# Patient Record
Sex: FEMALE | Race: WHITE | NOT HISPANIC OR LATINO | Employment: OTHER | URBAN - METROPOLITAN AREA
[De-identification: names, ages, dates, MRNs, and addresses within clinical notes are randomized per-mention and may not be internally consistent; named-entity substitution may affect disease eponyms.]

---

## 2017-03-03 ENCOUNTER — HOSPITAL ENCOUNTER (OUTPATIENT)
Dept: PHYSICAL MEDICINE AND REHAB | Facility: CLINIC | Age: 67
Discharge: HOME | End: 2017-03-03
Attending: ORTHOPAEDIC SURGERY

## 2017-03-03 DIAGNOSIS — Z98.1 S/P CERVICAL SPINAL FUSION: ICD-10-CM

## 2017-03-03 DIAGNOSIS — M81.0 OSTEOPOROSIS: ICD-10-CM

## 2017-03-03 DIAGNOSIS — M54.2 CERVICAL PAIN: ICD-10-CM

## 2017-03-20 ENCOUNTER — COMMUNICATION - HEALTHEAST (OUTPATIENT)
Dept: PHYSICAL MEDICINE AND REHAB | Facility: CLINIC | Age: 67
End: 2017-03-20

## 2017-03-27 ENCOUNTER — AMBULATORY - HEALTHEAST (OUTPATIENT)
Dept: PHYSICAL MEDICINE AND REHAB | Facility: CLINIC | Age: 67
End: 2017-03-27

## 2017-04-03 ENCOUNTER — RECORDS - HEALTHEAST (OUTPATIENT)
Dept: ADMINISTRATIVE | Facility: OTHER | Age: 67
End: 2017-04-03

## 2017-04-05 ENCOUNTER — RECORDS - HEALTHEAST (OUTPATIENT)
Dept: ADMINISTRATIVE | Facility: OTHER | Age: 67
End: 2017-04-05

## 2017-04-14 ENCOUNTER — AMBULATORY - HEALTHEAST (OUTPATIENT)
Dept: PHYSICAL MEDICINE AND REHAB | Facility: CLINIC | Age: 67
End: 2017-04-14

## 2017-04-21 ENCOUNTER — AMBULATORY - HEALTHEAST (OUTPATIENT)
Dept: PHYSICAL MEDICINE AND REHAB | Facility: CLINIC | Age: 67
End: 2017-04-21

## 2017-04-21 ENCOUNTER — RECORDS - HEALTHEAST (OUTPATIENT)
Dept: ADMINISTRATIVE | Facility: OTHER | Age: 67
End: 2017-04-21

## 2017-04-21 ENCOUNTER — RECORDS - HEALTHEAST (OUTPATIENT)
Dept: LAB | Facility: CLINIC | Age: 67
End: 2017-04-21

## 2017-04-21 LAB
CHOLEST SERPL-MCNC: 270 MG/DL
FASTING STATUS PATIENT QL REPORTED: ABNORMAL
HDLC SERPL-MCNC: 40 MG/DL
LDLC SERPL CALC-MCNC: 150 MG/DL
LDLC SERPL CALC-MCNC: ABNORMAL MG/DL
TRIGL SERPL-MCNC: 528 MG/DL

## 2017-04-26 ASSESSMENT — MIFFLIN-ST. JEOR: SCORE: 1439.32

## 2017-04-27 ENCOUNTER — ANESTHESIA - HEALTHEAST (OUTPATIENT)
Dept: SURGERY | Facility: HOSPITAL | Age: 67
End: 2017-04-27

## 2017-04-27 ENCOUNTER — SURGERY - HEALTHEAST (OUTPATIENT)
Dept: SURGERY | Facility: HOSPITAL | Age: 67
End: 2017-04-27

## 2017-04-27 ASSESSMENT — MIFFLIN-ST. JEOR: SCORE: 1421.62

## 2017-04-28 ASSESSMENT — MIFFLIN-ST. JEOR: SCORE: 1424.97

## 2017-04-29 ENCOUNTER — OFFICE VISIT - HEALTHEAST (OUTPATIENT)
Dept: OCCUPATIONAL THERAPY | Facility: HOSPITAL | Age: 67
End: 2017-04-29

## 2017-05-04 ENCOUNTER — RECORDS - HEALTHEAST (OUTPATIENT)
Dept: ADMINISTRATIVE | Facility: OTHER | Age: 67
End: 2017-05-04

## 2017-05-05 ENCOUNTER — COMMUNICATION - HEALTHEAST (OUTPATIENT)
Dept: PULMONOLOGY | Facility: OTHER | Age: 67
End: 2017-05-05

## 2017-05-05 ENCOUNTER — AMBULATORY - HEALTHEAST (OUTPATIENT)
Dept: PULMONOLOGY | Facility: OTHER | Age: 67
End: 2017-05-05

## 2017-05-05 DIAGNOSIS — R06.02 SOB (SHORTNESS OF BREATH): ICD-10-CM

## 2017-05-08 ENCOUNTER — RECORDS - HEALTHEAST (OUTPATIENT)
Dept: GENERAL RADIOLOGY | Facility: CLINIC | Age: 67
End: 2017-05-08

## 2017-05-08 DIAGNOSIS — M54.12 RADICULOPATHY, CERVICAL REGION: ICD-10-CM

## 2017-05-09 ENCOUNTER — HOSPITAL ENCOUNTER (OUTPATIENT)
Dept: PHYSICAL MEDICINE AND REHAB | Facility: CLINIC | Age: 67
Discharge: HOME | End: 2017-05-09
Attending: PHYSICIAN ASSISTANT

## 2017-05-09 ENCOUNTER — COMMUNICATION - HEALTHEAST (OUTPATIENT)
Dept: PHYSICAL MEDICINE AND REHAB | Facility: CLINIC | Age: 67
End: 2017-05-09

## 2017-05-09 DIAGNOSIS — G89.18 POST-OP PAIN: ICD-10-CM

## 2017-05-09 ASSESSMENT — MIFFLIN-ST. JEOR: SCORE: 1403.03

## 2017-05-25 ENCOUNTER — AMBULATORY - HEALTHEAST (OUTPATIENT)
Dept: PHYSICAL MEDICINE AND REHAB | Facility: CLINIC | Age: 67
End: 2017-05-25

## 2017-05-30 ENCOUNTER — RECORDS - HEALTHEAST (OUTPATIENT)
Dept: GENERAL RADIOLOGY | Facility: CLINIC | Age: 67
End: 2017-05-30

## 2017-05-30 DIAGNOSIS — G89.18 OTHER ACUTE POSTPROCEDURAL PAIN: ICD-10-CM

## 2017-06-06 ENCOUNTER — HOSPITAL ENCOUNTER (OUTPATIENT)
Dept: PHYSICAL MEDICINE AND REHAB | Facility: CLINIC | Age: 67
Discharge: HOME | End: 2017-06-06
Attending: PHYSICIAN ASSISTANT

## 2017-06-06 DIAGNOSIS — M54.12 CERVICAL RADICULOPATHY: ICD-10-CM

## 2017-06-06 ASSESSMENT — MIFFLIN-ST. JEOR: SCORE: 1430.24

## 2017-06-07 ENCOUNTER — RECORDS - HEALTHEAST (OUTPATIENT)
Dept: ADMINISTRATIVE | Facility: OTHER | Age: 67
End: 2017-06-07

## 2017-06-13 ENCOUNTER — RECORDS - HEALTHEAST (OUTPATIENT)
Dept: PULMONOLOGY | Facility: OTHER | Age: 67
End: 2017-06-13

## 2017-06-13 ENCOUNTER — RECORDS - HEALTHEAST (OUTPATIENT)
Dept: ADMINISTRATIVE | Facility: OTHER | Age: 67
End: 2017-06-13

## 2017-06-13 ENCOUNTER — OFFICE VISIT - HEALTHEAST (OUTPATIENT)
Dept: PULMONOLOGY | Facility: OTHER | Age: 67
End: 2017-06-13

## 2017-06-13 DIAGNOSIS — I42.9 CARDIOMYOPATHY (H): ICD-10-CM

## 2017-06-13 DIAGNOSIS — R06.02 SHORTNESS OF BREATH: ICD-10-CM

## 2017-06-13 DIAGNOSIS — R53.81 PHYSICAL DECONDITIONING: ICD-10-CM

## 2017-06-13 DIAGNOSIS — J96.90 RESPIRATORY FAILURE (H): ICD-10-CM

## 2017-06-13 DIAGNOSIS — E66.01 MORBID OBESITY DUE TO EXCESS CALORIES (H): ICD-10-CM

## 2017-06-13 DIAGNOSIS — G47.33 OSA TREATED WITH BIPAP: ICD-10-CM

## 2017-06-13 DIAGNOSIS — R06.09 DYSPNEA ON EXERTION: ICD-10-CM

## 2017-06-14 ENCOUNTER — OFFICE VISIT - HEALTHEAST (OUTPATIENT)
Dept: PHYSICAL THERAPY | Facility: REHABILITATION | Age: 67
End: 2017-06-14

## 2017-06-14 ENCOUNTER — AMBULATORY - HEALTHEAST (OUTPATIENT)
Dept: PHYSICAL MEDICINE AND REHAB | Facility: CLINIC | Age: 67
End: 2017-06-14

## 2017-06-14 DIAGNOSIS — G89.18 POST-OPERATIVE PAIN: ICD-10-CM

## 2017-06-14 DIAGNOSIS — M54.12 CERVICAL RADICULOPATHY: ICD-10-CM

## 2017-06-20 ENCOUNTER — OFFICE VISIT - HEALTHEAST (OUTPATIENT)
Dept: PHYSICAL THERAPY | Facility: REHABILITATION | Age: 67
End: 2017-06-20

## 2017-06-20 DIAGNOSIS — M54.12 CERVICAL RADICULOPATHY: ICD-10-CM

## 2017-06-27 ENCOUNTER — OFFICE VISIT - HEALTHEAST (OUTPATIENT)
Dept: PHYSICAL THERAPY | Facility: REHABILITATION | Age: 67
End: 2017-06-27

## 2017-06-27 DIAGNOSIS — M54.12 CERVICAL RADICULOPATHY: ICD-10-CM

## 2017-06-29 ENCOUNTER — RECORDS - HEALTHEAST (OUTPATIENT)
Dept: ADMINISTRATIVE | Facility: OTHER | Age: 67
End: 2017-06-29

## 2017-07-05 ENCOUNTER — OFFICE VISIT - HEALTHEAST (OUTPATIENT)
Dept: PHYSICAL THERAPY | Facility: REHABILITATION | Age: 67
End: 2017-07-05

## 2017-07-05 DIAGNOSIS — M54.12 CERVICAL RADICULOPATHY: ICD-10-CM

## 2017-07-18 ENCOUNTER — OFFICE VISIT - HEALTHEAST (OUTPATIENT)
Dept: PHYSICAL THERAPY | Facility: REHABILITATION | Age: 67
End: 2017-07-18

## 2017-07-18 ENCOUNTER — COMMUNICATION - HEALTHEAST (OUTPATIENT)
Dept: FAMILY MEDICINE | Facility: CLINIC | Age: 67
End: 2017-07-18

## 2017-07-18 DIAGNOSIS — M54.12 CERVICAL RADICULOPATHY: ICD-10-CM

## 2017-07-20 ENCOUNTER — RECORDS - HEALTHEAST (OUTPATIENT)
Dept: GENERAL RADIOLOGY | Facility: CLINIC | Age: 67
End: 2017-07-20

## 2017-07-20 ENCOUNTER — RECORDS - HEALTHEAST (OUTPATIENT)
Dept: ADMINISTRATIVE | Facility: OTHER | Age: 67
End: 2017-07-20

## 2017-07-20 DIAGNOSIS — R06.09 OTHER FORMS OF DYSPNEA: ICD-10-CM

## 2017-07-20 DIAGNOSIS — M54.12 RADICULOPATHY, CERVICAL REGION: ICD-10-CM

## 2017-07-21 ENCOUNTER — HOSPITAL ENCOUNTER (OUTPATIENT)
Dept: CARDIOLOGY | Facility: HOSPITAL | Age: 67
Discharge: HOME | End: 2017-07-21

## 2017-07-21 ENCOUNTER — HOSPITAL ENCOUNTER (OUTPATIENT)
Dept: NUCLEAR MEDICINE | Facility: HOSPITAL | Age: 67
Discharge: HOME | End: 2017-07-21

## 2017-07-21 DIAGNOSIS — R06.02 SOB (SHORTNESS OF BREATH): ICD-10-CM

## 2017-07-21 DIAGNOSIS — R09.02 HYPOXIA: ICD-10-CM

## 2017-07-21 LAB
CV STRESS CURRENT BP HE: NORMAL
CV STRESS CURRENT HR HE: 53
CV STRESS CURRENT HR HE: 54
CV STRESS CURRENT HR HE: 54
CV STRESS CURRENT HR HE: 70
CV STRESS CURRENT HR HE: 71
CV STRESS CURRENT HR HE: 73
CV STRESS CURRENT HR HE: 73
CV STRESS CURRENT HR HE: 75
CV STRESS CURRENT HR HE: 76
CV STRESS CURRENT HR HE: 77
CV STRESS CURRENT HR HE: 78
CV STRESS DEVIATION TIME HE: NORMAL
CV STRESS ECHO PERCENT HR HE: NORMAL
CV STRESS EXERCISE STAGE HE: NORMAL
CV STRESS FINAL RESTING BP HE: NORMAL
CV STRESS FINAL RESTING HR HE: 70
CV STRESS MAX HR HE: 78
CV STRESS MAX TREADMILL GRADE HE: 0
CV STRESS MAX TREADMILL SPEED HE: 0
CV STRESS PEAK DIA BP HE: NORMAL
CV STRESS PEAK SYS BP HE: NORMAL
CV STRESS PHASE HE: NORMAL
CV STRESS PROTOCOL HE: NORMAL
CV STRESS RESTING PT POSITION HE: NORMAL
CV STRESS ST DEVIATION AMOUNT HE: NORMAL
CV STRESS ST DEVIATION ELEVATION HE: NORMAL
CV STRESS ST EVELATION AMOUNT HE: NORMAL
CV STRESS TEST TYPE HE: NORMAL
CV STRESS TOTAL STAGE TIME MIN 1 HE: NORMAL
NUC STRESS EJECTION FRACTION: 74 %
STRESS ECHO BASELINE BP: NORMAL
STRESS ECHO BASELINE HR: 53
STRESS ECHO CALCULATED PERCENT HR: 50 %
STRESS ECHO LAST STRESS BP: NORMAL
STRESS ECHO LAST STRESS HR: 76

## 2017-07-25 ENCOUNTER — HOSPITAL ENCOUNTER (OUTPATIENT)
Dept: PHYSICAL MEDICINE AND REHAB | Facility: CLINIC | Age: 67
Discharge: HOME | End: 2017-07-25
Attending: PHYSICIAN ASSISTANT

## 2017-07-25 DIAGNOSIS — M54.12 CERVICAL RADICULOPATHY: ICD-10-CM

## 2017-07-25 DIAGNOSIS — Z98.1 S/P CERVICAL SPINAL FUSION: ICD-10-CM

## 2017-07-26 ENCOUNTER — AMBULATORY - HEALTHEAST (OUTPATIENT)
Dept: PHYSICAL MEDICINE AND REHAB | Facility: CLINIC | Age: 67
End: 2017-07-26

## 2017-07-26 DIAGNOSIS — Z98.1 STATUS POST CERVICAL SPINAL FUSION: ICD-10-CM

## 2017-07-26 DIAGNOSIS — M54.12 CERVICAL RADICULITIS: ICD-10-CM

## 2017-07-28 ENCOUNTER — COMMUNICATION - HEALTHEAST (OUTPATIENT)
Dept: PHYSICAL MEDICINE AND REHAB | Facility: CLINIC | Age: 67
End: 2017-07-28

## 2017-08-01 ENCOUNTER — HOSPITAL ENCOUNTER (OUTPATIENT)
Dept: PHYSICAL MEDICINE AND REHAB | Facility: CLINIC | Age: 67
Discharge: HOME | End: 2017-08-01
Attending: PHYSICIAN ASSISTANT

## 2017-08-01 ENCOUNTER — OFFICE VISIT - HEALTHEAST (OUTPATIENT)
Dept: PULMONOLOGY | Facility: OTHER | Age: 67
End: 2017-08-01

## 2017-08-01 DIAGNOSIS — R06.09 DYSPNEA ON EXERTION: ICD-10-CM

## 2017-08-01 DIAGNOSIS — J96.11 CHRONIC RESPIRATORY FAILURE WITH HYPOXIA (H): ICD-10-CM

## 2017-08-01 DIAGNOSIS — E66.2 MORBID OBESITY WITH ALVEOLAR HYPOVENTILATION (H): ICD-10-CM

## 2017-08-01 DIAGNOSIS — R53.81 PHYSICAL DECONDITIONING: ICD-10-CM

## 2017-08-01 DIAGNOSIS — G47.33 OSA TREATED WITH BIPAP: ICD-10-CM

## 2017-08-01 DIAGNOSIS — M54.12 CERVICAL RADICULOPATHY: ICD-10-CM

## 2017-08-01 DIAGNOSIS — I50.32 CHRONIC DIASTOLIC CONGESTIVE HEART FAILURE (H): ICD-10-CM

## 2017-08-01 RX ORDER — ALENDRONATE SODIUM 70 MG/1
70 TABLET ORAL
Refills: 11 | Status: SHIPPED | COMMUNITY
Start: 2017-07-12

## 2017-08-02 ENCOUNTER — RECORDS - HEALTHEAST (OUTPATIENT)
Dept: ADMINISTRATIVE | Facility: OTHER | Age: 67
End: 2017-08-02

## 2017-08-08 ENCOUNTER — OFFICE VISIT - HEALTHEAST (OUTPATIENT)
Dept: PHYSICAL THERAPY | Facility: REHABILITATION | Age: 67
End: 2017-08-08

## 2017-08-08 DIAGNOSIS — R26.89 POOR BALANCE: ICD-10-CM

## 2017-08-08 DIAGNOSIS — Z74.09 DECREASED FUNCTIONAL MOBILITY AND ENDURANCE: ICD-10-CM

## 2017-08-08 DIAGNOSIS — M62.81 GENERALIZED MUSCLE WEAKNESS: ICD-10-CM

## 2017-08-09 ENCOUNTER — AMBULATORY - HEALTHEAST (OUTPATIENT)
Dept: LAB | Facility: CLINIC | Age: 67
End: 2017-08-09

## 2017-08-09 DIAGNOSIS — R06.09 DYSPNEA ON EXERTION: ICD-10-CM

## 2017-08-21 ENCOUNTER — ANESTHESIA - HEALTHEAST (OUTPATIENT)
Dept: SURGERY | Facility: HOSPITAL | Age: 67
End: 2017-08-21

## 2017-08-21 ASSESSMENT — MIFFLIN-ST. JEOR: SCORE: 1425.71

## 2017-08-22 ENCOUNTER — SURGERY - HEALTHEAST (OUTPATIENT)
Dept: SURGERY | Facility: HOSPITAL | Age: 67
End: 2017-08-22

## 2017-08-29 ENCOUNTER — AMBULATORY - HEALTHEAST (OUTPATIENT)
Dept: CARDIOLOGY | Facility: CLINIC | Age: 67
End: 2017-08-29

## 2017-10-13 ENCOUNTER — RECORDS - HEALTHEAST (OUTPATIENT)
Dept: ADMINISTRATIVE | Facility: OTHER | Age: 67
End: 2017-10-13

## 2017-10-13 ENCOUNTER — AMBULATORY - HEALTHEAST (OUTPATIENT)
Dept: CARDIOLOGY | Facility: CLINIC | Age: 67
End: 2017-10-13

## 2017-10-23 ENCOUNTER — OFFICE VISIT - HEALTHEAST (OUTPATIENT)
Dept: CARDIOLOGY | Facility: CLINIC | Age: 67
End: 2017-10-23

## 2017-10-23 DIAGNOSIS — I50.32 CHRONIC DIASTOLIC HEART FAILURE (H): ICD-10-CM

## 2017-10-23 DIAGNOSIS — G47.33 OBSTRUCTIVE SLEEP APNEA: ICD-10-CM

## 2017-10-23 DIAGNOSIS — N18.2 CHRONIC KIDNEY DISEASE, STAGE 2 (MILD): ICD-10-CM

## 2017-10-23 DIAGNOSIS — I10 ESSENTIAL HYPERTENSION: ICD-10-CM

## 2017-10-23 ASSESSMENT — MIFFLIN-ST. JEOR: SCORE: 1399.62

## 2017-10-25 ENCOUNTER — AMBULATORY - HEALTHEAST (OUTPATIENT)
Dept: CARDIOLOGY | Facility: CLINIC | Age: 67
End: 2017-10-25

## 2017-10-25 DIAGNOSIS — E87.70 HYPERVOLEMIA, UNSPECIFIED HYPERVOLEMIA TYPE: ICD-10-CM

## 2017-10-25 RX ORDER — FUROSEMIDE 20 MG
20 TABLET ORAL DAILY
Qty: 30 TABLET | Refills: 0 | Status: SHIPPED
Start: 2017-10-25 | End: 2022-10-31

## 2017-12-12 ENCOUNTER — RECORDS - HEALTHEAST (OUTPATIENT)
Dept: LAB | Facility: CLINIC | Age: 67
End: 2017-12-12

## 2017-12-12 LAB
CHOLEST SERPL-MCNC: 232 MG/DL
FASTING STATUS PATIENT QL REPORTED: NO
HDLC SERPL-MCNC: 67 MG/DL
LDLC SERPL CALC-MCNC: 123 MG/DL
TRIGL SERPL-MCNC: 208 MG/DL

## 2018-01-10 ENCOUNTER — RECORDS - HEALTHEAST (OUTPATIENT)
Dept: LAB | Facility: CLINIC | Age: 68
End: 2018-01-10

## 2018-01-10 ENCOUNTER — RECORDS - HEALTHEAST (OUTPATIENT)
Dept: ADMINISTRATIVE | Facility: OTHER | Age: 68
End: 2018-01-10

## 2018-01-10 LAB
ANION GAP SERPL CALCULATED.3IONS-SCNC: 13 MMOL/L (ref 5–18)
BUN SERPL-MCNC: 35 MG/DL (ref 8–22)
CALCIUM SERPL-MCNC: 9.6 MG/DL (ref 8.5–10.5)
CHLORIDE BLD-SCNC: 101 MMOL/L (ref 98–107)
CO2 SERPL-SCNC: 28 MMOL/L (ref 22–31)
CREAT SERPL-MCNC: 1.31 MG/DL (ref 0.6–1.1)
GFR SERPL CREATININE-BSD FRML MDRD: 41 ML/MIN/1.73M2
GLUCOSE BLD-MCNC: 104 MG/DL (ref 70–125)
POTASSIUM BLD-SCNC: 4.2 MMOL/L (ref 3.5–5)
SODIUM SERPL-SCNC: 142 MMOL/L (ref 136–145)

## 2018-01-11 ENCOUNTER — RECORDS - HEALTHEAST (OUTPATIENT)
Dept: ADMINISTRATIVE | Facility: OTHER | Age: 68
End: 2018-01-11

## 2018-01-12 ENCOUNTER — RECORDS - HEALTHEAST (OUTPATIENT)
Dept: ADMINISTRATIVE | Facility: OTHER | Age: 68
End: 2018-01-12

## 2018-01-16 ENCOUNTER — RECORDS - HEALTHEAST (OUTPATIENT)
Dept: ADMINISTRATIVE | Facility: OTHER | Age: 68
End: 2018-01-16

## 2018-02-16 ENCOUNTER — RECORDS - HEALTHEAST (OUTPATIENT)
Dept: LAB | Facility: CLINIC | Age: 68
End: 2018-02-16

## 2018-02-16 LAB
ANION GAP SERPL CALCULATED.3IONS-SCNC: 13 MMOL/L (ref 5–18)
BUN SERPL-MCNC: 48 MG/DL (ref 8–22)
CALCIUM SERPL-MCNC: 9.6 MG/DL (ref 8.5–10.5)
CHLORIDE BLD-SCNC: 102 MMOL/L (ref 98–107)
CO2 SERPL-SCNC: 26 MMOL/L (ref 22–31)
CREAT SERPL-MCNC: 1.32 MG/DL (ref 0.6–1.1)
GFR SERPL CREATININE-BSD FRML MDRD: 40 ML/MIN/1.73M2
GLUCOSE BLD-MCNC: 105 MG/DL (ref 70–125)
POTASSIUM BLD-SCNC: 4.5 MMOL/L (ref 3.5–5)
SODIUM SERPL-SCNC: 141 MMOL/L (ref 136–145)

## 2018-02-18 LAB — BACTERIA SPEC CULT: ABNORMAL

## 2018-02-19 LAB — BNP SERPL-MCNC: 137 PG/ML (ref 0–109)

## 2018-03-19 ENCOUNTER — OFFICE VISIT - HEALTHEAST (OUTPATIENT)
Dept: PULMONOLOGY | Facility: OTHER | Age: 68
End: 2018-03-19

## 2018-03-19 DIAGNOSIS — E66.01 MORBID OBESITY (H): ICD-10-CM

## 2018-03-19 DIAGNOSIS — R53.81 PHYSICAL DECONDITIONING: ICD-10-CM

## 2018-03-19 DIAGNOSIS — I51.89 DIASTOLIC DYSFUNCTION: ICD-10-CM

## 2018-03-19 DIAGNOSIS — G47.33 OSA TREATED WITH BIPAP: ICD-10-CM

## 2018-03-19 DIAGNOSIS — J96.11 CHRONIC RESPIRATORY FAILURE WITH HYPOXIA (H): ICD-10-CM

## 2018-04-05 ENCOUNTER — COMMUNICATION - HEALTHEAST (OUTPATIENT)
Dept: SURGERY | Facility: HOSPITAL | Age: 68
End: 2018-04-05

## 2018-05-16 ENCOUNTER — OFFICE VISIT - HEALTHEAST (OUTPATIENT)
Dept: PHYSICAL THERAPY | Facility: REHABILITATION | Age: 68
End: 2018-05-16

## 2018-05-16 DIAGNOSIS — R68.89 DECREASED FUNCTIONAL ACTIVITY TOLERANCE: ICD-10-CM

## 2018-05-16 DIAGNOSIS — R53.81 PHYSICAL DECONDITIONING: ICD-10-CM

## 2018-05-30 ENCOUNTER — OFFICE VISIT - HEALTHEAST (OUTPATIENT)
Dept: PHYSICAL THERAPY | Facility: REHABILITATION | Age: 68
End: 2018-05-30

## 2018-05-30 DIAGNOSIS — Z74.09 DECREASED FUNCTIONAL MOBILITY AND ENDURANCE: ICD-10-CM

## 2018-05-30 DIAGNOSIS — R26.89 POOR BALANCE: ICD-10-CM

## 2018-05-30 DIAGNOSIS — R53.81 PHYSICAL DECONDITIONING: ICD-10-CM

## 2018-05-30 DIAGNOSIS — R68.89 DECREASED FUNCTIONAL ACTIVITY TOLERANCE: ICD-10-CM

## 2018-05-30 DIAGNOSIS — M62.81 GENERALIZED MUSCLE WEAKNESS: ICD-10-CM

## 2018-06-06 ENCOUNTER — OFFICE VISIT - HEALTHEAST (OUTPATIENT)
Dept: PHYSICAL THERAPY | Facility: REHABILITATION | Age: 68
End: 2018-06-06

## 2018-06-06 DIAGNOSIS — Z74.09 DECREASED FUNCTIONAL MOBILITY AND ENDURANCE: ICD-10-CM

## 2018-06-06 DIAGNOSIS — M62.81 GENERALIZED MUSCLE WEAKNESS: ICD-10-CM

## 2018-06-06 DIAGNOSIS — R68.89 DECREASED FUNCTIONAL ACTIVITY TOLERANCE: ICD-10-CM

## 2018-06-06 DIAGNOSIS — R26.89 POOR BALANCE: ICD-10-CM

## 2018-06-06 DIAGNOSIS — R53.81 PHYSICAL DECONDITIONING: ICD-10-CM

## 2018-06-08 ENCOUNTER — RECORDS - HEALTHEAST (OUTPATIENT)
Dept: LAB | Facility: CLINIC | Age: 68
End: 2018-06-08

## 2018-06-08 LAB — ERYTHROCYTE [SEDIMENTATION RATE] IN BLOOD BY WESTERGREN METHOD: 41 MM/HR (ref 0–20)

## 2018-06-13 ENCOUNTER — OFFICE VISIT - HEALTHEAST (OUTPATIENT)
Dept: PHYSICAL THERAPY | Facility: REHABILITATION | Age: 68
End: 2018-06-13

## 2018-06-13 DIAGNOSIS — R53.81 PHYSICAL DECONDITIONING: ICD-10-CM

## 2018-06-13 DIAGNOSIS — R26.89 POOR BALANCE: ICD-10-CM

## 2018-06-13 DIAGNOSIS — R68.89 DECREASED FUNCTIONAL ACTIVITY TOLERANCE: ICD-10-CM

## 2018-06-13 DIAGNOSIS — Z74.09 DECREASED FUNCTIONAL MOBILITY AND ENDURANCE: ICD-10-CM

## 2018-06-13 DIAGNOSIS — M62.81 GENERALIZED MUSCLE WEAKNESS: ICD-10-CM

## 2018-06-19 ENCOUNTER — RECORDS - HEALTHEAST (OUTPATIENT)
Dept: ADMINISTRATIVE | Facility: OTHER | Age: 68
End: 2018-06-19

## 2018-06-27 ENCOUNTER — OFFICE VISIT - HEALTHEAST (OUTPATIENT)
Dept: PHYSICAL THERAPY | Facility: REHABILITATION | Age: 68
End: 2018-06-27

## 2018-06-27 DIAGNOSIS — M62.81 GENERALIZED MUSCLE WEAKNESS: ICD-10-CM

## 2018-06-27 DIAGNOSIS — R68.89 DECREASED FUNCTIONAL ACTIVITY TOLERANCE: ICD-10-CM

## 2018-06-27 DIAGNOSIS — R53.81 PHYSICAL DECONDITIONING: ICD-10-CM

## 2018-06-27 DIAGNOSIS — R26.89 POOR BALANCE: ICD-10-CM

## 2018-06-27 DIAGNOSIS — Z74.09 DECREASED FUNCTIONAL MOBILITY AND ENDURANCE: ICD-10-CM

## 2019-01-17 ENCOUNTER — RECORDS - HEALTHEAST (OUTPATIENT)
Dept: ADMINISTRATIVE | Facility: OTHER | Age: 69
End: 2019-01-17

## 2019-01-18 ENCOUNTER — RECORDS - HEALTHEAST (OUTPATIENT)
Dept: ADMINISTRATIVE | Facility: OTHER | Age: 69
End: 2019-01-18

## 2019-01-18 ENCOUNTER — COMMUNICATION - HEALTHEAST (OUTPATIENT)
Dept: PHARMACY | Facility: HOSPITAL | Age: 69
End: 2019-01-18

## 2019-01-18 DIAGNOSIS — M54.12 CERVICAL RADICULOPATHY: ICD-10-CM

## 2019-03-13 ENCOUNTER — OFFICE VISIT - HEALTHEAST (OUTPATIENT)
Dept: PULMONOLOGY | Facility: OTHER | Age: 69
End: 2019-03-13

## 2019-03-13 DIAGNOSIS — J45.40 MODERATE PERSISTENT ASTHMA WITHOUT COMPLICATION: ICD-10-CM

## 2019-03-13 RX ORDER — BENZONATATE 100 MG/1
100 CAPSULE ORAL 3 TIMES DAILY PRN
Status: SHIPPED | COMMUNITY
Start: 2019-03-13 | End: 2023-12-05

## 2019-03-13 RX ORDER — ALBUTEROL SULFATE 90 UG/1
1 AEROSOL, METERED RESPIRATORY (INHALATION) EVERY 4 HOURS PRN
Status: SHIPPED | COMMUNITY
Start: 2019-03-13

## 2019-03-13 ASSESSMENT — MIFFLIN-ST. JEOR: SCORE: 1349.73

## 2019-03-29 ENCOUNTER — COMMUNICATION - HEALTHEAST (OUTPATIENT)
Dept: PULMONOLOGY | Facility: OTHER | Age: 69
End: 2019-03-29

## 2019-04-01 ENCOUNTER — COMMUNICATION - HEALTHEAST (OUTPATIENT)
Dept: PULMONOLOGY | Facility: OTHER | Age: 69
End: 2019-04-01

## 2019-04-01 ENCOUNTER — AMBULATORY - HEALTHEAST (OUTPATIENT)
Dept: SLEEP MEDICINE | Facility: CLINIC | Age: 69
End: 2019-04-01

## 2019-04-01 DIAGNOSIS — G47.33 OSA (OBSTRUCTIVE SLEEP APNEA): ICD-10-CM

## 2019-04-03 ENCOUNTER — COMMUNICATION - HEALTHEAST (OUTPATIENT)
Dept: PULMONOLOGY | Facility: OTHER | Age: 69
End: 2019-04-03

## 2019-04-03 DIAGNOSIS — R05.3 CHRONIC COUGH: ICD-10-CM

## 2019-04-03 DIAGNOSIS — R06.00 DYSPNEA, UNSPECIFIED TYPE: ICD-10-CM

## 2019-04-03 DIAGNOSIS — J98.4 RESTRICTIVE LUNG DISEASE: ICD-10-CM

## 2019-04-08 ENCOUNTER — HOSPITAL ENCOUNTER (OUTPATIENT)
Dept: CT IMAGING | Facility: CLINIC | Age: 69
Discharge: HOME | End: 2019-04-08
Attending: INTERNAL MEDICINE

## 2019-04-08 DIAGNOSIS — R06.00 DYSPNEA, UNSPECIFIED TYPE: ICD-10-CM

## 2019-04-08 DIAGNOSIS — J98.4 RESTRICTIVE LUNG DISEASE: ICD-10-CM

## 2019-04-08 DIAGNOSIS — R05.3 CHRONIC COUGH: ICD-10-CM

## 2019-04-09 ENCOUNTER — COMMUNICATION - HEALTHEAST (OUTPATIENT)
Dept: PULMONOLOGY | Facility: OTHER | Age: 69
End: 2019-04-09

## 2019-04-11 ENCOUNTER — COMMUNICATION - HEALTHEAST (OUTPATIENT)
Dept: PULMONOLOGY | Facility: OTHER | Age: 69
End: 2019-04-11

## 2019-04-24 ENCOUNTER — RECORDS - HEALTHEAST (OUTPATIENT)
Dept: SLEEP MEDICINE | Facility: CLINIC | Age: 69
End: 2019-04-24

## 2019-04-24 ENCOUNTER — RECORDS - HEALTHEAST (OUTPATIENT)
Dept: ADMINISTRATIVE | Facility: OTHER | Age: 69
End: 2019-04-24

## 2019-04-24 DIAGNOSIS — G47.33 OBSTRUCTIVE SLEEP APNEA (ADULT) (PEDIATRIC): ICD-10-CM

## 2019-05-03 ENCOUNTER — COMMUNICATION - HEALTHEAST (OUTPATIENT)
Dept: SLEEP MEDICINE | Facility: CLINIC | Age: 69
End: 2019-05-03

## 2019-05-03 ENCOUNTER — COMMUNICATION - HEALTHEAST (OUTPATIENT)
Dept: PULMONOLOGY | Facility: OTHER | Age: 69
End: 2019-05-03

## 2019-05-03 ENCOUNTER — AMBULATORY - HEALTHEAST (OUTPATIENT)
Dept: PULMONOLOGY | Facility: OTHER | Age: 69
End: 2019-05-03

## 2019-05-03 DIAGNOSIS — G47.33 OSA (OBSTRUCTIVE SLEEP APNEA): ICD-10-CM

## 2019-05-08 ENCOUNTER — OFFICE VISIT - HEALTHEAST (OUTPATIENT)
Dept: PULMONOLOGY | Facility: OTHER | Age: 69
End: 2019-05-08

## 2019-05-08 DIAGNOSIS — G47.33 OSA (OBSTRUCTIVE SLEEP APNEA): ICD-10-CM

## 2019-05-08 DIAGNOSIS — J45.909 UNCOMPLICATED ASTHMA, UNSPECIFIED ASTHMA SEVERITY, UNSPECIFIED WHETHER PERSISTENT: ICD-10-CM

## 2019-05-08 DIAGNOSIS — E66.2 OBESITY HYPOVENTILATION SYNDROME (H): ICD-10-CM

## 2019-05-08 DIAGNOSIS — J30.9 ALLERGIC RHINITIS, UNSPECIFIED SEASONALITY, UNSPECIFIED TRIGGER: ICD-10-CM

## 2019-05-08 ASSESSMENT — MIFFLIN-ST. JEOR: SCORE: 1363.34

## 2019-05-10 ENCOUNTER — RECORDS - HEALTHEAST (OUTPATIENT)
Dept: LAB | Facility: CLINIC | Age: 69
End: 2019-05-10

## 2019-05-10 LAB
ANION GAP SERPL CALCULATED.3IONS-SCNC: 14 MMOL/L (ref 5–18)
BUN SERPL-MCNC: 70 MG/DL (ref 8–22)
CALCIUM SERPL-MCNC: 10.3 MG/DL (ref 8.5–10.5)
CHLORIDE BLD-SCNC: 99 MMOL/L (ref 98–107)
CHOLEST SERPL-MCNC: 214 MG/DL
CO2 SERPL-SCNC: 27 MMOL/L (ref 22–31)
CREAT SERPL-MCNC: 1.47 MG/DL (ref 0.6–1.1)
FASTING STATUS PATIENT QL REPORTED: NO
GFR SERPL CREATININE-BSD FRML MDRD: 35 ML/MIN/1.73M2
GLUCOSE BLD-MCNC: 106 MG/DL (ref 70–125)
HDLC SERPL-MCNC: 44 MG/DL
LDLC SERPL CALC-MCNC: 125 MG/DL
POTASSIUM BLD-SCNC: 4.7 MMOL/L (ref 3.5–5)
SODIUM SERPL-SCNC: 140 MMOL/L (ref 136–145)
TRIGL SERPL-MCNC: 227 MG/DL

## 2019-05-13 LAB — 25(OH)D3 SERPL-MCNC: 41.2 NG/ML (ref 30–80)

## 2019-05-17 ENCOUNTER — RECORDS - HEALTHEAST (OUTPATIENT)
Dept: LAB | Facility: CLINIC | Age: 69
End: 2019-05-17

## 2019-05-17 LAB
ANION GAP SERPL CALCULATED.3IONS-SCNC: 13 MMOL/L (ref 5–18)
BUN SERPL-MCNC: 60 MG/DL (ref 8–22)
CALCIUM SERPL-MCNC: 9.8 MG/DL (ref 8.5–10.5)
CHLORIDE BLD-SCNC: 105 MMOL/L (ref 98–107)
CO2 SERPL-SCNC: 24 MMOL/L (ref 22–31)
CREAT SERPL-MCNC: 1.47 MG/DL (ref 0.6–1.1)
GFR SERPL CREATININE-BSD FRML MDRD: 35 ML/MIN/1.73M2
GLUCOSE BLD-MCNC: 86 MG/DL (ref 70–125)
POTASSIUM BLD-SCNC: 5.7 MMOL/L (ref 3.5–5)
SODIUM SERPL-SCNC: 142 MMOL/L (ref 136–145)

## 2019-06-27 ENCOUNTER — RECORDS - HEALTHEAST (OUTPATIENT)
Dept: LAB | Facility: CLINIC | Age: 69
End: 2019-06-27

## 2019-06-27 LAB — ERYTHROCYTE [SEDIMENTATION RATE] IN BLOOD BY WESTERGREN METHOD: 40 MM/HR (ref 0–20)

## 2019-08-01 ENCOUNTER — OFFICE VISIT - HEALTHEAST (OUTPATIENT)
Dept: PULMONOLOGY | Facility: OTHER | Age: 69
End: 2019-08-01

## 2019-08-01 DIAGNOSIS — J45.40 MODERATE PERSISTENT ASTHMA WITHOUT COMPLICATION: ICD-10-CM

## 2019-08-01 DIAGNOSIS — G47.33 OSA (OBSTRUCTIVE SLEEP APNEA): ICD-10-CM

## 2019-08-01 DIAGNOSIS — E66.2 OBESITY HYPOVENTILATION SYNDROME (H): ICD-10-CM

## 2019-08-01 DIAGNOSIS — J32.9 CHRONIC SINUSITIS, UNSPECIFIED LOCATION: ICD-10-CM

## 2019-08-28 ENCOUNTER — RECORDS - HEALTHEAST (OUTPATIENT)
Dept: LAB | Facility: CLINIC | Age: 69
End: 2019-08-28

## 2019-08-28 LAB
ALBUMIN SERPL-MCNC: 3.8 G/DL (ref 3.5–5)
ALP SERPL-CCNC: 76 U/L (ref 45–120)
ALT SERPL W P-5'-P-CCNC: 16 U/L (ref 0–45)
ANION GAP SERPL CALCULATED.3IONS-SCNC: 13 MMOL/L (ref 5–18)
AST SERPL W P-5'-P-CCNC: 20 U/L (ref 0–40)
BILIRUB SERPL-MCNC: 0.3 MG/DL (ref 0–1)
BUN SERPL-MCNC: 61 MG/DL (ref 8–22)
CALCIUM SERPL-MCNC: 9.4 MG/DL (ref 8.5–10.5)
CHLORIDE BLD-SCNC: 105 MMOL/L (ref 98–107)
CO2 SERPL-SCNC: 24 MMOL/L (ref 22–31)
CREAT SERPL-MCNC: 1.44 MG/DL (ref 0.6–1.1)
ERYTHROCYTE [SEDIMENTATION RATE] IN BLOOD BY WESTERGREN METHOD: 47 MM/HR (ref 0–20)
GFR SERPL CREATININE-BSD FRML MDRD: 36 ML/MIN/1.73M2
GLUCOSE BLD-MCNC: 125 MG/DL (ref 70–125)
POTASSIUM BLD-SCNC: 4.1 MMOL/L (ref 3.5–5)
PROT SERPL-MCNC: 7 G/DL (ref 6–8)
SODIUM SERPL-SCNC: 142 MMOL/L (ref 136–145)

## 2019-08-30 ENCOUNTER — RECORDS - HEALTHEAST (OUTPATIENT)
Dept: LAB | Facility: CLINIC | Age: 69
End: 2019-08-30

## 2019-09-03 LAB — O+P STL MICRO: NORMAL

## 2019-09-06 ENCOUNTER — RECORDS - HEALTHEAST (OUTPATIENT)
Dept: LAB | Facility: CLINIC | Age: 69
End: 2019-09-06

## 2019-09-06 LAB
SHIGA TOXIN 1: NEGATIVE
SHIGA TOXIN 2: NEGATIVE

## 2019-09-08 LAB — BACTERIA SPEC CULT: NORMAL

## 2019-09-25 ENCOUNTER — RECORDS - HEALTHEAST (OUTPATIENT)
Dept: LAB | Facility: CLINIC | Age: 69
End: 2019-09-25

## 2019-09-25 LAB — ERYTHROCYTE [SEDIMENTATION RATE] IN BLOOD BY WESTERGREN METHOD: 17 MM/HR (ref 0–20)

## 2019-10-18 ENCOUNTER — RECORDS - HEALTHEAST (OUTPATIENT)
Dept: LAB | Facility: CLINIC | Age: 69
End: 2019-10-18

## 2019-10-18 LAB — ERYTHROCYTE [SEDIMENTATION RATE] IN BLOOD BY WESTERGREN METHOD: 28 MM/HR (ref 0–20)

## 2019-12-04 ENCOUNTER — RECORDS - HEALTHEAST (OUTPATIENT)
Dept: LAB | Facility: CLINIC | Age: 69
End: 2019-12-04

## 2019-12-04 LAB
ANION GAP SERPL CALCULATED.3IONS-SCNC: 10 MMOL/L (ref 5–18)
BUN SERPL-MCNC: 48 MG/DL (ref 8–22)
CALCIUM SERPL-MCNC: 10.1 MG/DL (ref 8.5–10.5)
CHLORIDE BLD-SCNC: 95 MMOL/L (ref 98–107)
CO2 SERPL-SCNC: 34 MMOL/L (ref 22–31)
CREAT SERPL-MCNC: 1.3 MG/DL (ref 0.6–1.1)
GFR SERPL CREATININE-BSD FRML MDRD: 41 ML/MIN/1.73M2
GLUCOSE BLD-MCNC: 117 MG/DL (ref 70–125)
POTASSIUM BLD-SCNC: 5 MMOL/L (ref 3.5–5)
SODIUM SERPL-SCNC: 139 MMOL/L (ref 136–145)

## 2019-12-19 ASSESSMENT — MIFFLIN-ST. JEOR: SCORE: 1366.74

## 2019-12-20 ENCOUNTER — SURGERY - HEALTHEAST (OUTPATIENT)
Dept: SURGERY | Facility: HOSPITAL | Age: 69
End: 2019-12-20

## 2019-12-20 ENCOUNTER — ANESTHESIA - HEALTHEAST (OUTPATIENT)
Dept: SURGERY | Facility: HOSPITAL | Age: 69
End: 2019-12-20

## 2020-03-31 ENCOUNTER — RECORDS - HEALTHEAST (OUTPATIENT)
Dept: LAB | Facility: CLINIC | Age: 70
End: 2020-03-31

## 2020-04-02 LAB — BACTERIA SPEC CULT: NORMAL

## 2020-05-13 ENCOUNTER — RECORDS - HEALTHEAST (OUTPATIENT)
Dept: ADMINISTRATIVE | Facility: OTHER | Age: 70
End: 2020-05-13

## 2020-05-13 ENCOUNTER — RECORDS - HEALTHEAST (OUTPATIENT)
Dept: LAB | Facility: CLINIC | Age: 70
End: 2020-05-13

## 2020-05-13 LAB
ANION GAP SERPL CALCULATED.3IONS-SCNC: 12 MMOL/L (ref 5–18)
BUN SERPL-MCNC: 51 MG/DL (ref 8–22)
CALCIUM SERPL-MCNC: 10.3 MG/DL (ref 8.5–10.5)
CHLORIDE BLD-SCNC: 97 MMOL/L (ref 98–107)
CHOLEST SERPL-MCNC: 201 MG/DL
CO2 SERPL-SCNC: 32 MMOL/L (ref 22–31)
CREAT SERPL-MCNC: 1.46 MG/DL (ref 0.6–1.1)
ERYTHROCYTE [SEDIMENTATION RATE] IN BLOOD BY WESTERGREN METHOD: 41 MM/HR (ref 0–20)
FASTING STATUS PATIENT QL REPORTED: NO
GFR SERPL CREATININE-BSD FRML MDRD: 36 ML/MIN/1.73M2
GLUCOSE BLD-MCNC: 131 MG/DL (ref 70–125)
HDLC SERPL-MCNC: 52 MG/DL
LDLC SERPL CALC-MCNC: 103 MG/DL
POTASSIUM BLD-SCNC: 4.9 MMOL/L (ref 3.5–5)
SODIUM SERPL-SCNC: 141 MMOL/L (ref 136–145)
TRIGL SERPL-MCNC: 229 MG/DL

## 2020-05-14 ENCOUNTER — RECORDS - HEALTHEAST (OUTPATIENT)
Dept: ADMINISTRATIVE | Facility: OTHER | Age: 70
End: 2020-05-14

## 2020-05-15 ENCOUNTER — COMMUNICATION - HEALTHEAST (OUTPATIENT)
Dept: ADMINISTRATIVE | Facility: CLINIC | Age: 70
End: 2020-05-15

## 2020-05-22 ENCOUNTER — RECORDS - HEALTHEAST (OUTPATIENT)
Dept: ADMINISTRATIVE | Facility: OTHER | Age: 70
End: 2020-05-22

## 2020-05-22 ENCOUNTER — AMBULATORY - HEALTHEAST (OUTPATIENT)
Dept: PHYSICAL MEDICINE AND REHAB | Facility: CLINIC | Age: 70
End: 2020-05-22

## 2020-05-22 DIAGNOSIS — M48.02 CERVICAL STENOSIS OF SPINAL CANAL: ICD-10-CM

## 2020-05-27 ENCOUNTER — AMBULATORY - HEALTHEAST (OUTPATIENT)
Dept: NEUROSURGERY | Facility: CLINIC | Age: 70
End: 2020-05-27

## 2020-05-27 DIAGNOSIS — Z98.1 S/P CERVICAL SPINAL FUSION: ICD-10-CM

## 2020-05-29 ENCOUNTER — RECORDS - HEALTHEAST (OUTPATIENT)
Dept: RADIOLOGY | Facility: CLINIC | Age: 70
End: 2020-05-29

## 2020-05-29 ENCOUNTER — AMBULATORY - HEALTHEAST (OUTPATIENT)
Dept: NEUROSURGERY | Facility: CLINIC | Age: 70
End: 2020-05-29

## 2020-06-03 ENCOUNTER — OFFICE VISIT - HEALTHEAST (OUTPATIENT)
Dept: NEUROSURGERY | Facility: CLINIC | Age: 70
End: 2020-06-03

## 2020-06-03 DIAGNOSIS — S12.9XXA CERVICAL PSEUDOARTHROSIS, INITIAL ENCOUNTER (H): ICD-10-CM

## 2020-06-03 DIAGNOSIS — M81.0 SENILE OSTEOPOROSIS: ICD-10-CM

## 2020-06-03 DIAGNOSIS — M47.22 OSTEOARTHRITIS OF SPINE WITH RADICULOPATHY, CERVICAL REGION: ICD-10-CM

## 2020-06-03 RX ORDER — HYDROXYZINE PAMOATE 25 MG/1
25 CAPSULE ORAL PRN
Status: SHIPPED | COMMUNITY
Start: 2020-06-03 | End: 2022-10-31

## 2020-06-03 ASSESSMENT — MIFFLIN-ST. JEOR: SCORE: 1430.7

## 2020-06-25 ENCOUNTER — RECORDS - HEALTHEAST (OUTPATIENT)
Dept: LAB | Facility: CLINIC | Age: 70
End: 2020-06-25

## 2020-06-25 LAB
AMPHETAMINES UR QL SCN: ABNORMAL
BARBITURATES UR QL: ABNORMAL
BENZODIAZ UR QL: ABNORMAL
CANNABINOIDS UR QL SCN: ABNORMAL
COCAINE UR QL: ABNORMAL
CREAT UR-MCNC: 55.8 MG/DL
METHADONE UR QL SCN: ABNORMAL
OPIATES UR QL SCN: ABNORMAL
OXYCODONE UR QL: ABNORMAL
PCP UR QL SCN: ABNORMAL

## 2020-07-01 ENCOUNTER — COMMUNICATION - HEALTHEAST (OUTPATIENT)
Dept: NEUROSURGERY | Facility: CLINIC | Age: 70
End: 2020-07-01

## 2020-07-23 ENCOUNTER — RECORDS - HEALTHEAST (OUTPATIENT)
Dept: ADMINISTRATIVE | Facility: OTHER | Age: 70
End: 2020-07-23

## 2021-01-29 ENCOUNTER — RECORDS - HEALTHEAST (OUTPATIENT)
Dept: LAB | Facility: CLINIC | Age: 71
End: 2021-01-29

## 2021-01-29 LAB
ANION GAP SERPL CALCULATED.3IONS-SCNC: 11 MMOL/L (ref 5–18)
BUN SERPL-MCNC: 70 MG/DL (ref 8–22)
CALCIUM SERPL-MCNC: 9.7 MG/DL (ref 8.5–10.5)
CHLORIDE BLD-SCNC: 99 MMOL/L (ref 98–107)
CHOLEST SERPL-MCNC: 190 MG/DL
CO2 SERPL-SCNC: 32 MMOL/L (ref 22–31)
CREAT SERPL-MCNC: 1.53 MG/DL (ref 0.6–1.1)
ERYTHROCYTE [SEDIMENTATION RATE] IN BLOOD BY WESTERGREN METHOD: 30 MM/HR (ref 0–20)
FASTING STATUS PATIENT QL REPORTED: ABNORMAL
GFR SERPL CREATININE-BSD FRML MDRD: 34 ML/MIN/1.73M2
GLUCOSE BLD-MCNC: 98 MG/DL (ref 70–125)
HDLC SERPL-MCNC: 42 MG/DL
LDLC SERPL CALC-MCNC: 89 MG/DL
POTASSIUM BLD-SCNC: 5 MMOL/L (ref 3.5–5)
SODIUM SERPL-SCNC: 142 MMOL/L (ref 136–145)
TRIGL SERPL-MCNC: 293 MG/DL

## 2021-03-08 ENCOUNTER — AMBULATORY - HEALTHEAST (OUTPATIENT)
Dept: OTHER | Facility: CLINIC | Age: 71
End: 2021-03-08

## 2021-03-19 ENCOUNTER — DOCUMENTATION ONLY (OUTPATIENT)
Dept: OTHER | Facility: CLINIC | Age: 71
End: 2021-03-19

## 2021-03-19 ENCOUNTER — AMBULATORY - HEALTHEAST (OUTPATIENT)
Dept: OTHER | Facility: CLINIC | Age: 71
End: 2021-03-19

## 2021-05-26 ENCOUNTER — RECORDS - HEALTHEAST (OUTPATIENT)
Dept: ADMINISTRATIVE | Facility: CLINIC | Age: 71
End: 2021-05-26

## 2021-05-27 NOTE — PROGRESS NOTES
Order for sleep study received, reviewed, and approved.   Comments for study:   High risk for hypoventilation (obesity and restrictive lung disease).  Split but skip CPAP and go right to Bilevel (prior CPAP intolerance).  Hesham Nguyen MD  8:26 PM, 4/1/2019

## 2021-05-27 NOTE — TELEPHONE ENCOUNTER
Pulmonary Telephone Note    Called Ms. Rey. Received her BiPAP prescription from [facility] which is from 2003; settings are IPAP 16, EPAP 12. She is recovering from an upper airway process and cough with steroid and antibiotic, starting to feel better. Will get her in for a new sleep study and referral to sleep medicine clinic. I will follow up with her in May. She is in agreement.    Nitin Giang MD  Pulmonary and Critical Care Medicine  [facility]  Cell [phone]  Office [phone]  Pager [phone]

## 2021-05-27 NOTE — TELEPHONE ENCOUNTER
Pulmonary Telephone Note    Called Ms. Rey. Discussed the pulmonary trunk enlargement on CT, even more reason to get a repeat sleep study done. That is scheduled for April 24. Minimal dependent fibroatelectasis otherwise, expected. No ILD. I will call her with sleep study results when available. Encouraged her to call any time with questions or concerning symptoms.    Nitin Giang MD  Pulmonary and Critical Care Medicine  [facility]  Cell [phone]  Office [phone]  Pager [phone]

## 2021-05-27 NOTE — TELEPHONE ENCOUNTER
Pulmonary Telephone Note    Called Ms. Rey back. She had called asking about her sleep study and about persistent cough and getting a chest CT. I assured her that I would call her after her sleep study, inform her of the results, and prescribe the necessary therapy. Also, given her persistent cough and dyspnea, and restrictive physiology on PFTs, will order high-resolution chest CT and I will call her with the results. Encouraged her to call any time with questions or concerning symptoms.    Nitin Giang MD  Pulmonary and Critical Care Medicine  [facility]  Cell [phone]  Office [phone]  Pager [phone]

## 2021-05-27 NOTE — TELEPHONE ENCOUNTER
Pulmonary Telephone Note    Chest CT unremarkable. Trivial amount of basilar fibroatelectasis, to be expected. Called Ms. Rey to discuss and reached voicemail on her home and cell phones. Left a brief voicemail message on her home phone with the clinic phone number and a message that I would try her again at another time.    Nitin Giang MD  Pulmonary and Critical Care Medicine  [facility]  Cell [phone]  Office [phone]  Pager [phone]

## 2021-05-27 NOTE — TELEPHONE ENCOUNTER
Call from patient. States she would like to speak with Dr. Giang .  Says she has been having some sleep issues and is wondering if he can order a sleep study?  Would like to get this set up asap.      Saw Dr. Giang on 3/13.    Phone: [phone]  Cell:  [phone]

## 2021-05-28 NOTE — TELEPHONE ENCOUNTER
Pulmonary Telephone Note    Received communication from Dr. Ngyuen regarding the split night sleep study result and reviewed his report. Moderate NELLIE with AHI 17.1 (severe when supine at 40.6). Titration with BiPAP was unable to completely eliminate events, so future titration study may be needed. Discussed with Ms. Rey, and will prescribe ResMed auto-bilevel with EPAP min of 12, IPAP max of 25, and PS of 7 cm H2O, oxygen 2 L/min bled in (she already has home oxygen). I will also see her in clinic next week for further discussion.    Nitin Giang MD  Pulmonary and Critical Care Medicine  [facility]  Cell [phone]  Office [phone]  Pager [phone]

## 2021-05-28 NOTE — TELEPHONE ENCOUNTER
Study completed.  Incomplete titration.  Recommending Auto-Bilevel 12 - 25 with PS 7 cmH2O. Recommending ResMed device due to integrated O2 hookups.  However, patient will need follow up of persistent hypoxia.

## 2021-05-28 NOTE — TELEPHONE ENCOUNTER
Sleep study from 4/24/19 is still in process, as of today. Routing to Dr. Nguyen for review.    Jane Beasley, [facility] 5/3/2019 12:13 PM

## 2021-05-28 NOTE — TELEPHONE ENCOUNTER
Dr Kazaglis    Dr Wilson called to obtain Sleep Study Results for Desirae Rey.    Thank you    Contact:  [phone]

## 2021-05-28 NOTE — PROGRESS NOTES
Orders for auto-bilevel device faxed to [facility].   Faxed to [phone]

## 2021-05-28 NOTE — PATIENT INSTRUCTIONS - HE
It was good to see you in clinic today. This is what we discussed:    1. I will work on getting your BiPAP machine.  2. We will continue your current medications and oxygen.  3. I will see you in about 3 months.  4. Call any time with questions or concerning symptoms.    Nitin Giang MD  Pulmonary and Critical Care Medicine  [facility]  Office [phone]

## 2021-05-28 NOTE — PROGRESS NOTES
Pulmonary Clinic Follow-up Visit    Assessment/Plan:  68 year old female never smoker with a history of asthma, chronic allergic rhinosinusitis, GERD, HTN, fibromyalgia, chronic low back pain, obesity, dyslipidemia, NELLIE, obesity-hypoventilation syndrome, presenting for follow-up.     Asthma, chronic rhinosinusitis, NELLIE, obesity-hypoventilation syndrome: We are attempting to get the patient adequate treatment for her NELLIE/OHS, and facing challenges. She had a split-night sleep study on 4/24-4/25 showing AHI 17.1 (up to 40.6 when supine) during diagnostic portion, with titration portion showing need for BiPAP, though despite uptitration of BiPAP, there were persistent events. Discussed her case with Dr. Nguyen and prescribed BiPAP via ResMed device with auto-bilevel, EPAP min 12, IPAP max 25, PS 7 cm H2O with oxygen 2 L/min bled in. Magaly VALDOVINOS states her last device was given in 2015, so she is not due for a new device until 2020, and will need to reprogram her current device; the patient states her device was given by [facility] in 2013 and it says so on the device. She states the fluticasone-salmeterol respiclick is going fine. Walking daily and doing pool exercise four times per week.     Plan:  - attempt to resolve her home BiPAP issues, either programming her current device or getting her a new one with the above settings  - advised her to have formal sleep medicine consultation but she would like to resolve her device issue first  - start fluticasone-salmeterol respiclick 113-14 mcg one inhalation two times a day; rinse/gargle/spit water after use  - continue montelukast 10 mg at bedtime  - continue cetirizine 10 mg daily  - albuterol HFA as needed  - continue oxygen 3 L/min continuously  - annual influenza vaccination  - will discuss pneumococcal vaccination at follow-up  - follow up in 3 months  - encouraged her to call any time with questions or concerning symptoms     I appreciate the opportunity to  participate in the care of Ms. Rey.  Please call any time if needed.     CCx: asthma, NELLIE, obesity-hypoventilation syndrome, chronic rhinosinusitis    HPI: 68 year old female never smoker with a history of asthma, chronic allergic rhinosinusitis, GERD, HTN, fibromyalgia, chronic low back pain, obesity, dyslipidemia, NELLIE, obesity-hypoventilation syndrome, presenting for follow-up. We are attempting to get the patient adequate treatment for her NELLIE/OHS, and facing challenges. She had a split-night sleep study on 4/24-4/25 showing AHI 17.1 (up to 40.6 when supine) during diagnostic portion, with titration portion showing need for BiPAP, though despite uptitration of BiPAP, there were persistent events. Discussed her case with Dr. Nguyen and prescribed BiPAP via ResMed device with auto-bilevel, EPAP min 12, IPAP max 25, PS 7 cm H2O with oxygen 2 L/min bled in. Magaly VALDOVINOS states her last device was given in 2015, so she is not due for a new device until 2020, and will need to reprogram her current device; the patient states her device was given by [facility] in 2013 and it says so on the device. She states the fluticasone-salmeterol respiclick is going fine. Walking daily and doing pool exercise four times per week.    ROS:  A 12-system review was obtained and was negative with the exception of the symptoms endorsed in the history of present illness.    PMH:  NELLIE on BiPAP  OHS  Asthma  Chronic rhinosinusitis  Chronic low back pain  DJD  HTN  DL  Fibromyalgia  GERD    PSH:  Past Surgical History:   Procedure Laterality Date     CARPAL TUNNEL RELEASE Bilateral      CERVICAL FUSION N/A 4/27/2017    Procedure: ANTERIOR CERVICAL DECOMPRESSION FUSION C5-7 BILATERAL;  Surgeon: Basim Barone MD;  Location: [facility] OR;  Service:      CHOLECYSTECTOMY      open     HAND SURGERY Right      HYSTEROSCOPY       JOINT REPLACEMENT      bilateral TKA     KNEE SURGERY         Allergies:  Allergies   Allergen Reactions      Latex Rash     Severe rash       Colchicine Diarrhea     Latex      Added based on information entered during case entry, please review and add reactions, type, and severity as needed     Other Environmental Allergy      Coverlet bandaid , 3M product; rash     Statins-Hmg-Coa Reductase Inhibitors Myalgia     Tried lovastatin and simvastatin     Sulfa (Sulfonamide Antibiotics) Swelling     Facial swelling     Paper Tape Rash       Family HX:  Family History   Problem Relation Age of Onset     Rheum arthritis Mother      Heart disease Sister        Social Hx:  Social History     Socioeconomic History     Marital status:      Spouse name: Not on file     Number of children: Not on file     Years of education: Not on file     Highest education level: Not on file   Occupational History     Not on file   Social Needs     Financial resource strain: Not on file     Food insecurity:     Worry: Not on file     Inability: Not on file     Transportation needs:     Medical: Not on file     Non-medical: Not on file   Tobacco Use     Smoking status: Never Smoker     Smokeless tobacco: Never Used   Substance and Sexual Activity     Alcohol use: No     Drug use: No     Sexual activity: Not on file   Lifestyle     Physical activity:     Days per week: Not on file     Minutes per session: Not on file     Stress: Not on file   Relationships     Social connections:     Talks on phone: Not on file     Gets together: Not on file     Attends Jainism service: Not on file     Active member of club or organization: Not on file     Attends meetings of clubs or organizations: Not on file     Relationship status: Not on file     Intimate partner violence:     Fear of current or ex partner: Not on file     Emotionally abused: Not on file     Physically abused: Not on file     Forced sexual activity: Not on file   Other Topics Concern     Not on file   Social History Narrative     Not on file       Current Meds:  Current Outpatient  Medications   Medication Sig Dispense Refill     acyclovir (ZOVIRAX) 5 % ointment Apply to Affected Areas 5 Times Daily for 7 Days PRN       albuterol (PROAIR HFA;PROVENTIL HFA;VENTOLIN HFA) 90 mcg/actuation inhaler Inhale 2 puffs every 6 (six) hours as needed for wheezing.       alendronate (FOSAMAX) 70 MG tablet Take 70 mg by mouth every 7 days. Takes on Mondays 11     azelastine (ASTELIN) 137 mcg nasal spray 2 sprays into each nostril 2 (two) times a day as needed for rhinitis.        azelastine (OPTIVAR) 0.05 % ophthalmic solution Administer 1 drop to both eyes every 12 (twelve) hours.       beclomethasone (QVAR) 80 mcg/actuation inhaler Inhale 2 puffs 2 (two) times a day as needed.        benzonatate (TESSALON) 100 MG capsule Take 200 mg by mouth 3 (three) times a day as needed for cough.       calcium citrate (CALCITRATE) 200 mg (950 mg) tablet Take 1 tablet (200 mg total) by mouth 2 (two) times a day. 200 tablet 4     CALCIUM CITRATE/VITAMIN D3 (CALCIUM CITRATE + D ORAL) Take 1 tablet by mouth 2 (two) times a day.       cetirizine (ZYRTEC) 10 MG tablet Take 10 mg by mouth at bedtime.        cholecalciferol, vitamin D3, 5,000 unit Tab Take 5,000 Units by mouth daily.       esomeprazole (NEXIUM) 40 MG capsule Take 40 mg by mouth every other day.       fluticasone-salmeterol (AIRDUO RESPICLICK) 113-14 mcg/actuation AePB Inhale 1 Inhalation 2 (two) times a day. 1 each 11     furosemide (LASIX) 20 MG tablet Take 1 tablet (20 mg total) by mouth daily. 30 tablet 0     gabapentin (NEURONTIN) 300 MG capsule Take 300 mg by mouth 3 (three) times a day.        HYDROcodone-acetaminophen (NORCO) 5-325 mg per tablet Take 1 tablet by mouth every 4 (four) hours as needed for pain. (Patient taking differently: Take 1 tablet by mouth every 6 (six) hours as needed for pain. ) 80 tablet 0     hydrOXYzine (ATARAX) 25 MG tablet Take 25 mg by mouth every 8 (eight) hours as needed for itching.        lysine 500 mg cap Take 1 capsule  "by mouth 2 (two) times a day.       magnesium oxide 250 mg Tab Take 500 mg by mouth 2 (two) times a day.        metoprolol succinate (TOPROL-XL) 50 MG 24 hr tablet Take 1 tablet (50 mg total) by mouth daily. 30 tablet 0     modafinil (PROVIGIL) 200 MG tablet Take 200 mg by mouth daily.        montelukast (SINGULAIR) 10 mg tablet Take 10 mg by mouth daily.       OMEGA-3/DHA/EPA/FISH OIL (FISH OIL-OMEGA-3 FATTY ACIDS) 300-1,000 mg capsule Take 1 g by mouth daily.       OXYGEN-AIR DELIVERY SYSTEMS [facility] Use As Directed. 3 lpm 24/7 continuous       rosuvastatin (CRESTOR) 5 MG tablet 1 tablet at bedtime.  1     TiZANidine (ZANAFLEX) 4 MG capsule Take 8 mg by mouth every 8 (eight) hours as needed for muscle spasms.       valACYclovir (VALTREX) 500 MG tablet Take 500 mg by mouth 2 (two) times a day as needed.        No current facility-administered medications for this visit.        Physical Exam:  /72   Pulse (!) 53   Ht 4' 7.75\" (1.416 m)   Wt 217 lb (98.4 kg)   SpO2 95% Comment: 3 lpm cont  Breastfeeding? No   BMI 49.09 kg/m    Gen: alert, oriented, no distress  HEENT: nasal turbinates are unremarkable, no oropharyngeal lesions, no cervical or supraclavicular lymphadenopathy  CV: RRR, no M/G/R  Resp: diminished bilaterally, no focal crackles or wheezes  Abd: soft, nontender, no palpable organomegaly  Skin: no apparent rashes  Ext: trace bilateral ankle edema  Neuro: alert, nonfocal    Labs:  reviewed    Imaging studies:  High-res chest CT (April 2019):  - images directly reviewed, formal interpretation follows:  FINDINGS:   LUNGS AND PLEURA: Compared to April 2017, little change of minimal bilateral lower lobe predominant bandlike opacities, reticulation and asymmetric bronchiectasis (small amount in the medial right lower lobe and some in the left lower lobe). Prior   infectious / inflammatory opacities have resolved since 2017. No consolidation. Negative pleural spaces. Expiration images demonstrate little " change in the appearance of the trachea. It is difficult to evaluate the degree of possible gas trapping secondary to this. Diffuse gas trapping cannot be excluded.     MEDIASTINUM: No adenopathy. Pulmonary trunk enlargement measuring 4 cm. Mild coronary calcification. Nonaneurysmal aorta.     LIMITED UPPER ABDOMEN: Cholecystectomy.     MUSCULOSKELETAL: Degenerative changes shoulders and spine. ACDF.     IMPRESSION:   CONCLUSION:      1.  Minimal amount of nonspecific scarring and/or fibrosis in the lung bases. Unfortunately, prone imaging was not able to be performed to evaluate for resolution of any reticular opacities in the lung bases. No honeycombing or UIP pattern present.   Findings show no significant change since 4/5/2017.     2.  Minimal bronchiectasis. No significant airway thickening.     3.  Significant pulmonary trunk enlargement; correlate for pulmonary hypertension or shunt.       TTE (August 2017 at [facility]):  - EF 60-65%  - normal RV size/function  - grade 1 diastolic dysfunction  - no valvular abnormalities  - technically difficult study     Pharmacologic MPI (July 2017):    There is no prior study available.    The left ventricular ejection fraction is 74%.    The pharmacologic nuclear stress test is negative for inducible myocardial ischemia or infarction.     PFT's (June 2017):  FEV1/FVC is 88 and is normal.  FEV1 is 53% predicted and is reduced.  FVC is 48% predicted and reduced.  There was no improvement in spirometry after a single inhaled dose of bronchodilator.  TLC is 72% predicted and is reduced.  RV is 79% predicted and is normal.  DLCO is 39% predicted and is reduced when it   is corrected for hemoglobin.    Sleep Study (4/24/19-4/25/19):  Primary Findings:  Diagnostic Portion:    Respiratory monitoring showed moderate obstructive sleep apnea (AHI=17.1) during light NREM sleep, with events more frequently occurring during supine sleep (AHI 40.6/hr vs 8.3/hr).    The patient spent a  total of 21.9 minutes at an oxygen saturation below 88%.  Profound hypoxia was noted and after 10 minutes of sleep, 1 LPM of supplemental oxygen was added via nasal cannula (which stabilized hypoxia).    Mild tachypnea was noted throughout  Titration Portion:  A trial of Bi-level PAP in Spontaneous (S) mode was initiated at 8/4 and increased up to 22/12 cmH2O (supplemental oxygen was turned off).    Respiration was stabilized during NREM sleep, albeit with persistent hypoxia.  However, during REM sleep respiratory events and profound hypoxia returned.    This was considered an unacceptable titration due to persistence of respiratory events and hypoxia.   Mask leak was also problematic.     Other Findings:  Sleep Architecture:     Sleep onset latency was normal.    REM onset latency was prolonged.    All stages of sleep were sampled during the test.    Sleep architecture was fragmented due to numerous arousals.    Stage N3 sleep and Stage REM sleep were observed with increased frequency and duration (rebound) after CPAP pressures reached an optimal pressure.    Sleep efficiency was normal.     Respiration:    Mean hemoglobin oxygen saturation (SaO2) during the diagnostic portion of the PSG in NREM and REM sleep was 84% with a SaO2 desaturation jorge observed to 61%.    Sleep-related Hypoxia was present as cumulative exposure time in sleep to SaO2 below 88% surpassed 5 total minutes (in this case, 21.9 minutes).     Movements:        Periodic leg movements were observed with low frequency.    The patient had a Periodic Limb Movement (PLM) index of 2.1 and the ESTEFANIA PLM index was 0.8.     Parasomnia:    No activity consistent with parasomnia was observed in the video recording.     Electrocardiogram:    Two-lead ECG showed normal sinus rhythm.     Electroencephalogram:    We used a limited-montage EEG with F3, F4, C3, C4, O1, O2 and contra-lateral references to M1 and M2 that was reviewed using a 30-second EPOCH. No EEG  abnormalities were observed with these settings.     Therapeutic Intervention:   Bilevel was initiated with a pressure of 8/4 cwp  and titrated up to 22/12 cwp  Interface:  Type-  Nasal          ,    Brand-  Eson 2              ,   Size-  Small     Diagnosis:    G47.33 Obstructive sleep apnea    G47.34 Sleep related hypoxia    G47.36 Sleep related hypoventilation in conditions classified elsewhere     Assessment & Recommendation:        Although optimal pressures were not determined, I would recommend auto-titrating Bilevel with an EPAP min of 12, IPAP max of 25, and PS of 7 cmH2O.  Recommend special attention to mask fit and mouth leak, especially at higher pressures.    I suspect hypoxia will persist despite maximal Bilevel support.  ? If patient already qualifies for continuous oxygen, recommend bleeding 1 LPM at machine end of tubing (recommend ResMed devices due to tubing with integrated oxygen hookup).  ? If patient does not qualify for continuous oxygen already, then she would benefit from nocturnal oximetry to confirm resolution of hypoxia.  She may require repeat titration study to qualify for supplemental oxygen therapy.    Consider adjunct positional therapy with HOB elevation or lateral sleep position.    Patients with excessive daytime sleepiness are at increased risk for motor vehicle accidents.    Suggest optimizing sleep hygiene and avoiding any further sleep deprivation.    Consider weight reduction management as this may be a factor in NELLIE.    Recommend evaluation due to periodic leg movements.    Measures aimed at increasing sleep consolidation can be beneficial.    Nitin Giang MD  Pulmonary and Critical Care Medicine  [facility]  Cell [phone]  Office [phone]  Pager [phone]

## 2021-05-30 VITALS — HEIGHT: 56 IN | WEIGHT: 228.84 LBS | BODY MASS INDEX: 51.48 KG/M2

## 2021-05-31 VITALS — WEIGHT: 229.1 LBS | BODY MASS INDEX: 50.91 KG/M2

## 2021-05-31 VITALS — HEIGHT: 56 IN | WEIGHT: 230 LBS | BODY MASS INDEX: 51.74 KG/M2

## 2021-05-31 VITALS — BODY MASS INDEX: 50.39 KG/M2 | WEIGHT: 224 LBS | HEIGHT: 56 IN

## 2021-05-31 VITALS — WEIGHT: 225 LBS | HEIGHT: 56 IN | BODY MASS INDEX: 50.61 KG/M2

## 2021-05-31 VITALS — HEIGHT: 56 IN | WEIGHT: 229 LBS | BODY MASS INDEX: 51.51 KG/M2

## 2021-05-31 NOTE — PATIENT INSTRUCTIONS - HE
It was good to see you in clinic today. This is what we discussed:    1. Continue your sleep device with oxygen at a setting of 3.  2. Continue AirDuo one inhalation twice daily. Rinse, gargle, and spit water after use.  3. Continue montelukast (Singulair) one pill at bedtime.  4. Continue cetirizine (Zyrtec) one pill each morning.  5. Use albuterol inhaler or nebulizer as needed.  6. Continue to stay active with exercise, including pool exercise.  7. Continue the oxygen on setting of 3 continuously.  8. I will see you in 6 months or sooner if needed.  9. Call any time with questions or concerning symptoms.    Nitin Giang MD  Pulmonary and Critical Care Medicine  [facility]  Office [phone]

## 2021-05-31 NOTE — PROGRESS NOTES
Pulmonary Clinic Follow-up Visit    Assessment/Plan:  68 year old female never smoker with a history of asthma, chronic allergic rhinosinusitis, GERD, HTN, fibromyalgia, chronic low back pain, obesity, dyslipidemia, NELLIE, and obesity-hypoventilation syndrome, presenting for follow-up.     Asthma, chronic rhinosinusitis, NELLIE, obesity-hypoventilation syndrome: AHI 17.1 (40.6 when supine), with titration sleep study showing need for high-pressure BiPAP (had persistent events despite uptitration throughout the study). We were finally able to get her the appropriate ResMed auto-bilevel, EPAP min 12, IPAP max 25, PS 7 cm H2O with oxygen 2 L/min bled in, with Ultra Mirage II mask. Continues fluticasone-salmeterol respiclick. Regular walking using walking sticks and frequently does pool exercise. No need for prednisone or ED/urgent care visits in the last 3 months. Has a lot of sinus drainage but not interested in nasal sprays due to aversion. Has some ongoing, but stable, dyspnea with exertion, especially when outside in hot/humid weather.     Plan:  - continue auto-BiPAP with oxygen bleed-in at above settings  - continue fluticasone-salmeterol respiclick 113-14 mcg one inhalation two times a day; rinse/gargle/spit water after use  - continue montelukast 10 mg at bedtime  - continue cetirizine 10 mg daily  - albuterol HFA as needed  - continue oxygen 3 L/min continuously  - annual high-dose influenza vaccination  - will discuss pneumococcal vaccination at follow-up  - follow up in 6 months or sooner if needed  - encouraged her to call any time with questions or concerning symptoms     I appreciate the opportunity to participate in the care of Ms. Rey.  Please call any time if needed.     CCx: asthma, NELLIE, obesity-hypoventilation syndrome, chronic rhinosinusitis    HPI: 68 year old female never smoker with a history of asthma, chronic allergic rhinosinusitis, GERD, HTN, fibromyalgia, chronic low back pain, obesity,  dyslipidemia, NELLIE, and obesity-hypoventilation syndrome, presenting for follow-up. AHI 17.1 (40.6 when supine), with titration sleep study showing need for high-pressure BiPAP (had persistent events despite uptitration throughout the study). We were finally able to get her the appropriate ResMed auto-bilevel, EPAP min 12, IPAP max 25, PS 7 cm H2O with oxygen 2 L/min bled in, with Ultra Mirage II mask. Continues fluticasone-salmeterol respiclick. Regular walking using walking sticks and frequently does pool exercise. No need for prednisone or ED/urgent care visits in the last 3 months. Has a lot of sinus drainage but not interested in nasal sprays due to aversion. Has some ongoing, but stable, dyspnea with exertion, especially when outside in hot/humid weather.    ROS:  A 12-system review was obtained and was negative with the exception of the symptoms endorsed in the history of present illness.    PMH:  NELLIE/OHS on auto-BiPAP  Asthma  Chronic rhinosinusitis  Chronic low back pain  DJD  HTN  DL  Fibromyalgia  GERD    PSH:  Past Surgical History:   Procedure Laterality Date     CARPAL TUNNEL RELEASE Bilateral      CERVICAL FUSION N/A 4/27/2017    Procedure: ANTERIOR CERVICAL DECOMPRESSION FUSION C5-7 BILATERAL;  Surgeon: Basim Barone MD;  [place] [facility] [place];  Service:      CHOLECYSTECTOMY      open     HAND SURGERY Right      HYSTEROSCOPY       JOINT REPLACEMENT      bilateral TKA     KNEE SURGERY         Allergies:  Allergies   Allergen Reactions     Latex Rash     Severe rash       Colchicine Diarrhea     Latex      Added based on information entered during case entry, please review and add reactions, type, and severity as needed     Other Environmental Allergy      Coverlet bandaid , 3M product; rash     Statins-Hmg-Coa Reductase Inhibitors Myalgia     Tried lovastatin and simvastatin     Sulfa (Sulfonamide Antibiotics) Swelling     Facial swelling     Paper Tape Rash       Family HX:  Family  History   Problem Relation Age of Onset     Rheum arthritis Mother      Heart disease Sister        Social Hx:  Social History     Socioeconomic History     Marital status:      Spouse name: Not on file     Number of children: Not on file     Years of education: Not on file     Highest education level: Not on file   Occupational History     Not on file   Social Needs     Financial resource strain: Not on file     Food insecurity:     Worry: Not on file     Inability: Not on file     Transportation needs:     Medical: Not on file     Non-medical: Not on file   Tobacco Use     Smoking status: Never Smoker     Smokeless tobacco: Never Used   Substance and Sexual Activity     Alcohol use: No     Drug use: No     Sexual activity: Not on file   Lifestyle     Physical activity:     Days per week: Not on file     Minutes per session: Not on file     Stress: Not on file   Relationships     Social connections:     Talks on phone: Not on file     Gets together: Not on file     Attends [employer] witness service: Not on file     Active member of club or organization: Not on file     Attends meetings of clubs or organizations: Not on file     Relationship status: Not on file     Intimate partner violence:     Fear of current or ex partner: Not on file     Emotionally abused: Not on file     Physically abused: Not on file     Forced sexual activity: Not on file   Other Topics Concern     Not on file   Social History Narrative     Not on file       Current Meds:  Current Outpatient Medications   Medication Sig Dispense Refill     acyclovir (ZOVIRAX) 5 % ointment Apply to Affected Areas 5 Times Daily for 7 Days PRN       albuterol (ACCUNEB) 1.25 mg/3 mL nebulizer solution Take 1 ampule by nebulization every 6 (six) hours as needed for wheezing or shortness of breath.       albuterol (PROAIR HFA;PROVENTIL HFA;VENTOLIN HFA) 90 mcg/actuation inhaler Inhale 2 puffs every 6 (six) hours as needed for wheezing.       alendronate (FOSAMAX)  70 MG tablet Take 70 mg by mouth every 7 days. Takes on Mondays  11     azelastine (OPTIVAR) 0.05 % ophthalmic solution Administer 1 drop to both eyes every 12 (twelve) hours.       benzonatate (TESSALON) 100 MG capsule Take 200 mg by mouth 3 (three) times a day as needed for cough.       calcium citrate (CALCITRATE) 200 mg (950 mg) tablet Take 1 tablet (200 mg total) by mouth 2 (two) times a day. 200 tablet 4     CALCIUM CITRATE/VITAMIN D3 (CALCIUM CITRATE + D ORAL) Take 1 tablet by mouth 2 (two) times a day.       cetirizine (ZYRTEC) 10 MG tablet Take 10 mg by mouth at bedtime.        cholecalciferol, vitamin D3, 5,000 unit Tab Take 5,000 Units by mouth daily.       esomeprazole (NEXIUM) 40 MG capsule Take 40 mg by mouth every other day.       fluticasone-salmeterol (AIRDUO RESPICLICK) 113-14 mcg/actuation AePB Inhale 1 Inhalation 2 (two) times a day. 1 each 11     furosemide (LASIX) 20 MG tablet Take 1 tablet (20 mg total) by mouth daily. 30 tablet 0     gabapentin (NEURONTIN) 300 MG capsule Take 300 mg by mouth 3 (three) times a day.        HYDROcodone-acetaminophen (NORCO) 5-325 mg per tablet Take 1 tablet by mouth every 4 (four) hours as needed for pain. (Patient taking differently: Take 1 tablet by mouth every 6 (six) hours as needed for pain. ) 80 tablet 0     lysine 500 mg cap Take 1 capsule by mouth 2 (two) times a day.       magnesium oxide 250 mg Tab Take 500 mg by mouth 2 (two) times a day.        metoprolol succinate (TOPROL-XL) 50 MG 24 hr tablet Take 1 tablet (50 mg total) by mouth daily. 30 tablet 0     modafinil (PROVIGIL) 200 MG tablet Take 200 mg by mouth daily.        montelukast (SINGULAIR) 10 mg tablet Take 10 mg by mouth daily.       OMEGA-3/DHA/EPA/FISH OIL (FISH OIL-OMEGA-3 FATTY ACIDS) 300-1,000 mg capsule Take 1 g by mouth daily.       OXYGEN-AIR DELIVERY SYSTEMS [facility] Use As Directed. 3 lpm 24/7 continuous       rosuvastatin (CRESTOR) 5 MG tablet 1 tablet at bedtime.  1     TiZANidine  (ZANAFLEX) 4 MG capsule Take 8 mg by mouth every 8 (eight) hours as needed for muscle spasms.       valACYclovir (VALTREX) 500 MG tablet Take 500 mg by mouth 2 (two) times a day as needed.        No current facility-administered medications for this visit.        Physical Exam:  /84   Pulse 60   Resp 19   Wt 213 lb (96.6 kg)   SpO2 96% Comment: 3LPM  BMI 48.18 kg/m    Gen: alert, oriented, no distress  HEENT: nasal turbinates are unremarkable, no oropharyngeal lesions, no cervical or supraclavicular lymphadenopathy  CV: RRR, no M/G/R  Resp: CTAB, no focal crackles or wheezes  Abd: soft, nontender, no palpable organomegaly  Skin: no apparent rashes  Ext: trace bilateral ankle edema  Neuro: alert, nonfocal    Labs:  reviewed    Imaging studies:  High-res chest CT (April 2019):  - images directly reviewed, formal interpretation follows:  FINDINGS:   LUNGS AND PLEURA: Compared to April 2017, little change of minimal bilateral lower lobe predominant bandlike opacities, reticulation and asymmetric bronchiectasis (small amount in the medial right lower lobe and some in the left lower lobe). Prior   infectious / inflammatory opacities have resolved since 2017. No consolidation. Negative pleural spaces. Expiration images demonstrate little change in the appearance of the trachea. It is difficult to evaluate the degree of possible gas trapping secondary to this. Diffuse gas trapping cannot be excluded.     MEDIASTINUM: No adenopathy. Pulmonary trunk enlargement measuring 4 cm. Mild coronary calcification. Nonaneurysmal aorta.     LIMITED UPPER ABDOMEN: Cholecystectomy.     MUSCULOSKELETAL: Degenerative changes shoulders and spine. ACDF.     IMPRESSION:   CONCLUSION:      1.  Minimal amount of nonspecific scarring and/or fibrosis in the lung bases. Unfortunately, prone imaging was not able to be performed to evaluate for resolution of any reticular opacities in the lung bases. No honeycombing or UIP pattern present.    Findings show no significant change since 4/5/2017.     2.  Minimal bronchiectasis. No significant airway thickening.     3.  Significant pulmonary trunk enlargement; correlate for pulmonary hypertension or shunt.        TTE (August 2017 at [facility]):  - EF 60-65%  - normal RV size/function  - grade 1 diastolic dysfunction  - no valvular abnormalities  - technically difficult study     Pharmacologic MPI (July 2017):    There is no prior study available.    The left ventricular ejection fraction is 74%.    The pharmacologic nuclear stress test is negative for inducible myocardial ischemia or infarction.     PFT's (June 2017):  FEV1/FVC is 88 and is normal.  FEV1 is 53% predicted and is reduced.  FVC is 48% predicted and reduced.  There was no improvement in spirometry after a single inhaled dose of bronchodilator.  TLC is 72% predicted and is reduced.  RV is 79% predicted and is normal.  DLCO is 39% predicted and is reduced when it   is corrected for hemoglobin.     Sleep Study (4/24/19-4/25/19):  Primary Findings:  Diagnostic Portion:    Respiratory monitoring showed moderate obstructive sleep apnea (AHI=17.1) during light NREM sleep, with events more frequently occurring during supine sleep (AHI 40.6/hr vs 8.3/hr).    The patient spent a total of 21.9 minutes at an oxygen saturation below 88%.  Profound hypoxia was noted and after 10 minutes of sleep, 1 LPM of supplemental oxygen was added via nasal cannula (which stabilized hypoxia).    Mild tachypnea was noted throughout  Titration Portion:  A trial of Bi-level PAP in Spontaneous (S) mode was initiated at 8/4 and increased up to 22/12 cmH2O (supplemental oxygen was turned off).    Respiration was stabilized during NREM sleep, albeit with persistent hypoxia.  However, during REM sleep respiratory events and profound hypoxia returned.    This was considered an unacceptable titration due to persistence of respiratory events and hypoxia.   Mask leak was also  problematic.     Other Findings:  Sleep Architecture:     Sleep onset latency was normal.    REM onset latency was prolonged.    All stages of sleep were sampled during the test.    Sleep architecture was fragmented due to numerous arousals.    Stage N3 sleep and Stage REM sleep were observed with increased frequency and duration (rebound) after CPAP pressures reached an optimal pressure.    Sleep efficiency was normal.     Respiration:    Mean hemoglobin oxygen saturation (SaO2) during the diagnostic portion of the PSG in NREM and REM sleep was 84% with a SaO2 desaturation jorge observed to 61%.    Sleep-related Hypoxia was present as cumulative exposure time in sleep to SaO2 below 88% surpassed 5 total minutes (in this case, 21.9 minutes).     Movements:        Periodic leg movements were observed with low frequency.    The patient had a Periodic Limb Movement (PLM) index of 2.1 and the ESTEFANIA PLM index was 0.8.     Parasomnia:    No activity consistent with parasomnia was observed in the video recording.     Electrocardiogram:    Two-lead ECG showed normal sinus rhythm.     Electroencephalogram:    We used a limited-montage EEG with F3, F4, C3, C4, O1, O2 and contra-lateral references to M1 and M2 that was reviewed using a 30-second EPOCH. No EEG abnormalities were observed with these settings.     Therapeutic Intervention:   Bilevel was initiated with a pressure of 8/4 cwp  and titrated up to 22/12 cwp  Interface:  Type-  Nasal          ,    Brand-  Eson 2              ,   Size-  Small     Diagnosis:    G47.33 Obstructive sleep apnea    G47.34 Sleep related hypoxia    G47.36 Sleep related hypoventilation in conditions classified elsewhere     Assessment & Recommendation:        Although optimal pressures were not determined, I would recommend auto-titrating Bilevel with an EPAP min of 12, IPAP max of 25, and PS of 7 cmH2O.  Recommend special attention to mask fit and mouth leak, especially at higher pressures.    I  suspect hypoxia will persist despite maximal Bilevel support.  ? If patient already qualifies for continuous oxygen, recommend bleeding 1 LPM at machine end of tubing (recommend ResMed devices due to tubing with integrated oxygen hookup).  ? If patient does not qualify for continuous oxygen already, then she would benefit from nocturnal oximetry to confirm resolution of hypoxia.  She may require repeat titration study to qualify for supplemental oxygen therapy.    Consider adjunct positional therapy with HOB elevation or lateral sleep position.    Patients with excessive daytime sleepiness are at increased risk for motor vehicle accidents.    Suggest optimizing sleep hygiene and avoiding any further sleep deprivation.    Consider weight reduction management as this may be a factor in NELLIE.    Recommend evaluation due to periodic leg movements.    Measures aimed at increasing sleep consolidation can be beneficial.    Nitin Giang MD  Pulmonary and Critical Care Medicine  [facility] [place]  Cell [phone]  Office [phone]  Pager [phone]

## 2021-06-01 VITALS — WEIGHT: 222 LBS | BODY MASS INDEX: 50.22 KG/M2

## 2021-06-02 ENCOUNTER — RECORDS - HEALTHEAST (OUTPATIENT)
Dept: ADMINISTRATIVE | Facility: CLINIC | Age: 71
End: 2021-06-02

## 2021-06-02 VITALS — HEIGHT: 56 IN | WEIGHT: 214 LBS | BODY MASS INDEX: 48.14 KG/M2

## 2021-06-03 VITALS — BODY MASS INDEX: 48.18 KG/M2 | WEIGHT: 213 LBS

## 2021-06-03 VITALS — BODY MASS INDEX: 48.41 KG/M2 | BODY MASS INDEX: 48.41 KG/M2 | HEIGHT: 56 IN | WEIGHT: 214 LBS

## 2021-06-03 VITALS — HEIGHT: 56 IN | WEIGHT: 217 LBS | BODY MASS INDEX: 48.81 KG/M2

## 2021-06-04 VITALS
DIASTOLIC BLOOD PRESSURE: 77 MMHG | HEART RATE: 67 BPM | SYSTOLIC BLOOD PRESSURE: 145 MMHG | BODY MASS INDEX: 51.76 KG/M2 | HEIGHT: 56 IN | WEIGHT: 230.1 LBS

## 2021-06-04 VITALS — HEIGHT: 56 IN | WEIGHT: 216 LBS | BODY MASS INDEX: 48.59 KG/M2

## 2021-06-04 NOTE — ANESTHESIA POSTPROCEDURE EVALUATION
Patient: Desirae Rey  COLONOSCOPY WITH BIOPSIES, POLYPECTOMY  Anesthesia type: MAC    Patient location: Phase II Recovery  Last vitals:   Vitals Value Taken Time   /57 12/20/2019 11:01 AM   Temp 35.7  C (96.2  F) 12/20/2019 10:29 AM   Pulse 70 12/20/2019 11:12 AM   Resp 24 12/20/2019 10:29 AM   SpO2 97 % 12/20/2019 11:12 AM   Vitals shown include unvalidated device data.  Post vital signs: stable  Level of consciousness: awake and responds to simple questions  Post-anesthesia pain: pain controlled  Post-anesthesia nausea and vomiting: no  Pulmonary: unassisted, return to baseline  Cardiovascular: stable and blood pressure at baseline  Hydration: adequate  Anesthetic events: no    QCDR Measures:  ASA# 11 - Annita-op Cardiac Arrest: ASA11B - Patient did NOT experience unanticipated cardiac arrest  ASA# 12 - Annita-op Mortality Rate: ASA12B - Patient did NOT die  ASA# 13 - PACU Re-Intubation Rate: NA - No ETT / LMA used for case  ASA# 10 - Composite Anes Safety: ASA10A - No serious adverse event    Additional Notes:    Home O2 is in their car.

## 2021-06-04 NOTE — ANESTHESIA PREPROCEDURE EVALUATION
Anesthesia Evaluation      Patient summary reviewed     Airway   Mallampati: II  Neck ROM: full   Pulmonary     breath sounds clear to auscultation  (+) asthma  sleep apnea (BiPAP) on CPAP, ,     ROS comment: O2 at home - prescribed continuous but uses at night and with exertion                          Cardiovascular   Exercise tolerance: < 4 METS (Able to do stairs)  (+) hypertension, CHF, ,     (-) angina  ECG reviewed  Rhythm: regular        Neuro/Psych    (+) neuromuscular disease (cervical fusion, fibromyalgia, ulnar neuropathy),  chronic pain (chronic opioids)    Endo/Other    (+) arthritis, obesity (BMI 48),      GI/Hepatic/Renal    (+) GERD,   chronic renal disease CRI,      Other findings:     NPO > 8 hrs     Results for SKRIBELL LYONS (MRN [id number]) as of 12/20/2019 12/4/2019 16:20  Sodium: 139  Potassium: 5.0  Chloride: 95 (L)  CO2: 34 (H)  Anion Gap, Calculation: 10  BUN: 48 (H)  Creatinine: 1.30 (H)  GFR MDRD Af Amer: 50 (L)  GFR MDRD Non Af Amer: 41 (L)  Calcium: 10.1  Glucose: 117    ECHO 2017:    wnl  EF 60 %        Dental    (+) poor dentition                       Anesthesia Plan  Planned anesthetic: MAC      LMA prn  GETA prn  Stress dose steroids prn      ASA 4   Induction: intravenous   Anesthetic plan and risks discussed with: patient and spouse  Anesthesia plan special considerations: antiemetics,   Post-op plan: routine recovery

## 2021-06-04 NOTE — ANESTHESIA CARE TRANSFER NOTE
Last vitals:   Vitals:    12/20/19 1032   BP:    Pulse: 67   Resp:    Temp:    SpO2: 97%     Patient's level of consciousness is drowsy  Spontaneous respirations: yes  Maintains airway independently: yes  Dentition unchanged: yes  Oropharynx: oropharynx clear of all foreign objects    QCDR Measures:  ASA# 20 - Surgical Safety Checklist: WHO surgical safety checklist completed prior to induction    PQRS# 430 - Adult PONV Prevention: NA - Not adult patient, not GA or 3 or more risk factors NOT present  ASA# 8 - Peds PONV Prevention: NA - Not pediatric patient, not GA or 2 or more risk factors NOT present  PQRS# 424 - Annita-op Temp Management: NA - MAC anesthesia or case < 60 minutes  PQRS# 426 - PACU Transfer Protocol: - Transfer of care checklist used  ASA# 14 - Acute Post-op Pain: ASA14B - Patient did NOT experience pain >= 7 out of 10

## 2021-06-08 NOTE — TELEPHONE ENCOUNTER
[facility] [phone]  Referring Provider: landen Brito  DX: Elevated sed rate    Ref./rec. Were received on... 05.14 @ 2:57pm & 3:17pm (inside Rheum consult fax)

## 2021-06-08 NOTE — PATIENT INSTRUCTIONS - HE
YOU WERE TAUGHT TODAY FOR A POSTERIOR CERVICAL FUSION C5-T1.  Provided complete information about approaching surgery.  Discussed discharge planning and expected outcomes after surgery as well as follow-ups and restrictions.  Emphasized on stop taking ASA, NSAID's, vitamins and /or OTC herbal supplements within 10 days and any anticoagulant meds within 7 days prior to surgery.  Reminded patient to bring all pertinent films to hospital the day of surgery.    NPO after midnight    Provided written pamphlet about surgery.  Answered all questions.  The  will call patient with all pre-op orders and instructions.  Patient to complete all required diagnostic tests prior to surgery.  If test are not completed this will cancel the surgery; contact the clinic nurses at [phone] if unable to complete test.      YOU WILL BE IN A CERVICAL COLLAR AFTER FOR 6-12 WEEKS.

## 2021-06-08 NOTE — PROGRESS NOTES
NEUROSURGERY CONSULTATION NOTE    6/3/2020       CHIEF COMPLAINT: Cervical radiculopathy; pseudoarthrosis    HPI:    Desirae Rey is a 69 y.o. female with a PMH sig for osteoporosis who is sent to us in consultation by Noemy Brito for further evaluation of cervical pseudoarthrosis, cervical radiculopathy.    Onset: 2017+; felt like the anterior fusion surgery helped for maybe three months before symptoms started returning and worsening  Character: Pain in the back of the neck at the base of the skull. Worse in the mornings. Left arm feels like someone punched her (rarely on the right side).     Tingling: in both arms; not improved with ulnar nerve decompression. Ring and pinky finger continue to be numb since her ulnar nerve decompression, but right now is having tingling in both hands, fingers on both sides (4th and 5th digits on the right are worse than on the left). Doesn't know when tingling in the hands onset    Weakness: Denies, notes generalized fatigue    Aggravating: Having her hair up makes things feel worse. Putting the head of the bed up at night.  Relieving: leaning over the edge of the bed in the morning; hot shower in the morning, ice in the evening    Pain management: Biofreeze, bengay, vicodin- takes up to 4 tablets/day- has been using for a year at least. Has prednisone available PRN for pain- has been off of it for 2 months- will take the pain away when she takes it.     Past Medical History:   Diagnosis Date     Allergic rhinitis      Arthritis of back      CHF (congestive heart failure) (H)      Chronic kidney disease     stage 3      De Quervain's disease (tenosynovitis)      Fibromyalgia, primary      GERD (gastroesophageal reflux disease)      Hematuria     chronic     High cholesterol      History of blood clots 1970's    DVT, from birth control, h/o left calf     Hyperlipidemia      Hypertension      Low back pain      Osteoporosis      Pickwickian syndrome (H)      Plantar fasciitis       Proteinuria      Proteinuria     chronic     Sleep apnea     CPAP     Past Surgical History:   Procedure Laterality Date     CARPAL TUNNEL RELEASE Bilateral      CERVICAL FUSION N/A 4/27/2017    Procedure: ANTERIOR CERVICAL DECOMPRESSION FUSION C5-7 BILATERAL;  Surgeon: Basim Barone MD;  Location: [facility];  Service:      CHOLECYSTECTOMY      open     COLONOSCOPY N/A 12/20/2019    Procedure: COLONOSCOPY WITH BIOPSIES;  Surgeon: Lucio Farr MD;  Location: [facility];  Service: Gastroenterology     EYE SURGERY       HAND SURGERY Right      HYSTEROSCOPY       JOINT REPLACEMENT      bilateral TKA     KNEE SURGERY       REVIEW OF SYSTEMS:  Some intermittent mild imbalance. Denies falls. Notes numbness, tingling in the feet- several years. Frequent stooling- had a colonoscopy in December and was only found to have 5 polyps. A full 14 point review of systems was otherwise completed and is negative aside from that mentioned above in the HPI    MEDICATIONS:    Current Outpatient Medications   Medication Sig Dispense Refill     alendronate (FOSAMAX) 70 MG tablet Take 70 mg by mouth every 7 days. Takes on Mondays 11     azelastine (OPTIVAR) 0.05 % ophthalmic solution Administer 1 drop to both eyes every 12 (twelve) hours.       benzonatate (TESSALON) 100 MG capsule Take 200 mg by mouth 3 (three) times a day as needed for cough.       calcium citrate (CALCITRATE) 200 mg (950 mg) tablet Take 1 tablet (200 mg total) by mouth 2 (two) times a day. 200 tablet 4     cetirizine (ZYRTEC) 10 MG tablet Take 10 mg by mouth daily.        cholecalciferol, vitamin D3, 5,000 unit Tab Take 5,000 Units by mouth daily.       albuterol (PROAIR HFA;PROVENTIL HFA;VENTOLIN HFA) 90 mcg/actuation inhaler Inhale 2 puffs every 6 (six) hours as needed for wheezing.       esomeprazole (NEXIUM) 40 MG capsule Take 40 mg by mouth every other day.       furosemide (LASIX) 20 MG tablet Take 1 tablet (20 mg total) by  mouth daily. 30 tablet 0     gabapentin (NEURONTIN) 300 MG capsule Take 300 mg by mouth 3 (three) times a day.        HYDROcodone-acetaminophen 5-325 mg per tablet Take 1 tablet by mouth every 6 (six) hours as needed for pain.       lysine 500 mg cap Take 1 capsule by mouth 2 (two) times a day.       magnesium oxide 250 mg Tab Take 500 mg by mouth 2 (two) times a day.        metoprolol succinate (TOPROL-XL) 50 MG 24 hr tablet Take 1 tablet (50 mg total) by mouth daily. (Patient taking differently: Take 50 mg by mouth at bedtime. ) 30 tablet 0     modafinil (PROVIGIL) 100 MG tablet Take 250 mg by mouth daily.       montelukast (SINGULAIR) 10 mg tablet Take 10 mg by mouth daily.       OMEGA-3/DHA/EPA/FISH OIL (FISH OIL-OMEGA-3 FATTY ACIDS) 300-1,000 mg capsule Take 1 g by mouth daily.       OXYGEN-AIR DELIVERY SYSTEMS [facility] Use As Directed. 3 lpm 24/7 continuous       predniSONE (DELTASONE) 20 MG tablet Take 10 mg by mouth daily.       rosuvastatin (CRESTOR) 10 MG tablet Take 10 mg by mouth at bedtime.       TiZANidine (ZANAFLEX) 4 MG capsule Take 8 mg by mouth every 8 (eight) hours as needed for muscle spasms.       valACYclovir (VALTREX) 500 MG tablet Take 500 mg by mouth 2 (two) times a day as needed.        No current facility-administered medications for this visit.          ALLERGIES/SENSITIVITIES:     Allergies   Allergen Reactions     Latex Rash     Severe rash       Valsartan      Hyperkalemia     Colchicine Diarrhea     Latex      Added based on information entered during case entry, please review and add reactions, type, and severity as needed     Other Environmental Allergy      Coverlet bandaid , 3VR product; rash     Statins-Hmg-Coa Reductase Inhibitors Myalgia     Tried lovastatin and simvastatin     Sulfa (Sulfonamide Antibiotics) Swelling     Facial swelling     Paper Tape Rash       PERTINENT SOCIAL HISTORY:   Social History     Socioeconomic History     Marital status:      Spouse name: Not on  file     Number of children: Not on file     Years of education: Not on file     Highest education level: Not on file   Occupational History     Not on file   Social Needs     Financial resource strain: Not on file     Food insecurity     Worry: Not on file     Inability: Not on file     Transportation needs     Medical: Not on file     Non-medical: Not on file   Tobacco Use     Smoking status: Never Smoker     Smokeless tobacco: Never Used   Substance and Sexual Activity     Alcohol use: No     Drug use: No     Sexual activity: Not on file   Lifestyle     Physical activity     Days per week: Not on file     Minutes per session: Not on file     Stress: Not on file   Relationships     Social connections     Talks on phone: Not on file     Gets together: Not on file     Attends Islam service: Not on file     Active member of club or organization: Not on file     Attends meetings of clubs or organizations: Not on file     Relationship status: Not on file     Intimate partner violence     Fear of current or ex partner: Not on file     Emotionally abused: Not on file     Physically abused: Not on file     Forced sexual activity: Not on file   Other Topics Concern     Not on file   Social History Narrative     Not on file         FAMILY HISTORY:  Family History   Problem Relation Age of Onset     Rheum arthritis Mother      Heart disease Sister      COPD Father         PHYSICAL EXAM:     Constitution: There were no vitals taken for this visit..   Awake, alert and in NAD  Eyes: Conjugate gaze. Conjunctiva benign without icterus or injection  Heart: RRR  Lungs: Non-labored respiration without accessory muscle use  Skin: No obvious rash or lesion  Psych: Appropriate mood and affect, alert and oriented x 3  Mental Status:  Speech is fluent.  Recent and remote memory are intact.  Attention span and concentration are normal.     Cranial Nerves:  CN2: No funduscopic exam performed. CN3,4 & 6: Pupillary light response,  lateral and vertical gaze normal.  No nystagmus. CN7: No facial weakness, smile, facial symmetry intact. CN8: Intact to spoken voice.      Motor: No pronator drift of upper extremity. Normal bulk and tone all muscle groups of upper and lower extremities.     Right Left   Deltoid 5 5   Biceps 4 4+   Triceps 5 4+   Wrist ex 5 5   Wrist flex 5 5   Finger ex 5 5   Hand intrinsic 4 4    5 Mildly decreased   4/5 left opponens digiti minimi     Sensory: Sensation intact bilaterally to light touch and temperature throughout. Sensation is intact to pinprick in the bilateral LE. Vibratory sense is absent in the left great toe.     Coordination:  Gait is WNL.  HERIBERTO in the UE is WNL     Reflexes; 2+ supinator, biceps, triceps. 2+ patellar and absent achilles. No serrano. No clonus. Toes are down-going bilaterally    Musculoskeletal: Negative straight leg raise bilaterally. Negative YORDY testing. Negative Robbie finger test    IMAGING: I personally reviewed all radiographic images  Cervical spine flexion-extension x-rays 5/28/2020: Evidence of prior C5-7 anterior cervical fusion with likely pseudoarthrosis given anterior cervical plate migration the anterior cervical plate sits approximately 1 cm ventral to the anterior spine.    Cervical spine CT 5/28/2020: Evidence of cervical pseudoarthrosis involving C5-C7 anterior cervical discectomy and fusion.  Plate sits approximately 1 cm ventral to the C5 vertebral body.  Evidence of screw lucency at C7, C6 and C5 with ventral screw/plate pullout    MRI cervical spine 5/22/2020: Patient has congenitally small central cervical canal.  Evidence of prior anterior instrumented fusion from C5-7 with artifact.  Patient appears to have moderate bilateral foraminal stenosis at C5-6 and C6-7, worse on the left than the right    CONSULTATION ASSESSMENT AND PLAN:    Desirae Rey is a 69 y.o. female who has evidence of pseudoarthrosis of a C5-7 ACDF (2017) with ventral plate and screw  migration    Recommended a C5-T1 posterior instrumented fusion with foraminotomies in areas of persistent stenosis. We discussed the risks, benefits and alternatives to surgical intervention. I am not certain as to the origin of her arm paresthesias- given the distribution of her symptoms, it does appear more consistent with ulnar neuropathy, however with her residual foraminal stenosis, I cannot exclude a cervical radiculopathy. Would strongly consider EMG prior to surgery if available to elucidate origin of paresthesias    Ms. Rey has a past history of osteoporosis documented but I cannot find any prior DEXA scan within our system, her care everywhere or at [facility] which is where she thought it was performed. Given her pseudoarthrosis, I would recommend a repeat DEXA prior to surgery, but given her symptoms and anterior plate migration, I do not believe surgery should be delayed for treatment.     I spent more than 60 minutes in this apt, examining the pt, reviewing the scans, reviewing notes from chart, discussing treatment options with risks and benefits and coordinating care. >50 % clinic time was spent in face to face counseling and coordinating care    Sherita Mckeon MD      Cc:   Noemy Brito PA-C

## 2021-06-08 NOTE — PROGRESS NOTES
Neurosurgery consultation was requested by: Noemy Brito PA-C  Pain: Cervical  Radicular Pain is present:  Bilaterally in upper extremities  Lhermitte sign: No  Motor complaints:Denies  Sensory complaints: numbness tingling in hands bilateral  Gait and balance issues: No  Bowel or bladder issues: No  Duration of SX is: 1 yr  The symptoms are worse with: constant  The symptoms are better with: ice  Injury: denies  Severity is: moderate  Patient has tried the following conservative measures: PT over 1 yr ago  NDI score is:46  Vandana Pang CMA

## 2021-06-08 NOTE — TELEPHONE ENCOUNTER
"05.15 - called x 1 - pt will think about referral and call us back to schedule when ready. States \"we only have 2 guys here\".  "

## 2021-06-09 ENCOUNTER — DOCUMENTATION ONLY (OUTPATIENT)
Dept: OTHER | Facility: CLINIC | Age: 71
End: 2021-06-09

## 2021-06-09 ENCOUNTER — AMBULATORY - HEALTHEAST (OUTPATIENT)
Dept: OTHER | Facility: CLINIC | Age: 71
End: 2021-06-09

## 2021-06-09 NOTE — TELEPHONE ENCOUNTER
"\"Someone\" called patient to schedule an EMG and pt claims Dr. Mckeon never told her she needs that test. Pt very reluctant for repeat EMG as they 'hurt like a son of a _____'. I explained Dr. Mckeon's notes stronger encouraged she complete the test. Patient asking for Dr. Mckeon to call her. I explained I would forward to Dr. Mckeon's team and someone will call her back.     Ulnar nerve surgery 2017  EMG completed done prior to ulnar nerve service.   "

## 2021-06-09 NOTE — TELEPHONE ENCOUNTER
Called and spoke with Desirae, explained that Dr. Mckeon would strongly consider EMG prior to recommended surgery if available to elucidate origin of paresthesias - Desirae verbalized understanding and agreement. She will connect with us when is ready to proceed with EMG prior to surgery.  Avani Rodriguez, RN, CNRN

## 2021-06-09 NOTE — PROGRESS NOTES
[facility] SPINE SURGERY SERVICE CONSULTATION NOTE    3/3/2017     Desirae Rey is a 65 y.o. female who is sent to us in consultation by Mauricio Contreras for evaluation of cervical spinal stenosis as a second opinion.             HPI: The pt describes the symptoms as having started more than a year ago. . Pt describes aggressive numbness and tingling in the upper extremities and pain in her cervical spine at all times. She's having difficulty with working and other activities of daily life. She had a previous opinion for the need for surgery secondary to significant spinal cord compression especially at C6-C7 by multiple providers and was here today to move forward with any surgical options.      Past Medical History:   Diagnosis Date     Arthritis of back      De Quervain's disease (tenosynovitis)      Fibromyalgia, primary      Hyperlipidemia      Hypertension      Low back pain      Plantar fasciitis      Pneumonia      Proteinuria      Past Surgical History:   Procedure Laterality Date     CARPAL TUNNEL RELEASE       HAND SURGERY       KNEE SURGERY           REVIEW OF SYSTEMS:  ROS reviewed with pt as documented on pt health form of 3/3/2017.    Negative cardiac, pulmonary, hematological.  No family hx of anesthetic reactions.  No family hx of hypercoagulability.  Endorses sleep apnea       MEDICATIONS:  Current Outpatient Prescriptions   Medication Sig Dispense Refill     acyclovir (ZOVIRAX) 5 % ointment Apply to Affected Areas 5 Times Daily for 7 Days PRN       armodafinil (NUVIGIL) 250 mg tablet        aspirin 325 MG tablet Take 325 mg by mouth daily.       atenolol (TENORMIN) 50 MG tablet Take 50 mg by mouth daily.       azelastine (ASTELIN) 137 mcg nasal spray 2 sprays into each nostril 2 (two) times a day.       azelastine (OPTIVAR) 0.05 % ophthalmic solution Administer 1 drop to both eyes every 12 (twelve) hours.       beclomethasone (QVAR) 80 mcg/actuation inhaler Inhale 2 puffs 2 (two) times a day.        calcium citrate (CALCITRATE) 200 mg (950 mg) tablet Take 1 tablet (200 mg total) by mouth 2 (two) times a day. 200 tablet 4     CALCIUM CITRATE/VITAMIN D3 (CALCIUM CITRATE + D ORAL) Take 1 tablet by mouth 2 (two) times a day.       cetirizine (ZYRTEC) 10 MG tablet Take 10 mg by mouth daily.       cholecalciferol, vitamin D3, (VITAMIN D3) 2,000 unit Tab Take 1 tablet by mouth 2 (two) times a day.       clobetasol (TEMOVATE) 0.05 % ointment        cyclobenzaprine (FLEXERIL) 10 MG tablet Take 10 mg by mouth daily as needed for muscle spasms.       DOCOSAHEXANOIC ACID/EPA (FISH OIL ORAL) Take 1,400 mg by mouth daily.       ergocalciferol (VITAMIN D2) 50,000 unit capsule Take 1 capsule (50,000 Units total) by mouth once a week. 12 capsule 4     esomeprazole (NEXIUM) 40 MG capsule Take 40 mg by mouth every other day.       furosemide (LASIX) 20 MG tablet Take 20 mg by mouth daily.       gabapentin (NEURONTIN) 300 MG capsule Take 400 mg by mouth 3 (three) times a day.        glucos-msm-C-Mn-herb#21 (GLUCOSAMINE-MSM COMPLEX) Tab Take 1 tablet by mouth daily.       HYDROcodone-acetaminophen 5-325 mg per tablet Take 1 tablet by mouth every 6 (six) hours as needed.        lysine 500 mg cap Take 1 capsule by mouth 2 (two) times a day.       magnesium oxide 250 mg Tab Take 250 mg by mouth 2 (two) times a day.       montelukast (SINGULAIR) 10 mg tablet Take 10 mg by mouth daily.       MULTIVITAMIN ORAL Take 1 tablet by mouth daily.       NUVIGIL 200 mg Tab Take 1 tablet by mouth daily. Then 1/2 tablet at noon.       traMADol (ULTRAM) 50 mg tablet Take 50 mg by mouth 3 (three) times a day as needed for pain.       valACYclovir (VALTREX) 500 MG tablet Take 500 mg by mouth as needed.        valsartan (DIOVAN) 320 MG tablet Take 320 mg by mouth daily.       VITAMIN D2 50,000 unit capsule   0     No current facility-administered medications for this encounter.          ALLERGIES/SENSITIVITIES:     Allergies   Allergen Reactions      Statins-Hmg-Coa Reductase Inhibitors Myalgia     Sulfa (Sulfonamide Antibiotics)      Edema         PERTINENT SOCIAL HISTORY:   Social History     Social History     Marital status:      Spouse name: N/A     Number of children: N/A     Years of education: N/A     Social History Main Topics     Smoking status: Never Smoker     Smokeless tobacco: Never Used     Alcohol use No     Drug use: Not on file     Sexual activity: Not on file     Other Topics Concern     Not on file     Social History Narrative         FAMILY HISTORY:  Family History   Problem Relation Age of Onset     Rheum arthritis Mother      Heart disease Sister         PHYSICAL EXAM:   Constitutional:   Visit Vitals     /80     Pulse 90     SpO2 92%        Mental Status: A & O in no acute distress.  Affect is appropriate.  Speech is fluent.  Recent and remote memory are intact.  Attention span and concentration are normal.     Cranial Nerves: Grossly intact    Range of Motion: Limited due to pain    Provacative Testing: negative     Motor: No pronator drift of upper extremity. Normal bulk and tone all muscle groups of upper and lower extremities.      Sensory: Sensation intact bilaterally to light touch.  Altered bilaterally in her upper extremities and C6, 7 distribution     Coordination:  Heel/toe/  Gait not  intact.       Reflexes; supinator, biceps, triceps, knee/ ankle jerk intact.  Positive Hoffmans on the left upper extremity hoffmans,   Negative  babinski/ clonus.    IMAGING: I personally reviewed all radiographic images    Lumbar-mri  IMPRESSION:   CONCLUSION:  1. Significantly motion degraded examination.  2. Interval progression of mild to moderate spondylitic changes of the mid to lower cervical spine since 12/18/2014, with resultant moderate to severe spinal canal stenosis at the level of C6-C7 secondary to left central/foraminal disc protrusion. No   convincing accompanying cord signal abnormality.  3. Moderate spinal canal  stenosis at C5-C6.  4. Varying degrees of neural foraminal compromise, moderate on the left at C6-C7.      MRI  cervical   IMPRESSION:   CONCLUSION:  1. Transitional lumbosacral anatomy is redemonstrated, with partial lumbarization of the S1 vertebral elements. The last fully mobile disc space is designated as L5-S1. Nomenclature is consistent with designations utilized on prior MRI examinations of   the lumbar spine, most recently performed 12/18/2014.  2. Continued progression of spondylitic changes as compared to 12/18/2014, with resultant moderate to severe spinal canal stenosis at L4-L5.  3. Moderate spinal canal compromise at L3-L4.  4. Mild to moderate thecal sac effacement at L2-L3, with relatively high-grade left lateral recess narrowing contributing to encroachment/potential impingement upon the traversing left L3 nerve root.  5. Varying degrees of neural foraminal compromise, moderate to severe bilaterally at L4-L5, with mild deformity of the exiting proximal L4 nerve roots.      CONSULTATION ASSESSMENT AND PLAN:    This is a complex spine surgery consultation on a 66-year-old female with significant cervical spinal stenosis C-5 six and C6 C7. I have seen her before in the office and we had discussed proceeding with anterior cervical decompression and fusion C-5 through C7. She's having progressive numbness and tingling with pain in the upper extremities. Risk and benefits of surgery were extensively discussed today. We discussed risks of the anterior cervical fusion including but not limited to spinal cord injury, nerve root damage, spinal fluid leakage, infection, bleeding, recurrent laryngeal nerve, soft tissue injury, difficulty swallowing, chronic pain syndromes, and other unforeseen complications.  If he has any other questions or concerns he certainly not hesitate to contact our surgical team and the [facility] at any time.  Patient was specifically instructed on pre-operative and  scheduling protocol and was given the appropriate contact information for the surgery scheduler and the general information line. Patient will need a DEXA scan prior to surgical intervention.      Surgical plan:  1. Anterior cervical decompression and fusion C5-C7 with instrumentation    Basim Barone MD  Spine Surgeon  [facility] [place]    Nola Pikemergaurang SMITH served as scribe    Cc:   Mauricio Contreras MD  [id number]  [place] [id number]

## 2021-06-10 NOTE — ANESTHESIA CARE TRANSFER NOTE
Last vitals:   Vitals:    04/27/17 1522   BP: 130/64   Pulse: 73   Resp: 12   Temp: (!) 35.7  C (96.3  F)   SpO2: 93%   In OR, spont respir, , RICHEY, sustained head lift, sx and extubated to 02 via FM, spont respir, transported to PACU, VSS, report to RN.   Patient's level of consciousness is drowsy  Spontaneous respirations: yes  Maintains airway independently: yes  Dentition unchanged: yes  Oropharynx: oropharynx clear of all foreign objects    QCDR Measures:  ASA# 20 - Surgical Safety Checklist: ASA20A - Safety Checks Done  PQRS# 430 - Adult PONV Prevention: 4558F - Pt received => 2 anti-emetic agents (different classes) preop & intraop  ASA# 8 - Peds PONV Prevention: NA - Not pediatric patient, not GA or 2 or more risk factors NOT present  PQRS# 424 - Annita-op Temp Management: 4559F - At least one body temp DOCUMENTED => 35.5C or 95.9F within required timeframe  PQRS# 426 - PACU Transfer Protocol: - Transfer of care checklist used  ASA# 14 - Acute Post-op Pain: ASA14B - Patient did NOT experience pain >= 7 out of 10    I completed my SBAR handoff to the receiving nurse per policy and procedure.

## 2021-06-10 NOTE — PROGRESS NOTES
History:    Ms Rey is a very pleasant 67 yo female who is 2 weeks post acdf C5-7.  She is having some interscapular pain her pain is well controlled she is hoping to wean down on her oxycodone no upper extremity pain.  No problems with her incision.    Exam:  Alter and oriented x 3.  vss afebrile    Incision: clean/dry/intact without evidence of infection    Neuro:     Motors  5/5 throughout w/o deficit    Sensory  Equal normal bilaterally    Imaginv cervical xrays: Hardware is intact and stable and the motion migration.  No other findings          A/P  2 weeks post acdf. Cervicalgia  -Ms Rey will continue to increase her activities as robin.  I will wean her down to [place].  She will see me back in 4 weeks.  We will have her obtain updated x-rays then as well.    Should the patient have any future problems/questions/concerns they will let me know.

## 2021-06-10 NOTE — ANESTHESIA PREPROCEDURE EVALUATION
Anesthesia Evaluation      Patient summary reviewed   No history of anesthetic complications     Airway   Mallampati: II   Pulmonary - negative ROS and normal exam   (+) sleep apnea on CPAP, ,   Pneumonia: Recent URI. Completed z-pack 10days ago.    ROS comment: Pickwickian syndrome  Some seasonal allergies (worse in Spring)  Chronic sinusitis                         Cardiovascular - negative ROS  Exercise tolerance: > or = 4 METS  (+) hypertension well controlled, , hypercholesterolemia,     (-) past MI, angina  ECG reviewed (NSR with LAFB)  Rhythm: regular  Rate: normal,         Neuro/Psych - negative ROS     Endo/Other - negative ROS   (+) obesity (BMI>50),      GI/Hepatic/Renal - negative ROS   (+) GERD well controlled,   chronic renal disease CRI,      Other findings: Results for BELL THOMAS (MRN [id number]) as of 4/27/2017 08:56    4/21/2017 15:30  Sodium: 142  Potassium: 5.1 (H)  Chloride: 107  CO2: 24  Anion Gap, Calculation: 11  BUN: 40 (H)  Creatinine: 1.68 (H)  GFR MDRD Af Amer: 37 (L)  GFR MDRD Non Af Amer: 31 (L)  Calcium: 9.1        Dental - normal exam                        Anesthesia Plan  Planned anesthetic: general endotracheal  Glidescope  Soft bite block  precedex  propofol  Pre op neb  LTA (if available)  Post op CPAP available  Possible lan  Recheck K      ASA 3   Induction: intravenous   Anesthetic plan and risks discussed with: patient  Anesthesia plan special considerations: video-assisted, arterial catheterization, IV therapy two IVs, dexmedetomidine  Post-op plan: routine recovery

## 2021-06-10 NOTE — ANESTHESIA POSTPROCEDURE EVALUATION
Patient: Desirae Rey  ANTERIOR CERVICAL DECOMPRESSION FUSION C5-7 BILATERAL  Anesthesia type: general    Patient location: PACU  Last vitals:   Vitals:    04/27/17 1650   BP: 132/60   Pulse: 63   Resp: 21   Temp:    SpO2: 91%     Post vital signs: stable  Level of consciousness: awake and responds to simple questions  Post-anesthesia pain: pain controlled  Post-anesthesia nausea and vomiting: no  Pulmonary: unassisted, return to baseline  Cardiovascular: stable and blood pressure at baseline  Hydration: adequate  Anesthetic events: no    QCDR Measures:  ASA# 11 - Annita-op Cardiac Arrest: ASA11B - Patient did NOT experience unanticipated cardiac arrest  ASA# 12 - Annita-op Mortality Rate: ASA12B - Patient did NOT die  ASA# 13 - PACU Re-Intubation Rate: ASA13B - Patient did NOT require a new airway mgmt  ASA# 10 - Composite Anes Safety: ASA10A - No serious adverse event  ASA# 38 - New Corneal Injury: ASA38A - No new exposure keratitis or corneal abrasion in PACU    Additional Notes: Doing well. O2 potentially decreases with narcotics for pain (NELLIE/Pickwickian syndrome). Thought it best for patient to go to the ICU for recovery where they can watch her oxygenation closely. Report given to intensivist.

## 2021-06-11 NOTE — PROGRESS NOTES
History:    Ms Rey is a very pleasant 65 yo female who is 6 weeks post acdf C5-7.  She occasionally will note some spasm in her R UE but overall she is feeling better    Exam:  Alter and oriented x 3.  vss afebrile    Incision: clean/dry/intact without evidence of infection    Neuro:     Motors  5/5 throughout w/o deficit    Sensory  Equal normal bilaterally    Imaginv cervical xrays reveal the appearance of a developing fusion. hdw is intact/stable        A/P  6 weeks acdf C5-7. Cervicalgia  -Desirae will slowly increase her activities as she can tolerate she will see us back in 6 weeks we will have her return to work on the eighth without restriction physical therapy will also be ordered as I believe this will improve her residual pain and make returning to work easier.    Should the patient have any future problems/questions/concerns they will let me know.

## 2021-06-11 NOTE — PROGRESS NOTES
"[facility] Daily Progress     Patient Name: Desirae Rey  Date: 2017  Visit #: 3  PTA visit #:  -  Referral Diagnosis: cervical radiculopathy  Referring provider: Escobar Veloz PA-C  Visit Diagnosis:     ICD-10-CM    1. Cervical radiculopathy M54.12          Assessment:     Posture: flexed forward, needs verbal cues to correct posture.  Limited cervical mobility .    Patient is benefitting from skilled physical therapy and is making steady progress toward functional goals.  Patient is appropriate to continue with skilled physical therapy intervention, as indicated by initial plan of care.    Goal Status:  Pt. will demonstrate/verbalize independence in self-management of condition in : 4 weeks;Progressing toward  Pt. will have improved quality of sleep: waking less times/night;in 4 weeks;Progressing toward  Patient will look up / down: for reading;with less pain;in 4 weeks;Progressing toward      Plan / Patient Education:     Continue with initial plan of care.  Progress with home program as tolerated. cervical exercises, Manual therapy, posture    Subjective:     Pain Ratin-5 after working  \"my neck was hurting\".      Objective:     Gait: independent without assistive device, slow.  Limited cervical mobility.  Rounded shoulders.    Treatment Today     TREATMENT MINUTES COMMENTS   Evaluation     Self-care/ Home management     Manual therapy 15 Seated: MFR cervical and thoracic,  Ribs release.   Neuromuscular Re-education     Therapeutic Activity     Therapeutic Exercises 14 Exercises:  Exercise #1: shoulder rolls, breathing  Comment #1: shoulder extension, cervical: lateral flexion to right and left, flexion, chin tuck.  Exercise #2: corner stretch     Gait training     Modality__________________ 5 warm pack to cervical, good skin tolerance              Total 34    Blank areas are intentional and mean the treatment did not include these items.       Adali Li PT  2017    "

## 2021-06-11 NOTE — PROGRESS NOTES
"[facility] Daily Progress     Patient Name: Desirae Rey  Date: 2017  Visit #: 4  PTA visit #:  -  Referral Diagnosis: cervical radiculopathy  Referring provider: Escobar Veloz PA-C  Visit Diagnosis:     ICD-10-CM    1. Cervical radiculopathy M54.12          Assessment:     Increased adipose tissue on the cervical and sub occipital areas. Limited cervical mobility.    Patient is benefitting from skilled physical therapy and is making steady progress toward functional goals.  Patient is appropriate to continue with skilled physical therapy intervention, as indicated by initial plan of care.    Goal Status:  Pt. will demonstrate/verbalize independence in self-management of condition in : 4 weeks;Progressing toward  Pt. will have improved quality of sleep: waking less times/night;in 4 weeks;Progressing toward  Patient will look up / down: for reading;with less pain;in 4 weeks;Progressing toward      Plan / Patient Education:     Continue with initial plan of care.  Progress with home program as tolerated. cervical exercises, Manual therapy, posture    Subjective:     Pain Ratin-3  \"I did my exercises\".      Objective:     Flexed forward posture.  Slow independent ambulation without assistive device.    Treatment Today     TREATMENT MINUTES COMMENTS   Evaluation     Self-care/ Home management     Manual therapy 15 Seated: MFR cervical and thoracic elongation, strumming,  Ribs release.   Neuromuscular Re-education     Therapeutic Activity     Therapeutic Exercises 14 Exercises:  Exercise #1: shoulder rolls, breathing  Comment #1: shoulder extension, cervical: lateral flexion to right and left, flexion, chin tuck.  Exercise #2: corner stretch     Gait training     Modality__________________ 5 warm pack to cervical, good skin tolerance              Total 34    Blank areas are intentional and mean the treatment did not include these items.       Adali Li PT  2017    "

## 2021-06-11 NOTE — PROGRESS NOTES
[facility]   Cervical Thoracic Initial Evaluation    Patient Name: Desirae Rey  Date of evaluation: 6/14/2017  Referral Diagnosis: Cervical radiculopathy  Referring provider: Escobar Veloz PA-C  Visit Diagnosis:     ICD-10-CM    1. Cervical radiculopathy M54.12        Assessment:         Desirae Rey is a 66 y.o. female who presents to therapy today with chief complaints of cervical pain. Onset date of sx was 6-8-17.  Pt reported h/o neck surgery.  Pain symptoms are 7/10.  Functional impairments include reaching, turning head.  Pt demo's signs and sx consistent with cervical radiculopathy and left upper extremity weakness.     Pt. is appropriate for skilled PT intervention as outlined in the Plan of Care (POC).  Pt. is a good candidate for skilled PT services to improve pain levels and function.    Goals:  Pt. will demonstrate/verbalize independence in self-management of condition in : 4 weeks  Pt. will have improved quality of sleep: waking less times/night;in 4 weeks  Patient will look up / down: for reading;with less pain;in 4 weeks      Patient's expectations/goals are realistic.    Barriers to Learning or Achieving Goals:  Chronicity of the problem.  Co-morbidities or other medical factors.  obesity, poor posture.       Plan / Patient Instructions:        Plan of Care:   Communication with: Referral Source  Patient Related Instruction: Nature of Condition;Treatment plan and rationale;Self Care instruction;Basis of treatment;Body mechanics;Posture;Precautions;Next steps;Expected outcome  Times per Week: 2  Number of Weeks: 4  Number of Visits: 8  Discharge Planning: patient will be discharge to self care  Therapeutic Exercise: ROM;Stretching;Strengthening  Neuromuscular Reeducation: kinesio tape;posture  Manual Therapy: soft tissue mobilization;myofascial release    Plan for next visit: manual therapy, posture, upper extremities, cervical exercises.     Subjective:         Social  information:   Living Situation:single family home and lives with others    Occupation:dietary aide   Work Status:Working full time   Equipment Available: None    History of Present Illness:    Desirae is a 66 y.o. female who presents to therapy today with complaints of neck pain and left arm weakness. Date of onset/duration of symptoms is neck fusion 2017, got worse 17 when she returned to work. Onset was gradual. Symptoms are not improving. She reports  an episodic  history of similar symptoms. She describes their previous level of function as not limited    Pain Ratin  Pain rating at best: 3  Pain rating at worst: 10  Pain description: stabbing    Functional limitations are described as occurring with:   lifting  reaching at shoulder height, overhead and behind back  chew/ yawn, carry laundry/ groceries, up/ down steps.    Patient reports benefit from:  pool therapy.         Objective:      Note: Items left blank indicates the item was not performed or not indicated at the time of the evaluation.    Patient Outcome Measures :    Neck Disability Score in %: 34   Scores range from 0-100%, where a score of 0% represents minimal pain and maximal function. The minmal clinically important difference is a score reduction of 10%.    Cervical Thoracic Examination  1. Cervical radiculopathy       Involved side: Bilateral  Posture Observation:      General sitting posture is  fair.  Shoulder/Thoracic complex: Moderate bilateral scapular winging    Cervical ROM:    Date: 17     *Indicate scale AROM AROM AROM   Cervical Flexion 25o pain     Cervical Extension       Right Left Right Left Right Left   Cervical Sidebending 33o pain 32o pain       Cervical Rotation         Cervical Protraction      Cervical Retraction      Thoracic Flexion      Thoracic Extension      Thoracic Sidebending         Thoracic Rotation           Strength     Date: 17     Cervical Myotomes/5 Right Left Right Left Right Left    Cervical Flexion (C1-2) 4- 4-       Cervical Sidebending (C3) 4- 4-       Shoulder Elevation (C4)         Shoulder Abduction (C5)         Elbow Flexion (C6)         Elbow Extension (C7)         Wrist Flexion (C7)         Wrist Extension (C6)         Thumb abduction (C8)         Finger Abduction (T1)           Sensation   Left arm tingling.   Flexibility: limited.    Palpation:cervical and upper trap tenderness    Left shoulder flexion limited to 65o active.    Passive Mobility-Joint Integrity: Hypomobile.      UE Screen: rounded shoulders.    Treatment Today     TREATMENT MINUTES COMMENTS   Evaluation 30 low   Self-care/ Home management     Manual therapy 15 MFR: cervical strumming.   Neuromuscular Re-education     Therapeutic Activity     Therapeutic Exercises 15 Exercises:  Exercise #1: shoulder rolls, breathing  Comment #1: shoulder extension, cervical: lateral flexion to right and left, flexion, provided written instructions     Gait training     Modality__________________                Total 60    Blank areas are intentional and mean the treatment did not include these items.     PT Evaluation Code: (Please list factors)  Patient History/Comorbidities: cervical surgery  Examination: cervical pain  Clinical Presentation: stable  Clinical Decision Making: low    Patient History/  Comorbidities Examination  (body structures and functions, activity limitations, and/or participation restrictions) Clinical Presentation Clinical Decision Making (Complexity)   No documented Comorbidities or personal factors 1-2 Elements Stable and/or uncomplicated Low   1-2 documented comorbidities or personal factor 3 Elements Evolving clinical presentation with changing characteristics Moderate   3-4 documented comorbidities or personal factors 4 or more Unstable and unpredictable High              Adali Li PT  6/14/2017  3:03 PM

## 2021-06-11 NOTE — PROGRESS NOTES
"[facility] Daily Progress     Patient Name: Desirae Rey  Date: 2017  Visit #: 2  PTA visit #:  -  Referral Diagnosis: cervical radiculopathy  Referring provider: Escobar Veloz PA-C  Visit Diagnosis:     ICD-10-CM    1. Cervical radiculopathy M54.12          Assessment:     Bilateral cervical muscle spasm releases after manual therapy. Patient needs to improve posture.  Patient is benefitting from skilled physical therapy and is making steady progress toward functional goals.  Patient is appropriate to continue with skilled physical therapy intervention, as indicated by initial plan of care.    Goal Status:  Pt. will demonstrate/verbalize independence in self-management of condition in : 4 weeks;Progressing toward  Pt. will have improved quality of sleep: waking less times/night;in 4 weeks;Progressing toward  Patient will look up / down: for reading;with less pain;in 4 weeks;Progressing toward      Plan / Patient Education:     Continue with initial plan of care.  Progress with home program as tolerated. cervical exercises, Manual therapy, posture    Subjective:     Pain Ratin  \"I did my exercises, the pain was better after my last visit for about 4 hours\".      Objective:     Bilateral cervical and upper trap muscle spasm.  Rounded posture.    Treatment Today     TREATMENT MINUTES COMMENTS   Evaluation     Self-care/ Home management     Manual therapy 15 Seated: MFR cervical and thoracic,  Ribs release.   Neuromuscular Re-education     Therapeutic Activity     Therapeutic Exercises 14 Exercises:  Exercise #1: shoulder rolls, breathing  Comment #1: shoulder extension, cervical: lateral flexion to right and left, flexion, provided written instructions  Exercise #2: corner stretch     Gait training     Modality__________________                Total 29    Blank areas are intentional and mean the treatment did not include these items.       Adali Li PT  2017    "

## 2021-06-11 NOTE — PROGRESS NOTES
PULMONARY OUTPATIENT CONSULT NOTE    Assessment:   1. Respiratory failure   Clinically mild volume up, underlying cardiomyopathy, sleep apnea.   Recent PFTs showed a restrictive lung disease, severely reduced DLCO, although studydid not meet ATS criteria. Unable to achieve > 85% of VC.  Patient underwent ACDF C5-7 two months ago.   Recommend to get a CXR PA and L, echocardiogram , and increase diuresis   Continue O2 supplementation 3 LPM with activities and at night   2. Sleep apnea  BiPAP titration was done on 3/2008. Recommending BIPAP 14/10.    3. HTN, cardiomyopathy  4. Deconditioning   5. Morbid Obesity      Plan:   1. Continue lasix 20 mg daily  2. Take extra lasix 20 mg every other day for one week  3. Labs BNP and BMP  4. Continue O2 supplementation 3 LPM with activities and at night  5. Continue BiPAP 14/10 plus 3 LPM  6. Continue albuterol HFA as needed  7. Echocardiogram   8. CXR  9. Increase physical activity  10. Follow up in 6 weeks    Thank you for this consultation.     Carlos Clayton  Pulmonary / Critical Care  6/13/2017    CC:      Chief Complaint   Patient presents with     Consult     oxygen sat's are low     Shortness of Breath     go over pft's, worse with activity and today she is really bad       HPI:       Desirae Rey is an 66 y.o. female who presents for evaluation post hospitalization.   Patient has history of HTN, NELLIE on BiPAP, DVT LLE 84' while on contraceptives, DJD, chronic back pain, morbid obesity  Patient underwent anterior cervical discectomy and fusion C5-7 on 4/27/2017. Uneventful surgery, postop complicated with ICU admission for hypoxia. Favorable hospital course, pt was discharged on O2 supplementation, currently on 3 LPM with activities and at night.   Activity level is very limited, able to walk less 100 feet before stopping.   Complaints of back pain, knee pain.   Denies history of asthma/COPD, denies tobacco user in the past. Denies cough. Reports occasional  wheezes. Denies chest pain, orthopnea or PND, reports chronic swelling of LEs.   Uses BiPAP every night. Feels somewhat refresh in AM.   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   Her weight on 3/2008 was 194 lbs. On 8/2010 was 217 lbs, and increased to 228 lbs on 12/2013. Since then, her weight is unchanged.     Past Medical History:   Diagnosis Date     Allergic rhinitis      Arthritis of back      Chronic kidney disease     stage 3      De Quervain's disease (tenosynovitis)      DVT (deep venous thrombosis) 1970s    from BC pills     Fibromyalgia, primary      GERD (gastroesophageal reflux disease)      Hematuria     chronic     Hyperlipidemia      Hypertension      Low back pain      Osteoporosis      Pickwickian syndrome      Plantar fasciitis      Pneumonia      Proteinuria      Proteinuria     chronic     Sleep apnea     CPAP     Medications:     Prior to Admission medications    Medication Sig Start Date End Date Taking? Authorizing Provider   acyclovir (ZOVIRAX) 5 % ointment Apply to Affected Areas 5 Times Daily for 7 Days PRN 12/18/13  Yes PROVIDER, HISTORICAL   atenolol (TENORMIN) 50 MG tablet Take 50 mg by mouth at bedtime.  12/18/13  Yes PROVIDER, HISTORICAL   azelastine (ASTELIN) 137 mcg nasal spray 2 sprays into each nostril 2 (two) times a day as needed for rhinitis.  3/5/14  Yes Jordan Moreno MD   azelastine (OPTIVAR) 0.05 % ophthalmic solution Administer 1 drop to both eyes every 12 (twelve) hours. 1/28/14  Yes Jordan Moreno MD   beclomethasone (QVAR) 80 mcg/actuation inhaler Inhale 2 puffs 2 (two) times a day as needed.  2/25/14  Yes Jordan Moreno MD   calcium citrate (CALCITRATE) 200 mg (950 mg) tablet Take 1 tablet (200 mg total) by mouth 2 (two) times a day. 8/19/16  Yes Basim Barone MD   CALCIUM CITRATE/VITAMIN D3 (CALCIUM CITRATE + D ORAL) Take 1 tablet by mouth 2 (two) times a day. 9/26/14  Yes  PROVIDER, HISTORICAL   cephalexin (KEFLEX) 250 MG capsule 2 capsules daily. For 7 days 5/4/17  Yes PROVIDER, HISTORICAL   cetirizine (ZYRTEC) 10 MG tablet Take 10 mg by mouth at bedtime.  1/8/14  Yes PROVIDER, HISTORICAL   cholecalciferol, vitamin D3, (VITAMIN D3) 2,000 unit Tab Take 5,000 Units by mouth daily.  12/18/13  Yes PROVIDER, HISTORICAL   clobetasol (TEMOVATE) 0.05 % ointment Apply 1 application topically 2 (two) times a day as needed.  2/17/15  Yes PROVIDER, HISTORICAL   cyclobenzaprine (FLEXERIL) 10 MG tablet Take 10 mg by mouth at bedtime.    Yes PROVIDER, HISTORICAL   DOCOSAHEXANOIC ACID/EPA (FISH OIL ORAL) Take 1,400 mg by mouth daily. 12/18/13  Yes PROVIDER, HISTORICAL   ergocalciferol (VITAMIN D2) 50,000 unit capsule Take 1 capsule (50,000 Units total) by mouth once a week. 8/19/16  Yes Basim Barone MD   esomeprazole (NEXIUM) 40 MG capsule Take 40 mg by mouth every other day. 12/18/13  Yes PROVIDER, HISTORICAL   furosemide (LASIX) 20 MG tablet Take 20 mg by mouth daily. 12/18/13  Yes PROVIDER, HISTORICAL   gabapentin (NEURONTIN) 300 MG capsule Take 400 mg by mouth 3 (three) times a day.  4/8/15  Yes PROVIDER, HISTORICAL   glucos-msm-C-Mn-herb#21 (GLUCOSAMINE-MSM COMPLEX) Tab Take 1 tablet by mouth daily. 12/18/13  Yes PROVIDER, HISTORICAL   lysine 500 mg cap Take 1 capsule by mouth 2 (two) times a day. 12/18/13  Yes PROVIDER, HISTORICAL   magnesium oxide 250 mg Tab Take 250 mg by mouth 2 (two) times a day. 12/18/13  Yes PROVIDER, HISTORICAL   modafinil (PROVIGIL) 200 MG tablet Take 200 mg by mouth daily.   Yes PROVIDER, HISTORICAL   montelukast (SINGULAIR) 10 mg tablet Take 10 mg by mouth daily. 1/28/14  Yes Jordan Moreno MD   MULTIVITAMIN ORAL Take 1 tablet by mouth daily. 12/18/13  Yes PROVIDER, HISTORICAL   rosuvastatin (CRESTOR) 5 MG tablet 1 tablet at bedtime. 6/11/17  Yes PROVIDER, HISTORICAL   valACYclovir (VALTREX) 500 MG tablet Take 500 mg by mouth 2 (two) times a day as  needed.  1/15/15  Yes PROVIDER, HISTORICAL   valsartan (DIOVAN) 80 MG tablet 1 tablet daily. 5/24/17  Yes PROVIDER, HISTORICAL     Social History     Social History     Marital status:      Spouse name: N/A     Number of children: N/A     Years of education: N/A     Occupational History     Not on file.     Social History Main Topics     Smoking status: Never Smoker     Smokeless tobacco: Never Used     Alcohol use No     Drug use: No     Sexual activity: Not on file     Other Topics Concern     Not on file     Social History Narrative     Family History   Problem Relation Age of Onset     Rheum arthritis Mother      Heart disease Sister      Review of Systems  A 12 point comprehensive review of systems was negative except as noted.      Objective:     Vitals:    06/13/17 1510   BP: 128/70   Pulse: 68   Resp: 24   SpO2: 90%   Weight 230 lbs  SpO2 at rest on RA 90%  SpO2 after ambulating 50 feet on RA 80%  SpO2 after ambulating 75 feet on 2 LPM is 87%  SpO2 after ambulating 75 feet on 3 LPM is 92%    Gen: obese, awake, alert  HEENT: pink conjunctiva, moist mucosa, Mallampati III/IV  Neck: no thyromegaly, masses or JVD  Lungs: decrease breath sounds at the bases otherwise clear  CV: regular, no murmurs or gallops appreciated  Abdomen: soft, distended, NT, BS wnl  Ext: lymphedema   Neuro: CN II-XII intact, non focal      Diagnostic tests:     Labs 5/4/2017  Na 143  K 5.5  Cl 104  HCO3 31  BUN 40  Cr 1.05  Glu 124    Echocardiogram 12/12/2013  Mild LV hypertrophy, EF 60%. IVSd 1.3 cm. RV is not well visualized. Trace MR, Mild TR, Aortic valve is not well visualized, no significant AS.     CXR 12/12/2013  Infiltrate left upper and left lower lobe. Right lung is clear, Infiltrates are new when compared to CXR done on 1/10/2013    PFTs (6/13/2017)  FEV1/FVC is 88 and is normal.  FEV1 is 53% predicted and is reduced.  FVC is 48% predicted and reduced.  There was no improvement in spirometry after a single inhaled  dose of bronchodilator.  TLC is 72% predicted and is reduced.  RV is 79% predicted and is normal.  DLCO is 39% predicted and is reduced when it is corrected for hemoglobin.

## 2021-06-11 NOTE — PROGRESS NOTES
"[facility] Daily Progress - discharge    Patient Name: Desirae Rey  Date: 2017  Visit #: 5  PTA visit #:  -  Referral Diagnosis: cervical radiculopathy  Referring provider: Escobar Veloz PA-C  Visit Diagnosis:     ICD-10-CM    1. Cervical radiculopathy M54.12          Assessment:     Posture has improved.  Decreased c/o cervical pain.  Patient was instructed on home program and needs to continue with exercises.  Encourage patient to go for walks.        Goal Status:  Pt. will demonstrate/verbalize independence in self-management of condition in : 4 weeks;Met  Pt. will have improved quality of sleep: waking less times/night;in 4 weeks;Met  Patient will look up / down: for reading;with less pain;in 4 weeks;Met      Plan / Patient Education:     Discharge from physical therapy all goals met. Patient will continue with independent home program.    Subjective:     Pain Ratin-3  \"I did my exercises\".      Objective:     Flexed forward posture.  Cervical adipose tissue, kyphosis.  Slow independent ambulation without assistive device.    Treatment Today     TREATMENT MINUTES COMMENTS   Evaluation     Self-care/ Home management     Manual therapy 10 Seated: MFR cervical and thoracic elongation, strumming,  Ribs release.   Neuromuscular Re-education     Therapeutic Activity     Therapeutic Exercises 14 Exercises:  Exercise #1: shoulder rolls, breathing  Comment #1: shoulder extension, cervical: lateral flexion to right and left, flexion, chin tuck.  Exercise #2: corner stretch     Gait training     Modality__________________ 6 warm pack to cervical, good skin tolerance              Total 30    Blank areas are intentional and mean the treatment did not include these items.       Adali Li PT  2017    "

## 2021-06-12 NOTE — PROGRESS NOTES
[facility] Discharge Summary  Patient Name: Desirae Rey  Date: 9/18/2017  Referral Diagnosis: Physical deconditioning   Referring provider: Carlos Bull*  Visit Diagnosis:   1. Generalized muscle weakness     2. Decreased functional mobility and endurance     3. Poor balance         Goals:NOT MET  Pt will: ambulate >90 meters on 2 minute walk test with RPD 5/10 or less in 8 weeks  Pt will: increase comfortable gait speed to >0.85 m/s to decrease falls risk and improve endurance in 8 weeks  Pt will: perform 30 second sit<>stand with O2 sats 88% or greater to improve aerobic condition in 8 weeks    Patient was seen for initial evaluation and did not return for follow up.     Therapy will be discontinued at this time.  The patient will need a new referral to resume.    Thank you for your referral.  Alesha Gutiérrez  9/18/2017  3:03 PM  [facility]   Balance Initial Evaluation    Patient Name: Desirae Rey  Date of evaluation: 8/8/2017  Referral Diagnosis: Physical deconditioning   Referring provider: Carlos Bull*  Visit Diagnosis:     ICD-10-CM    1. Generalized muscle weakness M62.81    2. Decreased functional mobility and endurance Z74.09    3. Poor balance R26.89        Assessment:   Desirae Rey is a 66 y.o. female who presents to therapy today with chief complaints of physical deconditioning. Patient's biggest concerns are SOB with walking and LE weakness. She was previously attending the gym 4-5x/week but has stopped due to elbow pain and recent cervical surgery. Pt is using 3L O2 at night, no O2 during the day except for walking long distances. Evaluation reveals: patient is at high falls risk based on 4 square step test, gait speed, and TUG. She scored well below age gender normative values for all tests today. Her O2 sats reduced to 76% following 2 minute walk test, indicating poor aerobic capacity. Patient will benefit from 1:1 skilled PT services to improve  endurance/capacity, LE strength, and return to gym with regular exercise program to promote healthy living. Patient will be having an ulnar nerve decompression surgery on 8/22/17 which may ultimately alter POC.    Goals:  Pt. will be independent with home exercise program in : 2 weeks  Pt will: ambulate >90 meters on 2 minute walk test with RPD 5/10 or less in 8 weeks  Pt will: increase comfortable gait speed to >0.85 m/s to decrease falls risk and improve endurance in 8 weeks  Pt will: perform 30 second sit<>stand with O2 sats 88% or greater to improve aerobic condition in 8 weeks    Patient's expectations/goals are realistic.    Barriers to Learning or Achieving Goals:  No Barriers.       Plan / Patient Instructions:        Plan of Care:   Communication with: Referral Source  Patient Related Instruction: Nature of Condition;Treatment plan and rationale;Self Care instruction;Basis of treatment;Body mechanics  Times per Week: 2x/week  Number of Weeks: up to 8 weeks  Number of Visits: up to 12 visits   Therapeutic Exercise: Stretching;Strengthening;ROM  Neuromuscular Reeducation: posture;balance/proprioception;kinesio tape;TNE;core  Manual Therapy: soft tissue mobilization;myofascial release;joint mobilization;muscle energy  Other Plan #1: Therapeutic activity     Plan for next visit: diaphragmatic breathing techniques, postural strengthening, aerobic exercise     Subjective:       Desirae Rey is a 67 y/o female who presents today with chief c/o physical deconditioning and elbow pain. Patient recently had cervical fusion on 4//27/17 without improvement in right elbow pain. Patient had an EMG performed today and follow up with [facility] and will be pursuing an ulnar nerve decompression on 8/22/17.  Patient's main complaint is SOB with walking. She was previously attending the gym 4-5x/week but has stopped due to elbow pain. Pt is using 3L O2 at night, no O2 during the day except for walking long distances (to and  "from work). 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       Diagnosis: physical deconditioning, chronic respiratory failure with hypoxia, Dr. Carlos Clayton  Date of diagnosis: 8/1/17  Past medical history/precautions: neuropathy, acute respiratory failure, GRACY, morbid obesity, OA, Hx of B TKA, fibromyalgia, osteoporosis, anemia    Living Situation: house, one step to enter, split level with 7 steps and 2 HR  Caregiver Support: lives with her ,  assist patient with R UE activities due to elbow pain   Equipment: owns SPC and walker   Prior Functional Status: patient was going to the gym 4-5x/week before cervical surgery  Current Activity Level: enjoys swimming/walking in the pool, recumbent bike and treadmill   Chief Complaint: unable to breathe when she is walking    Patient's Functional Goal: return to the gym     Fall history:None, feels her balance is \"off\". Feels unsteady with standing.    Pain  Pain Rating:10/10 right elbow, no neck pain        Objective:      Note: Items left blank indicates the item was not performed or not indicated at the time of the evaluation.    Balance Examination  1. Generalized muscle weakness     2. Decreased functional mobility and endurance     3. Poor balance       Involved side: Bilateral    Posture Observation: forward head/rounded shoulders  Assistive Device: none    Transitional movements:          Sit?Stand:  Independent    Sit? Supine:  Independent   Supine? Sit:   Independent    Floor? Stand:  Not Tested    Strength    Strength   L / R ROM   L / R Comments   Seated Hip Flexion 4/4      Knee Extension 4/4      Dorsi Flexion 4/4                   Supine Straight Leg Raise (degrees)       Quad activation             Side lying Hip Abduction       Hip Adduction             Prone Hip Extension       Knee Flexion             Standing Plantar Flexion*       Dorsi Flexion    "     *Standing PF (single heel raise with UE support for balance only):  5= 16-20 heel raises  4= 10-15 heel raises  3= 5-9 heel raises  2= 1-4 heel raises      Resting oxygen sats: 91%  Oxygen after 30 second sit<>stand: 87%. Time to return to 90 at 86 seconds      Timed Up and Go (TUG):   Average: 14 seconds  AD used? no    30 second sit<>stand: 13 reps  UE support? no  Age gender norm: 14    Balance:    Firm Surface (seconds) Unstable Surface (seconds)    EO EC EO EC   Romberg (feet together) 30 seconds WNL 30 seconds with significant sway      Sharpened Romberg (tandem) 9 seconds  Unable, LOB immediately      Semi-Romberg (small step)           Other Balance Test: (Aguila, Yee, FGA, Mini-BESTest)      Four Square Step Test (motor planning): Trial 1: 15.4 seconds, dowel strikes x 5 total  (>15 seconds falls risk for best of 2 trials)    Gait speed (10 meter walk test):  Comfortable gait speed: 9.1 seconds, 0.66 m/s  Age gender norm: 1.3 m/s  AD used? no    Fast gait speed: 8.5 seconds, 0.70 m/s  Age gender norm: 1.77 m/s  AD used? no    Gait observation:   Decreased step length B, full foot clearance, forward flexed posture       Two Minute Walk Test  Total meters walked: 69.7 meters  AD used? no  RPD: 5/10, RPE: 6/10  Age gender norm: 155.2 m  O2 sats: 76%  50 seconds to return to 86%  76 seconds to return to 89% baseline      Treatment Today     TREATMENT MINUTES COMMENTS   Evaluation 45 Deconditioning evaluation    Self-care/ Home management     Manual therapy     Neuromuscular Re-education     Therapeutic Activity     Therapeutic Exercises     Gait training     Modality__________________                Total 45 Pt arrived 10' late to appt today    Blank areas are intentional and mean the treatment did not include these items.     PT Evaluation Code: (Please list factors)  Patient History/Comorbidities: neuropathy, acute respiratory failure, GRACY, morbid obesity, OA, Hx of B TKA, fibromyalgia, osteoporosis,  anemia  Examination: see above  Clinical Presentation: stable  Clinical Decision Making: low    Patient History/  Comorbidities Examination  (body structures and functions, activity limitations, and/or participation restrictions) Clinical Presentation Clinical Decision Making (Complexity)   No documented Comorbidities or personal factors 1-2 Elements Stable and/or uncomplicated Low   1-2 documented comorbidities or personal factor 3 Elements Evolving clinical presentation with changing characteristics Moderate   3-4 documented comorbidities or personal factors 4 or more Unstable and unpredictable High                Alesha Gutiérrez  8/8/2017  2:50 PM

## 2021-06-12 NOTE — PROGRESS NOTES
SPINE SURGERY FOLLOWUP  NOTE    Ms Rey is approaching 3 months post ACDF C5-7.  She has a few new complaints today.  She has a new frontal HA and right UE numbness from her elbow to her 4th/5th digits.  She continues to describe UE spasm along with ongoing sob.  She has f/u with a pulmonologist and the workup thus far has been unremarkable.  No new injuries.  Her UE pain/numbness is quite problematic for her.  She requires a few norco a day to get by.  She has completed therapy.      Since our last visit she has obtained a ct of her cervical spine.  She is here to f/u on the that scan.  Her pain is still difficult to manage.  It extends from her left elbow to the 4th and 5th digits of her right hand.          The pts PMH, PSH, ROS, Meds, Allergies, SH, FH are all unchanged and summarized in the pts health history from last visit         PHYSICAL EXAM:   Constitutional: /78 (Patient Site: Left Arm, Patient Position: Sitting)  Pulse 73     Mental Status: A & O in no acute distress.  Affect is appropriate.  Speech is fluent.  Recent and remote memory are intact.  Attention span and concentration are normal.        Motor: No pronator drift of upper extremity. Normal bulk and tone all muscle groups of upper and lower extremities.    Musculoskeletal: Tender to palpation cervical spine.  ROM limited in all directions.      Sensory: Sensation intact bilaterally to light touch.      Coordination:   Heel/toe/ gait intact.  nml tandem gait      Reflexes; supinator, biceps, triceps, knee/ ankle jerk intact.  neg hoffmans/   neg babinski/ clonus.    IMAGING:   I personally reviewed all radiographic images   Ct lumbar: nearly solid fusion C5-7.  No sig stenosis on the right. Residual for narrowing C6-7 left     CONSULTATION ASSESSMENT AND PLAN:    Left elbow/UE pain. Possible ulnar nerve impingment  -I would like Desirae to start pt to eval/tx her right UE pain.  She is scheduled for an emg of her right UE in roughly 10  days.  I will see her back after the emg/therapy.  We discussed her surgery looks good and the ct cannot explain her exquisite right UE pain.    I spent more than 15 minutes in this apt, examining the pt, reviewing the scans, reviewing notes from chart, discussing treatment options with risks and benefits and coordinating care. >50 % clinic time was spent in face to face counseling and coordinating care    Escobar Veloz  /Dr. Lizabeth MD      CC:     Noemy Brito PA-C  [id number] [facility] [id number]

## 2021-06-12 NOTE — PROGRESS NOTES
History:    Ms Rey is approaching 3 months post ACDF C5-7.  She has a few new complaints today.  She has a new frontal HA and right UE numbness from her elbow to her 4th/5th digits.  She continues to describe UE spasm along with ongoing sob.  She has f/u with a pulmonologist and the workup thus far has been unremarkable.  No new injuries.  Her UE pain/numbness is quite problematic for her.  She requires a few norco a day to get by.  She has completed therapy.      Exam:  Alter and oriented x 3.  vss afebrile    Incision: clean/dry/intact without evidence of infection    Neuro:     Motors  5/5 throughout w/o deficit    Sensory  Equal normal bilaterally    Imaginv cervical xrays were reviewed:  hdw stable. No sig adjacent level pathology noted    A/P  <3months ACDF C5-7.  New onset right UE numbness C8 vs ulnar neuropathy. HA  -I would like mitzy to obtain an updated ct of her cervical spine to eval for any residual stenosis.  We also discussed obtaining an EMG of her rUE she would prefer to wait on this.  I added fioricet for her HA.  I will see her back in 1-2 weeks to review her ct  Should the patient have any future problems/questions/concerns they will let me know.

## 2021-06-12 NOTE — PROGRESS NOTES
PULMONARY OUTPATIENT FOLLOW UP NOTE    Assessment:   1. Respiratory failure   Multifactorial, obesity, sleep apnea, diastolic dysfunction, deconditioning  PFTs showed a restrictive lung disease, severely reduced DLCO, although study did not meet ATS criteria.  Chest Ct scan 4/2017 showed atelectasis and nodular opacities that account for pneumonia at that time. Also enlarged main pulmonary artery were noted  Recent stress test showed preserved EF, no wall motion abnormalities.   Echocardiogram showed grade 1 diastolic dysfunction, normal RV function, normal heart valves.  Continue O2 supplementation 3 LPM with activities and at night   Continue diuresis. Referral to physical therapy  2. Sleep apnea  BiPAP titration was done on 3/2008. Recommending BIPAP 14/10.    3. HTN, cardiomyopathy  Recent NM stress test showed preserved EF, no wall motion abnormalities. Echocardiogram showed grade 1 diastolic dysfunction, preserved RV function  Titrate BP meds, diuresis    4. Deconditioning   Referral to physical therapy  5. Morbid Obesity      Plan:   1. Continue lasix 20 mg daily and extra 20 mg every other day  2. Follow up BNP and BMP   3. Continue O2 supplementation 3 LPM with activities and at night  4. Continue BiPAP 14/10 plus 3 LPM  5. Continue albuterol HFA as needed  6. Referral to physical therapy   7. Follow up in 3 months    Carlos Clayton  Pulmonary / Critical Care  8/1/2017    CC:      Chief Complaint   Patient presents with     Follow Up     6 wk f/u-pt states that she thinks that her breathing is getting worse than the last time that she was here-pt would also like to go over the results of her chest X-ray 07/20/17, stress test and Echo that were ordered at the last visit       HPI:       Desirae Rey is an 66 y.o. female who presents for evaluation post hospitalization.   Patient has history of HTN, NELLIE on BiPAP, DVT LLE 84' while on contraceptives, DJD, chronic back pain, morbid  obesity  Patient underwent anterior cervical discectomy and fusion C5-7 on 4/27/2017. Uneventful surgery, postop complicated with ICU admission for hypoxia. Favorable hospital course, pt was discharged on O2 supplementation, currently on 3 LPM with activities and at night.   Activity level is very limited, able to walk less 100 feet before stopping.   Complaints of back pain, knee pain.   Denies history of asthma/COPD, denies tobacco user in the past. Denies cough. Reports occasional wheezes. Denies chest pain, orthopnea or PND, reports chronic swelling of LEs.   Uses BiPAP every night. Feels somewhat refresh in AM.   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   Her weight on 3/2008 was 194 lbs. On 8/2010 was 217 lbs, and increased to 228 lbs on 12/2013. Since then, her weight is unchanged.     Past Medical History:   Diagnosis Date     Allergic rhinitis      Arthritis of back      Chronic kidney disease     stage 3      De Quervain's disease (tenosynovitis)      DVT (deep venous thrombosis) 1970s    from BC pills     Fibromyalgia, primary      GERD (gastroesophageal reflux disease)      Hematuria     chronic     High cholesterol      Hyperlipidemia      Hypertension      Low back pain      Osteoporosis      Pickwickian syndrome      Plantar fasciitis      Pneumonia      Proteinuria      Proteinuria     chronic     Sleep apnea     CPAP     Sleep apnea, obstructive      Medications:     Prior to Admission medications    Medication Sig Start Date End Date Taking? Authorizing Provider   acyclovir (ZOVIRAX) 5 % ointment Apply to Affected Areas 5 Times Daily for 7 Days PRN 12/18/13  Yes PROVIDER, HISTORICAL   atenolol (TENORMIN) 50 MG tablet Take 50 mg by mouth at bedtime.  12/18/13  Yes PROVIDER, HISTORICAL   azelastine (ASTELIN) 137 mcg nasal spray 2 sprays into each nostril 2 (two) times a day as needed for rhinitis.  3/5/14  Yes Jordan Moreno MD    azelastine (OPTIVAR) 0.05 % ophthalmic solution Administer 1 drop to both eyes every 12 (twelve) hours. 1/28/14  Yes Jordan Moreno MD   beclomethasone (QVAR) 80 mcg/actuation inhaler Inhale 2 puffs 2 (two) times a day as needed.  2/25/14  Yes Jordan Moreno MD   calcium citrate (CALCITRATE) 200 mg (950 mg) tablet Take 1 tablet (200 mg total) by mouth 2 (two) times a day. 8/19/16  Yes Basim Barone MD   CALCIUM CITRATE/VITAMIN D3 (CALCIUM CITRATE + D ORAL) Take 1 tablet by mouth 2 (two) times a day. 9/26/14  Yes PROVIDER, HISTORICAL   cephalexin (KEFLEX) 250 MG capsule 2 capsules daily. For 7 days 5/4/17  Yes PROVIDER, HISTORICAL   cetirizine (ZYRTEC) 10 MG tablet Take 10 mg by mouth at bedtime.  1/8/14  Yes PROVIDER, HISTORICAL   cholecalciferol, vitamin D3, (VITAMIN D3) 2,000 unit Tab Take 5,000 Units by mouth daily.  12/18/13  Yes PROVIDER, HISTORICAL   clobetasol (TEMOVATE) 0.05 % ointment Apply 1 application topically 2 (two) times a day as needed.  2/17/15  Yes PROVIDER, HISTORICAL   cyclobenzaprine (FLEXERIL) 10 MG tablet Take 10 mg by mouth at bedtime.    Yes PROVIDER, HISTORICAL   DOCOSAHEXANOIC ACID/EPA (FISH OIL ORAL) Take 1,400 mg by mouth daily. 12/18/13  Yes PROVIDER, HISTORICAL   ergocalciferol (VITAMIN D2) 50,000 unit capsule Take 1 capsule (50,000 Units total) by mouth once a week. 8/19/16  Yes Basim Barone MD   esomeprazole (NEXIUM) 40 MG capsule Take 40 mg by mouth every other day. 12/18/13  Yes PROVIDER, HISTORICAL   furosemide (LASIX) 20 MG tablet Take 20 mg by mouth daily. 12/18/13  Yes PROVIDER, HISTORICAL   gabapentin (NEURONTIN) 300 MG capsule Take 400 mg by mouth 3 (three) times a day.  4/8/15  Yes PROVIDER, HISTORICAL   glucos-msm-C-Mn-herb#21 (GLUCOSAMINE-MSM COMPLEX) Tab Take 1 tablet by mouth daily. 12/18/13  Yes PROVIDER, HISTORICAL   lysine 500 mg cap Take 1 capsule by mouth 2 (two) times a day. 12/18/13  Yes PROVIDER, HISTORICAL   magnesium oxide 250 mg  Tab Take 250 mg by mouth 2 (two) times a day. 12/18/13  Yes PROVIDER, HISTORICAL   modafinil (PROVIGIL) 200 MG tablet Take 200 mg by mouth daily.   Yes PROVIDER, HISTORICAL   montelukast (SINGULAIR) 10 mg tablet Take 10 mg by mouth daily. 1/28/14  Yes Jordan Moreno MD   MULTIVITAMIN ORAL Take 1 tablet by mouth daily. 12/18/13  Yes PROVIDER, HISTORICAL   rosuvastatin (CRESTOR) 5 MG tablet 1 tablet at bedtime. 6/11/17  Yes PROVIDER, HISTORICAL   valACYclovir (VALTREX) 500 MG tablet Take 500 mg by mouth 2 (two) times a day as needed.  1/15/15  Yes PROVIDER, HISTORICAL   valsartan (DIOVAN) 80 MG tablet 1 tablet daily. 5/24/17  Yes PROVIDER, HISTORICAL     Social History     Social History     Marital status:      Spouse name: N/A     Number of children: N/A     Years of education: N/A     Occupational History     Not on file.     Social History Main Topics     Smoking status: Never Smoker     Smokeless tobacco: Never Used     Alcohol use No     Drug use: No     Sexual activity: Not on file     Other Topics Concern     Not on file     Social History Narrative     Family History   Problem Relation Age of Onset     Rheum arthritis Mother      Heart disease Sister      Review of Systems  A 12 point comprehensive review of systems was negative except as noted.      Objective:     Vitals:    08/01/17 1133   BP: 128/58   Pulse: 77   Resp: 16   SpO2: 91%     Gen: obese, awake, alert  HEENT: pink conjunctiva, moist mucosa, Mallampati III/IV  Neck: no thyromegaly, masses or JVD  Lungs: decrease breath sounds at the bases otherwise clear  CV: regular, no murmurs or gallops appreciated  Abdomen: soft, distended, NT, BS wnl  Ext: lymphedema   Neuro: CN II-XII intact, non focal      Diagnostic tests:     Echocardiogram 12/12/2013  Mild LV hypertrophy, EF 60%. IVSd 1.3 cm. RV is not well visualized. Trace MR, Mild TR, Aortic valve is not well visualized, no significant AS.     Echocardiogram (6/29/2017)  LV is normal  size and function, EF 60-65%  IVSd 1.2 cm, LA is mildly dilated. Diastolic dysfunction.   RV was normal size and function.  Unremarkable cardiac valves    CXR 12/12/2013  Infiltrate left upper and left lower lobe. Right lung is clear, Infiltrates are new when compared to CXR done on 1/10/2013    Chest CT scan (4/5/2017)  Airspace opacity posterior right lung apex. Likely infection.   Small opacities in LLL and RLL related to infection.   Mucous plug RLL.   Enlarge pulmonary artery.  No mediastinal adenopathy.     PFTs (6/13/2017)  FEV1/FVC is 88 and is normal.  FEV1 is 53% predicted and is reduced.  FVC is 48% predicted and reduced.  There was no improvement in spirometry after a single inhaled dose of bronchodilator.  TLC is 72% predicted and is reduced.  RV is 79% predicted and is normal.  DLCO is 39% predicted and is reduced when it is corrected for hemoglobin.

## 2021-06-12 NOTE — ANESTHESIA PREPROCEDURE EVALUATION
Anesthesia Evaluation      Patient summary reviewed   No history of anesthetic complications     Airway   Mallampati: III  Neck ROM: limited   Pulmonary - normal exam   (+) shortness of breath (Stress echo was negative), sleep apnea (Uses BiPap),                          Cardiovascular - normal exam  (+) hypertension, , hypercholesterolemia,     ECG reviewed     ROS comment: Cardiac stress test 7-21-17:    There is no prior study available.    The left ventricular ejection fraction is 74%.    The pharmacologic nuclear stress test is negative for inducible myocardial ischemia or infarction.     Neuro/Psych    (+) neuromuscular disease (Fibromyalgia),  chronic pain (Chronic LBP)    Comments: Hx cervical stenosis -s/p cervical fusion 4-27-17.    Endo/Other    (+) obesity (Morbid obesity),   (-) no diabetes     GI/Hepatic/Renal    (+) GERD,   chronic renal disease (CKD Stage 3 - Cr 1.59. K 5.7.),      Other findings: Hx DVT left calf  Hgb 14.2      Dental - normal exam                        Anesthesia Plan  Planned anesthetic: peripheral nerve block  GA PRN  Conscious sedation  0.9% NaCl IVF  No paper tape  Re-check K and Cr  ASA 4   Induction: intravenous   Anesthetic plan and risks discussed with: patient and spouse  Anesthesia plan special considerations: antiemetics,

## 2021-06-12 NOTE — ANESTHESIA PROCEDURE NOTES
Peripheral Block    Patient location during procedure: pre-op  Start time: 8/22/2017 12:01 PM  End time: 8/22/2017 12:19 PM  surgical anesthesia  Staffing:  Performing  Anesthesiologist: LINDA QUIJANO  Preanesthetic Checklist  Completed: patient identified, site marked, risks, benefits, and alternatives discussed, timeout performed, consent obtained, airway assessed, oxygen available, suction available, emergency drugs available and hand hygiene performed  Peripheral Block  Block type: brachial plexus, axillary  Prep: ChloraPrep  Patient position: supine  Patient monitoring: cardiac monitor, continuous pulse oximetry, heart rate and blood pressure  Laterality: right  Injection technique: ultrasound guided    Ultrasound used to visualize needle placement in proximity to nerve being blocked: yes   Permanent ultrasound image captured for medical record  lidocaine 1% local anesthesia used for local skin prep  Needle  Needle type: echogenic   Needle gauge: 21 G  Needle length: 6 in  no peripheral nerve catheter placed  Assessment  Injection assessment: no difficulty with injection, negative aspiration for heme, incremental injection and transient paresthesias

## 2021-06-12 NOTE — ANESTHESIA POSTPROCEDURE EVALUATION
Patient: Desirae Rey  ULNAR NERVE DECOMPRESSION RIGHT ELBOW  Anesthesia type: regional    Patient location: PACU  Last vitals:   Vitals:    08/22/17 1440   BP:    Pulse: 65   Resp: 18   Temp:    SpO2: 95%     Post vital signs: stable  Level of consciousness: awake and responds to simple questions  Post-anesthesia pain: pain controlled  Post-anesthesia nausea and vomiting: no  Pulmonary: unassisted, return to baseline  Cardiovascular: stable and blood pressure at baseline  Hydration: adequate  Anesthetic events: no    QCDR Measures:  ASA# 11 - Annita-op Cardiac Arrest: ASA11B - Patient did NOT experience unanticipated cardiac arrest  ASA# 12 - Annita-op Mortality Rate: ASA12B - Patient did NOT die  ASA# 13 - PACU Re-Intubation Rate: ASA13B - Patient did NOT require a new airway mgmt  ASA# 10 - Composite Anes Safety: ASA10A - No serious adverse event  ASA# 38 - New Corneal Injury: ASA38A - No new exposure keratitis or corneal abrasion in PACU    Additional Notes:

## 2021-06-12 NOTE — ANESTHESIA CARE TRANSFER NOTE
Last vitals:   Vitals:    08/22/17 1329   BP: 127/78   Pulse: 66   Resp: 22   Temp: 36.3  C (97.3  F)   SpO2: 100%     Patient's level of consciousness is drowsy  Spontaneous respirations: yes  Maintains airway independently: yes  Dentition unchanged: yes  Oropharynx: oropharynx clear of all foreign objects    QCDR Measures:  ASA# 20 - Surgical Safety Checklist: WHO surgical safety checklist completed prior to induction  PQRS# 430 - Adult PONV Prevention: 4558F - Pt received => 2 anti-emetic agents (different classes) preop & intraop  ASA# 8 - Peds PONV Prevention: NA - Not pediatric patient, not GA or 2 or more risk factors NOT present  PQRS# 424 - Annita-op Temp Management: 4559F - At least one body temp DOCUMENTED => 35.5C or 95.9F within required timeframe  PQRS# 426 - PACU Transfer Protocol: - Transfer of care checklist used  ASA# 14 - Acute Post-op Pain: ASA14B - Patient did NOT experience pain >= 7 out of 10

## 2021-06-15 PROBLEM — I10 ESSENTIAL HYPERTENSION: Status: ACTIVE | Noted: 2017-04-27

## 2021-06-15 PROBLEM — M54.12 CERVICAL RADICULOPATHY: Status: ACTIVE | Noted: 2017-04-27

## 2021-06-16 PROBLEM — I51.89 DIASTOLIC DYSFUNCTION: Status: ACTIVE | Noted: 2017-08-27

## 2021-06-16 NOTE — PROGRESS NOTES
PULMONARY OUTPATIENT FOLLOW UP NOTE    Assessment:   1. Respiratory failure   Multifactorial, obesity, sleep apnea, diastolic dysfunction, deconditioning  PFTs showed a restrictive lung disease, severely reduced DLCO, although study did not meet ATS criteria.  Chest Ct scan 4/2017 showed atelectasis and nodular opacities that account for pneumonia at that time. Also enlarged main pulmonary artery were noted  Recent stress test showed preserved EF, no wall motion abnormalities.   Echocardiogram showed grade 1 diastolic dysfunction, normal RV function, normal heart valves.  Continue O2 supplementation 3 LPM with activities and at night   Continue diuresis. Referral to physical therapy  2. Sleep apnea  BiPAP titration was done on 3/2008. Recommending BIPAP 14/10.    3. HTN, cardiomyopathy  Recent NM stress test showed preserved EF, no wall motion abnormalities. Echocardiogram showed grade 1 diastolic dysfunction, preserved RV function  Titrate BP meds, diuresis    4. CKD stage III  5. Deconditioning   Referral to physical therapy  6. Morbid Obesity      Plan:   1. Continue lasix as instructed by PCP  2. Minimize, eliminate  salt intake  3. Continue O2 supplementation 3 LPM with activities and at night  4. Continue BiPAP 14/10 plus 3 LPM  5. Continue QVAR 80 mcg two puffs BID, rinse your mouth with water after each use  6. Continue albuterol HFA / nebulizer as needed  7. Referral to physical therapy   8. Follow up in 4 months    Carlos Clayton  Pulmonary / Critical Care  3/19/2018    CC:      Chief Complaint   Patient presents with     Follow Up     Medication Management       HPI:       Desirae Rey is an 67 y.o. female who presents for evaluation post hospitalization.   Patient has history of HTN, NELLIE on BiPAP, CKD stage III, DVT LLE 84' while on contraceptives, DJD, C5-7 fusion, chronic back pain, morbid obesity, on home O2 3 LPM with activities.   Activity level is very limited, able to walk less 100  feet before stopping.   Complaints of back pain, knee pain.   Denies history of asthma/COPD, denies tobacco user in the past. Denies cough. Reports occasional wheezes.   Patient was started on ICS by PCP  Denies chest pain, orthopnea or PND, reports chronic swelling of LEs.   Diuresis was titrated up by PCP.    Uses BiPAP every night. Feels somewhat refresh in AM.   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   Her weight is down in 6 lbs since last visit. Current weight 222 lbs.      Past Medical History:   Diagnosis Date     Allergic rhinitis      Arthritis of back      Chronic kidney disease     stage 3      De Quervain's disease (tenosynovitis)      Fibromyalgia, primary      GERD (gastroesophageal reflux disease)      Hematuria     chronic     High cholesterol      History of blood clots 1970's    DVT, from birth control, h/o left calf     Hyperlipidemia      Hypertension      Low back pain      Osteoporosis      Pickwickian syndrome      Plantar fasciitis      Proteinuria      Proteinuria     chronic     Sleep apnea     CPAP     Medications:     Prior to Admission medications    Medication Sig Start Date End Date Taking? Authorizing Provider   acyclovir (ZOVIRAX) 5 % ointment Apply to Affected Areas 5 Times Daily for 7 Days PRN 12/18/13  Yes PROVIDER, HISTORICAL   atenolol (TENORMIN) 50 MG tablet Take 50 mg by mouth at bedtime.  12/18/13  Yes PROVIDER, HISTORICAL   azelastine (ASTELIN) 137 mcg nasal spray 2 sprays into each nostril 2 (two) times a day as needed for rhinitis.  3/5/14  Yes Jordan Moreno MD   azelastine (OPTIVAR) 0.05 % ophthalmic solution Administer 1 drop to both eyes every 12 (twelve) hours. 1/28/14  Yes Jordan Moreno MD   beclomethasone (QVAR) 80 mcg/actuation inhaler Inhale 2 puffs 2 (two) times a day as needed.  2/25/14  Yes Jordan Moreno MD   calcium citrate (CALCITRATE) 200 mg (950 mg) tablet Take 1 tablet  (200 mg total) by mouth 2 (two) times a day. 8/19/16  Yes Basim Barone MD   CALCIUM CITRATE/VITAMIN D3 (CALCIUM CITRATE + D ORAL) Take 1 tablet by mouth 2 (two) times a day. 9/26/14  Yes PROVIDER, HISTORICAL   cephalexin (KEFLEX) 250 MG capsule 2 capsules daily. For 7 days 5/4/17  Yes PROVIDER, HISTORICAL   cetirizine (ZYRTEC) 10 MG tablet Take 10 mg by mouth at bedtime.  1/8/14  Yes PROVIDER, HISTORICAL   cholecalciferol, vitamin D3, (VITAMIN D3) 2,000 unit Tab Take 5,000 Units by mouth daily.  12/18/13  Yes PROVIDER, HISTORICAL   clobetasol (TEMOVATE) 0.05 % ointment Apply 1 application topically 2 (two) times a day as needed.  2/17/15  Yes PROVIDER, HISTORICAL   cyclobenzaprine (FLEXERIL) 10 MG tablet Take 10 mg by mouth at bedtime.    Yes PROVIDER, HISTORICAL   DOCOSAHEXANOIC ACID/EPA (FISH OIL ORAL) Take 1,400 mg by mouth daily. 12/18/13  Yes PROVIDER, HISTORICAL   ergocalciferol (VITAMIN D2) 50,000 unit capsule Take 1 capsule (50,000 Units total) by mouth once a week. 8/19/16  Yes Basim Barone MD   esomeprazole (NEXIUM) 40 MG capsule Take 40 mg by mouth every other day. 12/18/13  Yes PROVIDER, HISTORICAL   furosemide (LASIX) 20 MG tablet Take 20 mg by mouth daily. 12/18/13  Yes PROVIDER, HISTORICAL   gabapentin (NEURONTIN) 300 MG capsule Take 400 mg by mouth 3 (three) times a day.  4/8/15  Yes PROVIDER, HISTORICAL   glucos-msm-C-Mn-herb#21 (GLUCOSAMINE-MSM COMPLEX) Tab Take 1 tablet by mouth daily. 12/18/13  Yes PROVIDER, HISTORICAL   lysine 500 mg cap Take 1 capsule by mouth 2 (two) times a day. 12/18/13  Yes PROVIDER, HISTORICAL   magnesium oxide 250 mg Tab Take 250 mg by mouth 2 (two) times a day. 12/18/13  Yes PROVIDER, HISTORICAL   modafinil (PROVIGIL) 200 MG tablet Take 200 mg by mouth daily.   Yes PROVIDER, HISTORICAL   montelukast (SINGULAIR) 10 mg tablet Take 10 mg by mouth daily. 1/28/14  Yes Jordan Moreno MD   MULTIVITAMIN ORAL Take 1 tablet by mouth daily. 12/18/13  Yes  PROVIDER, HISTORICAL   rosuvastatin (CRESTOR) 5 MG tablet 1 tablet at bedtime. 6/11/17  Yes PROVIDER, HISTORICAL   valACYclovir (VALTREX) 500 MG tablet Take 500 mg by mouth 2 (two) times a day as needed.  1/15/15  Yes PROVIDER, HISTORICAL   valsartan (DIOVAN) 80 MG tablet 1 tablet daily. 5/24/17  Yes PROVIDER, HISTORICAL     Social History     Social History     Marital status:      Spouse name: N/A     Number of children: N/A     Years of education: N/A     Occupational History     Not on file.     Social History Main Topics     Smoking status: Never Smoker     Smokeless tobacco: Never Used     Alcohol use No     Drug use: No     Sexual activity: Not on file     Other Topics Concern     Not on file     Social History Narrative     Family History   Problem Relation Age of Onset     Rheum arthritis Mother      Heart disease Sister      Review of Systems  A 12 point comprehensive review of systems was negative except as noted.      Objective:     Vitals:    03/19/18 1526   BP: 118/68   Pulse: 69   Resp: 17   SpO2: 97%     Gen: obese, awake, alert  HEENT: pink conjunctiva, moist mucosa, Mallampati III/IV  Neck: no thyromegaly, masses or JVD  Lungs: decrease breath sounds at the bases otherwise clear  CV: regular, no murmurs or gallops appreciated  Abdomen: soft, distended, NT, BS wnl  Ext: lymphedema   Neuro: CN II-XII intact, non focal      Diagnostic tests:     Echocardiogram 12/12/2013  Mild LV hypertrophy, EF 60%. IVSd 1.3 cm. RV is not well visualized. Trace MR, Mild TR, Aortic valve is not well visualized, no significant AS.     Echocardiogram (6/29/2017)  LV is normal size and function, EF 60-65%  IVSd 1.2 cm, LA is mildly dilated. Diastolic dysfunction.   RV was normal size and function.  Unremarkable cardiac valves    CXR 12/12/2013  Infiltrate left upper and left lower lobe. Right lung is clear, Infiltrates are new when compared to CXR done on 1/10/2013    Chest CT scan (4/5/2017)  Airspace opacity  posterior right lung apex. Likely infection.   Small opacities in LLL and RLL related to infection.   Mucous plug RLL.   Enlarge pulmonary artery.  No mediastinal adenopathy.     PFTs (6/13/2017)  FEV1/FVC is 88 and is normal.  FEV1 is 53% predicted and is reduced.  FVC is 48% predicted and reduced.  There was no improvement in spirometry after a single inhaled dose of bronchodilator.  TLC is 72% predicted and is reduced.  RV is 79% predicted and is normal.  DLCO is 39% predicted and is reduced when it is corrected for hemoglobin.

## 2021-06-18 NOTE — PROGRESS NOTES
[facility] Daily Progress/Discharge Summary      Patient Name: Desirae Rey  Date: 6/27/2018  Visit #: 5  PTA visit #:    Date of evaluation: 5/16/2018  Referral Diagnosis: Physical deconditioning  Referring provider: Noemy Brito PA-C  Visit Diagnosis:     ICD-10-CM    1. Physical deconditioning R53.81    2. Decreased functional activity tolerance R68.89    3. Decreased functional mobility and endurance Z74.09    4. Generalized muscle weakness M62.81    5. Poor balance R26.89      Initial Assessment  Desirae Rey is a 67 y.o. female who presents to therapy today with chief complaints of deconditioning and difficulty with breathing during activities. She presents to clinic today with decrease in age gender norms for gait speed, 2 minute walk test and sit to stands. Gait speed places the patient at an increase risk for falls. Patient did not have her O2 on her today, during testing today her O2 sats decreased to 83% with  2 minute walk test but rebounded back to >90% in less than a minute of rest. Patient will benefit from skilled PT intervention to increase LE strength and endurance for improved functional mobility.   Patient will benefit from 1:1 skilled PT services to address the above limitations.     Assessment:   Patient had cancelled her last 2 sessions for various reasons and then completed a trial of therapy on her own and has not returned to clinic. At this time she will now be discharged from therapy with I HEP.   See below for last known objective and subjective measures.     HEP/POC compliance is  good .  Patient demonstrates understanding/independence with home program.  Patient is benefitting from skilled physical therapy and is making steady progress toward functional goals.  Patient is appropriate to continue with skilled physical therapy intervention, as indicated by initial plan of care.    Goal Status:  Pt. will be independent with home exercise program in : 4 weeks  Pt will:  ambulate >90 meters on 2 minute walk test with RPE 5/10 or less in 8 weeks  Pt will: increase in comfortable gait speed to >0.85 m/s to decrease falls risk and improve endurance in 8 weeks  Pt will: perform 30 sec sit to stand >/= 14 reps with O2 sats 88% or greater to improve aerobic condition in 8 weeks    Plan / Patient Education:     Continue with initial plan of care.  Progress with home program as tolerated.     Plan for next visit:  NuStep, LE strengthening and endurance exercises, balance exercises.   Subjective:     Pain Ratin   She has been going to the gym a lot and most of the time she is in the pool doing exercises. Patient states overall she feels better when she is being active, but in general she does not     Objective:   Has 3L O2 on today during the session    Therapeutic Exercises:  NuStep WL 6, 10:00 average  - O2 sats were 95-6%  with  bpm    - sit to stands from standard chair with yellow foam 2 x 10 reps able to do without UE assist of LOB     Seated:   - marches with TA set 2 x 10 reps each side alternating sides   - hip adduction yellow weighted ball 5 sec hold 2 x 10 reps with TA set   - HS curls L2 band 2 x 10 reps each side   - bicep curls 4# 2 x 10 reps each side   - overhead press 2# 2  x 10 reps Bilaterally   - seated chops yellow weighted ball x 10 reps each side      Standing     - bent over row with tricep kickback 2# 2 x 10 reps each side    - standing hip extension  around knees x 10 reps each side   - step ups onto small step x 8 reps each side    - standing hip abduction x 10 reps each side       Treatment Today   2018  TREATMENT MINUTES COMMENTS   Evaluation     Self-care/ Home management     Manual therapy     Neuromuscular Re-education     Therapeutic Activity     Therapeutic Exercises 55 As above    Gait training     Modality__________________                Total 55    Blank areas are intentional and mean the treatment did not include these items.      Laney Bravo, PT, DPT   6/27/2018

## 2021-06-18 NOTE — PROGRESS NOTES
[facility] Daily Progress     Patient Name: Desirae Rey  Date: 6/6/2018  Visit #: 3  PTA visit #:    Date of evaluation: 5/16/2018  Referral Diagnosis: Physical deconditioning  Referring provider: Noemy Brito PA-C  Visit Diagnosis:     ICD-10-CM    1. Physical deconditioning R53.81    2. Decreased functional activity tolerance R68.89    3. Decreased functional mobility and endurance Z74.09    4. Generalized muscle weakness M62.81    5. Poor balance R26.89      Initial Assessment  Desirae Rey is a 67 y.o. female who presents to therapy today with chief complaints of deconditioning and difficulty with breathing during activities. She presents to clinic today with decrease in age gender norms for gait speed, 2 minute walk test and sit to stands. Gait speed places the patient at an increase risk for falls. Patient did not have her O2 on her today, during testing today her O2 sats decreased to 83% with  2 minute walk test but rebounded back to >90% in less than a minute of rest. Patient will benefit from skilled PT intervention to increase LE strength and endurance for improved functional mobility.   Patient will benefit from 1:1 skilled PT services to address the above limitations.     Assessment:     HEP/POC compliance is  good .  Patient demonstrates understanding/independence with home program.  Patient is benefitting from skilled physical therapy and is making steady progress toward functional goals.  Patient is appropriate to continue with skilled physical therapy intervention, as indicated by initial plan of care.    Goal Status:  Pt. will be independent with home exercise program in : 4 weeks  Pt will: ambulate >90 meters on 2 minute walk test with RPE 5/10 or less in 8 weeks  Pt will: increase in comfortable gait speed to >0.85 m/s to decrease falls risk and improve endurance in 8 weeks  Pt will: perform 30 sec sit to stand >/= 14 reps with O2 sats 88% or greater to improve aerobic  condition in 8 weeks    Plan / Patient Education:     Continue with initial plan of care.  Progress with home program as tolerated.     Plan for next visit: NuStep, LE strengthening and endurance exercises, balance exercises.   Subjective:     Pain Ratin  She had a tough week with work and is tired today. She did not bring in her O2 tank with her today but she feels her breathing is ok overall. Patient has increased foot pain today.     Objective:   Not on her O2 today during the session    Therapeutic Exercises:  NuStep WL 5, 10:00 average  - with 2 min rest O2 sats where 94%  with -113 bpm    - sit to stands from large mat 2 x 15 reps     Supine Bracing - 5 sec hold x 10 reps, cues for TA contraction   Supine bridges - 5 sec hold x 10 reps - had increase in hamstring tightness   Supine SLR 2 x 10 reps each side  Cues for quad contraction   Hook-lying hip abduction B L2 band x 15 reps cues for TA contraction     Standing at // bars x 10 reps x 2 sets    - standing hip abduction side stepping L2 band around knees    - standing hip extension L2 band around knees    - marches with finger touch support on bar for balance       HEP:   Sit to stands  Pool or land standing exercises as above   Tandem stance       Treatment Today   2018  TREATMENT MINUTES COMMENTS   Evaluation     Self-care/ Home management     Manual therapy     Neuromuscular Re-education     Therapeutic Activity     Therapeutic Exercises 53    Gait training     Modality__________________                Total 53    Blank areas are intentional and mean the treatment did not include these items.     Laney Bravo, PT, DPT   2018

## 2021-06-18 NOTE — PROGRESS NOTES
[facility]   Physical Deconditioning Initial Evaluation    Patient Name: Desirae Rey  Date of evaluation: 5/16/2018  Referral Diagnosis: Physical deconditioning  Referring provider: Noemy Brito PA-C  Visit Diagnosis:     ICD-10-CM    1. Physical deconditioning R53.81    2. Decreased functional activity tolerance R68.89        Assessment:     Desirae Rey is a 67 y.o. female who presents to therapy today with chief complaints of deconditioning and difficulty with breathing during activities. She presents to clinic today with decrease in age gender norms for gait speed, 2 minute walk test and sit to stands. Gait speed places the patient at an increase risk for falls. Patient did not have her O2 on her today, during testing today her O2 sats decreased to 83% with  2 minute walk test but rebounded back to >90% in less than a minute of rest. Patient will benefit from skilled PT intervention to increase LE strength and endurance for improved functional mobility.   Patient will benefit from 1:1 skilled PT services to address the above limitations.     Goals:  Pt. will be independent with home exercise program in : 4 weeks  Pt will: ambulate >90 meters on 2 minute walk test with RPE 5/10 or less in 8 weeks  Pt will: increase in comfortable gait speed to >0.85 m/s to decrease falls risk and improve endurance in 8 weeks  Pt will: perform 30 sec sit to stand >/= 14 reps with O2 sats 88% or greater to improve aerobic condition in 8 weeks    Patient's expectations/goals are realistic.    Barriers to Learning or Achieving Goals:  Chronicity of the problem.  Co-morbidities or other medical factors.  fibromyalgia, osteoporosis, OA, NELLIE, obesity, chronic kidney disease stage III, HTN, hx of TKA bilaterally, carpal tunnel, ulnar nerve release and cervical surgery       Plan / Patient Instructions:        Plan of Care:   Authorization / Certification Start Date: 05/16/18  Communication with: Referral  Source  Patient Related Instruction: Nature of Condition;Treatment plan and rationale;Self Care instruction;Basis of treatment;Body mechanics  Times per Week: 1-2  Number of Weeks: up tp 8 weeks  Number of Visits: 8-10 sessions  Therapeutic Exercise: ROM;Stretching;Strengthening  Neuromuscular Reeducation: kinesio tape;posture;core;balance/proprioception;postural restoration;TNE    POC and pathology of condition were reviewed with patient.  Pt. is in agreement with the Plan of Care  A Home Exercise Program (HEP) was initiated today.  Pt. was instructed in exercises by PT and patient was given a handout with detailed instructions.    Plan for next visit: NuStep, LE strengthening and endurance exercises, balance exercises.     Treatment techniques, plan of care, and goals were discussed with the patient.  The patient agrees to the plan as outlined.  The plan of care is dynamic and will be modified on an ongoing basis.       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    Work Status: Working full time   Equipment Available: SE cane and O2 for walking longer distances     Pain Ratin  Pain rating at best: 3  Pain rating at worst: 9  Pain description: burning and constant pain in both shoulder and feet     Functional limitations are described as occurring with:   ascending and descending stairs or curbs  balance  repetitive movements  performing routine daily activities  walking longer than 100 feet    Patient reports benefit from:  pool exercise and whirlpool       Objective:      Note: Items left blank indicates the item was not performed or not indicated at the time of the evaluation.    Balance Examination  1. Physical deconditioning     2. Decreased functional activity tolerance       Involved side: Bilateral  Gait Observation: antalgic, forward bed and lean to the right side    Assistive Device:  None and uses a cane and O2 sometimes    Lower Extremity Strength: 5/16/2018  Date: 5/16/2018     LE strength/5 Right Left Right Left Right Left   Hip Flexion  4+ 4+       Hip Extension          Hip Abduction  4 4       Hip Adduction  5 5       Hip External Rotation         Hip Internal Rotation         Knee Extension  5 5       Knee Flexion 5 5       Ankle Dorsiflexion  5 5       Great Toe Extension         Ankle Plantar flexion          Abdominals          Sensation: numbness in B feet per patient report    Cardiovascular Testing:  Resting:  HR: 100-102  SpO2: 88-93    After Walk test:  HR: 125  SpO2: 83    After 2 minutes Rest:  HR: 108  SpO2: 93    Two Minute Walk Test  Total meters walked: 70.4 meters   AD used? none  RPE: 7-8/10  Age gender norm: 155 meters   TWO- MINUTE WALK TEST (2mwt) MEANS    WOMEN MEAN DISTANCE (FEET) METERS       + cm   18-54 600.4 183 m 0.192  cm   55-59 578.7 176 m 38.776 cm   60-64 545.9 166 m 32.936 cm   65-69 509.2 155 m 20.416 cm   70-74 478.7 145 m 95.9576 cm   75-79 462.3 140 m 90.904 cm   80-85 440.6 136 m 29.488 cm     Physical Therapy  Physical Therapy Gait Speed Measurement  Comfortable Gait Speed was measured by the 10-Meter Walk Test, and is an important indicator of independence and one's ability to perform ADL/IADL activities. It can be correlated to falls risk and need for further balance intervention.    Patient score: 0.57 m/sec  Gait assistive device used: none   Time   Interpretation    >1.2 m/sec.  Independent in all activities and crossing street    0.8-1.0 m/sec  Community ambulator, household activities. May need intervention to reduce falls risk.    0.6-0.8 m/sec  Performs self-care, limited community ambulator. May need intervention to reduce falls risk.    <0.6 m/sec  Dependent in ADL's, household ambulator. Needs intervention to reduce falls risk.    Minimally Clinically Important Difference for geriatrics: .05 meters/second, with  substantial change of .13 meters/second.  Normative Data:   Healthy Adults:   (Zak, 1997; n = 230 healthy volunteers; age 20-79, measured over 7.62 m with acceleration and deceleration period)           Male   Female    Age  Comfortable  Fast  Comfortable  Fast    20's  1.39  2.53  1.41  2.47    30's  1.46  2.45  1.42  2.34    40's  1.46  2.46  1.39  2.12    50's  1.39  2.07  1.40  2.01    60's  1.36  1.93  1.30  1.77    70's  1.33  2.08  1.27  1.74         Fast Gait Speed: 0.72 m/sec     30 sec sit to stand: 13 reps without UE assistance   Age/gender norms: 14 reps     Appt time: 02:00 - 03:00 PM     Treatment Today   5/16/2018  TREATMENT MINUTES COMMENTS   Evaluation 40    Self-care/ Home management     Manual therapy     Neuromuscular Re-education     Therapeutic Activity     Therapeutic Exercises 13 Demo/performance of HEP  Patient educated on pathology  Discussed POC  10 sit to stands - 2 times per day    Gait training     Modality__________________                Total 53    Blank areas are intentional and mean the treatment did not include these items.     PT Evaluation Code: (Please list factors)  Patient History/Comorbidities: as above   Examination: as above   Clinical Presentation: stable   Clinical Decision Making: low     Patient History/  Comorbidities Examination  (body structures and functions, activity limitations, and/or participation restrictions) Clinical Presentation Clinical Decision Making (Complexity)   No documented Comorbidities or personal factors 1-2 Elements Stable and/or uncomplicated Low   1-2 documented comorbidities or personal factor 3 Elements Evolving clinical presentation with changing characteristics Moderate   3-4 documented comorbidities or personal factors 4 or more Unstable and unpredictable High     Laney Bravo, PT, DPT  5/16/2018  2:12 PM

## 2021-06-18 NOTE — PROGRESS NOTES
[facility] Daily Progress     Patient Name: Desirae Rey  Date: 6/13/2018  Visit #: 4   PTA visit #:    Date of evaluation: 5/16/2018  Referral Diagnosis: Physical deconditioning  Referring provider: Noemy Brito PA-C  Visit Diagnosis:     ICD-10-CM    1. Physical deconditioning R53.81    2. Decreased functional activity tolerance R68.89    3. Decreased functional mobility and endurance Z74.09    4. Generalized muscle weakness M62.81    5. Poor balance R26.89      Initial Assessment  Desirae Rey is a 67 y.o. female who presents to therapy today with chief complaints of deconditioning and difficulty with breathing during activities. She presents to clinic today with decrease in age gender norms for gait speed, 2 minute walk test and sit to stands. Gait speed places the patient at an increase risk for falls. Patient did not have her O2 on her today, during testing today her O2 sats decreased to 83% with  2 minute walk test but rebounded back to >90% in less than a minute of rest. Patient will benefit from skilled PT intervention to increase LE strength and endurance for improved functional mobility.   Patient will benefit from 1:1 skilled PT services to address the above limitations.     Assessment:     HEP/POC compliance is  good .  Patient demonstrates understanding/independence with home program.  Patient is benefitting from skilled physical therapy and is making steady progress toward functional goals.  Patient is appropriate to continue with skilled physical therapy intervention, as indicated by initial plan of care.    Goal Status:  Pt. will be independent with home exercise program in : 4 weeks  Pt will: ambulate >90 meters on 2 minute walk test with RPE 5/10 or less in 8 weeks  Pt will: increase in comfortable gait speed to >0.85 m/s to decrease falls risk and improve endurance in 8 weeks  Pt will: perform 30 sec sit to stand >/= 14 reps with O2 sats 88% or greater to improve aerobic  condition in 8 weeks    Plan / Patient Education:     Continue with initial plan of care.  Progress with home program as tolerated.     Plan for next visit:  NuStep, LE strengthening and endurance exercises, balance exercises.   Subjective:     Pain Ratin   She made an appointment to have the corns shaved off her feet. Hopefully this will help her feet pain, her back has been more painful today as well.  She comes with her O2 today as it is more humid outside     Objective:   Has 3L O2 on today during the session    Therapeutic Exercises:  NuStep WL 6, 10:00 average  - O2 sats were 95-6%  with  bpm    - sit to stands from standard chair with yellow foam 2 x 10 reps able to do without UE assist of LOB     Seated:   - marches with TA set 2 x 10 reps each side alternating sides   - LAQ 5 sec hold 2 x 10 reps with TA set   - HS curls L2 band 2 x 10 reps each side   - bicep curls 3# x 10 reps bilaterally  - overhead press 1# x 10 reps Bilaterally   - seated chops red weighted ball x 10 reps each side      Standing     - standing hip abduction side stepping  band around knees 20 feet x 2 each side    - standing hip extension  around knees x 10 reps each side   - standing hamstring curls x 10 reps each side    - standing hip abduction x 10 reps each side       Treatment Today   2018  TREATMENT MINUTES COMMENTS   Evaluation     Self-care/ Home management     Manual therapy     Neuromuscular Re-education     Therapeutic Activity     Therapeutic Exercises 53 As above    Gait training     Modality__________________                Total 53    Blank areas are intentional and mean the treatment did not include these items.     Laney Bravo, PT, DPT   2018

## 2021-06-18 NOTE — PROGRESS NOTES
[facility] Daily Progress     Patient Name: Desirae Rey  Date: 5/30/2018  Visit #: 2  PTA visit #:    Date of evaluation: 5/16/2018  Referral Diagnosis: Physical deconditioning  Referring provider: Noemy Brito PA-C  Visit Diagnosis:     ICD-10-CM    1. Physical deconditioning R53.81    2. Decreased functional activity tolerance R68.89    3. Decreased functional mobility and endurance Z74.09    4. Generalized muscle weakness M62.81    5. Poor balance R26.89      Initial Assessment  Desirae Rey is a 67 y.o. female who presents to therapy today with chief complaints of deconditioning and difficulty with breathing during activities. She presents to clinic today with decrease in age gender norms for gait speed, 2 minute walk test and sit to stands. Gait speed places the patient at an increase risk for falls. Patient did not have her O2 on her today, during testing today her O2 sats decreased to 83% with  2 minute walk test but rebounded back to >90% in less than a minute of rest. Patient will benefit from skilled PT intervention to increase LE strength and endurance for improved functional mobility.   Patient will benefit from 1:1 skilled PT services to address the above limitations.     Assessment:     HEP/POC compliance is  good .  Patient demonstrates understanding/independence with home program.  Patient is benefitting from skilled physical therapy and is making steady progress toward functional goals.  Patient is appropriate to continue with skilled physical therapy intervention, as indicated by initial plan of care.    Goal Status:  Pt. will be independent with home exercise program in : 4 weeks  Pt will: ambulate >90 meters on 2 minute walk test with RPE 5/10 or less in 8 weeks  Pt will: increase in comfortable gait speed to >0.85 m/s to decrease falls risk and improve endurance in 8 weeks  Pt will: perform 30 sec sit to stand >/= 14 reps with O2 sats 88% or greater to improve aerobic  condition in 8 weeks    Plan / Patient Education:     Continue with initial plan of care.  Progress with home program as tolerated.     Plan for next visit: NuStep, LE strengthening and endurance exercises, balance exercises.   Subjective:     Pain Ratin  Patient is doing well, she has been going to the pool most days, tomorrow the [facility] is re opening their pool and she will resume going there as well as at [facility] with her  when able. Patient reports has been doing her sit to stands at home,. She comes into the clinic today with her portable O2 on. She reports her O2 stats have been in the 90%'s consistently       Objective:   Patient on 3L of O2 today throughout the session    Therapeutic Exercises:  NuStep WL 5, 8:00 average  - after session O2 sats where 93% on 3L with -113 bpm    - sit to stands from large mat 2 x 15 reps     Standing at // bars x 10 reps x 2 sets    - standing hip abduction    - standing hip extension    - marches with finger touch support on bar for balance    - attempted heel raises but feet too painful   - hamstring curls     - forward step ups on to step with one riser x 10 reps each side     Neuromuscular Re-education  Standing cone tapping with one finger touch on bars for assist 5 cones in 1/2 [place] 2 x 5 reps each side     Tandem stance 20 sec x 4 reps each side     HEP:   Sit to stands  Pool or land standing exercises as above   Tandem stance       Treatment Today   2018  TREATMENT MINUTES COMMENTS   Evaluation     Self-care/ Home management     Manual therapy     Neuromuscular Re-education 8    Therapeutic Activity     Therapeutic Exercises 45    Gait training     Modality__________________                Total 53    Blank areas are intentional and mean the treatment did not include these items.     Laney Bravo, PT, DPT   2018

## 2021-06-19 NOTE — LETTER
Letter by Nitin Giang MD at      Author: Nitin Giang MD Service: -- Author Type: --    Filed:  Encounter Date: 5/3/2019 Status: (Other)       3 May 2019    Dear Noemy Brito PA-C,    See below for documentation of a telephone conversation that I had today with Desirae Rey ( 1950).  Please feel free to call me at any time if needed.    Pulmonary Telephone Note    Received communication from Dr. Nguyen regarding the split night sleep study result and reviewed his report. Moderate NELLIE with AHI 17.1 (severe when supine at 40.6). Titration with BiPAP was unable to completely eliminate events, so future titration study may be needed. Discussed with Ms. Rey, and will prescribe ResMed auto-bilevel with EPAP min of 12, IPAP max of 25, and PS of 7 cm H2O, oxygen 2 L/min bled in (she already has home oxygen). I will also see her in clinic next week for further discussion.    Sincerely,    Nitin Giang MD  Pulmonary and Critical Care Medicine  [facility]  Cell [phone]  Office [phone]  Pager [phone]

## 2021-06-19 NOTE — LETTER
Letter by Nitin Giang MD at      Author: Nitin Giang MD Service: -- Author Type: --    Filed:  Encounter Date: 5/8/2019 Status: (Other)         Noemy Brito PA-C  [address] [facility] [address]                                  May 9, 2019    Patient: Desirae Rey   MR Number: [id number]   YOB: 1950   Date of Visit: 5/8/2019     Dear Noemy Brito PA-C:    I saw Desirae Rey on the above date at the [facility]. Below are the relevant portions of my assessment and plan of care.    If you have questions, please do not hesitate to call me. I look forward to following Desirae along with you.    Sincerely,        Nitin Giang MD          CC  No Recipients  Nitin Giang MD  5/9/2019  1:39 PM  Sign at close encounter  Pulmonary Clinic Follow-up Visit    Assessment/Plan:  68 year old female never smoker with a history of asthma, chronic allergic rhinosinusitis, GERD, HTN, fibromyalgia, chronic low back pain, obesity, dyslipidemia, NELLIE, obesity-hypoventilation syndrome, presenting for follow-up.     Asthma, chronic rhinosinusitis, NELLIE, obesity-hypoventilation syndrome: We are attempting to get the patient adequate treatment for her NELLIE/OHS, and facing challenges. She had a split-night sleep study on 4/24-4/25 showing AHI 17.1 (up to 40.6 when supine) during diagnostic portion, with titration portion showing need for BiPAP, though despite uptitration of BiPAP, there were persistent events. Discussed her case with Dr. Nguyen and prescribed BiPAP via ResMed device with auto-bilevel, EPAP min 12, IPAP max 25, PS 7 cm H2O with oxygen 2 L/min bled in. Magaly VALDOVINOS states her last device was given in 2015, so she is not due for a new device until 2020, and will need to reprogram her current device; the patient states her device was given by Padmini in 2013 and it says so on the device. She states the fluticasone-salmeterol respiclick is going fine. Walking daily and doing pool  exercise four times per week.     Plan:  - attempt to resolve her home BiPAP issues, either programming her current device or getting her a new one with the above settings  - advised her to have formal sleep medicine consultation but she would like to resolve her device issue first  - start fluticasone-salmeterol respiclick 113-14 mcg one inhalation two times a day; rinse/gargle/spit water after use  - continue montelukast 10 mg at bedtime  - continue cetirizine 10 mg daily  - albuterol HFA as needed  - continue oxygen 3 L/min continuously  - annual influenza vaccination  - will discuss pneumococcal vaccination at follow-up  - follow up in 3 months  - encouraged her to call any time with questions or concerning symptoms     I appreciate the opportunity to participate in the care of Ms. Rey.  Please call any time if needed.     CCx: asthma, NELLIE, obesity-hypoventilation syndrome, chronic rhinosinusitis    HPI: 68 year old female never smoker with a history of asthma, chronic allergic rhinosinusitis, GERD, HTN, fibromyalgia, chronic low back pain, obesity, dyslipidemia, NELLIE, obesity-hypoventilation syndrome, presenting for follow-up. We are attempting to get the patient adequate treatment for her NELLIE/OHS, and facing challenges. She had a split-night sleep study on 4/24-4/25 showing AHI 17.1 (up to 40.6 when supine) during diagnostic portion, with titration portion showing need for BiPAP, though despite uptitration of BiPAP, there were persistent events. Discussed her case with Dr. Nguyen and prescribed BiPAP via ResMed device with auto-bilevel, EPAP min 12, IPAP max 25, PS 7 cm H2O with oxygen 2 L/min bled in. Magaly VALDOVINOS states her last device was given in 2015, so she is not due for a new device until 2020, and will need to reprogram her current device; the patient states her device was given by Padmini in 2013 and it says so on the device. She states the fluticasone-salmeterol respiclick is going fine. Walking  daily and doing pool exercise four times per week.    ROS:  A 12-system review was obtained and was negative with the exception of the symptoms endorsed in the history of present illness.    PMH:  NELLIE on BiPAP  OHS  Asthma  Chronic rhinosinusitis  Chronic low back pain  DJD  HTN  DL  Fibromyalgia  GERD    PSH:  Past Surgical History:   Procedure Laterality Date   ? CARPAL TUNNEL RELEASE Bilateral    ? CERVICAL FUSION N/A 4/27/2017    Procedure: ANTERIOR CERVICAL DECOMPRESSION FUSION C5-7 BILATERAL;  Surgeon: Basim Barone MD;  [place] [facility] [place]  Service:    ? CHOLECYSTECTOMY      open   ? HAND SURGERY Right    ? HYSTEROSCOPY     ? JOINT REPLACEMENT      bilateral TKA   ? KNEE SURGERY         Allergies:  Allergies   Allergen Reactions   ? Latex Rash     Severe rash     ? Colchicine Diarrhea   ? Latex      Added based on information entered during case entry, please review and add reactions, type, and severity as needed   ? Other Environmental Allergy      Coverlet bandaid , 3M product; rash   ? Statins-Hmg-Coa Reductase Inhibitors Myalgia     Tried lovastatin and simvastatin   ? Sulfa (Sulfonamide Antibiotics) Swelling     Facial swelling   ? Paper Tape Rash       Family HX:  Family History   Problem Relation Age of Onset   ? Rheum arthritis Mother    ? Heart disease Sister        Social Hx:  Social History     Socioeconomic History   ? Marital status:      Spouse name: Not on file   ? Number of children: Not on file   ? Years of education: Not on file   ? Highest education level: Not on file   Occupational History   ? Not on file   Social Needs   ? Financial resource strain: Not on file   ? Food insecurity:     Worry: Not on file     Inability: Not on file   ? Transportation needs:     Medical: Not on file     Non-medical: Not on file   Tobacco Use   ? Smoking status: Never Smoker   ? Smokeless tobacco: Never Used   Substance and Sexual Activity   ? Alcohol use: No   ? Drug use: No   ?  Sexual activity: Not on file   Lifestyle   ? Physical activity:     Days per week: Not on file     Minutes per session: Not on file   ? Stress: Not on file   Relationships   ? Social connections:     Talks on phone: Not on file     Gets together: Not on file     Attends [employer] service: Not on file     Active member of club or organization: Not on file     Attends meetings of clubs or organizations: Not on file     Relationship status: Not on file   ? Intimate partner violence:     Fear of current or ex partner: Not on file     Emotionally abused: Not on file     Physically abused: Not on file     Forced sexual activity: Not on file   Other Topics Concern   ? Not on file   Social History Narrative   ? Not on file       Current Meds:  Current Outpatient Medications   Medication Sig Dispense Refill   ? acyclovir (ZOVIRAX) 5 % ointment Apply to Affected Areas 5 Times Daily for 7 Days PRN     ? albuterol (PROAIR HFA;PROVENTIL HFA;VENTOLIN HFA) 90 mcg/actuation inhaler Inhale 2 puffs every 6 (six) hours as needed for wheezing.     ? alendronate (FOSAMAX) 70 MG tablet Take 70 mg by mouth every 7 days. Takes on Mondays 11   ? azelastine (ASTELIN) 137 mcg nasal spray 2 sprays into each nostril 2 (two) times a day as needed for rhinitis.      ? azelastine (OPTIVAR) 0.05 % ophthalmic solution Administer 1 drop to both eyes every 12 (twelve) hours.     ? beclomethasone (QVAR) 80 mcg/actuation inhaler Inhale 2 puffs 2 (two) times a day as needed.      ? benzonatate (TESSALON) 100 MG capsule Take 200 mg by mouth 3 (three) times a day as needed for cough.     ? calcium citrate (CALCITRATE) 200 mg (950 mg) tablet Take 1 tablet (200 mg total) by mouth 2 (two) times a day. 200 tablet 4   ? CALCIUM CITRATE/VITAMIN D3 (CALCIUM CITRATE + D ORAL) Take 1 tablet by mouth 2 (two) times a day.     ? cetirizine (ZYRTEC) 10 MG tablet Take 10 mg by mouth at bedtime.      ? cholecalciferol, vitamin D3, 5,000 unit Tab Take 5,000 Units by  "mouth daily.     ? esomeprazole (NEXIUM) 40 MG capsule Take 40 mg by mouth every other day.     ? fluticasone-salmeterol (AIRDUO RESPICLICK) 113-14 mcg/actuation AePB Inhale 1 Inhalation 2 (two) times a day. 1 each 11   ? furosemide (LASIX) 20 MG tablet Take 1 tablet (20 mg total) by mouth daily. 30 tablet 0   ? gabapentin (NEURONTIN) 300 MG capsule Take 300 mg by mouth 3 (three) times a day.      ? HYDROcodone-acetaminophen (NORCO) 5-325 mg per tablet Take 1 tablet by mouth every 4 (four) hours as needed for pain. (Patient taking differently: Take 1 tablet by mouth every 6 (six) hours as needed for pain. ) 80 tablet 0   ? hydrOXYzine (ATARAX) 25 MG tablet Take 25 mg by mouth every 8 (eight) hours as needed for itching.      ? lysine 500 mg cap Take 1 capsule by mouth 2 (two) times a day.     ? magnesium oxide 250 mg Tab Take 500 mg by mouth 2 (two) times a day.      ? metoprolol succinate (TOPROL-XL) 50 MG 24 hr tablet Take 1 tablet (50 mg total) by mouth daily. 30 tablet 0   ? modafinil (PROVIGIL) 200 MG tablet Take 200 mg by mouth daily.      ? montelukast (SINGULAIR) 10 mg tablet Take 10 mg by mouth daily.     ? OMEGA-3/DHA/EPA/FISH OIL (FISH OIL-OMEGA-3 FATTY ACIDS) 300-1,000 mg capsule Take 1 g by mouth daily.     ? OXYGEN-AIR DELIVERY SYSTEMS [facility] Use As Directed. 3 lpm 24/7 continuous     ? rosuvastatin (CRESTOR) 5 MG tablet 1 tablet at bedtime.  1   ? TiZANidine (ZANAFLEX) 4 MG capsule Take 8 mg by mouth every 8 (eight) hours as needed for muscle spasms.     ? valACYclovir (VALTREX) 500 MG tablet Take 500 mg by mouth 2 (two) times a day as needed.        No current facility-administered medications for this visit.        Physical Exam:  /72   Pulse (!) 53   Ht 4' 7.75\" (1.416 m)   Wt 217 lb (98.4 kg)   SpO2 95% Comment: 3 lpm cont  Breastfeeding? No   BMI 49.09 kg/m     Gen: alert, oriented, no distress  HEENT: nasal turbinates are unremarkable, no oropharyngeal lesions, no cervical or " supraclavicular lymphadenopathy  CV: RRR, no M/G/R  Resp: diminished bilaterally, no focal crackles or wheezes  Abd: soft, nontender, no palpable organomegaly  Skin: no apparent rashes  Ext: trace bilateral ankle edema  Neuro: alert, nonfocal    Labs:  reviewed    Imaging studies:  High-res chest CT (April 2019):  - images directly reviewed, formal interpretation follows:  FINDINGS:   LUNGS AND PLEURA: Compared to April 2017, little change of minimal bilateral lower lobe predominant bandlike opacities, reticulation and asymmetric bronchiectasis (small amount in the medial right lower lobe and some in the left lower lobe). Prior   infectious / inflammatory opacities have resolved since 2017. No consolidation. Negative pleural spaces. Expiration images demonstrate little change in the appearance of the trachea. It is difficult to evaluate the degree of possible gas trapping secondary to this. Diffuse gas trapping cannot be excluded.     MEDIASTINUM: No adenopathy. Pulmonary trunk enlargement measuring 4 cm. Mild coronary calcification. Nonaneurysmal aorta.     LIMITED UPPER ABDOMEN: Cholecystectomy.     MUSCULOSKELETAL: Degenerative changes shoulders and spine. ACDF.     IMPRESSION:   CONCLUSION:      1.  Minimal amount of nonspecific scarring and/or fibrosis in the lung bases. Unfortunately, prone imaging was not able to be performed to evaluate for resolution of any reticular opacities in the lung bases. No honeycombing or UIP pattern present.   Findings show no significant change since 4/5/2017.     2.  Minimal bronchiectasis. No significant airway thickening.     3.  Significant pulmonary trunk enlargement; correlate for pulmonary hypertension or shunt.       TTE (August 2017 at [facility]):  - EF 60-65%  - normal RV size/function  - grade 1 diastolic dysfunction  - no valvular abnormalities  - technically difficult study     Pharmacologic MPI (July 2017):    There is no prior study available.    The left ventricular  ejection fraction is 74%.    The pharmacologic nuclear stress test is negative for inducible myocardial ischemia or infarction.     PFT's (June 2017):  FEV1/FVC is 88 and is normal.  FEV1 is 53% predicted and is reduced.  FVC is 48% predicted and reduced.  There was no improvement in spirometry after a single inhaled dose of bronchodilator.  TLC is 72% predicted and is reduced.  RV is 79% predicted and is normal.  DLCO is 39% predicted and is reduced when it   is corrected for hemoglobin.    Sleep Study (4/24/19-4/25/19):  Primary Findings:  Diagnostic Portion:    Respiratory monitoring showed moderate obstructive sleep apnea (AHI=17.1) during light NREM sleep, with events more frequently occurring during supine sleep (AHI 40.6/hr vs 8.3/hr).    The patient spent a total of 21.9 minutes at an oxygen saturation below 88%.  Profound hypoxia was noted and after 10 minutes of sleep, 1 LPM of supplemental oxygen was added via nasal cannula (which stabilized hypoxia).    Mild tachypnea was noted throughout  Titration Portion:  A trial of Bi-level PAP in Spontaneous (S) mode was initiated at 8/4 and increased up to 22/12 cmH2O (supplemental oxygen was turned off).    Respiration was stabilized during NREM sleep, albeit with persistent hypoxia.  However, during REM sleep respiratory events and profound hypoxia returned.    This was considered an unacceptable titration due to persistence of respiratory events and hypoxia.   Mask leak was also problematic.     Other Findings:  Sleep Architecture:     Sleep onset latency was normal.    REM onset latency was prolonged.    All stages of sleep were sampled during the test.    Sleep architecture was fragmented due to numerous arousals.    Stage N3 sleep and Stage REM sleep were observed with increased frequency and duration (rebound) after CPAP pressures reached an optimal pressure.    Sleep efficiency was normal.     Respiration:    Mean hemoglobin oxygen saturation (SaO2) during  the diagnostic portion of the PSG in NREM and REM sleep was 84% with a SaO2 desaturation jorge observed to 61%.    Sleep-related Hypoxia was present as cumulative exposure time in sleep to SaO2 below 88% surpassed 5 total minutes (in this case, 21.9 minutes).     Movements:        Periodic leg movements were observed with low frequency.    The patient had a Periodic Limb Movement (PLM) index of 2.1 and the ESTEFANIA PLM index was 0.8.     Parasomnia:    No activity consistent with parasomnia was observed in the video recording.     Electrocardiogram:    Two-lead ECG showed normal sinus rhythm.     Electroencephalogram:    We used a limited-montage EEG with F3, F4, C3, C4, O1, O2 and contra-lateral references to M1 and M2 that was reviewed using a 30-second EPOCH. No EEG abnormalities were observed with these settings.     Therapeutic Intervention:   Bilevel was initiated with a pressure of 8/4 cwp  and titrated up to 22/12 cwp  Interface:  Type-  Nasal          ,    Brand-  Eson 2              ,   Size-  Small     Diagnosis:    G47.33 Obstructive sleep apnea    G47.34 Sleep related hypoxia    G47.36 Sleep related hypoventilation in conditions classified elsewhere     Assessment & Recommendation:        Although optimal pressures were not determined, I would recommend auto-titrating Bilevel with an EPAP min of 12, IPAP max of 25, and PS of 7 cmH2O.  Recommend special attention to mask fit and mouth leak, especially at higher pressures.    I suspect hypoxia will persist despite maximal Bilevel support.  ? If patient already qualifies for continuous oxygen, recommend bleeding 1 LPM at machine end of tubing (recommend ResMed devices due to tubing with integrated oxygen hookup).  ? If patient does not qualify for continuous oxygen already, then she would benefit from nocturnal oximetry to confirm resolution of hypoxia.  She may require repeat titration study to qualify for supplemental oxygen therapy.    Consider adjunct  positional therapy with HOB elevation or lateral sleep position.    Patients with excessive daytime sleepiness are at increased risk for motor vehicle accidents.    Suggest optimizing sleep hygiene and avoiding any further sleep deprivation.    Consider weight reduction management as this may be a factor in NELLIE.    Recommend evaluation due to periodic leg movements.    Measures aimed at increasing sleep consolidation can be beneficial.    Nitin Giang MD  Pulmonary and Critical Care Medicine  [facility]  Cell [phone]  Office [phone]  Pager [phone]

## 2021-06-19 NOTE — LETTER
Letter by Nitin Giang MD at      Author: Nitin Giang MD Service: -- Author Type: --    Filed:  Encounter Date: 4/3/2019 Status: (Other)       3 April 2019    Dear Noemy Brito PA-C,    See below for documentation of a telephone conversation that I had today with Desirae Rey ( 1950).  Please feel free to call me at any time if needed.    Pulmonary Telephone Note     Called Ms. Rey back. She had called asking about her sleep study and about persistent cough and getting a chest CT. I assured her that I would call her after her sleep study, inform her of the results, and prescribe the necessary therapy. Also, given her persistent cough and dyspnea, and restrictive physiology on PFTs, will order high-resolution chest CT and I will call her with the results. Encouraged her to call any time with questions or concerning symptoms.    Sincerely,    Nitin Giang MD  Pulmonary and Critical Care Medicine  [facility]  Cell [phone]  Office [phone]  Pager [phone]

## 2021-06-19 NOTE — LETTER
Letter by Nitin Giang MD at      Author: Nitin Giang MD Service: -- Author Type: --    Filed:  Encounter Date: 2019 Status: (Other)       2019    Dear Noemy Brito PA-C,    See below for documentation of a telephone conversation that I had today with Desirae Rey ( 1950).  Please feel free to call me at any time if needed.    Pulmonary Telephone Note     Called Ms. Rey. Discussed the pulmonary trunk enlargement on CT, even more reason to get a repeat sleep study done. That is scheduled for . Minimal dependent fibroatelectasis otherwise, expected. No ILD. I will call her with sleep study results when available. Encouraged her to call any time with questions or concerning symptoms.    Sincerely,    Nitin Giang MD  Pulmonary and Critical Care Medicine  [facility]  Cell [phone]  Office [phone]  Pager [phone]

## 2021-06-19 NOTE — LETTER
Letter by Nitin Giang MD at      Author: Nitin Giang MD Service: -- Author Type: --    Filed:  Encounter Date: 8/1/2019 Status: (Other)         Noemy Brito PA-C  [address] [facility] [address]  [place] [address]                                  August 4, 2019    Patient: Desirae Rey   MR Number: [id number]   YOB: 1950   Date of Visit: 8/1/2019     Dear Mayela Brito PA-C:    I saw Desirae Rey on the above date at the [facility]. Below are the relevant portions of my assessment and plan of care.    If you have questions, please do not hesitate to call me. I look forward to following Desirae along with you.    Sincerely,        Nitin Giang MD          CC  No Recipients  Nitin Giang MD  8/4/2019  3:36 PM  Sign at close encounter  Pulmonary Clinic Follow-up Visit    Assessment/Plan:  68 year old female never smoker with a history of asthma, chronic allergic rhinosinusitis, GERD, HTN, fibromyalgia, chronic low back pain, obesity, dyslipidemia, NELLIE, and obesity-hypoventilation syndrome, presenting for follow-up.     Asthma, chronic rhinosinusitis, NELLIE, obesity-hypoventilation syndrome: AHI 17.1 (40.6 when supine), with titration sleep study showing need for high-pressure BiPAP (had persistent events despite uptitration throughout the study). We were finally able to get her the appropriate ResMed auto-bilevel, EPAP min 12, IPAP max 25, PS 7 cm H2O with oxygen 2 L/min bled in, with Ultra Mirage II mask. Continues fluticasone-salmeterol respiclick. Regular walking using walking sticks and frequently does pool exercise. No need for prednisone or ED/urgent care visits in the last 3 months. Has a lot of sinus drainage but not interested in nasal sprays due to aversion. Has some ongoing, but stable, dyspnea with exertion, especially when outside in hot/humid weather.     Plan:  - continue auto-BiPAP with oxygen bleed-in at above settings  - continue fluticasone-salmeterol  respiclick 113-14 mcg one inhalation two times a day; rinse/gargle/spit water after use  - continue montelukast 10 mg at bedtime  - continue cetirizine 10 mg daily  - albuterol HFA as needed  - continue oxygen 3 L/min continuously  - annual high-dose influenza vaccination  - will discuss pneumococcal vaccination at follow-up  - follow up in 6 months or sooner if needed  - encouraged her to call any time with questions or concerning symptoms     I appreciate the opportunity to participate in the care of Ms. Rey.  Please call any time if needed.     CCx: asthma, NELLIE, obesity-hypoventilation syndrome, chronic rhinosinusitis    HPI: 68 year old female never smoker with a history of asthma, chronic allergic rhinosinusitis, GERD, HTN, fibromyalgia, chronic low back pain, obesity, dyslipidemia, NELLIE, and obesity-hypoventilation syndrome, presenting for follow-up. AHI 17.1 (40.6 when supine), with titration sleep study showing need for high-pressure BiPAP (had persistent events despite uptitration throughout the study). We were finally able to get her the appropriate ResMed auto-bilevel, EPAP min 12, IPAP max 25, PS 7 cm H2O with oxygen 2 L/min bled in, with Ultra Mirage II mask. Continues fluticasone-salmeterol respiclick. Regular walking using walking sticks and frequently does pool exercise. No need for prednisone or ED/urgent care visits in the last 3 months. Has a lot of sinus drainage but not interested in nasal sprays due to aversion. Has some ongoing, but stable, dyspnea with exertion, especially when outside in hot/humid weather.    ROS:  A 12-system review was obtained and was negative with the exception of the symptoms endorsed in the history of present illness.    PMH:  NELLIE/OHS on auto-BiPAP  Asthma  Chronic rhinosinusitis  Chronic low back pain  DJD  HTN  DL  Fibromyalgia  GERD    PSH:  Past Surgical History:   Procedure Laterality Date   ? CARPAL TUNNEL RELEASE Bilateral    ? CERVICAL FUSION N/A 4/27/2017     Procedure: ANTERIOR CERVICAL DECOMPRESSION FUSION C5-7 BILATERAL;  Surgeon: Basim Barone MD;  Location: [facility] Main OR;  Service:    ? CHOLECYSTECTOMY      open   ? HAND SURGERY Right    ? HYSTEROSCOPY     ? JOINT REPLACEMENT      bilateral TKA   ? KNEE SURGERY         Allergies:  Allergies   Allergen Reactions   ? Latex Rash     Severe rash     ? Colchicine Diarrhea   ? Latex      Added based on information entered during case entry, please review and add reactions, type, and severity as needed   ? Other Environmental Allergy      Coverlet bandaid , 3M product; rash   ? Statins-Hmg-Coa Reductase Inhibitors Myalgia     Tried lovastatin and simvastatin   ? Sulfa (Sulfonamide Antibiotics) Swelling     Facial swelling   ? Paper Tape Rash       Family HX:  Family History   Problem Relation Age of Onset   ? Rheum arthritis Mother    ? Heart disease Sister        Social Hx:  Social History     Socioeconomic History   ? Marital status:      Spouse name: Not on file   ? Number of children: Not on file   ? Years of education: Not on file   ? Highest education level: Not on file   Occupational History   ? Not on file   Social Needs   ? Financial resource strain: Not on file   ? Food insecurity:     Worry: Not on file     Inability: Not on file   ? Transportation needs:     Medical: Not on file     Non-medical: Not on file   Tobacco Use   ? Smoking status: Never Smoker   ? Smokeless tobacco: Never Used   Substance and Sexual Activity   ? Alcohol use: No   ? Drug use: No   ? Sexual activity: Not on file   Lifestyle   ? Physical activity:     Days per week: Not on file     Minutes per session: Not on file   ? Stress: Not on file   Relationships   ? Social connections:     Talks on phone: Not on file     Gets together: Not on file     Attends [employer] service: Not on file     Active member of club or organization: Not on file     Attends meetings of clubs or organizations: Not on file     Relationship status:  Not on file   ? Intimate partner violence:     Fear of current or ex partner: Not on file     Emotionally abused: Not on file     Physically abused: Not on file     Forced sexual activity: Not on file   Other Topics Concern   ? Not on file   Social History Narrative   ? Not on file       Current Meds:  Current Outpatient Medications   Medication Sig Dispense Refill   ? acyclovir (ZOVIRAX) 5 % ointment Apply to Affected Areas 5 Times Daily for 7 Days PRN     ? albuterol (ACCUNEB) 1.25 mg/3 mL nebulizer solution Take 1 ampule by nebulization every 6 (six) hours as needed for wheezing or shortness of breath.     ? albuterol (PROAIR HFA;PROVENTIL HFA;VENTOLIN HFA) 90 mcg/actuation inhaler Inhale 2 puffs every 6 (six) hours as needed for wheezing.     ? alendronate (FOSAMAX) 70 MG tablet Take 70 mg by mouth every 7 days. Takes on Mondays  11   ? azelastine (OPTIVAR) 0.05 % ophthalmic solution Administer 1 drop to both eyes every 12 (twelve) hours.     ? benzonatate (TESSALON) 100 MG capsule Take 200 mg by mouth 3 (three) times a day as needed for cough.     ? calcium citrate (CALCITRATE) 200 mg (950 mg) tablet Take 1 tablet (200 mg total) by mouth 2 (two) times a day. 200 tablet 4   ? CALCIUM CITRATE/VITAMIN D3 (CALCIUM CITRATE + D ORAL) Take 1 tablet by mouth 2 (two) times a day.     ? cetirizine (ZYRTEC) 10 MG tablet Take 10 mg by mouth at bedtime.      ? cholecalciferol, vitamin D3, 5,000 unit Tab Take 5,000 Units by mouth daily.     ? esomeprazole (NEXIUM) 40 MG capsule Take 40 mg by mouth every other day.     ? fluticasone-salmeterol (AIRDUO RESPICLICK) 113-14 mcg/actuation AePB Inhale 1 Inhalation 2 (two) times a day. 1 each 11   ? furosemide (LASIX) 20 MG tablet Take 1 tablet (20 mg total) by mouth daily. 30 tablet 0   ? gabapentin (NEURONTIN) 300 MG capsule Take 300 mg by mouth 3 (three) times a day.      ? HYDROcodone-acetaminophen (NORCO) 5-325 mg per tablet Take 1 tablet by mouth every 4 (four) hours as needed  for pain. (Patient taking differently: Take 1 tablet by mouth every 6 (six) hours as needed for pain. ) 80 tablet 0   ? lysine 500 mg cap Take 1 capsule by mouth 2 (two) times a day.     ? magnesium oxide 250 mg Tab Take 500 mg by mouth 2 (two) times a day.      ? metoprolol succinate (TOPROL-XL) 50 MG 24 hr tablet Take 1 tablet (50 mg total) by mouth daily. 30 tablet 0   ? modafinil (PROVIGIL) 200 MG tablet Take 200 mg by mouth daily.      ? montelukast (SINGULAIR) 10 mg tablet Take 10 mg by mouth daily.     ? OMEGA-3/DHA/EPA/FISH OIL (FISH OIL-OMEGA-3 FATTY ACIDS) 300-1,000 mg capsule Take 1 g by mouth daily.     ? OXYGEN-AIR DELIVERY SYSTEMS [facility]. 3 lpm 24/7 continuous     ? rosuvastatin (CRESTOR) 5 MG tablet 1 tablet at bedtime.  1   ? TiZANidine (ZANAFLEX) 4 MG capsule Take 8 mg by mouth every 8 (eight) hours as needed for muscle spasms.     ? valACYclovir (VALTREX) 500 MG tablet Take 500 mg by mouth 2 (two) times a day as needed.        No current facility-administered medications for this visit.        Physical Exam:  /84   Pulse 60   Resp 19   Wt 213 lb (96.6 kg)   SpO2 96% Comment: 3LPM  BMI 48.18 kg/m     Gen: alert, oriented, no distress  HEENT: nasal turbinates are unremarkable, no oropharyngeal lesions, no cervical or supraclavicular lymphadenopathy  CV: RRR, no M/G/R  Resp: CTAB, no focal crackles or wheezes  Abd: soft, nontender, no palpable organomegaly  Skin: no apparent rashes  Ext: trace bilateral ankle edema  Neuro: alert, nonfocal    Labs:  reviewed    Imaging studies:  High-res chest CT (April 2019):  - images directly reviewed, formal interpretation follows:  FINDINGS:   LUNGS AND PLEURA: Compared to April 2017, little change of minimal bilateral lower lobe predominant bandlike opacities, reticulation and asymmetric bronchiectasis (small amount in the medial right lower lobe and some in the left lower lobe). Prior   infectious / inflammatory opacities have resolved  since 2017. No consolidation. Negative pleural spaces. Expiration images demonstrate little change in the appearance of the trachea. It is difficult to evaluate the degree of possible gas trapping secondary to this. Diffuse gas trapping cannot be excluded.     MEDIASTINUM: No adenopathy. Pulmonary trunk enlargement measuring 4 cm. Mild coronary calcification. Nonaneurysmal aorta.     LIMITED UPPER ABDOMEN: Cholecystectomy.     MUSCULOSKELETAL: Degenerative changes shoulders and spine. ACDF.     IMPRESSION:   CONCLUSION:      1.  Minimal amount of nonspecific scarring and/or fibrosis in the lung bases. Unfortunately, prone imaging was not able to be performed to evaluate for resolution of any reticular opacities in the lung bases. No honeycombing or UIP pattern present.   Findings show no significant change since 4/5/2017.     2.  Minimal bronchiectasis. No significant airway thickening.     3.  Significant pulmonary trunk enlargement; correlate for pulmonary hypertension or shunt.        TTE (August 2017 at [facility]):  - EF 60-65%  - normal RV size/function  - grade 1 diastolic dysfunction  - no valvular abnormalities  - technically difficult study     Pharmacologic MPI (July 2017):    There is no prior study available.    The left ventricular ejection fraction is 74%.    The pharmacologic nuclear stress test is negative for inducible myocardial ischemia or infarction.     PFT's (June 2017):  FEV1/FVC is 88 and is normal.  FEV1 is 53% predicted and is reduced.  FVC is 48% predicted and reduced.  There was no improvement in spirometry after a single inhaled dose of bronchodilator.  TLC is 72% predicted and is reduced.  RV is 79% predicted and is normal.  DLCO is 39% predicted and is reduced when it   is corrected for hemoglobin.     Sleep Study (4/24/19-4/25/19):  Primary Findings:  Diagnostic Portion:    Respiratory monitoring showed moderate obstructive sleep apnea (AHI=17.1) during light NREM sleep, with events more  frequently occurring during supine sleep (AHI 40.6/hr vs 8.3/hr).    The patient spent a total of 21.9 minutes at an oxygen saturation below 88%.  Profound hypoxia was noted and after 10 minutes of sleep, 1 LPM of supplemental oxygen was added via nasal cannula (which stabilized hypoxia).    Mild tachypnea was noted throughout  Titration Portion:  A trial of Bi-level PAP in Spontaneous (S) mode was initiated at 8/4 and increased up to 22/12 cmH2O (supplemental oxygen was turned off).    Respiration was stabilized during NREM sleep, albeit with persistent hypoxia.  However, during REM sleep respiratory events and profound hypoxia returned.    This was considered an unacceptable titration due to persistence of respiratory events and hypoxia.   Mask leak was also problematic.     Other Findings:  Sleep Architecture:     Sleep onset latency was normal.    REM onset latency was prolonged.    All stages of sleep were sampled during the test.    Sleep architecture was fragmented due to numerous arousals.    Stage N3 sleep and Stage REM sleep were observed with increased frequency and duration (rebound) after CPAP pressures reached an optimal pressure.    Sleep efficiency was normal.     Respiration:    Mean hemoglobin oxygen saturation (SaO2) during the diagnostic portion of the PSG in NREM and REM sleep was 84% with a SaO2 desaturation jorge observed to 61%.    Sleep-related Hypoxia was present as cumulative exposure time in sleep to SaO2 below 88% surpassed 5 total minutes (in this case, 21.9 minutes).     Movements:        Periodic leg movements were observed with low frequency.    The patient had a Periodic Limb Movement (PLM) index of 2.1 and the ESTEFANIA PLM index was 0.8.     Parasomnia:    No activity consistent with parasomnia was observed in the video recording.     Electrocardiogram:    Two-lead ECG showed normal sinus rhythm.     Electroencephalogram:    We used a limited-montage EEG with F3, F4, C3, C4, O1, O2 and  contra-lateral references to M1 and M2 that was reviewed using a 30-second EPOCH. No EEG abnormalities were observed with these settings.     Therapeutic Intervention:   Bilevel was initiated with a pressure of 8/4 cwp  and titrated up to 22/12 cwp  Interface:  Type-  Nasal          ,    Brand-  Eson 2              ,   Size-  Small     Diagnosis:    G47.33 Obstructive sleep apnea    G47.34 Sleep related hypoxia    G47.36 Sleep related hypoventilation in conditions classified elsewhere     Assessment & Recommendation:        Although optimal pressures were not determined, I would recommend auto-titrating Bilevel with an EPAP min of 12, IPAP max of 25, and PS of 7 cmH2O.  Recommend special attention to mask fit and mouth leak, especially at higher pressures.    I suspect hypoxia will persist despite maximal Bilevel support.  ? If patient already qualifies for continuous oxygen, recommend bleeding 1 LPM at machine end of tubing (recommend ResMed devices due to tubing with integrated oxygen hookup).  ? If patient does not qualify for continuous oxygen already, then she would benefit from nocturnal oximetry to confirm resolution of hypoxia.  She may require repeat titration study to qualify for supplemental oxygen therapy.    Consider adjunct positional therapy with HOB elevation or lateral sleep position.    Patients with excessive daytime sleepiness are at increased risk for motor vehicle accidents.    Suggest optimizing sleep hygiene and avoiding any further sleep deprivation.    Consider weight reduction management as this may be a factor in NELLIE.    Recommend evaluation due to periodic leg movements.    Measures aimed at increasing sleep consolidation can be beneficial.    Nitin Giang MD  Pulmonary and Critical Care Medicine  [facility]  Cell [phone]  Office [phone]  Pager [phone]

## 2021-06-19 NOTE — LETTER
"Letter by Nitin Giang MD at      Author: Nitin Giang MD Service: -- Author Type: --    Filed:  Encounter Date: 3/13/2019 Status: (Other)         Noemy Brito PA-C  [address] [facility] [address]                                  March 15, 2019    Patient: Desirae Rey   MR Number: [id number]   YOB: 1950   Date of Visit: 3/13/2019     Dear Noemy Brito PA-C:    I saw Desirae Rey on the above date at the [facility] [place]. Below are the relevant portions of my assessment and plan of care.    If you have questions, please do not hesitate to call me. I look forward to following Desirae along with you.    Sincerely,        Nitin Giang MD          CC  No Recipients  Nitin Giang MD  3/15/2019  3:57 PM  Sign at close encounter  Pulmonary Clinic Follow-up Visit    Assessment/Plan:  68 year old female never smoker with a history of asthma, chronic allergic rhinosinusitis, GERD, HTN, fibromyalgia, chronic low back pain, obesity, dyslipidemia, NELLIE, obesity-hypoventilation syndrome, presenting for follow-up.    Asthma, chronic rhinosinusitis, NELLIE, obesity-hypoventilation syndrome: Patient returns for follow-up to the clinic, though it is my first time meeting her. She never smoked. Had \"bronchial pneumonia\" as a child. Has struggled with persistent dyspnea on exertion and nasal/sinus congestion. Her BiPAP has not been working properly for about two months, but she continues to use it; received it about 10 years ago from [facility]. I do not have the original prescription, though I have record of settings of IPAP 14, EPAP 10 in some notes. Uses an Ultra Mirage II nasal mask. Uses oxygen 3 L/min continuously. Continues to have exertional dyspnea and occasional wheezing, \"plugged nose\" and intermittent itchy and watery eyes. Currently uses beclomethasone 40 mcg two inhalations two times a day, montelukast, and cetirizine, but takes the cetirizine at night and the montelukast " "in the morning. Weight appears to be down 11 pounds from October 2017 to current. Has some leg edema and uses compressions stockings regularly.    Plan:  - stop inhaled beclomethasone  - start fluticasone-salmeterol respiclick 113-14 mcg one inhalation two times a day; rinse/gargle/spit water after use  - continue montelukast 10 mg but take at bedtime  - continue cetirizine 10 mg but take in the morning  - albuterol HFA as needed  - patient decided to wait on my pursuing a new BiPAP machine; can discuss at follow-up; I called [facility] and she last had her equipment serviced there in November 2017; asked them to fax us her most recent BiPAP prescription  - continue oxygen 3 L/min continuously, including bled into nocturnal BiPAP  - annual influenza vaccination  - will discuss pneumococcal vaccination at follow-up  - follow up in 3 months  - encouraged her to call any time with questions or concerning symptoms    I appreciate the opportunity to participate in the care of Ms. Rey.  Please call any time if needed.    CCx: asthma, NELLIE, obesity-hypoventilation syndrome, chronic rhinosinusitis    HPI: 68 year old female never smoker with a history of asthma, chronic allergic rhinosinusitis, GERD, HTN, fibromyalgia, chronic low back pain, obesity, dyslipidemia, NELLIE, obesity-hypoventilation syndrome, presenting for follow-up. Patient returns for follow-up to the clinic, though it is my first time meeting her. She never smoked. Had \"bronchial pneumonia\" as a child. Has struggled with persistent dyspnea on exertion and nasal/sinus congestion. Her BiPAP has not been working properly for about two months, but she continues to use it; received it about 10 years ago from [facility]. I do not have the original prescription, though I have record of settings of IPAP 14, EPAP 10 in some notes. Uses an Ultra Mirage II nasal mask. Uses oxygen 3 L/min continuously. Continues to have exertional dyspnea and occasional " "wheezing, \"plugged nose\" and intermittent itchy and watery eyes. Currently uses beclomethasone 40 mcg two inhalations two times a day, montelukast, and cetirizine, but takes the cetirizine at night and the montelukast in the morning. Weight appears to be down 11 pounds from October 2017 to current. Has some leg edema and uses compressions stockings regularly.    ROS:  A 12-system review was obtained and was negative with the exception of the symptoms endorsed in the history of present illness.    PMH:  NELLIE on BiPAP  OHS  Asthma  Chronic rhinosinusitis  Chronic low back pain  DJD  HTN  DL  Fibromyalgia  GERD    PSH:  Past Surgical History:   Procedure Laterality Date   ? CARPAL TUNNEL RELEASE Bilateral    ? CERVICAL FUSION N/A 4/27/2017    Procedure: ANTERIOR CERVICAL DECOMPRESSION FUSION C5-7 BILATERAL;  Surgeon: Basim Barone MD;  Location: [facility];  Service:    ? CHOLECYSTECTOMY      open   ? HAND SURGERY Right    ? HYSTEROSCOPY     ? JOINT REPLACEMENT      bilateral TKA   ? KNEE SURGERY         Allergies:  Allergies   Allergen Reactions   ? Latex Rash     Severe rash     ? Colchicine Diarrhea   ? Latex      Added based on information entered during case entry, please review and add reactions, type, and severity as needed   ? Other Environmental Allergy      Coverlet bandaid , [employer] product; rash   ? Statins-Hmg-Coa Reductase Inhibitors Myalgia     Tried lovastatin and simvastatin   ? Sulfa (Sulfonamide Antibiotics) Swelling     Facial swelling   ? Paper Tape Rash       Family HX:  Family History   Problem Relation Age of Onset   ? Rheum arthritis Mother    ? Heart disease Sister        Social Hx:  Social History     Socioeconomic History   ? Marital status:      Spouse name: Not on file   ? Number of children: Not on file   ? Years of education: Not on file   ? Highest education level: Not on file   Occupational History   ? Not on file   Social Needs   ? Financial resource strain: Not on " file   ? Food insecurity:     Worry: Not on file     Inability: Not on file   ? Transportation needs:     Medical: Not on file     Non-medical: Not on file   Tobacco Use   ? Smoking status: Never Smoker   ? Smokeless tobacco: Never Used   Substance and Sexual Activity   ? Alcohol use: No   ? Drug use: No   ? Sexual activity: Not on file   Lifestyle   ? Physical activity:     Days per week: Not on file     Minutes per session: Not on file   ? Stress: Not on file   Relationships   ? Social connections:     Talks on phone: Not on file     Gets together: Not on file     Attends Bahai service: Not on file     Active member of club or organization: Not on file     Attends meetings of clubs or organizations: Not on file     Relationship status: Not on file   ? Intimate partner violence:     Fear of current or ex partner: Not on file     Emotionally abused: Not on file     Physically abused: Not on file     Forced sexual activity: Not on file   Other Topics Concern   ? Not on file   Social History Narrative   ? Not on file       Current Meds:  Current Outpatient Medications   Medication Sig Dispense Refill   ? acyclovir (ZOVIRAX) 5 % ointment Apply to Affected Areas 5 Times Daily for 7 Days PRN     ? albuterol (PROAIR HFA;PROVENTIL HFA;VENTOLIN HFA) 90 mcg/actuation inhaler Inhale 2 puffs every 6 (six) hours as needed for wheezing.     ? alendronate (FOSAMAX) 70 MG tablet Take 70 mg by mouth every 7 days. Takes on Mondays  11   ? azelastine (ASTELIN) 137 mcg nasal spray 2 sprays into each nostril 2 (two) times a day as needed for rhinitis.      ? azelastine (OPTIVAR) 0.05 % ophthalmic solution Administer 1 drop to both eyes every 12 (twelve) hours.     ? beclomethasone (QVAR) 80 mcg/actuation inhaler Inhale 2 puffs 2 (two) times a day as needed.      ? benzonatate (TESSALON) 100 MG capsule Take 200 mg by mouth 3 (three) times a day as needed for cough.     ? calcium citrate (CALCITRATE) 200 mg (950 mg) tablet Take 1  tablet (200 mg total) by mouth 2 (two) times a day. 200 tablet 4   ? CALCIUM CITRATE/VITAMIN D3 (CALCIUM CITRATE + D ORAL) Take 1 tablet by mouth 2 (two) times a day.     ? cetirizine (ZYRTEC) 10 MG tablet Take 10 mg by mouth at bedtime.      ? cholecalciferol, vitamin D3, 5,000 unit Tab Take 5,000 Units by mouth daily.     ? esomeprazole (NEXIUM) 40 MG capsule Take 40 mg by mouth every other day.     ? furosemide (LASIX) 20 MG tablet Take 1 tablet (20 mg total) by mouth daily. 30 tablet 0   ? gabapentin (NEURONTIN) 300 MG capsule Take 300 mg by mouth 3 (three) times a day.      ? HYDROcodone-acetaminophen (NORCO) 5-325 mg per tablet Take 1 tablet by mouth every 4 (four) hours as needed for pain. (Patient taking differently: Take 1 tablet by mouth every 6 (six) hours as needed for pain. ) 80 tablet 0   ? hydrOXYzine (ATARAX) 25 MG tablet Take 25 mg by mouth every 8 (eight) hours as needed for itching.      ? lysine 500 mg cap Take 1 capsule by mouth 2 (two) times a day.     ? magnesium oxide 250 mg Tab Take 500 mg by mouth 2 (two) times a day.      ? metoprolol succinate (TOPROL-XL) 50 MG 24 hr tablet Take 1 tablet (50 mg total) by mouth daily. 30 tablet 0   ? modafinil (PROVIGIL) 200 MG tablet Take 200 mg by mouth daily.      ? montelukast (SINGULAIR) 10 mg tablet Take 10 mg by mouth daily.     ? OMEGA-3/DHA/EPA/FISH OIL (FISH OIL-OMEGA-3 FATTY ACIDS) 300-1,000 mg capsule Take 1 g by mouth daily.     ? rosuvastatin (CRESTOR) 5 MG tablet 1 tablet at bedtime.  1   ? TiZANidine (ZANAFLEX) 4 MG capsule Take 8 mg by mouth every 8 (eight) hours as needed for muscle spasms.     ? valACYclovir (VALTREX) 500 MG tablet Take 500 mg by mouth 2 (two) times a day as needed.      ? fluticasone-salmeterol (AIRDUO RESPICLICK) 113-14 mcg/actuation AePB Inhale 1 Inhalation 2 (two) times a day. 1 each 11     No current facility-administered medications for this visit.        Physical Exam:  /64   Pulse 75   Resp 12   Ht 4'  "7.75\" (1.416 m)   Wt 214 lb (97.1 kg)   SpO2 90%   Breastfeeding? No   BMI 48.41 kg/m     Gen: alert, oriented, no distress  HEENT: nasal turbinates are mildly edematous, no oropharyngeal lesions, no cervical or supraclavicular lymphadenopathy  CV: RRR, no M/G/R  Resp: diminished bilaterally  Abd: soft, nontender, no palpable organomegaly  Skin: no apparent rashes  Ext: trace bilateral edema with compression stockings in place  Neuro: alert, nonfocal    Labs:  reviewed    Imaging studies:  CXR (July 2017):  - images directly reviewed, formal interpretation follows:  FINDINGS: Left subcoracoid calcification could represent an intra-articular loose body, or calcified lymph node. Cervical fusion hardware. Multilevel thoracic disc degeneration.     Heart size appears normal. Lungs appear clear.    TTE (August 2017 at [facility]):  - EF 60-65%  - normal RV size/function  - grade 1 diastolic dysfunction  - no valvular abnormalities  - technically difficult study    Pharmacologic MPI (July 2017):    There is no prior study available.    The left ventricular ejection fraction is 74%.    The pharmacologic nuclear stress test is negative for inducible myocardial ischemia or infarction.    PFT's (June 2017):  FEV1/FVC is 88 and is normal.  FEV1 is 53% predicted and is reduced.  FVC is 48% predicted and reduced.  There was no improvement in spirometry after a single inhaled dose of bronchodilator.  TLC is 72% predicted and is reduced.  RV is 79% predicted and is normal.  DLCO is 39% predicted and is reduced when it   is corrected for hemoglobin.    Nitin Giang MD  Pulmonary and Critical Care Medicine  [facility]  Cell [phone]  Office [phone]  Pager [phone]         "

## 2021-06-19 NOTE — LETTER
Letter by Nitin Giang MD at      Author: Nitin Giang MD Service: -- Author Type: --    Filed:  Encounter Date: 2019 Status: (Other)       2019    Dear Noemy Brito PA-C,    See below for documentation of a telephone conversation that I had today with Desirae Rey ( 1950).  Please feel free to call me at any time if needed.    Pulmonary Telephone Note     Called Ms. Rey. Received her BiPAP prescription from [place] which is from ; settings are IPAP 16, EPAP 12. She is recovering from an upper airway process and cough with steroid and antibiotic, starting to feel better. Will get her in for a new sleep study and referral to sleep medicine clinic. I will follow up with her in May. She is in agreement.    Sincerely,    Nitin Giang MD  Pulmonary and Critical Care Medicine  [facility]  Cell [phone]  Office [phone]  Pager [phone]

## 2021-06-24 NOTE — PATIENT INSTRUCTIONS - HE
It was good to meet you in clinic today. This is what we discussed:    1. Stop Qvar.  2. Start AirDuo one inhalation twice daily. Rinse, gargle, and spit water after use.  3. Use albuterol inhaler as needed.  4. Continue Singulair, but take one pill at bedtime.  5. Continue Zyrtec, but take one pill in the morning.  6. We can wait on getting a new BiPAP machine.  7. Continue your oxygen on a setting of 3.  8. I will see you in about 3 months.  9. Call any time with questions or concerning symptoms.    Nitin Giang MD  Pulmonary and Critical Care Medicine  [facility]  Office [phone]

## 2021-06-25 NOTE — PROGRESS NOTES
Progress Notes by Greer Forrest MD at 10/23/2017  3:30 PM     Author: Greer Forrest MD Service: -- Author Type: Physician    Filed: 10/23/2017  6:32 PM Encounter Date: 10/23/2017 Status: Signed    : Greer Forrest MD (Physician)           Click to link to [facility]     [facility] NOTE    Thank you, Dr. Brito, for asking the [facility] team to see Ms. Desirae Rey to follow-up on recent diastolic heart failure.    Assessment/Recommendations   Assessment:    1.  Mild diastolic heart failure exacerbation, resolved with diuretic therapy.  She appears euvolemic today with no report of any significant weight gain, orthopnea or PND.  I suggested we check a basic metabolic profile and BNP today to compare to her previous studies.  2.  Hypoxia, likely multifactorial.  I suspect this is due to hypoventilation syndrome in the setting of her obesity.  She does have a history of obstructive sleep apnea but has not had any recent reassessment.  I suggested we set up a referral to the sleep medicine clinic to see if any adjustment in her CPAP mask as needed.  3.  Morbid obesity  4.  Chronic kidney disease, stage III.  Again we will repeat basic metabolic profile today to verify that no worsening of her renal function noted.    Plan:  1.  Continue current medications for now  2.  Repeat basic metabolic profile and BNP  3.  Referral to sleep medicine clinic       History of Present Illness    Ms. Desirae Rey is a 66 y.o. female with history of morbid obesity, essential hypertension, hypercholesterolemia, NELLIE treated with CPAP therapy with negative stress test this summer who underwent recent surgery on her left elbow.  Postoperatively, she was noted to have hypoxia with subsequent laboratory studies suggesting an elevated BNP.  Her echocardiogram showed normal LV systolic performance but mild diastolic dysfunction and that she was given diuretic therapy for treatment of diastolic heart  failure.  Her hypoxia did appear to improve back to her baseline need for supplemental oxygen at nighttime and she was discharged home.  Since her hospitalization, she has gradually been increasing her activity and is back to work now.  She does continue to experience symptoms of exertional dyspnea.  She tries to exercise 4-5 days per week at the gym, up to an hour at a time.  She denies any clear orthopnea, PND or worsening of her baseline lower extremity edema    ECG (personally reviewed): No ECG today    Cardiac Imaging Studies (personally reviewed): No new cardiac imaging.  Recent nuclear stress study negative for ischemia or infarction.     Physical Examination Review of Systems   Vitals:    10/23/17 1538   BP: 120/56   Pulse: 80   Resp: 28     Body mass index is 50.9 kg/(m^2).  Wt Readings from Last 3 Encounters:   10/23/17 (!) 225 lb (102.1 kg)   08/21/17 (!) 229 lb (103.9 kg)   08/01/17 (!) 229 lb 1.6 oz (103.9 kg)     General Appearance:   Awake, Alert, No acute distress.   HEENT:  No scleral icterus; the mucous membranes were pink and moist.   Neck: No cervical bruits or jugular venous distention    Chest: The spine was straight. The chest was symmetric.   Lungs:   Respirations unlabored; the lungs are clear to auscultation. No wheezing   Cardiovascular:    Regular rate and rhythm.  S1, S2 normal.  No murmur, gallop or rub   Abdomen:  No organomegaly, masses, bruits, or tenderness. Bowels sounds are present   Extremities:  1+ ankle edema bilaterally   Skin: No xanthelasma. Warm, Dry.   Musculoskeletal: No tenderness.   Neurologic: Mood and affect are appropriate.    General: WNL  Eyes: WNL  Ears/Nose/Throat: WNL  Lungs: Shortness of Breath, Snoring  Heart: Arm Pain, Shortness of Breath with activity  Stomach: WNL  Bladder: WNL  Muscle/Joints: Joint Pain  Skin: WNL  Nervous System: WNL  Mental Health: WNL     Blood: WNL     Medical History  Surgical History Family History Social History   Past Medical  History:   Diagnosis Date   ? Allergic rhinitis    ? Arthritis of back    ? Chronic kidney disease     stage 3    ? De Quervain's disease (tenosynovitis)    ? Fibromyalgia, primary    ? GERD (gastroesophageal reflux disease)    ? Hematuria     chronic   ? High cholesterol    ? History of blood clots 1970's    DVT, from birth control, h/o left calf   ? Hyperlipidemia    ? Hypertension    ? Low back pain    ? Osteoporosis    ? Pickwickian syndrome    ? Plantar fasciitis    ? Proteinuria    ? Proteinuria     chronic   ? Sleep apnea     CPAP    Past Surgical History:   Procedure Laterality Date   ? CARPAL TUNNEL RELEASE Bilateral    ? CERVICAL FUSION N/A 4/27/2017    Procedure: ANTERIOR CERVICAL DECOMPRESSION FUSION C5-7 BILATERAL;  Surgeon: Basim Barone MD;  Location: [facility];  Service:    ? CHOLECYSTECTOMY      open   ? HAND SURGERY Right    ? HYSTEROSCOPY     ? JOINT REPLACEMENT      bilateral TKA   ? KNEE SURGERY      Family History   Problem Relation Age of Onset   ? Rheum arthritis Mother    ? Heart disease Sister     Social History     Social History   ? Marital status:      Spouse name: N/A   ? Number of children: N/A   ? Years of education: N/A     Occupational History   ? Not on file.     Social History Main Topics   ? Smoking status: Never Smoker   ? Smokeless tobacco: Never Used   ? Alcohol use No   ? Drug use: No   ? Sexual activity: Not on file     Other Topics Concern   ? Not on file     Social History Narrative          Medications  Allergies   Current Outpatient Prescriptions   Medication Sig Dispense Refill   ? acyclovir (ZOVIRAX) 5 % ointment Apply to Affected Areas 5 Times Daily for 7 Days PRN     ? alendronate (FOSAMAX) 70 MG tablet Take 70 mg by mouth every 7 days. Takes on Mondays  11   ? azelastine (ASTELIN) 137 mcg nasal spray 2 sprays into each nostril 2 (two) times a day as needed for rhinitis.      ? azelastine (OPTIVAR) 0.05 % ophthalmic solution Administer 1 drop  to both eyes every 12 (twelve) hours.     ? beclomethasone (QVAR) 80 mcg/actuation inhaler Inhale 2 puffs 2 (two) times a day as needed.      ? calcium citrate (CALCITRATE) 200 mg (950 mg) tablet Take 1 tablet (200 mg total) by mouth 2 (two) times a day. 200 tablet 4   ? cetirizine (ZYRTEC) 10 MG tablet Take 10 mg by mouth at bedtime.      ? cholecalciferol, vitamin D3, 5,000 unit Tab Take 5,000 Units by mouth daily.     ? cyclobenzaprine (FLEXERIL) 10 MG tablet Take 10 mg by mouth at bedtime as needed for muscle spasms.      ? esomeprazole (NEXIUM) 40 MG capsule Take 40 mg by mouth every other day.     ? furosemide (LASIX) 20 MG tablet Take 2 tablets (40 mg total) by mouth daily. 30 tablet 0   ? gabapentin (NEURONTIN) 300 MG capsule Take 300 mg by mouth 3 (three) times a day.      ? HYDROcodone-acetaminophen (NORCO) 5-325 mg per tablet Take 1 tablet by mouth every 4 (four) hours as needed for pain. (Patient taking differently: Take 1 tablet by mouth every 6 (six) hours as needed for pain. ) 80 tablet 0   ? hydrOXYzine (ATARAX) 25 MG tablet Take 25 mg by mouth every 8 (eight) hours as needed for itching.      ? lysine 500 mg cap Take 1 capsule by mouth 2 (two) times a day.     ? magnesium oxide 250 mg Tab Take 500 mg by mouth 2 (two) times a day.      ? metoprolol succinate (TOPROL-XL) 50 MG 24 hr tablet Take 1 tablet (50 mg total) by mouth daily. 30 tablet 0   ? modafinil (PROVIGIL) 200 MG tablet Take 200 mg by mouth daily.      ? montelukast (SINGULAIR) 10 mg tablet Take 10 mg by mouth daily.     ? OMEGA-3/DHA/EPA/FISH OIL (FISH OIL-OMEGA-3 FATTY ACIDS) 300-1,000 mg capsule Take 1 g by mouth daily.     ? rosuvastatin (CRESTOR) 5 MG tablet 1 tablet at bedtime.  1   ? valACYclovir (VALTREX) 500 MG tablet Take 500 mg by mouth 2 (two) times a day as needed.      ? CALCIUM CITRATE/VITAMIN D3 (CALCIUM CITRATE + D ORAL) Take 1 tablet by mouth 2 (two) times a day.     ? clobetasol (TEMOVATE) 0.05 % ointment Apply 1  application topically 2 (two) times a day as needed.      ? ergocalciferol (VITAMIN D2) 50,000 unit capsule Take 1 capsule (50,000 Units total) by mouth once a week. 12 capsule 4   ? oxyCODONE-acetaminophen (PERCOCET) 5-325 mg per tablet Take 1 tablet by mouth every 4 (four) hours as needed for pain. 30 tablet 0     No current facility-administered medications for this visit.       Allergies   Allergen Reactions   ? Latex Rash     Severe rash     ? Colchicine Diarrhea   ? Latex      Added based on information entered during case entry, please review and add reactions, type, and severity as needed   ? Other Environmental Allergy      Coverlet bandaid , [employer] product; rash   ? Statins-Hmg-Coa Reductase Inhibitors Myalgia     Tried lovastatin and simvastatin   ? Sulfa (Sulfonamide Antibiotics) Swelling     Facial swelling   ? Paper Tape Rash         Lab Results    Chemistry/lipid CBC Cardiac Enzymes/BNP/TSH/INR   Lab Results   Component Value Date    CHOL 270 (H) 04/21/2017    HDL 40 (L) 04/21/2017    LDLCALC  04/21/2017      Comment:      Invalid, Triglycerides >400    TRIG 528 (H) 04/21/2017    CREATININE 1.04 08/30/2017    BUN 28 (H) 08/30/2017    K 4.4 08/30/2017     08/30/2017     08/30/2017    CO2 30 08/30/2017    Lab Results   Component Value Date    WBC 7.0 08/25/2017    HGB 11.6 (L) 08/25/2017    HCT 36.9 08/25/2017     (H) 08/25/2017     08/25/2017    Lab Results   Component Value Date    CKTOTAL 284 (H) 05/06/2016     (H) 08/26/2017    TSH 2.86 06/07/2017    INR 1.08 02/14/2013

## 2021-06-25 NOTE — PROGRESS NOTES
"Pulmonary Clinic Follow-up Visit    Assessment/Plan:  68 year old female never smoker with a history of asthma, chronic allergic rhinosinusitis, GERD, HTN, fibromyalgia, chronic low back pain, obesity, dyslipidemia, NELLIE, obesity-hypoventilation syndrome, presenting for follow-up.    Asthma, chronic rhinosinusitis, NELLIE, obesity-hypoventilation syndrome: Patient returns for follow-up to the clinic, though it is my first time meeting her. She never smoked. Had \"bronchial pneumonia\" as a child. Has struggled with persistent dyspnea on exertion and nasal/sinus congestion. Her BiPAP has not been working properly for about two months, but she continues to use it; received it about 10 years ago from [facility]. I do not have the original prescription, though I have record of settings of IPAP 14, EPAP 10 in some notes. Uses an Ultra Mirage II nasal mask. Uses oxygen 3 L/min continuously. Continues to have exertional dyspnea and occasional wheezing, \"plugged nose\" and intermittent itchy and watery eyes. Currently uses beclomethasone 40 mcg two inhalations two times a day, montelukast, and cetirizine, but takes the cetirizine at night and the montelukast in the morning. Weight appears to be down 11 pounds from October 2017 to current. Has some leg edema and uses compressions stockings regularly.    Plan:  - stop inhaled beclomethasone  - start fluticasone-salmeterol respiclick 113-14 mcg one inhalation two times a day; rinse/gargle/spit water after use  - continue montelukast 10 mg but take at bedtime  - continue cetirizine 10 mg but take in the morning  - albuterol HFA as needed  - patient decided to wait on my pursuing a new BiPAP machine; can discuss at follow-up; I called [facility] and she last had her equipment serviced there in November 2017; asked them to fax us her most recent BiPAP prescription  - continue oxygen 3 L/min continuously, including bled into nocturnal BiPAP  - annual influenza vaccination  - " "will discuss pneumococcal vaccination at follow-up  - follow up in 3 months  - encouraged her to call any time with questions or concerning symptoms    I appreciate the opportunity to participate in the care of Ms. Rey.  Please call any time if needed.    CCx: asthma, NELLIE, obesity-hypoventilation syndrome, chronic rhinosinusitis    HPI: 68 year old female never smoker with a history of asthma, chronic allergic rhinosinusitis, GERD, HTN, fibromyalgia, chronic low back pain, obesity, dyslipidemia, NELLIE, obesity-hypoventilation syndrome, presenting for follow-up. Patient returns for follow-up to the clinic, though it is my first time meeting her. She never smoked. Had \"bronchial pneumonia\" as a child. Has struggled with persistent dyspnea on exertion and nasal/sinus congestion. Her BiPAP has not been working properly for about two months, but she continues to use it; received it about 10 years ago from [facility]. I do not have the original prescription, though I have record of settings of IPAP 14, EPAP 10 in some notes. Uses an Ultra Mirage II nasal mask. Uses oxygen 3 L/min continuously. Continues to have exertional dyspnea and occasional wheezing, \"plugged nose\" and intermittent itchy and watery eyes. Currently uses beclomethasone 40 mcg two inhalations two times a day, montelukast, and cetirizine, but takes the cetirizine at night and the montelukast in the morning. Weight appears to be down 11 pounds from October 2017 to current. Has some leg edema and uses compressions stockings regularly.    ROS:  A 12-system review was obtained and was negative with the exception of the symptoms endorsed in the history of present illness.    PMH:  NELLIE on BiPAP  OHS  Asthma  Chronic rhinosinusitis  Chronic low back pain  DJD  HTN  DL  Fibromyalgia  GERD    PSH:  Past Surgical History:   Procedure Laterality Date     CARPAL TUNNEL RELEASE Bilateral      CERVICAL FUSION N/A 4/27/2017    Procedure: ANTERIOR CERVICAL " DECOMPRESSION FUSION C5-7 BILATERAL;  Surgeon: Basim Barone MD;  Location: [facility] OR;  Service:      CHOLECYSTECTOMY      open     HAND SURGERY Right      HYSTEROSCOPY       JOINT REPLACEMENT      bilateral TKA     KNEE SURGERY         Allergies:  Allergies   Allergen Reactions     Latex Rash     Severe rash       Colchicine Diarrhea     Latex      Added based on information entered during case entry, please review and add reactions, type, and severity as needed     Other Environmental Allergy      Coverlet bandaid , 3M product; rash     Statins-Hmg-Coa Reductase Inhibitors Myalgia     Tried lovastatin and simvastatin     Sulfa (Sulfonamide Antibiotics) Swelling     Facial swelling     Paper Tape Rash       Family HX:  Family History   Problem Relation Age of Onset     Rheum arthritis Mother      Heart disease Sister        Social Hx:  Social History     Socioeconomic History     Marital status:      Spouse name: Not on file     Number of children: Not on file     Years of education: Not on file     Highest education level: Not on file   Occupational History     Not on file   Social Needs     Financial resource strain: Not on file     Food insecurity:     Worry: Not on file     Inability: Not on file     Transportation needs:     Medical: Not on file     Non-medical: Not on file   Tobacco Use     Smoking status: Never Smoker     Smokeless tobacco: Never Used   Substance and Sexual Activity     Alcohol use: No     Drug use: No     Sexual activity: Not on file   Lifestyle     Physical activity:     Days per week: Not on file     Minutes per session: Not on file     Stress: Not on file   Relationships     Social connections:     Talks on phone: Not on file     Gets together: Not on file     Attends Yazidi service: Not on file     Active member of club or organization: Not on file     Attends meetings of clubs or organizations: Not on file     Relationship status: Not on file     Intimate  partner violence:     Fear of current or ex partner: Not on file     Emotionally abused: Not on file     Physically abused: Not on file     Forced sexual activity: Not on file   Other Topics Concern     Not on file   Social History Narrative     Not on file       Current Meds:  Current Outpatient Medications   Medication Sig Dispense Refill     acyclovir (ZOVIRAX) 5 % ointment Apply to Affected Areas 5 Times Daily for 7 Days PRN       albuterol (PROAIR HFA;PROVENTIL HFA;VENTOLIN HFA) 90 mcg/actuation inhaler Inhale 2 puffs every 6 (six) hours as needed for wheezing.       alendronate (FOSAMAX) 70 MG tablet Take 70 mg by mouth every 7 days. Takes on Mondays 11     azelastine (ASTELIN) 137 mcg nasal spray 2 sprays into each nostril 2 (two) times a day as needed for rhinitis.        azelastine (OPTIVAR) 0.05 % ophthalmic solution Administer 1 drop to both eyes every 12 (twelve) hours.       beclomethasone (QVAR) 80 mcg/actuation inhaler Inhale 2 puffs 2 (two) times a day as needed.        benzonatate (TESSALON) 100 MG capsule Take 200 mg by mouth 3 (three) times a day as needed for cough.       calcium citrate (CALCITRATE) 200 mg (950 mg) tablet Take 1 tablet (200 mg total) by mouth 2 (two) times a day. 200 tablet 4     CALCIUM CITRATE/VITAMIN D3 (CALCIUM CITRATE + D ORAL) Take 1 tablet by mouth 2 (two) times a day.       cetirizine (ZYRTEC) 10 MG tablet Take 10 mg by mouth at bedtime.        cholecalciferol, vitamin D3, 5,000 unit Tab Take 5,000 Units by mouth daily.       esomeprazole (NEXIUM) 40 MG capsule Take 40 mg by mouth every other day.       furosemide (LASIX) 20 MG tablet Take 1 tablet (20 mg total) by mouth daily. 30 tablet 0     gabapentin (NEURONTIN) 300 MG capsule Take 300 mg by mouth 3 (three) times a day.        HYDROcodone-acetaminophen (NORCO) 5-325 mg per tablet Take 1 tablet by mouth every 4 (four) hours as needed for pain. (Patient taking differently: Take 1 tablet by mouth every 6 (six) hours as  "needed for pain. ) 80 tablet 0     hydrOXYzine (ATARAX) 25 MG tablet Take 25 mg by mouth every 8 (eight) hours as needed for itching.        lysine 500 mg cap Take 1 capsule by mouth 2 (two) times a day.       magnesium oxide 250 mg Tab Take 500 mg by mouth 2 (two) times a day.        metoprolol succinate (TOPROL-XL) 50 MG 24 hr tablet Take 1 tablet (50 mg total) by mouth daily. 30 tablet 0     modafinil (PROVIGIL) 200 MG tablet Take 200 mg by mouth daily.        montelukast (SINGULAIR) 10 mg tablet Take 10 mg by mouth daily.       OMEGA-3/DHA/EPA/FISH OIL (FISH OIL-OMEGA-3 FATTY ACIDS) 300-1,000 mg capsule Take 1 g by mouth daily.       rosuvastatin (CRESTOR) 5 MG tablet 1 tablet at bedtime.  1     TiZANidine (ZANAFLEX) 4 MG capsule Take 8 mg by mouth every 8 (eight) hours as needed for muscle spasms.       valACYclovir (VALTREX) 500 MG tablet Take 500 mg by mouth 2 (two) times a day as needed.        fluticasone-salmeterol (AIRDUO RESPICLICK) 113-14 mcg/actuation AePB Inhale 1 Inhalation 2 (two) times a day. 1 each 11     No current facility-administered medications for this visit.        Physical Exam:  /64   Pulse 75   Resp 12   Ht 4' 7.75\" (1.416 m)   Wt 214 lb (97.1 kg)   SpO2 90%   Breastfeeding? No   BMI 48.41 kg/m    Gen: alert, oriented, no distress  HEENT: nasal turbinates are mildly edematous, no oropharyngeal lesions, no cervical or supraclavicular lymphadenopathy  CV: RRR, no M/G/R  Resp: diminished bilaterally  Abd: soft, nontender, no palpable organomegaly  Skin: no apparent rashes  Ext: trace bilateral edema with compression stockings in place  Neuro: alert, nonfocal    Labs:  reviewed    Imaging studies:  CXR (July 2017):  - images directly reviewed, formal interpretation follows:  FINDINGS: Left subcoracoid calcification could represent an intra-articular loose body, or calcified lymph node. Cervical fusion hardware. Multilevel thoracic disc degeneration.     Heart size appears " normal. Lungs appear clear.    TTE (August 2017 at [facility]):  - EF 60-65%  - normal RV size/function  - grade 1 diastolic dysfunction  - no valvular abnormalities  - technically difficult study    Pharmacologic MPI (July 2017):    There is no prior study available.    The left ventricular ejection fraction is 74%.    The pharmacologic nuclear stress test is negative for inducible myocardial ischemia or infarction.    PFT's (June 2017):  FEV1/FVC is 88 and is normal.  FEV1 is 53% predicted and is reduced.  FVC is 48% predicted and reduced.  There was no improvement in spirometry after a single inhaled dose of bronchodilator.  TLC is 72% predicted and is reduced.  RV is 79% predicted and is normal.  DLCO is 39% predicted and is reduced when it   is corrected for hemoglobin.    Nitin Giang MD  Pulmonary and Critical Care Medicine  [facility]  Cell [phone]  Office [phone]  Pager [phone]

## 2021-07-03 NOTE — ADDENDUM NOTE
Addendum Note by Tirso Pollock MD at 8/22/2017  3:02 PM     Author: Tirso Pollock MD Service: -- Author Type: Physician    Filed: 8/22/2017  3:02 PM Date of Service: 8/22/2017  3:02 PM Status: Signed    : Tirso Pollock MD (Physician)       Addendum  created 08/22/17 1502 by Tirso Pollock MD    Anesthesia Intra Meds edited

## 2021-07-21 ENCOUNTER — RECORDS - HEALTHEAST (OUTPATIENT)
Dept: ADMINISTRATIVE | Facility: CLINIC | Age: 71
End: 2021-07-21

## 2021-09-20 ENCOUNTER — LAB REQUISITION (OUTPATIENT)
Dept: LAB | Facility: CLINIC | Age: 71
End: 2021-09-20
Payer: MEDICARE

## 2021-09-20 DIAGNOSIS — E78.5 HYPERLIPIDEMIA, UNSPECIFIED: ICD-10-CM

## 2021-09-20 DIAGNOSIS — G89.4 CHRONIC PAIN SYNDROME: ICD-10-CM

## 2021-09-20 LAB
ALBUMIN SERPL-MCNC: 4 G/DL (ref 3.5–5)
ALP SERPL-CCNC: 65 U/L (ref 45–120)
ALT SERPL W P-5'-P-CCNC: 21 U/L (ref 0–45)
ANION GAP SERPL CALCULATED.3IONS-SCNC: 13 MMOL/L (ref 5–18)
AST SERPL W P-5'-P-CCNC: 20 U/L (ref 0–40)
BILIRUB SERPL-MCNC: 0.3 MG/DL (ref 0–1)
BUN SERPL-MCNC: 64 MG/DL (ref 8–22)
CALCIUM SERPL-MCNC: 10.5 MG/DL (ref 8.5–10.5)
CHLORIDE BLD-SCNC: 98 MMOL/L (ref 98–107)
CO2 SERPL-SCNC: 31 MMOL/L (ref 22–31)
CREAT SERPL-MCNC: 1.93 MG/DL (ref 0.6–1.1)
GFR SERPL CREATININE-BSD FRML MDRD: 26 ML/MIN/1.73M2
GLUCOSE BLD-MCNC: 168 MG/DL (ref 70–125)
POTASSIUM BLD-SCNC: 5 MMOL/L (ref 3.5–5)
PROT SERPL-MCNC: 7 G/DL (ref 6–8)
SODIUM SERPL-SCNC: 142 MMOL/L (ref 136–145)

## 2021-09-20 PROCEDURE — 80053 COMPREHEN METABOLIC PANEL: CPT | Mod: ORL | Performed by: PHYSICIAN ASSISTANT

## 2021-09-20 PROCEDURE — 80171 DRUG SCREEN QUANT GABAPENTIN: CPT | Mod: ORL | Performed by: PHYSICIAN ASSISTANT

## 2021-09-22 LAB — GABAPENTIN SERPLBLD-MCNC: 12.5 UG/ML

## 2021-10-20 ENCOUNTER — LAB REQUISITION (OUTPATIENT)
Dept: LAB | Facility: CLINIC | Age: 71
End: 2021-10-20
Payer: MEDICARE

## 2021-10-20 DIAGNOSIS — N18.30 CHRONIC KIDNEY DISEASE, STAGE 3 UNSPECIFIED (H): ICD-10-CM

## 2021-10-20 LAB
ANION GAP SERPL CALCULATED.3IONS-SCNC: 11 MMOL/L (ref 5–18)
BUN SERPL-MCNC: 55 MG/DL (ref 8–22)
CALCIUM SERPL-MCNC: 10.5 MG/DL (ref 8.5–10.5)
CHLORIDE BLD-SCNC: 102 MMOL/L (ref 98–107)
CO2 SERPL-SCNC: 30 MMOL/L (ref 22–31)
CREAT SERPL-MCNC: 1.44 MG/DL (ref 0.6–1.1)
GFR SERPL CREATININE-BSD FRML MDRD: 37 ML/MIN/1.73M2
GLUCOSE BLD-MCNC: 87 MG/DL (ref 70–125)
POTASSIUM BLD-SCNC: 4.7 MMOL/L (ref 3.5–5)
SODIUM SERPL-SCNC: 143 MMOL/L (ref 136–145)

## 2021-10-20 PROCEDURE — 80048 BASIC METABOLIC PNL TOTAL CA: CPT | Mod: ORL | Performed by: PHYSICIAN ASSISTANT

## 2021-11-30 ENCOUNTER — LAB REQUISITION (OUTPATIENT)
Dept: LAB | Facility: CLINIC | Age: 71
End: 2021-11-30
Payer: MEDICARE

## 2021-11-30 DIAGNOSIS — N18.30 CHRONIC KIDNEY DISEASE, STAGE 3 UNSPECIFIED (H): ICD-10-CM

## 2021-11-30 LAB
ANION GAP SERPL CALCULATED.3IONS-SCNC: 11 MMOL/L (ref 5–18)
BUN SERPL-MCNC: 65 MG/DL (ref 8–22)
CALCIUM SERPL-MCNC: 10.3 MG/DL (ref 8.5–10.5)
CHLORIDE BLD-SCNC: 102 MMOL/L (ref 98–107)
CO2 SERPL-SCNC: 30 MMOL/L (ref 22–31)
CREAT SERPL-MCNC: 1.57 MG/DL (ref 0.6–1.1)
GFR SERPL CREATININE-BSD FRML MDRD: 33 ML/MIN/1.73M2
GLUCOSE BLD-MCNC: 127 MG/DL (ref 70–125)
POTASSIUM BLD-SCNC: 4.9 MMOL/L (ref 3.5–5)
SODIUM SERPL-SCNC: 143 MMOL/L (ref 136–145)

## 2021-11-30 PROCEDURE — 80048 BASIC METABOLIC PNL TOTAL CA: CPT | Mod: ORL | Performed by: PHYSICIAN ASSISTANT

## 2021-12-15 ENCOUNTER — LAB REQUISITION (OUTPATIENT)
Dept: LAB | Facility: CLINIC | Age: 71
End: 2021-12-15
Payer: MEDICARE

## 2021-12-15 DIAGNOSIS — I13.0 HYPERTENSIVE HEART AND CHRONIC KIDNEY DISEASE WITH HEART FAILURE AND STAGE 1 THROUGH STAGE 4 CHRONIC KIDNEY DISEASE, OR UNSPECIFIED CHRONIC KIDNEY DISEASE (H): ICD-10-CM

## 2021-12-15 PROCEDURE — 80048 BASIC METABOLIC PNL TOTAL CA: CPT | Mod: ORL | Performed by: PHYSICIAN ASSISTANT

## 2021-12-16 LAB
ANION GAP SERPL CALCULATED.3IONS-SCNC: 9 MMOL/L (ref 5–18)
BUN SERPL-MCNC: 62 MG/DL (ref 8–22)
CALCIUM SERPL-MCNC: 10.1 MG/DL (ref 8.5–10.5)
CHLORIDE BLD-SCNC: 100 MMOL/L (ref 98–107)
CO2 SERPL-SCNC: 33 MMOL/L (ref 22–31)
CREAT SERPL-MCNC: 1.49 MG/DL (ref 0.6–1.1)
GFR SERPL CREATININE-BSD FRML MDRD: 36 ML/MIN/1.73M2
GLUCOSE BLD-MCNC: 114 MG/DL (ref 70–125)
POTASSIUM BLD-SCNC: 4.9 MMOL/L (ref 3.5–5)
SODIUM SERPL-SCNC: 142 MMOL/L (ref 136–145)

## 2022-04-22 ENCOUNTER — LAB REQUISITION (OUTPATIENT)
Dept: LAB | Facility: CLINIC | Age: 72
End: 2022-04-22
Payer: MEDICARE

## 2022-04-22 DIAGNOSIS — N39.46 MIXED INCONTINENCE: ICD-10-CM

## 2022-04-22 PROCEDURE — 87186 SC STD MICRODIL/AGAR DIL: CPT | Mod: ORL | Performed by: PHYSICIAN ASSISTANT

## 2022-04-25 LAB
BACTERIA UR CULT: ABNORMAL
BACTERIA UR CULT: ABNORMAL

## 2022-07-21 ENCOUNTER — TRANSFERRED RECORDS (OUTPATIENT)
Dept: PULMONOLOGY | Facility: OTHER | Age: 72
End: 2022-07-21

## 2022-07-22 ENCOUNTER — MEDICAL CORRESPONDENCE (OUTPATIENT)
Dept: PULMONOLOGY | Facility: OTHER | Age: 72
End: 2022-07-22

## 2022-08-09 ENCOUNTER — TRANSFERRED RECORDS (OUTPATIENT)
Dept: HEALTH INFORMATION MANAGEMENT | Facility: CLINIC | Age: 72
End: 2022-08-09

## 2022-08-09 ENCOUNTER — LAB REQUISITION (OUTPATIENT)
Dept: LAB | Facility: CLINIC | Age: 72
End: 2022-08-09
Payer: MEDICARE

## 2022-08-09 DIAGNOSIS — Z03.818 ENCOUNTER FOR OBSERVATION FOR SUSPECTED EXPOSURE TO OTHER BIOLOGICAL AGENTS RULED OUT: ICD-10-CM

## 2022-08-09 PROCEDURE — 87081 CULTURE SCREEN ONLY: CPT | Mod: ORL | Performed by: FAMILY MEDICINE

## 2022-08-12 LAB — BACTERIA SPEC CULT: NORMAL

## 2022-10-11 DIAGNOSIS — J44.9 COPD (CHRONIC OBSTRUCTIVE PULMONARY DISEASE) (H): Primary | ICD-10-CM

## 2022-10-30 ENCOUNTER — HOSPITAL ENCOUNTER (OUTPATIENT)
Facility: HOSPITAL | Age: 72
Setting detail: OBSERVATION
Discharge: HOME | End: 2022-11-03
Attending: EMERGENCY MEDICINE
Payer: MEDICARE

## 2022-10-30 DIAGNOSIS — R41.82 ALTERED MENTAL STATUS, UNSPECIFIED ALTERED MENTAL STATUS TYPE: ICD-10-CM

## 2022-10-30 PROCEDURE — 83880 ASSAY OF NATRIURETIC PEPTIDE: CPT | Performed by: EMERGENCY MEDICINE

## 2022-10-30 PROCEDURE — 83605 ASSAY OF LACTIC ACID: CPT | Performed by: EMERGENCY MEDICINE

## 2022-10-30 PROCEDURE — 82077 ASSAY SPEC XCP UR&BREATH IA: CPT | Performed by: EMERGENCY MEDICINE

## 2022-10-30 PROCEDURE — 83690 ASSAY OF LIPASE: CPT | Performed by: EMERGENCY MEDICINE

## 2022-10-30 PROCEDURE — 96361 HYDRATE IV INFUSION ADD-ON: CPT

## 2022-10-30 PROCEDURE — C9803 HOPD COVID-19 SPEC COLLECT: HCPCS

## 2022-10-30 PROCEDURE — 84484 ASSAY OF TROPONIN QUANT: CPT | Performed by: EMERGENCY MEDICINE

## 2022-10-30 PROCEDURE — 96360 HYDRATION IV INFUSION INIT: CPT

## 2022-10-30 PROCEDURE — 80053 COMPREHEN METABOLIC PANEL: CPT | Performed by: EMERGENCY MEDICINE

## 2022-10-30 PROCEDURE — 99285 EMERGENCY DEPT VISIT HI MDM: CPT | Mod: 25

## 2022-10-30 PROCEDURE — 36415 COLL VENOUS BLD VENIPUNCTURE: CPT | Performed by: EMERGENCY MEDICINE

## 2022-10-30 PROCEDURE — 82550 ASSAY OF CK (CPK): CPT | Performed by: FAMILY MEDICINE

## 2022-10-30 PROCEDURE — 85025 COMPLETE CBC W/AUTO DIFF WBC: CPT | Performed by: EMERGENCY MEDICINE

## 2022-10-30 ASSESSMENT — ACTIVITIES OF DAILY LIVING (ADL): ADLS_ACUITY_SCORE: 35

## 2022-10-31 ENCOUNTER — APPOINTMENT (OUTPATIENT)
Dept: CT IMAGING | Facility: HOSPITAL | Age: 72
End: 2022-10-31
Attending: EMERGENCY MEDICINE
Payer: MEDICARE

## 2022-10-31 ENCOUNTER — APPOINTMENT (OUTPATIENT)
Dept: OCCUPATIONAL THERAPY | Facility: HOSPITAL | Age: 72
End: 2022-10-31
Attending: FAMILY MEDICINE
Payer: MEDICARE

## 2022-10-31 ENCOUNTER — APPOINTMENT (OUTPATIENT)
Dept: CARDIOLOGY | Facility: HOSPITAL | Age: 72
End: 2022-10-31
Attending: HOSPITALIST
Payer: MEDICARE

## 2022-10-31 ENCOUNTER — APPOINTMENT (OUTPATIENT)
Dept: SPEECH THERAPY | Facility: HOSPITAL | Age: 72
End: 2022-10-31
Attending: FAMILY MEDICINE
Payer: MEDICARE

## 2022-10-31 ENCOUNTER — APPOINTMENT (OUTPATIENT)
Dept: PHYSICAL THERAPY | Facility: HOSPITAL | Age: 72
End: 2022-10-31
Attending: FAMILY MEDICINE
Payer: MEDICARE

## 2022-10-31 PROBLEM — R53.1 ACUTE WEAKNESS: Status: ACTIVE | Noted: 2022-10-31

## 2022-10-31 PROBLEM — R41.82 ALTERED MENTAL STATUS, UNSPECIFIED ALTERED MENTAL STATUS TYPE: Status: ACTIVE | Noted: 2022-10-31

## 2022-10-31 PROBLEM — R62.7 ADULT FAILURE TO THRIVE: Status: ACTIVE | Noted: 2022-10-31

## 2022-10-31 PROBLEM — R79.89 ELEVATED TROPONIN: Status: ACTIVE | Noted: 2022-10-31

## 2022-10-31 PROBLEM — I50.32 CHRONIC HEART FAILURE WITH PRESERVED EJECTION FRACTION (HFPEF) (H): Status: ACTIVE | Noted: 2022-10-31

## 2022-10-31 LAB
ALBUMIN SERPL BCG-MCNC: 3.8 G/DL (ref 3.5–5.2)
ALBUMIN UR-MCNC: 300 MG/DL
ALP SERPL-CCNC: 82 U/L (ref 35–104)
ALT SERPL W P-5'-P-CCNC: 30 U/L (ref 10–35)
AMPHETAMINES UR QL SCN: ABNORMAL
ANION GAP SERPL CALCULATED.3IONS-SCNC: 12 MMOL/L (ref 7–15)
ANION GAP SERPL CALCULATED.3IONS-SCNC: 15 MMOL/L (ref 7–15)
APPEARANCE UR: CLEAR
AST SERPL W P-5'-P-CCNC: 44 U/L (ref 10–35)
BARBITURATES UR QL SCN: ABNORMAL
BASE EXCESS BLDV CALC-SCNC: 3 MMOL/L
BASOPHILS # BLD AUTO: 0 10E3/UL (ref 0–0.2)
BASOPHILS NFR BLD AUTO: 0 %
BENZODIAZ UR QL SCN: ABNORMAL
BILIRUB SERPL-MCNC: 0.4 MG/DL
BILIRUB UR QL STRIP: NEGATIVE
BUN SERPL-MCNC: 27.9 MG/DL (ref 8–23)
BUN SERPL-MCNC: 30.6 MG/DL (ref 8–23)
BZE UR QL SCN: ABNORMAL
CALCIUM SERPL-MCNC: 9.3 MG/DL (ref 8.8–10.2)
CALCIUM SERPL-MCNC: 9.7 MG/DL (ref 8.8–10.2)
CANNABINOIDS UR QL SCN: ABNORMAL
CHLORIDE SERPL-SCNC: 96 MMOL/L (ref 98–107)
CHLORIDE SERPL-SCNC: 99 MMOL/L (ref 98–107)
CK SERPL-CCNC: 289 U/L (ref 26–192)
CK SERPL-CCNC: 392 U/L (ref 26–192)
COLOR UR AUTO: ABNORMAL
CREAT SERPL-MCNC: 1.24 MG/DL (ref 0.51–0.95)
CREAT SERPL-MCNC: 1.26 MG/DL (ref 0.51–0.95)
CRP SERPL-MCNC: 26.4 MG/L
DEPRECATED HCO3 PLAS-SCNC: 27 MMOL/L (ref 22–29)
DEPRECATED HCO3 PLAS-SCNC: 27 MMOL/L (ref 22–29)
EOSINOPHIL # BLD AUTO: 0.2 10E3/UL (ref 0–0.7)
EOSINOPHIL NFR BLD AUTO: 2 %
ERYTHROCYTE [DISTWIDTH] IN BLOOD BY AUTOMATED COUNT: 11.8 % (ref 10–15)
ERYTHROCYTE [SEDIMENTATION RATE] IN BLOOD BY WESTERGREN METHOD: 50 MM/HR (ref 0–20)
ETHANOL SERPL-MCNC: <0.01 G/DL
FLUAV RNA SPEC QL NAA+PROBE: NEGATIVE
FLUBV RNA RESP QL NAA+PROBE: NEGATIVE
GFR SERPL CREATININE-BSD FRML MDRD: 46 ML/MIN/1.73M2
GFR SERPL CREATININE-BSD FRML MDRD: 47 ML/MIN/1.73M2
GLUCOSE SERPL-MCNC: 110 MG/DL (ref 70–99)
GLUCOSE SERPL-MCNC: 126 MG/DL (ref 70–99)
GLUCOSE UR STRIP-MCNC: NEGATIVE MG/DL
HCO3 BLDV-SCNC: 29 MMOL/L (ref 24–30)
HCT VFR BLD AUTO: 39.3 % (ref 35–47)
HGB BLD-MCNC: 12.9 G/DL (ref 11.7–15.7)
HGB UR QL STRIP: ABNORMAL
HOLD SPECIMEN: NORMAL
HYALINE CASTS: 4 /LPF
IMM GRANULOCYTES # BLD: 0.1 10E3/UL
IMM GRANULOCYTES NFR BLD: 1 %
KETONES UR STRIP-MCNC: NEGATIVE MG/DL
LACTATE SERPL-SCNC: 0.9 MMOL/L (ref 0.7–2)
LEUKOCYTE ESTERASE UR QL STRIP: NEGATIVE
LIPASE SERPL-CCNC: 16 U/L (ref 13–60)
LVEF ECHO: NORMAL
LYMPHOCYTES # BLD AUTO: 1.1 10E3/UL (ref 0.8–5.3)
LYMPHOCYTES NFR BLD AUTO: 12 %
MAGNESIUM SERPL-MCNC: 2 MG/DL (ref 1.7–2.3)
MCH RBC QN AUTO: 32 PG (ref 26.5–33)
MCHC RBC AUTO-ENTMCNC: 32.8 G/DL (ref 31.5–36.5)
MCV RBC AUTO: 98 FL (ref 78–100)
MONOCYTES # BLD AUTO: 0.9 10E3/UL (ref 0–1.3)
MONOCYTES NFR BLD AUTO: 9 %
MUCOUS THREADS #/AREA URNS LPF: PRESENT /LPF
NEUTROPHILS # BLD AUTO: 7 10E3/UL (ref 1.6–8.3)
NEUTROPHILS NFR BLD AUTO: 76 %
NITRATE UR QL: NEGATIVE
NRBC # BLD AUTO: 0 10E3/UL
NRBC BLD AUTO-RTO: 0 /100
NT-PROBNP SERPL-MCNC: 2039 PG/ML (ref 0–900)
OPIATES UR QL SCN: ABNORMAL
OXYHGB MFR BLDV: 92.3 % (ref 70–75)
PCO2 BLDV: 54 MM HG (ref 35–50)
PCP QUAL URINE (ROCHE): ABNORMAL
PH BLDV: 7.34 [PH] (ref 7.35–7.45)
PH UR STRIP: 6 [PH] (ref 5–7)
PLATELET # BLD AUTO: 366 10E3/UL (ref 150–450)
PO2 BLDV: 74 MM HG (ref 25–47)
POTASSIUM SERPL-SCNC: 4.4 MMOL/L (ref 3.4–5.3)
POTASSIUM SERPL-SCNC: 4.4 MMOL/L (ref 3.4–5.3)
PROT SERPL-MCNC: 7.1 G/DL (ref 6.4–8.3)
RBC # BLD AUTO: 4.03 10E6/UL (ref 3.8–5.2)
RBC URINE: 1 /HPF
RSV RNA SPEC NAA+PROBE: NEGATIVE
SAO2 % BLDV: 93.6 % (ref 70–75)
SARS-COV-2 RNA RESP QL NAA+PROBE: NEGATIVE
SODIUM SERPL-SCNC: 138 MMOL/L (ref 136–145)
SODIUM SERPL-SCNC: 138 MMOL/L (ref 136–145)
SP GR UR STRIP: 1.01 (ref 1–1.03)
SQUAMOUS EPITHELIAL: <1 /HPF
TROPONIN T SERPL HS-MCNC: 38 NG/L
TROPONIN T SERPL HS-MCNC: 39 NG/L
TROPONIN T SERPL HS-MCNC: 41 NG/L
TSH SERPL DL<=0.005 MIU/L-ACNC: 4.57 UIU/ML (ref 0.3–4.2)
UROBILINOGEN UR STRIP-MCNC: <2 MG/DL
WBC # BLD AUTO: 9.1 10E3/UL (ref 4–11)
WBC URINE: 1 /HPF

## 2022-10-31 PROCEDURE — 93005 ELECTROCARDIOGRAM TRACING: CPT | Performed by: EMERGENCY MEDICINE

## 2022-10-31 PROCEDURE — 92610 EVALUATE SWALLOWING FUNCTION: CPT | Mod: GN

## 2022-10-31 PROCEDURE — G0378 HOSPITAL OBSERVATION PER HR: HCPCS

## 2022-10-31 PROCEDURE — 71250 CT THORAX DX C-: CPT | Mod: MG

## 2022-10-31 PROCEDURE — 97162 PT EVAL MOD COMPLEX 30 MIN: CPT | Mod: GP | Performed by: PHYSICAL THERAPIST

## 2022-10-31 PROCEDURE — 82550 ASSAY OF CK (CPK): CPT | Performed by: HOSPITALIST

## 2022-10-31 PROCEDURE — 36415 COLL VENOUS BLD VENIPUNCTURE: CPT | Performed by: FAMILY MEDICINE

## 2022-10-31 PROCEDURE — 85652 RBC SED RATE AUTOMATED: CPT | Performed by: FAMILY MEDICINE

## 2022-10-31 PROCEDURE — 82310 ASSAY OF CALCIUM: CPT | Performed by: HOSPITALIST

## 2022-10-31 PROCEDURE — 81001 URINALYSIS AUTO W/SCOPE: CPT | Performed by: EMERGENCY MEDICINE

## 2022-10-31 PROCEDURE — 80307 DRUG TEST PRSMV CHEM ANLYZR: CPT | Performed by: FAMILY MEDICINE

## 2022-10-31 PROCEDURE — 36415 COLL VENOUS BLD VENIPUNCTURE: CPT | Mod: 59 | Performed by: HOSPITALIST

## 2022-10-31 PROCEDURE — 84484 ASSAY OF TROPONIN QUANT: CPT | Mod: 91 | Performed by: HOSPITALIST

## 2022-10-31 PROCEDURE — 999N000285 HC STATISTIC VASC ACCESS LAB DRAW WITH PIV START

## 2022-10-31 PROCEDURE — 82805 BLOOD GASES W/O2 SATURATION: CPT | Performed by: EMERGENCY MEDICINE

## 2022-10-31 PROCEDURE — 84484 ASSAY OF TROPONIN QUANT: CPT | Performed by: FAMILY MEDICINE

## 2022-10-31 PROCEDURE — 97535 SELF CARE MNGMENT TRAINING: CPT | Mod: GO

## 2022-10-31 PROCEDURE — 255N000002 HC RX 255 OP 636: Performed by: INTERNAL MEDICINE

## 2022-10-31 PROCEDURE — G1010 CDSM STANSON: HCPCS

## 2022-10-31 PROCEDURE — 250N000009 HC RX 250: Performed by: HOSPITALIST

## 2022-10-31 PROCEDURE — 83735 ASSAY OF MAGNESIUM: CPT | Performed by: HOSPITALIST

## 2022-10-31 PROCEDURE — 99204 OFFICE O/P NEW MOD 45 MIN: CPT | Performed by: INTERNAL MEDICINE

## 2022-10-31 PROCEDURE — 250N000013 HC RX MED GY IP 250 OP 250 PS 637: Performed by: FAMILY MEDICINE

## 2022-10-31 PROCEDURE — 99205 OFFICE O/P NEW HI 60 MIN: CPT | Performed by: PSYCHIATRY & NEUROLOGY

## 2022-10-31 PROCEDURE — 258N000003 HC RX IP 258 OP 636: Performed by: EMERGENCY MEDICINE

## 2022-10-31 PROCEDURE — 84443 ASSAY THYROID STIM HORMONE: CPT | Performed by: FAMILY MEDICINE

## 2022-10-31 PROCEDURE — 99223 1ST HOSP IP/OBS HIGH 75: CPT | Mod: AI | Performed by: FAMILY MEDICINE

## 2022-10-31 PROCEDURE — 87637 SARSCOV2&INF A&B&RSV AMP PRB: CPT | Performed by: EMERGENCY MEDICINE

## 2022-10-31 PROCEDURE — 97167 OT EVAL HIGH COMPLEX 60 MIN: CPT | Mod: GO

## 2022-10-31 PROCEDURE — 99226 PR SUBSEQUENT OBSERVATION CARE,LEVEL III: CPT | Performed by: HOSPITALIST

## 2022-10-31 PROCEDURE — 999N000157 HC STATISTIC RCP TIME EA 10 MIN

## 2022-10-31 PROCEDURE — 93306 TTE W/DOPPLER COMPLETE: CPT | Mod: 26 | Performed by: INTERNAL MEDICINE

## 2022-10-31 PROCEDURE — 250N000013 HC RX MED GY IP 250 OP 250 PS 637: Performed by: HOSPITALIST

## 2022-10-31 PROCEDURE — 94150 VITAL CAPACITY TEST: CPT

## 2022-10-31 PROCEDURE — 999N000127 HC STATISTIC PERIPHERAL IV START W US GUIDANCE

## 2022-10-31 PROCEDURE — 83519 RIA NONANTIBODY: CPT | Performed by: FAMILY MEDICINE

## 2022-10-31 PROCEDURE — 86140 C-REACTIVE PROTEIN: CPT | Performed by: FAMILY MEDICINE

## 2022-10-31 RX ORDER — AMOXICILLIN 250 MG
2 CAPSULE ORAL 2 TIMES DAILY PRN
Status: DISCONTINUED | OUTPATIENT
Start: 2022-10-31 | End: 2022-11-03 | Stop reason: HOSPADM

## 2022-10-31 RX ORDER — ALBUTEROL SULFATE 90 UG/1
1 AEROSOL, METERED RESPIRATORY (INHALATION) EVERY 4 HOURS PRN
Status: DISCONTINUED | OUTPATIENT
Start: 2022-10-31 | End: 2022-11-03 | Stop reason: HOSPADM

## 2022-10-31 RX ORDER — ACETAMINOPHEN 650 MG/1
650 SUPPOSITORY RECTAL EVERY 6 HOURS PRN
Status: DISCONTINUED | OUTPATIENT
Start: 2022-10-31 | End: 2022-11-03 | Stop reason: HOSPADM

## 2022-10-31 RX ORDER — METOPROLOL SUCCINATE 50 MG/1
50 TABLET, EXTENDED RELEASE ORAL DAILY
COMMUNITY

## 2022-10-31 RX ORDER — PANTOPRAZOLE SODIUM 40 MG/1
40 TABLET, DELAYED RELEASE ORAL
Status: DISCONTINUED | OUTPATIENT
Start: 2022-11-01 | End: 2022-11-03 | Stop reason: HOSPADM

## 2022-10-31 RX ORDER — FUROSEMIDE 20 MG
40 TABLET ORAL EVERY MORNING
Status: DISCONTINUED | OUTPATIENT
Start: 2022-11-01 | End: 2022-11-03 | Stop reason: HOSPADM

## 2022-10-31 RX ORDER — POLYETHYLENE GLYCOL 3350 17 G/17G
17 POWDER, FOR SOLUTION ORAL DAILY
Status: DISCONTINUED | OUTPATIENT
Start: 2022-10-31 | End: 2022-11-03 | Stop reason: HOSPADM

## 2022-10-31 RX ORDER — BISACODYL 10 MG
10 SUPPOSITORY, RECTAL RECTAL DAILY PRN
Status: DISCONTINUED | OUTPATIENT
Start: 2022-10-31 | End: 2022-11-03 | Stop reason: HOSPADM

## 2022-10-31 RX ORDER — LYSINE HCL 500 MG
2 TABLET ORAL DAILY
COMMUNITY

## 2022-10-31 RX ORDER — FUROSEMIDE 20 MG
3 TABLET ORAL EVERY MORNING
Status: ON HOLD | COMMUNITY
End: 2024-04-23

## 2022-10-31 RX ORDER — AZELASTINE HYDROCHLORIDE 0.5 MG/ML
1 SOLUTION/ DROPS OPHTHALMIC 2 TIMES DAILY
Status: DISCONTINUED | OUTPATIENT
Start: 2022-10-31 | End: 2022-11-03 | Stop reason: HOSPADM

## 2022-10-31 RX ORDER — PROCHLORPERAZINE 25 MG
12.5 SUPPOSITORY, RECTAL RECTAL EVERY 12 HOURS PRN
Status: DISCONTINUED | OUTPATIENT
Start: 2022-10-31 | End: 2022-11-03 | Stop reason: HOSPADM

## 2022-10-31 RX ORDER — CETIRIZINE HYDROCHLORIDE 10 MG/1
10 TABLET ORAL DAILY
Status: DISCONTINUED | OUTPATIENT
Start: 2022-10-31 | End: 2022-11-03 | Stop reason: HOSPADM

## 2022-10-31 RX ORDER — MONTELUKAST SODIUM 10 MG/1
10 TABLET ORAL AT BEDTIME
Status: DISCONTINUED | OUTPATIENT
Start: 2022-10-31 | End: 2022-11-03 | Stop reason: HOSPADM

## 2022-10-31 RX ORDER — METOPROLOL SUCCINATE 50 MG/1
50 TABLET, EXTENDED RELEASE ORAL AT BEDTIME
Status: DISCONTINUED | OUTPATIENT
Start: 2022-10-31 | End: 2022-11-03 | Stop reason: HOSPADM

## 2022-10-31 RX ORDER — PROCHLORPERAZINE MALEATE 5 MG
5 TABLET ORAL EVERY 6 HOURS PRN
Status: DISCONTINUED | OUTPATIENT
Start: 2022-10-31 | End: 2022-11-03 | Stop reason: HOSPADM

## 2022-10-31 RX ORDER — ACETAMINOPHEN 325 MG/1
650 TABLET ORAL EVERY 6 HOURS PRN
Status: DISCONTINUED | OUTPATIENT
Start: 2022-10-31 | End: 2022-11-03 | Stop reason: HOSPADM

## 2022-10-31 RX ORDER — AMOXICILLIN 250 MG
1 CAPSULE ORAL 2 TIMES DAILY PRN
Status: DISCONTINUED | OUTPATIENT
Start: 2022-10-31 | End: 2022-11-03 | Stop reason: HOSPADM

## 2022-10-31 RX ORDER — TOLTERODINE 2 MG/1
4 CAPSULE, EXTENDED RELEASE ORAL DAILY
Status: DISCONTINUED | OUTPATIENT
Start: 2022-10-31 | End: 2022-11-03 | Stop reason: HOSPADM

## 2022-10-31 RX ORDER — IOPAMIDOL 755 MG/ML
75 INJECTION, SOLUTION INTRAVASCULAR ONCE
Status: DISCONTINUED | OUTPATIENT
Start: 2022-10-31 | End: 2022-11-03 | Stop reason: HOSPADM

## 2022-10-31 RX ORDER — FUROSEMIDE 20 MG
20 TABLET ORAL
Status: DISCONTINUED | OUTPATIENT
Start: 2022-10-31 | End: 2022-10-31

## 2022-10-31 RX ORDER — FUROSEMIDE 20 MG
20 TABLET ORAL DAILY
Status: DISCONTINUED | OUTPATIENT
Start: 2022-10-31 | End: 2022-11-03 | Stop reason: HOSPADM

## 2022-10-31 RX ORDER — LANOLIN ALCOHOL/MO/W.PET/CERES
3 CREAM (GRAM) TOPICAL
Status: DISCONTINUED | OUTPATIENT
Start: 2022-10-31 | End: 2022-11-03 | Stop reason: HOSPADM

## 2022-10-31 RX ORDER — IBUPROFEN 200 MG
1 CAPSULE ORAL DAILY
COMMUNITY

## 2022-10-31 RX ORDER — FLUCONAZOLE 100 MG/1
100 TABLET ORAL 2 TIMES DAILY
Status: ON HOLD | COMMUNITY
End: 2022-11-03

## 2022-10-31 RX ORDER — SOLIFENACIN SUCCINATE 10 MG/1
5-10 TABLET, FILM COATED ORAL AT BEDTIME
COMMUNITY

## 2022-10-31 RX ORDER — HYDROCODONE BITARTRATE AND ACETAMINOPHEN 5; 325 MG/1; MG/1
1 TABLET ORAL EVERY 4 HOURS PRN
Status: DISCONTINUED | OUTPATIENT
Start: 2022-10-31 | End: 2022-11-03 | Stop reason: HOSPADM

## 2022-10-31 RX ORDER — IPRATROPIUM BROMIDE AND ALBUTEROL SULFATE 2.5; .5 MG/3ML; MG/3ML
3 SOLUTION RESPIRATORY (INHALATION) EVERY 4 HOURS PRN
Status: DISCONTINUED | OUTPATIENT
Start: 2022-10-31 | End: 2022-11-03 | Stop reason: HOSPADM

## 2022-10-31 RX ORDER — BENZTROPINE MESYLATE 0.5 MG/1
1 TABLET ORAL 3 TIMES DAILY PRN
Status: DISCONTINUED | OUTPATIENT
Start: 2022-10-31 | End: 2022-11-03 | Stop reason: HOSPADM

## 2022-10-31 RX ORDER — BENZONATATE 100 MG/1
100 CAPSULE ORAL 3 TIMES DAILY PRN
Status: DISCONTINUED | OUTPATIENT
Start: 2022-10-31 | End: 2022-11-03 | Stop reason: HOSPADM

## 2022-10-31 RX ORDER — DICLOXACILLIN SODIUM 250 MG
500 CAPSULE ORAL 4 TIMES DAILY
Status: ON HOLD | COMMUNITY
End: 2022-11-03

## 2022-10-31 RX ORDER — HYDRALAZINE HYDROCHLORIDE 20 MG/ML
10 INJECTION INTRAMUSCULAR; INTRAVENOUS EVERY 4 HOURS PRN
Status: DISCONTINUED | OUTPATIENT
Start: 2022-10-31 | End: 2022-11-03 | Stop reason: HOSPADM

## 2022-10-31 RX ORDER — DULOXETIN HYDROCHLORIDE 30 MG/1
30 CAPSULE, DELAYED RELEASE ORAL 2 TIMES DAILY
Status: DISCONTINUED | OUTPATIENT
Start: 2022-10-31 | End: 2022-11-03 | Stop reason: HOSPADM

## 2022-10-31 RX ORDER — TRIAMCINOLONE ACETONIDE 1 MG/G
OINTMENT TOPICAL 2 TIMES DAILY
Status: DISCONTINUED | OUTPATIENT
Start: 2022-10-31 | End: 2022-11-03 | Stop reason: HOSPADM

## 2022-10-31 RX ADMIN — SODIUM CHLORIDE 500 ML: 9 INJECTION, SOLUTION INTRAVENOUS at 01:20

## 2022-10-31 RX ADMIN — HYDROCODONE BITARTRATE AND ACETAMINOPHEN 1 TABLET: 5; 325 TABLET ORAL at 22:00

## 2022-10-31 RX ADMIN — SENNOSIDES AND DOCUSATE SODIUM 2 TABLET: 50; 8.6 TABLET ORAL at 22:00

## 2022-10-31 RX ADMIN — FUROSEMIDE 20 MG: 20 TABLET ORAL at 13:45

## 2022-10-31 RX ADMIN — HYDROCODONE BITARTRATE AND ACETAMINOPHEN 1 TABLET: 5; 325 TABLET ORAL at 13:47

## 2022-10-31 RX ADMIN — MONTELUKAST SODIUM 10 MG: 10 TABLET, COATED ORAL at 21:52

## 2022-10-31 RX ADMIN — AZELASTINE HYDROCHLORIDE 1 DROP: 0.5 SOLUTION/ DROPS INTRAOCULAR at 21:52

## 2022-10-31 RX ADMIN — CETIRIZINE HYDROCHLORIDE 10 MG: 10 TABLET ORAL at 13:47

## 2022-10-31 RX ADMIN — TOLTERODINE 4 MG: 2 CAPSULE, EXTENDED RELEASE ORAL at 13:47

## 2022-10-31 RX ADMIN — DULOXETINE HYDROCHLORIDE 30 MG: 30 CAPSULE, DELAYED RELEASE ORAL at 21:52

## 2022-10-31 RX ADMIN — METOPROLOL SUCCINATE 50 MG: 50 TABLET, EXTENDED RELEASE ORAL at 21:52

## 2022-10-31 RX ADMIN — PERFLUTREN 4 ML: 6.52 INJECTION, SUSPENSION INTRAVENOUS at 10:05

## 2022-10-31 ASSESSMENT — ACTIVITIES OF DAILY LIVING (ADL)
ADLS_ACUITY_SCORE: 35
ADLS_ACUITY_SCORE: 41
ADLS_ACUITY_SCORE: 35
ADLS_ACUITY_SCORE: 41
ADLS_ACUITY_SCORE: 35
ADLS_ACUITY_SCORE: 35
ADLS_ACUITY_SCORE: 41
ADLS_ACUITY_SCORE: 35
ADLS_ACUITY_SCORE: 41
DEPENDENT_IADLS:: INDEPENDENT;TRANSPORTATION
ADLS_ACUITY_SCORE: 35

## 2022-10-31 NOTE — CONSULTS
"This is a neurological consultation    71-year-old admitted to hospital 10/30/2022      Chief complaint  Generalized weakness lethargy  Generalized weakness  Recent right leg cellulitis  Cognitive changes and hallucinations (metabolic encephalopathy)      HPI  71-year-old brought in to hospital on 10/30/2022 by her .   gave most of the history.  Patient has been progressively weak \"disabled and delusional\" worsening over the last 3 days.  For couple of days needed to use the walker but on day of admission was unable to walk with a walker.  Patient was found on the floor by family, patient stated that she had lowered her self to the floor but she is confused.    Patient is lethargic/confused    Had a little bit of faint erythema in the lower extremities patient may have some right leg cellulitis recently diagnosed possibly prescribed antibiotics within the last week or so.    Recently patient has been weak would get on the floor not from a fall but was unable to get back up and would crawl around    Patient been having some hallucinations and seeing people in the home      Patient has history of significant cervical spine disease recent problems with past fusion has seen orthopedic spine specialist who wants to do refusion of her neck.  Has chronic arthritic shoulders with frozen shoulder bilaterally chronic greater than 4 years  Arthritic gait with diffuse weakness chronic in nature at baseline        Significant chronic cervical spine disease        Chronic frozen shoulders        Chronic diffuse \"fibromyalgia pain syndrome\"        2017 anterior cervical fusion helped for about 3 months        Evaluated by neurosurgery June 2020 with cervical pseudoarthrosis cervical radiculopathy (considering possible C5-T1 posterior cervical fusion)        Chronic ulnar numbness bilaterally.  Past history of ulnar nerve decompression.        Previously treated with Biofreeze/Bengay/Vicodin/prednisone for her " pain.          Review of past sleep study April 2019  Diagnosis:  - G47.33 Obstructive sleep apnea  - G47.34 Sleep related hypoxia  - G47.36 Sleep related hypoventilation in conditions classified elsewhere     Assessment & Recommendation:        Although optimal pressures were not determined, I would recommend auto-titrating Bilevel with an EPAP min of 12, IPAP max of 25, and PS of 7 cmH2O.  Recommend special attention to mask fit and mouth leak, especially at higher pressures.    I suspect hypoxia will persist despite maximal Bilevel support.  o If patient already qualifies for continuous oxygen, recommend bleeding 1 LPM at machine end of tubing (recommend ResMed devices due to tubing with integrated oxygen hookup).  o If patient does not qualify for continuous oxygen already, then she would benefit from nocturnal oximetry to confirm resolution of hypoxia.  She may require repeat titration study to qualify for supplemental oxygen therapy.    Consider adjunct positional therapy with HOB elevation or lateral sleep position.    Patients with excessive daytime sleepiness are at increased risk for motor vehicle accidents.    Suggest optimizing sleep hygiene and avoiding any further sleep deprivation.    Consider weight reduction management as this may be a factor in NELLIE.    Recommend evaluation due to periodic leg movements.    Measures aimed at increasing sleep consolidation can be beneficial.  Hesham Nguyen MD  Diplomate of Sleep Medicine, [facility]        Past medical history  Hypertension  Hyperlipidemia  CHF  CKD  COPD  Obstructive sleep apnea/pickwickian syndrome  Fibromyalgia  Osteoarthritis  Pseudogout  Right cubital tunnel syndrome  Adult failure to thrive  History of blood clots in the 1970s possibly DVT from birth control  Plantar fasciitis      Habits  Non-smoker  Does not drink alcohol      Family history  Father COPD  Mother rheumatoid arthritis  Sister heart disease  Daughter pseudotumor    Work-up  CT scan  head 10/31/2022  1.  Mildly motion degraded exam.        No definite acute intracranial abnormality.  2.  Mild sequelae of chronic microvascular ischemic disease.  CT chest abdomen pelvis 10/31/2022 see official report  1.  Mild wall thickening of colon in the left lower quadrant with slight adjacent inflammation.        Findings could be due to diverticulitis or focal colitis.        Follow-up to confirm resolution.  2.  No bowel obstruction or abscess.    TSH 4.57  Troponin high-sensitivity 41-39 elevated  AST 44  ESR 50  CRP 26.4  -289  BNP 2039  COVID-19 negative, influenza A and influenza B negative, RSV negative  Alcohol level negative  Urine tox screen positive opioids/cannabinoids          Labs  Sodium 138 potassium 4.4  BUN 30.6, creatinine 1.26  Glucose 126  WBC 9.1, hemoglobin 12.9, platelets 366,000        Exam          10/31/2022  NIF    -20       FVC  1.29    General exam per primary MD  Alert orient x3 now  HEENT no signs of trauma  Lungs clear  Heart rate regular  Abdomen soft  No edema the feet        Neurologic exam  Alert orient x3  Prosody speech normal  Naming normal  Repetition normal  Comprehension normal  No neglect  Memory recall okay at bedside testing    Cranials 2 through 12  No ophthalmoplegia  No nystagmus  No visual field cut  Face symmetrical  Tongue twisters good    Upper extremities  Chronic decreased range of motion of the shoulders  Reported right over left pain with testing  Deltoid 4/4  Biceps 4/4+  Triceps 5/4+  Wrist extensors 5/5  Wrist flexors 5/5  Finger extensors 5/5  Intrinsic hand strength 4/4    Lower extremities reported right over left  Iliopsoas 4/4  Quadriceps 4/4  Anterior tibial 5/5    Reflexes  Biceps 2+  Triceps 1+  Knees absent  Ankles absent  Toes downgoing  No spasticity      Sensory  Chronic numbness hands bilaterally  Decreased vibratory sense chronically in the toes    Gait  Deferred            Assessment/plan    1.   "Hallucinations/lethargy/confusion (question metabolic encephalopathy)       Patient with obstructive sleep apnea/pickwickian syndrome/COPD       History of hypoventilation syndrome/restrictive chronic hypoxic respiratory failure       May not have used her BiPAP the last couple of days per the patient, ???         Home medication last       Zyrtec 10 mg daily       Cymbalta 30 mg twice daily       Hydrocodone 1 every 4 hours as needed maximum 6 tablets/day       Zanaflex 4 mg every 8 hour as needed              Has a care 10 mg 5-10 mg at nighttime         Provigil 250 mg daily       O2 3 L/min at night        2.   Significant chronic cervical spine disease        Chronic frozen shoulders        Chronic diffuse \"fibromyalgia pain syndrome\"        2017 anterior cervical fusion helped for about 3 months        Evaluated by neurosurgery June 2020 with cervical pseudoarthrosis cervical radiculopathy (considering possible C5-T1 posterior cervical fusion)        3.  CT chest abdomen pelvis 10/31/2022 see official report       Mild wall thickening of colon in the left lower quadrant with slight adjacent inflammation.        Findings could be due to diverticulitis or focal colitis.        Follow-up to confirm resolution.    4.  Questionable component of PMR        ESR 50        CRP 26.4        -289    5.   Chronic diastolic heart failure/elevated BNP         Cardiology checking echo        Diagnosis  Metabolic toxic encephalopathy  Chronic cervical neck disease chronic spinal difficulties exacerbated by above  PMR  Hypoventilation/obstructive sleep apnea severe with hypoxia  Cardiac disease          Myasthenia gravis panel pending  Echo pending  Check EEG    Diligent treatment of hypoventilation syndrome/obstructive sleep apnea patient states that she may be on BiPAP not CPAP and per chart may need oxygen with this.    May need pain consult and review due to difficulty with breathing disorder pain " medications    Question seen by neurosurgery back in June 2020 with consideration of further intervention for her neck if not improving in strength with PT consider MRI scan cervical spine    I am not sure that this is a neuromuscular junction problem MG antibodies pending    Extensive chart review  Discussed with patient face-to-face  Discussed face-to-face with primary MD    110 minutes total care time today discussing and evaluating the above greater than 50% face-to-face patient care team.

## 2022-10-31 NOTE — H&P
Admission History & Physical  Desirae Rey, 1950, [id number]    [facility]  [unfilled]  Noemy Brito, [phone]   Code Status:  Full Code     Extended Emergency Contact Information  Primary Emergency Contact: Hugo Rey  Address: [address] [place] [address]            [place] [address] [facility]  Home Phone: [phone]  Mobile Phone: [phone]  Relation: Spouse  Secondary Emergency Contact: Ricardo Rey  Mobile Phone: [phone]  Relation: Son     Assessment and Plan:   71-year-old female with NELLIE, obesity, HFpEF, chronic hypoxic respiratory failure on 3 L oxygen, HTN, CKD 3 presents with full body weakness, ?  Hallucinations really of unclear etiology.      Principal Problem:    Altered mental status, unspecified altered mental status type  Active Problems:    NELLIE (obstructive sleep apnea)    Benign essential hypertension    CKD (chronic kidney disease) stage 3, GFR 30-59 ml/min (H)    Acute weakness    Adult failure to thrive    Chronic heart failure with preserved ejection fraction (HFpEF) (H)    Elevated troponin    Present on Admission:    Altered mental status, unspecified altered mental status type    NELLIE (obstructive sleep apnea)      Acute metabolic encephalopathy, ?hallucinations  -Patient was sent to the emergency department when she was unable to walk on her own and patient's family endorsed she had been hallucinating and confused.  CT head 10/31/2022 mildly motion degraded but no definitive acute intracranial abnormalities and mixed chronic microvascular ischemic disease.  CT chest abdomen pelvis with some mild wall thickening of the colon but otherwise no acute abnormalities.  Patient otherwise afebrile, without leukocytosis and no signs of infection based on urinalysis and imaging, influenza and COVID-negative.  AST mildly elevated but hepatobiliary system normal on CT abdomen.  In the emergency department patient is able to answer all questions I asked her, although  she speaks very slowly and does readily fall asleep multiple times during exam.  She denies any hallucinations and no family are present in order to verify what type of hallucination she might of been having.  Patient currently oriented x3 and appears decisional and without confusion, just very slow and sleepy.  I asked ED to collect VBG, PCO2 is 54 which is only mildly elevated likely her baseline.  Patient recently on 2 different types of antibiotics, initially Keflex and then the second 1 she does not remember the name.  Some antibiotics can cause hallucinations.  -Check urine drug screen  -Do not let SPO2 become greater than 94%  -Delirium protocol, Seroquel is an excellent choice for hallucinations in the elderly  -Given ? hallucinations, acute onset weakness, will ask neurology to evaluate.    Acute onset weakness, adult failure to thrive  -Patient with very dramatic decrease in physical capabilities over the past 3 to 4 days.  She initially was independent now is barely able to move on her own and can no longer walk.  She is speaking very slowly but denies any difficulty with swallowing.  Somewhat concerning for neuromuscular junction issue myasthenia gravis, guillain barre, versus other neurological issue.  Patient endorses she feels like her acute onset weakness started when she began a new antibiotic 3-4 days ago, the name of which she cannot remember.  We do not have a current medication reconciliation for patient so unclear what other medication she could be on that are causing weakness.  Really unclear cause of weakness, concern it is neurological versus psychosomatic vs myositis?.  She is protecting her airway and using less than her normal chronic oxygen  -Check CK, Myasthenia gravis panel, TSH, ESR, CRP  -Check NIF  -Fall precautions  -SLP evaluation  -PT/OT  -Neurology consulted per above    Restrictive lung disease, chronic hypoxic respiratory failure  -Patient 3 L oxygen with activity and at night  as well as CPAP at night.  CT chest with scattered subsegmental atelectasis but otherwise no pleural effusions.  -Medication reconciliation pending, continue home inhalers at that time  -Again do not over titrate oxygen, SPO2 should not be greater than 94%    Chronic HFpE  -Patient with proBNP > 2300 but no other values to compare to.  CT chest without any pulmonary edema and patient's oxygen needs have not changed.  Initial high-sensitivity troponin elevated.  EKG with left anterior fascicular block but no acute ST or T wave abnormalities.  NM stress test from 2017 showed ejection fraction of 74% otherwise unable to read other TTE results.  Patient is on oral Lasix at baseline.  Really appears quite euvolemic, I do not think she is in acute heart failure.  -Continue home diuretics once medication reconciliation is complete    ? colitis  -CT abdomen pelvis 10/21/2022 did show some mild wall thickening of the colon to the left lower quadrant which could be from diverticulitis or focal colitis.  Patient without leukocytosis, afebrile, hemodynamically stable.  Unfortunately family did not accompany her and there were no reports of abdominal pain, diarrhea.  -Need to just monitor for the time being    Elevated high-sensitivity troponin  -Patient with elevated high-sensitivity troponin x1.  EKG with left anterior fascicular block but no ST or T wave abnormalities.  Patient is very lethargic but does not appear to have any chest pain.  Likely with acute ACS  -Repeat troponin    HTN  -Home medication reconciliation pending    CKD 3  -Baseline creatinine around 1.5    NELLIE, obesity hypoventilation syndrome  -CPAP    Venous stasis dermatitis, bilateral  -Patient with mild pink/erythematous skin changes to bilateral lower extremities.  Legs are also hairless.  This is not consistent with a cellulitis and more consistent with venous stasis.  -Start triamcinolone 0.1% ointment twice daily    Clinically Significant Risk Factors  Present on Admission                      # Severe Obesity: Estimated body mass index is 48.82 kg/m  as calculated from the following:    Height as of this encounter: 1.524 m (5').    Weight as of this encounter: 113.4 kg (250 lb).             Electrolytes: Currently within normal limits  Fluids: P.o.  Diet: Cardiac  VTE prophylaxis: Lovenox      COVID19 symptom check 10/31/2022  Fevers/Chills/myalgias: NEGATIVE TO ALL  Sick contacts/COVID19 exposure: NEGATIVE TO ALL  Cough/trouble breathing/SOB/sore throat: NEGATIVE TO ALL  Diarrhea: NEGATIVE    Patient was very clearly able to tell me that she is full code at this time.  She does appear to be decisional and comprehending our discussion about this.    Chief Complaint: weakness     HPI:    Desirae Rey is a 71 year old old female chronic hypoxic respiratory failure, HFpEF, HTN, CKD 3 who presents with worsening generalized weakness.  Patient is actually able to answer all questions but she speaks very slowly with muffled speech needs quite a bit of time to answer.  She currently denies any pain, including denying chest pain, headache, abdominal pain.  She states she just feels very weak and has no energy.  She says she was recently treated with 2 different antibiotics for possible skin infection on her leg.  The first antibiotic was Keflex and then she states she was placed on another antibiotic, the name she cannot remember.  When she started the second antibiotic 3 to 4 days ago she endorses when she became very weak and started falling a lot.  At baseline she is independent but family she also needed to use a walker and now is unable to walk or move on her own at all.    History is provided by patient    Medical History  Present on Admission:    Altered mental status, unspecified altered mental status type    NELLIE (obstructive sleep apnea)     Past Medical History:   Diagnosis Date     Allergic rhinitis      Arthritis of back      CHF (congestive heart failure)  (H)      Chronic kidney disease     stage 3      De Quervain's disease (tenosynovitis)      Fibromyalgia, primary      GERD (gastroesophageal reflux disease)      Hematuria     chronic     High cholesterol      History of blood clots 1970's    DVT, from birth control, h/o left calf     Hyperlipidemia      Hypertension      Low back pain      Osteoporosis      Pickwickian syndrome (H)      Plantar fasciitis      Proteinuria      Proteinuria     chronic     Sleep apnea     CPAP     Patient Active Problem List   Diagnosis     Fibromyalgia     Osteoporosis     Obesity     Osteoarthritis     NELLIE (obstructive sleep apnea)     Immunology Studies Nonspecific Abnormal Findings     Allergic Rhinitis     Anemia     Hematuria     Proteinuria     Renal Insufficiency     Synovitis     Pseudogout     Chondrocalcinosis     Pain During Urination (Dysuria)     Cervical radiculopathy     Benign essential hypertension     Hyperkalemia     Acute respiratory failure with hypoxia (H)     GRACY (acute kidney injury) (H)     Blood loss anemia     Morbid obesity due to excess calories (H)     Cubital tunnel syndrome, right     Morbid obesity, unspecified obesity type (H)     Chronic kidney disease, stage 2 (mild)     Hyperlipidemia, unspecified hyperlipidemia type     Other specified hypotension     Sleep apnea     CKD (chronic kidney disease) stage 3, GFR 30-59 ml/min (H)     Hypertension     Diastolic dysfunction     Altered mental status, unspecified altered mental status type     Acute weakness     Adult failure to thrive     Chronic heart failure with preserved ejection fraction (HFpEF) (H)     Elevated troponin     Surgical History  She  has a past surgical history that includes knee surgery; Hand surgery (Right); Release carpal tunnel (Bilateral); joint replacement; Cholecystectomy; Hysteroscopy Diagnostic; Cervical Fusion (N/A, 4/27/2017); Eye surgery; and Colonoscopy (N/A, 12/20/2019).     Past Surgical History:   Procedure Laterality  Date     CERVICAL FUSION N/A 4/27/2017    Procedure: ANTERIOR CERVICAL DECOMPRESSION FUSION C5-7 BILATERAL;  Surgeon: Basim Barone MD;  Location: [facility];  Service:      CHOLECYSTECTOMY      open     COLONOSCOPY N/A 12/20/2019    Procedure: COLONOSCOPY WITH BIOPSIES;  Surgeon: Lucio Farr MD;  Location: [facility];  Service: Gastroenterology     EYE SURGERY       HAND SURGERY Right      HYSTEROSCOPY DIAGNOSTIC       JOINT REPLACEMENT      bilateral TKA     KNEE SURGERY       RELEASE CARPAL TUNNEL Bilateral     Social History  Reviewed, and she  reports that she has never smoked. She has never used smokeless tobacco. She reports that she does not drink alcohol and does not use drugs.  Social History     Tobacco Use     Smoking status: Never     Smokeless tobacco: Never   Substance Use Topics     Alcohol use: No      Allergies  Allergies   Allergen Reactions     Latex Rash     Severe rash     Valsartan Unknown     Hyperkalemia     Colchicine Diarrhea     Latex Unknown     Added based on information entered during case entry, please review and add reactions, type, and severity as needed     Other Environmental Allergy Unknown     Coverlet bandaid , 3M product; rash     Statins-Hmg-Coa Reductase Inhibitors [Hmg-Coa-R Inhibitors] Muscle Pain (Myalgia)     Tried lovastatin and simvastatin     Sulfa (Sulfonamide Antibiotics) [Sulfa Drugs] Swelling     Facial swelling     Paper Tape [Adhesive Tape] Rash    Family History  Reviewed, and family history includes Chronic Obstructive Pulmonary Disease in her father; Heart Disease in her sister; Rheumatoid Arthritis in her mother.   Psychosocial Needs  Social History     Social History Narrative     Not on file     Additional psychosocial needs reviewed per nursing assessment.       Prior to Admission Medications   (Not in a hospital admission)            Review of Systems: History obtained from the patient  General ROS: negative  for  -  chills, or, positive for fatigue  ENT ROS: negative for - headaches, hearing change, nasal congestion, nasal discharge  Hematological and Lymphatic ROS: negative for - bleeding problems, blood clots, bruising  Respiratory ROS: negative for - cough, pleuritic pain, shortness of breath  Cardiovascular ROS: negative for - chest pain, dyspnea on exertion, edema  Gastrointestinal ROS: negative for - abdominal pain, constipation, diarrhea, and nausea/vomiting  Genito-Urinary ROS: negative for - change in urinary stream, dysuria, hematuria  Musculoskeletal ROS: Positive for diffuse weakness in all extremities  Neurological ROS: negative for - behavioral changes, confusion, dizziness, numbness/tingling   Dermatological ROS: negative for pruritus, rash    BP (!) 167/95   Pulse 84   Temp 97.9  F (36.6  C) (Oral)   Resp 22   Ht 1.524 m (5')   Wt 113.4 kg (250 lb)   SpO2 96%   BMI 48.82 kg/m      Physical Examination:   General appearance -sleepy, well appearing, and in no distress and oriented to person, place, and time  Mental status -patient is sleepy and falls asleep readily during exam but will awaken.  She answers all my questions appropriately but speaks very slowly and her speech is quite muffled as well.  HEENT - sclera anicteric, left eye normal, right eye normal, nares normal and patent, no erythema  Neck -very short and thick neck  Respiratory -loud transmitted upper airway sounds but otherwise lungs are clear to auscultation, nasal cannula in place  Cardiac - normal rate, regular rhythm, normal S1, S2, no murmurs, rubs, clicks or gallops, no JVD  Abdomen - soft and rotund, nontender, nondistended, no masses or organomegaly no rebound tenderness noted bowel sounds normal, no bladder distension noted  Neurological -sedated, oriented, speech is very slow, patient is able to move all extremities equally but very slowly, diffuse 4+ strength in bilateral upper and lower extremities  Musculoskeletal - no joint  tenderness, deformity or swelling, full range of motion without pain  Extremities - peripheral pulses normal, no pedal edema, no clubbing or cyanosis  Skin -bilateral lower extremities are hairless, mild pink/erythematous coloration to bilateral anterior legs that are not warm, nonblanching nontender.  Hypertrophic nails.    Results:  Recent Results (from the past 24 hour(s))   Extra Blue Top Tube    Collection Time: 10/30/22 11:55 PM   Result Value Ref Range    Hold Specimen JIC    Extra Red Top Tube    Collection Time: 10/30/22 11:55 PM   Result Value Ref Range    Hold Specimen JIC    Extra Green Top (Lithium Heparin) Tube    Collection Time: 10/30/22 11:55 PM   Result Value Ref Range    Hold Specimen JIC    Extra Purple Top Tube    Collection Time: 10/30/22 11:55 PM   Result Value Ref Range    Hold Specimen JIC    Extra Green Top (Lithium Heparin) ON ICE    Collection Time: 10/30/22 11:55 PM   Result Value Ref Range    Hold Specimen JIC    Comprehensive metabolic panel    Collection Time: 10/30/22 11:55 PM   Result Value Ref Range    Sodium 138 136 - 145 mmol/L    Potassium 4.4 3.4 - 5.3 mmol/L    Chloride 96 (L) 98 - 107 mmol/L    Carbon Dioxide (CO2) 27 22 - 29 mmol/L    Anion Gap 15 7 - 15 mmol/L    Urea Nitrogen 30.6 (H) 8.0 - 23.0 mg/dL    Creatinine 1.26 (H) 0.51 - 0.95 mg/dL    Calcium 9.7 8.8 - 10.2 mg/dL    Glucose 126 (H) 70 - 99 mg/dL    Alkaline Phosphatase 82 35 - 104 U/L    AST 44 (H) 10 - 35 U/L    ALT 30 10 - 35 U/L    Protein Total 7.1 6.4 - 8.3 g/dL    Albumin 3.8 3.5 - 5.2 g/dL    Bilirubin Total 0.4 <=1.2 mg/dL    GFR Estimate 46 (L) >60 mL/min/1.73m2   Lipase    Collection Time: 10/30/22 11:55 PM   Result Value Ref Range    Lipase 16 13 - 60 U/L   Lactic acid whole blood    Collection Time: 10/30/22 11:55 PM   Result Value Ref Range    Lactic Acid 0.9 0.7 - 2.0 mmol/L   Troponin T, High Sensitivity    Collection Time: 10/30/22 11:55 PM   Result Value Ref Range    Troponin T, High Sensitivity  41 (H) <=14 ng/L   Nt probnp inpatient (BNP)    Collection Time: 10/30/22 11:55 PM   Result Value Ref Range    N terminal Pro BNP Inpatient 2,039 (H) 0 - 900 pg/mL   Ethyl Alcohol Level    Collection Time: 10/30/22 11:55 PM   Result Value Ref Range    Alcohol ethyl <0.01 (H) <=0.00 g/dL   CBC with platelets and differential    Collection Time: 10/30/22 11:55 PM   Result Value Ref Range    WBC Count 9.1 4.0 - 11.0 10e3/uL    RBC Count 4.03 3.80 - 5.20 10e6/uL    Hemoglobin 12.9 11.7 - 15.7 g/dL    Hematocrit 39.3 35.0 - 47.0 %    MCV 98 78 - 100 fL    MCH 32.0 26.5 - 33.0 pg    MCHC 32.8 31.5 - 36.5 g/dL    RDW 11.8 10.0 - 15.0 %    Platelet Count 366 150 - 450 10e3/uL    % Neutrophils 76 %    % Lymphocytes 12 %    % Monocytes 9 %    % Eosinophils 2 %    % Basophils 0 %    % Immature Granulocytes 1 %    NRBCs per 100 WBC 0 <1 /100    Absolute Neutrophils 7.0 1.6 - 8.3 10e3/uL    Absolute Lymphocytes 1.1 0.8 - 5.3 10e3/uL    Absolute Monocytes 0.9 0.0 - 1.3 10e3/uL    Absolute Eosinophils 0.2 0.0 - 0.7 10e3/uL    Absolute Basophils 0.0 0.0 - 0.2 10e3/uL    Absolute Immature Granulocytes 0.1 <=0.4 10e3/uL    Absolute NRBCs 0.0 10e3/uL   CK total    Collection Time: 10/30/22 11:55 PM   Result Value Ref Range     (H) 26 - 192 U/L   Extra Blood Culture Bottle    Collection Time: 10/30/22 11:56 PM   Result Value Ref Range    Hold Specimen JIC    Symptomatic; Auto-generated order Influenza A/B & SARS-CoV2 (COVID-19) Virus PCR Multiplex Nasopharyngeal    Collection Time: 10/31/22  1:36 AM    Specimen: Nasopharyngeal; Swab   Result Value Ref Range    Influenza A PCR Negative Negative    Influenza B PCR Negative Negative    RSV PCR Negative Negative    SARS CoV2 PCR Negative Negative   UA with Microscopic reflex to Culture    Collection Time: 10/31/22  1:59 AM    Specimen: Urine, Catheter   Result Value Ref Range    Color Urine Light Yellow Colorless, Straw, Light Yellow, Yellow    Appearance Urine Clear Clear    Glucose  Urine Negative Negative mg/dL    Bilirubin Urine Negative Negative    Ketones Urine Negative Negative mg/dL    Specific Gravity Urine 1.015 1.001 - 1.030    Blood Urine 0.06 mg/dL (A) Negative    pH Urine 6.0 5.0 - 7.0    Protein Albumin Urine 300 (A) Negative mg/dL    Urobilinogen Urine <2.0 <2.0 mg/dL    Nitrite Urine Negative Negative    Leukocyte Esterase Urine Negative Negative    Mucus Urine Present (A) None Seen /LPF    RBC Urine 1 <=2 /HPF    WBC Urine 1 <=5 /HPF    Squamous Epithelials Urine <1 <=1 /HPF    Hyaline Casts Urine 4 (H) <=2 /LPF   Troponin T, High Sensitivity    Collection Time: 10/31/22  3:34 AM   Result Value Ref Range    Troponin T, High Sensitivity 39 (H) <=14 ng/L   Blood gas venous    Collection Time: 10/31/22  3:34 AM   Result Value Ref Range    pH Venous 7.34 (L) 7.35 - 7.45    pCO2 Venous 54 (H) 35 - 50 mm Hg    pO2 Venous 74 (H) 25 - 47 mm Hg    Bicarbonate Venous 29 24 - 30 mmol/L    Base Excess/Deficit (+/-) 3.0   mmol/L    Oxyhemoglobin Venous 92.3 (H) 70.0 - 75.0 %    O2 Sat, Venous 93.6 (H) 70.0 - 75.0 %     IMPRESSION:  1.  Mild wall thickening of colon in the left lower quadrant with slight adjacent inflammation. Findings could be due to diverticulitis or focal colitis. Follow-up to confirm resolution.  2.  No bowel obstruction or abscess.    IMPRESSION:  1.  Mildly motion degraded exam. No definite acute intracranial abnormality.  2.  Mild sequelae of chronic microvascular ischemic disease.    Lab Results personally reviewed 10/31  Imaging Results personally reviewed 10/31  Discussed with patient  Reviewed old records   Discharge Planning discussed with patient  Discussed care with patient for 70 minutes with greater than 50% of time spent in counseling and coordination of care.    Gail Salinas DO  Hospitalist Service  [facility]  Text page via [employer] Paging/Directory     This note was created using dragon dictation, any spelling and grammatical  errors are unintentional.

## 2022-10-31 NOTE — ED NOTES
RT happened by, and I asked him to place pt CPAP on her. He agrees that this is a good plan and is kind enough to do so. Pt is now on 1L on her CPAP. She also agreed with the plan and wanted the cpap placed, as well.

## 2022-10-31 NOTE — ED NOTES
Cardiology seeing patient. Pt moved from hallway bed to RP 5; care and report handed off to RP nurse. Desirae is awake and oriented upon the transfer.

## 2022-10-31 NOTE — PROGRESS NOTES
Hospitalist Progress Note    Assessment/Plan    Principal Problem:    Altered mental status, unspecified altered mental status type  Active Problems:    NELLIE (obstructive sleep apnea)    Benign essential hypertension    CKD (chronic kidney disease) stage 3, GFR 30-59 ml/min (H)    Acute weakness    Adult failure to thrive    Chronic heart failure with preserved ejection fraction (HFpEF) (H)    Elevated troponin    71-year-old patient with history of obstructive sleep apnea, heart failure with preserved ejection fraction, restrictive lung disease on 3 L oxygen at home, CKD stage III presented with full body weakness of 4 days duration of possible hallucination per her family, all of unclear etiology, patient was coherent when I interviewed her this morning, and they she states that since she started antibiotic for UTI which is dicloxacillin she thinks that could have contributed to her weakness, reviewing her medication.  She also takes fluconazole and statin, however the CK was slightly elevated at 392, initial work-up in the ER including head CT was negative for new stroke      Metabolic encephalopathy  Possible due to polypharmacy head CT was done on admission with some mixed chronic microvascular ischemic disease no definitive acute intracranial abnormality  CT abdomen chest pelvis some thickening of the colon but she denies any diarrhea or acute onset abdominal pain,  She could answer questions appropriately to me and tells me that she has been feeling weak since she started her new antibiotic for UTI she is on chronic oxygen at home and ABG showed PCO2 of 54 which is mildly elevated at her baseline, urine tox screen was positive for marijuana and opiates and she states she takes it only occasionally      Acute onset weakness  Patient states she has been feeling weak for 3 to 4 days since she started on antibiotic that dicloxacillin for acute UTI she also takes fluconazole and statin at home her CK slightly  elevated at 394,  Will recheck CK level,  No recent URI to suggest GuillianBarré, myasthenia gravis panel has been ordered, ESR and CRP elevated,  She tells me that she was previously diagnosed with polymyalgia rheumatica and I reviewed her medication list includes a opiates in the tizanidine for muscle spasm,  Appreciate recommendations from neurology    History of a chronic hypoxic respiratory failure  Secondary to restrictive lung disease she is on liter oxygen at home and CPAP at night,  Chest CT was done on admission showed scattered atelectasis but no pleural effusion,  Resume diet inhalers,      Chronic diastolic heart failure  She was seen by cardiologist in 2017 she was diagnosed with diastolic heart failure and she takes Lasix twice a day, her troponin was elevated but then came down, cardiologist evaluated the patient and recommended to repeat the echo which is currently pending-resume p.o. diuretics      CT finding of colonic thickening suggestive of colitis  CT showed thickening of the colon Dr. Quadrant could be diverticulitis or focal colitis  Patient has no leukocytosis no acute abdominal pain on the chronic abdominal discomfort,  Will hold off on treatment for now she states she has a regular bowel movement      Elevated high-sensitivity troponin likely demand ischemia  EKG showed left anterior fascicular block  Repeat troponin pending, cardiologist recommended an echo may be a stress test to before hospital discharge, as an outpatient      CKD stage III  Creatinine stable at baseline    Chronic venous stasis dermatitis of both lower extremities  And has redness of both patient but no warmth or any signs suggestive of cellulitis,  Will continue to monitor    NELLIE obesity hypoventilation syndrome  She uses CPAP at home but after she discussed with cardiologist she has not been using it recently    Barriers to Discharge: Not clinically stable for discharge acute Chronic Obstructive Pulmonary Disease  Exacerbation.   -Start Solu-Medrol 60 mg IV daily  -Continue oxygen support and wean as tolerated  -Continue home medications  -Start duonebs q.4 hours while awake and every 2 hour as needed    Anticipated discharge date/Disposition: TBD    Subjective  Patient's was examined and seen in the ER hallway she opens her eyes and answer questions, she thinks her weakness got worse after she was started on antibiotic for UTI, she thinks she might have polymyalgia rheumatica    Objective    Vital signs in last 24 hours  Temp:  [97.9  F (36.6  C)-98.2  F (36.8  C)] 98.2  F (36.8  C)  Pulse:  [83-90] 86  Resp:  [21-28] 21  BP: (148-175)/() 148/80  SpO2:  [93 %-100 %] 94 % [unfilled] O2 Device: Nasal cannula    Weight:   [unfilled] Weight change:     Intake/Output last 3 shifts  No intake/output data recorded.  Body mass index is 48.82 kg/m .    Physical Exam:  General Appearance: Alert, oriented x3, does not appear in distress.  HEENT: Normocephalic. No scleral icterus, . Mucous membranes moist.  Heart: :Regular rate and rhythm, normal S1 ,S2, No murmurs, no JVD, nopedal edema   Lungs: Clear to auscultation bilaterally. No wheezing or crackles  Abdomen: Soft, non tender, no rebound or rigidity, non distended, bowel sounds present.  Extremity: No deformity. No joint swelling.      Pertinent Labs   Lab Results: personally reviewed.   Recent Labs   Lab 10/30/22  2355      CO2 27   BUN 30.6*   ALBUMIN 3.8   ALKPHOS 82   ALT 30   AST 44*     Recent Labs   Lab 10/30/22  2355   WBC 9.1   HGB 12.9   HCT 39.3        No results for input(s): CKTOTAL, TROPONINI in the last 168 hours.    Invalid input(s): TROPONINT, CKMBINDEX  Invalid input(s): POCGLUFGR      Advanced Care Planning:  Discharge Planning discussed with patient's and her son over the phone  Total time with this patient is 46 min with 50% of time spent in examining the patient, reviewing records, discussing plan of care and counseling, 50% of time spent  in coordination of care.  Care discussed and coordinated with KENDRICK.      Morris Liang MD  Internal Medicine Hospitalist  10/31/2022

## 2022-10-31 NOTE — CONSULTS
HEART CARE CONSULTATON NOTE            Reason for consult: Elevated troponin x2     Assessment:     1. Elevated Troponin    Mildly elevated and down trending. Although patient is not a great historian, she does not report any chest pain or worsening shortness of breath. No EKG changes. Troponin mildly elevated and down trending. Could represent mild demand ischemia in setting of acute illness/decompensation. Agree with hospitalist that this is unlikely ACS.     High sensitivity troponin GREATER than 100 is concerning for acute MI. If troponin is greater than 14 but less than 100, repeat in 2 hours. If the change in troponin is greater than 7, high concern for acute MI. If the change is less than 7, consider how long ago symptoms started. If 12+ hours, could be late infarct. If less than 12 hours, unlikely to be infarct. I included a graph from the [employer] at the bottom of this note for reference since we recently changed to using this assay. They use a change of 10 rather than 7, which is what our institution recommends.   2. HFpEF  Noted to have EF of 74% in 2017. BNP elevated more than at that time. Repeat echocardiogram could be beneficial to evaluate for CHF exacerbation or worsening.  3. NELLIE/ obesity hypoventilation syndrome  4. HTN  5. CKD3  6. Acute encephalophathy  7. Acute onset weakness, adult failure to thrive  8. Venous stasis dermatits    Cardiology staff note.  Patient seen and examined and discussed with medical student   Goran.  Asked to see secondary to equivocal troponin.  This is most likely not related to an acute ischemic event given the level of troponins identified.  Would wonder if acute symptoms related to obstructive sleep apnea and not utilizing CPAP recently.  She reports that over the last 2 days she did not utilize her CPAP and perhaps this is contributing to her acute presentation of acute somnolence.  We will plan to review echocardiogram.  Encourage  utilization of CPAP.  We did talk about the importance of avoiding sodium indiscretion.  She does state that she has been eating more salty foods as of late.  She has a history of heart failure with preserved ejection fraction episode in 2017.  We will continue to monitor on current dosing.         Plan:     1. Echocardiogram  2. Continue PTA furosemide & metoprolol  3. Could consider stress test in the future, although this does not need to be done inpatient.   4. Discussed the importance of diligent use of her CPAP     History:      HPI:   Desirae is a 70 yo with PMHx of HFpEF, chronic hypoxic respiratory failure, obesity hypoventilation syndrome, HTN and CKD 3 presneting with confusion, somnolence, and weakness. Per family, patient had been hallcuinating at home, more confused, and unable to walk on her own for the last few days. At baseline, she gets short of breath after 5-10 feet but is ambulatory.  Initally in the ED, she was very somnolent, falling asleep during conversation, and speaking slowly. Hwoever, this morning she is alert and able to follow conservation without difficulty. She believes she did not use her CPAP for the last few day and thinks that contributed to her confusion. She also says she has been eating out and eating more frozen meals. She thinks she is slightly more swollen than typical.  No chest pain or  worsening shortness of breath.    Past Medical History:   Diagnosis Date     Allergic rhinitis      Arthritis of back      CHF (congestive heart failure) (H)      Chronic kidney disease     stage 3      De Quervain's disease (tenosynovitis)      Fibromyalgia, primary      GERD (gastroesophageal reflux disease)      Hematuria     chronic     High cholesterol      History of blood clots 1970's    DVT, from birth control, h/o left calf     Hyperlipidemia      Hypertension      Low back pain      Osteoporosis      Pickwickian syndrome (H)      Plantar fasciitis      Proteinuria      Proteinuria      chronic     Sleep apnea     CPAP      Past Surgical History:   Procedure Laterality Date     CERVICAL FUSION N/A 4/27/2017    Procedure: ANTERIOR CERVICAL DECOMPRESSION FUSION C5-7 BILATERAL;  Surgeon: Basim Barone MD;  Location: [facility];  Service:      CHOLECYSTECTOMY      open     COLONOSCOPY N/A 12/20/2019    Procedure: COLONOSCOPY WITH BIOPSIES;  Surgeon: Lucio Farr MD;  Location: [facility];  Service: Gastroenterology     EYE SURGERY       HAND SURGERY Right      HYSTEROSCOPY DIAGNOSTIC       JOINT REPLACEMENT      bilateral TKA     KNEE SURGERY       RELEASE CARPAL TUNNEL Bilateral       Social History     Socioeconomic History     Marital status:      Spouse name: Not on file     Number of children: Not on file     Years of education: Not on file     Highest education level: Not on file   Occupational History     Not on file   Tobacco Use     Smoking status: Never     Smokeless tobacco: Never   Substance and Sexual Activity     Alcohol use: No     Drug use: No     Sexual activity: Not on file   Other Topics Concern     Not on file   Social History Narrative     Not on file     Social Determinants of Health     Financial Resource Strain: Not on file   Food Insecurity: Not on file   Transportation Needs: Not on file   Physical Activity: Not on file   Stress: Not on file   Social Connections: Not on file   Intimate Partner Violence: Not on file   Housing Stability: Not on file     Family History   Problem Relation Age of Onset     Rheumatoid Arthritis Mother      Heart Disease Sister      Chronic Obstructive Pulmonary Disease Father      Allergies   Allergen Reactions     Latex Rash     Severe rash     Valsartan Unknown     Hyperkalemia     Colchicine Diarrhea     Latex Unknown     Added based on information entered during case entry, please review and add reactions, type, and severity as needed     Other Environmental Allergy Unknown     Coverlet bandaid , 3M  product; rash     Statins-Hmg-Coa Reductase Inhibitors [Hmg-Coa-R Inhibitors] Muscle Pain (Myalgia)     Tried lovastatin and simvastatin     Sulfa (Sulfonamide Antibiotics) [Sulfa Drugs] Swelling     Facial swelling     Paper Tape [Adhesive Tape] Rash     Current Outpatient Medications   Medication Sig Dispense Refill     albuterol (PROAIR HFA;PROVENTIL HFA;VENTOLIN HFA) 90 mcg/actuation inhaler [ALBUTEROL (PROAIR HFA;PROVENTIL HFA;VENTOLIN HFA) 90 MCG/ACTUATION INHALER] Inhale 2 puffs every 6 (six) hours as needed for wheezing.       alendronate (FOSAMAX) 70 MG tablet [ALENDRONATE (FOSAMAX) 70 MG TABLET] Take 70 mg by mouth every 7 days. Takes on Mondays 11     azelastine (OPTIVAR) 0.05 % ophthalmic solution [AZELASTINE (OPTIVAR) 0.05 % OPHTHALMIC SOLUTION] Administer 1 drop to both eyes every 12 (twelve) hours.       benzonatate (TESSALON) 100 MG capsule [BENZONATATE (TESSALON) 100 MG CAPSULE] Take 200 mg by mouth 3 (three) times a day as needed for cough.       calcium citrate (CALCITRATE) 200 mg (950 mg) tablet [CALCIUM CITRATE (CALCITRATE) 200 MG (950 MG) TABLET] Take 1 tablet (200 mg total) by mouth 2 (two) times a day. 200 tablet 4     cetirizine (ZYRTEC) 10 MG tablet [CETIRIZINE (ZYRTEC) 10 MG TABLET] Take 10 mg by mouth daily.        cholecalciferol, vitamin D3, 5,000 unit Tab [CHOLECALCIFEROL, VITAMIN D3, 5,000 UNIT TAB] Take 5,000 Units by mouth daily.       esomeprazole (NEXIUM) 40 MG capsule [ESOMEPRAZOLE (NEXIUM) 40 MG CAPSULE] Take 40 mg by mouth every other day.       furosemide (LASIX) 20 MG tablet [FUROSEMIDE (LASIX) 20 MG TABLET] Take 1 tablet (20 mg total) by mouth daily. 30 tablet 0     gabapentin (NEURONTIN) 300 MG capsule [GABAPENTIN (NEURONTIN) 300 MG CAPSULE] Take 300 mg by mouth 3 (three) times a day.        HYDROcodone-acetaminophen 5-325 mg per tablet [HYDROCODONE-ACETAMINOPHEN 5-325 MG PER TABLET] Take 1 tablet by mouth every 6 (six) hours as needed for pain.       hydrOXYzine pamoate  (VISTARIL) 25 MG capsule [HYDROXYZINE PAMOATE (VISTARIL) 25 MG CAPSULE] Take 25 mg by mouth as needed.       magnesium oxide 250 mg Tab [MAGNESIUM OXIDE 250 MG TAB] Take 500 mg by mouth 2 (two) times a day.        MEDICATION CANNOT BE REORDERED - PLEASE MANUALLY REORDER AND DISCONTINUE THE OLD ORDER [LYSINE 500 MG CAP] Take 1 capsule by mouth 2 (two) times a day.       metoprolol succinate (TOPROL-XL) 50 MG 24 hr tablet [METOPROLOL SUCCINATE (TOPROL-XL) 50 MG 24 HR TABLET] Take 1 tablet (50 mg total) by mouth daily. 30 tablet 0     modafinil (PROVIGIL) 100 MG tablet [MODAFINIL (PROVIGIL) 100 MG TABLET] Take 250 mg by mouth daily.       montelukast (SINGULAIR) 10 mg tablet [MONTELUKAST (SINGULAIR) 10 MG TABLET] Take 10 mg by mouth daily.       OMEGA-3/DHA/EPA/FISH OIL (FISH OIL-OMEGA-3 FATTY ACIDS) 300-1,000 mg capsule [OMEGA-3/DHA/EPA/FISH OIL (FISH OIL-OMEGA-3 FATTY ACIDS) 300-1,000 MG CAPSULE] Take 1 g by mouth daily.       OXYGEN-AIR DELIVERY SYSTEMS MISC [OXYGEN-AIR DELIVERY SYSTEMS MISC] Use 3 L As Directed. 3 lpm with activities and as needed at night       predniSONE (DELTASONE) 20 MG tablet [PREDNISONE (DELTASONE) 20 MG TABLET] Take 10 mg by mouth daily.       rosuvastatin (CRESTOR) 10 MG tablet [ROSUVASTATIN (CRESTOR) 10 MG TABLET] Take 10 mg by mouth at bedtime.       TiZANidine (ZANAFLEX) 4 MG capsule [TIZANIDINE (ZANAFLEX) 4 MG CAPSULE] Take 8 mg by mouth every 8 (eight) hours as needed for muscle spasms.       valACYclovir (VALTREX) 500 MG tablet [VALACYCLOVIR (VALTREX) 500 MG TABLET] Take 500 mg by mouth 2 (two) times a day as needed.            Review of Systems  All pertinent positives and negatives reviewed in History.  All other 10 point review of systems reviewed and negative.      Objective:    Physical Exam  VITALS: BP (!) 149/80   Pulse 86   Temp 97.9  F (36.6  C) (Oral)   Resp 26   Ht 1.524 m (5')   Wt 113.4 kg (250 lb)   SpO2 96%   BMI 48.82 kg/m    BMI: Body mass index is 48.82  kg/m .  Wt Readings from Last 3 Encounters:   10/30/22 113.4 kg (250 lb)   06/03/20 104.4 kg (230 lb 1.6 oz)   12/19/19 98 kg (216 lb)     No intake or output data in the 24 hours ending 10/31/22 0916  General Appearance:  Sleepy but arousable, cooperative, no distress, appears stated age,cpap in place   HEENT:  Normocephalic, without obvious abnormality   Neck: Supple, symmetrical, trachea midline, no adenopathy,  no carotid bruit, unable to assess for JVD due to body habitus   Lungs:   Clear but diminished bilaterally, respirations unlabored, CPAP in place   Chest Wall:  No tenderness or deformity   Heart:  Regular rate and rhythm, S1, S2 normal, soft S4 present, no rub or gallop   Abdomen:   Soft, non-tender,  no masses, no organomegaly   Extremities: Extremities normal, atraumatic, no cyanosis. Pitting edema to ankle.   Skin: Skin color, texture, turgor normal, venous statis dermatitis noted on bilateral calves   Neurologic: Alert and oriented X 3, Moves all 4 extremities     Cardiographics  ECG: No acute changes, sinus rhythm with no ischemia sinus rhythm, left anterior fascicular block  Echocardiogram: pending    Imaging  Chest CT :   IMPRESSION:  1.  Mild wall thickening of colon in the left lower quadrant with slight adjacent inflammation. Findings could be due to diverticulitis or focal colitis. Follow-up to confirm resolution.  2.  No bowel obstruction or abscess.     Lab Results    Chemistry/lipid CBC Cardiac Enzymes/BNP/TSH/INR   Recent Labs   Lab Test 01/29/21  1647   CHOL 190   HDL 42*   LDL 89   TRIG 293*     Recent Labs   Lab Test 01/29/21  1647 05/13/20  1536 05/10/19  1701   LDL 89 103 125     Recent Labs   Lab Test 10/30/22  2355      POTASSIUM 4.4   CHLORIDE 96*   CO2 27   *   BUN 30.6*   CR 1.26*   GFRESTIMATED 46*   LYNN 9.7     Recent Labs   Lab Test 10/30/22  2355 12/15/21  1625 11/30/21  1514   CR 1.26* 1.49* 1.57*     No results for input(s): A1C in the last 88653 hours.        Recent Labs   Lab Test 10/30/22  2355   WBC 9.1   HGB 12.9   HCT 39.3   MCV 98        Recent Labs   Lab Test 10/30/22  2355   HGB 12.9    No results for input(s):  Lab Test 10/31/22  0334 10/30/22  2355   Trop 39 41     Recent Labs   Lab Test 10/30/22  2355 02/16/18  1606   BNP  --  137*   NTBNPI 2,039*  --      Recent Labs   Lab Test 10/31/22  0334   TSH 4.57*     No results for input(s): INR in the last 79539 hours.

## 2022-10-31 NOTE — ED TRIAGE NOTES
Pt arrives via EMS from home for increased weakness. Pt has had increasing weakness for the last 3 days. At baseline pt is able to ambulate but pt had to use walker the last couple days and today was unable to walk at all. Pt was found on the floor by family, but family reported to have lowered herself to the floor and did not fall. Pt is confused, but EMS reports this is not her baseline.      Triage Assessment     Row Name 10/30/22 3085       Triage Assessment (Adult)    Airway WDL WDL       Respiratory WDL    Respiratory WDL WDL       Skin Circulation/Temperature WDL    Skin Circulation/Temperature WDL X;temperature    Skin Temperature cool       Cardiac WDL    Cardiac WDL WDL       Cognitive/Neuro/Behavioral WDL    Cognitive/Neuro/Behavioral WDL X;level of consciousness    Level of Consciousness confused    Orientation disoriented to;time;situation    Mood/Behavior calm       Alexandro Coma Scale    Best Eye Response 4-->(E4) spontaneous    Best Motor Response 6-->(M6) obeys commands    Best Verbal Response 4-->(V4) confused    Alexandro Coma Scale Score 14

## 2022-10-31 NOTE — PROGRESS NOTES
[facility]  OUTPATIENT OCCUPATIONAL THERAPY  EVALUATION  PLAN OF TREATMENT FOR OUTPATIENT REHABILITATION  (COMPLETE FOR INITIAL CLAIMS ONLY)  Patient's Last Name, First Name, M.I.  YOB: 1950  Desirae Rey                          Provider's Name  MARJORIE [facility] Medical Record No.  [id number]                             Onset Date:  10/30/22   Start of Care Date:      Type:     ___PT   _X_OT   ___SLP Medical Diagnosis:                       OT Diagnosis:  Impaired ability to perform ADLs, IADLs, and functional mobility Visits from SOC:  1     See note for plan of treatment, functional goals and certification details    I CERTIFY THE NEED FOR THESE SERVICES FURNISHED UNDER        THIS PLAN OF TREATMENT AND WHILE UNDER MY CARE     (Physician co-signature of this document indicates review and certification of the therapy plan).                                  10/31/22 1045   Appointment Info   Signing Clinician's Name / Credentials (OT) Mary Louise OTR/L   Quick Adds   Quick Adds Certification   Living Environment   People in Home spouse   Current Living Arrangements house  (split-level)   Home Accessibility stairs to enter home;stairs within home   Number of Stairs, Main Entrance 1   Stair Railings, Main Entrance railings safe and in good condition   Number of Stairs, Within Home, Primary seven   Stair Railings, Within Home, Primary railings safe and in good condition   Living Environment Comments Pt lives in a split-level home with her . Her bedroom, bathroom, and the kitchen are on the upstairs level (8 total stairs to reach). Pt does not typically have to go into lower level. Pt has a walk-in shower with grab bars. One of the toilets is raised with a raised height toilet.   Self-Care   Usual Activity Tolerance fair   Equipment Currently Used at Home cane, straight;walker, rolling  (recently started using FWW and SEC)   Fall  history within last six months yes   Number of times patient has fallen within last six months 8  (within the last year, pt unsure how many times were in the past 6 months)   Activity/Exercise/Self-Care Comment Pt typically requires Min A with dressing (with bra). Pt IND with shower transfer, requires Min A for bathing (her  washes her hair). Pt requires assistance with G/H as pt has limited ROM of arms d/t pain, pt's  assists.   Instrumental Activities of Daily Living (IADL)   IADL Comments Pt does assist with cooking/cleaning tasks, but recently  has been completing more of them. Pt does not drive d/t BUE pain. Pt manages own meds.   General Information   Onset of Illness/Injury or Date of Surgery 10/30/22   Referring Physician Gail Salinas DO   Additional Occupational Profile Info/Pertinent History of Current Problem Per chart review, pt is a 71 year old old female chronic hypoxic respiratory failure, HFpEF, HTN, CKD 3 who presents with worsening generalized weakness.   Existing Precautions/Restrictions fall;oxygen therapy device and L/min  (3 LPM via NC, consistent with baseline)   Cognitive Status Examination   Orientation Status orientation to person, place and time   Pain Assessment   Patient Currently in Pain Yes, see Vital Sign flowsheet  (pain with AROM of BUE's, pain in toes)   Range of Motion Comprehensive   Comment, General Range of Motion AROM of shoulder flexion <90 degrees BUE limited by pain, full PROM no increase in pain   Strength Comprehensive (MMT)   Comment, General Manual Muscle Testing (MMT) Assessment functional BUE weakness noted   Bed Mobility   Bed Mobility supine-sit;sit-supine;rolling left   Rolling Left [place] (Bed Mobility) moderate assist (50% patient effort);verbal cues   Supine-Sit [place] (Bed Mobility) moderate assist (50% patient effort)   Sit-Supine [place] (Bed Mobility) moderate assist (50% patient effort);2 person assist   Assistive  Device (Bed Mobility) bed rails;draw sheet   Transfers   Transfers sit-stand transfer;toilet transfer   Sit-Stand Transfer   Sit-Stand [place] (Transfers) not tested   Sit/Stand Transfer Comments Unsafe to attempt at this time d/t height of ED cart.   Toilet Transfer   Toilet Transfer Comments Unsafe to attempt at this time d/t height of ED cart and pt's BLE weakness.   Balance   Balance Comments Pt unsteady at EOB, required close SBA/CGA for balance.   Activities of Daily Living   BADL Assessment/Intervention lower body dressing;toileting   Lower Body Dressing Assessment/Training   [place] Level (Lower Body Dressing) dependent (less than 25% patient effort)   Toileting   Assistive Devices (Toileting) bedpan   [place] Level (Toileting) dependent (less than 25% patient effort)   Clinical Impression   Criteria for Skilled Therapeutic Interventions Met (OT) Yes, treatment indicated   OT Diagnosis Impaired ability to perform ADLs, IADLs, and functional mobility   Influenced by the following impairments generalized weakness   OT Problem List-Impairments impacting ADL problems related to;activity tolerance impaired;balance;mobility;range of motion (ROM);strength;pain   Assessment of Occupational Performance 5 or more Performance Deficits   Identified Performance Deficits bed mobility, lower body dressing, toileting, transfers, functional mobility, grooming/hygiene   Planned Therapy Interventions (OT) ADL retraining;IADL retraining;balance training;bed mobility training;neuromuscular re-education;ROM;strengthening;transfer training;home program guidelines;progressive activity/exercise   Clinical Decision Making Complexity (OT) low complexity   Anticipated Equipment Needs Upon Discharge (OT) bathing equipment   Risk & Benefits of therapy have been explained evaluation/treatment results reviewed;care plan/treatment goals reviewed;risks/benefits reviewed;current/potential barriers reviewed;participants voiced  agreement with care plan;participants included;patient   Clinical Impression Comments Pt would benefit from skilled OT services to promote ability to perform ADLs, IADLs, and functional mobility.   OT Total Evaluation Time   OT Eval, High Complexity Minutes (71294) 15   OT Goals   Therapy Frequency (OT) Daily   OT Predicted Duration/Target Date for Goal Attainment 11/07/22   OT Goals Hygiene/Grooming;Lower Body Dressing;Bed Mobility;Transfers;Toilet Transfer/Toileting   OT: Hygiene/Grooming minimal assist;using adaptive equipment;while standing   OT: Lower Body Dressing Supervision/stand-by assist;using adaptive equipment;including set-up/clothing retrieval   OT: Bed Mobility Supervision/stand-by assist;supine to/from sitting   OT: Transfer Supervision/stand-by assist;with assistive device   OT: Toilet Transfer/Toileting Supervision/stand-by assist;toilet transfer;cleaning and garment management;using adaptive equipment   Interventions   Interventions Quick Adds Self-Care/Home Management   Self-Care/Home Management   Self-Care/Home Mgmt/ADL, Compensatory, Meal Prep Minutes (87714) 8   Symptoms Noted During/After Treatment (Meal Preparation/Planning Training) increased pain;fatigue   Treatment Detail/Skilled Intervention Pt reported she was unable to don socks in supine d/t pain, requesting attempting while sitting EOB. Once sitting EOB, educated pt on attempting figure 4 tech, however pt unsteady on EOB d/t height of the ED cart and inability to stabilize feet on ground. Pt attempted to reach feet to don socks, however unable to perform with SBA. Pt tolerated sitting on EOB for ~4 min, however then became nauseous and dizzy. Pt requesting using the bathroom. Educated pt that transferring to toilet/BSC not safe at this time d/t weakness, pain, dizziness, and height of ED cart and pt verbalized understanding. Positioned pt supine with bedpan, RN notified.   OT Discharge Planning   OT Plan attempt stand/transfer Ax2,  G/H seated EOB, lower body dressing   OT Discharge Recommendation (DC Rec) Transitional Care Facility   OT Rationale for DC Rec Pt currently requires Ax2 for bed mobility and is dependent/Max A for ADLs due to weakness and pain. Pt lives with  in split level home and must be able to do 8 stairs. She has not yet been able to safely attempt standing/mobility during hospital stay.   OT Brief overview of current status Pt has not yet attempted to stand/ambulate safely at this time. Mod Ax1-2 for bed mobility, dependent for lower body dressing, dependent for toileting wtih bedpan   Total Session Time   Timed Code Treatment Minutes 8   Total Session Time (sum of timed and untimed services) 23

## 2022-10-31 NOTE — PROGRESS NOTES
10/31/22 1300   Appointment Info   Signing Clinician's Name / Credentials (SLP) Gabriel Carreno MA [facility] SLP   General Information   Onset of Illness/Injury or Date of Surgery 10/30/22   Referring Physician Dr Salinas   Patient/Family Therapy Goal Statement (SLP) to eat/drink   Pertinent History of Current Problem 71-year-old patient with history of obstructive sleep apnea, heart failure with preserved ejection fraction, restrictive lung disease on 3 L oxygen at home, CKD stage III presented with full body weakness of 4 days duration of possible hallucination per her family, all of unclear etiology, patient was coherent when I interviewed her this morning, and they she states that since she started antibiotic for UTI which is dicloxacillin she thinks that could have contributed to her weakness, reviewing her medication.   General Observations referred due to metabolic encephalopathy and significant weakness upon admit. Pt is now alert, conversant.   Type of Evaluation   Type of Evaluation Swallow Evaluation   Oral Motor   Oral Musculature generally intact   Structural Abnormalities none present   Dentition (Oral Motor)   Dentition (Oral Motor) adequate dentition;dental appliance/dentures   Dental Appliance/Denture (Oral Motor) upper and lower   Facial Symmetry (Oral Motor)   Facial Symmetry (Oral Motor) WNL   Lip Function (Oral Motor)   Lip Range of Motion (Oral Motor) WNL   Lip Strength (Oral Motor) WNL   Tongue Function (Oral Motor)   Tongue Strength (Oral Motor) WNL   Tongue Coordination/Speed (Oral Motor) WNL   Tongue ROM (Oral Motor) WNL   Jaw Function (Oral Motor)   Jaw Function (Oral Motor) WNL   Cough/Swallow/Gag Reflex (Oral Motor)   Volitional Throat Clear/Cough (Oral Motor) WNL   Volitional Swallow (Oral Motor) WNL   Comment, Cough/Swallow/Gag Reflex (Oral Motor) dry cough at times triggered while speaking   Vocal Quality/Secretion Management (Oral Motor)   Vocal Quality (Oral Motor) WNL   Secretion  Management (Oral Motor) WNL   General Swallowing Observations   Past History of Dysphagia none reported   Respiratory Support (General Swallowing Observations) nasal cannula  (home baseline 3 LPM)   Current Diet/Method of Nutritional Intake (General Swallowing Observations, NIS) thin liquids (level 0);regular diet   Swallowing Evaluation Clinical swallow evaluation   Clinical Swallow Evaluation   Feeding Assistance set up only required   Clinical Swallow Evaluation Textures Trialed thin liquids;solid foods   Clinical Swallow Eval: Thin Liquid Texture Trial   Mode of Presentation, Thin Liquids straw;self-fed   Volume of Liquid or Food Presented 6 oz   Oral Phase of Swallow WFL   Pharyngeal Phase of Swallow intact   Diagnostic Statement no overt s/s aspiration. Dry cough x1   Clinical Swallow Evaluation: Solid Food Texture Trial   Mode of Presentation spoon   Volume Presented 2 oz   Oral Phase WFL   Pharyngeal Phase intact   Diagnostic Statement No overt s/s aspiration solid texture and mixed consistency   Esophageal Phase of Swallow   Patient reports or presents with symptoms of esophageal dysphagia No   Swallowing Recommendations   Diet Consistency Recommendations regular diet;thin liquids (level 0)   Supervision Level for Intake patient independent   Mode of Delivery Recommendations bolus size, small   Monitoring/Assistance Required (Eating/Swallowing) monitor for cough or change in vocal quality with intake   Recommended Feeding/Eating Techniques (Swallow Eval) maintain upright sitting position for eating   Medication Administration Recommendations, Swallowing (SLP) patient preference   Instrumental Assessment Recommendations instrumental evaluation not recommended at this time   Clinical Impression   Criteria for Skilled Therapeutic Interventions Met (SLP Eval) No problems identified which require skilled intervention   Clinical Impression Comments Improving mentation, patient is alert, dry cough especially when  coordinating breath with speaking, baseline home O2. Patient is able to feed herself with set up. She tends to talk while chewing, min cue to swallow first. No oral dysphagia noted. No overt s/s aspiration. Dry cough x1 after large sip of water then immediately talking. Recommend regular diet w/thin liquids with improving condition.   SLP Total Evaluation Time   Eval: oral/pharyngeal swallow function, clinical swallow Minutes (84678) 25   SLP Discharge Planning   SLP Brief overview of current status  reg diet w/thin liquids   Total Session Time   Total Session Time (sum of timed and untimed services) 25

## 2022-10-31 NOTE — ED NOTES
RESPIRATORY CARE NOTE     RT attempted NIF, pt unable to perform. RT will attempt again on day shift.     BP (!) 167/95   Pulse 84   Temp 97.9  F (36.6  C) (Oral)   Resp 22   Ht 1.524 m (5')   Wt 113.4 kg (250 lb)   SpO2 96%   BMI 48.82 kg/m      Karissa Mackenzie, RT

## 2022-10-31 NOTE — ED NOTES
"Pt is sleeping. When I approach and touch her feel and legs while palpating her pulses, she arouses and opens her eyes. I ask her if she is having pain and she says, \"Not usually until I get up. Her feet and legs are very warm and her left leg is swollen, more than the right leg. The skin of the shin and calf are a little red on the RLL. HOB raised, mask loosened for comfort. Pt is answering questions appropriately, but is unsure about the date. She is able to move all of her extremities, but is very weak and drowsy. She has her CPAP at bedside and I have turned her O2 up from the current 1L to 2 liters because as she sleeps, her saturation drops to 85% periodically with some sleep apnea noted upon inspection.  "

## 2022-10-31 NOTE — ED NOTES
Bed: [facility]NED[facility]-03  Expected date: 10/30/22  Expected time:   Means of arrival:   Comments:  Padmini  71 year old  Weakness  Non ambulatory

## 2022-10-31 NOTE — PROGRESS NOTES
"   10/31/22 1046   Appointment Info   Signing Clinician's Name / Credentials (PT) Marquita Lee, PT, DPT   Quick Adds   Quick Adds Certification   Living Environment   People in Home spouse   Current Living Arrangements house   Home Accessibility stairs within home;stairs to enter home   Number of Stairs, Main Entrance 1   Stair Railings, Main Entrance railings safe and in good condition   Number of Stairs, Within Home, Primary seven   Stair Railings, Within Home, Primary railings safe and in good condition   Self-Care   Equipment Currently Used at Home walker, rolling;cane, straight   Fall history within last six months yes   Number of times patient has fallen within last six months 8  (over past year)   General Information   Onset of Illness/Injury or Date of Surgery 10/31/22   Referring Physician Gail Salinas, DO   Patient/Family Therapy Goals Statement (PT) None stated.   Pertinent History of Current Problem (include personal factors and/or comorbidities that impact the POC) Per H&P: \"71-year-old female with NELLIE, obesity, HFpEF, chronic hypoxic respiratory failure on 3 L oxygen, HTN, CKD 3 presents with full body weakness, ?  Hallucinations really of unclear etiology.\"   Existing Precautions/Restrictions fall;oxygen therapy device and L/min   Range of Motion (ROM)   Range of Motion ROM deficits secondary to weakness   Strength (Manual Muscle Testing)   Strength (Manual Muscle Testing) Deficits observed during functional mobility   Strength Comments General 3/5 to 3+/5 strength.   Bed Mobility   Bed Mobility supine-sit;sit-supine   Supine-Sit [place] (Bed Mobility) maximum assist (25% patient effort);2 person assist   Sit-Supine [place] (Bed Mobility) maximum assist (25% patient effort);2 person assist   Bed Mobility Limitations decreased ability to use arms for pushing/pulling;decreased ability to use legs for bridging/pushing   Impairments Contributing to Impaired Bed Mobility decreased " strength;decreased ROM;impaired balance;decreased flexibility   Assistive Device (Bed Mobility) draw sheet   Transfers   Comment, (Transfers) Deferred for safety due to height of bed.   Clinical Impression   Criteria for Skilled Therapeutic Intervention Yes, treatment indicated   PT Diagnosis (PT) impaired functional mobility   Influenced by the following impairments decreased strength, ROM, impaired balance   Functional limitations due to impairments transfers, ambulation   Clinical Presentation (PT Evaluation Complexity) Evolving/Changing   Clinical Presentation Rationale Pt presents as medically diagnosed.   Clinical Decision Making (Complexity) moderate complexity   Planned Therapy Interventions (PT) balance training;bed mobility training;gait training;home exercise program;neuromuscular re-education;patient/family education;strengthening;transfer training   Risk & Benefits of therapy have been explained evaluation/treatment results reviewed;participants voiced agreement with care plan;participants included;patient   PT Total Evaluation Time   PT Eval, Moderate Complexity Minutes (97820) 15   Therapy Certification   Start of care date 10/31/22   Certification date from 10/31/22   Certification date to 11/07/22   Medical Diagnosis altered mental status   Physical Therapy Goals   PT Frequency Daily   PT Predicted Duration/Target Date for Goal Attainment 11/07/22   PT Goals Bed Mobility;Transfers;Gait   PT: Bed Mobility Minimal assist;Supine to/from sit   PT: Transfers Minimal assist;Sit to/from stand;Assistive device   PT Discharge Planning   PT Plan progress mobility as able, LE strengthening   PT Discharge Recommendation (DC Rec) Transitional Care Facility   PT Rationale for DC Rec Pt currently requiring assist of 2 for transfers. Recommend TCU at discharge.   PT Brief overview of current status Supine <> sit, max assist of 2. Unable to attempt standing due to safety.   Total Session Time   Total Session Time  (sum of timed and untimed services) 15     M [facility]  OUTPATIENT PHYSICAL THERAPY EVALUATION  PLAN OF TREATMENT FOR OUTPATIENT REHABILITATION  (COMPLETE FOR INITIAL CLAIMS ONLY)  Patient's Last Name, First Name, M.I.  YOB: 1950  SkDesirae velazquez                        Provider's Name  [facility] Medical Record No.  [id number]                             Onset Date:  10/31/22   Start of Care Date:  10/31/22   Type:     _X_PT   ___OT   ___SLP Medical Diagnosis:  (P) altered mental status              PT Diagnosis:  impaired functional mobility Visits from SOC:  1     See note for plan of treatment, functional goals and certification details    I CERTIFY THE NEED FOR THESE SERVICES FURNISHED UNDER        THIS PLAN OF TREATMENT AND WHILE UNDER MY CARE     (Physician co-signature of this document indicates review and certification of the therapy plan).              Marquita Lee, PT  10/31/2022

## 2022-10-31 NOTE — PROGRESS NOTES
PT performed bedside FVC and NIF. FVC was 1.29 L with good effort. NIF was -20 cmH20 with fair effort.    Dangelo Rosales, RT

## 2022-10-31 NOTE — PHARMACY-ADMISSION MEDICATION HISTORY
Pharmacy Note - Admission Medication History    Pertinent Provider Information:     On 10/22, was started on cephalexin 500 mg four times a day for a 7 day course. Then on 10/26, antibiotic was changed to dicloxacillin for a 7 day course.      On 10/19, patient was started on a 10 day course of fluconazole. Course should have ended on 10/29, but patient believes she still has a couple more days remaining of course. Please assess if fluconazole should continue or not.       Patient reports that while she has been taking fluconazole she has been holding her rosuvastatin due to drug-drug interaction.      ______________________________________________________________________    Prior To Admission (PTA) med list completed and updated in EMR.       PTA Med List   Medication Sig Note Last Dose     albuterol (PROAIR HFA;PROVENTIL HFA;VENTOLIN HFA) 90 mcg/actuation inhaler Inhale 1 puff into the lungs every 4 hours as needed for shortness of breath / dyspnea   at prn     alendronate (FOSAMAX) 70 MG tablet [ALENDRONATE (FOSAMAX) 70 MG TABLET] Take 70 mg by mouth every 7 days. Takes on Mondays  10/24/2022     azelastine (OPTIVAR) 0.05 % ophthalmic solution [AZELASTINE (OPTIVAR) 0.05 % OPHTHALMIC SOLUTION] Administer 1 drop to both eyes every 12 (twelve) hours.  10/30/2022     benzonatate (TESSALON) 100 MG capsule Take 100 mg by mouth 3 times daily as needed   at prn     calcium citrate (CITRACAL) 950 (200 Ca) MG tablet Take 1 tablet by mouth daily  10/30/2022 at AM     cetirizine (ZYRTEC) 10 MG tablet [CETIRIZINE (ZYRTEC) 10 MG TABLET] Take 10 mg by mouth daily.   10/30/2022 at AM     cholecalciferol (VITAMIN D3) 25 mcg (1000 units) capsule Take 5000 units in the morning and 2000 units in the evening  10/30/2022 at AM     dicloxacillin (DYNAPEN) 250 MG capsule Take 500 mg by mouth 4 times daily For 7 days 10/31/2022: Started 10/26/22 10/30/2022     DULOXETINE HCL PO Take 30 mg by mouth 2 times daily  10/30/2022 at AM      esomeprazole (NEXIUM) 40 MG capsule Take 40 mg by mouth daily as needed   at prn     fluconazole (DIFLUCAN) 100 MG tablet Take 100 mg by mouth 2 times daily For 10 days 10/31/2022: Started 10/19/22 10/30/2022     furosemide (LASIX) 20 MG tablet Take 2 tablets in the morning and 1 tablet at noon.  10/30/2022     HYDROcodone-acetaminophen 5-325 mg per tablet Take 1 tablet by mouth every 4 hours as needed for pain Max 6 tablets per day.   at prn     l-lysine HCl 500 MG TABS tablet Take 500 mg by mouth 2 times daily  10/30/2022 at AM     magnesium oxide 250 mg Tab Take 500 mg by mouth daily as needed (constipation)   at prn     metoprolol succinate ER (TOPROL XL) 50 MG 24 hr tablet Take 50 mg by mouth At Bedtime  10/29/2022 at PM     modafinil (PROVIGIL) 100 MG tablet [MODAFINIL (PROVIGIL) 100 MG TABLET] Take 250 mg by mouth daily.  10/30/2022 at AM     montelukast (SINGULAIR) 10 mg tablet [MONTELUKAST (SINGULAIR) 10 MG TABLET] Take 10 mg by mouth daily.  10/29/2022 at HS     rosuvastatin (CRESTOR) 10 MG tablet [ROSUVASTATIN (CRESTOR) 10 MG TABLET] Take 10 mg by mouth at bedtime.  Past Week     solifenacin (VESICARE) 10 MG tablet Take 5-10 mg by mouth At Bedtime  10/29/2022 at HS     tiZANidine (ZANAFLEX) 4 MG tablet Take 4 mg by mouth every 8 hours as needed   at prn     valACYclovir (VALTREX) 500 MG tablet Take 500 mg by mouth 2 times daily as needed (flare)   at prn       Information source(s): Patient, Patient's pharmacy ([facility]) and medication list  Method of interview communication: in-person    Summary of Changes to PTA Med List  New: duloxetine, lysine, solifenacin, dicloxacillin, fluconazole  Discontinued: gabapentin, hydroxyzine, fish oil, prednisone  Changed: benzonatate (200 mg to 100 mg), calcium citrate (twice daily to once daily), vitamin D (5000 units daily to 5000 units AM/2000 units PM), esomeprazole (daily to daily PRN), furosemide (20 mg daily to 40 mg AM/20 mg noon), norco (Q6H PRN to Q4H  PRN), tizanidine (8 mg to 4 mg)    Patient was asked about OTC/herbal products specifically.  PTA med list reflects this.    In the past week, patient estimated taking medication this percent of the time:  greater than 90%.    Allergies were reviewed, assessed, and updated with the patient.      Patient does not use any multi-dose medications prior to admission.    The information provided in this note is only as accurate as the sources available at the time of the update(s).    Thank you for the opportunity to participate in the care of this patient.    Smiley Hansen [facility]  10/31/2022 10:53 AM

## 2022-10-31 NOTE — ED NOTES
Swallow screen complete and patient passed fine. Pt drank a half glass of water. Mouth care and lip care done.
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
## 2022-10-31 NOTE — ED PROVIDER NOTES
EMERGENCY DEPARTMENT ENCOUNTER            IMPRESSION:  Weakness  Altered mentation      MEDICAL DECISION MAKING:  Patient brought in by medics from home.  She is unable to provide significant history.  Her  reports she has been progressively weak, disabled and delusional.  No trauma or recent infectious symptoms.  Patient is unable to provide meaningful history    On exam her blood pressure slightly elevated.  There is no fever.  Her sats are normal.  She is lethargic.  Her heart is regular and her lung sounds are diminished.  Abdomen seems a little tender with exam.  She has some faint erythema of the lower extremities    An IV was established    She was placed on cardiac monitor which showed sinus rhythm    Urine did not show evidence of infection    Viral panel negative    EKG does not show arrhythmia or ischemia    BNP is slightly elevated; troponin not elevated    Chemistry shows slightly elevated BUN and creatinine but otherwise normal    CBC and lactate are normal    Head CT is normal    CT of the abdomen shows some colon wall thickening    I spoke to hospitalist for admission; ABG ordered to evaluate for CO2 retention                  =================================================================  CHIEF COMPLAINT:  Chief Complaint   Patient presents with     Generalized Weakness         HPI  HPI is severely limited due to patient confusion.     Desirae Rey is a 71 year old female with a pertinent medical history of CHF, diastolic dysfunction, anemia, blood clots, CKD, GRACY, COPD, acute respiratory failure with hypoxia, Pickwickian syndrome, NELLIE, osteoporosis, osteoarthritis, chondrocalcinosis, fibromyalgia, pseudogout, hyperkalemia, hypercholesterolemia, HLD, HTN, morbid obesity, who presents to the ED by EMS for evaluation of generalized weakness.    Per Nurse's Note, patient arrives via EMS from home due to having progressively worsening generalized weakness for approximately 3 days. At baseline  the patient is typically able to ambulate, but the past couple of days she has needed to use a walker and today she was unable to walk at all. Patient was found on the floorr by family, but family reported that the patient stated she lowered herself to the floor and did not fall. Per EMS, patient is confused but this is not her baseline.     Per , patient recently cellulitis at her right leg with associated right leg pain and myalgias. He notes that approximately one week ago she was prescribed antibiotics for the cellulitis. He mentions that recently the patient has been unable to walk or move around due to generalized weakness, leg pains, and myalgias. He also mentions that recently at times the patient gets to the floor (not falls), and because he is unable to help her back up she needs to crawl around in order to move. He also states that a couple of days ago the patient began to have hallucinations of people in her home. He denies any other patient complications at this time.    I, Jay Farr am serving as a scribe to document services personally performed by Dr. Roberto Armstrong MD, based on my observation and the provider's statements to me. I, Dr. Roberto Armstrong MD attest that Jay Farr is acting in a scribe capacity, has observed my performance of the services and has documented them in accordance with my direction.      REVIEW OF SYSTEMS   ROS is unable to be completed due to patient confusion.      PAST MEDICAL HISTORY:  Past Medical History:   Diagnosis Date     Allergic rhinitis      Arthritis of back      CHF (congestive heart failure) (H)      Chronic kidney disease     stage 3      De Quervain's disease (tenosynovitis)      Fibromyalgia, primary      GERD (gastroesophageal reflux disease)      Hematuria     chronic     High cholesterol      History of blood clots 1970's    DVT, from birth control, h/o left calf     Hyperlipidemia      Hypertension      Low back pain      Osteoporosis       Pickwickian syndrome (H)      Plantar fasciitis      Proteinuria      Proteinuria     chronic     Sleep apnea     CPAP       PAST SURGICAL HISTORY:  Past Surgical History:   Procedure Laterality Date     CERVICAL FUSION N/A 4/27/2017    Procedure: ANTERIOR CERVICAL DECOMPRESSION FUSION C5-7 BILATERAL;  Surgeon: Basim Barone MD;  Location: [facility];  Service:      CHOLECYSTECTOMY      open     COLONOSCOPY N/A 12/20/2019    Procedure: COLONOSCOPY WITH BIOPSIES;  Surgeon: Lucio Farr MD;  Location: [facility] [place];  Service: Gastroenterology     EYE SURGERY       HAND SURGERY Right      HYSTEROSCOPY DIAGNOSTIC       JOINT REPLACEMENT      bilateral TKA     KNEE SURGERY       RELEASE CARPAL TUNNEL Bilateral          CURRENT MEDICATIONS:    albuterol (PROAIR HFA;PROVENTIL HFA;VENTOLIN HFA) 90 mcg/actuation inhaler  alendronate (FOSAMAX) 70 MG tablet  azelastine (OPTIVAR) 0.05 % ophthalmic solution  benzonatate (TESSALON) 100 MG capsule  calcium citrate (CALCITRATE) 200 mg (950 mg) tablet  cetirizine (ZYRTEC) 10 MG tablet  cholecalciferol, vitamin D3, 5,000 unit Tab  esomeprazole (NEXIUM) 40 MG capsule  furosemide (LASIX) 20 MG tablet  gabapentin (NEURONTIN) 300 MG capsule  HYDROcodone-acetaminophen 5-325 mg per tablet  hydrOXYzine pamoate (VISTARIL) 25 MG capsule  magnesium oxide 250 mg Tab  MEDICATION CANNOT BE REORDERED - PLEASE MANUALLY REORDER AND DISCONTINUE THE OLD ORDER  metoprolol succinate (TOPROL-XL) 50 MG 24 hr tablet  modafinil (PROVIGIL) 100 MG tablet  montelukast (SINGULAIR) 10 mg tablet  OMEGA-3/DHA/EPA/FISH OIL (FISH OIL-OMEGA-3 FATTY ACIDS) 300-1,000 mg capsule  OXYGEN-AIR DELIVERY SYSTEMS MISC  predniSONE (DELTASONE) 20 MG tablet  rosuvastatin (CRESTOR) 10 MG tablet  TiZANidine (ZANAFLEX) 4 MG capsule  valACYclovir (VALTREX) 500 MG tablet        ALLERGIES:  Allergies   Allergen Reactions     Latex Rash     Severe rash     Valsartan Unknown     Hyperkalemia     Colchicine  Diarrhea     Latex Unknown     Added based on information entered during case entry, please review and add reactions, type, and severity as needed     Other Environmental Allergy Unknown     Coverlet bandaid , 3M product; rash     Statins-Hmg-Coa Reductase Inhibitors [Hmg-Coa-R Inhibitors] Muscle Pain (Myalgia)     Tried lovastatin and simvastatin     Sulfa (Sulfonamide Antibiotics) [Sulfa Drugs] Swelling     Facial swelling     Paper Tape [Adhesive Tape] Rash       FAMILY HISTORY:  Family History   Problem Relation Age of Onset     Rheumatoid Arthritis Mother      Heart Disease Sister      Chronic Obstructive Pulmonary Disease Father        SOCIAL HISTORY:   Social History     Socioeconomic History     Marital status:    Tobacco Use     Smoking status: Never     Smokeless tobacco: Never   Substance and Sexual Activity     Alcohol use: No     Drug use: No       PHYSICAL EXAM:    BP (!) 167/95   Pulse 84   Temp 97.9  F (36.6  C) (Oral)   Resp 22   Ht 1.524 m (5')   Wt 113.4 kg (250 lb)   SpO2 96%   BMI 48.82 kg/m      Constitutional: She is lethargic.  She is arousable to stimuli and can follow some commands  Head: Normocephalic, atraumatic.  ENT: Mucous membranes moist. Posterior oropharynx appears normal.  Eyes: Pupils midrange and reactive ,no conjunctival discharge  Neck: No lymphadenopathy, no stridor, supple, no soft tissue swelling  Chest: No tenderness   Respiratory: Diminished breath sounds bilaterally  Cardiovascular: Regular rate and rhythm.+2 radial pulses, equal bilaterally.  No murmurs.   GI: Abdomen soft, non-tender to palpation in all 4 quadrants. No guarding or rebound. Bowel sounds intact on all 4 quadrants.   Back: No CVA tenderness.    Musculoskeletal: Moves all 4 extremities equally, strength symmetrical on bilateral uppers and lowers.  No peripheral edema  Integument: Faint bilateral erythema of the lower legs  Lymphatic: No cervical lymphadenopathy  Neurologic: She appears  lethargic.  Arousable to noxious stimuli.  Moves all extremities.  Psychiatric: Normal mood and affect. Normal judgement.    ED COURSE:  12:20 AM I met with the patient to gather history and to perform my initial exam. We discussed plans for the ED course, including diagnostic testing and treatment.   3:07 AM Spoke with the Hospitalist, Dr. Salinas who accepts the patient for admission.      LAB:  All pertinent labs reviewed and interpreted.  Results for orders placed or performed during the hospital encounter of 10/30/22   CT Head w/o Contrast    Impression    IMPRESSION:  1.  Mildly motion degraded exam. No definite acute intracranial abnormality.  2.  Mild sequelae of chronic microvascular ischemic disease.   CT Chest Abdomen Pelvis w/o Contrast    Impression    IMPRESSION:  1.  Mild wall thickening of colon in the left lower quadrant with slight adjacent inflammation. Findings could be due to diverticulitis or focal colitis. Follow-up to confirm resolution.  2.  No bowel obstruction or abscess.   Extra Blood Culture Bottle   Result Value Ref Range    Hold Specimen JIC    Extra Blue Top Tube   Result Value Ref Range    Hold Specimen JIC    Extra Red Top Tube   Result Value Ref Range    Hold Specimen JIC    Extra Green Top (Lithium Heparin) Tube   Result Value Ref Range    Hold Specimen JIC    Extra Purple Top Tube   Result Value Ref Range    Hold Specimen JIC    Extra Green Top (Lithium Heparin) ON ICE   Result Value Ref Range    Hold Specimen JIC    Comprehensive metabolic panel   Result Value Ref Range    Sodium 138 136 - 145 mmol/L    Potassium 4.4 3.4 - 5.3 mmol/L    Chloride 96 (L) 98 - 107 mmol/L    Carbon Dioxide (CO2) 27 22 - 29 mmol/L    Anion Gap 15 7 - 15 mmol/L    Urea Nitrogen 30.6 (H) 8.0 - 23.0 mg/dL    Creatinine 1.26 (H) 0.51 - 0.95 mg/dL    Calcium 9.7 8.8 - 10.2 mg/dL    Glucose 126 (H) 70 - 99 mg/dL    Alkaline Phosphatase 82 35 - 104 U/L    AST 44 (H) 10 - 35 U/L    ALT 30 10 - 35 U/L     Protein Total 7.1 6.4 - 8.3 g/dL    Albumin 3.8 3.5 - 5.2 g/dL    Bilirubin Total 0.4 <=1.2 mg/dL    GFR Estimate 46 (L) >60 mL/min/1.73m2   Result Value Ref Range    Lipase 16 13 - 60 U/L   Lactic acid whole blood   Result Value Ref Range    Lactic Acid 0.9 0.7 - 2.0 mmol/L   Result Value Ref Range    Troponin T, High Sensitivity 41 (H) <=14 ng/L   Nt probnp inpatient (BNP)   Result Value Ref Range    N terminal Pro BNP Inpatient 2,039 (H) 0 - 900 pg/mL   Ethyl Alcohol Level   Result Value Ref Range    Alcohol ethyl <0.01 (H) <=0.00 g/dL   UA with Microscopic reflex to Culture    Specimen: Urine, Catheter   Result Value Ref Range    Color Urine Light Yellow Colorless, Straw, Light Yellow, Yellow    Appearance Urine Clear Clear    Glucose Urine Negative Negative mg/dL    Bilirubin Urine Negative Negative    Ketones Urine Negative Negative mg/dL    Specific Gravity Urine 1.015 1.001 - 1.030    Blood Urine 0.06 mg/dL (A) Negative    pH Urine 6.0 5.0 - 7.0    Protein Albumin Urine 300 (A) Negative mg/dL    Urobilinogen Urine <2.0 <2.0 mg/dL    Nitrite Urine Negative Negative    Leukocyte Esterase Urine Negative Negative    Mucus Urine Present (A) None Seen /LPF    RBC Urine 1 <=2 /HPF    WBC Urine 1 <=5 /HPF    Squamous Epithelials Urine <1 <=1 /HPF    Hyaline Casts Urine 4 (H) <=2 /LPF   CBC with platelets and differential   Result Value Ref Range    WBC Count 9.1 4.0 - 11.0 10e3/uL    RBC Count 4.03 3.80 - 5.20 10e6/uL    Hemoglobin 12.9 11.7 - 15.7 g/dL    Hematocrit 39.3 35.0 - 47.0 %    MCV 98 78 - 100 fL    MCH 32.0 26.5 - 33.0 pg    MCHC 32.8 31.5 - 36.5 g/dL    RDW 11.8 10.0 - 15.0 %    Platelet Count 366 150 - 450 10e3/uL    % Neutrophils 76 %    % Lymphocytes 12 %    % Monocytes 9 %    % Eosinophils 2 %    % Basophils 0 %    % Immature Granulocytes 1 %    NRBCs per 100 WBC 0 <1 /100    Absolute Neutrophils 7.0 1.6 - 8.3 10e3/uL    Absolute Lymphocytes 1.1 0.8 - 5.3 10e3/uL    Absolute Monocytes 0.9 0.0 - 1.3  10e3/uL    Absolute Eosinophils 0.2 0.0 - 0.7 10e3/uL    Absolute Basophils 0.0 0.0 - 0.2 10e3/uL    Absolute Immature Granulocytes 0.1 <=0.4 10e3/uL    Absolute NRBCs 0.0 10e3/uL   Symptomatic; Auto-generated order Influenza A/B & SARS-CoV2 (COVID-19) Virus PCR Multiplex Nasopharyngeal    Specimen: Nasopharyngeal; Swab   Result Value Ref Range    Influenza A PCR Negative Negative    Influenza B PCR Negative Negative    RSV PCR Negative Negative    SARS CoV2 PCR Negative Negative       RADIOLOGY:  Reviewed all pertinent imaging. Please see official radiology report.  CT Chest Abdomen Pelvis w/o Contrast   Final Result   IMPRESSION:   1.  Mild wall thickening of colon in the left lower quadrant with slight adjacent inflammation. Findings could be due to diverticulitis or focal colitis. Follow-up to confirm resolution.   2.  No bowel obstruction or abscess.      CT Head w/o Contrast   Final Result   IMPRESSION:   1.  Mildly motion degraded exam. No definite acute intracranial abnormality.   2.  Mild sequelae of chronic microvascular ischemic disease.           EKG:    Performed at: [facility] Emergency Department. 31-OCT-2022, 01:30     Impression: Sinus rhythm. Pulmonary disease pattern. Left anterior fascicular block. Abnormal ECG.     Rate: 85 BPM  Rhythm: Sinus rhythm  P-R-T Axis: 90 -63 57  MA Interval:  202 ms  QRS Interval: 104 ms  QT/QTc Interval: 388/461 ms  Comparison: Compared with ECG of  28-APR-2017 07:40, Vent. Rate has increased BY 32 BPM. QT has lengthened.     I have independently reviewed and interpreted the EKG(s) documented above.        MEDICATIONS GIVEN IN THE EMERGENCY:  Medications   iopamidol (ISOVUE-370) solution 75 mL (75 mLs Intravenous Not Given 10/31/22 0120)   melatonin tablet 3 mg (has no administration in time range)   acetaminophen (TYLENOL) tablet 650 mg (has no administration in time range)     Or   acetaminophen (TYLENOL) Suppository 650 mg (has no  administration in time range)   polyethylene glycol (MIRALAX) Packet 17 g (has no administration in time range)   bisacodyl (DULCOLAX) suppository 10 mg (has no administration in time range)   senna-docusate (SENOKOT-S/PERICOLACE) 8.6-50 MG per tablet 1 tablet (has no administration in time range)     Or   senna-docusate (SENOKOT-S/PERICOLACE) 8.6-50 MG per tablet 2 tablet (has no administration in time range)   prochlorperazine (COMPAZINE) injection 5 mg (has no administration in time range)     Or   prochlorperazine (COMPAZINE) tablet 5 mg (has no administration in time range)     Or   prochlorperazine (COMPAZINE) suppository 12.5 mg (has no administration in time range)   benztropine (COGENTIN) tablet 1 mg (has no administration in time range)   QUEtiapine (SEROquel) half-tab 12.5 mg (has no administration in time range)   0.9% sodium chloride BOLUS (500 mLs Intravenous New Bag 10/31/22 0120)           NEW PRESCRIPTIONS STARTED AT TODAY'S ER VISIT:  New Prescriptions    No medications on file          FINAL DIAGNOSIS:    ICD-10-CM    1. Altered mental status, unspecified altered mental status type  R41.82                At the conclusion of the encounter I discussed the results of all of the tests and the disposition. The questions were answered. The patient or family acknowledged understanding and was agreeable with the care plan.     NAME: Desirae Rey  AGE: 71 year old female  YOB: 1950  MRN: [id number]  EVALUATION DATE & TIME: 10/30/2022 10:42 PM    PCP: Noemy Brito    ED PROVIDER: ROXIE Augustin, Jay Farr, am serving as a scribe to document services personally performed by Dr. Roberto Armstrong based on my observation and the provider's statements to me. I, Roberto Armstrong MD attest that Jay Farr is acting in a scribe capacity, has observed my performance of the services and has documented them in accordance with my direction.    Roberto Armstrong M.D.  Emergency  Medicine  [facility] EMERGENCY DEPARTMENT  [facility] [facility] [address]  [phone]  Dept: [phone]  10/30/2022         Roberto Armstrong MD  10/31/22 0332

## 2022-11-01 ENCOUNTER — APPOINTMENT (OUTPATIENT)
Dept: NEUROLOGY | Facility: HOSPITAL | Age: 72
End: 2022-11-01
Attending: PSYCHIATRY & NEUROLOGY
Payer: MEDICARE

## 2022-11-01 ENCOUNTER — APPOINTMENT (OUTPATIENT)
Dept: OCCUPATIONAL THERAPY | Facility: HOSPITAL | Age: 72
End: 2022-11-01
Payer: MEDICARE

## 2022-11-01 ENCOUNTER — APPOINTMENT (OUTPATIENT)
Dept: MRI IMAGING | Facility: HOSPITAL | Age: 72
End: 2022-11-01
Attending: PSYCHIATRY & NEUROLOGY
Payer: MEDICARE

## 2022-11-01 LAB
ANION GAP SERPL CALCULATED.3IONS-SCNC: 8 MMOL/L (ref 7–15)
BUN SERPL-MCNC: 38.6 MG/DL (ref 8–23)
CALCIUM SERPL-MCNC: 9.2 MG/DL (ref 8.8–10.2)
CHLORIDE SERPL-SCNC: 101 MMOL/L (ref 98–107)
CREAT SERPL-MCNC: 1.57 MG/DL (ref 0.51–0.95)
DEPRECATED HCO3 PLAS-SCNC: 29 MMOL/L (ref 22–29)
ERYTHROCYTE [DISTWIDTH] IN BLOOD BY AUTOMATED COUNT: 12 % (ref 10–15)
GFR SERPL CREATININE-BSD FRML MDRD: 35 ML/MIN/1.73M2
GLUCOSE SERPL-MCNC: 109 MG/DL (ref 70–99)
HCT VFR BLD AUTO: 37.8 % (ref 35–47)
HGB BLD-MCNC: 11.7 G/DL (ref 11.7–15.7)
MCH RBC QN AUTO: 31.7 PG (ref 26.5–33)
MCHC RBC AUTO-ENTMCNC: 31 G/DL (ref 31.5–36.5)
MCV RBC AUTO: 102 FL (ref 78–100)
PLATELET # BLD AUTO: 353 10E3/UL (ref 150–450)
POTASSIUM SERPL-SCNC: 4.8 MMOL/L (ref 3.4–5.3)
RBC # BLD AUTO: 3.69 10E6/UL (ref 3.8–5.2)
SODIUM SERPL-SCNC: 138 MMOL/L (ref 136–145)
WBC # BLD AUTO: 7.4 10E3/UL (ref 4–11)

## 2022-11-01 PROCEDURE — G0378 HOSPITAL OBSERVATION PER HR: HCPCS

## 2022-11-01 PROCEDURE — 255N000002 HC RX 255 OP 636

## 2022-11-01 PROCEDURE — 36415 COLL VENOUS BLD VENIPUNCTURE: CPT | Performed by: FAMILY MEDICINE

## 2022-11-01 PROCEDURE — 99214 OFFICE O/P EST MOD 30 MIN: CPT | Performed by: PSYCHIATRY & NEUROLOGY

## 2022-11-01 PROCEDURE — 82310 ASSAY OF CALCIUM: CPT | Performed by: FAMILY MEDICINE

## 2022-11-01 PROCEDURE — 250N000013 HC RX MED GY IP 250 OP 250 PS 637: Performed by: HOSPITALIST

## 2022-11-01 PROCEDURE — 999N000157 HC STATISTIC RCP TIME EA 10 MIN

## 2022-11-01 PROCEDURE — 99225 PR SUBSEQUENT OBSERVATION CARE,LEVEL II: CPT | Performed by: INTERNAL MEDICINE

## 2022-11-01 PROCEDURE — 95819 EEG AWAKE AND ASLEEP: CPT | Mod: 26 | Performed by: PSYCHIATRY & NEUROLOGY

## 2022-11-01 PROCEDURE — 97110 THERAPEUTIC EXERCISES: CPT | Mod: GO

## 2022-11-01 PROCEDURE — 99214 OFFICE O/P EST MOD 30 MIN: CPT | Performed by: INTERNAL MEDICINE

## 2022-11-01 PROCEDURE — 95819 EEG AWAKE AND ASLEEP: CPT

## 2022-11-01 PROCEDURE — 999N000127 HC STATISTIC PERIPHERAL IV START W US GUIDANCE

## 2022-11-01 PROCEDURE — 85027 COMPLETE CBC AUTOMATED: CPT | Performed by: FAMILY MEDICINE

## 2022-11-01 PROCEDURE — 97535 SELF CARE MNGMENT TRAINING: CPT | Mod: GO

## 2022-11-01 PROCEDURE — 250N000013 HC RX MED GY IP 250 OP 250 PS 637: Performed by: FAMILY MEDICINE

## 2022-11-01 PROCEDURE — 72156 MRI NECK SPINE W/O & W/DYE: CPT | Mod: ME

## 2022-11-01 PROCEDURE — A9585 GADOBUTROL INJECTION: HCPCS

## 2022-11-01 RX ORDER — GADOBUTROL 604.72 MG/ML
11 INJECTION INTRAVENOUS ONCE
Status: COMPLETED | OUTPATIENT
Start: 2022-11-01 | End: 2022-11-01

## 2022-11-01 RX ADMIN — POLYETHYLENE GLYCOL 3350 17 G: 17 POWDER, FOR SOLUTION ORAL at 08:22

## 2022-11-01 RX ADMIN — CETIRIZINE HYDROCHLORIDE 10 MG: 10 TABLET ORAL at 08:02

## 2022-11-01 RX ADMIN — AZELASTINE HYDROCHLORIDE 1 DROP: 0.5 SOLUTION/ DROPS INTRAOCULAR at 20:52

## 2022-11-01 RX ADMIN — DULOXETINE HYDROCHLORIDE 30 MG: 30 CAPSULE, DELAYED RELEASE ORAL at 20:53

## 2022-11-01 RX ADMIN — DULOXETINE HYDROCHLORIDE 30 MG: 30 CAPSULE, DELAYED RELEASE ORAL at 08:02

## 2022-11-01 RX ADMIN — SENNOSIDES AND DOCUSATE SODIUM 1 TABLET: 50; 8.6 TABLET ORAL at 20:52

## 2022-11-01 RX ADMIN — METOPROLOL SUCCINATE 50 MG: 50 TABLET, EXTENDED RELEASE ORAL at 21:05

## 2022-11-01 RX ADMIN — FUROSEMIDE 40 MG: 20 TABLET ORAL at 08:22

## 2022-11-01 RX ADMIN — HYDROCODONE BITARTRATE AND ACETAMINOPHEN 1 TABLET: 5; 325 TABLET ORAL at 22:24

## 2022-11-01 RX ADMIN — FUROSEMIDE 20 MG: 20 TABLET ORAL at 11:17

## 2022-11-01 RX ADMIN — MODAFINIL 250 MG: 100 TABLET ORAL at 10:24

## 2022-11-01 RX ADMIN — AZELASTINE HYDROCHLORIDE 1 DROP: 0.5 SOLUTION/ DROPS INTRAOCULAR at 08:23

## 2022-11-01 RX ADMIN — TOLTERODINE 4 MG: 2 CAPSULE, EXTENDED RELEASE ORAL at 08:02

## 2022-11-01 RX ADMIN — TRIAMCINOLONE ACETONIDE: 1 OINTMENT TOPICAL at 20:53

## 2022-11-01 RX ADMIN — PANTOPRAZOLE SODIUM 40 MG: 40 TABLET, DELAYED RELEASE ORAL at 08:22

## 2022-11-01 RX ADMIN — GADOBUTROL 11 ML: 604.72 INJECTION INTRAVENOUS at 15:24

## 2022-11-01 RX ADMIN — MONTELUKAST SODIUM 10 MG: 10 TABLET, COATED ORAL at 21:05

## 2022-11-01 ASSESSMENT — ACTIVITIES OF DAILY LIVING (ADL)
ADLS_ACUITY_SCORE: 41
ADLS_ACUITY_SCORE: 47
ADLS_ACUITY_SCORE: 51
ADLS_ACUITY_SCORE: 41
ADLS_ACUITY_SCORE: 51
ADLS_ACUITY_SCORE: 41
ADLS_ACUITY_SCORE: 41

## 2022-11-01 ASSESSMENT — COLUMBIA-SUICIDE SEVERITY RATING SCALE - C-SSRS
1. IN THE PAST MONTH, HAVE YOU WISHED YOU WERE DEAD OR WISHED YOU COULD GO TO SLEEP AND NOT WAKE UP?: NO
3. HAVE YOU BEEN THINKING ABOUT HOW YOU MIGHT KILL YOURSELF?: NO
2. HAVE YOU ACTUALLY HAD ANY THOUGHTS OF KILLING YOURSELF IN THE PAST MONTH?: NO
6. HAVE YOU EVER DONE ANYTHING, STARTED TO DO ANYTHING, OR PREPARED TO DO ANYTHING TO END YOUR LIFE?: NO
5. HAVE YOU STARTED TO WORK OUT OR WORKED OUT THE DETAILS OF HOW TO KILL YOURSELF? DO YOU INTEND TO CARRY OUT THIS PLAN?: NO
4. HAVE YOU HAD THESE THOUGHTS AND HAD SOME INTENTION OF ACTING ON THEM?: NO

## 2022-11-01 NOTE — PROGRESS NOTES
Bedside EEG was performed that included photic stimulation. HV is not done due to patient state. The patient was both awake and asleep during the recording.  EEG #   Skins-Intact  EEG system was used.[id number]

## 2022-11-01 NOTE — PROGRESS NOTES
Cardiology Progress Note    Assessment:  1.  Elevated high-sensitivity troponin the trend is not suggestive of an acute myocardial infarction.  Details include a troponin greater than 14 but less than 100 repeat in 2 hours. When the change in troponin is greater than 7suggests a  higher concern for acute myocardial infarction ST changes less than 7 this appears less likely.  Troponin was flat at 41-39-38.  Elevated BNP noted.  Patient has moderate coronary calcification on the CT scan completed this admission.  2.  Heart failure with preserved ejection fraction.  Echocardiogram this admission demonstrates normal systolic function.  Diastolic indices were reported to be abnormal.  No regional wall motion abnormality noted.  Input and output not recorded.  Patient not reporting dyspnea. .  It appears that she received both the 20 mg dose and a 40 mg dose.  Home dose appears to be 40 mg in the morning and 20 mg in the afternoon.    3.Obstructive sleep apnea/obesity hypoventilation syndrome.  Patient did not wear her CPAP for 2 days which currently being precipitating factor to her admission.  Suspect troponin was related to demand.    4.  Renal insufficiency.  Patient has chronic renal insufficiency.  Creatinine is somewhat higher today at 1.57 from admission 1.26.      Principal Problem:    Altered mental status, unspecified altered mental status type  Active Problems:    NELLIE (obstructive sleep apnea)    Benign essential hypertension    CKD (chronic kidney disease) stage 3, GFR 30-59 ml/min (H)    Acute weakness    Adult failure to thrive    Chronic heart failure with preserved ejection fraction (HFpEF) (H)    Elevated troponin      Plan:  1.  Repeat chemistry profile in the morning.  No evidence for overt congestive heart failure on exam.  2.  We will arrange a Lexiscan nuclear stress test  3.  Discussed the importance of utilizing her CPAP regularly.  4.  Lipid management.  Patient is on rosuvastatin 10 mg daily  at home.  The last LDL cholesterol in the chart is 89 from 2021 would recommend repeating.    Subjective:   Desirae Arteaga  Is seen in follow-up.  She is more alert today.  She denies chest discomfort.  She does endorse some chronic mild shortness of breath with exertion.  She does not recall having a recent stress test but may have had a stress echo number of years ago.    Objective:   Vital signs in last 24 hours:  VITALS: /83 (BP Location: Right arm)   Pulse 65   Temp 97.8  F (36.6  C) (Oral)   Resp 20   Ht 1.524 m (5')   Wt 95.7 kg (210 lb 14.4 oz)   SpO2 99%   BMI 41.19 kg/m    BMI: Body mass index is 41.19 kg/m .  Wt Readings from Last 3 Encounters:   22 95.7 kg (210 lb 14.4 oz)   20 104.4 kg (230 lb 1.6 oz)   19 98 kg (216 lb)       Intake/Output Summary (Last 24 hours) at 2022 1718  Last data filed at 10/31/2022 2030  Gross per 24 hour   Intake 240 ml   Output --   Net 240 ml       Physical Exam:  General: More alert in no acute distress   Neck: JVP difficult to assess secondary to body habitus   Lungs: clear to auscultation   COR: RRR, No murmur, no rub no gallop   Abd: nondistended, BS present   Extrem: No edema, warm  Neuro: More alert, oriented    Cardiographics:    ECG: No acute changes, sinus rhythm with no ischemia sinus rhythm, left anterior fascicular block  Narrative & Impression  [id number]  [id number]  [id number]  [id number]^JAMES^BRIONNA^[place] [address]     Name: DESIRAE THOMAS  MRN: [id number]  : 1950  Study Date: 10/31/2022 09:30 AM  Age: 71 yrs  Gender: Female  Patient Location: [facility]  Reason For Study: Abn EKG  Ordering Physician: BRIONNA RAMIREZ  Performed By: ACE     BSA: 2.1 m2  Height: 60 in  Weight: 250 lb  HR: 88  BP: 148/80 mmHg  ______________________________________________________________________________  Procedure  Complete Echo Adult. Definity (NDC #05790-381) given intravenously.  4mL given.  ______________________________________________________________________________  Interpretation Summary     The left ventricle is normal in size.  Left ventricular function is normal.The ejection fraction is 60-65%.  The left atrium is mildly dilated.  There is moderate mitral annular calcification.  Mild valvular aortic stenosis.  ______________________________________________________________________________  Left Ventricle  The left ventricle is normal in size. Left ventricular function is normal.The  ejection fraction is 60-65%. There is normal left ventricular wall thickness.  Left ventricular diastolic function is abnormal. No regional wall motion  abnormalities noted.     Right Ventricle  The right ventricle is not well visualized.     Atria  The left atrium is mildly dilated. Right atrial size is normal. There is no  color Doppler evidence of an atrial shunt.     Mitral Valve  There is moderate mitral annular calcification. There is no mitral  regurgitation noted.     Tricuspid Valve  The tricuspid valve is not well visualized. No tricuspid regurgitation.     Aortic Valve  The aortic valve is not well visualized. No aortic regurgitation is present.  Mild valvular aortic stenosis.     Pulmonic Valve  The pulmonic valve is not well visualized. There is trace pulmonic valvular  regurgitation.     Vessels  The aorta root is normal. Normal size ascending aorta. IVC diameter <2.1 cm  collapsing >50% with sniff suggests a normal RA pressure of 3 mmHg.     Pericardium  There is no pericardial effusion        Imaging:   Chest X-Ray:   EXAM: CT CHEST ABDOMEN PELVIS W/O CONTRAST  LOCATION: [facility]  DATE/TIME: 10/31/2022 1:11 AM     INDICATION: ams tender abd  COMPARISON: None.  TECHNIQUE: CT scan of the chest, abdomen, and pelvis was performed without IV contrast. Multiplanar reformats were obtained. Dose reduction techniques were used.   CONTRAST: None.     FINDINGS:   LUNGS AND  PLEURA: Scattered subsegmental atelectasis left lung. No pleural effusion.     MEDIASTINUM/AXILLAE: Atherosclerotic aorta. No adenopathy. No significant pericardial effusion. Mitral annular calcification.     CORONARY ARTERY CALCIFICATION: Moderate.     HEPATOBILIARY: Cholecystectomy.     PANCREAS: Fatty atrophy of the pancreas.     SPLEEN: Normal.     ADRENAL GLANDS: Normal.     KIDNEYS/BLADDER: Normal.     BOWEL: Normal caliber. Diverticulosis. Slight thickening of the colon in the left lower quadrant with mild adjacent inflammation.     LYMPH NODES: Normal.     VASCULATURE: Diffuse, atherosclerotic vascular calcification.     PELVIC ORGANS: Normal.     MUSCULOSKELETAL: Fat-containing hernia umbilical region. Degenerative change osseous structures. Postsurgical change cervical spine.                                                                      IMPRESSION:  1.  Mild wall thickening of colon in the left lower quadrant with slight adjacent inflammation. Findings could be due to diverticulitis or focal colitis. Follow-up to confirm resolution.  2.  No bowel obstruction or abscess.    EXAM: CT HEAD W/O CONTRAST  LOCATION: [facility]  DATE/TIME: 10/31/2022 1:09 AM     INDICATION: Altered mental status  COMPARISON: None.  TECHNIQUE: Routine CT Head without IV contrast. Multiplanar reformats. Dose reduction techniques were used.     FINDINGS: Mildly motion degraded exam.  INTRACRANIAL CONTENTS: No intracranial hemorrhage, extraaxial collection, or mass effect.  The gray-white differentiation is grossly preserved. Mild presumed chronic small vessel ischemic changes. Normal ventricles and sulci.      VISUALIZED ORBITS/SINUSES/MASTOIDS: Prior bilateral cataract surgery. Visualized portions of the orbits are otherwise unremarkable. Mild mucosal thickening scattered about the paranasal sinuses. Nonspecific fluid in the left maxillary sinus. No middle   ear or mastoid effusion.     BONES/SOFT  TISSUES: No acute abnormality.                                                                      IMPRESSION:  1.  Mildly motion degraded exam. No definite acute intracranial abnormality.  2.  Mild sequelae of chronic microvascular ischemic disease.   Lab Results    Chemistry/lipid CBC Cardiac Enzymes/BNP/TSH/INR   Recent Labs   Lab Test 01/29/21  1647   CHOL 190   HDL 42*   LDL 89   TRIG 293*     Recent Labs   Lab Test 01/29/21  1647 05/13/20  1536 05/10/19  1701   LDL 89 103 125     Recent Labs   Lab Test 11/01/22  0738      POTASSIUM 4.8   CHLORIDE 101   CO2 29   *   BUN 38.6*   CR 1.57*   GFRESTIMATED 35*   LYNN 9.2     Recent Labs   Lab Test 11/01/22  0738 10/31/22  1442 10/30/22  2355   CR 1.57* 1.24* 1.26*     No results for input(s): A1C in the last 50584 hours.       Recent Labs   Lab Test 11/01/22  0738   WBC 7.4   HGB 11.7   HCT 37.8   *        Recent Labs   Lab Test 11/01/22  0738 10/30/22  2355   HGB 11.7 12.9    No results for input(s): TROPONINI in the last 33635 hours.  Recent Labs   Lab Test 10/30/22  2355 02/16/18  1606   BNP  --  137*   NTBNPI 2,039*  --      Recent Labs   Lab Test 10/31/22  0334   TSH 4.57*     No results for input(s): INR in the last 63256 hours.

## 2022-11-01 NOTE — PLAN OF CARE
PRIMARY DIAGNOSIS: ACUTE PAIN  OUTPATIENT/OBSERVATION GOALS TO BE MET BEFORE DISCHARGE:  1. Pain Status: Pain free.    2. Return to near baseline physical activity: No    3. Cleared for discharge by consultants (if involved): No    Discharge Planner Nurse   Safe discharge environment identified: Yes  Barriers to discharge: Yes       Entered by: Ioana Guerra RN 11/01/2022 2:07 PM   Pt is alert and oriented x4. Pt is med complaint. Pt's v/s is stable. Pt denies pain. Pt had small bm this morning, continue to monitor pt.  Please review provider order for any additional goals.   Nurse to notify provider when observation goals have been met and patient is ready for discharge.Goal Outcome Evaluation:

## 2022-11-01 NOTE — PLAN OF CARE
Problem: Plan of Care - These are the overarching goals to be used throughout the patient stay.    Goal: Optimal Comfort and Wellbeing  Outcome: Progressing     Pt comfortable overnight. Makes needs known.   Still very weak she says compared to baseline.   Purewick in place. A/o 4x.   Vss. 3L nasal canula baseline. EEG this AM.

## 2022-11-01 NOTE — PROGRESS NOTES
Hospitalist Progress Note    Assessment/Plan    Principal Problem:    Altered mental status, unspecified altered mental status type  Active Problems:    NELLIE (obstructive sleep apnea)    Benign essential hypertension    CKD (chronic kidney disease) stage 3, GFR 30-59 ml/min (H)    Acute weakness    Adult failure to thrive    Chronic heart failure with preserved ejection fraction (HFpEF) (H)    Elevated troponin    Desirae Rey is a 71 year old female with history of obstructive sleep apnea, heart failure with preserved ejection fraction, restrictive lung disease on 3 L oxygen at home, CKD stage III who presented with weakness and confusion for the past 4 days.  Patient was hallucinating.  She thinks it may have been related to an antibiotic that she started for UTI.  She also admits that she had not worn her CPAP for 2 or 3 days prior to admission at night.  Usually she is very compliant with her CPAP.  She did use oxygen at that time.    Acute metabolic encephalopathy  -Etiology is likely multifactorial  -Could be due to the fact that she was not using her CPAP, medications contributing  -CT scan of the head did not show any acute intracranial abnormality  -Urine tox screen was positive for marijuana and opiates  -Her encephalopathy seems to have resolved and her mentation seems to have returned to baseline  -She is hoping to go home    Generalized weakness  -Etiology is unclear  -Probably related to medications and what ever caused her confusion  -Appreciate input from neurology  -She says at baseline she uses an assistive device at home but otherwise is independent in her ADLs  -She was evaluated by therapy and they are recommending TCU    Chronic hypoxic respiratory failure due to restrictive lung disease  -Patient's oxygen needs are at baseline which is 3 L via nasal cannula    Chronic diastolic heart failure  -Previously seen by cardiologist in 2017 and diagnosed with diastolic congestive heart failure  -She  has been taking Lasix twice a day  -Her troponin was elevated but her delta troponin was negative and unlikely to be ACS  -2D echocardiogram done here shows an EF of 60 to 65% but abnormal diastolic function  -Continue diuretics    Diverticulitis versus focal colitis on CT scan  -Patient does not have any symptoms.  White count is normal and she is afebrile  -This was incidentally found on CT scan  -Monitor    Elevated high-sensitivity troponin with a flat trend  -Unlikely to be ACS  -2D echo also does not show any regional wall motion abnormality  -Seen by cardiology and appreciate recommendations    CKD stage III  -Baseline creatinine is anywhere 1.2-1.6  -Creatinine remains at baseline    NELLIE/OHS  -Previously she was very compliant with her CPAP.  She was encouraged to continue    Barriers to Discharge: If patient remains stable and her mentation remained stable then probably discharge on 11/2/2022    Anticipated discharge date/Disposition: 11/2/2022.  Therapies recommending TCU.    Subjective  Patient was sitting up in the bed.  Visiting with her son.  Cognitively she seemed very appropriate.  Denies being confused.  No new symptoms.  She is hoping she can go home soon.    Objective    Vital signs in last 24 hours  Temp:  [97.8  F (36.6  C)-98.2  F (36.8  C)] 97.8  F (36.6  C)  Pulse:  [65-92] 65  Resp:  [18-20] 20  BP: (127-143)/(70-83) 127/83  SpO2:  [95 %-99 %] 99 % [unfilled] O2 Device: Nasal cannula    Weight:   [unfilled] Weight change:     Intake/Output last 3 shifts  I/O last 3 completed shifts:  In: 240 [P.O.:240]  Out: -   Body mass index is 41.19 kg/m .    Physical Exam:  General Appearance: Alert, oriented x3, does not appear in distress.  HEENT: Normocephalic. No scleral icterus, . Mucous membranes moist.  Heart: :Regular rate and rhythm, normal S1 ,S2, No murmurs, no JVD, nopedal edema   Lungs: Clear to auscultation bilaterally. No wheezing or crackles  Abdomen: Soft, non tender, no rebound or  rigidity, non distended, bowel sounds present.  Extremity: No deformity. No joint swelling.      Pertinent Labs   Lab Results: personally reviewed.   Recent Labs   Lab 11/01/22  0738 10/31/22  1442 10/30/22  2355    138 138   CO2 29 27 27   BUN 38.6* 27.9* 30.6*   ALBUMIN  --   --  3.8   ALKPHOS  --   --  82   ALT  --   --  30   AST  --   --  44*     Recent Labs   Lab 11/01/22 0738 10/30/22  2355   WBC 7.4 9.1   HGB 11.7 12.9   HCT 37.8 39.3    366     No results for input(s): CKTOTAL, TROPONINI in the last 168 hours.    Invalid input(s): TROPONINT, CKMBINDEX  Invalid input(s): PERI Thomas MD  Hospitalist  Board certified in internal medicine

## 2022-11-01 NOTE — PROGRESS NOTES
"This is a neurological follow-up note    71-year-old admitted to hospital 10/30/2022      Chief complaint  Generalized weakness lethargy  Generalized weakness  Recent right leg cellulitis  Cognitive changes and hallucinations (metabolic encephalopathy)      HPI  71-year-old brought in to hospital on 10/30/2022 by her .   gave most of the history.  Patient has been progressively weak \"disabled and delusional\" worsening over the last 3 days.  For couple of days needed to use the walker but on day of admission was unable to walk with a walker.  Patient was found on the floor by family, patient stated that she had lowered her self to the floor but she is confused.    Patient is lethargic/confused    Had a little bit of faint erythema in the lower extremities patient may have some right leg cellulitis recently diagnosed possibly prescribed antibiotics within the last week or so.    Recently patient has been weak would get on the floor not from a fall but was unable to get back up and would crawl around    Patient been having some hallucinations and seeing people in the home      Patient has history of significant cervical spine disease recent problems with past fusion has seen orthopedic spine specialist who wants to do refusion of her neck.  Has chronic arthritic shoulders with frozen shoulder bilaterally chronic greater than 4 years  Arthritic gait with diffuse weakness chronic in nature at baseline        Significant chronic cervical spine disease        Chronic frozen shoulders        Chronic diffuse \"fibromyalgia pain syndrome\"        2017 anterior cervical fusion helped for about 3 months        Evaluated by neurosurgery June 2020 with cervical pseudoarthrosis cervical radiculopathy (considering possible C5-T1 posterior cervical fusion)        Chronic ulnar numbness bilaterally.  Past history of ulnar nerve decompression.        Previously treated with Biofreeze/Bengay/Vicodin/prednisone for her " pain.          Review of past sleep study April 2019  Diagnosis:  - G47.33 Obstructive sleep apnea  - G47.34 Sleep related hypoxia  - G47.36 Sleep related hypoventilation in conditions classified elsewhere     Assessment & Recommendation:        Although optimal pressures were not determined, I would recommend auto-titrating Bilevel with an EPAP min of 12, IPAP max of 25, and PS of 7 cmH2O.  Recommend special attention to mask fit and mouth leak, especially at higher pressures.    I suspect hypoxia will persist despite maximal Bilevel support.  o If patient already qualifies for continuous oxygen, recommend bleeding 1 LPM at machine end of tubing (recommend ResMed devices due to tubing with integrated oxygen hookup).  o If patient does not qualify for continuous oxygen already, then she would benefit from nocturnal oximetry to confirm resolution of hypoxia.  She may require repeat titration study to qualify for supplemental oxygen therapy.    Consider adjunct positional therapy with HOB elevation or lateral sleep position.    Patients with excessive daytime sleepiness are at increased risk for motor vehicle accidents.    Suggest optimizing sleep hygiene and avoiding any further sleep deprivation.    Consider weight reduction management as this may be a factor in NELLIE.    Recommend evaluation due to periodic leg movements.    Measures aimed at increasing sleep consolidation can be beneficial.  Hesham Nguyen MD  Diplomate of Sleep Medicine, [facility]        Past medical history  Hypertension  Hyperlipidemia  CHF  CKD  COPD  Obstructive sleep apnea/pickwickian syndrome  Fibromyalgia  Osteoarthritis  Pseudogout  Right cubital tunnel syndrome  Adult failure to thrive  History of blood clots in the 1970s possibly DVT from birth control  Plantar fasciitis      Habits  Non-smoker  Does not drink alcohol      Family history  Father COPD  Mother rheumatoid arthritis  Sister heart disease  Daughter pseudotumor    Work-up  CT scan  head 10/31/2022  1.  Mildly motion degraded exam.        No definite acute intracranial abnormality.  2.  Mild sequelae of chronic microvascular ischemic disease.  CT chest abdomen pelvis 10/31/2022 see official report  1.  Mild wall thickening of colon in the left lower quadrant with slight adjacent inflammation.        Findings could be due to diverticulitis or focal colitis.        Follow-up to confirm resolution.  2.  No bowel obstruction or abscess.    TSH 4.57  Troponin high-sensitivity 41-39 elevated  AST 44  ESR 50  CRP 26.4  -289  BNP 2039  COVID-19 negative, influenza A and influenza B negative, RSV negative  Alcohol level negative  Urine tox screen positive opioids/cannabinoids  Echo 10/31/2022 60-65% ejection fraction, left atrium mildly dilated  EEG 11/1/2022,Impression  Mildly abnormal awake drowsy and sleep stage I EEG due to:  Subtle mild theta disorganization of the background nonspecific in nature  Could be consistent with mild cerebral dysfunction           Labs  Sodium 138 potassium 4.4  BUN 30.6, creatinine 1.26  Glucose 126  WBC 9.1, hemoglobin 12.9, platelets 366,000        Exam  Blood pressure 143/70, pulse 92, temperature 90.7  Blood pressure range 143//84  Pulse range 85-98  T-max 98.7            10/31/2022  NIF    -20       FVC  1.29    General exam per primary MD  Alert orient x3 now  HEENT no signs of trauma  Lungs clear  Heart rate regular  Abdomen soft  No edema the feet        Neurologic exam  Alert orient x3  Prosody speech normal  Naming normal  Repetition normal  Comprehension normal  No neglect  Memory recall okay at bedside testing    Cranials 2 through 12  No ophthalmoplegia  No nystagmus  No visual field cut  Face symmetrical  Tongue twisters good    Upper extremities  Chronic decreased range of motion of the shoulders  Reported right over left pain with testing  Deltoid 4/4  Biceps 4/4+  Triceps 5/4+  Wrist extensors 5/5  Wrist flexors 5/5  Finger extensors  "5/5  Intrinsic hand strength 4/4    Lower extremities reported right over left  Iliopsoas 4/4  Quadriceps 4/4  Anterior tibial 5/5    Reflexes  Biceps 2+  Triceps 1+  Knees 2+  Ankles absent  Toes downgoing  No spasticity      Sensory  Chronic numbness hands bilaterally  Decreased vibratory sense chronically in the toes    Gait  Deferred            Assessment/plan    1.  Hallucinations/lethargy/confusion (question metabolic encephalopathy)       Patient with obstructive sleep apnea/pickwickian syndrome/COPD       History of hypoventilation syndrome/restrictive chronic hypoxic respiratory failure       May not have used her BiPAP the last couple of days per the patient, ???         Home medication last       Zyrtec 10 mg daily       Cymbalta 30 mg twice daily       Hydrocodone 1 every 4 hours as needed maximum 6 tablets/day       Zanaflex 4 mg every 8 hour as needed              Has a care 10 mg 5-10 mg at nighttime         Provigil 250 mg daily       O2 3 L/min at night        2.   Significant chronic cervical spine disease        Chronic frozen shoulders        Chronic diffuse \"fibromyalgia pain syndrome\"        2017 anterior cervical fusion helped for about 3 months        Evaluated by neurosurgery June 2020 with cervical pseudoarthrosis cervical radiculopathy (considering possible C5-T1 posterior cervical fusion)        3.  CT chest abdomen pelvis 10/31/2022 see official report       Mild wall thickening of colon in the left lower quadrant with slight adjacent inflammation.        Findings could be due to diverticulitis or focal colitis.        Follow-up to confirm resolution.    4.  Questionable component of PMR        ESR 50        CRP 26.4        -289    5.   Chronic diastolic heart failure/elevated BNP         Cardiology checking echo        Diagnosis  Metabolic toxic encephalopathy  Chronic cervical neck disease chronic spinal difficulties exacerbated by above  PMR  Hypoventilation/obstructive sleep " apnea severe with hypoxia  Cardiac disease          Myasthenia gravis panel pending  Echo pending  Check EEG    Diligent treatment of hypoventilation syndrome/obstructive sleep apnea patient states that she may be on BiPAP not CPAP and per chart may need oxygen with this.    May need pain consult and review due to difficulty with breathing disorder pain medications    Question seen by neurosurgery back in June 2020 with consideration of further intervention for her neck if not improving in strength with PT consider MRI scan cervical spine    I am not sure that this is a neuromuscular junction problem MG antibodies pending    Patient has fluctuating sensorium  EEG shows a little bit of theta slowing of the background subtle    Suspect encephalopathy is multifactorial    Difficulty with gait and weakness is multifactorial and complex  Does have known cervical neck disease  We will see if we can repeat an MRI scan of her cervical spine  Continue with PT/OT as tolerated    32 minutes total care time today greater than 50% face-to-face patient care team reviewing and discussing the above.

## 2022-11-01 NOTE — PROGRESS NOTES
"PRIMARY DIAGNOSIS: \"GENERIC\" NURSING  OUTPATIENT/OBSERVATION GOALS TO BE MET BEFORE DISCHARGE:  1. ADLs back to baseline: Yes    2. Activity and level of assistance: Up with standby assistance.    3. Pain status: Pain free.    4. Return to near baseline physical activity: No     Discharge Planner Nurse   Safe discharge environment identified: Yes  Barriers to discharge: Yes       Entered by: Liza Thornton RN 11/01/2022 6:38 PM     Please review provider order for any additional goals.   Nurse to notify provider when observation goals have been met and patient is ready for discharge.  "

## 2022-11-02 ENCOUNTER — APPOINTMENT (OUTPATIENT)
Dept: NUCLEAR MEDICINE | Facility: HOSPITAL | Age: 72
End: 2022-11-02
Attending: INTERNAL MEDICINE
Payer: MEDICARE

## 2022-11-02 ENCOUNTER — APPOINTMENT (OUTPATIENT)
Dept: CARDIOLOGY | Facility: HOSPITAL | Age: 72
End: 2022-11-02
Attending: INTERNAL MEDICINE
Payer: MEDICARE

## 2022-11-02 ENCOUNTER — APPOINTMENT (OUTPATIENT)
Dept: PHYSICAL THERAPY | Facility: HOSPITAL | Age: 72
End: 2022-11-02
Payer: MEDICARE

## 2022-11-02 LAB
ANION GAP SERPL CALCULATED.3IONS-SCNC: 10 MMOL/L (ref 7–15)
BUN SERPL-MCNC: 41.2 MG/DL (ref 8–23)
CALCIUM SERPL-MCNC: 9.2 MG/DL (ref 8.8–10.2)
CHLORIDE SERPL-SCNC: 101 MMOL/L (ref 98–107)
CHOLEST SERPL-MCNC: 165 MG/DL
CK SERPL-CCNC: 101 U/L (ref 26–192)
CREAT SERPL-MCNC: 1.47 MG/DL (ref 0.51–0.95)
CV BLOOD PRESSURE: 65 MMHG
CV STRESS CURRENT BP HE: NORMAL
CV STRESS CURRENT HR HE: 66
CV STRESS CURRENT HR HE: 67
CV STRESS CURRENT HR HE: 75
CV STRESS CURRENT HR HE: 76
CV STRESS CURRENT HR HE: 76
CV STRESS CURRENT HR HE: 77
CV STRESS CURRENT HR HE: 78
CV STRESS CURRENT HR HE: 79
CV STRESS CURRENT HR HE: 79
CV STRESS CURRENT HR HE: 81
CV STRESS CURRENT HR HE: 81
CV STRESS CURRENT HR HE: 82
CV STRESS CURRENT HR HE: 82
CV STRESS DEVIATION TIME HE: NORMAL
CV STRESS ECHO PERCENT HR HE: NORMAL
CV STRESS EXERCISE STAGE HE: NORMAL
CV STRESS FINAL RESTING BP HE: NORMAL
CV STRESS FINAL RESTING HR HE: 76
CV STRESS MAX HR HE: 82
CV STRESS MAX TREADMILL GRADE HE: 0
CV STRESS MAX TREADMILL SPEED HE: 0
CV STRESS PEAK DIA BP HE: NORMAL
CV STRESS PEAK SYS BP HE: NORMAL
CV STRESS PHASE HE: NORMAL
CV STRESS PROTOCOL HE: NORMAL
CV STRESS RESTING PT POSITION HE: NORMAL
CV STRESS ST DEVIATION AMOUNT HE: NORMAL
CV STRESS ST DEVIATION ELEVATION HE: NORMAL
CV STRESS ST EVELATION AMOUNT HE: NORMAL
CV STRESS TEST TYPE HE: NORMAL
CV STRESS TOTAL STAGE TIME MIN 1 HE: NORMAL
DEPRECATED HCO3 PLAS-SCNC: 28 MMOL/L (ref 22–29)
GFR SERPL CREATININE-BSD FRML MDRD: 38 ML/MIN/1.73M2
GLUCOSE SERPL-MCNC: 132 MG/DL (ref 70–99)
HDLC SERPL-MCNC: 36 MG/DL
LDLC SERPL CALC-MCNC: 93 MG/DL
NONHDLC SERPL-MCNC: 129 MG/DL
POTASSIUM SERPL-SCNC: 4.6 MMOL/L (ref 3.4–5.3)
RATE PRESSURE PRODUCT: NORMAL
SODIUM SERPL-SCNC: 139 MMOL/L (ref 136–145)
STRESS ECHO BASELINE DIASTOLIC HE: 102
STRESS ECHO BASELINE HR: 73
STRESS ECHO BASELINE SYSTOLIC BP: 211
STRESS ECHO CALCULATED PERCENT HR: 55 %
STRESS ECHO LAST STRESS DIASTOLIC BP: 117
STRESS ECHO LAST STRESS HR: 79
STRESS ECHO LAST STRESS SYSTOLIC BP: 188
STRESS ECHO TARGET HR: 150
TRIGL SERPL-MCNC: 181 MG/DL

## 2022-11-02 PROCEDURE — G0378 HOSPITAL OBSERVATION PER HR: HCPCS

## 2022-11-02 PROCEDURE — 97110 THERAPEUTIC EXERCISES: CPT | Mod: GP

## 2022-11-02 PROCEDURE — 99214 OFFICE O/P EST MOD 30 MIN: CPT | Performed by: INTERNAL MEDICINE

## 2022-11-02 PROCEDURE — 93018 CV STRESS TEST I&R ONLY: CPT | Performed by: INTERNAL MEDICINE

## 2022-11-02 PROCEDURE — 80061 LIPID PANEL: CPT | Performed by: INTERNAL MEDICINE

## 2022-11-02 PROCEDURE — 93017 CV STRESS TEST TRACING ONLY: CPT

## 2022-11-02 PROCEDURE — 78452 HT MUSCLE IMAGE SPECT MULT: CPT | Mod: 26 | Performed by: INTERNAL MEDICINE

## 2022-11-02 PROCEDURE — 97530 THERAPEUTIC ACTIVITIES: CPT | Mod: GP

## 2022-11-02 PROCEDURE — G1010 CDSM STANSON: HCPCS | Performed by: INTERNAL MEDICINE

## 2022-11-02 PROCEDURE — 93016 CV STRESS TEST SUPVJ ONLY: CPT | Performed by: INTERNAL MEDICINE

## 2022-11-02 PROCEDURE — 250N000013 HC RX MED GY IP 250 OP 250 PS 637: Performed by: FAMILY MEDICINE

## 2022-11-02 PROCEDURE — 96374 THER/PROPH/DIAG INJ IV PUSH: CPT

## 2022-11-02 PROCEDURE — 99225 PR SUBSEQUENT OBSERVATION CARE,LEVEL II: CPT | Performed by: INTERNAL MEDICINE

## 2022-11-02 PROCEDURE — 82550 ASSAY OF CK (CPK): CPT | Performed by: PSYCHIATRY & NEUROLOGY

## 2022-11-02 PROCEDURE — G1010 CDSM STANSON: HCPCS

## 2022-11-02 PROCEDURE — A9500 TC99M SESTAMIBI: HCPCS | Performed by: INTERNAL MEDICINE

## 2022-11-02 PROCEDURE — 250N000013 HC RX MED GY IP 250 OP 250 PS 637: Performed by: HOSPITALIST

## 2022-11-02 PROCEDURE — 99214 OFFICE O/P EST MOD 30 MIN: CPT | Performed by: PSYCHIATRY & NEUROLOGY

## 2022-11-02 PROCEDURE — 343N000001 HC RX 343: Performed by: INTERNAL MEDICINE

## 2022-11-02 PROCEDURE — 80048 BASIC METABOLIC PNL TOTAL CA: CPT | Performed by: INTERNAL MEDICINE

## 2022-11-02 PROCEDURE — 999N000157 HC STATISTIC RCP TIME EA 10 MIN

## 2022-11-02 PROCEDURE — 250N000011 HC RX IP 250 OP 636: Performed by: FAMILY MEDICINE

## 2022-11-02 PROCEDURE — 36415 COLL VENOUS BLD VENIPUNCTURE: CPT | Performed by: INTERNAL MEDICINE

## 2022-11-02 PROCEDURE — 250N000011 HC RX IP 250 OP 636: Performed by: INTERNAL MEDICINE

## 2022-11-02 RX ORDER — AMINOPHYLLINE 25 MG/ML
50 INJECTION, SOLUTION INTRAVENOUS
Status: ACTIVE | OUTPATIENT
Start: 2022-11-02 | End: 2022-11-02

## 2022-11-02 RX ORDER — NALOXONE HYDROCHLORIDE 0.4 MG/ML
0.2 INJECTION, SOLUTION INTRAMUSCULAR; INTRAVENOUS; SUBCUTANEOUS
Status: DISCONTINUED | OUTPATIENT
Start: 2022-11-02 | End: 2022-11-03 | Stop reason: HOSPADM

## 2022-11-02 RX ORDER — NALOXONE HYDROCHLORIDE 0.4 MG/ML
0.4 INJECTION, SOLUTION INTRAMUSCULAR; INTRAVENOUS; SUBCUTANEOUS
Status: DISCONTINUED | OUTPATIENT
Start: 2022-11-02 | End: 2022-11-03 | Stop reason: HOSPADM

## 2022-11-02 RX ORDER — REGADENOSON 0.08 MG/ML
0.4 INJECTION, SOLUTION INTRAVENOUS ONCE
Status: COMPLETED | OUTPATIENT
Start: 2022-11-02 | End: 2022-11-02

## 2022-11-02 RX ADMIN — MONTELUKAST SODIUM 10 MG: 10 TABLET, COATED ORAL at 22:24

## 2022-11-02 RX ADMIN — REGADENOSON 0.4 MG: 0.08 INJECTION, SOLUTION INTRAVENOUS at 11:14

## 2022-11-02 RX ADMIN — PANTOPRAZOLE SODIUM 40 MG: 40 TABLET, DELAYED RELEASE ORAL at 06:41

## 2022-11-02 RX ADMIN — TRIAMCINOLONE ACETONIDE: 1 OINTMENT TOPICAL at 11:55

## 2022-11-02 RX ADMIN — Medication 30.7 MILLICURIE: at 13:46

## 2022-11-02 RX ADMIN — TRIAMCINOLONE ACETONIDE: 1 OINTMENT TOPICAL at 19:10

## 2022-11-02 RX ADMIN — POLYETHYLENE GLYCOL 3350 17 G: 17 POWDER, FOR SOLUTION ORAL at 11:53

## 2022-11-02 RX ADMIN — AZELASTINE HYDROCHLORIDE 1 DROP: 0.5 SOLUTION/ DROPS INTRAOCULAR at 19:10

## 2022-11-02 RX ADMIN — ACETAMINOPHEN 650 MG: 325 TABLET, FILM COATED ORAL at 16:17

## 2022-11-02 RX ADMIN — CETIRIZINE HYDROCHLORIDE 10 MG: 10 TABLET ORAL at 11:54

## 2022-11-02 RX ADMIN — TOLTERODINE 4 MG: 2 CAPSULE, EXTENDED RELEASE ORAL at 11:56

## 2022-11-02 RX ADMIN — AZELASTINE HYDROCHLORIDE 1 DROP: 0.5 SOLUTION/ DROPS INTRAOCULAR at 11:57

## 2022-11-02 RX ADMIN — Medication 8.31 MCI.: at 10:31

## 2022-11-02 RX ADMIN — METOPROLOL SUCCINATE 50 MG: 50 TABLET, EXTENDED RELEASE ORAL at 22:24

## 2022-11-02 RX ADMIN — FUROSEMIDE 20 MG: 20 TABLET ORAL at 14:49

## 2022-11-02 RX ADMIN — DULOXETINE HYDROCHLORIDE 30 MG: 30 CAPSULE, DELAYED RELEASE ORAL at 11:56

## 2022-11-02 RX ADMIN — HYDRALAZINE HYDROCHLORIDE 10 MG: 20 INJECTION, SOLUTION INTRAMUSCULAR; INTRAVENOUS at 19:02

## 2022-11-02 RX ADMIN — DULOXETINE HYDROCHLORIDE 30 MG: 30 CAPSULE, DELAYED RELEASE ORAL at 20:11

## 2022-11-02 ASSESSMENT — ACTIVITIES OF DAILY LIVING (ADL)
ADLS_ACUITY_SCORE: 43

## 2022-11-02 NOTE — PROGRESS NOTES
"PRIMARY DIAGNOSIS: \"GENERIC\" NURSING  OUTPATIENT/OBSERVATION GOALS TO BE MET BEFORE DISCHARGE:  1. ADLs back to baseline: No    2. Activity and level of assistance: Up with standby assistance.    3. Pain status: Improved with use of alternative comfort measures i.e.: positioning    4. Return to near baseline physical activity: Yes     Discharge Planner Nurse   Safe discharge environment identified: Yes  Barriers to discharge: Yes       Entered by: Liza Thornton RN 11/01/2022 10:09 PM     Please review provider order for any additional goals.   Nurse to notify provider when observation goals have been met and patient is ready for discharge.  "

## 2022-11-02 NOTE — PROGRESS NOTES
"PRIMARY DIAGNOSIS: \"GENERIC\" NURSING  OUTPATIENT/OBSERVATION GOALS TO BE MET BEFORE DISCHARGE:  1. ADLs back to baseline: No    2. Activity and level of assistance: Up with standby assistance.    3. Pain status: Pain free.    4. Return to near baseline physical activity: No     Discharge Planner Nurse   Safe discharge environment identified: Yes  Barriers to discharge: Yes       Entered by: Verena De Los Santos RN 11/02/2022 2:10 PM     Please review provider order for any additional goals.   Nurse to notify provider when observation goals have been met and patient is ready for discharge.  "

## 2022-11-02 NOTE — PROGRESS NOTES
"This is a neurological follow-up note    71-year-old admitted to hospital 10/30/2022      Chief complaint  Generalized weakness lethargy  Generalized weakness  Recent right leg cellulitis  Cognitive changes and hallucinations (metabolic encephalopathy)      HPI  71-year-old brought in to hospital on 10/30/2022 by her .   gave most of the history.  Patient has been progressively weak \"disabled and delusional\" worsening over the last 3 days.  For couple of days needed to use the walker but on day of admission was unable to walk with a walker.  Patient was found on the floor by family, patient stated that she had lowered her self to the floor but she is confused.    Patient is lethargic/confused    Had a little bit of faint erythema in the lower extremities patient may have some right leg cellulitis recently diagnosed possibly prescribed antibiotics within the last week or so.    Recently patient has been weak would get on the floor not from a fall but was unable to get back up and would crawl around    Patient been having some hallucinations and seeing people in the home      Patient has history of significant cervical spine disease recent problems with past fusion has seen orthopedic spine specialist who wants to do refusion of her neck.  Has chronic arthritic shoulders with frozen shoulder bilaterally chronic greater than 4 years  Arthritic gait with diffuse weakness chronic in nature at baseline        Significant chronic cervical spine disease        Chronic frozen shoulders        Chronic diffuse \"fibromyalgia pain syndrome\"        2017 anterior cervical fusion helped for about 3 months        Evaluated by neurosurgery June 2020 with cervical pseudoarthrosis cervical radiculopathy (considering possible C5-T1 posterior cervical fusion)        Chronic ulnar numbness bilaterally.  Past history of ulnar nerve decompression.        Previously treated with Biofreeze/Bengay/Vicodin/prednisone for her " pain.          Review of past sleep study April 2019  Diagnosis:  - G47.33 Obstructive sleep apnea  - G47.34 Sleep related hypoxia  - G47.36 Sleep related hypoventilation in conditions classified elsewhere     Assessment & Recommendation:        Although optimal pressures were not determined, I would recommend auto-titrating Bilevel with an EPAP min of 12, IPAP max of 25, and PS of 7 cmH2O.  Recommend special attention to mask fit and mouth leak, especially at higher pressures.    I suspect hypoxia will persist despite maximal Bilevel support.  o If patient already qualifies for continuous oxygen, recommend bleeding 1 LPM at machine end of tubing (recommend ResMed devices due to tubing with integrated oxygen hookup).  o If patient does not qualify for continuous oxygen already, then she would benefit from nocturnal oximetry to confirm resolution of hypoxia.  She may require repeat titration study to qualify for supplemental oxygen therapy.    Consider adjunct positional therapy with HOB elevation or lateral sleep position.    Patients with excessive daytime sleepiness are at increased risk for motor vehicle accidents.    Suggest optimizing sleep hygiene and avoiding any further sleep deprivation.    Consider weight reduction management as this may be a factor in NELLIE.    Recommend evaluation due to periodic leg movements.    Measures aimed at increasing sleep consolidation can be beneficial.  Hesham Nguyen MD  Diplomate of Sleep Medicine, [facility]        Past medical history  Hypertension  Hyperlipidemia  CHF  CKD  COPD  Obstructive sleep apnea/pickwickian syndrome  Fibromyalgia  Osteoarthritis  Pseudogout  Right cubital tunnel syndrome  Adult failure to thrive  History of blood clots in the 1970s possibly DVT from birth control  Plantar fasciitis      Habits  Non-smoker  Does not drink alcohol      Family history  Father COPD  Mother rheumatoid arthritis  Sister heart disease  Daughter pseudotumor    Work-up  CT scan  head 10/31/2022  1.  Mildly motion degraded exam.        No definite acute intracranial abnormality.  2.  Mild sequelae of chronic microvascular ischemic disease.  CT chest abdomen pelvis 10/31/2022 see official report  1.  Mild wall thickening of colon in the left lower quadrant with slight adjacent inflammation.        Findings could be due to diverticulitis or focal colitis.        Follow-up to confirm resolution.  2.  No bowel obstruction or abscess.  MRI cervical spine 11/1/2022 with and without contrast  1.  ACDF C5-C7  2.  No pathologic enhancement to suggest spinal infection  3.  Cervical spondylosis multilevel mild to moderate foraminal narrowing  4.  No high-grade spinal canal narrowing.  TSH 4.57  Troponin high-sensitivity 41-39 elevated  AST 44  ESR 50  CRP 26.4  -289  BNP 2039  COVID-19 negative, influenza A and influenza B negative, RSV negative  Alcohol level negative  Urine tox screen positive opioids/cannabinoids  Echo 10/31/2022 60-65% ejection fraction, left atrium mildly dilated  EEG 11/1/2022,Impression  Mildly abnormal awake drowsy and sleep stage I EEG due to:  Subtle mild theta disorganization of the background nonspecific in nature  Could be consistent with mild cerebral dysfunction  Myasthenia gravis panel pending        Labs  Sodium 138 potassium 4.4  BUN 30.6, creatinine 1.26  Glucose 126  WBC 9.1, hemoglobin 12.9, platelets 366,000        Exam  Blood pressure 143/73, pulse 54, temperature 90.1  Blood pressure range 114//73  Pulse range 57-74  T-max 98.2    Review of systems pertinent positive negatives  With a little bit stronger today on 11/2/2022 and was able to ambulate a little bit with the therapist    Developed significant nausea and vomiting  Denied chest pain or shortness of breath                10/31/2022  NIF    -20       FVC  1.29    General exam per primary MD  Alert orient x3 now  HEENT no signs of trauma  Lungs clear  Heart rate regular  Abdomen soft  No edema  "the feet        Neurologic exam  Alert orient x3  Prosody speech normal  Naming normal  Repetition normal  Comprehension normal  No neglect  Memory recall okay at bedside testing    Cranials 2 through 12  No ophthalmoplegia  No nystagmus  No visual field cut  Face symmetrical  Tongue twisters good    Upper extremities  Chronic decreased range of motion of the shoulders  Reported right over left pain with testing  Deltoid 4/4  Biceps 4/4+  Triceps 5/4+  Wrist extensors 5/5  Wrist flexors 5/5  Finger extensors 5/5  Intrinsic hand strength 4/4    Lower extremities reported right over left  Iliopsoas 4/4  Quadriceps 4/4  Anterior tibial 5/5    Reflexes  Biceps 2+  Triceps 1+  Knees 2+  Ankles absent  Toes downgoing  No spasticity      Sensory  Chronic numbness hands bilaterally  Decreased vibratory sense chronically in the toes    Gait  Ambulated with physical therapist            Assessment/plan    1.  Hallucinations/lethargy/confusion (question metabolic encephalopathy)       Patient with obstructive sleep apnea/pickwickian syndrome/COPD       History of hypoventilation syndrome/restrictive chronic hypoxic respiratory failure       May not have used her BiPAP the last couple of days per the patient, ???         Home medication last       Zyrtec 10 mg daily       Cymbalta 30 mg twice daily       Hydrocodone 1 every 4 hours as needed maximum 6 tablets/day       Zanaflex 4 mg every 8 hour as needed              Has a care 10 mg 5-10 mg at nighttime         Provigil 250 mg daily       O2 3 L/min at night        2.   Significant chronic cervical spine disease        Chronic frozen shoulders        Chronic diffuse \"fibromyalgia pain syndrome\"        2017 anterior cervical fusion helped for about 3 months        Evaluated by neurosurgery June 2020 with cervical pseudoarthrosis cervical radiculopathy (considering possible C5-T1 posterior cervical fusion)        3.  CT chest abdomen pelvis 10/31/2022 see official report       " Mild wall thickening of colon in the left lower quadrant with slight adjacent inflammation.        Findings could be due to diverticulitis or focal colitis.        Follow-up to confirm resolution.    4.  Questionable component of PMR        ESR 50        CRP 26.4        -289    5.   Chronic diastolic heart failure/elevated BNP         Cardiology checking echo        Diagnosis  Metabolic toxic encephalopathy  Chronic cervical neck disease chronic spinal difficulties exacerbated by above  PMR  Hypoventilation/obstructive sleep apnea severe with hypoxia  Cardiac disease      Encephalopathy is multifactorial    Difficulty with gait and weakness is multifactorial and complex  Does have known cervical neck disease  Continue with PT/OT as tolerated    Myasthenia gravis panel pending  Recheck CPK if not coming down question outpatient consult with rheumatology for possible PMR  EEG consistent with encephalopathy  MRI cervical spine without contrast no signs of spinal infection does have hardware in place.  No high-grade stenosis    Discussed with patient at bedside    Diligent treatment of hypoventilation syndrome/obstructive sleep apnea patient states that she may be on BiPAP not CPAP and per chart may need oxygen with this.    May need pain consult and review due to difficulty with breathing disorder pain medications      Neuro exam improved less encephalopathic strength slightly better varies with her medical issues.  Biggest difficulty today is nausea and vomiting.  Patient is good to get a nuclear stress test today    Discussed face-to-face with patient and physical therapy in room  30 minutes total care time today greater than 50 percent face-to-face patient care team discussing and evaluating the above.

## 2022-11-02 NOTE — UTILIZATION REVIEW
Concurrent stay review; Secondary Review Determination     Under the authority of the Utilization Management Committee, the utilization review process indicated a secondary review on Desirae Rey.  The review outcome is based on review of the medical records, discussions with staff, and applying clinical experience noted on the date of the review.        (x) Observation Status Appropriate - Concurrent stay review    RATIONALE FOR DETERMINATION   71 yr old female with NELLIE, CKD3, restrictive lung disease with chronic respiratory failure on 3L oxygen presented with weakness and hallucination.  Mentation baseline by following AM.  Suspected due to either not wearing CPAP vs medication/antibiotic as outpatient.  Mild elevated HS-Trop and stress test performed and pending.  Respiratory status stable.  Volume status stable.  Anticipating discharge to TCU.  If determined abnormal stress test needing further evaluation prior to ability to discharge, then consider change to inpatient.    Patient is clinically improving and there is no clear indication to change patient's status to inpatient. The severity of illness, intensity of service provided, expected LOS and risk for adverse outcome make the care appropriate for observation.    The information on this document is developed by the utilization review team in order for the business office to ensure compliance.  This only denotes the appropriateness of proper admission status and does not reflect the quality of care rendered.         The definitions of Inpatient Status and Observation Status used in making the determination above are those provided in the CMS Coverage Manual, Chapter 1 and Chapter 6, section 70.4.      Sincerely,   Tiny Frederick MD  Utilization Review  Physician Advisor  [facility]

## 2022-11-02 NOTE — PROGRESS NOTES
Cardiology Progress Note    Assessment:  1.  Elevated high-sensitivity troponin.  The trend is not suggestive of an acute myocardial infarction.  Troponin greater than 14 but less than 100 without significant change between tests.  Patient with moderate coronary artery calcification on CT scan.  Plan Lexiscan nuclear stress test to exclude significant ischemia.  Echocardiogram this admission with normal systolic function with abnormal diastolic indices.  No regional wall motion abnormality.  Patient is on home dose diuretics.  Input and output have not been accurately recorded.    2.  Obstructive sleep apnea/hypoventilation obesity syndrome.  Patient did not wear her CPAP for 3 days prior to the hospitalization.  Question precipitating factor.  Question is mild elevation in high-sensitivity troponin related to lack of CPAP use.    3.  Moderate coronary artery plaque on CT scan.  Not on statins.  Allergy list indicates has previously tried lovastatin and simvastatin but did not tolerate related to muscle discomfort.  Last LDL cholesterol in the chart 89.  We will plan to check fasting lipid panel today.  Could consider pravastatin which might be better tolerated.    4.  Renal insufficiency.  Chronic renal insufficiency.  Creatinine yesterday 1.57.  Not drawn today.  We will recheck today and ask primary care to assess.  Patient chronically on furosemide 40 mg in the morning and 20 mg in the afternoon.  Principal Problem:    Altered mental status, unspecified altered mental status type  Active Problems:    NELLIE (obstructive sleep apnea)    Benign essential hypertension    CKD (chronic kidney disease) stage 3, GFR 30-59 ml/min (H)    Acute weakness    Adult failure to thrive    Chronic heart failure with preserved ejection fraction (HFpEF) (H)    Elevated troponin    Cardiac medications include furosemide, metoprolol XL 50 mg daily  Plan:  1.  Nuclear stress test today.  If abnormal would have a low threshold to place  on aspirin.  Could consider aspirin given the moderate coronary calcification.  2.  Check lipids.  Previously intolerant to statins: Question if patient was able to tolerate pravastatin.  3.  Recheck chemistries and renal insufficiency.  Defer to primary care.    Subjective:   Desirae Thomas is seen in follow-up.  Chart notes reviewed.  Discussed with patient yesterday.  She is sleeping comfortably.  She arouses and is appropriate with respect to her affect and questions.    Objective:   Vital signs in last 24 hours:  VITALS: BP (!) 143/73 (BP Location: Right arm)   Pulse 54   Temp 98.1  F (36.7  C) (Oral)   Resp 20   Ht 1.524 m (5')   Wt 95.7 kg (210 lb 14.4 oz)   SpO2 100%   BMI 41.19 kg/m    BMI: Body mass index is 41.19 kg/m .  Wt Readings from Last 3 Encounters:   22 95.7 kg (210 lb 14.4 oz)   20 104.4 kg (230 lb 1.6 oz)   19 98 kg (216 lb)       Intake/Output Summary (Last 24 hours) at 2022 0811  Last data filed at 2022 2200  Gross per 24 hour   Intake 842 ml   Output --   Net 842 ml       Physical Exam:  General: Alert, no acute distress   Neck: CPAP in place.  Due to venous pressure difficult to assess.   Lungs: clear to anterior auscultation   COR: RRR, No significant murmur, distant   Abd: nondistended, BS present, nontender   Extrem: No edema, warm  Neuro: Alert and oriented    Cardiographics:    ECG: No acute changes, sinus rhythm with no ischemia sinus rhythm, left anterior fascicular block  Narrative & Impression  [id number]  [id number]  [id number]  [id number]^JAMES^BRIONNA^[place] [address]     Name: DESIRAE THOMAS  MRN: [id number]  : 1950  Study Date: 10/31/2022 09:30 AM  Age: 71 yrs  Gender: Female  Patient [facility]  Reason For Study: Abn EKG  Ordering Physician: BRIONNA RAMIREZ  Performed By: ACE     BSA: 2.1 m2  Height: 60 in  Weight: 250 lb  HR: 88  BP: 148/80  mmHg  ______________________________________________________________________________  Procedure  Complete Echo Adult. Definity (NDC #[id number]) given intravenously. 4mL given.  ______________________________________________________________________________  Interpretation Summary     The left ventricle is normal in size.  Left ventricular function is normal.The ejection fraction is 60-65%.  The left atrium is mildly dilated.  There is moderate mitral annular calcification.  Mild valvular aortic stenosis.  ______________________________________________________________________________  Left Ventricle  The left ventricle is normal in size. Left ventricular function is normal.The  ejection fraction is 60-65%. There is normal left ventricular wall thickness.  Left ventricular diastolic function is abnormal. No regional wall motion  abnormalities noted.     Right Ventricle  The right ventricle is not well visualized.     Atria  The left atrium is mildly dilated. Right atrial size is normal. There is no  color Doppler evidence of an atrial shunt.     Mitral Valve  There is moderate mitral annular calcification. There is no mitral  regurgitation noted.     Tricuspid Valve  The tricuspid valve is not well visualized. No tricuspid regurgitation.     Aortic Valve  The aortic valve is not well visualized. No aortic regurgitation is present.  Mild valvular aortic stenosis.     Pulmonic Valve  The pulmonic valve is not well visualized. There is trace pulmonic valvular  regurgitation.     Vessels  The aorta root is normal. Normal size ascending aorta. IVC diameter <2.1 cm  collapsing >50% with sniff suggests a normal RA pressure of 3 mmHg.     Pericardium  There is no pericardial effusion           Imaging:   Chest X-Ray:   EXAM: CT CHEST ABDOMEN PELVIS W/O CONTRAST  LOCATION: [facility]  DATE/TIME: 10/31/2022 1:11 AM     INDICATION: ams tender abd  COMPARISON: None.  TECHNIQUE: CT scan of the chest,  abdomen, and pelvis was performed without IV contrast. Multiplanar reformats were obtained. Dose reduction techniques were used.   CONTRAST: None.     FINDINGS:   LUNGS AND PLEURA: Scattered subsegmental atelectasis left lung. No pleural effusion.     MEDIASTINUM/AXILLAE: Atherosclerotic aorta. No adenopathy. No significant pericardial effusion. Mitral annular calcification.     CORONARY ARTERY CALCIFICATION: Moderate.     HEPATOBILIARY: Cholecystectomy.     PANCREAS: Fatty atrophy of the pancreas.     SPLEEN: Normal.     ADRENAL GLANDS: Normal.     KIDNEYS/BLADDER: Normal.     BOWEL: Normal caliber. Diverticulosis. Slight thickening of the colon in the left lower quadrant with mild adjacent inflammation.     LYMPH NODES: Normal.     VASCULATURE: Diffuse, atherosclerotic vascular calcification.     PELVIC ORGANS: Normal.     MUSCULOSKELETAL: Fat-containing hernia umbilical region. Degenerative change osseous structures. Postsurgical change cervical spine.                                                                      IMPRESSION:  1.  Mild wall thickening of colon in the left lower quadrant with slight adjacent inflammation. Findings could be due to diverticulitis or focal colitis. Follow-up to confirm resolution.  2.  No bowel obstruction or abscess.     EXAM: CT HEAD W/O CONTRAST  LOCATION: [facility]  DATE/TIME: 10/31/2022 1:09 AM     INDICATION: Altered mental status  COMPARISON: None.  TECHNIQUE: Routine CT Head without IV contrast. Multiplanar reformats. Dose reduction techniques were used.     FINDINGS: Mildly motion degraded exam.  INTRACRANIAL CONTENTS: No intracranial hemorrhage, extraaxial collection, or mass effect.  The gray-white differentiation is grossly preserved. Mild presumed chronic small vessel ischemic changes. Normal ventricles and sulci.      VISUALIZED ORBITS/SINUSES/MASTOIDS: Prior bilateral cataract surgery. Visualized portions of the orbits are otherwise  unremarkable. Mild mucosal thickening scattered about the paranasal sinuses. Nonspecific fluid in the left maxillary sinus. No middle   ear or mastoid effusion.     BONES/SOFT TISSUES: No acute abnormality.                                                                      IMPRESSION:  1.  Mildly motion degraded exam. No definite acute intracranial abnormality.  2.  Mild sequelae of chronic microvascular ischemic disease.     Lab Results    Chemistry/lipid CBC Cardiac Enzymes/BNP/TSH/INR   Recent Labs   Lab Test 01/29/21  1647   CHOL 190   HDL 42*   LDL 89   TRIG 293*     Recent Labs   Lab Test 01/29/21  1647 05/13/20  1536 05/10/19  1701   LDL 89 103 125     Recent Labs   Lab Test 11/01/22  0738      POTASSIUM 4.8   CHLORIDE 101   CO2 29   *   BUN 38.6*   CR 1.57*   GFRESTIMATED 35*   LYNN 9.2     Recent Labs   Lab Test 11/01/22  0738 10/31/22  1442 10/30/22  2355   CR 1.57* 1.24* 1.26*     No results for input(s): A1C in the last 81820 hours.       Recent Labs   Lab Test 11/01/22  0738   WBC 7.4   HGB 11.7   HCT 37.8   *        Recent Labs   Lab Test 11/01/22  0738 10/30/22  2355   HGB 11.7 12.9    No results for input(s): TROPONINI in the last 83003 hours.  Recent Labs   Lab Test 10/30/22  2355 02/16/18  1606   BNP  --  137*   NTBNPI 2,039*  --      Recent Labs   Lab Test 10/31/22  0334   TSH 4.57*     No results for input(s): INR in the last 44820 hours.

## 2022-11-02 NOTE — PROGRESS NOTES
Nuclear Pharmacological Stress Test:  Sitting Protocol. Lexiscan 0.4mg IV administered over 15sec.  Baseline VS: HR=75 (SR)  AL=635/102, rechecked prior to stress test 185/100  Peak VS: HR= 81 BP= 181/92.  No symptoms of CP produced, however patient felt typical vasodilator side effects from Lexiscan including feeling nauseated which resolved by the end of recovery.     Results pending cardiology interpretation.   Reginaldo message sent  Jade Lam RT (R)

## 2022-11-02 NOTE — PROGRESS NOTES
Nuclear stress test read as negative.  The results are outlined below.  We will plan to update the patient.  Cholesterol numbers 90 total cholesterol 165, HDL 36, LDL 93.  Will defer to primary care to follow-up as a relates to the lipids given history of intolerance to statins.  NM Lexiscan stress test  Order: [id number]   Status: Final result      Visible to patient: No (scheduled for 11/2/2022  4:15 PM)      Next appt: Today at 03:45 PM in Occupational Therapy (Karissa Ames, OTR)      0 Result Notes  Details    Reading Physician Reading Date Result Priority   Marquise Pfeiffer,   [phone] 11/2/2022 Routine   Marquise Pfeiffer,   [phone] 11/2/2022      Result Text        The nuclear stress test is negative for inducible myocardial ischemia.     Left ventricular function is normal.     The left ventricular ejection fraction at rest is 65%.        A prior study was conducted on 7/21/2017.

## 2022-11-02 NOTE — PROGRESS NOTES
Hospitalist Progress Note    Assessment/Plan    Principal Problem:    Altered mental status, unspecified altered mental status type  Active Problems:    NELLIE (obstructive sleep apnea)    Benign essential hypertension    CKD (chronic kidney disease) stage 3, GFR 30-59 ml/min (H)    Acute weakness    Adult failure to thrive    Chronic heart failure with preserved ejection fraction (HFpEF) (H)    Elevated troponin    Desirae Rey is a 71 year old female with history of obstructive sleep apnea, heart failure with preserved ejection fraction, restrictive lung disease on 3 L oxygen at home, CKD stage III who presented with weakness and confusion for the past 4 days.  Patient was hallucinating.  She thinks it may have been related to an antibiotic that she started for UTI.  She also admits that she had not worn her CPAP for 2 or 3 days prior to admission at night.  Usually she is very compliant with her CPAP.  She did use oxygen at that time.    Acute metabolic encephalopathy  -Etiology is likely multifactorial  -Could be due to the fact that she was not using her CPAP, medications contributing  -CT scan of the head did not show any acute intracranial abnormality  -Urine tox screen was positive for marijuana and opiates  -Her encephalopathy seems to have resolved and her mentation seems to have returned to baseline  -She is hoping to go home soon    Generalized weakness  -Etiology is unclear  -Probably related to medications and what ever caused her confusion  -Appreciate input from neurology  -She says at baseline she uses an assistive device at home but otherwise is independent in her ADLs  -She was evaluated by therapy and they are recommending TCU    Chronic hypoxic respiratory failure due to restrictive lung disease  -Patient's oxygen needs are at baseline which is 3 L via nasal cannula    Chronic diastolic heart failure  -Previously seen by cardiologist in 2017 and diagnosed with diastolic congestive heart  failure  -She has been taking Lasix twice a day  -Her troponin was elevated but her delta troponin was negative and unlikely to be ACS  -2D echocardiogram done here shows an EF of 60 to 65% but abnormal diastolic function  -Continue diuretics    Diverticulitis versus focal colitis on CT scan  -Patient does not have any symptoms.  White count is normal and she is afebrile  -This was incidentally found on CT scan  -Monitor    Elevated high-sensitivity troponin with a flat trend  -Unlikely to be ACS  -2D echo also does not show any regional wall motion abnormality  -Seen by cardiology and appreciate recommendations  -Nuclear medicine stress test today was negative  -Plan for her to follow up as outpatient for hyperlipidemia    CKD stage III  -Baseline creatinine is anywhere from 1.2-1.6  -Creatinine remains at baseline    NELLIE/OHS  -Previously she was very compliant with her CPAP.  She was encouraged to continue    Barriers to Discharge: If patient remains stable and her mentation remained stable then probably discharge on 11/2/2022    Anticipated discharge date/Disposition: 11/2/2022.  Therapies recommending TCU.    Subjective  Patient was sitting up in the bed.  Visiting with her son.  Cognitively she seemed very appropriate.  Denies being confused.  No new symptoms.  She is hoping she can go home soon.    Objective    Vital signs in last 24 hours  Temp:  [98  F (36.7  C)-98.2  F (36.8  C)] 98  F (36.7  C)  Pulse:  [54-74] 70  Resp:  [18-20] 18  BP: (114-145)/(54-73) 135/71  SpO2:  [94 %-100 %] 100 % [unfilled] O2 Device: Nasal cannula    Weight:   [unfilled] Weight change:     Intake/Output last 3 shifts  I/O last 3 completed shifts:  In: 1322 [P.O.:1322]  Out: -   Body mass index is 41.19 kg/m .    Physical Exam:  General Appearance: Alert, oriented x3, does not appear in distress.  HEENT: Normocephalic. No scleral icterus, . Mucous membranes moist.  Heart: :Regular rate and rhythm, normal S1 ,S2, No murmurs, no  JVD, nopedal edema   Lungs: Clear to auscultation bilaterally. No wheezing or crackles  Abdomen: Soft, non tender, no rebound or rigidity, non distended, bowel sounds present.  Extremity: No deformity. No joint swelling.      Pertinent Labs   Lab Results: personally reviewed.   Recent Labs   Lab 11/02/22  0716 11/01/22  0738 10/31/22  1442 10/30/22  2355    138 138 138   CO2 28 29 27 27   BUN 41.2* 38.6* 27.9* 30.6*   ALBUMIN  --   --   --  3.8   ALKPHOS  --   --   --  82   ALT  --   --   --  30   AST  --   --   --  44*     Recent Labs   Lab 11/01/22  0738 10/30/22  2355   WBC 7.4 9.1   HGB 11.7 12.9   HCT 37.8 39.3    366     No results for input(s): CKTOTAL, TROPONINI in the last 168 hours.    Invalid input(s): TROPONINT, CKMBINDEX  Invalid input(s): PERI Thomas MD  Hospitalist  Board certified in internal medicine

## 2022-11-02 NOTE — PLAN OF CARE
"PRIMARY DIAGNOSIS: \"GENERIC\" NURSING  OUTPATIENT/OBSERVATION GOALS TO BE MET BEFORE DISCHARGE:  1. ADLs back to baseline: Yes    2. Activity and level of assistance: Up with standby assistance.    3. Pain status: Improved with use of alternative comfort measures i.e.: reposition    4. Return to near baseline physical activity: Yes     Discharge Planner Nurse   Safe discharge environment identified: Yes  Barriers to discharge: Yes       Entered by: Gaye Montero RN 11/02/2022 4:50 AM     Please review provider order for any additional goals.   Nurse to notify provider when observation goals have been met and patient is ready for discharge.Goal Outcome Evaluation:        Pt denies pain. Alert/oriented. Generalized weakness compared to baseline. Oxygen at 3 L via NC. Call light within reach.            "

## 2022-11-02 NOTE — PROGRESS NOTES
"PRIMARY DIAGNOSIS: \"GENERIC\" NURSING  OUTPATIENT/OBSERVATION GOALS TO BE MET BEFORE DISCHARGE:  1. ADLs back to baseline: No    2. Activity and level of assistance: Up with standby assistance.    3. Pain status: Pain free.    4. Return to near baseline physical activity: No     Discharge Planner Nurse   Safe discharge environment identified: Yes  Barriers to discharge: Yes       Entered by: Verena De Los Santos RN 11/02/2022 2:12 PM     Please review provider order for any additional goals.   Nurse to notify provider when observation goals have been met and patient is ready for discharge.  "

## 2022-11-02 NOTE — PLAN OF CARE
"Goal Outcome Evaluation:                      PRIMARY DIAGNOSIS: \"GENERIC\" NURSING  OUTPATIENT/OBSERVATION GOALS TO BE MET BEFORE DISCHARGE:  ADLs back to baseline:  No    Activity and level of assistance: Up with standby assistance.    Pain status: reposition    Return to near baseline physical activity: Yes     Discharge Planner Nurse   Safe discharge environment identified: Yes  Barriers to discharge: Yes       Entered by: Gaye Montero RN 11/02/2022 2:25 AM     Please review provider order for any additional goals.   Nurse to notify provider when observation goals have been met and patient is ready for discharge.  "

## 2022-11-03 ENCOUNTER — APPOINTMENT (OUTPATIENT)
Dept: PHYSICAL THERAPY | Facility: HOSPITAL | Age: 72
End: 2022-11-03
Payer: MEDICARE

## 2022-11-03 ENCOUNTER — APPOINTMENT (OUTPATIENT)
Dept: OCCUPATIONAL THERAPY | Facility: HOSPITAL | Age: 72
End: 2022-11-03
Payer: MEDICARE

## 2022-11-03 VITALS
SYSTOLIC BLOOD PRESSURE: 135 MMHG | TEMPERATURE: 98.2 F | HEART RATE: 75 BPM | OXYGEN SATURATION: 95 % | BODY MASS INDEX: 41.4 KG/M2 | RESPIRATION RATE: 20 BRPM | DIASTOLIC BLOOD PRESSURE: 71 MMHG | HEIGHT: 60 IN | WEIGHT: 210.9 LBS

## 2022-11-03 PROCEDURE — G0378 HOSPITAL OBSERVATION PER HR: HCPCS

## 2022-11-03 PROCEDURE — 99214 OFFICE O/P EST MOD 30 MIN: CPT | Performed by: PSYCHIATRY & NEUROLOGY

## 2022-11-03 PROCEDURE — 97535 SELF CARE MNGMENT TRAINING: CPT | Mod: GO

## 2022-11-03 PROCEDURE — 250N000013 HC RX MED GY IP 250 OP 250 PS 637: Performed by: FAMILY MEDICINE

## 2022-11-03 PROCEDURE — 97530 THERAPEUTIC ACTIVITIES: CPT | Mod: GP

## 2022-11-03 PROCEDURE — 97116 GAIT TRAINING THERAPY: CPT | Mod: GP

## 2022-11-03 PROCEDURE — 99217 PR OBSERVATION CARE DISCHARGE: CPT | Performed by: INTERNAL MEDICINE

## 2022-11-03 PROCEDURE — 250N000013 HC RX MED GY IP 250 OP 250 PS 637: Performed by: HOSPITALIST

## 2022-11-03 RX ORDER — MODAFINIL 100 MG/1
200 TABLET ORAL DAILY
Status: DISCONTINUED | OUTPATIENT
Start: 2022-11-03 | End: 2022-11-03 | Stop reason: HOSPADM

## 2022-11-03 RX ORDER — AMOXICILLIN 250 MG
1 CAPSULE ORAL 2 TIMES DAILY PRN
Qty: 30 TABLET | Refills: 0 | Status: SHIPPED | OUTPATIENT
Start: 2022-11-03

## 2022-11-03 RX ADMIN — TRIAMCINOLONE ACETONIDE: 1 OINTMENT TOPICAL at 09:53

## 2022-11-03 RX ADMIN — AZELASTINE HYDROCHLORIDE 1 DROP: 0.5 SOLUTION/ DROPS INTRAOCULAR at 09:46

## 2022-11-03 RX ADMIN — PANTOPRAZOLE SODIUM 40 MG: 40 TABLET, DELAYED RELEASE ORAL at 09:49

## 2022-11-03 RX ADMIN — FUROSEMIDE 20 MG: 20 TABLET ORAL at 12:05

## 2022-11-03 RX ADMIN — TOLTERODINE 4 MG: 2 CAPSULE, EXTENDED RELEASE ORAL at 09:49

## 2022-11-03 RX ADMIN — MODAFINIL 200 MG: 100 TABLET ORAL at 12:05

## 2022-11-03 RX ADMIN — DULOXETINE HYDROCHLORIDE 30 MG: 30 CAPSULE, DELAYED RELEASE ORAL at 09:59

## 2022-11-03 RX ADMIN — FUROSEMIDE 40 MG: 20 TABLET ORAL at 09:48

## 2022-11-03 RX ADMIN — POLYETHYLENE GLYCOL 3350 17 G: 17 POWDER, FOR SOLUTION ORAL at 09:49

## 2022-11-03 RX ADMIN — MODAFINIL 50 MG: 100 TABLET ORAL at 12:05

## 2022-11-03 RX ADMIN — CETIRIZINE HYDROCHLORIDE 10 MG: 10 TABLET ORAL at 09:48

## 2022-11-03 ASSESSMENT — ACTIVITIES OF DAILY LIVING (ADL)
ADLS_ACUITY_SCORE: 47
ADLS_ACUITY_SCORE: 43
ADLS_ACUITY_SCORE: 47
ADLS_ACUITY_SCORE: 43

## 2022-11-03 NOTE — DISCHARGE SUMMARY
Vitals stable. Denied any pain. Pt was discharged home around 1600. Discharge instruction was given. Belongs was sent home with patient.  gave pt a ride home. Nely Khan RN

## 2022-11-03 NOTE — PLAN OF CARE
Physical Therapy Discharge Summary    Reason for therapy discharge:    Discharged to home with home therapy.    Progress towards therapy goal(s). See goals on Care Plan in [facility] electronic health record for goal details.  Goals partially met.  Barriers to achieving goals:   discharge from facility.    Therapy recommendation(s):    Continued therapy is recommended.  Rationale/Recommendations:  Pt requires Ax1 for mobility, amb, pericares, stairs, transfers. Assistance w/ ADLs.    Lucila Carroll on 11/3/2022 at 3:46 PM

## 2022-11-03 NOTE — PROGRESS NOTES
"PRIMARY DIAGNOSIS: \"GENERIC\" NURSING  OUTPATIENT/OBSERVATION GOALS TO BE MET BEFORE DISCHARGE:  1. ADLs back to baseline: Yes    2. Activity and level of assistance: Up with standby assistance.    3. Pain status: Improved-controlled with oral pain medications.    4. Return to near baseline physical activity: Yes     Discharge Planner Nurse   Safe discharge environment identified: Yes  Barriers to discharge: No       Entered by: Asia Calhoun RN 11/03/2022 12:11 PM   Pt is alert, slept with her CPAP on last night. Pt switched to 2.5L NC. Pt was incontinent this morning and Pt did not know when she peeped. Pt had a shower and  was changed. Pt is now off Tele per provider.   Please review provider order for any additional goals.   Nurse to notify provider when observation goals have been met and patient is ready for discharge.  "

## 2022-11-03 NOTE — PROGRESS NOTES
"PRIMARY DIAGNOSIS: \"GENERIC\" NURSING  OUTPATIENT/OBSERVATION GOALS TO BE MET BEFORE DISCHARGE:  1. ADLs back to baseline: Assist x1    2. Activity and level of assistance: Assist X1     3. Pain status: Denies    4. Return to near baseline physical activity: Yes, improving still needs assist X 1     Discharge Planner Nurse   Safe discharge environment identified: Yes, TCU  Barriers to discharge: None       Entered by: Emi Young RN 11/03/2022 6:26 AM   Patient denied pain and was able to sleep for most of the night with CPAP in place. Patient hopes to discharge today.  Please review provider order for any additional goals.   Nurse to notify provider when observation goals have been met and patient is ready for discharge.  "

## 2022-11-03 NOTE — PROGRESS NOTES
Pt home cpap set up at bedside, water added to reservoir.  RN will assist with placement and O2 bleed.  RT will continue to monitor.    22:45 called to pt room, to evaluate leak coming from cpap.  Noticed minimal air leak from cpap tubing where tubing connects to mask.  Writer unable to fix leak.  Instructed pt to contact home care company to evaluate leak.  RT will continue to monitor.

## 2022-11-03 NOTE — PROGRESS NOTES
"PRIMARY DIAGNOSIS: \"GENERIC\" NURSING  OUTPATIENT/OBSERVATION GOALS TO BE MET BEFORE DISCHARGE:  1. ADLs back to baseline: No    2. Activity and level of assistance: Up with maximum assistance. Consider SW and/or PT evaluation.     3. Pain status: Improved-controlled with oral pain medications.    4. Return to near baseline physical activity: No     Discharge Planner Nurse   Safe discharge environment identified: No  Barriers to discharge: Yes       Entered by: Elayne Scott RN 11/02/2022 8:34 PM     Please review provider order for any additional goals.   Nurse to notify provider when observation goals have been met and patient is ready for discharge.  Alert and oriented x 4. Forgetful. C/O back pain. Tylenol given x 1 around 1600. Pt said it is helping and had no further complain . Ate 100% of supper. Pt is sitting on a recliner with legs elevated.     Elayne Scott RN      "

## 2022-11-03 NOTE — PLAN OF CARE
Occupational Therapy Discharge Summary    Reason for therapy discharge:    Discharged to home with home therapy.    Progress towards therapy goal(s). See goals on Care Plan in [facility] electronic health record for goal details.  Goals partially met.  Barriers to achieving goals:   discharge from facility.    Therapy recommendation(s):    Continued therapy is recommended.  Rationale/Recommendations:  to improve overall ADL ind. .    Goal Outcome Evaluation:

## 2022-11-03 NOTE — PROGRESS NOTES
Discharge planning assist    Length of Stay (days): 1    Expected Discharge Date: 11/03/2022     Concerns to be Addressed:  Potential home care.    Anticipated Discharge Disposition: Home  Anticipated Discharge Services: Home PT  Anticipated Discharge DME: Per therapy (if indicated).    Referrals Placed by CM/SVEN: See below   Private pay costs discussed: See previous care management notes.     Additional Information:  Per MD, patient can discharge to home today and would benefit from home care. Writer met with patient to review role of care management services, discuss goals of care and assess need for any possible services at discharge. Patient alert, answering questions appropriately and engaged in the conversation. She would agree with home care for home PT and has no agency preferences. Referral made to [facility].     Latanya Hamilton RN

## 2022-11-03 NOTE — PROGRESS NOTES
"This is a neurological follow-up note    71-year-old admitted to hospital 10/30/2022      Chief complaint  Generalized weakness lethargy  Generalized weakness  Recent right leg cellulitis  Cognitive changes and hallucinations (metabolic encephalopathy)      HPI  71-year-old brought in to hospital on 10/30/2022 by her .   gave most of the history.  Patient has been progressively weak \"disabled and delusional\" worsening over the last 3 days.  For couple of days needed to use the walker but on day of admission was unable to walk with a walker.  Patient was found on the floor by family, patient stated that she had lowered her self to the floor but she is confused.    Patient is lethargic/confused    Had a little bit of faint erythema in the lower extremities patient may have some right leg cellulitis recently diagnosed possibly prescribed antibiotics within the last week or so.    Recently patient has been weak would get on the floor not from a fall but was unable to get back up and would crawl around    Patient been having some hallucinations and seeing people in the home    Patient may not have been using her CPAP/BiPAP mask regularly for the 2 days leading up to the above.        Patient has history of significant cervical spine disease recent problems with past fusion has seen orthopedic spine specialist who wants to do refusion of her neck.  Has chronic arthritic shoulders with frozen shoulder bilaterally chronic greater than 4 years  Arthritic gait with diffuse weakness chronic in nature at baseline        Significant chronic cervical spine disease        Chronic frozen shoulders        Chronic diffuse \"fibromyalgia pain syndrome\"        2017 anterior cervical fusion helped for about 3 months        Evaluated by neurosurgery June 2020 with cervical pseudoarthrosis cervical radiculopathy (considering possible C5-T1 posterior cervical fusion)        Chronic ulnar numbness bilaterally.  Past history of " ulnar nerve decompression.        Previously treated with Biofreeze/Bengay/Vicodin/prednisone for her pain.          Review of past sleep study April 2019  Diagnosis:  - G47.33 Obstructive sleep apnea  - G47.34 Sleep related hypoxia  - G47.36 Sleep related hypoventilation in conditions classified elsewhere     Assessment & Recommendation:        Although optimal pressures were not determined, I would recommend auto-titrating Bilevel with an EPAP min of 12, IPAP max of 25, and PS of 7 cmH2O.  Recommend special attention to mask fit and mouth leak, especially at higher pressures.    I suspect hypoxia will persist despite maximal Bilevel support.  o If patient already qualifies for continuous oxygen, recommend bleeding 1 LPM at machine end of tubing (recommend ResMed devices due to tubing with integrated oxygen hookup).  o If patient does not qualify for continuous oxygen already, then she would benefit from nocturnal oximetry to confirm resolution of hypoxia.  She may require repeat titration study to qualify for supplemental oxygen therapy.    Consider adjunct positional therapy with HOB elevation or lateral sleep position.    Patients with excessive daytime sleepiness are at increased risk for motor vehicle accidents.    Suggest optimizing sleep hygiene and avoiding any further sleep deprivation.    Consider weight reduction management as this may be a factor in NELLIE.    Recommend evaluation due to periodic leg movements.    Measures aimed at increasing sleep consolidation can be beneficial.  Hesham Nguyen MD  Diplomate of Sleep Medicine, [facility]        Past medical history  Hypertension  Hyperlipidemia  CHF  CKD  COPD  Obstructive sleep apnea/pickwickian syndrome  Fibromyalgia  Osteoarthritis  Pseudogout  Right cubital tunnel syndrome  Adult failure to thrive  History of blood clots in the 1970s possibly DVT from birth control  Plantar fasciitis      Habits  Non-smoker  Does not drink alcohol      Family  history  Father COPD  Mother rheumatoid arthritis  Sister heart disease  Daughter pseudotumor    Work-up  CT scan head 10/31/2022  1.  Mildly motion degraded exam.        No definite acute intracranial abnormality.  2.  Mild sequelae of chronic microvascular ischemic disease.  CT chest abdomen pelvis 10/31/2022 see official report  1.  Mild wall thickening of colon in the left lower quadrant with slight adjacent inflammation.        Findings could be due to diverticulitis or focal colitis.        Follow-up to confirm resolution.  2.  No bowel obstruction or abscess.  MRI cervical spine 11/1/2022 with and without contrast  1.  ACDF C5-C7  2.  No pathologic enhancement to suggest spinal infection  3.  Cervical spondylosis multilevel mild to moderate foraminal narrowing  4.  No high-grade spinal canal narrowing.  TSH 4.57  Troponin high-sensitivity 41-39 elevated  AST 44  ESR 50  CRP 26.4  -289-101  BNP 2039  COVID-19 negative, influenza A and influenza B negative, RSV negative  Alcohol level negative  Urine tox screen positive opioids/cannabinoids  Echo 10/31/2022 60-65% ejection fraction, left atrium mildly dilated  EEG 11/1/2022,Impression  Mildly abnormal awake drowsy and sleep stage I EEG due to:  Subtle mild theta disorganization of the background nonspecific in nature  Could be consistent with mild cerebral dysfunction  Myasthenia gravis panel pending        Labs  Sodium 138 potassium 4.4  BUN 30.6, creatinine 1.26  Glucose 126  WBC 9.1, hemoglobin 12.9, platelets 366,000        Exam  Blood pressure 100/52, pulse 62, temperature 97.6  Blood pressure range 100//88  Pulse range 54-74  T-max 98.2     -289-101            10/31/2022  NIF    -20       FVC  1.29    Review of systems pertinent positive negatives  With a little bit stronger today on 11/2/2022 and was able to ambulate a little bit with the therapist    Developed significant nausea and vomiting  Denied chest pain or shortness of  "breath            General exam per primary MD  Alert orient x3   HEENT no signs of trauma  Lungs clear  Heart rate regular  Abdomen soft  No edema the feet        Neurologic exam  Alert orient x3  Prosody speech normal  Naming normal  Repetition normal  Comprehension normal  No neglect  Memory recall okay at bedside testing    Cranials 2 through 12  No ophthalmoplegia  No nystagmus  No visual field cut  Face symmetrical  Tongue twisters good    Upper extremities  Chronic decreased range of motion of the shoulders  Reported right over left pain with testing  Deltoid 4/4  Biceps 4/4+  Triceps 5/4+  Wrist extensors 5/5  Wrist flexors 5/5  Finger extensors 5/5  Intrinsic hand strength 4/4    Lower extremities reported right over left  Iliopsoas 4/4  Quadriceps 4/4  Anterior tibial 5/5    Reflexes  Biceps 2+  Triceps 1+  Knees 2+  Ankles absent  Toes downgoing  No spasticity      Sensory  Chronic numbness hands bilaterally  Decreased vibratory sense chronically in the toes    Gait  Ambulated with physical therapist    Patient close to her baseline        Assessment/plan    1.  Hallucinations/lethargy/confusion (question metabolic encephalopathy)       Patient with obstructive sleep apnea/pickwickian syndrome/COPD       History of hypoventilation syndrome/restrictive chronic hypoxic respiratory failure       May not have used her BiPAP the last couple of days per the patient, ???         Home medication last       Zyrtec 10 mg daily       Cymbalta 30 mg twice daily       Hydrocodone 1 every 4 hours as needed maximum 6 tablets/day       Zanaflex 4 mg every 8 hour as needed              Has a care 10 mg 5-10 mg at nighttime         Provigil 250 mg daily       O2 3 L/min at night        2.   Significant chronic cervical spine disease        Chronic frozen shoulders        Chronic diffuse \"fibromyalgia pain syndrome\"        2017 anterior cervical fusion helped for about 3 months        Evaluated by neurosurgery June 2020 with " cervical pseudoarthrosis cervical radiculopathy (considering possible C5-T1 posterior cervical fusion)        3.  CT chest abdomen pelvis 10/31/2022 see official report       Mild wall thickening of colon in the left lower quadrant with slight adjacent inflammation.        Findings could be due to diverticulitis or focal colitis.        Follow-up to confirm resolution.    4.  Questionable component of PMR        ESR 50        CRP 26.4        -289    5.   Chronic diastolic heart failure/elevated BNP         Cardiology checking echo        Diagnosis  Metabolic toxic encephalopathy  Chronic cervical neck disease chronic spinal difficulties exacerbated by above  PMR  Hypoventilation/obstructive sleep apnea severe with hypoxia  Cardiac disease      Encephalopathy is multifactorial    Difficulty with gait and weakness is multifactorial and complex  Does have known cervical neck disease      Myasthenia gravis panel pending    CPK came down more consistent with mild rhabdomyolysis  EEG consistent with encephalopathy  MRI cervical spine without contrast no signs of spinal infection does have hardware in place.  No high-grade stenosis  Patient with elevated inflammatory markers may want a consider rheumatologic work-up as a outpatient    Diligent treatment of hypoventilation syndrome/obstructive sleep apnea patient states that she may be on BiPAP not CPAP and per chart needs oxygen with this.    May need pain consult and review due to difficulty with breathing disorder pain medications      Encephalopathy improved after using her CPAP and oxygen    Recommend as an outpatient and she talk with her primary to see a different rheumatologist had seen possibly Dr. Claire in the past did not get much help  May have underlying either polymyalgia rheumatica or other arthritic condition causing her chronic elevated inflammatory markers.    Discussed also with primary MD    Neurology will sign off at this time  Total care time  today 32 minutes greater than 50% face-to-face patient and care team reviewing above.

## 2022-11-03 NOTE — PROGRESS NOTES
PRIMARY DIAGNOSIS: ACUTE PAIN  OUTPATIENT/OBSERVATION GOALS TO BE MET BEFORE DISCHARGE:  1. Pain Status: Denies    2. Return to near baseline physical activity: Getting stronger but assist X1 to bedside commode    3. Cleared for discharge by consultants (if involved): Neuro and Cardiology involved; neither mentions signing off at this time.    Discharge Planner Nurse   Safe discharge environment identified: Yes; TCU pending placement  Barriers to discharge: None       Entered by: Emi Young RN 11/03/2022 2:30 AM     Please review provider order for any additional goals.   Nurse to notify provider when observation goals have been met and patient is ready for discharge.

## 2022-11-03 NOTE — PROGRESS NOTES
"PRIMARY DIAGNOSIS: \"GENERIC\" NURSING  OUTPATIENT/OBSERVATION GOALS TO BE MET BEFORE DISCHARGE:  1. ADLs back to baseline: No    2. Activity and level of assistance: Up with standby assistance.    3. Pain status: Improved with use of alternative comfort measures i.e.: positioning    4. Return to near baseline physical activity: No     Discharge Planner Nurse   Safe discharge environment identified: No  Barriers to discharge: Yes       Entered by: Elayne Scott RN 11/02/2022 10:40 PM     Please review provider order for any additional goals.   Nurse to notify provider when observation goals have been met and patient is ready for discharge.    BP was 188/88 PRN iv hydralazine given x 1 1900.  BP  came down to 134/64. Hs BP meds given. Got up to a commode with assist of 1. Had small formed BM and voided.      Elayne Scott RN    "

## 2022-11-03 NOTE — PROGRESS NOTES
PRIMARY DIAGNOSIS: ACUTE PAIN  OUTPATIENT/OBSERVATION GOALS TO BE MET BEFORE DISCHARGE:  1. Pain Status: Improved-controlled with oral pain medications.    2. Return to near baseline physical activity: Yes    3. Cleared for discharge by consultants (if involved): No    Discharge Planner Nurse   Safe discharge environment identified: No  Barriers to discharge: Yes       Entered by: Asia Calhoun RN 11/03/2022 9:44 AM     Please review provider order for any additional goals.   Nurse to notify provider when observation goals have been met and patient is ready for discharge.

## 2022-11-03 NOTE — DISCHARGE SUMMARY
[facility]  Hospitalist Discharge Summary      Date of Admission:  10/30/2022  Date of Discharge:  11/3/2022  Discharging Provider: Jesus Alberto Thomas MD  Discharge Service: Hospitalist Service    Discharge Diagnoses   Acute metabolic encephalopathy  Generalized weakness  Chronic hypoxic respiratory failure due to restrictive lung disease  Chronic diastolic congestive heart failure  Diverticulitis versus focal colitis on CT scan  Elevated high-sensitivity troponin with a flat trend  Chronic kidney disease stage III  NELLIE/OHS    Follow-ups Needed After Discharge   Follow-up Appointments     Follow-up and recommended labs and tests       Follow up with primary care provider, Noemy Brito, within 7 days for   hospital follow- up.  No follow up labs or test are needed.             Unresulted Labs Ordered in the Past 30 Days of this Admission     Date and Time Order Name Status Description    10/31/2022  3:46 AM Myasthenia Gravis (MG) Evaulation with MuSK Reflex In process       These results will be followed up by PCP/Neurology    Discharge Disposition   Admited to home care:   Agency: [facility] homecare  Condition at discharge: Stable    Hospital Course   Desirae Rey is a 71 year old female with history of obstructive sleep apnea, heart failure with preserved ejection fraction, restrictive lung disease on 3 L oxygen at home, CKD stage III who presented with weakness and confusion for the past 4 days.  Patient was hallucinating.  She thinks it may have been related to an antibiotic that she started for UTI.  She also admits that she had not worn her CPAP for 2 or 3 days prior to admission at night.  Usually she is very compliant with her CPAP but for 2 or 3 nights prior to coming to the hospital she had only worn oxygen and not CPAP.  Her mentation slowly improved.  Her urine tox was positive for marijuana and opiates.  CT scan of the head was negative.  The etiology for her  encephalopathy was probably multifactorial.  Her mentation slowly returned to baseline.  Because she had troponin elevation she underwent a stress test that was negative. She was also seen by Neurology and work up was negative as well. She was then discharged in stable condition to home with Home Care.    Hospital Course per problem list     Acute metabolic encephalopathy  -Etiology is likely multifactorial  -Could be due to the fact that she was not using her CPAP, medications contributing  -CT scan of the head did not show any acute intracranial abnormality  -Urine tox screen was positive for marijuana and opiates  -Her encephalopathy seems to have resolved and her mentation seems to have returned to baseline     Generalized weakness  -Etiology is unclear  -Probably related to medications and what ever caused her confusion  -Appreciate input from neurology  -She says at baseline she uses an assistive device at home but otherwise is independent in her ADLs  -She was evaluated by therapy and initially they recommended TCU but later said she could go home with home care.     Chronic hypoxic respiratory failure due to restrictive lung disease  -Patient's oxygen needs are at baseline which is 3 L via nasal cannula     Chronic diastolic heart failure  -Previously seen by cardiologist in 2017 and diagnosed with diastolic congestive heart failure  -She has been taking Lasix twice a day  -Her troponin was elevated but her delta troponin was negative and unlikely to be ACS  -2D echocardiogram done here shows an EF of 60 to 65% but abnormal diastolic function  -Continue diuretics     Diverticulitis versus focal colitis on CT scan  -Patient does not have any symptoms.  White count is normal and she is afebrile  -This was incidentally found on CT scan  -Monitor     Elevated high-sensitivity troponin with a flat trend  -Unlikely to be ACS  -2D echo also does not show any regional wall motion abnormality  -Seen by cardiology and  appreciate recommendations  -Nuclear medicine stress test today was negative  -Plan for her to follow up as outpatient for hyperlipidemia     CKD stage III  -Baseline creatinine is anywhere from 1.2-1.6  -Creatinine remains at baseline     NELLIE/OHS  -Previously she was very compliant with her CPAP.  She was encouraged to continue  -She is recommended outpatient follow-up with sleep medicine for this    Consultations This Hospital Stay   OCCUPATIONAL THERAPY ADULT IP CONSULT  PHYSICAL THERAPY ADULT IP CONSULT  PHARMACY IP CONSULT  NEUROLOGY IP CONSULT  SPEECH LANGUAGE PATH ADULT IP CONSULT  CARDIOLOGY IP CONSULT  CARE MANAGEMENT / SOCIAL WORK IP CONSULT    Code Status   Full Code    Time Spent on this Encounter   I, Jesus Alberto Thomas MD, personally saw the patient today and spent greater than 30 minutes discharging this patient.       Jesus Alberto Thomas MD  [address] [address]  Phone: [phone]  Fax: [phone]  ______________________________________________________________________    Physical Exam   Vital Signs: Temp: 98.1  F (36.7  C) Temp src: Oral BP: (!) 148/67 Pulse: 65   Resp: 17 SpO2: 94 % O2 Device: Nasal cannula Oxygen Delivery: 2.5 LPM  Weight: 210 lbs 14.4 oz  Physical Exam  Constitutional:       General: She is not in acute distress.     Appearance: Normal appearance. She is obese.   HENT:      Head: Normocephalic and atraumatic.      Mouth/Throat:      Mouth: Mucous membranes are moist.   Eyes:      Extraocular Movements: Extraocular movements intact.      Pupils: Pupils are equal, round, and reactive to light.   Cardiovascular:      Rate and Rhythm: Normal rate and regular rhythm.   Pulmonary:      Effort: Pulmonary effort is normal.      Breath sounds: Normal breath sounds.      Comments: Diminished breath sounds at bilateral bases  Abdominal:      General: Bowel sounds are normal.      Palpations: Abdomen is soft.      Tenderness:  There is no abdominal tenderness.   Musculoskeletal:         General: Normal range of motion.      Cervical back: Neck supple.   Skin:     General: Skin is warm and dry.   Neurological:      General: No focal deficit present.      Mental Status: She is alert and oriented to person, place, and time. Mental status is at baseline.              Primary Care Physician   Noemy Brito    Discharge Orders      Home Care Referral      Reason for your hospital stay    Confusion, altered mental status     Follow-up and recommended labs and tests     Follow up with primary care provider, Noemy Brito, within 7 days for hospital follow- up.  No follow up labs or test are needed.     Activity    Your activity upon discharge: activity as tolerated     Diet    Follow this diet upon discharge: Orders Placed This Encounter      Combination Diet Regular Diet; 2 gm NA Diet       Significant Results and Procedures   Most Recent 3 CBC's:Recent Labs   Lab Test 11/01/22  0738 10/30/22  2355   WBC 7.4 9.1   HGB 11.7 12.9   * 98    366     Most Recent 3 BMP's:Recent Labs   Lab Test 11/02/22  0716 11/01/22  0738 10/31/22  1442    138 138   POTASSIUM 4.6 4.8 4.4   CHLORIDE 101 101 99   CO2 28 29 27   BUN 41.2* 38.6* 27.9*   CR 1.47* 1.57* 1.24*   ANIONGAP 10 8 12   LYNN 9.2 9.2 9.3   * 109* 110*     Most Recent 2 LFT's:Recent Labs   Lab Test 10/30/22  2355 09/20/21  1456   AST 44* 20   ALT 30 21   ALKPHOS 82 65   BILITOTAL 0.4 0.3   ,   Results for orders placed or performed during the hospital encounter of 10/30/22   CT Head w/o Contrast    Narrative    EXAM: CT HEAD W/O CONTRAST  LOCATION: [facility]  DATE/TIME: 10/31/2022 1:09 AM    INDICATION: Altered mental status  COMPARISON: None.  TECHNIQUE: Routine CT Head without IV contrast. Multiplanar reformats. Dose reduction techniques were used.    FINDINGS: Mildly motion degraded exam.  INTRACRANIAL CONTENTS: No intracranial hemorrhage,  extraaxial collection, or mass effect.  The gray-white differentiation is grossly preserved. Mild presumed chronic small vessel ischemic changes. Normal ventricles and sulci.     VISUALIZED ORBITS/SINUSES/MASTOIDS: Prior bilateral cataract surgery. Visualized portions of the orbits are otherwise unremarkable. Mild mucosal thickening scattered about the paranasal sinuses. Nonspecific fluid in the left maxillary sinus. No middle   ear or mastoid effusion.    BONES/SOFT TISSUES: No acute abnormality.      Impression    IMPRESSION:  1.  Mildly motion degraded exam. No definite acute intracranial abnormality.  2.  Mild sequelae of chronic microvascular ischemic disease.   CT Chest Abdomen Pelvis w/o Contrast    Narrative    EXAM: CT CHEST ABDOMEN PELVIS W/O CONTRAST  LOCATION: [facility]  DATE/TIME: 10/31/2022 1:11 AM    INDICATION: ams tender abd  COMPARISON: None.  TECHNIQUE: CT scan of the chest, abdomen, and pelvis was performed without IV contrast. Multiplanar reformats were obtained. Dose reduction techniques were used.   CONTRAST: None.    FINDINGS:   LUNGS AND PLEURA: Scattered subsegmental atelectasis left lung. No pleural effusion.    MEDIASTINUM/AXILLAE: Atherosclerotic aorta. No adenopathy. No significant pericardial effusion. Mitral annular calcification.    CORONARY ARTERY CALCIFICATION: Moderate.    HEPATOBILIARY: Cholecystectomy.    PANCREAS: Fatty atrophy of the pancreas.    SPLEEN: Normal.    ADRENAL GLANDS: Normal.    KIDNEYS/BLADDER: Normal.    BOWEL: Normal caliber. Diverticulosis. Slight thickening of the colon in the left lower quadrant with mild adjacent inflammation.    LYMPH NODES: Normal.    VASCULATURE: Diffuse, atherosclerotic vascular calcification.    PELVIC ORGANS: Normal.    MUSCULOSKELETAL: Fat-containing hernia umbilical region. Degenerative change osseous structures. Postsurgical change cervical spine.      Impression    IMPRESSION:  1.  Mild wall thickening of  colon in the left lower quadrant with slight adjacent inflammation. Findings could be due to diverticulitis or focal colitis. Follow-up to confirm resolution.  2.  No bowel obstruction or abscess.   MR Cervical Spine w/o & w Contrast    Narrative    EXAM: MR CERVICAL SPINE W/O and W CONTRAST  LOCATION: [facility]  DATE/TIME: 2022 3:34 PM    INDICATION: History of neck pain, concern for infection.  COMPARISON: MRI cervical spine 3/17/2017  CONTRAST: 11 mL Gadavist IV  TECHNIQUE: MRI Cervical Spine without and with IV contrast.    FINDINGS: The examination imaging quality is is degraded by motion artifact. ACDF C5-C7. No evidence of fluid collection or pathologic enhancement to suggest spinal infection. Minimal 1-2 mm anterior subluxation T2 on T3. Otherwise normal vertebral body   heights, alignment and marrow signal. No abnormal spinal cord signal.    Normal craniovertebral junction. Cervical spondylosis with multilevel mild to moderate neural foraminal stenosis. No high-grade narrowing of the spinal canal. Multilevel uncovertebral joint hypertrophy and facet arthropathy. No cervical disc herniation.    No extraspinal abnormality.      Impression    IMPRESSION:    No evidence of fluid collection or pathologic enhancement to suggest spinal infection.    Echocardiogram Complete     Value    LVEF  60-65%    Narrative    [id number]  [id number]  [id number]  [id number]^JAMES^BRIONNA^[place] [address]     Name: BELL THOMAS  MRN: [id number]  : 1950  Study Date: 10/31/2022 09:30 AM  Age: 71 yrs  Gender: Female  Patient Location: [facility]  Reason For Study: Abn EKG  Ordering Physician: BRIONNA RAMIREZ  Performed By: ACE     BSA: 2.1 m2  Height: 60 in  Weight: 250 lb  HR: 88  BP: 148/80 mmHg  ______________________________________________________________________________  Procedure  Complete Echo Adult. Definity (NDC #44021-288) given  intravenously. 4mL given.  ______________________________________________________________________________  Interpretation Summary     The left ventricle is normal in size.  Left ventricular function is normal.The ejection fraction is 60-65%.  The left atrium is mildly dilated.  There is moderate mitral annular calcification.  Mild valvular aortic stenosis.  ______________________________________________________________________________  Left Ventricle  The left ventricle is normal in size. Left ventricular function is normal.The  ejection fraction is 60-65%. There is normal left ventricular wall thickness.  Left ventricular diastolic function is abnormal. No regional wall motion  abnormalities noted.     Right Ventricle  The right ventricle is not well visualized.     Atria  The left atrium is mildly dilated. Right atrial size is normal. There is no  color Doppler evidence of an atrial shunt.     Mitral Valve  There is moderate mitral annular calcification. There is no mitral  regurgitation noted.     Tricuspid Valve  The tricuspid valve is not well visualized. No tricuspid regurgitation.     Aortic Valve  The aortic valve is not well visualized. No aortic regurgitation is present.  Mild valvular aortic stenosis.     Pulmonic Valve  The pulmonic valve is not well visualized. There is trace pulmonic valvular  regurgitation.     Vessels  The aorta root is normal. Normal size ascending aorta. IVC diameter <2.1 cm  collapsing >50% with sniff suggests a normal RA pressure of 3 mmHg.     Pericardium  There is no pericardial effusion.     ______________________________________________________________________________  MMode/2D Measurements & Calculations  IVSd: 1.1 cm  LVIDd: 4.4 cm  LVIDs: 2.8 cm  LVPWd: 1.0 cm  FS: 35.6 %     LV mass(C)d: 157.3 grams  LV mass(C)dI: 76.6 grams/m2  Ao root diam: 3.2 cm  LA dimension: 3.6 cm  LA/Ao: 1.1  LVOT diam: 2.1 cm  LVOT area: 3.5 cm2  LA Volume Indexed (AL/bp): 43.1 ml/m2  RWT: 0.47      Doppler Measurements & Calculations  MV E max ruddy: 90.0 cm/sec  MV A max ruddy: 134.6 cm/sec  MV E/A: 0.67  MV max P.0 mmHg  MV mean P.7 mmHg  MV V2 VTI: 34.8 cm  MVA(VTI): 2.5 cm2  MV dec slope: 405.6 cm/sec2  MV dec time: 0.22 sec     Ao V2 max: 233.7 cm/sec  Ao max P.8 mmHg  Ao V2 mean: 148.9 cm/sec  Ao mean PG: 10.6 mmHg  Ao V2 VTI: 36.7 cm  RITA(I,D): 2.4 cm2  RITA(V,D): 2.1 cm2  LV V1 max P.2 mmHg  LV V1 max: 142.7 cm/sec  LV V1 VTI: 25.1 cm  SV(LVOT): 87.4 ml  SI(LVOT): 42.6 ml/m2  PA acc time: 0.05 sec  AV Ruddy Ratio (DI): 0.61  RITA Index (cm2/m2): 1.2  E/E' avg: 15.9  Lateral E/e': 13.5  Medial E/e': 18.2     ______________________________________________________________________________  Report approved by: Arelis Villagomez 10/31/2022 12:15 PM         NM Lexiscan stress test     Value    Pharmacologic Protocol Lexiscan    Test Type Pharmacological    Baseline HR 73    Baseline Systolic     Baseline Diastolic     Last Stress HR 79    Last Stress Systolic     Last Stress Diastolic     Target     PERCENT HR 85%    ST Deviation Elevation III 0.1mm    Deviation Time  mm    ST Elevation Amount II 0.8mm    ST Deviation Amount he aVR -0.7mm    Final Resting /94    Final Resting HR 76    Max Treadmill Speed 0.0    Max Treadmill Grade 0.0    Peak Systolic /117    Peak Diastolic /117    Max HR  82    Stress Phase Resting    Stress Resting Pt Position MANUAL EVENT    Current HR 75    Current /102    Stress Phase Stress    Stage Minute EXE 00:00    Exercise Stage STAGE 2    Current HR 67    Current /100    Stress Phase Stress    Stage Minute EXE 01:00    Exercise Stage STAGE 3    Current HR 66    Current /100    Stress Phase Stress    Stage Minute EXE 02:00    Exercise Stage STAGE 4    Current HR 81    Current /100    Stress Phase Stress    Stage Minute EXE 02:02    Exercise Stage STAGE 4    Current HR 82    Current /92     Stress Phase Stress    Stage Minute EXE 03:00    Exercise Stage STAGE 5    Current HR 82    Current /92    Stress Phase Stress    Stage Minute EXE 03:34    Exercise Stage STAGE 5    Current HR 81    Current /117    Stress Phase Stress    Stage Minute EXE 04:00    Exercise Stage STAGE 6    Current HR 79    Current /117    Stress Phase Stress    Stage Minute EXE 04:00    Exercise Stage STAGE 6    Current HR 79    Current /117    Stress Phase Recovery    Stage Minute REC 00:55    Exercise Stage Recovery    Current HR 78    Current /100    Stress Phase Recovery    Stage Minute REC 00:59    Exercise Stage Recovery    Current HR 77    Current /100    Stress Phase Recovery    Stage Minute REC 01:59    Exercise Stage Recovery    Current HR 75    Current /100    Stress Phase Recovery    Stage Minute REC 02:10    Exercise Stage Recovery    Current HR 75    Current /101    Stress Phase Recovery    Stage Minute REC 02:59    Exercise Stage Recovery    Current HR 75    Current /101    Stress Phase Recovery    Stage Minute REC 03:14    Exercise Stage Recovery    Current HR 75    Current /94    Stress Phase Recovery    Stage Minute REC 03:59    Exercise Stage Recovery    Current HR 76    Current /94    Stress Phase Recovery    Stage Minute REC 04:01    Exercise Stage Recovery    Current HR 76    Current /94    Max Predicted HR  55    Rate Pressure Product 15,416.0    BP 65    Narrative       The nuclear stress test is negative for inducible myocardial ischemia.     Left ventricular function is normal.     The left ventricular ejection fraction at rest is 65%.       A prior study was conducted on 7/21/2017.       Discharge Medications   Current Discharge Medication List      START taking these medications    Details   senna-docusate (SENOKOT-S/PERICOLACE) 8.6-50 MG tablet Take 1 tablet by mouth 2 times daily as needed for constipation  Qty: 30 tablet, Refills: 0     Associated Diagnoses: Altered mental status, unspecified altered mental status type         CONTINUE these medications which have NOT CHANGED    Details   albuterol (PROAIR HFA;PROVENTIL HFA;VENTOLIN HFA) 90 mcg/actuation inhaler Inhale 1 puff into the lungs every 4 hours as needed for shortness of breath / dyspnea      alendronate (FOSAMAX) 70 MG tablet [ALENDRONATE (FOSAMAX) 70 MG TABLET] Take 70 mg by mouth every 7 days. Takes on Mondays  Refills: 11      azelastine (OPTIVAR) 0.05 % ophthalmic solution [AZELASTINE (OPTIVAR) 0.05 % OPHTHALMIC SOLUTION] Administer 1 drop to both eyes every 12 (twelve) hours.      benzonatate (TESSALON) 100 MG capsule Take 100 mg by mouth 3 times daily as needed      calcium citrate (CITRACAL) 950 (200 Ca) MG tablet Take 1 tablet by mouth daily      cetirizine (ZYRTEC) 10 MG tablet [CETIRIZINE (ZYRTEC) 10 MG TABLET] Take 10 mg by mouth daily.       cholecalciferol (VITAMIN D3) 25 mcg (1000 units) capsule Take 5000 units in the morning and 2000 units in the evening      DULOXETINE HCL PO Take 30 mg by mouth 2 times daily      esomeprazole (NEXIUM) 40 MG capsule Take 40 mg by mouth daily as needed      furosemide (LASIX) 20 MG tablet Take 2 tablets in the morning and 1 tablet at noon.      HYDROcodone-acetaminophen 5-325 mg per tablet Take 1 tablet by mouth every 4 hours as needed for pain Max 6 tablets per day.      l-lysine HCl 500 MG TABS tablet Take 500 mg by mouth 2 times daily      magnesium oxide 250 mg Tab Take 500 mg by mouth daily as needed (constipation)      metoprolol succinate ER (TOPROL XL) 50 MG 24 hr tablet Take 50 mg by mouth At Bedtime      modafinil (PROVIGIL) 100 MG tablet [MODAFINIL (PROVIGIL) 100 MG TABLET] Take 250 mg by mouth daily.      montelukast (SINGULAIR) 10 mg tablet [MONTELUKAST (SINGULAIR) 10 MG TABLET] Take 10 mg by mouth daily.      rosuvastatin (CRESTOR) 10 MG tablet [ROSUVASTATIN (CRESTOR) 10 MG TABLET] Take 10 mg by mouth at bedtime.       solifenacin (VESICARE) 10 MG tablet Take 5-10 mg by mouth At Bedtime      tiZANidine (ZANAFLEX) 4 MG tablet Take 4 mg by mouth every 8 hours as needed      valACYclovir (VALTREX) 500 MG tablet Take 500 mg by mouth 2 times daily as needed (flare)      OXYGEN-AIR DELIVERY SYSTEMS MISC [OXYGEN-AIR DELIVERY SYSTEMS MISC] Use 3 L As Directed. 3 lpm with activities and as needed at night         STOP taking these medications       dicloxacillin (DYNAPEN) 250 MG capsule Comments:   Reason for Stopping:         fluconazole (DIFLUCAN) 100 MG tablet Comments:   Reason for Stopping:             Allergies   Allergies   Allergen Reactions     Latex Rash     Severe rash     Valsartan Unknown     Hyperkalemia     Colchicine Diarrhea     Latex Unknown     Added based on information entered during case entry, please review and add reactions, type, and severity as needed     Other Environmental Allergy Unknown     Coverlet bandaid , 3M product; rash     Statins-Hmg-Coa Reductase Inhibitors [Hmg-Coa-R Inhibitors] Muscle Pain (Myalgia)     Tried lovastatin and simvastatin     Sulfa (Sulfonamide Antibiotics) [Sulfa Drugs] Swelling     Facial swelling     Paper Tape [Adhesive Tape] Rash

## 2022-11-07 LAB — MAYO MISC RESULT: NORMAL

## 2022-11-08 LAB
ACHR BIND IGG+IGM SER IA-SCNC: 0 NMOL/L
IMMUNOLOGIST REVIEW: NORMAL

## 2022-11-10 ENCOUNTER — TRANSFERRED RECORDS (OUTPATIENT)
Dept: HEALTH INFORMATION MANAGEMENT | Facility: CLINIC | Age: 72
End: 2022-11-10

## 2022-11-11 ENCOUNTER — MEDICAL CORRESPONDENCE (OUTPATIENT)
Dept: HEALTH INFORMATION MANAGEMENT | Facility: CLINIC | Age: 72
End: 2022-11-11

## 2023-01-12 LAB
ATRIAL RATE - MUSE: 85 BPM
DIASTOLIC BLOOD PRESSURE - MUSE: 95 MMHG
INTERPRETATION ECG - MUSE: NORMAL
P AXIS - MUSE: 90 DEGREES
PR INTERVAL - MUSE: 202 MS
QRS DURATION - MUSE: 104 MS
QT - MUSE: 388 MS
QTC - MUSE: 461 MS
R AXIS - MUSE: -63 DEGREES
SYSTOLIC BLOOD PRESSURE - MUSE: 167 MMHG
T AXIS - MUSE: 57 DEGREES
VENTRICULAR RATE- MUSE: 85 BPM

## 2023-01-13 ENCOUNTER — ALLIED HEALTH/NURSE VISIT (OUTPATIENT)
Dept: PULMONOLOGY | Facility: CLINIC | Age: 73
End: 2023-01-13
Attending: INTERNAL MEDICINE
Payer: MEDICARE

## 2023-01-13 DIAGNOSIS — J44.9 COPD (CHRONIC OBSTRUCTIVE PULMONARY DISEASE) (H): ICD-10-CM

## 2023-01-13 LAB — HGB BLD-MCNC: 11.3 G/DL

## 2023-01-13 PROCEDURE — 94726 PLETHYSMOGRAPHY LUNG VOLUMES: CPT | Performed by: INTERNAL MEDICINE

## 2023-01-13 PROCEDURE — 94375 RESPIRATORY FLOW VOLUME LOOP: CPT | Performed by: INTERNAL MEDICINE

## 2023-01-13 PROCEDURE — 85018 HEMOGLOBIN: CPT | Performed by: INTERNAL MEDICINE

## 2023-01-13 PROCEDURE — 94729 DIFFUSING CAPACITY: CPT | Performed by: INTERNAL MEDICINE

## 2023-01-14 LAB
DLCOCOR-%PRED-PRE: 84 %
DLCOCOR-PRE: 11.46 ML/MIN/MMHG
DLCOUNC-%PRED-PRE: 78 %
DLCOUNC-PRE: 10.64 ML/MIN/MMHG
DLCOUNC-PRED: 13.55 ML/MIN/MMHG
ERV-%PRED-PRE: -135 %
ERV-PRE: 0.4 L
ERV-PRED: -0.3 L
EXPTIME-PRE: 5.95 SEC
FEF2575-%PRED-PRE: 100 %
FEF2575-PRE: 1.39 L/SEC
FEF2575-PRED: 1.38 L/SEC
FEFMAX-%PRED-PRE: 107 %
FEFMAX-PRE: 4.49 L/SEC
FEFMAX-PRED: 4.19 L/SEC
FEV1-%PRED-PRE: 69 %
FEV1-PRE: 0.99 L
FEV1FEV6-PRE: 88 %
FEV1FEV6-PRED: 79 %
FEV1FVC-PRE: 88 %
FEV1FVC-PRED: 80 %
FEV1SVC-PRE: 59 %
FEV1SVC-PRED: 79 %
FIFMAX-PRE: 1.35 L/SEC
FRCPLETH-%PRED-PRE: 82 %
FRCPLETH-PRE: 1.78 L
FRCPLETH-PRED: 2.16 L
FVC-%PRED-PRE: 63 %
FVC-PRE: 1.13 L
FVC-PRED: 1.78 L
IC-%PRED-PRE: 61 %
IC-PRE: 1.28 L
IC-PRED: 2.1 L
RVPLETH-%PRED-PRE: 84 %
RVPLETH-PRE: 1.38 L
RVPLETH-PRED: 1.63 L
TLCPLETH-%PRED-PRE: 92 %
TLCPLETH-PRE: 3.06 L
TLCPLETH-PRED: 3.3 L
VA-%PRED-PRE: 85 %
VA-PRE: 2.59 L
VC-%PRED-PRE: 93 %
VC-PRE: 1.68 L
VC-PRED: 1.8 L

## 2023-01-18 ENCOUNTER — OFFICE VISIT (OUTPATIENT)
Dept: PULMONOLOGY | Facility: CLINIC | Age: 73
End: 2023-01-18
Payer: MEDICARE

## 2023-01-18 VITALS
HEIGHT: 60 IN | HEART RATE: 60 BPM | BODY MASS INDEX: 41.66 KG/M2 | WEIGHT: 212.2 LBS | DIASTOLIC BLOOD PRESSURE: 84 MMHG | SYSTOLIC BLOOD PRESSURE: 135 MMHG | OXYGEN SATURATION: 98 %

## 2023-01-18 DIAGNOSIS — J45.40 MODERATE PERSISTENT ASTHMA WITHOUT COMPLICATION: ICD-10-CM

## 2023-01-18 DIAGNOSIS — H10.13 ALLERGIC CONJUNCTIVITIS, BILATERAL: Primary | ICD-10-CM

## 2023-01-18 PROCEDURE — 99204 OFFICE O/P NEW MOD 45 MIN: CPT | Performed by: INTERNAL MEDICINE

## 2023-01-18 RX ORDER — FLUTICASONE PROPIONATE AND SALMETEROL 113; 14 UG/1; UG/1
1 POWDER, METERED RESPIRATORY (INHALATION) 2 TIMES DAILY
Qty: 60 EACH | Refills: 11 | Status: SHIPPED | OUTPATIENT
Start: 2023-01-18 | End: 2024-04-13

## 2023-01-18 RX ORDER — AZELASTINE HYDROCHLORIDE 0.5 MG/ML
1 SOLUTION/ DROPS OPHTHALMIC 2 TIMES DAILY
Qty: 6 ML | Refills: 11 | Status: SHIPPED | OUTPATIENT
Start: 2023-01-18 | End: 2023-05-31

## 2023-01-18 NOTE — PROGRESS NOTES
Pulmonary Clinic Follow-up Visit    Assessment/Plan:  72 year old female never smoker with a history of moderate persistent asthma, chronic allergic rhinosinusitis, GERD, HTN, fibromyalgia, chronic low back pain, obesity, dyslipidemia, NELLIE with hypoventilation on auto-BiPAP, presenting for follow-up.     Moderate persistent asthma, chronic rhinosinusitis, obstructive sleep apnea with hypoventilation: I last law Desirae in August 2019 and had planned on 6-month follow-up, but have not seen her in over 3 years. She continues to use BiPAP with upload showing good adherence an minimal residual AHI. Exertional dyspnea persists and may be worse. She uses albuterol, but the fluticasone-salmeterol I had her on has fallen off her medication list. Required prednisone and azithromycin for an exacerbation about 3 months ago. Has sinus congestion and has felt like her head is blown up like a balloon. She is on furosemide, struggles to limit sodium intake; has 1+ bilateral leg edema on exam. Has a strong aversion to nasal sprays, and previously felt that ophthalmic azelastine prescribed in allergy clinic improved her sinus congestion. Tries to stay active, walks with a cane and occasionally a walker. Recall from prior visits that she has NELLIE with hypoventilation, with AHI 17.1 (40.6 when supine), with titration sleep study showing need for high-pressure BiPAP (had persistent events despite uptitration throughout the study). Has ResMed auto-bilevel, EPAP min 12, IPAP max 25, PS 7 cm H2O with oxygen 2 L/min bled in, with Ultra Mirage II mask.     Plan:  - continue auto-BiPAP with oxygen bleed-in at above settings  - oxygen 3 L/min with activity  - restart fluticasone-salmeterol respiclick 113-14 mcg one inhalation two times a day; rinse/gargle/spit water after use  - continue montelukast 10 mg at bedtime  - continue cetirizine 10 mg daily  - trial of azelastine eye drops, one drop in each eye BID which has helped her sinus symptoms  in the past (has a strong aversion to nasal sprays)  - limit sodium to 2000 mg or less daily  - albuterol HFA as needed  - annual high-dose influenza vaccination  - up to date on  pneumococcal vaccination  - up to date on COVID-19 vaccination  - follow up in 6 months  - encouraged her to call any time with questions or concerning symptoms    Nitin Giang MD  Pulmonary and Critical Care Medicine  [facility]  Office [phone]  Pager [phone]  he/him    CCx: moderate persistent asthma, chronic rhinosinusitis, obstructive sleep apnea with hypoventilation    HPI: 72 year old female never smoker with a history of moderate persistent asthma, chronic allergic rhinosinusitis, GERD, HTN, fibromyalgia, chronic low back pain, obesity, dyslipidemia, NELLIE with hypoventilation on BiPAP, presenting for follow-up. I last law Desirae in August 2019 and had planned on 6-month follow-up, but have not seen her in over 3 years. She continues to use BiPAP with upload showing good adherence an minimal residual AHI. Exertional dyspnea persists and may be worse. She uses albuterol, but the fluticasone-salmeterol I had her on has fallen off her medication list. Required prednisone and azithromycin for an exacerbation about 3 months ago. Has sinus congestion and has felt like her head is blown up like a balloon. She is on furosemide, struggles to limit sodium intake; has 1+ bilateral leg edema on exam. Has a strong aversion to nasal sprays, and previously felt that ophthalmic azelastine prescribed in allergy clinic improved her sinus congestion. Tries to stay active, walks with a cane and occasionally a walker. Recall from prior visits that she has NELLIE with hypoventilation, with AHI 17.1 (40.6 when supine), with titration sleep study showing need for high-pressure BiPAP (had persistent events despite uptitration throughout the study). Has ResMed auto-bilevel, EPAP min 12, IPAP max 25, PS 7 cm H2O with oxygen 2 L/min  bled in, with Ultra Mirage II mask.    ROS:  A 12-system review was obtained and was negative with the exception of the symptoms endorsed in the history of present illness.    PMH:  moderate persistent asthma  chronic allergic rhinosinusitis  GERD  HTN  Fibromyalgia  chronic low back pain  Obesity  Dyslipidemia  NELLIE with hypoventilation on auto-BiPAP    PSH:  Past Surgical History:   Procedure Laterality Date     CERVICAL FUSION N/A 4/27/2017    Procedure: ANTERIOR CERVICAL DECOMPRESSION FUSION C5-7 BILATERAL;  Surgeon: Basim Barone MD;  Location: [facility];  Service:      CHOLECYSTECTOMY      open     COLONOSCOPY N/A 12/20/2019    Procedure: COLONOSCOPY WITH BIOPSIES;  Surgeon: Lucio Farr MD;  Location: [facility];  Service: Gastroenterology     EYE SURGERY       HAND SURGERY Right      HYSTEROSCOPY DIAGNOSTIC       JOINT REPLACEMENT      bilateral TKA     KNEE SURGERY       RELEASE CARPAL TUNNEL Bilateral        Allergies:  Allergies   Allergen Reactions     Latex Rash     Severe rash     Valsartan Unknown     Hyperkalemia     Colchicine Diarrhea     Latex Unknown     Added based on information entered during case entry, please review and add reactions, type, and severity as needed     Other Drug Allergy (See Comments) Muscle Pain (Myalgia)     Tried lovastatin and simvastatin     Other Environmental Allergy Unknown     Coverlet bandaid , 3M product; rash     Statins-Hmg-Coa Reductase Inhibitors [Hmg-Coa-R Inhibitors] Muscle Pain (Myalgia)     Tried lovastatin and simvastatin     Sulfa Drugs Swelling     Facial swelling  Facial swelling     Adhesive Tape Rash       Family HX:  Family History   Problem Relation Age of Onset     Rheumatoid Arthritis Mother      Heart Disease Sister      Chronic Obstructive Pulmonary Disease Father        Social Hx:  Social History     Socioeconomic History     Marital status:      Spouse name: Not on file     Number of children: Not on file      Years of education: Not on file     Highest education level: Not on file   Occupational History     Not on file   Tobacco Use     Smoking status: Never     Smokeless tobacco: Never   Substance and Sexual Activity     Alcohol use: No     Drug use: No     Sexual activity: Not on file   Other Topics Concern     Not on file   Social History Narrative     Not on file     Social Determinants of Health     Financial Resource Strain: Not on file   Food Insecurity: Not on file   Transportation Needs: Not on file   Physical Activity: Not on file   Stress: Not on file   Social Connections: Not on file   Intimate Partner Violence: Not on file   Housing Stability: Not on file       Current Meds:  Current Outpatient Medications   Medication Sig Dispense Refill     albuterol (PROAIR HFA;PROVENTIL HFA;VENTOLIN HFA) 90 mcg/actuation inhaler Inhale 1 puff into the lungs every 4 hours as needed for shortness of breath / dyspnea       alendronate (FOSAMAX) 70 MG tablet [ALENDRONATE (FOSAMAX) 70 MG TABLET] Take 70 mg by mouth every 7 days. Takes on Mondays  11     azelastine (OPTIVAR) 0.05 % ophthalmic solution Place 1 drop into both eyes 2 times daily 6 mL 11     azelastine (OPTIVAR) 0.05 % ophthalmic solution [AZELASTINE (OPTIVAR) 0.05 % OPHTHALMIC SOLUTION] Administer 1 drop to both eyes every 12 (twelve) hours.       benzonatate (TESSALON) 100 MG capsule Take 100 mg by mouth 3 times daily as needed       calcium citrate (CITRACAL) 950 (200 Ca) MG tablet Take 1 tablet by mouth daily       cetirizine (ZYRTEC) 10 MG tablet [CETIRIZINE (ZYRTEC) 10 MG TABLET] Take 10 mg by mouth daily.        cholecalciferol (VITAMIN D3) 25 mcg (1000 units) capsule Take 5000 units in the morning and 2000 units in the evening       DULOXETINE HCL PO Take 30 mg by mouth 2 times daily       esomeprazole (NEXIUM) 40 MG capsule Take 40 mg by mouth daily as needed       fluticasone-salmeterol (AIRDUO RESPICLICK) 113-14 MCG/ACT inhaler Inhale 1 puff into  the lungs 2 times daily 60 each 11     furosemide (LASIX) 20 MG tablet Take 2 tablets in the morning and 1 tablet at noon.       HYDROcodone-acetaminophen 5-325 mg per tablet Take 1 tablet by mouth every 4 hours as needed for pain Max 6 tablets per day.       l-lysine HCl 500 MG TABS tablet Take 500 mg by mouth 2 times daily       magnesium oxide 250 mg Tab Take 500 mg by mouth daily as needed (constipation)       metoprolol succinate ER (TOPROL XL) 50 MG 24 hr tablet Take 50 mg by mouth At Bedtime       modafinil (PROVIGIL) 100 MG tablet [MODAFINIL (PROVIGIL) 100 MG TABLET] Take 250 mg by mouth daily.       montelukast (SINGULAIR) 10 mg tablet [MONTELUKAST (SINGULAIR) 10 MG TABLET] Take 10 mg by mouth daily.       OXYGEN-AIR DELIVERY SYSTEMS MISC [OXYGEN-AIR DELIVERY SYSTEMS MISC] Use 3 L As Directed. 3 lpm with activities and as needed at night       rosuvastatin (CRESTOR) 10 MG tablet [ROSUVASTATIN (CRESTOR) 10 MG TABLET] Take 10 mg by mouth at bedtime.       senna-docusate (SENOKOT-S/PERICOLACE) 8.6-50 MG tablet Take 1 tablet by mouth 2 times daily as needed for constipation 30 tablet 0     solifenacin (VESICARE) 10 MG tablet Take 5-10 mg by mouth At Bedtime       tiZANidine (ZANAFLEX) 4 MG tablet Take 4 mg by mouth every 8 hours as needed       valACYclovir (VALTREX) 500 MG tablet Take 500 mg by mouth 2 times daily as needed (flare)         Physical Exam:  /84   Pulse 60   Ht 1.524 m (5')   Wt 96.3 kg (212 lb 3.2 oz)   SpO2 98%   BMI 41.44 kg/m    Gen: alert, oriented, no distress  HEENT: nasal mucosa is mildly edematous, no oropharyngeal lesions, no cervical or supraclavicular lymphadenopathy  CV: RRR, no M/G/R  Resp: CTAB, no focal crackles or wheezes  Skin: no apparent rashes  Ext: 1+ pitting edema bilateral legs  Neuro: alert, nonfocal    Labs:  reviewed    Imaging studies:  CT C/A/P (October 2022):  - images directly reviewed, formal interpretation follows:  FINDINGS:   LUNGS AND PLEURA:  Scattered subsegmental atelectasis left lung. No pleural effusion.     MEDIASTINUM/AXILLAE: Atherosclerotic aorta. No adenopathy. No significant pericardial effusion. Mitral annular calcification.     CORONARY ARTERY CALCIFICATION: Moderate.     HEPATOBILIARY: Cholecystectomy.     PANCREAS: Fatty atrophy of the pancreas.     SPLEEN: Normal.     ADRENAL GLANDS: Normal.     KIDNEYS/BLADDER: Normal.     BOWEL: Normal caliber. Diverticulosis. Slight thickening of the colon in the left lower quadrant with mild adjacent inflammation.     LYMPH NODES: Normal.     VASCULATURE: Diffuse, atherosclerotic vascular calcification.     PELVIC ORGANS: Normal.     MUSCULOSKELETAL: Fat-containing hernia umbilical region. Degenerative change osseous structures. Postsurgical change cervical spine.                                                                      IMPRESSION:  1.  Mild wall thickening of colon in the left lower quadrant with slight adjacent inflammation. Findings could be due to diverticulitis or focal colitis. Follow-up to confirm resolution.  2.  No bowel obstruction or abscess.     TTE (October 2022):  The left ventricle is normal in size.  Left ventricular function is normal.The ejection fraction is 60-65%.  The left atrium is mildly dilated.  There is moderate mitral annular calcification.  Mild valvular aortic stenosis.     Pulmonary Function Testing  June 2017:  FEV1/FVC is 88 and is normal.  FEV1 is 53% predicted and is reduced.  FVC is 48% predicted and reduced.  There was no improvement in spirometry after a single inhaled dose of bronchodilator.  TLC is 72% predicted and is reduced.  RV is 79% predicted and is normal.  DLCO is 39% predicted and is reduced when it   is corrected for hemoglobin.    January 2023  FVC 1.13 (63%)  FEV1 0.99 (69%)  FEV1/FVC 0.88  RV 1.38 (84%)  TLC 3.06 (92%)  DLCOc 84%     Sleep Study (4/24/19-4/25/19):  Primary Findings:  Diagnostic Portion:    Respiratory monitoring showed  moderate obstructive sleep apnea (AHI=17.1) during light NREM sleep, with events more frequently occurring during supine sleep (AHI 40.6/hr vs 8.3/hr).    The patient spent a total of 21.9 minutes at an oxygen saturation below 88%.  Profound hypoxia was noted and after 10 minutes of sleep, 1 LPM of supplemental oxygen was added via nasal cannula (which stabilized hypoxia).    Mild tachypnea was noted throughout  Titration Portion:  A trial of Bi-level PAP in Spontaneous (S) mode was initiated at 8/4 and increased up to 22/12 cmH2O (supplemental oxygen was turned off).    Respiration was stabilized during NREM sleep, albeit with persistent hypoxia.  However, during REM sleep respiratory events and profound hypoxia returned.    This was considered an unacceptable titration due to persistence of respiratory events and hypoxia.   Mask leak was also problematic.     Other Findings:  Sleep Architecture:     Sleep onset latency was normal.    REM onset latency was prolonged.    All stages of sleep were sampled during the test.    Sleep architecture was fragmented due to numerous arousals.    Stage N3 sleep and Stage REM sleep were observed with increased frequency and duration (rebound) after CPAP pressures reached an optimal pressure.    Sleep efficiency was normal.     Respiration:    Mean hemoglobin oxygen saturation (SaO2) during the diagnostic portion of the PSG in NREM and REM sleep was 84% with a SaO2 desaturation jorge observed to 61%.    Sleep-related Hypoxia was present as cumulative exposure time in sleep to SaO2 below 88% surpassed 5 total minutes (in this case, 21.9 minutes).     Movements:        Periodic leg movements were observed with low frequency.    The patient had a Periodic Limb Movement (PLM) index of 2.1 and the ESTEFANIA PLM index was 0.8.     Parasomnia:    No activity consistent with parasomnia was observed in the video recording.     Electrocardiogram:    Two-lead ECG showed normal sinus  rhythm.     Electroencephalogram:    We used a limited-montage EEG with F3, F4, C3, C4, O1, O2 and contra-lateral references to M1 and M2 that was reviewed using a 30-second EPOCH. No EEG abnormalities were observed with these settings.     Therapeutic Intervention:   Bilevel was initiated with a pressure of 8/4 cwp  and titrated up to 22/12 cwp  Interface:  Type-  Nasal          ,    Brand-  Eson 2              ,   Size-  Small     Diagnosis:    G47.33 Obstructive sleep apnea    G47.34 Sleep related hypoxia    G47.36 Sleep related hypoventilation in conditions classified elsewhere     Assessment & Recommendation:        Although optimal pressures were not determined, I would recommend auto-titrating Bilevel with an EPAP min of 12, IPAP max of 25, and PS of 7 cmH2O.  Recommend special attention to mask fit and mouth leak, especially at higher pressures.    I suspect hypoxia will persist despite maximal Bilevel support.  ? If patient already qualifies for continuous oxygen, recommend bleeding 1 LPM at machine end of tubing (recommend ResMed devices due to tubing with integrated oxygen hookup).  ? If patient does not qualify for continuous oxygen already, then she would benefit from nocturnal oximetry to confirm resolution of hypoxia.  She may require repeat titration study to qualify for supplemental oxygen therapy.    Consider adjunct positional therapy with HOB elevation or lateral sleep position.    Patients with excessive daytime sleepiness are at increased risk for motor vehicle accidents.    Suggest optimizing sleep hygiene and avoiding any further sleep deprivation.    Consider weight reduction management as this may be a factor in NELLIE.    Recommend evaluation due to periodic leg movements.    Measures aimed at increasing sleep consolidation can be beneficial.

## 2023-01-18 NOTE — PATIENT INSTRUCTIONS
It was good to see you in clinic today. This is what we discussed:    Continue the BiPAP at night with the oxygen.  Use the oxygen with activity or as needed.  Continue loratadine one pill daily.  Continue montelukast one pill at bedtime.  Start AirDuo respiclick one inhalation twice daily. Rinse, gargle, and spit water after use.  Use the albuterol (ProAir) as needed.  Use the azelastine eye drops one drop in each eye twice daily.  Restrict sodium to less than 2000 mg (one teaspoon) daily along with the water pills.  Stay active as much as possible.  I will see you in 6 months.  Contact me with questions or concerns.    Nitin Giang MD  Pulmonary and Critical Care Medicine  [facility]  Office [phone]

## 2023-01-18 NOTE — LETTER
1/18/2023         RE: Desirae Rey  [address] [place] [address]        Dear Colleague,    Thank you for referring your patient, Desirae Rey, to the [facility]. Please see a copy of my visit note below.    Pulmonary Clinic Follow-up Visit    Assessment/Plan:  72 year old female never smoker with a history of moderate persistent asthma, chronic allergic rhinosinusitis, GERD, HTN, fibromyalgia, chronic low back pain, obesity, dyslipidemia, NELLIE with hypoventilation on auto-BiPAP, presenting for follow-up.     Moderate persistent asthma, chronic rhinosinusitis, obstructive sleep apnea with hypoventilation: I last law Desirae in August 2019 and had planned on 6-month follow-up, but have not seen her in over 3 years. She continues to use BiPAP with upload showing good adherence an minimal residual AHI. Exertional dyspnea persists and may be worse. She uses albuterol, but the fluticasone-salmeterol I had her on has fallen off her medication list. Required prednisone and azithromycin for an exacerbation about 3 months ago. Has sinus congestion and has felt like her head is blown up like a balloon. She is on furosemide, struggles to limit sodium intake; has 1+ bilateral leg edema on exam. Has a strong aversion to nasal sprays, and previously felt that ophthalmic azelastine prescribed in allergy clinic improved her sinus congestion. Tries to stay active, walks with a cane and occasionally a walker. Recall from prior visits that she has NELLIE with hypoventilation, with AHI 17.1 (40.6 when supine), with titration sleep study showing need for high-pressure BiPAP (had persistent events despite uptitration throughout the study). Has ResMed auto-bilevel, EPAP min 12, IPAP max 25, PS 7 cm H2O with oxygen 2 L/min bled in, with Ultra Mirage II mask.     Plan:  - continue auto-BiPAP with oxygen bleed-in at above settings  - oxygen 3 L/min with activity  - restart fluticasone-salmeterol  respiclick 113-14 mcg one inhalation two times a day; rinse/gargle/spit water after use  - continue montelukast 10 mg at bedtime  - continue cetirizine 10 mg daily  - trial of azelastine eye drops, one drop in each eye BID which has helped her sinus symptoms in the past (has a strong aversion to nasal sprays)  - limit sodium to 2000 mg or less daily  - albuterol HFA as needed  - annual high-dose influenza vaccination  - up to date on  pneumococcal vaccination  - up to date on COVID-19 vaccination  - follow up in 6 months  - encouraged her to call any time with questions or concerning symptoms    Nitin Giang MD  Pulmonary and Critical Care Medicine  [facility]  Office [phone]  Pager [phone]  he/him    CCx: moderate persistent asthma, chronic rhinosinusitis, obstructive sleep apnea with hypoventilation    HPI: 72 year old female never smoker with a history of moderate persistent asthma, chronic allergic rhinosinusitis, GERD, HTN, fibromyalgia, chronic low back pain, obesity, dyslipidemia, NELLIE with hypoventilation on BiPAP, presenting for follow-up. I last law Desirae in August 2019 and had planned on 6-month follow-up, but have not seen her in over 3 years. She continues to use BiPAP with upload showing good adherence an minimal residual AHI. Exertional dyspnea persists and may be worse. She uses albuterol, but the fluticasone-salmeterol I had her on has fallen off her medication list. Required prednisone and azithromycin for an exacerbation about 3 months ago. Has sinus congestion and has felt like her head is blown up like a balloon. She is on furosemide, struggles to limit sodium intake; has 1+ bilateral leg edema on exam. Has a strong aversion to nasal sprays, and previously felt that ophthalmic azelastine prescribed in allergy clinic improved her sinus congestion. Tries to stay active, walks with a cane and occasionally a walker. Recall from prior visits that she has NELLIE with  hypoventilation, with AHI 17.1 (40.6 when supine), with titration sleep study showing need for high-pressure BiPAP (had persistent events despite uptitration throughout the study). Has ResMed auto-bilevel, EPAP min 12, IPAP max 25, PS 7 cm H2O with oxygen 2 L/min bled in, with Ultra Mirage II mask.    ROS:  A 12-system review was obtained and was negative with the exception of the symptoms endorsed in the history of present illness.    PMH:  moderate persistent asthma  chronic allergic rhinosinusitis  GERD  HTN  Fibromyalgia  chronic low back pain  Obesity  Dyslipidemia  NELLIE with hypoventilation on auto-BiPAP    PSH:  Past Surgical History:   Procedure Laterality Date     CERVICAL FUSION N/A 4/27/2017    Procedure: ANTERIOR CERVICAL DECOMPRESSION FUSION C5-7 BILATERAL;  Surgeon: Basim Barone MD;  Location: [facility];  Service:      CHOLECYSTECTOMY      open     COLONOSCOPY N/A 12/20/2019    Procedure: COLONOSCOPY WITH BIOPSIES;  Surgeon: Lucio Farr MD;  Location: [facility];  Service: Gastroenterology     EYE SURGERY       HAND SURGERY Right      HYSTEROSCOPY DIAGNOSTIC       JOINT REPLACEMENT      bilateral TKA     KNEE SURGERY       RELEASE CARPAL TUNNEL Bilateral        Allergies:  Allergies   Allergen Reactions     Latex Rash     Severe rash     Valsartan Unknown     Hyperkalemia     Colchicine Diarrhea     Latex Unknown     Added based on information entered during case entry, please review and add reactions, type, and severity as needed     Other Drug Allergy (See Comments) Muscle Pain (Myalgia)     Tried lovastatin and simvastatin     Other Environmental Allergy Unknown     Coverlet bandaid , 3M product; rash     Statins-Hmg-Coa Reductase Inhibitors [Hmg-Coa-R Inhibitors] Muscle Pain (Myalgia)     Tried lovastatin and simvastatin     Sulfa Drugs Swelling     Facial swelling  Facial swelling     Adhesive Tape Rash       Family HX:  Family History   Problem Relation Age of  Onset     Rheumatoid Arthritis Mother      Heart Disease Sister      Chronic Obstructive Pulmonary Disease Father        Social Hx:  Social History     Socioeconomic History     Marital status:      Spouse name: Not on file     Number of children: Not on file     Years of education: Not on file     Highest education level: Not on file   Occupational History     Not on file   Tobacco Use     Smoking status: Never     Smokeless tobacco: Never   Substance and Sexual Activity     Alcohol use: No     Drug use: No     Sexual activity: Not on file   Other Topics Concern     Not on file   Social History Narrative     Not on file     Social Determinants of Health     Financial Resource Strain: Not on file   Food Insecurity: Not on file   Transportation Needs: Not on file   Physical Activity: Not on file   Stress: Not on file   Social Connections: Not on file   Intimate Partner Violence: Not on file   Housing Stability: Not on file       Current Meds:  Current Outpatient Medications   Medication Sig Dispense Refill     albuterol (PROAIR HFA;PROVENTIL HFA;VENTOLIN HFA) 90 mcg/actuation inhaler Inhale 1 puff into the lungs every 4 hours as needed for shortness of breath / dyspnea       alendronate (FOSAMAX) 70 MG tablet [ALENDRONATE (FOSAMAX) 70 MG TABLET] Take 70 mg by mouth every 7 days. Takes on Mondays  11     azelastine (OPTIVAR) 0.05 % ophthalmic solution Place 1 drop into both eyes 2 times daily 6 mL 11     azelastine (OPTIVAR) 0.05 % ophthalmic solution [AZELASTINE (OPTIVAR) 0.05 % OPHTHALMIC SOLUTION] Administer 1 drop to both eyes every 12 (twelve) hours.       benzonatate (TESSALON) 100 MG capsule Take 100 mg by mouth 3 times daily as needed       calcium citrate (CITRACAL) 950 (200 Ca) MG tablet Take 1 tablet by mouth daily       cetirizine (ZYRTEC) 10 MG tablet [CETIRIZINE (ZYRTEC) 10 MG TABLET] Take 10 mg by mouth daily.        cholecalciferol (VITAMIN D3) 25 mcg (1000 units) capsule Take 5000 units in the  morning and 2000 units in the evening       DULOXETINE HCL PO Take 30 mg by mouth 2 times daily       esomeprazole (NEXIUM) 40 MG capsule Take 40 mg by mouth daily as needed       fluticasone-salmeterol (AIRDUO RESPICLICK) 113-14 MCG/ACT inhaler Inhale 1 puff into the lungs 2 times daily 60 each 11     furosemide (LASIX) 20 MG tablet Take 2 tablets in the morning and 1 tablet at noon.       HYDROcodone-acetaminophen 5-325 mg per tablet Take 1 tablet by mouth every 4 hours as needed for pain Max 6 tablets per day.       l-lysine HCl 500 MG TABS tablet Take 500 mg by mouth 2 times daily       magnesium oxide 250 mg Tab Take 500 mg by mouth daily as needed (constipation)       metoprolol succinate ER (TOPROL XL) 50 MG 24 hr tablet Take 50 mg by mouth At Bedtime       modafinil (PROVIGIL) 100 MG tablet [MODAFINIL (PROVIGIL) 100 MG TABLET] Take 250 mg by mouth daily.       montelukast (SINGULAIR) 10 mg tablet [MONTELUKAST (SINGULAIR) 10 MG TABLET] Take 10 mg by mouth daily.       OXYGEN-AIR DELIVERY SYSTEMS MISC [OXYGEN-AIR DELIVERY SYSTEMS MISC] Use 3 L As Directed. 3 lpm with activities and as needed at night       rosuvastatin (CRESTOR) 10 MG tablet [ROSUVASTATIN (CRESTOR) 10 MG TABLET] Take 10 mg by mouth at bedtime.       senna-docusate (SENOKOT-S/PERICOLACE) 8.6-50 MG tablet Take 1 tablet by mouth 2 times daily as needed for constipation 30 tablet 0     solifenacin (VESICARE) 10 MG tablet Take 5-10 mg by mouth At Bedtime       tiZANidine (ZANAFLEX) 4 MG tablet Take 4 mg by mouth every 8 hours as needed       valACYclovir (VALTREX) 500 MG tablet Take 500 mg by mouth 2 times daily as needed (flare)         Physical Exam:  /84   Pulse 60   Ht 1.524 m (5')   Wt 96.3 kg (212 lb 3.2 oz)   SpO2 98%   BMI 41.44 kg/m    Gen: alert, oriented, no distress  HEENT: nasal mucosa is mildly edematous, no oropharyngeal lesions, no cervical or supraclavicular lymphadenopathy  CV: RRR, no M/G/R  Resp: CTAB, no focal  crackles or wheezes  Skin: no apparent rashes  Ext: 1+ pitting edema bilateral legs  Neuro: alert, nonfocal    Labs:  reviewed    Imaging studies:  CT C/A/P (October 2022):  - images directly reviewed, formal interpretation follows:  FINDINGS:   LUNGS AND PLEURA: Scattered subsegmental atelectasis left lung. No pleural effusion.     MEDIASTINUM/AXILLAE: Atherosclerotic aorta. No adenopathy. No significant pericardial effusion. Mitral annular calcification.     CORONARY ARTERY CALCIFICATION: Moderate.     HEPATOBILIARY: Cholecystectomy.     PANCREAS: Fatty atrophy of the pancreas.     SPLEEN: Normal.     ADRENAL GLANDS: Normal.     KIDNEYS/BLADDER: Normal.     BOWEL: Normal caliber. Diverticulosis. Slight thickening of the colon in the left lower quadrant with mild adjacent inflammation.     LYMPH NODES: Normal.     VASCULATURE: Diffuse, atherosclerotic vascular calcification.     PELVIC ORGANS: Normal.     MUSCULOSKELETAL: Fat-containing hernia umbilical region. Degenerative change osseous structures. Postsurgical change cervical spine.                                                                      IMPRESSION:  1.  Mild wall thickening of colon in the left lower quadrant with slight adjacent inflammation. Findings could be due to diverticulitis or focal colitis. Follow-up to confirm resolution.  2.  No bowel obstruction or abscess.     TTE (October 2022):  The left ventricle is normal in size.  Left ventricular function is normal.The ejection fraction is 60-65%.  The left atrium is mildly dilated.  There is moderate mitral annular calcification.  Mild valvular aortic stenosis.     Pulmonary Function Testing  June 2017:  FEV1/FVC is 88 and is normal.  FEV1 is 53% predicted and is reduced.  FVC is 48% predicted and reduced.  There was no improvement in spirometry after a single inhaled dose of bronchodilator.  TLC is 72% predicted and is reduced.  RV is 79% predicted and is normal.  DLCO is 39% predicted and is  reduced when it   is corrected for hemoglobin.    January 2023  FVC 1.13 (63%)  FEV1 0.99 (69%)  FEV1/FVC 0.88  RV 1.38 (84%)  TLC 3.06 (92%)  DLCOc 84%     Sleep Study (4/24/19-4/25/19):  Primary Findings:  Diagnostic Portion:    Respiratory monitoring showed moderate obstructive sleep apnea (AHI=17.1) during light NREM sleep, with events more frequently occurring during supine sleep (AHI 40.6/hr vs 8.3/hr).    The patient spent a total of 21.9 minutes at an oxygen saturation below 88%.  Profound hypoxia was noted and after 10 minutes of sleep, 1 LPM of supplemental oxygen was added via nasal cannula (which stabilized hypoxia).    Mild tachypnea was noted throughout  Titration Portion:  A trial of Bi-level PAP in Spontaneous (S) mode was initiated at 8/4 and increased up to 22/12 cmH2O (supplemental oxygen was turned off).    Respiration was stabilized during NREM sleep, albeit with persistent hypoxia.  However, during REM sleep respiratory events and profound hypoxia returned.    This was considered an unacceptable titration due to persistence of respiratory events and hypoxia.   Mask leak was also problematic.     Other Findings:  Sleep Architecture:     Sleep onset latency was normal.    REM onset latency was prolonged.    All stages of sleep were sampled during the test.    Sleep architecture was fragmented due to numerous arousals.    Stage N3 sleep and Stage REM sleep were observed with increased frequency and duration (rebound) after CPAP pressures reached an optimal pressure.    Sleep efficiency was normal.     Respiration:    Mean hemoglobin oxygen saturation (SaO2) during the diagnostic portion of the PSG in NREM and REM sleep was 84% with a SaO2 desaturation jorge observed to 61%.    Sleep-related Hypoxia was present as cumulative exposure time in sleep to SaO2 below 88% surpassed 5 total minutes (in this case, 21.9 minutes).     Movements:        Periodic leg movements were observed with low  frequency.    The patient had a Periodic Limb Movement (PLM) index of 2.1 and the ESTEFANIA PLM index was 0.8.     Parasomnia:    No activity consistent with parasomnia was observed in the video recording.     Electrocardiogram:    Two-lead ECG showed normal sinus rhythm.     Electroencephalogram:    We used a limited-montage EEG with F3, F4, C3, C4, O1, O2 and contra-lateral references to M1 and M2 that was reviewed using a 30-second EPOCH. No EEG abnormalities were observed with these settings.     Therapeutic Intervention:   Bilevel was initiated with a pressure of 8/4 cwp  and titrated up to 22/12 cwp  Interface:  Type-  Nasal          ,    Brand-  Eson 2              ,   Size-  Small     Diagnosis:    G47.33 Obstructive sleep apnea    G47.34 Sleep related hypoxia    G47.36 Sleep related hypoventilation in conditions classified elsewhere     Assessment & Recommendation:        Although optimal pressures were not determined, I would recommend auto-titrating Bilevel with an EPAP min of 12, IPAP max of 25, and PS of 7 cmH2O.  Recommend special attention to mask fit and mouth leak, especially at higher pressures.    I suspect hypoxia will persist despite maximal Bilevel support.  ? If patient already qualifies for continuous oxygen, recommend bleeding 1 LPM at machine end of tubing (recommend ResMed devices due to tubing with integrated oxygen hookup).  ? If patient does not qualify for continuous oxygen already, then she would benefit from nocturnal oximetry to confirm resolution of hypoxia.  She may require repeat titration study to qualify for supplemental oxygen therapy.    Consider adjunct positional therapy with HOB elevation or lateral sleep position.    Patients with excessive daytime sleepiness are at increased risk for motor vehicle accidents.    Suggest optimizing sleep hygiene and avoiding any further sleep deprivation.    Consider weight reduction management as this may be a factor in NELLIE.    Recommend  evaluation due to periodic leg movements.    Measures aimed at increasing sleep consolidation can be beneficial.      Again, thank you for allowing me to participate in the care of your patient.        Sincerely,        Nitin Giang MD

## 2023-02-08 ENCOUNTER — HOSPITAL ENCOUNTER (EMERGENCY)
Facility: HOSPITAL | Age: 73
End: 2023-02-08
Payer: MEDICARE

## 2023-02-09 NOTE — ED NOTES
Expected Patient Referral to ED  9:03 PM    Referring Clinic/Provider:  Dr. Iglesias  [facility]    Reason for referral/Clinical facts:  Here with 4 days of abdominal pain, CT shows incarcerated SBO with high grade small bowl obstruction, ventral hernia, unable to reduce    Recommendations provided:  Send to ED for further evaluation    Caller was informed that this institution does possess the capabilities and/or resources to provide for patient and should be transferred to our facility. Discussed to let patient know we have no hospital beds and pt may need to be transferred.     Discussed that if direct admit is sought and any hurdles are encountered, this ED would be happy to see the patient and evaluate.    Informed caller that recommendations provided are recommendations based only on the facts provided and that they responsible to accept or reject the advice, or to seek a formal in person consultation as needed and that this ED will see/treat patient should they arrive.      Ameya Singh MD  [facility] EMERGENCY DEPARTMENT  [address] [place] [id number]  [phone]       Ameya Singh MD  02/08/23 2711

## 2023-03-02 ENCOUNTER — LAB REQUISITION (OUTPATIENT)
Dept: LAB | Facility: CLINIC | Age: 73
End: 2023-03-02
Payer: MEDICARE

## 2023-03-02 DIAGNOSIS — N18.32 CHRONIC KIDNEY DISEASE, STAGE 3B (H): ICD-10-CM

## 2023-03-02 DIAGNOSIS — M15.9 POLYOSTEOARTHRITIS, UNSPECIFIED: ICD-10-CM

## 2023-03-02 DIAGNOSIS — Z87.440 PERSONAL HISTORY OF URINARY (TRACT) INFECTIONS: ICD-10-CM

## 2023-03-02 LAB — ERYTHROCYTE [SEDIMENTATION RATE] IN BLOOD BY WESTERGREN METHOD: 15 MM/HR (ref 0–30)

## 2023-03-02 PROCEDURE — 80053 COMPREHEN METABOLIC PANEL: CPT | Mod: ORL | Performed by: PHYSICIAN ASSISTANT

## 2023-03-02 PROCEDURE — 83970 ASSAY OF PARATHORMONE: CPT | Mod: ORL | Performed by: PHYSICIAN ASSISTANT

## 2023-03-02 PROCEDURE — 87086 URINE CULTURE/COLONY COUNT: CPT | Mod: ORL | Performed by: PHYSICIAN ASSISTANT

## 2023-03-02 PROCEDURE — 85652 RBC SED RATE AUTOMATED: CPT | Mod: ORL | Performed by: PHYSICIAN ASSISTANT

## 2023-03-03 ENCOUNTER — LAB REQUISITION (OUTPATIENT)
Dept: LAB | Facility: CLINIC | Age: 73
End: 2023-03-03
Payer: MEDICARE

## 2023-03-03 DIAGNOSIS — E83.52 HYPERCALCEMIA: ICD-10-CM

## 2023-03-03 LAB
ALBUMIN SERPL BCG-MCNC: 4.2 G/DL (ref 3.5–5.2)
ALP SERPL-CCNC: 74 U/L (ref 35–104)
ALT SERPL W P-5'-P-CCNC: 19 U/L (ref 10–35)
ANION GAP SERPL CALCULATED.3IONS-SCNC: 13 MMOL/L (ref 7–15)
AST SERPL W P-5'-P-CCNC: 19 U/L (ref 10–35)
BILIRUB SERPL-MCNC: 0.2 MG/DL
BUN SERPL-MCNC: 17.9 MG/DL (ref 8–23)
CALCIUM SERPL-MCNC: 10.7 MG/DL (ref 8.8–10.2)
CHLORIDE SERPL-SCNC: 102 MMOL/L (ref 98–107)
CREAT SERPL-MCNC: 1.11 MG/DL (ref 0.51–0.95)
DEPRECATED HCO3 PLAS-SCNC: 27 MMOL/L (ref 22–29)
GFR SERPL CREATININE-BSD FRML MDRD: 53 ML/MIN/1.73M2
GLUCOSE SERPL-MCNC: 111 MG/DL (ref 70–99)
POTASSIUM SERPL-SCNC: 4.5 MMOL/L (ref 3.4–5.3)
PROT SERPL-MCNC: 6.7 G/DL (ref 6.4–8.3)
PTH-INTACT SERPL-MCNC: 39 PG/ML (ref 15–65)
SODIUM SERPL-SCNC: 142 MMOL/L (ref 136–145)

## 2023-03-04 LAB — BACTERIA UR CULT: NORMAL

## 2023-03-13 ENCOUNTER — TELEPHONE (OUTPATIENT)
Dept: INTERVENTIONAL RADIOLOGY/VASCULAR | Facility: CLINIC | Age: 73
End: 2023-03-13
Payer: MEDICARE

## 2023-03-15 RX ORDER — LIDOCAINE 40 MG/G
CREAM TOPICAL
Status: CANCELLED | OUTPATIENT
Start: 2023-03-15

## 2023-03-15 NOTE — H&P
Interventional Radiology - History and Physical  Outpatient - [facility]  03/17/2023     Procedure Requested: abscessogram  Requesting Provider: LINDA GARCIA NP     HPI: Desirae Rey is a 72 year old female   PMH chronic respiratory failure ion 2L at baseline, HTN, CHF, CKD III, HLD, NELLIE, asthma who admitted to [facility] 2/19/2023 2/2 ventral hernia abscess.    S/P drain placement 2/19/2023.  Discharged 2/25/2023. She has not had any follow up visits since discharge with any specialty. She was followed by Dr. Frank from General Surgery    Last CT and abscessogram 3/9/2023 at [facility], per below.     She has no concerns for this writer today except drain is leaking with flushing    Culture: NGTD  Abx: discharged on 7 day course of Augmentin- finished, took as prescribed  Flushing: 10mL Qday  Outputs: Only the flush volume.  No additional outputs    Presents for abscessogram      Imaging:     [facility]  LOCATION: [facility] MEDICAL IMAGING  DATE: 3/9/2023    PROCEDURE: ABSCESS TUBE CHECK    INTERVENTIONAL RADIOLOGIST: Trent Jones MD.    INDICATION: Abdominal wall fluid collection status post drain placement on 02/19/2023. Patient reports 20 mL-60 mL daily output, however, she does not appear to be subtracting flushes. Following up with surgery appointment later today.    CONSENT: The risks, benefits and alternatives of an abscessogram with possible exchange, manipulation or removal were discussed with the patient  in detail. All questions were answered. Informed consent was given to proceed with the procedure.    MODERATE SEDATION: None.    CONTRAST: 10 mL Omnipaque into the abscess cavity.   ANTIBIOTICS: None.  ADDITIONAL MEDICATIONS: None.    FLUOROSCOPIC TIME: 0.6 minutes.  RADIATION DOSE: Air Kerma: 29.7 mGy.    COMPLICATIONS: No immediate complications.    STERILE BARRIER TECHNIQUE: Maximum sterile barrier technique was used. Cutaneous antisepsis was  performed at the operative site with application of 2% chlorhexidine and large sterile drape. Prior to the procedure, the  and assistant performed hand hygiene and wore hat, mask, sterile gown, and sterile gloves during the entire procedure.    PROCEDURE:   A  image was obtained.    The pre-existing drainage catheter was injected with a small amount of contrast and multiple images were obtained. Based on the results of the study the drainage catheter was left in place.    FINDINGS:  There is aspirated and small amount of serous fluid removed. Injection performed descending the abscess cavity but no fistula. 10 mL caused leakage around the catheter.    IMPRESSION:  Near resolved abscess cavity. No evidence of fistula.    PLAN:  Given the persistent output, drain was not removed today. Patient is following up with surgery. Unless drain is removed at that appointment, recommend one to two-week follow-up with abscessogram. No CT. Discussed subtracting flushes from the drain output with the patient.       EXAM: CT ABDOMEN PELVIS W  LOCATION: [facility] MEDICAL IMAGING  DATE/TIME: 3/9/2023 7:58 AM    INDICATION: Abscess After Procedure. Drain f/u approx 7-10 days post drain placement 2/19 w/ abscessogram. Periumbilical pain.  COMPARISON: 2/18/2023  TECHNIQUE: CT scan of the abdomen and pelvis was performed following injection of IV contrast. Multiplanar reformats were obtained. Dose reduction techniques were used.  CONTRAST: IOHEXOL 350 MG IODINE/ML  ML BOTTLE: 80mL    FINDINGS:   LOWER CHEST: Normal.    HEPATOBILIARY: Cholecystectomy.    PANCREAS: Normal.    SPLEEN: Normal.    ADRENAL GLANDS: Normal.    KIDNEYS/BLADDER: Few small benign renal cysts bilaterally. No follow-up needed.    BOWEL: Bowel loops are normal caliber. Mildly increased stool in the colon. Mild sigmoid diverticulosis without active inflammation.    LYMPH NODES: Normal.    VASCULATURE: Moderate calcified plaque in the abdominal  aorta.    PELVIC ORGANS: Normal.    MUSCULOSKELETAL: Since 2/18/2023 a percutaneous pigtail drain has been placed within the periumbilical fluid collection involving the abdominal wall adipose tissues. The collection has significantly decreased in size measuring 2.0 x 1.7 x 3.8 cm versus 6.3 x 4.9 x 7.4 cm previously. Adjacent induration and inflammatory changes demonstrate mild interval improvement. Inflammatory changes involving the muscular abdominal wall at this location and adjacent peritoneal space have also improved. No fluid collections within the muscular wall or peritoneal space. Degenerative disc disease and facet arthritis in the visualized spine.    IMPRESSION:  1. Since 2/18/2023 a percutaneous drain has been placed within the periumbilical abdominal wall fluid collection. The collection has significantly decreased in size measuring 2.0 x 1.7 x 3.8 cm versus 6.3 x 4.9 x 7.4 cm previously. Adjacent inflammatory changes involving the abdominal wall and adjacent peritoneal space have improved. No new fluid collections.    NPO Status: [place]   Anticoagulation/Antiplatelets/Bleeding tendencies: Xarelto - last dose last evening  Antibiotics: Antibiotics not clinically indicated for IR procedure, per review of current clinical practice guidelines     Review of Systems: A comprehensive 10-point review of systems was performed. All systems were reviewed and negative with exception to those reported in the HPI.    PMH:  Past Medical History:   Diagnosis Date     Allergic rhinitis      Arthritis of back      CHF (congestive heart failure) (H)      Chronic kidney disease     stage 3      De Quervain's disease (tenosynovitis)      Fibromyalgia, primary      GERD (gastroesophageal reflux disease)      Hematuria     chronic     High cholesterol      History of blood clots 09/01/1970    DVT, from birth control, h/o left calf     Hyperlipidemia      Hypertension      Low back pain      Osteoporosis      [facility]  syndrome (H)      Plantar fasciitis      Proteinuria      Proteinuria     chronic     Sleep apnea     CPAP     Uncomplicated asthma        PSH:  Past Surgical History:   Procedure Laterality Date     CERVICAL FUSION N/A 4/27/2017    Procedure: ANTERIOR CERVICAL DECOMPRESSION FUSION C5-7 BILATERAL;  Surgeon: Basim Barone MD;  Location: [facility];  Service:      CHOLECYSTECTOMY      open     COLONOSCOPY N/A 12/20/2019    Procedure: COLONOSCOPY WITH BIOPSIES;  Surgeon: Lucio Farr MD;  Location: [facility];  Service: Gastroenterology     EYE SURGERY       HAND SURGERY Right      HYSTEROSCOPY DIAGNOSTIC       JOINT REPLACEMENT      bilateral TKA     KNEE SURGERY       RELEASE CARPAL TUNNEL Bilateral        ALLERGIES:  Allergies   Allergen Reactions     Latex Rash     Severe rash     Valsartan Unknown     Hyperkalemia     Colchicine Diarrhea     Latex Unknown     Added based on information entered during case entry, please review and add reactions, type, and severity as needed     Other Drug Allergy (See Comments) Muscle Pain (Myalgia)     Tried lovastatin and simvastatin     Other Environmental Allergy Unknown     Coverlet bandaid , 3M product; rash     Statins-Hmg-Coa Reductase Inhibitors [Hmg-Coa-R Inhibitors] Muscle Pain (Myalgia)     Tried lovastatin and simvastatin     Sulfa Drugs Swelling     Facial swelling  Facial swelling     Adhesive Tape Rash       MEDICATIONS:  Current Outpatient Medications   Medication     albuterol (PROAIR HFA;PROVENTIL HFA;VENTOLIN HFA) 90 mcg/actuation inhaler     alendronate (FOSAMAX) 70 MG tablet     azelastine (OPTIVAR) 0.05 % ophthalmic solution     azelastine (OPTIVAR) 0.05 % ophthalmic solution     benzonatate (TESSALON) 100 MG capsule     calcium citrate (CITRACAL) 950 (200 Ca) MG tablet     cetirizine (ZYRTEC) 10 MG tablet     cholecalciferol (VITAMIN D3) 25 mcg (1000 units) capsule     DULOXETINE HCL PO     esomeprazole (NEXIUM) 40 MG capsule      fluticasone-salmeterol (AIRDUO RESPICLICK) 113-14 MCG/ACT inhaler     furosemide (LASIX) 20 MG tablet     HYDROcodone-acetaminophen 5-325 mg per tablet     l-lysine HCl 500 MG TABS tablet     magnesium oxide 250 mg Tab     metoprolol succinate ER (TOPROL XL) 50 MG 24 hr tablet     modafinil (PROVIGIL) 100 MG tablet     montelukast (SINGULAIR) 10 mg tablet     OXYGEN-AIR DELIVERY SYSTEMS MISC     rosuvastatin (CRESTOR) 10 MG tablet     senna-docusate (SENOKOT-S/PERICOLACE) 8.6-50 MG tablet     solifenacin (VESICARE) 10 MG tablet     tiZANidine (ZANAFLEX) 4 MG tablet     valACYclovir (VALTREX) 500 MG tablet     No current facility-administered medications for this encounter.         LABS:  No results found for: INR   Hemoglobin   Date Value Ref Range Status   11/01/2022 11.7 11.7 - 15.7 g/dL Final     Hemoglobin POCT   Date Value Ref Range Status   01/13/2023 11.3 g/dL Final   ]  Platelet Count   Date Value Ref Range Status   11/01/2022 353 150 - 450 10e3/uL Final     Creatinine   Date Value Ref Range Status   03/02/2023 1.11 (H) 0.51 - 0.95 mg/dL Final     Potassium   Date Value Ref Range Status   03/02/2023 4.5 3.4 - 5.3 mmol/L Final   12/15/2021 4.9 3.5 - 5.0 mmol/L Final         EXAM:  BP (!) 146/69   Pulse 70   Temp 98.5  F (36.9  C) (Oral)   Resp 20   SpO2 92%   General:  Stable.  In no acute distress.    Neuro:  A&O x 3. Moves all extremities equally.  Resp:  Lungs clear to auscultation bilaterally.  NC.  Tight, shallow  Cardio:  S1S2 and reg, without murmur, clicks or rubs  Abdomen:  Soft, non-distended, non-tender, positive bowel sounds.    Drain in place.  Insertion site c/d/i.  Stay suture in place.  Dressing c/d/i.  Drainage bag nearly empty       Pre-Sedation Assessment:  Mallampati Airway Classification:  II - Faucial pillars and soft palate may be seen, but uvula is masked by the base of the tongue  Previous reaction to anesthesia/sedation:  No  Sedation plan based on assessment: Moderate  (conscious) sedation as needed  ASA Classification: Class 3 - SEVERE SYSTEMIC DISEASE, DEFINITE FUNCTIONAL LIMITATIONS.   Code Status: FULL CODE    ASSESSMENT:  71 yo F    - PMH per above including ventral hernia abscess s/p drain placement   - Culture, abx per above  - Leaking    Presents for abscessogram    PLAN:    Abscessogram with possible drain intervention, sedation as needed    - Antibiotics not clinically indicated for IR procedure, per review of current clinical practice guidelines  - Continue to follow with Dr. Frank      Procedure, risks/benefits, details, alternatives, and sedation reviewed with patient/family and she verbalized understanding. All questions answered. OK to proceed with above radiology procedure.      10 minutes spent with patient at bedside and 10 minutes spent in reviewing imaging, test results and in consultation with colleagues.   Total Time: 20 minutes       LINDA GARCIA NP  Interventional Radiology  [phone]

## 2023-03-17 ENCOUNTER — HOSPITAL ENCOUNTER (OUTPATIENT)
Dept: INTERVENTIONAL RADIOLOGY/VASCULAR | Facility: HOSPITAL | Age: 73
Discharge: HOME | End: 2023-03-17
Attending: NURSE PRACTITIONER | Admitting: RADIOLOGY
Payer: MEDICARE

## 2023-03-17 VITALS
OXYGEN SATURATION: 92 % | TEMPERATURE: 98.5 F | DIASTOLIC BLOOD PRESSURE: 69 MMHG | RESPIRATION RATE: 20 BRPM | SYSTOLIC BLOOD PRESSURE: 146 MMHG | HEART RATE: 70 BPM

## 2023-03-17 DIAGNOSIS — T81.49XA ABSCESS AFTER PROCEDURE: ICD-10-CM

## 2023-03-17 PROCEDURE — 255N000002 HC RX 255 OP 636: Performed by: NURSE PRACTITIONER

## 2023-03-17 PROCEDURE — 49424 ASSESS CYST CONTRAST INJECT: CPT

## 2023-03-17 RX ORDER — NALOXONE HYDROCHLORIDE 0.4 MG/ML
0.4 INJECTION, SOLUTION INTRAMUSCULAR; INTRAVENOUS; SUBCUTANEOUS
Status: DISCONTINUED | OUTPATIENT
Start: 2023-03-17 | End: 2023-03-18 | Stop reason: HOSPADM

## 2023-03-17 RX ORDER — FENTANYL CITRATE 50 UG/ML
25-50 INJECTION, SOLUTION INTRAMUSCULAR; INTRAVENOUS EVERY 5 MIN PRN
Status: DISCONTINUED | OUTPATIENT
Start: 2023-03-17 | End: 2023-03-18 | Stop reason: HOSPADM

## 2023-03-17 RX ORDER — FLUMAZENIL 0.1 MG/ML
0.2 INJECTION, SOLUTION INTRAVENOUS
Status: DISCONTINUED | OUTPATIENT
Start: 2023-03-17 | End: 2023-03-18 | Stop reason: HOSPADM

## 2023-03-17 RX ORDER — NALOXONE HYDROCHLORIDE 0.4 MG/ML
0.2 INJECTION, SOLUTION INTRAMUSCULAR; INTRAVENOUS; SUBCUTANEOUS
Status: DISCONTINUED | OUTPATIENT
Start: 2023-03-17 | End: 2023-03-18 | Stop reason: HOSPADM

## 2023-03-17 RX ADMIN — IOHEXOL 5 ML: 350 INJECTION, SOLUTION INTRAVENOUS at 13:30

## 2023-03-17 NOTE — PRE-PROCEDURE
GENERAL PRE-PROCEDURE:   Procedure:  Abscessogram  Date/Time:  3/17/2023 1:04 PM    Written consent obtained?: Yes    Risks and benefits: Risks, benefits and alternatives were discussed    Consent given by:  Patient  Patient states understanding of procedure being performed: Yes    Patient's understanding of procedure matches consent: Yes    Procedure consent matches procedure scheduled: Yes    Expected level of sedation:  Moderate (PRN)  Appropriately NPO:  Yes  ASA Class:  3  Mallampati  :  Grade 2- soft palate, base of uvula, tonsillar pillars, and portion of posterior pharyngeal wall visible  Lungs:  Lungs clear with good breath sounds bilaterally and other (comment)  Lung exam comment:  NC.  Shallow  Heart:  Normal heart sounds and rate  History & Physical reviewed:  History and physical reviewed and no updates needed  Statement of review:  I have reviewed the lab findings, diagnostic data, medications, and the plan for sedation

## 2023-03-17 NOTE — IP AVS SNAPSHOT
[facility] Interventional Radiology  [address] [place] [id number]  Phone: [phone]  Fax: [phone]                                    After Visit Summary   3/17/2023    Desirae Rey   MRN: [id number]           After Visit Summary Signature Page    I have received my discharge instructions, and my questions have been answered. I have discussed any challenges I see with this plan with the nurse or doctor.    ..........................................................................................................................................  Patient/Patient Representative Signature      ..........................................................................................................................................  Patient Representative Print Name and Relationship to Patient    ..................................................               ................................................  Date                                   Time    ..........................................................................................................................................  Reviewed by Signature/Title    ...................................................              ..............................................  Date                                               Time          22EPIC Rev 08/18

## 2023-05-31 ENCOUNTER — HOSPITAL ENCOUNTER (OUTPATIENT)
Dept: GENERAL RADIOLOGY | Facility: HOSPITAL | Age: 73
Discharge: HOME | End: 2023-05-31
Attending: INTERNAL MEDICINE
Payer: MEDICARE

## 2023-05-31 ENCOUNTER — OFFICE VISIT (OUTPATIENT)
Dept: RHEUMATOLOGY | Facility: CLINIC | Age: 73
End: 2023-05-31
Payer: MEDICARE

## 2023-05-31 ENCOUNTER — LAB (OUTPATIENT)
Dept: LAB | Facility: CLINIC | Age: 73
End: 2023-05-31
Payer: MEDICARE

## 2023-05-31 VITALS
BODY MASS INDEX: 41.27 KG/M2 | HEART RATE: 78 BPM | DIASTOLIC BLOOD PRESSURE: 90 MMHG | WEIGHT: 211.3 LBS | SYSTOLIC BLOOD PRESSURE: 150 MMHG

## 2023-05-31 DIAGNOSIS — M25.50 POLYARTHRALGIA: Primary | ICD-10-CM

## 2023-05-31 DIAGNOSIS — M25.50 POLYARTHRALGIA: ICD-10-CM

## 2023-05-31 DIAGNOSIS — M15.0 PRIMARY OSTEOARTHRITIS INVOLVING MULTIPLE JOINTS: ICD-10-CM

## 2023-05-31 LAB
ALBUMIN SERPL BCG-MCNC: 4.5 G/DL (ref 3.5–5.2)
ALT SERPL W P-5'-P-CCNC: 19 U/L (ref 10–35)
BASOPHILS # BLD AUTO: 0.1 10E3/UL (ref 0–0.2)
BASOPHILS NFR BLD AUTO: 1 %
CREAT SERPL-MCNC: 1.46 MG/DL (ref 0.51–0.95)
CRP SERPL-MCNC: 3.51 MG/L
EOSINOPHIL # BLD AUTO: 0.4 10E3/UL (ref 0–0.7)
EOSINOPHIL NFR BLD AUTO: 5 %
ERYTHROCYTE [DISTWIDTH] IN BLOOD BY AUTOMATED COUNT: 11.6 % (ref 10–15)
ERYTHROCYTE [SEDIMENTATION RATE] IN BLOOD BY WESTERGREN METHOD: 23 MM/HR (ref 0–30)
GFR SERPL CREATININE-BSD FRML MDRD: 38 ML/MIN/1.73M2
HCT VFR BLD AUTO: 38.4 % (ref 35–47)
HGB BLD-MCNC: 12.8 G/DL (ref 11.7–15.7)
IMM GRANULOCYTES # BLD: 0 10E3/UL
IMM GRANULOCYTES NFR BLD: 0 %
LYMPHOCYTES # BLD AUTO: 2.2 10E3/UL (ref 0.8–5.3)
LYMPHOCYTES NFR BLD AUTO: 29 %
MCH RBC QN AUTO: 34 PG (ref 26.5–33)
MCHC RBC AUTO-ENTMCNC: 33.3 G/DL (ref 31.5–36.5)
MCV RBC AUTO: 102 FL (ref 78–100)
MONOCYTES # BLD AUTO: 0.7 10E3/UL (ref 0–1.3)
MONOCYTES NFR BLD AUTO: 10 %
NEUTROPHILS # BLD AUTO: 4.4 10E3/UL (ref 1.6–8.3)
NEUTROPHILS NFR BLD AUTO: 56 %
PLATELET # BLD AUTO: 248 10E3/UL (ref 150–450)
RBC # BLD AUTO: 3.77 10E6/UL (ref 3.8–5.2)
URATE SERPL-MCNC: 8.5 MG/DL (ref 2.4–5.7)
WBC # BLD AUTO: 7.8 10E3/UL (ref 4–11)

## 2023-05-31 PROCEDURE — 36415 COLL VENOUS BLD VENIPUNCTURE: CPT

## 2023-05-31 PROCEDURE — 85025 COMPLETE CBC W/AUTO DIFF WBC: CPT

## 2023-05-31 PROCEDURE — 82565 ASSAY OF CREATININE: CPT

## 2023-05-31 PROCEDURE — 86039 ANTINUCLEAR ANTIBODIES (ANA): CPT

## 2023-05-31 PROCEDURE — 73030 X-RAY EXAM OF SHOULDER: CPT | Mod: 50

## 2023-05-31 PROCEDURE — 86431 RHEUMATOID FACTOR QUANT: CPT

## 2023-05-31 PROCEDURE — 86140 C-REACTIVE PROTEIN: CPT

## 2023-05-31 PROCEDURE — 99204 OFFICE O/P NEW MOD 45 MIN: CPT | Performed by: INTERNAL MEDICINE

## 2023-05-31 PROCEDURE — 86038 ANTINUCLEAR ANTIBODIES: CPT

## 2023-05-31 PROCEDURE — 84550 ASSAY OF BLOOD/URIC ACID: CPT

## 2023-05-31 PROCEDURE — 86200 CCP ANTIBODY: CPT

## 2023-05-31 PROCEDURE — 85652 RBC SED RATE AUTOMATED: CPT

## 2023-05-31 PROCEDURE — 84460 ALANINE AMINO (ALT) (SGPT): CPT

## 2023-05-31 PROCEDURE — 73130 X-RAY EXAM OF HAND: CPT | Mod: 50

## 2023-05-31 PROCEDURE — 82040 ASSAY OF SERUM ALBUMIN: CPT

## 2023-05-31 RX ORDER — VIT C/B6/B5/MAGNESIUM/HERB 173 50-5-6-5MG
1 CAPSULE ORAL DAILY
COMMUNITY

## 2023-05-31 RX ORDER — NYSTATIN 100000 [USP'U]/G
POWDER TOPICAL
COMMUNITY
Start: 2022-10-22 | End: 2023-12-05

## 2023-05-31 RX ORDER — PRENATAL VIT 91/IRON/FOLIC/DHA 28-975-200
COMBINATION PACKAGE (EA) ORAL
COMMUNITY
Start: 2023-02-16 | End: 2023-12-05

## 2023-05-31 NOTE — PROGRESS NOTES
This document was created using a software with less than 100% fidelity, at times resulting in unintended, even erroneous syntax and grammar.  The reader is advised to keep this under consideration while reviewing, interpreting this note.      Rheumatology Consult Note      Desirae Rey     YOB: 1950 Age: 72 year old    Date of visit: 5/31/23    PCP: Noemy Brito    Chief Complaint   Patient presents with:  Consult      Assessment and Plan     Desirae was seen today for consult.    Diagnoses and all orders for this visit:    Polyarthralgia  -     Albumin level; Future  -     ALT; Future  -     Anti Nuclear Letha IgG by IFA with Reflex; Future  -     CBC with Platelets & Differential; Future  -     Creatinine; Future  -     CRP inflammation; Future  -     Cyclic Citrullinated Peptide Antibody IgG; Future  -     Erythrocyte sedimentation rate auto; Future  -     Rheumatoid factor; Future  -     Uric acid; Future  -     XR Shoulder Bilateral 3 Views; Future  -     XR Hand Bilateral G/E 3 Views; Future    Primary osteoarthritis involving multiple joints  -     XR Shoulder Bilateral 3 Views; Future  -     XR Hand Bilateral G/E 3 Views; Future           This patient with polyarthralgias going back several years as little as 5 and as long as 15 years, has well defined osteoarthritis both axial and appendicular status post C-spine surgery for fusion in the past, bilateral TKAs.  As far as the elevated sedimentation rate this has been high as far back as I can find i.e. 12/18/2013, 67.  Most recently in March 2023 this was at its lowest at 15.  One of the observations which requires careful monitoring and evaluation is that whenever she is given prednisone she feels so much better with the pain improvement very significantly in a day or 2 and once course of prednisone and she has relapse of symptoms in about 3 to 5 days.  There is no family history of psoriasis ulcerative colitis Crohn's disease herself.   "Further work-up indicated as noted.  X-ray of the hands done today, she has extensive chondrocalcinosis x-rays in the wrist bilaterally worse on the right side, second MCP.  This may suggest calcium pyrophosphate disease, chronic inflammatory arthropathy that could explain part significant response to prednisone each time.  Once the rest of data are available we will meet here further course to be charted accordingly.     ALYSSA Rey is a 72 year old female  is seen today for evaluation of widespread polyarthralgias polymyalgias.  This is gone on for several years.  Initially the thought was maybe 5 later on 10 years and then possibly going on since 2008 when she first \"had back give way\".  She is status post bilateral knee arthroplasty.  She has had cervical spine fusion.  She reports no personal or family history of psoriasis ulcerative colitis Crohn's disease.  She has sleep apnea she is on CPAP.  At one time she was thought to have fibromyalgia.    In this background she reports that the pain is there 24/7.  She wakes up in the morning unrefreshed.  The pain has affected upper and lower extremities neck and back.  Just about the only area where the pain is not as bothersome her hips.  She has noted no swelling with the exception of swelling toward the end of the day in her shins.  She is on Vicodin, she takes Tylenol for breakthrough pain.  One of the best options that she has found is short course of prednisone.  Over these years she recalls having had at least 5 episodes where she was given prednisone for 10 days each.  As soon as she starts prednisone within a day or so her symptoms nearly completely resolved.  Once she finishes a course of prednisone in the next 3 to 5 days her symptoms are resume.  Sometimes she is woken up from sleep because of this pain.  She feels that the worst part of the pain is first thing in the morning although this is there throughout.  At 1 point her sedimentation rate " was high.  This was at its peak at 50, October 2022 and most recently in March this year 15.  Her most recent course of prednisone was during a time when she had respiratory tract infection this was several months ago.  It is possible that this may have been in March when her sed rate was low or it is also possible she thought that it would be prior to that.  She recalls having had multiple corticosteroid injection in her shoulders by her orthopedic surgeon initially helpful later on not so.  She reports no jaw claudication double vision, she seldom if ever gets headaches.  She has had at least 7 episodes of fall over the past 12 months or so for unclear reasons. At the prompting of her  she noted spasms in her left upper extremity.  She used to work at a [employer] and [employer], now she is retired.  She has several comorbidities as noted..  She is not a smoker.           Active Problem List     Patient Active Problem List   Diagnosis     Fibromyalgia     Osteoporosis     Obesity     Osteoarthritis     NELLIE (obstructive sleep apnea)     Immunology Studies Nonspecific Abnormal Findings     Allergic Rhinitis     Anemia     Hematuria     Proteinuria     Renal Insufficiency     Synovitis     Pseudogout     Chondrocalcinosis     Pain During Urination (Dysuria)     Cervical radiculopathy     Benign essential hypertension     Hyperkalemia     Acute respiratory failure with hypoxia (H)     GRACY (acute kidney injury) (H)     Blood loss anemia     Morbid obesity due to excess calories (H)     Cubital tunnel syndrome, right     Morbid obesity, unspecified obesity type (H)     Chronic kidney disease, stage 2 (mild)     Hyperlipidemia, unspecified hyperlipidemia type     Other specified hypotension     Sleep apnea     CKD (chronic kidney disease) stage 3, GFR 30-59 ml/min (H)     Hypertension     Diastolic dysfunction     Altered mental status, unspecified altered mental status type     Acute weakness      Adult failure to thrive     Chronic heart failure with preserved ejection fraction (HFpEF) (H)     Elevated troponin     Moderate persistent asthma without complication     Past Medical History     Past Medical History:   Diagnosis Date     Allergic rhinitis      Arthritis of back      CHF (congestive heart failure) (H)      Chronic kidney disease     stage 3      De Quervain's disease (tenosynovitis)      Fibromyalgia, primary      GERD (gastroesophageal reflux disease)      Hematuria     chronic     High cholesterol      History of blood clots 09/01/1970    DVT, from birth control, h/o left calf     Hyperlipidemia      Hypertension      Low back pain      Osteoporosis      Pickwickian syndrome (H)      Plantar fasciitis      Proteinuria      Proteinuria     chronic     Sleep apnea     CPAP     Uncomplicated asthma      Past Surgical History     Past Surgical History:   Procedure Laterality Date     CERVICAL FUSION N/A 4/27/2017    Procedure: ANTERIOR CERVICAL DECOMPRESSION FUSION C5-7 BILATERAL;  Surgeon: Basim Barone MD;  Location: [facility] - [place], [place];  Service:      CHOLECYSTECTOMY      open     COLONOSCOPY N/A 12/20/2019    Procedure: COLONOSCOPY WITH BIOPSIES;  Surgeon: Lucio Farr MD;  Location: [facility] - [place], [place];  Service: Gastroenterology     EYE SURGERY       HAND SURGERY Right      HYSTEROSCOPY DIAGNOSTIC       JOINT REPLACEMENT      bilateral TKA     KNEE SURGERY       RELEASE CARPAL TUNNEL Bilateral      Allergy     Allergies   Allergen Reactions     Latex Rash     Severe rash     Valsartan Unknown     Hyperkalemia     Colchicine Diarrhea     Latex Unknown     Added based on information entered during case entry, please review and add reactions, type, and severity as needed     Other Drug Allergy (See Comments) Muscle Pain (Myalgia)     Tried lovastatin and simvastatin     Other Environmental Allergy Unknown     Coverlet bandaid , 3M product; rash     Statins-Hmg-Coa Reductase  Inhibitors [Statins] Muscle Pain (Myalgia)     Tried lovastatin and simvastatin     Sulfa Antibiotics Swelling     Facial swelling  Facial swelling     Adhesive Tape Rash     Current Medication List     Current Outpatient Medications   Medication Sig     albuterol (PROAIR HFA;PROVENTIL HFA;VENTOLIN HFA) 90 mcg/actuation inhaler Inhale 1 puff into the lungs every 4 hours as needed for shortness of breath / dyspnea     alendronate (FOSAMAX) 70 MG tablet [ALENDRONATE (FOSAMAX) 70 MG TABLET] Take 70 mg by mouth every 7 days. Takes on Mondays     azelastine (OPTIVAR) 0.05 % ophthalmic solution [AZELASTINE (OPTIVAR) 0.05 % OPHTHALMIC SOLUTION] Administer 1 drop to both eyes every 12 (twelve) hours.     benzonatate (TESSALON) 100 MG capsule Take 100 mg by mouth 3 times daily as needed     calcium citrate (CITRACAL) 950 (200 Ca) MG tablet Take 1 tablet by mouth daily     cetirizine (ZYRTEC) 10 MG tablet [CETIRIZINE (ZYRTEC) 10 MG TABLET] Take 10 mg by mouth daily.      cholecalciferol (VITAMIN D3) 25 mcg (1000 units) capsule Take 5000 units in the morning and 2000 units in the evening     DULOXETINE HCL PO Take 30 mg by mouth 2 times daily     esomeprazole (NEXIUM) 40 MG capsule Take 40 mg by mouth daily as needed     fluticasone-salmeterol (AIRDUO RESPICLICK) 113-14 MCG/ACT inhaler Inhale 1 puff into the lungs 2 times daily     furosemide (LASIX) 20 MG tablet Take 2 tablets in the morning and 1 tablet at noon.     HYDROcodone-acetaminophen 5-325 mg per tablet Take 1 tablet by mouth every 4 hours as needed for pain Max 6 tablets per day.     l-lysine HCl 500 MG TABS tablet Take 500 mg by mouth 2 times daily     magnesium oxide 250 mg Tab Take 500 mg by mouth daily as needed (constipation)     metoprolol succinate ER (TOPROL XL) 50 MG 24 hr tablet Take 50 mg by mouth At Bedtime     modafinil (PROVIGIL) 100 MG tablet [MODAFINIL (PROVIGIL) 100 MG TABLET] Take 250 mg by mouth daily.     montelukast (SINGULAIR) 10 mg tablet  [MONTELUKAST (SINGULAIR) 10 MG TABLET] Take 10 mg by mouth daily.     nystatin (MYCOSTATIN) 864782 UNIT/GM external powder APPLY TO AFFECTED AREA(S) EXTERNALLY TWICE A DAY UNDER BREAST UNTIL GONE     OXYGEN-AIR DELIVERY SYSTEMS MISC [OXYGEN-AIR DELIVERY SYSTEMS MISC] Use 3 L As Directed. 3 lpm with activities and as needed at night     rosuvastatin (CRESTOR) 10 MG tablet [ROSUVASTATIN (CRESTOR) 10 MG TABLET] Take 10 mg by mouth at bedtime.     senna-docusate (SENOKOT-S/PERICOLACE) 8.6-50 MG tablet Take 1 tablet by mouth 2 times daily as needed for constipation     solifenacin (VESICARE) 10 MG tablet Take 5-10 mg by mouth At Bedtime     terbinafine (LAMISIL) 1 % external cream      tiZANidine (ZANAFLEX) 4 MG tablet Take 4 mg by mouth every 8 hours as needed     Turmeric (CURCUPLEX-95) 500 MG CAPS Take 1 capsule by mouth daily     valACYclovir (VALTREX) 500 MG tablet Take 500 mg by mouth 2 times daily as needed (flare)     No current facility-administered medications for this visit.            Family History     Family History   Problem Relation Age of Onset     Rheumatoid Arthritis Mother      Heart Disease Sister      Chronic Obstructive Pulmonary Disease Father          Physical Exam     COMPREHENSIVE EXAMINATION:  Vitals:    05/31/23 1444   BP: (!) 150/90   Pulse: 78   Weight: 95.8 kg (211 lb 4.8 oz)     A well appearing alert oriented female. Vital data as noted above. Her eyes examined for inflammation/scleromalacia. ENT examined for oral mucositis, moisture, thrush, nasal deformity, external ear redness, deformity. Her neck is examined for suppleness and lymphadenopathy.  Cardiopulmonary examination without dyspnea at rest, use of accessory muscles of breathing, wheezing, edema, peripheral or central cyanosis.  Abdomen examined for softness, tenderness, obvious organomegaly.  Skin examined for heliotrope, malar area eruption, lupus pernio, periungual erythema, sclerodactyly, papules, erythema nodosum, purpura,  nail pitting, onycholysis, and obvious psoriasis lesion. Neurological examination done for alertness, speech, facial symmetry,  tone and power in upper and lower extremities, and gait. The joint examination is performed for swelling, tenderness, warmth, erythema, and range of motion in the following joints: DIPs, PIPs, MCPs, wrists, first CMC's, elbows, shoulders, hips, knees, ankles, feet; spine for range of motion and paraspinal muscles for tenderness. The salient normal / abnormal findings are appended.  She does not have definite synovitis in any other palpable joints.  She has bilateral TKA scars.  She has impingement in both her shoulders unable to abduct beyond 90 degrees.  She has widespread tenderness such as in her upper and lower extremities proximally distally across the trapezius, along the paraspinal region.  There is no dactylitis.    Labs / Imaging (select studies)     C3 Complement   Date Value Ref Range Status   12/18/2013 170 83 - 177 mg/dL Final     C4 Complement   Date Value Ref Range Status   12/18/2013 42 19 - 59 mg/dL Final      Hemoglobin   Date Value Ref Range Status   11/01/2022 11.7 11.7 - 15.7 g/dL Final   10/30/2022 12.9 11.7 - 15.7 g/dL Final     Hemoglobin POCT   Date Value Ref Range Status   01/13/2023 11.3 g/dL Final     Urea Nitrogen   Date Value Ref Range Status   03/02/2023 17.9 8.0 - 23.0 mg/dL Final   11/02/2022 41.2 (H) 8.0 - 23.0 mg/dL Final   11/01/2022 38.6 (H) 8.0 - 23.0 mg/dL Final   12/15/2021 62 (H) 8 - 22 mg/dL Final   11/30/2021 65 (H) 8 - 22 mg/dL Final   10/20/2021 55 (H) 8 - 22 mg/dL Final     Erythrocyte Sedimentation Rate   Date Value Ref Range Status   03/02/2023 15 0 - 30 mm/hr Final   10/31/2022 50 (H) 0 - 20 mm/hr Final   01/29/2021 30 (H) 0 - 20 mm/hr Final     AST   Date Value Ref Range Status   03/02/2023 19 10 - 35 U/L Final   10/30/2022 44 (H) 10 - 35 U/L Final   09/20/2021 20 0 - 40 U/L Final     Albumin   Date Value Ref Range Status   03/02/2023 4.2  3.5 - 5.2 g/dL Final   10/30/2022 3.8 3.5 - 5.2 g/dL Final   09/20/2021 4.0 3.5 - 5.0 g/dL Final   08/28/2019 3.8 3.5 - 5.0 g/dL Final     Alkaline Phosphatase   Date Value Ref Range Status   03/02/2023 74 35 - 104 U/L Final   10/30/2022 82 35 - 104 U/L Final   09/20/2021 65 45 - 120 U/L Final     ALT   Date Value Ref Range Status   03/02/2023 19 10 - 35 U/L Final   10/30/2022 30 10 - 35 U/L Final   09/20/2021 21 0 - 45 U/L Final          Immunization History     Immunization History   Administered Date(s) Administered     COVID-19 Bivalent 12+ (Pfizer) 09/21/2022     COVID-19 Monovalent 18+ (Moderna) 02/04/2021, 03/04/2021, 10/24/2021, 04/06/2022     FLUAD(HD)65+ QUAD 09/17/2020, 10/20/2021     Flu, Unspecified 09/26/2012     Influenza (intradermal) 10/13/2014, 09/21/2015     Pneumo Conj 13-V (2010&after) 05/10/2019     Pneumococcal 23 valent 06/25/2020     TDAP Vaccine (Adacel) 03/10/2014     Td,adult,historic,unspecified 05/17/2004

## 2023-06-01 LAB
ANA PAT SER IF-IMP: ABNORMAL
ANA SER QL IF: ABNORMAL
ANA TITR SER IF: ABNORMAL {TITER}
CCP AB SER IA-ACNC: 1.5 U/ML
RHEUMATOID FACT SER NEPH-ACNC: <7 IU/ML

## 2023-06-23 ENCOUNTER — TELEPHONE (OUTPATIENT)
Dept: RHEUMATOLOGY | Facility: CLINIC | Age: 73
End: 2023-06-23
Payer: MEDICARE

## 2023-06-23 DIAGNOSIS — R76.8 POSITIVE ANA (ANTINUCLEAR ANTIBODY): Primary | ICD-10-CM

## 2023-06-23 NOTE — TELEPHONE ENCOUNTER
[facility] Call Center    Phone Message    May a detailed message be left on voicemail: yes     Reason for Call: Other: Pt is calling to see what her results where from her X-ray. Pt was informed that Dr Valderrama is not in office today.      Action Taken: Message routed to:  Other: Rheumatology Support Pool     Travel Screening: Not Applicable

## 2023-06-27 NOTE — TELEPHONE ENCOUNTER
Per Dr Valderrama- xrays show inflammatory arthritis in hands and OA in shoulders. Dr Valderrama also reviewed labs results- borderline + MARIA GUADALUPE, elevated uric acid, Dr Valderrama would like pt to have ISSAC, dsDNA C3 and C4 drawn prior to f/u appt if able.     Pt notified and verbalized understanding. Explained Dr Valderrama would go over xrays more with pt at appt and discuss treatment options. Scheduled pt for labs 6/29 at 4pm.

## 2023-06-29 ENCOUNTER — LAB (OUTPATIENT)
Dept: LAB | Facility: CLINIC | Age: 73
End: 2023-06-29
Payer: MEDICARE

## 2023-06-29 DIAGNOSIS — R76.8 POSITIVE ANA (ANTINUCLEAR ANTIBODY): ICD-10-CM

## 2023-06-29 PROCEDURE — 86225 DNA ANTIBODY NATIVE: CPT

## 2023-06-29 PROCEDURE — 36415 COLL VENOUS BLD VENIPUNCTURE: CPT

## 2023-06-29 PROCEDURE — 86235 NUCLEAR ANTIGEN ANTIBODY: CPT

## 2023-06-29 PROCEDURE — 86160 COMPLEMENT ANTIGEN: CPT

## 2023-06-30 LAB
C3 SERPL-MCNC: 185 MG/DL (ref 81–157)
C4 SERPL-MCNC: 44 MG/DL (ref 13–39)

## 2023-07-03 LAB
DSDNA AB SER-ACNC: 10 IU/ML
ENA SM IGG SER IA-ACNC: <0.7 U/ML
ENA SM IGG SER IA-ACNC: NEGATIVE
ENA SS-A AB SER IA-ACNC: <0.5 U/ML
ENA SS-A AB SER IA-ACNC: NEGATIVE
ENA SS-B IGG SER IA-ACNC: 0.9 U/ML
ENA SS-B IGG SER IA-ACNC: NEGATIVE
U1 SNRNP IGG SER IA-ACNC: 1.2 U/ML
U1 SNRNP IGG SER IA-ACNC: NEGATIVE

## 2023-07-05 ENCOUNTER — OFFICE VISIT (OUTPATIENT)
Dept: RHEUMATOLOGY | Facility: CLINIC | Age: 73
End: 2023-07-05
Payer: MEDICARE

## 2023-07-05 VITALS
HEART RATE: 80 BPM | BODY MASS INDEX: 41.23 KG/M2 | WEIGHT: 211.1 LBS | SYSTOLIC BLOOD PRESSURE: 140 MMHG | DIASTOLIC BLOOD PRESSURE: 90 MMHG

## 2023-07-05 DIAGNOSIS — M15.0 PRIMARY OSTEOARTHRITIS INVOLVING MULTIPLE JOINTS: ICD-10-CM

## 2023-07-05 DIAGNOSIS — M25.50 POLYARTHRALGIA: Primary | ICD-10-CM

## 2023-07-05 DIAGNOSIS — M11.20 CHONDROCALCINOSIS: ICD-10-CM

## 2023-07-05 DIAGNOSIS — R76.8 POSITIVE ANA (ANTINUCLEAR ANTIBODY): ICD-10-CM

## 2023-07-05 PROCEDURE — 99214 OFFICE O/P EST MOD 30 MIN: CPT | Performed by: INTERNAL MEDICINE

## 2023-07-05 RX ORDER — PREDNISONE 2.5 MG/1
TABLET ORAL
Qty: 90 TABLET | Refills: 2 | Status: SHIPPED | OUTPATIENT
Start: 2023-07-05 | End: 2023-12-05

## 2023-07-05 RX ORDER — HYDROXYCHLOROQUINE SULFATE 200 MG/1
200 TABLET, FILM COATED ORAL DAILY
Qty: 90 TABLET | Refills: 0 | Status: SHIPPED | OUTPATIENT
Start: 2023-07-05 | End: 2023-12-05

## 2023-07-05 NOTE — PROGRESS NOTES
Rheumatology follow-up visit note     Desirae is a 72 year old female presents today for follow-up.    Desirae was seen today for recheck.    Diagnoses and all orders for this visit:    Polyarthralgia  -     predniSONE (DELTASONE) 2.5 MG tablet; 7.5 mg daily for 4 weeks, reduce by 2.5 mg every 4 weeks to 5 milligrams daily, and to 2.5 mg daily and stop  -     hydroxychloroquine (PLAQUENIL) 200 MG tablet; Take 1 tablet (200 mg) by mouth daily for 90 days    Primary osteoarthritis involving multiple joints    Positive MARIA GUADALUPE (antinuclear antibody)  -     predniSONE (DELTASONE) 2.5 MG tablet; 7.5 mg daily for 4 weeks, reduce by 2.5 mg every 4 weeks to 5 milligrams daily, and to 2.5 mg daily and stop  -     hydroxychloroquine (PLAQUENIL) 200 MG tablet; Take 1 tablet (200 mg) by mouth daily for 90 days    Chondrocalcinosis  -     predniSONE (DELTASONE) 2.5 MG tablet; 7.5 mg daily for 4 weeks, reduce by 2.5 mg every 4 weeks to 5 milligrams daily, and to 2.5 mg daily and stop        Her polyarthralgias are associated likely with more than 1 etiology.  She has well defined osteoarthritis.  This is likely the major contributory factor for her joint symptoms such as the shoulders.  She is on duloxetine.  Corticosteroid injections no longer help her.  She is aware of the options to go back to orthopedics for consideration for arthroplasty.  In addition she had a positive MARIA GUADALUPE, dsDNA was borderline positive, her complements were elevated.  She has widespread chondrocalcinosis she has hyperuricemia she may have had 1 episode of gout many years ago.  Today we discussed the concept of inflammatory joint disease.  I will ask her to take prednisone as noted major side effects ocular metabolic bone related were reviewed, starting on hydroxychloroquine.  Major side effects were outlined as well including in the examination of the eyes requirement.  We will meet here in couple of months.    Follow up in 2 months.    ALYSSA Rey  "is a 72 year old female is here for follow-up of   widespread polyarthralgias polymyalgias.  This is gone on for several years.  Initially the thought was maybe 5 later on 10 years and then possibly going on since 2008 when she first \"had back give way\".  She is status post bilateral knee arthroplasty.  She has had cervical spine fusion.  She has had bilateral glenohumeral injections in orthopedics initially helpful now no longer the case, she was offered arthroplasty of the shoulder she had declined.  She has had excellent response by her description each time she has been given prednisone for nonrheumatologic reasons. She reports no personal or family history of psoriasis ulcerative colitis Crohn's disease.  She has sleep apnea she is on CPAP.  At one time she was thought to have fibromyalgia.  Her recent work-up has showed advanced glenohumeral osteoarthritis.  She has widespread chondrocalcinosis.  She has positive MARIA GUADALUPE dsDNA borderline positive complements normal.  She has hyperuricemia.   Following is the excerpt from a previous note:    In this background she reports that the pain is there 24/7.  She wakes up in the morning unrefreshed.  The pain has affected upper and lower extremities neck and back.  Just about the only area where the pain is not as bothersome her hips.  She has noted no swelling with the exception of swelling toward the end of the day in her shins.  She is on Vicodin, she takes Tylenol for breakthrough pain.  One of the best options that she has found is short course of prednisone.  Over these years she recalls having had at least 5 episodes where she was given prednisone for 10 days each.  As soon as she starts prednisone within a day or so her symptoms nearly completely resolved.  Once she finishes a course of prednisone in the next 3 to 5 days her symptoms are resume.  Sometimes she is woken up from sleep because of this pain.  She feels that the worst part of the pain is first thing in " the morning although this is there throughout.  At 1 point her sedimentation rate was high.  This was at its peak at 51, October 2022 and most recently in March this year 15.  Her most recent course of prednisone was during a time when she had respiratory tract infection this was several months ago.  It is possible that this may have been in March when her sed rate was low or it is also possible she thought that it would be prior to that.  She recalls having had multiple corticosteroid injection in her shoulders by her orthopedic surgeon initially helpful later on not so.  She reports no jaw claudication double vision, she seldom if ever gets headaches.  She has had at least 7 episodes of fall over the past 12 months or so for unclear reasons. At the prompting of her  she noted spasms in her left upper extremity.  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  She has several comorbidities as noted..  She is not a smoker    DETAILED EXAMINATION  07/05/23  :    Vitals:    07/05/23 1334   BP: (!) 140/90   Pulse: 80   Weight: 95.8 kg (211 lb 1.6 oz)     Alert oriented. Head including the face is examined for malar rash, heliotropes, scarring, lupus pernio. Eyes examined for redness such as in episcleritis/scleritis, periorbital lesions.   Neck/ Face examined for parotid gland swelling, range of motion of neck.  Left upper and lower and right upper and lower extremities examined for tenderness, swelling, warmth of the appendicular joints, range of motion, edema, rash.  Some of the important findings included: she does not have clear evidence of synovitis in the palpable joints of the upper extremities.  No significant deformities of the digits.  + Heberden nodes.  Severe reduction in the shoulder range of motion no more than 30 degrees of abduction.  Bilateral TKA.  There is no rash on the face stomatitis sclerodactyly periungual erythema.  Patient Active Problem List     Diagnosis Date Noted     Moderate persistent asthma without complication 01/18/2023     Priority: Medium     Altered mental status, unspecified altered mental status type 10/31/2022     Priority: Medium     Acute weakness 10/31/2022     Priority: Medium     Adult failure to thrive 10/31/2022     Priority: Medium     Chronic heart failure with preserved ejection fraction (HFpEF) (H) 10/31/2022     Priority: Medium     Elevated troponin 10/31/2022     Priority: Medium     Chondrocalcinosis      Priority: Medium     Created by Conversion         NELLIE (obstructive sleep apnea)      Priority: Medium     4/24/2019 Polysomnography Split Bilevel (wt 214 lbs)  Diagnostic Portion:  Respiratory monitoring showed moderate obstructive sleep apnea (AHI=17.1)   during light NREM sleep, with events more frequently occurring during   supine sleep (AHI 40.6/hr vs 8.3/hr).  The patient spent a total of 21.9 minutes at an oxygen saturation below   88%.  Profound hypoxia was noted and after 10 minutes of sleep, 1 LPM of   supplemental oxygen was added via nasal cannula (which stabilized   hypoxia).  Mild tachypnea was noted throughout  Titration Portion:  A trial of Bi-level PAP in Spontaneous (S) mode was initiated at 8/4 and   increased up to 22/12 cmH2O (supplemental oxygen was turned off).    Respiration was stabilized during NREM sleep, albeit with persistent   hypoxia.  However, during REM sleep respiratory events and profound   hypoxia returned.    This was considered an unacceptable titration due to persistence of   respiratory events and hypoxia.   Mask leak was also problematic.  Recommending Auto-Bilevel 12 - 25 with PS 7 cmH2O. Recommending ResMed   device due to integrated O2 hookups.         Diastolic dysfunction 08/27/2017     Priority: Medium     Hyperkalemia      Priority: Medium     Acute respiratory failure with hypoxia (H)      Priority: Medium     Chronic kidney disease, stage 2 (mild)      Priority: Medium     Sleep  apnea      Priority: Medium     CPAP         CKD (chronic kidney disease) stage 3, GFR 30-59 ml/min (H)      Priority: Medium     stage 3          Hypertension      Priority: Medium     Cubital tunnel syndrome, right      Priority: Medium     Other specified hypotension      Priority: Medium     Morbid obesity, unspecified obesity type (H)      Priority: Medium     Hyperlipidemia, unspecified hyperlipidemia type      Priority: Medium     Morbid obesity due to excess calories (H)      Priority: Medium     Blood loss anemia      Priority: Medium     GRACY (acute kidney injury) (H)      Priority: Medium     Renal Insufficiency      Priority: Medium     Created by Conversion  Replacement Utility updated for latest IMO load         Cervical radiculopathy 04/27/2017     Priority: Medium     Benign essential hypertension 04/27/2017     Priority: Medium     Osteoporosis      Priority: Medium     Created by Conversion  Replacement Utility updated for latest IMO load         Osteoarthritis      Priority: Medium     Created by Conversion  Replacement Utility updated for latest IMO load         Allergic Rhinitis      Priority: Medium     Created by Conversion  Replacement Utility updated for latest IMO load         Fibromyalgia      Priority: Medium     Created by Conversion         Immunology Studies Nonspecific Abnormal Findings      Priority: Medium     Created by Conversion         Proteinuria      Priority: Medium     Created by Conversion         Synovitis      Priority: Medium     Created by Conversion         Pseudogout      Priority: Medium     Created by Conversion         Pain During Urination (Dysuria)      Priority: Medium     Created by Conversion         Obesity      Priority: Medium     Created by Conversion         Anemia      Priority: Medium     Created by Conversion         Hematuria      Priority: Medium     Created by Conversion         Past Surgical History:   Procedure Laterality Date     CERVICAL FUSION  N/A 4/27/2017    Procedure: ANTERIOR CERVICAL DECOMPRESSION FUSION C5-7 BILATERAL;  Surgeon: Basim Barone MD;  Location: [facility];  Service:      CHOLECYSTECTOMY      open     COLONOSCOPY N/A 12/20/2019    Procedure: COLONOSCOPY WITH BIOPSIES;  Surgeon: Lucio Farr MD;  Location: [facility];  Service: Gastroenterology     EYE SURGERY       HAND SURGERY Right      HYSTEROSCOPY DIAGNOSTIC       JOINT REPLACEMENT      bilateral TKA     KNEE SURGERY       RELEASE CARPAL TUNNEL Bilateral       Past Medical History:   Diagnosis Date     Allergic rhinitis      Arthritis of back      CHF (congestive heart failure) (H)      Chronic kidney disease     stage 3      De Quervain's disease (tenosynovitis)      Fibromyalgia, primary      GERD (gastroesophageal reflux disease)      Hematuria     chronic     High cholesterol      History of blood clots 09/01/1970    DVT, from birth control, h/o left calf     Hyperlipidemia      Hypertension      Low back pain      Osteoporosis      Pickwickian syndrome (H)      Plantar fasciitis      Proteinuria      Proteinuria     chronic     Sleep apnea     CPAP     Uncomplicated asthma      Allergies   Allergen Reactions     Latex Rash     Severe rash     Valsartan Unknown     Hyperkalemia     Colchicine Diarrhea     Latex Unknown     Added based on information entered during case entry, please review and add reactions, type, and severity as needed     Other Drug Allergy (See Comments) Muscle Pain (Myalgia)     Tried lovastatin and simvastatin     Other Environmental Allergy Unknown     Coverlet bandaid , 3M product; rash     Statins-Hmg-Coa Reductase Inhibitors [Statins] Muscle Pain (Myalgia)     Tried lovastatin and simvastatin     Sulfa Antibiotics Swelling     Facial swelling  Facial swelling     Adhesive Tape Rash     Current Outpatient Medications   Medication Sig Dispense Refill     albuterol (PROAIR HFA;PROVENTIL HFA;VENTOLIN HFA) 90 mcg/actuation  inhaler Inhale 1 puff into the lungs every 4 hours as needed for shortness of breath / dyspnea       alendronate (FOSAMAX) 70 MG tablet [ALENDRONATE (FOSAMAX) 70 MG TABLET] Take 70 mg by mouth every 7 days. Takes on Mondays 11     azelastine (OPTIVAR) 0.05 % ophthalmic solution [AZELASTINE (OPTIVAR) 0.05 % OPHTHALMIC SOLUTION] Administer 1 drop to both eyes every 12 (twelve) hours.       benzonatate (TESSALON) 100 MG capsule Take 100 mg by mouth 3 times daily as needed       calcium citrate (CITRACAL) 950 (200 Ca) MG tablet Take 1 tablet by mouth daily       cetirizine (ZYRTEC) 10 MG tablet [CETIRIZINE (ZYRTEC) 10 MG TABLET] Take 10 mg by mouth daily.        cholecalciferol (VITAMIN D3) 25 mcg (1000 units) capsule Take 5000 units in the morning and 2000 units in the evening       DULOXETINE HCL PO Take 30 mg by mouth 2 times daily       esomeprazole (NEXIUM) 40 MG capsule Take 40 mg by mouth daily as needed       fluticasone-salmeterol (AIRDUO RESPICLICK) 113-14 MCG/ACT inhaler Inhale 1 puff into the lungs 2 times daily 60 each 11     furosemide (LASIX) 20 MG tablet Take 2 tablets in the morning and 1 tablet at noon.       HYDROcodone-acetaminophen 5-325 mg per tablet Take 1 tablet by mouth every 4 hours as needed for pain Max 6 tablets per day.       l-lysine HCl 500 MG TABS tablet Take 500 mg by mouth 2 times daily       magnesium oxide 250 mg Tab Take 500 mg by mouth daily as needed (constipation)       metoprolol succinate ER (TOPROL XL) 50 MG 24 hr tablet Take 50 mg by mouth At Bedtime       modafinil (PROVIGIL) 100 MG tablet [MODAFINIL (PROVIGIL) 100 MG TABLET] Take 250 mg by mouth daily.       montelukast (SINGULAIR) 10 mg tablet [MONTELUKAST (SINGULAIR) 10 MG TABLET] Take 10 mg by mouth daily.       nystatin (MYCOSTATIN) 730279 UNIT/GM external powder APPLY TO AFFECTED AREA(S) EXTERNALLY TWICE A DAY UNDER BREAST UNTIL GONE       OXYGEN-AIR DELIVERY SYSTEMS MISC [OXYGEN-AIR DELIVERY SYSTEMS MISC] Use 3 L As  Directed. 3 lpm with activities and as needed at night       rosuvastatin (CRESTOR) 10 MG tablet [ROSUVASTATIN (CRESTOR) 10 MG TABLET] Take 10 mg by mouth at bedtime.       senna-docusate (SENOKOT-S/PERICOLACE) 8.6-50 MG tablet Take 1 tablet by mouth 2 times daily as needed for constipation 30 tablet 0     solifenacin (VESICARE) 10 MG tablet Take 5-10 mg by mouth At Bedtime       terbinafine (LAMISIL) 1 % external cream        tiZANidine (ZANAFLEX) 4 MG tablet Take 4 mg by mouth every 8 hours as needed       Turmeric (CURCUPLEX-95) 500 MG CAPS Take 1 capsule by mouth daily       valACYclovir (VALTREX) 500 MG tablet Take 500 mg by mouth 2 times daily as needed (flare)       family history includes Chronic Obstructive Pulmonary Disease in her father; Heart Disease in her sister; Rheumatoid Arthritis in her mother.  Social Connections: Not on file          WBC Count   Date Value Ref Range Status   05/31/2023 7.8 4.0 - 11.0 10e3/uL Final     RBC Count   Date Value Ref Range Status   05/31/2023 3.77 (L) 3.80 - 5.20 10e6/uL Final     Hemoglobin   Date Value Ref Range Status   05/31/2023 12.8 11.7 - 15.7 g/dL Final     Hematocrit   Date Value Ref Range Status   05/31/2023 38.4 35.0 - 47.0 % Final     MCV   Date Value Ref Range Status   05/31/2023 102 (H) 78 - 100 fL Final     MCH   Date Value Ref Range Status   05/31/2023 34.0 (H) 26.5 - 33.0 pg Final     Platelet Count   Date Value Ref Range Status   05/31/2023 248 150 - 450 10e3/uL Final     % Lymphocytes   Date Value Ref Range Status   05/31/2023 29 % Final     AST   Date Value Ref Range Status   03/02/2023 19 10 - 35 U/L Final     ALT   Date Value Ref Range Status   05/31/2023 19 10 - 35 U/L Final     Albumin   Date Value Ref Range Status   05/31/2023 4.5 3.5 - 5.2 g/dL Final   09/20/2021 4.0 3.5 - 5.0 g/dL Final     Alkaline Phosphatase   Date Value Ref Range Status   03/02/2023 74 35 - 104 U/L Final     Creatinine   Date Value Ref Range Status   05/31/2023 1.46 (H)  0.51 - 0.95 mg/dL Final     GFR Estimate   Date Value Ref Range Status   05/31/2023 38 (L) >60 mL/min/1.73m2 Final     Comment:     eGFR calculated using 2021 CKD-EPI equation.   01/29/2021 34 (L) >60 mL/min/1.73m2 Final     GFR Estimate If Black   Date Value Ref Range Status   01/29/2021 41 (L) >60 mL/min/1.73m2 Final     Erythrocyte Sedimentation Rate   Date Value Ref Range Status   05/31/2023 23 0 - 30 mm/hr Final     N terminal Pro BNP Inpatient   Date Value Ref Range Status   10/30/2022 2,039 (H) 0 - 900 pg/mL Final     Comment:     Reference range shown and results flagged as abnormal are suggested inpatient cut points for confirming diagnosis if CHF in an acute setting. Establishing a baseline value for each individual patient is useful for follow-up. An inpatient or emergency department NT-proPBNP <300 pg/mL effectively rules out acute CHF, with 99% negative predictive value.    The outpatient non-acute reference range for ruling out CHF is:  0-125 pg/mL (age 18 to less than 75)  0-450 pg/mL (age 75 yrs and older)

## 2023-08-11 ENCOUNTER — LAB REQUISITION (OUTPATIENT)
Dept: LAB | Facility: CLINIC | Age: 73
End: 2023-08-11
Payer: MEDICARE

## 2023-08-11 DIAGNOSIS — R73.03 PREDIABETES: ICD-10-CM

## 2023-08-11 DIAGNOSIS — E55.9 VITAMIN D DEFICIENCY, UNSPECIFIED: ICD-10-CM

## 2023-08-11 DIAGNOSIS — E78.5 HYPERLIPIDEMIA, UNSPECIFIED: ICD-10-CM

## 2023-08-11 LAB
ALBUMIN SERPL BCG-MCNC: 4.5 G/DL (ref 3.5–5.2)
ALP SERPL-CCNC: 81 U/L (ref 35–104)
ALT SERPL W P-5'-P-CCNC: 28 U/L (ref 0–50)
ANION GAP SERPL CALCULATED.3IONS-SCNC: 16 MMOL/L (ref 7–15)
AST SERPL W P-5'-P-CCNC: 31 U/L (ref 0–45)
BILIRUB SERPL-MCNC: 0.2 MG/DL
BUN SERPL-MCNC: 60.7 MG/DL (ref 8–23)
CALCIUM SERPL-MCNC: 10.2 MG/DL (ref 8.8–10.2)
CHLORIDE SERPL-SCNC: 96 MMOL/L (ref 98–107)
CHOLEST SERPL-MCNC: 172 MG/DL
CREAT SERPL-MCNC: 1.71 MG/DL (ref 0.51–0.95)
DEPRECATED HCO3 PLAS-SCNC: 26 MMOL/L (ref 22–29)
GFR SERPL CREATININE-BSD FRML MDRD: 31 ML/MIN/1.73M2
GLUCOSE SERPL-MCNC: 133 MG/DL (ref 70–99)
HDLC SERPL-MCNC: 55 MG/DL
LDLC SERPL CALC-MCNC: 81 MG/DL
NONHDLC SERPL-MCNC: 117 MG/DL
POTASSIUM SERPL-SCNC: 5.6 MMOL/L (ref 3.4–5.3)
PROT SERPL-MCNC: 7.3 G/DL (ref 6.4–8.3)
SODIUM SERPL-SCNC: 138 MMOL/L (ref 136–145)
TRIGL SERPL-MCNC: 180 MG/DL

## 2023-08-11 PROCEDURE — 80053 COMPREHEN METABOLIC PANEL: CPT | Mod: ORL | Performed by: PHYSICIAN ASSISTANT

## 2023-08-11 PROCEDURE — 80061 LIPID PANEL: CPT | Mod: ORL | Performed by: PHYSICIAN ASSISTANT

## 2023-08-11 PROCEDURE — 82306 VITAMIN D 25 HYDROXY: CPT | Mod: ORL | Performed by: PHYSICIAN ASSISTANT

## 2023-08-14 ENCOUNTER — MEDICAL CORRESPONDENCE (OUTPATIENT)
Dept: HEALTH INFORMATION MANAGEMENT | Facility: CLINIC | Age: 73
End: 2023-08-14
Payer: MEDICARE

## 2023-08-14 LAB — DEPRECATED CALCIDIOL+CALCIFEROL SERPL-MC: 80 UG/L (ref 20–75)

## 2023-08-21 ENCOUNTER — OFFICE VISIT (OUTPATIENT)
Dept: PULMONOLOGY | Facility: CLINIC | Age: 73
End: 2023-08-21
Payer: MEDICARE

## 2023-08-21 VITALS
BODY MASS INDEX: 41.05 KG/M2 | WEIGHT: 210.2 LBS | HEART RATE: 70 BPM | SYSTOLIC BLOOD PRESSURE: 145 MMHG | DIASTOLIC BLOOD PRESSURE: 79 MMHG | OXYGEN SATURATION: 96 %

## 2023-08-21 DIAGNOSIS — J45.30 MILD PERSISTENT ASTHMA WITHOUT COMPLICATION: ICD-10-CM

## 2023-08-21 DIAGNOSIS — U07.1 INFECTION DUE TO 2019 NOVEL CORONAVIRUS: Primary | ICD-10-CM

## 2023-08-21 DIAGNOSIS — G47.33 OSA (OBSTRUCTIVE SLEEP APNEA): ICD-10-CM

## 2023-08-21 PROCEDURE — 99214 OFFICE O/P EST MOD 30 MIN: CPT | Performed by: INTERNAL MEDICINE

## 2023-08-21 RX ORDER — ALBUTEROL SULFATE 90 UG/1
AEROSOL, METERED RESPIRATORY (INHALATION)
COMMUNITY
End: 2023-12-05

## 2023-08-21 RX ORDER — ASPIRIN 81 MG/1
TABLET ORAL
COMMUNITY

## 2023-08-21 RX ORDER — DOCUSATE SODIUM 100 MG/1
100-300 CAPSULE, LIQUID FILLED ORAL DAILY PRN
COMMUNITY
End: 2024-04-13

## 2023-08-21 NOTE — PATIENT INSTRUCTIONS
Continue the BiPAP at night with the oxygen.  Use the oxygen with activity or as needed.  Continue loratadine one pill daily.  Continue montelukast one pill at bedtime.  AirDuo respiclick one inhalation twice daily. Rinse, gargle, and spit water after use.  Use the albuterol (ProAir) as needed.  Use the azelastine eye drops one drop in each eye twice daily.  Restrict sodium to less than 2000 mg (one teaspoon) daily along with the water pills.  Stay active as much as possible.  Follow up in 3 months

## 2023-08-26 NOTE — PROGRESS NOTES
PULMONARY OUTPATIENT FOLLOW UP NOTE        Assessment:      Chronic respiratory failure   Secondary to NELLIE/OHS, body habitus. Underlying asthma  Continue O2 supplementation  S/p treatment COVID19 viral infection two weeks ago  Received molnupiravir for 5 days   Asthma mild persistent   Non tobacco use in the past. Worked in the kitchen area.   PFTs 1/2023 showed normal spirometry, lung volumes and diffusion capacity.   Continue ICS/LABA, albuterol HFA as needed  NELLIE on auto BiPAP  Sleep study 2019 showed a moderate obstructive sleep apnea (AHI=17.1), events more frequently occurring during supine sleep (AHI 40.6/hr vs 8.3/hr).  Nocturnal hypoxia.   Auto-titrating Bilevel with an EPAP min of 12, IPAP max of 25, and PS of 7 cmH2O pluse O2 2 LPM was effective.   Good compliance 30/30 days (100%) average use 7 Hr 42 Min per day.   Median leak 66.6 L/min  Patient feels somewhat refresh in AM.   High leaks related to high level pressure, discussion about wearing a new mask, proper mask fit.   HTN  Low sodium diet. Titrate BP meds and diuretics.   Obesity Body mass index is 41.05 kg/m .     Plan:      Continue the BiPAP at night with the oxygen.  Use the oxygen with activity or as needed.  Continue loratadine one pill daily.  Continue montelukast one pill at bedtime.  AirDuo respiclick one inhalation twice daily. Rinse, gargle, and spit water after use.  Use the albuterol (ProAir) as needed.  Use the azelastine eye drops one drop in each eye twice daily.  Restrict sodium to less than 2000 mg (one teaspoon) daily along with the water pills.  Stay active as much as possible.  Follow up in 3 months        Carlos Clayton  Pulmonary / Critical Care  August 21, 2023        CC:     Chief Complaint   Patient presents with    Follow Up     Asthma  NELLIE  Chronic rhinosinusitis          HPI:      Desirae Rey is a 72 year old female who presents for follow up appointment.  Patient has history of HTN, asthma, NELLIE/OHS  on BiPAP, CKD stage III, DVT LLE 84' while on contraceptives, DJD, C5-7 fusion, chronic back pain, morbid obesity, on home O2 3 LPM with activities.   Last visit with Dr. Giang was on 8/1/2023.   Patient was diagnosed with COVID19 viral infection two weeks ago and she was started on molnupiravir. Reports doing ok, mild dry cough, no wheezes   Uses inhalers as instructed, AIRDUO and albuterol  Activity level is limited, able to walk 1/2 block before stopping.   Complaints of back pain, knee pain.   Denies chest pain, orthopnea or PND, reports chronic swelling of LEs.   Uses BiPAP every night. Feels somewhat refresh in AM.   No tobacco use in the past  Her weight is down in 6 lbs since last visit.   Lost 10 lbs over the last year.     Past Medical History :     Past Medical History:   Diagnosis Date    Allergic rhinitis     Arthritis of back     CHF (congestive heart failure) (H)     Chronic kidney disease     stage 3     De Quervain's disease (tenosynovitis)     Fibromyalgia, primary     GERD (gastroesophageal reflux disease)     Hematuria     chronic    High cholesterol     History of blood clots 09/01/1970    DVT, from birth control, h/o left calf    Hyperlipidemia     Hypertension     Low back pain     Osteoporosis     Pickwickian syndrome (H)     Plantar fasciitis     Proteinuria     Proteinuria     chronic    Sleep apnea     CPAP    Uncomplicated asthma         Medications:     Current Outpatient Medications   Medication    albuterol (PROAIR HFA) 108 (90 Base) MCG/ACT inhaler    alendronate (FOSAMAX) 70 MG tablet    aspirin 81 MG EC tablet    azelastine (OPTIVAR) 0.05 % ophthalmic solution    calcium citrate (CITRACAL) 950 (200 Ca) MG tablet    cetirizine (ZYRTEC) 10 MG tablet    cholecalciferol (VITAMIN D3) 25 mcg (1000 units) capsule    DULOXETINE HCL PO    esomeprazole (NEXIUM) 40 MG capsule    fluticasone-salmeterol (AIRDUO RESPICLICK) 113-14 MCG/ACT inhaler    furosemide (LASIX) 20 MG tablet     HYDROcodone-acetaminophen 5-325 mg per tablet    l-lysine HCl 500 MG TABS tablet    magnesium oxide 250 mg Tab    metoprolol succinate ER (TOPROL XL) 50 MG 24 hr tablet    modafinil (PROVIGIL) 100 MG tablet    montelukast (SINGULAIR) 10 mg tablet    Naloxone HCl 5 MG/0.5ML SOSY    nystatin (MYCOSTATIN) 433435 UNIT/GM external powder    OXYGEN-AIR DELIVERY SYSTEMS MISC    predniSONE (DELTASONE) 2.5 MG tablet    rosuvastatin (CRESTOR) 10 MG tablet    senna-docusate (SENOKOT-S/PERICOLACE) 8.6-50 MG tablet    solifenacin (VESICARE) 10 MG tablet    terbinafine (LAMISIL) 1 % external cream    tiZANidine (ZANAFLEX) 4 MG tablet    Turmeric (CURCUPLEX-95) 500 MG CAPS    valACYclovir (VALTREX) 500 MG tablet    albuterol (PROAIR HFA;PROVENTIL HFA;VENTOLIN HFA) 90 mcg/actuation inhaler    benzonatate (TESSALON) 100 MG capsule    docusate sodium (COLACE) 100 MG capsule    hydroxychloroquine (PLAQUENIL) 200 MG tablet     No current facility-administered medications for this visit.            Social History :     Social History     Socioeconomic History    Marital status:      Spouse name: Not on file    Number of children: Not on file    Years of education: Not on file    Highest education level: Not on file   Occupational History    Not on file   Tobacco Use    Smoking status: Never    Smokeless tobacco: Never   Vaping Use    Vaping Use: Never used   Substance and Sexual Activity    Alcohol use: No    Drug use: No    Sexual activity: Not on file   Other Topics Concern    Not on file   Social History Narrative    Not on file     Social Determinants of Health     Financial Resource Strain: Not on file   Food Insecurity: Not on file   Transportation Needs: Not on file   Physical Activity: Not on file   Stress: Not on file   Social Connections: Not on file   Intimate Partner Violence: Not on file   Housing Stability: Not on file          Family History :     Family History   Problem Relation Age of Onset    Rheumatoid  Arthritis Mother     Heart Disease Sister     Chronic Obstructive Pulmonary Disease Father        Review of Systems  A 12 point comprehensive review of systems was negative except as noted.        Objective:     BP (!) 145/79 (BP Location: Left arm, Patient Position: Sitting, Cuff Size: Adult Regular)   Pulse 70   Wt 95.3 kg (210 lb 3.2 oz)   SpO2 96%   BMI 41.05 kg/m      Gen: obese, awake, alert, no distress  HEENT: pink conjunctiva, moist mucosa, Mallampati III/IV  Neck: no thyromegaly, masses or JVD  Lungs: clear  CV: regular, no murmurs or gallops appreciated  Abdomen: soft, NT, BS wnl  Ext: 1+ edema  Neuro: CN II-XII intact, non focal      Diagnostic tests:       Sleep study: (4/24/2019)     Diagnostic Portion:  - Respiratory monitoring showed moderate obstructive sleep apnea (AHI=17.1) during light NREM sleep, with events more frequently occurring during supine sleep (AHI 40.6/hr vs 8.3/hr).  - The patient spent a total of 21.9 minutes at an oxygen saturation below 88%.  Profound hypoxia was noted and after 10 minutes of sleep, 1 LPM of supplemental oxygen was added via nasal cannula (which stabilized hypoxia).  - Mild tachypnea was noted throughout  Titration Portion:  A trial of Bi-level PAP in Spontaneous (S) mode was initiated at 8/4 and increased up to 22/12 cmH2O (supplemental oxygen was turned off).    Respiration was stabilized during NREM sleep, albeit with persistent hypoxia.  However, during REM sleep respiratory events and profound hypoxia returned.    This was considered an unacceptable titration due to persistence of respiratory events and hypoxia.   Mask leak was also problematic.    - Periodic leg movements were observed with low frequency.  - The patient had a Periodic Limb Movement (PLM) index of 2.1 and the ESTEFANIA PLM index was 0.8  Mean hemoglobin oxygen saturation (SaO2) during the diagnostic portion of the PSG in NREM and REM sleep was 84% with a SaO2 desaturation jorge observed to  61%.  - Sleep-related Hypoxia was present as cumulative exposure time in sleep to SaO2 below 88% surpassed 5 total minutes (in this case, 21.9 minutes).    PFTs: (1/13/2023)          IMAGES:     CT CHEST ABDOMEN PELVIS W/O CONTRAST  LOCATION: [facility]  DATE/TIME: 10/31/2022 1:11 AM  INDICATION: ams tender abd  COMPARISON: None.  FINDINGS:   LUNGS AND PLEURA: Scattered subsegmental atelectasis left lung. No pleural effusion.  MEDIASTINUM/AXILLAE: Atherosclerotic aorta. No adenopathy. No significant pericardial effusion. Mitral annular calcification.  CORONARY ARTERY CALCIFICATION: Moderate.  HEPATOBILIARY: Cholecystectomy.  PANCREAS: Fatty atrophy of the pancreas.  SPLEEN: Normal.  ADRENAL GLANDS: Normal.  KIDNEYS/BLADDER: Normal.  BOWEL: Normal caliber. Diverticulosis. Slight thickening of the colon in the left lower quadrant with mild adjacent inflammation.  LYMPH NODES: Normal.  VASCULATURE: Diffuse, atherosclerotic vascular calcification.  PELVIC ORGANS: Normal.   MUSCULOSKELETAL: Fat-containing hernia umbilical region. Degenerative change osseous structures. Postsurgical change cervical spine.  IMPRESSION:  1.  Mild wall thickening of colon in the left lower quadrant with slight adjacent inflammation. Findings could be due to diverticulitis or focal colitis. Follow-up to confirm resolution.  2.  No bowel obstruction or abscess.      CARDIAC:     Echocardiogram 10/30/2022  The left ventricle is normal in size.  Left ventricular function is normal.The ejection fraction is 60-65%.  Left ventricular diastolic function is abnormal.   The left atrium is mildly dilated.  There is moderate mitral annular calcification.  Mild valvular aortic stenosis.    NM stress test 10/30/2022    The nuclear stress test is negative for inducible myocardial ischemia.    Left ventricular function is normal.    The left ventricular ejection fraction at rest is 65%.

## 2023-09-20 ENCOUNTER — OFFICE VISIT (OUTPATIENT)
Dept: RHEUMATOLOGY | Facility: CLINIC | Age: 73
End: 2023-09-20
Payer: MEDICARE

## 2023-09-20 VITALS
BODY MASS INDEX: 40.9 KG/M2 | WEIGHT: 209.4 LBS | HEART RATE: 80 BPM | SYSTOLIC BLOOD PRESSURE: 140 MMHG | DIASTOLIC BLOOD PRESSURE: 90 MMHG

## 2023-09-20 DIAGNOSIS — M25.50 POLYARTHRALGIA: ICD-10-CM

## 2023-09-20 DIAGNOSIS — R76.8 POSITIVE ANA (ANTINUCLEAR ANTIBODY): ICD-10-CM

## 2023-09-20 DIAGNOSIS — M11.20 CHONDROCALCINOSIS: ICD-10-CM

## 2023-09-20 DIAGNOSIS — M15.0 PRIMARY OSTEOARTHRITIS INVOLVING MULTIPLE JOINTS: Primary | ICD-10-CM

## 2023-09-20 PROCEDURE — 99214 OFFICE O/P EST MOD 30 MIN: CPT | Performed by: INTERNAL MEDICINE

## 2023-09-20 NOTE — PROGRESS NOTES
Rheumatology follow-up visit note     Desirae is a 72 year old female presents today for follow-up.    Desirae was seen today for recheck.    Diagnoses and all orders for this visit:    Primary osteoarthritis involving multiple joints    Polyarthralgia    Positive MARIA GUADALUPE (antinuclear antibody)    Chondrocalcinosis        This patient with widespread polyarthralgias osteoarthritis including the glenohumeral joints chondrocalcinosis positive MARIA GUADALUPE and a borderline positive DNA felt that with the prednisone at 7.5 mg there was some improvement in her joint symptoms.  As we discussed previously it is conceivable that her polyarthralgias are due to more than 1 reason such as primary osteoarthritis, calcium pyrophosphate disease.  Positive MARIA GUADALUPE as yet another dimension.  She is prepared to try hydroxychloroquine again.  She is not at all inclined to consider colchicine because of the experience in the past where she has severe diarrhea when this was given to her by other physicians for gout which was to be taken on multiple occasions in a day.  She is aware that she could break out in a very significant rash if indeed her original eruption was secondary to hydroxychloroquine.  We will meet here in 3 months or sooner.  She is going to finish the course of prednisone as previously prescribed.  She has the option of going to the orthopedic surgeons and have shoulder arthroplasty.    Follow up in 3 months.    HPI    Desirae Rey is a 72 year old female is here for follow-up of widespread polyarthralgias polymyalgias.    This patient with widespread polyarthralgias osteoarthritis chondrocalcinosis positive MARIA GUADALUPE and a borderline positive DNA felt that with the prednisone at 7.5 mg there was some improvement in her joint symptoms.  She developed a rash 4 to 5 weeks into starting hydroxychloroquine that affected her shoulders and back.  This rash continued as she continues take the hydroxychloroquine once she stopped a few days later  "this subsided.  She does not recall any other precipitating factor for the rash.as noted previously her joint pains have gone on for several years.  Initially the thought was maybe 5 later on 10 years and then possibly going on since 2008 when she first \"had back give way\".  She is status post bilateral knee arthroplasty.  She has had cervical spine fusion.  She has had bilateral glenohumeral injections in orthopedics initially helpful now no longer the case, she was offered arthroplasty of the shoulder she had declined.  She has had excellent response by her description each time she has been given prednisone for nonrheumatologic reasons. She reports no personal or family history of psoriasis ulcerative colitis Crohn's disease.  She has sleep apnea she is on CPAP.  At one time she was thought to have fibromyalgia.  Her recent work-up has showed advanced glenohumeral osteoarthritis.  She has widespread chondrocalcinosis.  She has positive MARIA GUADALUPE dsDNA borderline positive complements normal.  She has hyperuricemia.       DETAILED EXAMINATION  09/20/23  :    Vitals:    09/20/23 1433   BP: (!) 140/90   Pulse: 80   Weight: 95 kg (209 lb 6.4 oz)     Alert oriented. Head including the face is examined for malar rash, heliotropes, scarring, lupus pernio. Eyes examined for redness such as in episcleritis/scleritis, periorbital lesions.   Neck/ Face examined for parotid gland swelling, range of motion of neck.  Left upper and lower and right upper and lower extremities examined for tenderness, swelling, warmth of the appendicular joints, range of motion, edema, rash.  Some of the important findings included: she does not have evidence of synovitis in the palpable joints of the upper extremities.  No significant deformities of the digits.  Minimal  [place] nodes.  Range of motion of the shoulders  show f abduction of no more than 80 degrees on both sides and that too is quite painful..  She has had bilateral TKAs.  Patient " Active Problem List    Diagnosis Date Noted    Moderate persistent asthma without complication 01/18/2023     Priority: Medium    Altered mental status, unspecified altered mental status type 10/31/2022     Priority: Medium    Acute weakness 10/31/2022     Priority: Medium    Adult failure to thrive 10/31/2022     Priority: Medium    Chronic heart failure with preserved ejection fraction (HFpEF) (H) 10/31/2022     Priority: Medium    Elevated troponin 10/31/2022     Priority: Medium    Chondrocalcinosis      Priority: Medium     Created by Conversion        NELLIE (obstructive sleep apnea)      Priority: Medium     4/24/2019 Polysomnography Split Bilevel (wt 214 lbs)  Diagnostic Portion:  Respiratory monitoring showed moderate obstructive sleep apnea (AHI=17.1)   during light NREM sleep, with events more frequently occurring during   supine sleep (AHI 40.6/hr vs 8.3/hr).  The patient spent a total of 21.9 minutes at an oxygen saturation below   88%.  Profound hypoxia was noted and after 10 minutes of sleep, 1 LPM of   supplemental oxygen was added via nasal cannula (which stabilized   hypoxia).  Mild tachypnea was noted throughout  Titration Portion:  A trial of Bi-level PAP in Spontaneous (S) mode was initiated at 8/4 and   increased up to 22/12 cmH2O (supplemental oxygen was turned off).    Respiration was stabilized during NREM sleep, albeit with persistent   hypoxia.  However, during REM sleep respiratory events and profound   hypoxia returned.    This was considered an unacceptable titration due to persistence of   respiratory events and hypoxia.   Mask leak was also problematic.  Recommending Auto-Bilevel 12 - 25 with PS 7 cmH2O. Recommending ResMed   device due to integrated O2 hookups.        Diastolic dysfunction 08/27/2017     Priority: Medium    Hyperkalemia      Priority: Medium    Acute respiratory failure with hypoxia (H)      Priority: Medium    Chronic kidney disease, stage 2 (mild)      Priority:  Medium    Sleep apnea      Priority: Medium     CPAP        CKD (chronic kidney disease) stage 3, GFR 30-59 ml/min (H)      Priority: Medium     stage 3         Hypertension      Priority: Medium    Cubital tunnel syndrome, right      Priority: Medium    Other specified hypotension      Priority: Medium    Morbid obesity, unspecified obesity type (H)      Priority: Medium    Hyperlipidemia, unspecified hyperlipidemia type      Priority: Medium    Morbid obesity due to excess calories (H)      Priority: Medium    Blood loss anemia      Priority: Medium    GRACY (acute kidney injury) (H)      Priority: Medium    Renal Insufficiency      Priority: Medium     Created by Conversion  Replacement Utility updated for latest IMO load        Cervical radiculopathy 04/27/2017     Priority: Medium    Benign essential hypertension 04/27/2017     Priority: Medium    Osteoporosis      Priority: Medium     Created by Conversion  Replacement Utility updated for latest IMO load        Osteoarthritis      Priority: Medium     Created by Conversion  Replacement Utility updated for latest IMO load        Allergic Rhinitis      Priority: Medium     Created by Conversion  Replacement Utility updated for latest IMO load        Fibromyalgia      Priority: Medium     Created by Conversion        Immunology Studies Nonspecific Abnormal Findings      Priority: Medium     Created by Conversion        Proteinuria      Priority: Medium     Created by Conversion        Synovitis      Priority: Medium     Created by Conversion        Pseudogout      Priority: Medium     Created by Conversion        Pain During Urination (Dysuria)      Priority: Medium     Created by Conversion        Obesity      Priority: Medium     Created by Conversion        Anemia      Priority: Medium     Created by Conversion        Hematuria      Priority: Medium     Created by Conversion         Past Surgical History:   Procedure Laterality Date    CERVICAL FUSION N/A  4/27/2017    Procedure: ANTERIOR CERVICAL DECOMPRESSION FUSION C5-7 BILATERAL;  Surgeon: Basim Barone MD;  Location: [facility] [place];  Service:     CHOLECYSTECTOMY      open    COLONOSCOPY N/A 12/20/2019    Procedure: COLONOSCOPY WITH BIOPSIES;  Surgeon: Lucio Farr MD;  Location: [facility] [place];  Service: Gastroenterology    EYE SURGERY      HAND SURGERY Right     HYSTEROSCOPY DIAGNOSTIC      JOINT REPLACEMENT      bilateral TKA    KNEE SURGERY      RELEASE CARPAL TUNNEL Bilateral       Past Medical History:   Diagnosis Date    Allergic rhinitis     Arthritis of back     CHF (congestive heart failure) (H)     Chronic kidney disease     stage 3     De Quervain's disease (tenosynovitis)     Fibromyalgia, primary     GERD (gastroesophageal reflux disease)     Hematuria     chronic    High cholesterol     History of blood clots 09/01/1970    DVT, from birth control, h/o left calf    Hyperlipidemia     Hypertension     Low back pain     Osteoporosis     Pickwickian syndrome (H)     Plantar fasciitis     Proteinuria     Proteinuria     chronic    Sleep apnea     CPAP    Uncomplicated asthma      Allergies   Allergen Reactions    Latex Rash     Severe rash    Pregabalin Shortness Of Breath     Other Reaction(s): swelling and shortness of breath    Valsartan Unknown     Hyperkalemia    Colchicine Diarrhea    Dicloxacillin      Other Reaction(s): confusion    Gabapentin      Other Reaction(s): Sedation    Latex Unknown     Added based on information entered during case entry, please review and add reactions, type, and severity as needed    Other Drug Allergy (See Comments) Muscle Pain (Myalgia)     Tried lovastatin and simvastatin    Other Environmental Allergy Unknown     Coverlet bandaid , 3M product; rash    Statins-Hmg-Coa Reductase Inhibitors [Statins] Muscle Pain (Myalgia)     Tried lovastatin and simvastatin    Sulfa Antibiotics Swelling     Facial swelling      Adhesive Tape Rash     Plaquenil [Hydroxychloroquine] Rash     Current Outpatient Medications   Medication Sig Dispense Refill    albuterol (PROAIR HFA) 108 (90 Base) MCG/ACT inhaler 1 puff as needed Inhalation every 4 hrs      albuterol (PROAIR HFA;PROVENTIL HFA;VENTOLIN HFA) 90 mcg/actuation inhaler Inhale 1 puff into the lungs every 4 hours as needed for shortness of breath / dyspnea      alendronate (FOSAMAX) 70 MG tablet [ALENDRONATE (FOSAMAX) 70 MG TABLET] Take 70 mg by mouth every 7 days. Takes on Mondays 11    aspirin 81 MG EC tablet 1 tablet Orally Once a day      azelastine (OPTIVAR) 0.05 % ophthalmic solution [AZELASTINE (OPTIVAR) 0.05 % OPHTHALMIC SOLUTION] Administer 1 drop to both eyes every 12 (twelve) hours.      benzonatate (TESSALON) 100 MG capsule Take 100 mg by mouth 3 times daily as needed      calcium citrate (CITRACAL) 950 (200 Ca) MG tablet Take 1 tablet by mouth daily      cetirizine (ZYRTEC) 10 MG tablet [CETIRIZINE (ZYRTEC) 10 MG TABLET] Take 10 mg by mouth daily.       cholecalciferol (VITAMIN D3) 25 mcg (1000 units) capsule Take 5000 units in the morning and 2000 units in the evening      docusate sodium (COLACE) 100 MG capsule 1-3 capsules as needed Orally Once a day      DULOXETINE HCL PO Take 30 mg by mouth 2 times daily      esomeprazole (NEXIUM) 40 MG capsule Take 40 mg by mouth daily as needed      fluticasone-salmeterol (AIRDUO RESPICLICK) 113-14 MCG/ACT inhaler Inhale 1 puff into the lungs 2 times daily 60 each 11    furosemide (LASIX) 20 MG tablet Take 2 tablets in the morning and 1 tablet at noon.      HYDROcodone-acetaminophen 5-325 mg per tablet Take 1 tablet by mouth every 4 hours as needed for pain Max 6 tablets per day.      l-lysine HCl 500 MG TABS tablet Take 500 mg by mouth 2 times daily      magnesium oxide 250 mg Tab Take 500 mg by mouth daily as needed (constipation)      metoprolol succinate ER (TOPROL XL) 50 MG 24 hr tablet Take 50 mg by mouth At Bedtime      modafinil (PROVIGIL) 100 MG  tablet [MODAFINIL (PROVIGIL) 100 MG TABLET] Take 250 mg by mouth daily.      montelukast (SINGULAIR) 10 mg tablet [MONTELUKAST (SINGULAIR) 10 MG TABLET] Take 10 mg by mouth daily.      Naloxone HCl 5 MG/0.5ML SOSY as directed Injection      nystatin (MYCOSTATIN) 824621 UNIT/GM external powder APPLY TO AFFECTED AREA(S) EXTERNALLY TWICE A DAY UNDER BREAST UNTIL GONE      OXYGEN-AIR DELIVERY SYSTEMS MISC [OXYGEN-AIR DELIVERY SYSTEMS MISC] Use 3 L As Directed. 3 lpm with activities and as needed at night      predniSONE (DELTASONE) 2.5 MG tablet 7.5 mg daily for 4 weeks, reduce by 2.5 mg every 4 weeks to 5 milligrams daily, and to 2.5 mg daily and stop 90 tablet 2    rosuvastatin (CRESTOR) 10 MG tablet [ROSUVASTATIN (CRESTOR) 10 MG TABLET] Take 10 mg by mouth at bedtime.      senna-docusate (SENOKOT-S/PERICOLACE) 8.6-50 MG tablet Take 1 tablet by mouth 2 times daily as needed for constipation 30 tablet 0    solifenacin (VESICARE) 10 MG tablet Take 5-10 mg by mouth At Bedtime      terbinafine (LAMISIL) 1 % external cream       tiZANidine (ZANAFLEX) 4 MG tablet Take 4 mg by mouth every 8 hours as needed      Turmeric (CURCUPLEX-95) 500 MG CAPS Take 1 capsule by mouth daily      valACYclovir (VALTREX) 500 MG tablet Take 500 mg by mouth 2 times daily as needed (flare)      hydroxychloroquine (PLAQUENIL) 200 MG tablet Take 1 tablet (200 mg) by mouth daily for 90 days (Patient not taking: Reported on 9/20/2023) 90 tablet 0     family history includes Chronic Obstructive Pulmonary Disease in her father; Heart Disease in her sister; Rheumatoid Arthritis in her mother.  Social Connections: Not on file          WBC Count   Date Value Ref Range Status   05/31/2023 7.8 4.0 - 11.0 10e3/uL Final     RBC Count   Date Value Ref Range Status   05/31/2023 3.77 (L) 3.80 - 5.20 10e6/uL Final     Hemoglobin   Date Value Ref Range Status   05/31/2023 12.8 11.7 - 15.7 g/dL Final     Hematocrit   Date Value Ref Range Status   05/31/2023 38.4  35.0 - 47.0 % Final     MCV   Date Value Ref Range Status   05/31/2023 102 (H) 78 - 100 fL Final     MCH   Date Value Ref Range Status   05/31/2023 34.0 (H) 26.5 - 33.0 pg Final     Platelet Count   Date Value Ref Range Status   05/31/2023 248 150 - 450 10e3/uL Final     % Lymphocytes   Date Value Ref Range Status   05/31/2023 29 % Final     AST   Date Value Ref Range Status   08/11/2023 31 0 - 45 U/L Final     Comment:     Reference intervals for this test were updated on 6/12/2023 to more accurately reflect our healthy population. There may be differences in the flagging of prior results with similar values performed with this method. Interpretation of those prior results can be made in the context of the updated reference intervals.     ALT   Date Value Ref Range Status   08/11/2023 28 0 - 50 U/L Final     Comment:     Reference intervals for this test were updated on 6/12/2023 to more accurately reflect our healthy population. There may be differences in the flagging of prior results with similar values performed with this method. Interpretation of those prior results can be made in the context of the updated reference intervals.       Albumin   Date Value Ref Range Status   08/11/2023 4.5 3.5 - 5.2 g/dL Final   09/20/2021 4.0 3.5 - 5.0 g/dL Final     Alkaline Phosphatase   Date Value Ref Range Status   08/11/2023 81 35 - 104 U/L Final     Creatinine   Date Value Ref Range Status   08/11/2023 1.71 (H) 0.51 - 0.95 mg/dL Final     GFR Estimate   Date Value Ref Range Status   08/11/2023 31 (L) >60 mL/min/1.73m2 Final   01/29/2021 34 (L) >60 mL/min/1.73m2 Final     GFR Estimate If Black   Date Value Ref Range Status   01/29/2021 41 (L) >60 mL/min/1.73m2 Final     Erythrocyte Sedimentation Rate   Date Value Ref Range Status   05/31/2023 23 0 - 30 mm/hr Final     N terminal Pro BNP Inpatient   Date Value Ref Range Status   10/30/2022 2,039 (H) 0 - 900 pg/mL Final     Comment:     Reference range shown and results  flagged as abnormal are suggested inpatient cut points for confirming diagnosis if CHF in an acute setting. Establishing a baseline value for each individual patient is useful for follow-up. An inpatient or emergency department NT-proPBNP <300 pg/mL effectively rules out acute CHF, with 99% negative predictive value.    The outpatient non-acute reference range for ruling out CHF is:  0-125 pg/mL (age 18 to less than 75)  0-450 pg/mL (age 75 yrs and older)

## 2023-11-11 ENCOUNTER — TRANSFERRED RECORDS (OUTPATIENT)
Dept: HEALTH INFORMATION MANAGEMENT | Facility: CLINIC | Age: 73
End: 2023-11-11

## 2023-12-05 ENCOUNTER — APPOINTMENT (OUTPATIENT)
Dept: RADIOLOGY | Facility: HOSPITAL | Age: 73
DRG: 603 | End: 2023-12-05
Attending: EMERGENCY MEDICINE
Payer: MEDICARE

## 2023-12-05 ENCOUNTER — APPOINTMENT (OUTPATIENT)
Dept: CT IMAGING | Facility: HOSPITAL | Age: 73
DRG: 603 | End: 2023-12-05
Attending: EMERGENCY MEDICINE
Payer: MEDICARE

## 2023-12-05 ENCOUNTER — HOSPITAL ENCOUNTER (INPATIENT)
Facility: HOSPITAL | Age: 73
LOS: 3 days | Discharge: HOME HEALTH | DRG: 603 | End: 2023-12-09
Attending: EMERGENCY MEDICINE | Admitting: STUDENT IN AN ORGANIZED HEALTH CARE EDUCATION/TRAINING PROGRAM
Payer: MEDICARE
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
## 2023-12-05 DIAGNOSIS — N39.0 URINARY TRACT INFECTION WITHOUT HEMATURIA, SITE UNSPECIFIED: ICD-10-CM
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
## 2023-12-05 DIAGNOSIS — L03.116 CELLULITIS OF LEFT LOWER EXTREMITY: ICD-10-CM

## 2023-12-05 LAB
ALBUMIN SERPL BCG-MCNC: 3.7 G/DL (ref 3.5–5.2)
ALBUMIN UR-MCNC: 100 MG/DL
ALP SERPL-CCNC: 73 U/L (ref 40–150)
ALT SERPL W P-5'-P-CCNC: 50 U/L (ref 0–50)
ANION GAP SERPL CALCULATED.3IONS-SCNC: 16 MMOL/L (ref 7–15)
APPEARANCE UR: ABNORMAL
AST SERPL W P-5'-P-CCNC: 69 U/L (ref 0–45)
BACTERIA #/AREA URNS HPF: ABNORMAL /HPF
BILIRUB DIRECT SERPL-MCNC: <0.2 MG/DL (ref 0–0.3)
BILIRUB SERPL-MCNC: 0.4 MG/DL
BILIRUB UR QL STRIP: NEGATIVE
BUN SERPL-MCNC: 46.7 MG/DL (ref 8–23)
CALCIUM SERPL-MCNC: 9.8 MG/DL (ref 8.8–10.2)
CHLORIDE SERPL-SCNC: 98 MMOL/L (ref 98–107)
CK SERPL-CCNC: 900 U/L (ref 26–192)
COLOR UR AUTO: YELLOW
CREAT SERPL-MCNC: 1.4 MG/DL (ref 0.51–0.95)
CRP SERPL-MCNC: 122.8 MG/L
DEPRECATED HCO3 PLAS-SCNC: 20 MMOL/L (ref 22–29)
EGFRCR SERPLBLD CKD-EPI 2021: 40 ML/MIN/1.73M2
ERYTHROCYTE [DISTWIDTH] IN BLOOD BY AUTOMATED COUNT: 11.9 % (ref 10–15)
GLUCOSE SERPL-MCNC: 150 MG/DL (ref 70–99)
GLUCOSE UR STRIP-MCNC: NEGATIVE MG/DL
HCT VFR BLD AUTO: 42.2 % (ref 35–47)
HGB BLD-MCNC: 14.1 G/DL (ref 11.7–15.7)
HGB UR QL STRIP: ABNORMAL
HYALINE CASTS: 3 /LPF
KETONES UR STRIP-MCNC: NEGATIVE MG/DL
LACTATE SERPL-SCNC: 0.6 MMOL/L (ref 0.7–2)
LEUKOCYTE ESTERASE UR QL STRIP: ABNORMAL
MCH RBC QN AUTO: 32.9 PG (ref 26.5–33)
MCHC RBC AUTO-ENTMCNC: 33.4 G/DL (ref 31.5–36.5)
MCV RBC AUTO: 98 FL (ref 78–100)
MUCOUS THREADS #/AREA URNS LPF: PRESENT /LPF
NITRATE UR QL: NEGATIVE
PH UR STRIP: 5.5 [PH] (ref 5–7)
PLATELET # BLD AUTO: 158 10E3/UL (ref 150–450)
POTASSIUM SERPL-SCNC: 4.5 MMOL/L (ref 3.4–5.3)
PROT SERPL-MCNC: 7.2 G/DL (ref 6.4–8.3)
RBC # BLD AUTO: 4.29 10E6/UL (ref 3.8–5.2)
RBC URINE: 0 /HPF
SODIUM SERPL-SCNC: 134 MMOL/L (ref 135–145)
SP GR UR STRIP: 1.02 (ref 1–1.03)
SQUAMOUS EPITHELIAL: 1 /HPF
TSH SERPL DL<=0.005 MIU/L-ACNC: 0.63 UIU/ML (ref 0.3–4.2)
UROBILINOGEN UR STRIP-MCNC: <2 MG/DL
WBC # BLD AUTO: 13.9 10E3/UL (ref 4–11)
WBC URINE: 18 /HPF

## 2023-12-05 PROCEDURE — 73590 X-RAY EXAM OF LOWER LEG: CPT | Mod: LT

## 2023-12-05 PROCEDURE — 250N000011 HC RX IP 250 OP 636: Mod: JZ | Performed by: EMERGENCY MEDICINE

## 2023-12-05 PROCEDURE — 99285 EMERGENCY DEPT VISIT HI MDM: CPT | Mod: 25

## 2023-12-05 PROCEDURE — G1010 CDSM STANSON: HCPCS

## 2023-12-05 PROCEDURE — 73610 X-RAY EXAM OF ANKLE: CPT | Mod: LT

## 2023-12-05 PROCEDURE — 36415 COLL VENOUS BLD VENIPUNCTURE: CPT | Performed by: EMERGENCY MEDICINE

## 2023-12-05 PROCEDURE — 36415 COLL VENOUS BLD VENIPUNCTURE: CPT | Performed by: INTERNAL MEDICINE

## 2023-12-05 PROCEDURE — 85027 COMPLETE CBC AUTOMATED: CPT | Performed by: EMERGENCY MEDICINE

## 2023-12-05 PROCEDURE — 93005 ELECTROCARDIOGRAM TRACING: CPT | Performed by: EMERGENCY MEDICINE

## 2023-12-05 PROCEDURE — 99222 1ST HOSP IP/OBS MODERATE 55: CPT | Mod: AI | Performed by: STUDENT IN AN ORGANIZED HEALTH CARE EDUCATION/TRAINING PROGRAM

## 2023-12-05 PROCEDURE — 83605 ASSAY OF LACTIC ACID: CPT | Performed by: INTERNAL MEDICINE

## 2023-12-05 PROCEDURE — 96366 THER/PROPH/DIAG IV INF ADDON: CPT

## 2023-12-05 PROCEDURE — 82550 ASSAY OF CK (CPK): CPT | Performed by: EMERGENCY MEDICINE

## 2023-12-05 PROCEDURE — 84443 ASSAY THYROID STIM HORMONE: CPT | Performed by: STUDENT IN AN ORGANIZED HEALTH CARE EDUCATION/TRAINING PROGRAM

## 2023-12-05 PROCEDURE — 86140 C-REACTIVE PROTEIN: CPT | Performed by: EMERGENCY MEDICINE

## 2023-12-05 PROCEDURE — 87086 URINE CULTURE/COLONY COUNT: CPT | Performed by: EMERGENCY MEDICINE

## 2023-12-05 PROCEDURE — G0378 HOSPITAL OBSERVATION PER HR: HCPCS

## 2023-12-05 PROCEDURE — 82248 BILIRUBIN DIRECT: CPT | Performed by: EMERGENCY MEDICINE

## 2023-12-05 PROCEDURE — 258N000003 HC RX IP 258 OP 636: Performed by: EMERGENCY MEDICINE

## 2023-12-05 PROCEDURE — 87186 SC STD MICRODIL/AGAR DIL: CPT | Performed by: EMERGENCY MEDICINE

## 2023-12-05 PROCEDURE — 73502 X-RAY EXAM HIP UNI 2-3 VIEWS: CPT

## 2023-12-05 PROCEDURE — 250N000011 HC RX IP 250 OP 636: Mod: JZ | Performed by: STUDENT IN AN ORGANIZED HEALTH CARE EDUCATION/TRAINING PROGRAM

## 2023-12-05 PROCEDURE — 73630 X-RAY EXAM OF FOOT: CPT | Mod: LT

## 2023-12-05 PROCEDURE — 80048 BASIC METABOLIC PNL TOTAL CA: CPT | Performed by: EMERGENCY MEDICINE

## 2023-12-05 PROCEDURE — 81001 URINALYSIS AUTO W/SCOPE: CPT | Performed by: EMERGENCY MEDICINE

## 2023-12-05 PROCEDURE — 96365 THER/PROPH/DIAG IV INF INIT: CPT

## 2023-12-05 PROCEDURE — 96372 THER/PROPH/DIAG INJ SC/IM: CPT | Performed by: STUDENT IN AN ORGANIZED HEALTH CARE EDUCATION/TRAINING PROGRAM

## 2023-12-05 RX ORDER — CEFTRIAXONE 2 G/1
2 INJECTION, POWDER, FOR SOLUTION INTRAMUSCULAR; INTRAVENOUS ONCE
Status: COMPLETED | OUTPATIENT
Start: 2023-12-05 | End: 2023-12-05

## 2023-12-05 RX ORDER — ONDANSETRON 2 MG/ML
4 INJECTION INTRAMUSCULAR; INTRAVENOUS EVERY 6 HOURS PRN
Status: DISCONTINUED | OUTPATIENT
Start: 2023-12-05 | End: 2023-12-09 | Stop reason: HOSPADM

## 2023-12-05 RX ORDER — HYDROXYCHLOROQUINE SULFATE 200 MG/1
100 TABLET, FILM COATED ORAL 2 TIMES DAILY
COMMUNITY
End: 2023-12-05

## 2023-12-05 RX ORDER — ACETAMINOPHEN 325 MG/1
650 TABLET ORAL EVERY 4 HOURS PRN
Status: DISCONTINUED | OUTPATIENT
Start: 2023-12-05 | End: 2023-12-09 | Stop reason: HOSPADM

## 2023-12-05 RX ORDER — PROCHLORPERAZINE 25 MG
12.5 SUPPOSITORY, RECTAL RECTAL EVERY 12 HOURS PRN
Status: DISCONTINUED | OUTPATIENT
Start: 2023-12-05 | End: 2023-12-09 | Stop reason: HOSPADM

## 2023-12-05 RX ORDER — PROCHLORPERAZINE MALEATE 5 MG
5 TABLET ORAL EVERY 6 HOURS PRN
Status: DISCONTINUED | OUTPATIENT
Start: 2023-12-05 | End: 2023-12-09 | Stop reason: HOSPADM

## 2023-12-05 RX ORDER — POLYETHYLENE GLYCOL 3350 17 G/17G
17 POWDER, FOR SOLUTION ORAL 2 TIMES DAILY PRN
Status: DISCONTINUED | OUTPATIENT
Start: 2023-12-05 | End: 2023-12-09 | Stop reason: HOSPADM

## 2023-12-05 RX ORDER — ENOXAPARIN SODIUM 100 MG/ML
40 INJECTION SUBCUTANEOUS EVERY 24 HOURS
Status: DISCONTINUED | OUTPATIENT
Start: 2023-12-05 | End: 2023-12-06

## 2023-12-05 RX ORDER — FUROSEMIDE 20 MG
20 TABLET ORAL EVERY 24 HOURS
COMMUNITY
End: 2023-12-19

## 2023-12-05 RX ORDER — ACETAMINOPHEN 650 MG/1
650 SUPPOSITORY RECTAL EVERY 4 HOURS PRN
Status: DISCONTINUED | OUTPATIENT
Start: 2023-12-05 | End: 2023-12-09 | Stop reason: HOSPADM

## 2023-12-05 RX ORDER — HYDROXYCHLOROQUINE SULFATE 100 MG/1
100 TABLET ORAL 2 TIMES DAILY
COMMUNITY

## 2023-12-05 RX ORDER — DULOXETIN HYDROCHLORIDE 30 MG/1
60 CAPSULE, DELAYED RELEASE ORAL 2 TIMES DAILY
Status: ON HOLD | COMMUNITY
End: 2024-04-23

## 2023-12-05 RX ORDER — VITAMIN B COMPLEX
50 TABLET ORAL AT BEDTIME
COMMUNITY
End: 2024-04-13

## 2023-12-05 RX ORDER — ONDANSETRON 4 MG/1
4 TABLET, ORALLY DISINTEGRATING ORAL EVERY 6 HOURS PRN
Status: DISCONTINUED | OUTPATIENT
Start: 2023-12-05 | End: 2023-12-09 | Stop reason: HOSPADM

## 2023-12-05 RX ADMIN — CEFTRIAXONE SODIUM 2 G: 2 INJECTION, POWDER, FOR SOLUTION INTRAMUSCULAR; INTRAVENOUS at 19:10

## 2023-12-05 RX ADMIN — SODIUM CHLORIDE 500 ML: 9 INJECTION, SOLUTION INTRAVENOUS at 19:06

## 2023-12-05 RX ADMIN — ENOXAPARIN SODIUM 40 MG: 40 INJECTION SUBCUTANEOUS at 22:23

## 2023-12-05 ASSESSMENT — ACTIVITIES OF DAILY LIVING (ADL)
ADLS_ACUITY_SCORE: 35
DEPENDENT_IADLS:: CLEANING;LAUNDRY;TRANSPORTATION
ADLS_ACUITY_SCORE: 35
ADLS_ACUITY_SCORE: 35
ADLS_ACUITY_SCORE: 37

## 2023-12-05 NOTE — ED PROVIDER NOTES
Emergency Department Encounter     Evaluation Date & Time:   2023  3:55 PM    CHIEF COMPLAINT:  Fall      Triage Note:Pt not on blood thinning medication     Triage Assessment (Adult)       Row Name 23 1521          Triage Assessment    Airway WDL WDL        Skin Circulation/Temperature WDL    Skin Circulation/Temperature WDL X  LL leg redness                     Fall from bed to floor. Pt on floor from 0930 until just PTA. Pt states she is not sure what caused her to fall. C/o redness and pain to left lower leg. No other injuries from fall.      Triage Assessment (Adult)       Row Name 23 1521          Triage Assessment    Airway WDL WDL        Skin Circulation/Temperature WDL    Skin Circulation/Temperature WDL X  LL leg redness                     Impression and Plan       FINAL IMPRESSION:    ICD-10-CM    1. Cellulitis of left lower extremity  L03.116       2. Fall at home, initial encounter  W19.XXXA     Y92.009       3. Urinary tract infection without hematuria, site unspecified  N39.0     possible          ED COURSE & MEDICAL DECISION MAKIN:18 PM I met the patient and performed my initial interview and exam.  6:52 PM Spoke with Dr. Real, Hospitalist, regarding plan for admission.    72 year old female, history of CHF, HTN, HLD, CKD3, morbid obesity with pickwickian syndrome (on home O2), asthma, NELLIE, fibromyalgia, pseudogout, osteoporosis and osteoarthritis, who presents for evaluation after a fall. Patient reports that she awoke on the floor at 9:30 this morning between her bed and nightstand and thinks that she fell out of bed. She was unable to get up off the floor and eventually called EMS.    She reports new LLE pain, predominantly around the ankle and shin. On exam, there is mild to moderate edema to BL lower extremities with erythema from the left mid-tibia extending to the dorsal aspect of the foot with associated tenderness to palpation, predominantly over the ankle region.  No palpable crepitance. No signs of traumatic injury. CMS intact.     X-rays of the left foot, ankle, tibia / fibula and hip / pelvis negative for fracture.     Ultrasound LLE negative for DVT.     With erythema, suspect cellulitis for which she was given IV ceftriaxone.    Given amnesia regarding the fall, head CT performed and negative for acute intracranial process.     She does have midline tenderness to palpation of the mid to lower cervical spine and CT cervical spine demonstrated no fracture or post-traumatic subluxation with hardware complication with loosening of ventral fusion plate and screws C5-C7 and ventral migration by 9 mm.    Nothing in history or on exam to suggest significant chest, abdominal or back injury and I do not think emergent imaging studies to evaluate for such are indicated.    Syncope considered for which EKG was performed and demonstrated sinus tachycardia with PACs, LAFB and no ischemic changes.    Labs otherwise remarkable for no leukocytosis (WBC 9.8) with elevated CRP (122.8) and normal lactate (0.6). UA with questionable UTI with 250 LE, few bacteria and 18 WBCs; urine culture pending and patient given ceftriaxone for both possible UTI and cellulitis.    She has chronic and stable renal insufficiency (creatinine 1.40, GFR 40) with no significant associated electrolyte derangements. Total CK mildly elevated (900), not consistent with clinically significant rhabdomyolysis, for which gentle IV hydration was initiated.    Patient admitted to Hospitalist Service for further management. Patient hemodynamically stable throughout ED course.      At the conclusion of the encounter I discussed the results of all the tests and the disposition. The questions were answered. The patient acknowledged understanding and was agreeable with the care plan.      Medical Decision Making    History:  Supplemental history from: Documented in chart, if applicable  External Record(s) reviewed: Inpatient  Record: [facility] on 05-Jul-2023    Work Up:  Chart documentation includes differential considered and any EKGs or imaging independently interpreted by provider, where specified.  In additional to work up documented, I considered the following work up: Documented in chart, if applicable.    SEE ABOVE    External consultation:  Discussion of management with another provider: Hospitalist    Complicating factors:  Care impacted by chronic illness: Chronic Kidney Disease, Chronic Lung Disease, Heart Disease, Hyperlipidemia, and Hypertension  Care affected by social determinants of health: N/A    Disposition considerations: Admit.    MEDICATIONS GIVEN IN THE EMERGENCY DEPARTMENT:  Medications   ondansetron (ZOFRAN ODT) ODT tab 4 mg (has no administration in time range)     Or   ondansetron (ZOFRAN) injection 4 mg (has no administration in time range)   acetaminophen (TYLENOL) tablet 650 mg (has no administration in time range)     Or   acetaminophen (TYLENOL) Suppository 650 mg (has no administration in time range)   polyethylene glycol (MIRALAX) Packet 17 g (has no administration in time range)   prochlorperazine (COMPAZINE) injection 5 mg (has no administration in time range)     Or   prochlorperazine (COMPAZINE) tablet 5 mg (has no administration in time range)     Or   prochlorperazine (COMPAZINE) suppository 12.5 mg (has no administration in time range)   aspirin EC tablet 81 mg (81 mg Oral Not Given 12/6/23 0916)   calcium citrate (CITRACAL) tablet 950 mg (950 mg Oral Not Given 12/6/23 0917)   cetirizine (zyrTEC) tablet 10 mg (10 mg Oral Not Given 12/6/23 0918)   Vitamin D3 (CHOLECALCIFEROL) tablet 125 mcg (125 mcg Oral Not Given 12/6/23 0918)   DULoxetine (CYMBALTA) DR capsule 60 mg (60 mg Oral Not Given 12/6/23 0918)   DULoxetine (CYMBALTA) DR capsule 30 mg (30 mg Oral $Given 12/6/23 0235)   fluticasone-vilanterol (BREO ELLIPTA) 100-25 MCG/ACT inhaler 1 puff (1 puff Inhalation Not Given 12/6/23  0922)   furosemide (LASIX) tablet 40 mg (40 mg Oral Not Given 12/6/23 0921)   furosemide (LASIX) tablet 20 mg (20 mg Oral $Given 12/6/23 1305)   HYDROcodone-acetaminophen (NORCO) 5-325 MG per tablet 1 tablet (1 tablet Oral $Given 12/6/23 0615)   hydroxychloroquine (PLAQUENIL) half-tab 100 mg (100 mg Oral Not Given 12/6/23 0921)   metoprolol succinate ER (TOPROL XL) 24 hr tablet 50 mg (50 mg Oral $Given 12/6/23 0235)   montelukast (SINGULAIR) tablet 10 mg (10 mg Oral Not Given 12/6/23 0918)   tolterodine ER (DETROL LA) 24 hr capsule 2 mg (2 mg Oral Not Given 12/6/23 0920)   enoxaparin ANTICOAGULANT (LOVENOX) injection 40 mg (40 mg Subcutaneous Not Given 12/6/23 1001)   naloxone (NARCAN) injection 0.2 mg (has no administration in time range)     Or   naloxone (NARCAN) injection 0.4 mg (has no administration in time range)     Or   naloxone (NARCAN) injection 0.2 mg (has no administration in time range)     Or   naloxone (NARCAN) injection 0.4 mg (has no administration in time range)   vancomycin (VANCOCIN) 1,500 mg in sodium chloride 0.9 % 250 mL intermittent infusion (has no administration in time range)     Followed by   vancomycin (VANCOCIN) 750 mg in sodium chloride 0.9 % 250 mL intermittent infusion (has no administration in time range)   cefTRIAXone (ROCEPHIN) 2 g vial to attach to  ml bag for ADULTS or NS 50 ml bag for PEDS (0 g Intravenous Stopped 12/5/23 2222)   sodium chloride 0.9% BOLUS 500 mL (0 mLs Intravenous Stopped 12/5/23 2130)   perflutren lipid microsphere (DEFINITY) injection SUSP (2 mLs Intravenous $Given 12/6/23 1114)       NEW PRESCRIPTIONS STARTED AT TODAY'S ED VISIT:  New Prescriptions    No medications on file       HPI     HPI     Desirae Rey is a 72 year old female, history of CHF, HTN, HLD, CKD stage 3, morbid obesity with pickwickian syndrome (on home O2), asthma, NELLIE, fibromyalgia, pseudogout, osteoporosis and osteoarthritis, who presents to this ED by EMS for evaluation after a  "fall.    The patient reports that she awoke this morning at 9:30am (7 hours ago) on the floor between her bed and her nightstand; she thinks that she fell out of bed, but does not recall doing so. She was unable to get herself off of the floor and eventually called EMS.     She reports associated LLE pain, especially around the ankle and shin.     She otherwise denies headache, nausea / vomiting. She is no longer anticoagulated on Xarelto (discontinued for \"quite awhile\"). She reports chronic neck pain due to a spinal fusion that needs to be redone; no acute or worsening neck pain.     She reports chronic shortness of breath that is at baseline. No associated chest pain.     She denies abdominal pain, acute back pain. No recent illness; denies N/V/D, cough, fever or other concerns.    Per chart review, the patient presented to [facility] on 05[facility]Jul-2023 for a rheumatology follow-up. Over the past 12 months prior to this visit, she had at least 7 episodes of falls.    REVIEW OF SYSTEMS:  All other systems reviewed and are negative.      Medical History     Past Medical History:   Diagnosis Date    Allergic rhinitis     Arthritis of back     CHF (congestive heart failure) (H)     Chronic kidney disease     De Quervain's disease (tenosynovitis)     Fibromyalgia, primary     GERD (gastroesophageal reflux disease)     Hematuria     High cholesterol     History of blood clots 09/01/1970    Hyperlipidemia     Hypertension     Low back pain     Osteoporosis     Pickwickian syndrome (H)     Plantar fasciitis     Proteinuria     Proteinuria     Sleep apnea     Uncomplicated asthma        Past Surgical History:   Procedure Laterality Date    CERVICAL FUSION N/A 4/27/2017    Procedure: ANTERIOR CERVICAL DECOMPRESSION FUSION C5-7 BILATERAL;  Surgeon: Basim Barone MD;  Location: [facility];  Service:     CHOLECYSTECTOMY      open    COLONOSCOPY N/A 12/20/2019    Procedure: COLONOSCOPY WITH BIOPSIES; "  Surgeon: Lucio Farr MD;  Location: [facility] OR;  Service: Gastroenterology    EYE SURGERY      HAND SURGERY Right     HYSTEROSCOPY DIAGNOSTIC      JOINT REPLACEMENT      bilateral TKA    KNEE SURGERY      RELEASE CARPAL TUNNEL Bilateral        Family History   Problem Relation Age of Onset    Rheumatoid Arthritis Mother     Heart Disease Sister     Chronic Obstructive Pulmonary Disease Father        Social History     Tobacco Use    Smoking status: Never    Smokeless tobacco: Never   Vaping Use    Vaping Use: Never used   Substance Use Topics    Alcohol use: No    Drug use: No       albuterol (PROAIR HFA;PROVENTIL HFA;VENTOLIN HFA) 90 mcg/actuation inhaler  alendronate (FOSAMAX) 70 MG tablet  aspirin 81 MG EC tablet  calcium citrate (CITRACAL) 950 (200 Ca) MG tablet  cetirizine (ZYRTEC) 10 MG tablet  cholecalciferol (VITAMIN D3) 25 mcg (1000 units) capsule  docusate sodium (COLACE) 100 MG capsule  DULoxetine (CYMBALTA) 30 MG capsule  DULOXETINE HCL PO  esomeprazole (NEXIUM) 40 MG capsule  fluticasone-salmeterol (AIRDUO RESPICLICK) 113-14 MCG/ACT inhaler  furosemide (LASIX) 20 MG tablet  furosemide (LASIX) 20 MG tablet  HYDROcodone-acetaminophen 5-325 mg per tablet  Hydroxychloroquine Sulfate 100 MG TABS  l-lysine HCl 500 MG TABS tablet  magnesium oxide 250 mg Tab  metoprolol succinate ER (TOPROL XL) 50 MG 24 hr tablet  modafinil (PROVIGIL) 100 MG tablet  montelukast (SINGULAIR) 10 mg tablet  senna-docusate (SENOKOT-S/PERICOLACE) 8.6-50 MG tablet  solifenacin (VESICARE) 10 MG tablet  Turmeric (CURCUPLEX-95) 500 MG CAPS  valACYclovir (VALTREX) 500 MG tablet  Vitamin D3 (CHOLECALCIFEROL) 25 mcg (1000 units) tablet  OXYGEN-AIR DELIVERY SYSTEMS MISC        Physical Exam     First Vitals:  Patient Vitals for the past 24 hrs:   BP Temp Temp src Pulse Resp SpO2   12/06/23 1138 99/58 97.8  F (36.6  C) Oral 99 22 97 %   12/06/23 0809 112/61 98  F (36.7  C) Oral -- -- 97 %   12/06/23 0630 -- -- -- -- -- 97 %    12/06/23 0330 -- -- -- -- -- 94 %   12/06/23 0235 123/71 -- -- 95 29 98 %   12/06/23 0234 -- 98.4  F (36.9  C) Oral 90 22 95 %   12/05/23 2240 123/68 -- -- 84 -- 98 %   12/05/23 2100 (!) 144/77 98.3  F (36.8  C) Oral 92 20 100 %   12/05/23 2047 (!) 145/72 98.5  F (36.9  C) Oral 88 20 96 %   12/05/23 2027 -- 98.5  F (36.9  C) Oral 114 -- 98 %   12/05/23 2025 -- -- -- 115 -- 98 %   12/05/23 2024 -- -- -- 90 -- 97 %   12/05/23 2013 (!) 142/75 98.8  F (37.1  C) Oral 87 -- 96 %   12/05/23 1934 (!) 164/82 -- -- 91 -- 97 %   12/05/23 1919 (!) 170/71 -- -- 90 -- 97 %   12/05/23 1904 (!) 170/80 -- -- 91 -- 97 %   12/05/23 1849 (!) 164/81 -- -- 93 -- 98 %   12/05/23 1834 (!) 163/79 -- -- 89 -- 97 %   12/05/23 1819 (!) 179/83 -- -- 89 -- 98 %   12/05/23 1804 (!) 180/81 -- -- 90 -- 99 %   12/05/23 1749 (!) 142/66 -- -- 94 -- 100 %   12/05/23 1658 (!) 160/87 -- -- 89 -- 98 %   12/05/23 1653 135/88 -- -- 85 -- 100 %   12/05/23 1650 -- 98.8  F (37.1  C) Oral -- -- --   12/05/23 1603 (!) 175/87 -- -- 86 -- 99 %   12/05/23 1521 (!) 165/80 -- -- 92 20 95 %       PHYSICAL EXAM:   Physical Exam    GENERAL: Awake, alert.  In mild acute distress.   HEENT: Normocephalic, atraumatic. Pupils equal, round and reactive. Conjunctiva normal.   NECK: There is mild to moderate midline tenderness to palpation of the mid to lower cervical spine. No stridor.  PULMONARY: Chest is atraumatic and non-tender to palpation. No palpable subcutaneous emphysema. Symmetrical breath sounds without distress.  Lungs clear to auscultation bilaterally without wheezes, rhonchi or rales.  CARDIO: Regular rate and rhythm.  No significant murmur, rub or gallop.  Radial and pedal pulses strong and symmetrical.  ABDOMINAL: Abdomen atraumatic, soft, non-distended and non-tender to palpation.  No CVAT, BL.  BACK:  Back is atraumatic. No midline tenderness to palpation of thoracic and lumbar spines. No palpable bony step-offs.   EXTREMITIES: There is mild to moderate  edema to BL lower extremities. The LLE is erythematous from the mid-tibia extending to the dorsal aspect of the foot with associated tenderness to palpation, predominantly over the ankle region. No palpable crepitance. No signs of traumatic injury. Both lower extremities are warm and well perfused with strong and symmetrical pedal pulses. Patient able to move all toes. Sensation to light touch grossly intact.   NEURO: GCS 14 for some amnesia.  Alert and oriented to person, place and time.  Cranial nerves grossly intact. Strength 5/5 BL upper and lower extremities with sensation to light touch grossly intact.   PSYCH: Normal mood and affect.  SKIN: Erythema LLE, as noted above.     Results     LAB:  All pertinent labs reviewed and interpreted  Labs Ordered and Resulted from Time of ED Arrival to Time of ED Departure   CBC WITH PLATELETS - Abnormal       Result Value    WBC Count 13.9 (*)     RBC Count 4.29      Hemoglobin 14.1      Hematocrit 42.2      MCV 98      MCH 32.9      MCHC 33.4      RDW 11.9      Platelet Count 158     BASIC METABOLIC PANEL - Abnormal    Sodium 134 (*)     Potassium 4.5      Chloride 98      Carbon Dioxide (CO2) 20 (*)     Anion Gap 16 (*)     Urea Nitrogen 46.7 (*)     Creatinine 1.40 (*)     GFR Estimate 40 (*)     Calcium 9.8      Glucose 150 (*)    HEPATIC FUNCTION PANEL - Abnormal    Protein Total 7.2      Albumin 3.7      Bilirubin Total 0.4      Alkaline Phosphatase 73      AST 69 (*)     ALT 50      Bilirubin Direct <0.20     ROUTINE UA WITH MICROSCOPIC REFLEX TO CULTURE - Abnormal    Color Urine Yellow      Appearance Urine Turbid (*)     Glucose Urine Negative      Bilirubin Urine Negative      Ketones Urine Negative      Specific Gravity Urine 1.016      Blood Urine 0.5 mg/dL (*)     pH Urine 5.5      Protein Albumin Urine 100 (*)     Urobilinogen Urine <2.0      Nitrite Urine Negative      Leukocyte Esterase Urine 250 Marciano/uL (*)     Bacteria Urine Few (*)     Mucus Urine Present  (*)     RBC Urine 0      WBC Urine 18 (*)     Squamous Epithelials Urine 1      Hyaline Casts Urine 3 (*)    CRP INFLAMMATION - Abnormal    CRP Inflammation 122.80 (*)    CK TOTAL - Abnormal     (*)    LACTIC ACID WHOLE BLOOD - Abnormal    Lactic Acid 0.6 (*)    BASIC METABOLIC PANEL - Abnormal    Sodium 140      Potassium 4.1      Chloride 103      Carbon Dioxide (CO2) 23      Anion Gap 14      Urea Nitrogen 45.7 (*)     Creatinine 1.40 (*)     GFR Estimate 40 (*)     Calcium 9.3      Glucose 121 (*)    CBC WITH PLATELETS - Abnormal    WBC Count 9.8      RBC Count 3.85      Hemoglobin 12.4      Hematocrit 39.0       (*)     MCH 32.2      MCHC 31.8      RDW 12.0      Platelet Count 145 (*)    CK TOTAL - Abnormal     (*)    URINE CULTURE - Abnormal    Culture >100,000 CFU/mL Escherichia coli (*)    TSH WITH FREE T4 REFLEX - Normal    TSH 0.63     BLOOD CULTURE       RADIOLOGY:  Echocardiogram Complete   Final Result      US Lower Extremity Venous Duplex Left   Final Result   IMPRESSION:   1.  No deep venous thrombosis in the left lower extremity.      XR Ankle Left G/E 3 Views   Final Result   IMPRESSION: Soft tissue swelling about the ankle. No evidence for fracture. Mild degenerative change at the tibiotalar joint. Plantar and Achilles calcaneal spurring. Degenerative change at the calcaneocuboid articulation and subtalar joints.      XR Pelvis and Hip Left 2 Views   Final Result   IMPRESSION: Both hips negative for fracture or dislocation. Mild degenerative change both hip joints. Degenerative change at the SI joints and symphysis pubis. Pelvis negative for fracture. Chronic enthesitis at the hamstring tendon attachments to the    ischial tuberosities bilaterally.      Cervical spine CT w/o contrast   Final Result   IMPRESSION:   1.  No fracture or posttraumatic subluxation.   2.  Hardware complication with loosening of ventral fusion plate and screws C5-C7 and ventral migration by 9 mm.       Foot XR, G/E 3 views, left   Final Result   IMPRESSION: Plantar and Achilles calcaneal spurring. No evidence for fracture. Degenerative change at the talonavicular joint and first MTP joint.      XR Tibia and Fibula Left 2 Views   Final Result   IMPRESSION: The left tibia and fibula are negative for fracture. Achilles calcaneal spurring. Postoperative changes total knee arthroplasty. Soft tissue swelling about the ankle.      Head CT w/o contrast   Final Result   IMPRESSION:    1.  No acute intracranial process.   2.  Right maxillary sinus secretions, correlate for sinusitis.          EC2023, 16:28; sinus tachycardia with rate of 105 bpm; PACs; normal intervals; LAFB; no ST-T wave changes consistent with ACS or pericarditis; compared to previous EKG dated 10/31/2022, the rate has increased by 20 bpm, otherwise there are no significant changes    EKG independently reviewed and interpreted by Rosalind Sibley MD      I, Ramin Muse, am serving as a scribe to document services personally performed by Dr. Rosalind Sibley, based on my observations and the provider's statements to me.  I, Rosalind Silbey MD, attest that Ramin Muse is acting in a scribe capacity, has observed my performance of the services and has documented them in accordance with my direction.      Rosalind Sibley MD  Emergency Medicine  [facility] EMERGENCY DEPARTMENT         Rosalind Sibley MD  [phone]

## 2023-12-05 NOTE — ED TRIAGE NOTES
Fall from bed to floor. Pt on floor from 0930 until just PTA. Pt states she is not sure what caused her to fall. C/o redness and pain to left lower leg. No other injuries from fall.      Triage Assessment (Adult)       Row Name 12/05/23 1521          Triage Assessment    Airway WDL WDL        Skin Circulation/Temperature WDL    Skin Circulation/Temperature WDL X  LL leg redness

## 2023-12-05 NOTE — ED TRIAGE NOTES
Pt not on blood thinning medication     Triage Assessment (Adult)       Row Name 12/05/23 1521          Triage Assessment    Airway WDL WDL        Skin Circulation/Temperature WDL    Skin Circulation/Temperature WDL X  LL leg redness

## 2023-12-06 ENCOUNTER — APPOINTMENT (OUTPATIENT)
Dept: PHYSICAL THERAPY | Facility: HOSPITAL | Age: 73
DRG: 603 | End: 2023-12-06
Attending: STUDENT IN AN ORGANIZED HEALTH CARE EDUCATION/TRAINING PROGRAM
Payer: MEDICARE

## 2023-12-06 ENCOUNTER — APPOINTMENT (OUTPATIENT)
Dept: OCCUPATIONAL THERAPY | Facility: HOSPITAL | Age: 73
DRG: 603 | End: 2023-12-06
Attending: STUDENT IN AN ORGANIZED HEALTH CARE EDUCATION/TRAINING PROGRAM
Payer: MEDICARE

## 2023-12-06 ENCOUNTER — APPOINTMENT (OUTPATIENT)
Dept: ULTRASOUND IMAGING | Facility: HOSPITAL | Age: 73
DRG: 603 | End: 2023-12-06
Attending: EMERGENCY MEDICINE
Payer: MEDICARE

## 2023-12-06 ENCOUNTER — APPOINTMENT (OUTPATIENT)
Dept: CARDIOLOGY | Facility: HOSPITAL | Age: 73
DRG: 603 | End: 2023-12-06
Attending: STUDENT IN AN ORGANIZED HEALTH CARE EDUCATION/TRAINING PROGRAM
Payer: MEDICARE

## 2023-12-06 PROBLEM — N39.0 URINARY TRACT INFECTION WITHOUT HEMATURIA, SITE UNSPECIFIED: Status: ACTIVE | Noted: 2023-12-06

## 2023-12-06 LAB
ANION GAP SERPL CALCULATED.3IONS-SCNC: 14 MMOL/L (ref 7–15)
BUN SERPL-MCNC: 45.7 MG/DL (ref 8–23)
CALCIUM SERPL-MCNC: 9.3 MG/DL (ref 8.8–10.2)
CHLORIDE SERPL-SCNC: 103 MMOL/L (ref 98–107)
CK SERPL-CCNC: 884 U/L (ref 26–192)
CREAT SERPL-MCNC: 1.4 MG/DL (ref 0.51–0.95)
DEPRECATED HCO3 PLAS-SCNC: 23 MMOL/L (ref 22–29)
EGFRCR SERPLBLD CKD-EPI 2021: 40 ML/MIN/1.73M2
ERYTHROCYTE [DISTWIDTH] IN BLOOD BY AUTOMATED COUNT: 12 % (ref 10–15)
GLUCOSE SERPL-MCNC: 121 MG/DL (ref 70–99)
HCT VFR BLD AUTO: 39 % (ref 35–47)
HGB BLD-MCNC: 12.4 G/DL (ref 11.7–15.7)
LVEF ECHO: NORMAL
MCH RBC QN AUTO: 32.2 PG (ref 26.5–33)
MCHC RBC AUTO-ENTMCNC: 31.8 G/DL (ref 31.5–36.5)
MCV RBC AUTO: 101 FL (ref 78–100)
PLATELET # BLD AUTO: 145 10E3/UL (ref 150–450)
POTASSIUM SERPL-SCNC: 4.1 MMOL/L (ref 3.4–5.3)
RBC # BLD AUTO: 3.85 10E6/UL (ref 3.8–5.2)
SODIUM SERPL-SCNC: 140 MMOL/L (ref 135–145)
WBC # BLD AUTO: 9.8 10E3/UL (ref 4–11)

## 2023-12-06 PROCEDURE — G0378 HOSPITAL OBSERVATION PER HR: HCPCS

## 2023-12-06 PROCEDURE — 36415 COLL VENOUS BLD VENIPUNCTURE: CPT | Performed by: STUDENT IN AN ORGANIZED HEALTH CARE EDUCATION/TRAINING PROGRAM

## 2023-12-06 PROCEDURE — 87040 BLOOD CULTURE FOR BACTERIA: CPT

## 2023-12-06 PROCEDURE — 93971 EXTREMITY STUDY: CPT | Mod: LT

## 2023-12-06 PROCEDURE — 99207 PR APP CREDIT; MD BILLING SHARED VISIT: CPT

## 2023-12-06 PROCEDURE — 36415 COLL VENOUS BLD VENIPUNCTURE: CPT

## 2023-12-06 PROCEDURE — 99233 SBSQ HOSP IP/OBS HIGH 50: CPT | Mod: FS | Performed by: FAMILY MEDICINE

## 2023-12-06 PROCEDURE — 80048 BASIC METABOLIC PNL TOTAL CA: CPT | Performed by: STUDENT IN AN ORGANIZED HEALTH CARE EDUCATION/TRAINING PROGRAM

## 2023-12-06 PROCEDURE — 96375 TX/PRO/DX INJ NEW DRUG ADDON: CPT

## 2023-12-06 PROCEDURE — 999N000157 HC STATISTIC RCP TIME EA 10 MIN

## 2023-12-06 PROCEDURE — 250N000011 HC RX IP 250 OP 636

## 2023-12-06 PROCEDURE — 94660 CPAP INITIATION&MGMT: CPT

## 2023-12-06 PROCEDURE — 120N000001 HC R&B MED SURG/OB

## 2023-12-06 PROCEDURE — 250N000013 HC RX MED GY IP 250 OP 250 PS 637: Performed by: STUDENT IN AN ORGANIZED HEALTH CARE EDUCATION/TRAINING PROGRAM

## 2023-12-06 PROCEDURE — 250N000011 HC RX IP 250 OP 636: Mod: JZ | Performed by: FAMILY MEDICINE

## 2023-12-06 PROCEDURE — 250N000011 HC RX IP 250 OP 636: Mod: JZ | Performed by: STUDENT IN AN ORGANIZED HEALTH CARE EDUCATION/TRAINING PROGRAM

## 2023-12-06 PROCEDURE — 999N000208 ECHOCARDIOGRAM COMPLETE

## 2023-12-06 PROCEDURE — 97162 PT EVAL MOD COMPLEX 30 MIN: CPT | Mod: GP

## 2023-12-06 PROCEDURE — 258N000003 HC RX IP 258 OP 636

## 2023-12-06 PROCEDURE — 85027 COMPLETE CBC AUTOMATED: CPT | Performed by: STUDENT IN AN ORGANIZED HEALTH CARE EDUCATION/TRAINING PROGRAM

## 2023-12-06 PROCEDURE — 99222 1ST HOSP IP/OBS MODERATE 55: CPT | Performed by: SPECIALIST

## 2023-12-06 PROCEDURE — 93306 TTE W/DOPPLER COMPLETE: CPT | Mod: 26 | Performed by: INTERNAL MEDICINE

## 2023-12-06 PROCEDURE — 255N000002 HC RX 255 OP 636: Performed by: FAMILY MEDICINE

## 2023-12-06 PROCEDURE — 82550 ASSAY OF CK (CPK): CPT | Performed by: STUDENT IN AN ORGANIZED HEALTH CARE EDUCATION/TRAINING PROGRAM

## 2023-12-06 PROCEDURE — 97165 OT EVAL LOW COMPLEX 30 MIN: CPT | Mod: GO

## 2023-12-06 PROCEDURE — 97116 GAIT TRAINING THERAPY: CPT | Mod: GP

## 2023-12-06 RX ORDER — HYDROCODONE BITARTRATE AND ACETAMINOPHEN 5; 325 MG/1; MG/1
1 TABLET ORAL EVERY 4 HOURS PRN
Status: DISCONTINUED | OUTPATIENT
Start: 2023-12-06 | End: 2023-12-09 | Stop reason: HOSPADM

## 2023-12-06 RX ORDER — CETIRIZINE HYDROCHLORIDE 10 MG/1
10 TABLET ORAL DAILY
Status: DISCONTINUED | OUTPATIENT
Start: 2023-12-06 | End: 2023-12-08

## 2023-12-06 RX ORDER — CEFTRIAXONE 1 G/1
1 INJECTION, POWDER, FOR SOLUTION INTRAMUSCULAR; INTRAVENOUS EVERY 24 HOURS
Status: DISCONTINUED | OUTPATIENT
Start: 2023-12-06 | End: 2023-12-06

## 2023-12-06 RX ORDER — DULOXETIN HYDROCHLORIDE 30 MG/1
30 CAPSULE, DELAYED RELEASE ORAL AT BEDTIME
Status: DISCONTINUED | OUTPATIENT
Start: 2023-12-06 | End: 2023-12-09 | Stop reason: HOSPADM

## 2023-12-06 RX ORDER — NALOXONE HYDROCHLORIDE 0.4 MG/ML
0.2 INJECTION, SOLUTION INTRAMUSCULAR; INTRAVENOUS; SUBCUTANEOUS
Status: DISCONTINUED | OUTPATIENT
Start: 2023-12-06 | End: 2023-12-09 | Stop reason: HOSPADM

## 2023-12-06 RX ORDER — MONTELUKAST SODIUM 10 MG/1
10 TABLET ORAL DAILY
Status: DISCONTINUED | OUTPATIENT
Start: 2023-12-06 | End: 2023-12-09 | Stop reason: HOSPADM

## 2023-12-06 RX ORDER — FLUTICASONE FUROATE AND VILANTEROL 100; 25 UG/1; UG/1
1 POWDER RESPIRATORY (INHALATION) DAILY
Status: DISCONTINUED | OUTPATIENT
Start: 2023-12-06 | End: 2023-12-09 | Stop reason: HOSPADM

## 2023-12-06 RX ORDER — DULOXETIN HYDROCHLORIDE 60 MG/1
60 CAPSULE, DELAYED RELEASE ORAL EVERY MORNING
Status: DISCONTINUED | OUTPATIENT
Start: 2023-12-06 | End: 2023-12-09 | Stop reason: HOSPADM

## 2023-12-06 RX ORDER — METOPROLOL SUCCINATE 50 MG/1
50 TABLET, EXTENDED RELEASE ORAL AT BEDTIME
Status: DISCONTINUED | OUTPATIENT
Start: 2023-12-06 | End: 2023-12-09 | Stop reason: HOSPADM

## 2023-12-06 RX ORDER — CEFTRIAXONE 1 G/1
1 INJECTION, POWDER, FOR SOLUTION INTRAMUSCULAR; INTRAVENOUS EVERY 24 HOURS
Status: DISCONTINUED | OUTPATIENT
Start: 2023-12-06 | End: 2023-12-08

## 2023-12-06 RX ORDER — CEFTRIAXONE 1 G/1
1 INJECTION, POWDER, FOR SOLUTION INTRAMUSCULAR; INTRAVENOUS EVERY 24 HOURS
Status: DISCONTINUED | OUTPATIENT
Start: 2023-12-07 | End: 2023-12-06

## 2023-12-06 RX ORDER — ASPIRIN 81 MG/1
81 TABLET ORAL DAILY
Status: DISCONTINUED | OUTPATIENT
Start: 2023-12-06 | End: 2023-12-09 | Stop reason: HOSPADM

## 2023-12-06 RX ORDER — TOLTERODINE 2 MG/1
2 CAPSULE, EXTENDED RELEASE ORAL DAILY
Status: DISCONTINUED | OUTPATIENT
Start: 2023-12-06 | End: 2023-12-09 | Stop reason: HOSPADM

## 2023-12-06 RX ORDER — CEFAZOLIN SODIUM 1 G/50ML
750 SOLUTION INTRAVENOUS EVERY 24 HOURS
Status: DISCONTINUED | OUTPATIENT
Start: 2023-12-07 | End: 2023-12-07

## 2023-12-06 RX ORDER — VITAMIN B COMPLEX
125 TABLET ORAL EVERY MORNING
Status: DISCONTINUED | OUTPATIENT
Start: 2023-12-06 | End: 2023-12-09 | Stop reason: HOSPADM

## 2023-12-06 RX ORDER — FUROSEMIDE 20 MG
40 TABLET ORAL EVERY MORNING
Status: DISCONTINUED | OUTPATIENT
Start: 2023-12-06 | End: 2023-12-09 | Stop reason: HOSPADM

## 2023-12-06 RX ORDER — FUROSEMIDE 20 MG
20 TABLET ORAL EVERY 24 HOURS
Status: DISCONTINUED | OUTPATIENT
Start: 2023-12-06 | End: 2023-12-09 | Stop reason: HOSPADM

## 2023-12-06 RX ORDER — HYDROXYCHLOROQUINE SULFATE 200 MG
100 TABLET ORAL 2 TIMES DAILY
Status: DISCONTINUED | OUTPATIENT
Start: 2023-12-06 | End: 2023-12-09 | Stop reason: HOSPADM

## 2023-12-06 RX ORDER — NALOXONE HYDROCHLORIDE 0.4 MG/ML
0.4 INJECTION, SOLUTION INTRAMUSCULAR; INTRAVENOUS; SUBCUTANEOUS
Status: DISCONTINUED | OUTPATIENT
Start: 2023-12-06 | End: 2023-12-09 | Stop reason: HOSPADM

## 2023-12-06 RX ORDER — IBUPROFEN 200 MG
950 CAPSULE ORAL DAILY
Status: DISCONTINUED | OUTPATIENT
Start: 2023-12-06 | End: 2023-12-09 | Stop reason: HOSPADM

## 2023-12-06 RX ORDER — CEFTRIAXONE 2 G/1
2 INJECTION, POWDER, FOR SOLUTION INTRAMUSCULAR; INTRAVENOUS ONCE
Status: DISCONTINUED | OUTPATIENT
Start: 2023-12-06 | End: 2023-12-06

## 2023-12-06 RX ORDER — ENOXAPARIN SODIUM 100 MG/ML
40 INJECTION SUBCUTANEOUS EVERY 12 HOURS
Status: DISCONTINUED | OUTPATIENT
Start: 2023-12-06 | End: 2023-12-09 | Stop reason: HOSPADM

## 2023-12-06 RX ADMIN — DULOXETINE HYDROCHLORIDE 30 MG: 30 CAPSULE, DELAYED RELEASE ORAL at 02:35

## 2023-12-06 RX ADMIN — HYDROCODONE BITARTRATE AND ACETAMINOPHEN 1 TABLET: 5; 325 TABLET ORAL at 06:15

## 2023-12-06 RX ADMIN — METOPROLOL SUCCINATE 50 MG: 50 TABLET, EXTENDED RELEASE ORAL at 22:04

## 2023-12-06 RX ADMIN — ONDANSETRON 4 MG: 2 INJECTION INTRAMUSCULAR; INTRAVENOUS at 16:56

## 2023-12-06 RX ADMIN — ENOXAPARIN SODIUM 40 MG: 40 INJECTION SUBCUTANEOUS at 21:58

## 2023-12-06 RX ADMIN — DULOXETINE HYDROCHLORIDE 30 MG: 30 CAPSULE, DELAYED RELEASE ORAL at 21:58

## 2023-12-06 RX ADMIN — PERFLUTREN 2 ML: 6.52 INJECTION, SUSPENSION INTRAVENOUS at 11:14

## 2023-12-06 RX ADMIN — FUROSEMIDE 20 MG: 20 TABLET ORAL at 13:05

## 2023-12-06 RX ADMIN — VANCOMYCIN HYDROCHLORIDE 1500 MG: 5 INJECTION, POWDER, LYOPHILIZED, FOR SOLUTION INTRAVENOUS at 16:26

## 2023-12-06 RX ADMIN — HYDROXYCHLOROQUINE SULFATE 100 MG: 200 TABLET, FILM COATED ORAL at 20:31

## 2023-12-06 RX ADMIN — METOPROLOL SUCCINATE 50 MG: 50 TABLET, EXTENDED RELEASE ORAL at 02:35

## 2023-12-06 ASSESSMENT — ACTIVITIES OF DAILY LIVING (ADL)
ADLS_ACUITY_SCORE: 45
ADLS_ACUITY_SCORE: 37
ADLS_ACUITY_SCORE: 45
ADLS_ACUITY_SCORE: 37
ADLS_ACUITY_SCORE: 45
ADLS_ACUITY_SCORE: 45
ADLS_ACUITY_SCORE: 39
ADLS_ACUITY_SCORE: 37
ADLS_ACUITY_SCORE: 45
ADLS_ACUITY_SCORE: 45

## 2023-12-06 NOTE — PROGRESS NOTES
PRIMARY DIAGNOSIS: VERTIGO    OUTPATIENT/OBSERVATION GOALS TO BE MET BEFORE DISCHARGE  1. Orthostatic performed: N/A    2. Tolerating PO medications: Yes    3. Return to near baseline physical activity: Yes    4. Cleared for discharge by consultants (if involved): Yes    Discharge Planner Nurse   Safe discharge environment identified: Yes  Barriers to discharge: No       Entered by: Selene Delgadillo RN 12/06/2023 2:52 PM  Patient took furosemide at PA's insistence. Patient verbalized that she would like to go home.  Please review provider order for any additional goals.   Nurse to notify provider when observation goals have been met and patient is ready for discharge.

## 2023-12-06 NOTE — PROGRESS NOTES
"Care Management Follow Up    Length of Stay (days): 0    Expected Discharge Date: 12/07/2023     Concerns to be Addressed:  infection, O2 need, weakness     Patient plan of care discussed at interdisciplinary rounds: Yes    Anticipated Discharge Disposition:  home     Anticipated Discharge Services:  home care RN/PT/Ot    Anticipated Discharge DME:  no new DME    Patient/family educated on Medicare website which has current facility and service quality ratings:  declined TCU list  Education Provided on the Discharge Plan:  per care team  Patient/Family in Agreement with the Plan:  patient goal is to return home    Referrals Placed by CM/SW:  home care  Private pay costs discussed: Not applicable    Additional Information:  Patient presenting after a fall; pmhx includes NELLIE (BiPAP), myositis, HTN/HLD, HFpEF (65%, 10/22), CKD3; admitted to observation for generalised weakness, possible syncope.   Cellulitis left LE, on IV Ceftriaxone. On O21L.     Therapy: mod-mid assist x1 with FWW, 6ft. Recommending TCU.      Social History:  Patient and spouse Hugo live in their private residence. Hugo reports that patient is independent \"with some things\", he assists her with showering and dressing. He reports that it is difficult for her to do housework and laundry \"so I am the main mark here\". Per Hugo, patient does not like using her walker so he purchased \"walking sticks\" and she uses one of these with ambulation. She wears a CPAP with O2 at night.       12/6/23:  Met with patient to discuss discharge recommendation for TCU and issue with coverage due to being in Observation. Patient declines TCU for both cost and preference for home. She is willing to have home care. She states her leg cellulitis and pain limits her ability to ambulate.  She would have support from spouse.    At this time, plan is for home with spouse and home care.  Home care referral made to [facility] for RN/PT/OT.     2:27 PM  Accepted by [facility] for " RN/PT/OT.       Merline Laboy RN

## 2023-12-06 NOTE — PROGRESS NOTES
PRIMARY DIAGNOSIS: Infection    OUTPATIENT/OBSERVATION GOALS TO BE MET BEFORE DISCHARGE  1. Orthostatic performed: No    2. Tolerating PO medications: Yes    3. Return to near baseline physical activity: No    4. Cleared for discharge by consultants (if involved): No    Discharge Planner Nurse   Safe discharge environment identified: Yes  Barriers to discharge: Yes       Entered by: Hemalatha Sauceda RN 12/06/2023 12:36 AM     Please review provider order for any additional goals.   Nurse to notify provider when observation goals have been met and patient is ready for discharge.

## 2023-12-06 NOTE — PROGRESS NOTES
PRIMARY DIAGNOSIS: Infection    OUTPATIENT/OBSERVATION GOALS TO BE MET BEFORE DISCHARGE  1. Orthostatic performed: No    2. Tolerating PO medications: Yes    3. Return to near baseline physical activity: No    4. Cleared for discharge by consultants (if involved): No    Discharge Planner Nurse   Safe discharge environment identified: Yes  Barriers to discharge: Yes       Entered by: Hemalatha Sauceda RN 12/06/2023 4:33 AM     Please review provider order for any additional goals.   Nurse to notify provider when observation goals have been met and patient is ready for discharge.

## 2023-12-06 NOTE — CONSULTS
Care Management Initial Consult    General Information  Assessment completed with: Spouse or significant other, spouse Hugo  Type of CM/SW Visit: Initial Assessment    Primary Care Provider verified and updated as needed: Yes   Readmission within the last 30 days: no previous admission in last 30 days      Reason for Consult: discharge planning  Advance Care Planning: Advance Care Planning Reviewed: present on chart, no concerns identified          Communication Assessment  Patient's communication style: spoken language (English or Bilingual)             Cognitive  Cognitive/Neuro/Behavioral:                        Living Environment:   People in home: spouse  patient, spouse Hugo  Current living Arrangements: house      Able to return to prior arrangements: yes       Family/Social Support:  Care provided by: self, spouse/significant other  Provides care for: no one  Marital Status:   , Children  Hugo       Description of Support System: Supportive, Involved    Support Assessment: Adequate family and caregiver support    Current Resources:   Patient receiving home care services: No     Community Resources: DME  Equipment currently used at home:    Supplies currently used at home: Incontinence Supplies, Oxygen Tubing/Supplies, Other (CPAP (uses O2 with her CPAP), walking sticks)    Employment/Financial:  Employment Status:          Financial Concerns:             Does the patient's insurance plan have a 3 day qualifying hospital stay waiver?      Lifestyle & Psychosocial Needs:  Social Determinants of Health     Food Insecurity: Not on file   Depression: Not on file   Housing Stability: Not on file   Tobacco Use: Low Risk  (12/5/2023)    Patient History     Smoking Tobacco Use: Never     Smokeless Tobacco Use: Never     Passive Exposure: Not on file   Financial Resource Strain: Not on file   Alcohol Use: Not on file   Transportation Needs: Not on file   Physical Activity: Not on file   Interpersonal  "Safety: Not on file   Stress: Not on file   Social Connections: Not on file       Functional Status:  Prior to admission patient needed assistance:   Dependent ADLs:: Ambulation-cane, Bathing, Dressing (Uses \"walking sticks\", spouse assists with bathing and dressing)  Dependent IADLs:: Cleaning, Laundry, Transportation  Assesssment of Functional Status: Not at  functional baseline    Mental Health Status:          Chemical Dependency Status:                Values/Beliefs:  Spiritual, Cultural Beliefs, Advent Practices, Values that affect care:                 Additional Information:  Spoke with spouse Hugo on the phone for initial assessment. Introduced self and care management role. Patient and Hugo live in their private residence. Hugo reports that patient is independent \"with some things\", he assists her with showering and dressing. He reports that it is difficult for her to do housework and laundry \"so I am the main mark here\". Per Hugo, patient does not like using her walker so he purchased \"walking sticks\" and she uses one of these with ambulation. She wears a CPAP with O2 at night. Observation status (MOON) explained to Hugo and he was upset with this and asked that the hospital not give her any \"of your medications\" while she is here, that he would bring in her home medication. He is upset that her last obs status \"cost me $700. Explained that he cannot bring in home medication and she has been getting the medication that has been ordered for her. He would like to talk to \"someone\" about this observation status.     PT/OT eval pending. CM to follow hospital progression, treatment team recommendations and assist with discharge planning as needed. Spouse to transport at discharge.     Linda Armendarzi RN     "

## 2023-12-06 NOTE — PROGRESS NOTES
[facility]    Medicine Progress Note - Hospitalist Service    Date of Admission:  12/5/2023    Assessment & Plan   Desirae Rey is a 72F presents after GLF fall; pmhx includes NELLIE (BiPAP), myositis, HTN/HLD, HFpEF, CKD3; admitted to observation for generalized weakness, possible syncope and left lower leg pain.    Generalized Weakness  Ground Level Fall   Woke up at 7 AM in bed, returned to sleep and later woke up on the floor next to her bed at about 9:30 AM   - CT Cspine/Head negative  - Echo with EF 60-65%, no changes compared to the prior study dated 10/31/2022  - Per ED Note with chart review: Over the past 12 months prior to this visit, she had at least 7 episodes of falls    - PT rec TCU placement   - OT rec TCU placement   - Appreciate CM. Patient unwilling to go to TCU at this time. Continue to discuss home care referrals/resources and follow for disposition planning as well as patient financial concerns about medications   - Orthostatics to be done    L lower leg pain  Cellulitis, suspected  Acute onset left lower leg pain 1 day ago with associated erythema and swelling  - XR hip/pelvis: Both hips and pelvis negative for fracture or dislocation  - XR L tib/fib and ankle: No fractures, soft tissue swelling about the ankle   - US LE negative for DVT  - Area of erythema marked, will need to continue to monitor for progression/advancing erythema  - Patient started empirically on ceftraixone 2g IV. Will switch to Vanco IV,  pharmacy to dose   - Gen surg consulted given advancing erythema  - BC pending     Elevated CK/CRP  Leukocytosis (mild)  - CK total 900 --> 884  - .8   - LA 0.6  - WBC 13.9 on admission --> 9.8, trend CBC    UTI   - UA in  leuk esterase, 18 WBC  - UC showing >100,000 units E. Coli   - Patient given IV ceftriaxone 2g for cellulitis in ED, will continue with 1 g IV ceftriaxone q24 hrs while IP   - Continue I&Os  - Patient report minimal one-time urination  throughout day 12/6, bladder scan order set    Fibromyalgia   Polyarthralgia  Osteoarthritis   Chronic pain  MARIA GUADALUPE positive  Continue home regimen below   - Duloxetine 60mg PO qAM, 30mg PO qPM  - Norco 5-325mg PO q4h/PRN   - Tylenol PO PRN  - Hydroxychloroquine 100 mg BID    CHF   Home oxygen use   Uses 1L NC O2 PRN at baseline  - On 1-3 L here, continue as needed   - No clinical signs of decompensation   - continue Lasix  - I&Os, daily weights     Prerenal azotemia  CKD3b  - NS 500mL bolus given in ED  - will hold IVF for now given CHF history  - BMP trend    Essential Hypertension   - continue home metoprolol       Hyponatremia (corrected)  134 --> 140  - BMP trend    NELLIE  - Continued home BiPAP      Patient requiring 2 IV antibiotics and continued hospitalization for worsening cellulitis. Inpatient status met at this time       Observation Goals: -diagnostic tests and consults completed and resulted, -vital signs normal or at patient baseline, Nurse to notify provider when observation goals have been met and patient is ready for discharge.  Diet: Regular Diet Adult    DVT Prophylaxis: Enoxaparin (Lovenox) SQ  Rebollar Catheter: Not present  Lines: None     Cardiac Monitoring: ACTIVE order. Indication: Syncope- low cardiac risk (24 hours)  Code Status: Full Code      Clinically Significant Risk Factors Present on Admission                # Drug Induced Platelet Defect: home medication list includes an antiplatelet medication   # Hypertension: Noted on problem list          # Asthma: noted on problem list        Disposition Plan      Expected Discharge Date: 12/06/2023      Destination: home with family            The patient's care was discussed with the Attending Physician, Dr. Gina Tim who independently met with and assessed the patient and is agreement with the assessment and plan     Yun Carlson PA-C  Hospitalist Service  [facility] [place]  Securely message with Vocera (more info)  Text page via  [facility]   ______________________________________________________________________    Interval History   Patient states she woke up from bed morning of 12/5/23 around 7:30 AM and fell back asleep without leaving the bed. States she woke up at 9:30 AM and was sitting on the ground next to her bed. Thinks she may have fallen out of bed. Does not think she hit her head, denies any vision changes. Does report pain to her left lower extremity that she states started abruptly 1 day ago with some associated erythema and edema. Does not know when the erythema and swelling may started. States it is exquisitely tender and painful. Denies calf pain, pain behind knees or hip pain. States she uses 1 L of oxygen daily as needed. Denies CP, SOB, difficulty breathing.     Does not have any complaints of dysuria but does report that she only urinated a small amount once throughout the day today.     Is declining taking some of her home medications. States she does not want to pay for her medications here. Emphasized the need of continuing ordered home regimen while inpatient including metoprolol and diuretics. Discussed PT/OT recommendations for TCU, patient declining at this time but is open to home care(s). Agreeable to CM/SW consult.    Physical Exam   Vital Signs: Temp: 98.4  F (36.9  C) Temp src: Oral BP: 123/71 Pulse: 95   Resp: 29 SpO2: 97 % O2 Device: Nasal cannula Oxygen Delivery: 1 LPM  Weight: 0 lbs 0 oz    Constitutional: awake, alert, no apparent distress, and appears stated age  Respiratory: No increased work of breathing, no accessory muscle use, clear to auscultation bilaterally. NC in place  Cardiovascular: Regular rate and rhythm, normal S1 and S2  Skin: There is significant erythema with sharp demarcation circumferentially along the left lower extremity, from the mid-tibia extending to the dorsal aspect and laterally of the foot. 5 inches in greatest width. Exquisitely tender, moderate swelling of the  area. There is some faint, diffuse erythema extending just inferior to popliteal area.   Musculoskeletal: 2+ DP pulses  Neurologic: Awake, alert.   Neuropsychiatric: Appropriate mood, affect and eye contact. Cooperative.      Medical Decision Making             Data     I have personally reviewed the following data over the past 24 hrs:    9.8  \   12.4   / 145 (L)     140 103 45.7 (H) /  121 (H)   4.1 23 1.40 (H) \     ALT: N/A AST: N/A AP: N/A TBILI: N/A   ALB: N/A TOT PROTEIN: N/A LIPASE: N/A     TSH: N/A T4: N/A A1C: N/A     Procal: N/A CRP: N/A Lactic Acid: 0.6 (L)         Imaging results reviewed over the past 24 hrs:   Recent Results (from the past 24 hour(s))   Head CT w/o contrast    Narrative    EXAM: CT HEAD W/O CONTRAST  LOCATION: [facility]  DATE: 12/5/2023    INDICATION: New onset confusion. Hallucinations.  COMPARISON: CT head 10/31/2022.  TECHNIQUE: Routine CT Head without IV contrast. Multiplanar reformats. Dose reduction techniques were used.    FINDINGS:  INTRACRANIAL CONTENTS: No intracranial hemorrhage, extraaxial collection, or mass effect. No CT evidence of acute infarct. Mild presumed chronic small vessel ischemic changes. Normal ventricles and sulci.     VISUALIZED ORBITS/SINUSES/MASTOIDS: No intraorbital abnormality. Dependent secretions in the right maxillary sinus. The paranasal sinuses are otherwise clear. No middle ear or mastoid effusion.    BONES/SOFT TISSUES: No acute abnormality.      Impression    IMPRESSION:   1.  No acute intracranial process.  2.  Right maxillary sinus secretions, correlate for sinusitis.   XR Tibia and Fibula Left 2 Views    Narrative    EXAM: XR TIBIA AND FIBULA LEFT 2 VIEWS  LOCATION: [facility]  DATE: 12/5/2023    INDICATION: leg pain after fall  COMPARISON: None.      Impression    IMPRESSION: The left tibia and fibula are negative for fracture. Achilles calcaneal spurring. Postoperative changes total knee  arthroplasty. Soft tissue swelling about the ankle.   Foot XR, G/E 3 views, left    Narrative    EXAM: XR FOOT LEFT G/E 3 VIEWS  LOCATION: [facility]  DATE: 12/5/2023    INDICATION: pain after fall  COMPARISON: None.      Impression    IMPRESSION: Plantar and Achilles calcaneal spurring. No evidence for fracture. Degenerative change at the talonavicular joint and first MTP joint.   Cervical spine CT w/o contrast    Narrative    EXAM: CT CERVICAL SPINE W/O CONTRAST  LOCATION: [facility]  DATE: 12/5/2023    INDICATION: Fall, neck pain.  COMPARISON: MRI cervical spine 11/01/2022. CT cervical spine 01/28/2017.  TECHNIQUE: Routine CT Cervical Spine without IV contrast. Multiplanar reformats. Dose reduction techniques were used.    FINDINGS:  VERTEBRA: Normal alignment. Normal vertebral body heights. No fracture or posttraumatic subluxation. Interbody fusion devices at C5-C6 and C6-C7 with ventral fusion hardware. Hardware loosening with lucency surrounding the fixation screws and ventral   migration of the fusion hardware, residing 9 mm anterior to the vertebral bodies.    CANAL/FORAMINA: Mild right foraminal stenosis at C3-C4. Mild spinal canal stenosis C5-C6 with moderate left foraminal stenosis. Mild spinal canal stenosis C6-C7 with severe left and mild right foraminal stenoses. Severe left and mild right foraminal   stenoses at C7-T1.    PARASPINAL: No extraspinal abnormality.      Impression    IMPRESSION:  1.  No fracture or posttraumatic subluxation.  2.  Hardware complication with loosening of ventral fusion plate and screws C5-C7 and ventral migration by 9 mm.   XR Pelvis and Hip Left 2 Views    Narrative    EXAM: XR PELVIS AND HIP LEFT 2 VIEWS  LOCATION: [facility]  DATE: 12/5/2023    INDICATION: LLE pain after fall  COMPARISON: None.      Impression    IMPRESSION: Both hips negative for fracture or dislocation. Mild degenerative change  both hip joints. Degenerative change at the SI joints and symphysis pubis. Pelvis negative for fracture. Chronic enthesitis at the hamstring tendon attachments to the   ischial tuberosities bilaterally.   XR Ankle Left G/E 3 Views    Narrative    EXAM: XR ANKLE LEFT G/E 3 VIEWS  LOCATION: [facility]  DATE: 2023    INDICATION: Traumatic ankle pain.  COMPARISON: None.      Impression    IMPRESSION: Soft tissue swelling about the ankle. No evidence for fracture. Mild degenerative change at the tibiotalar joint. Plantar and Achilles calcaneal spurring. Degenerative change at the calcaneocuboid articulation and subtalar joints.   US Lower Extremity Venous Duplex Left    Narrative    EXAM: US LOWER EXTREMITY VENOUS DUPLEX LEFT  LOCATION: [facility]  DATE: 2023    INDICATION: lt leg pain, r o dvt  COMPARISON: None.  TECHNIQUE: Venous Duplex ultrasound of the left lower extremity with and without compression, augmentation and duplex. Color flow and spectral Doppler with waveform analysis performed.    FINDINGS: Exam includes the common femoral, femoral, popliteal, and contralateral common femoral veins as well as segmentally visualized deep calf veins and greater saphenous vein.     LEFT: No deep vein thrombosis. No superficial thrombophlebitis. No popliteal cyst.      Impression    IMPRESSION:  1.  No deep venous thrombosis in the left lower extremity.   Echocardiogram Complete   Result Value    LVEF  60-65%    Narrative    [id number]  [id number]  [id number]  [id number]^SHERRY^ZAK^[place] [address]     Name: BELL THOMAS  MRN: [id number]  : 1950  Study Date: 2023 10:57 AM  Age: 72 yrs  Gender: Female  Patient Location: [facility], [place]  Reason For Study: Syncope  Ordering Physician: ZAK POWELL  Performed By: MARISSA     BSA: 1.9 m2  Height: 60 in  Weight: 209 lb  HR:  71  ______________________________________________________________________________  Procedure  Complete Portable Echo Adult. Definity (NDC #15129-420) given intravenously.  Compared to the prior study dated 10/31/2022, there have been no changes.  ______________________________________________________________________________  Interpretation Summary     1.Left ventricular size, wall motion and function are normal. The ejection  fraction is 60-65%.  2.There is mild concentric left ventricular hypertrophy.  3.Normal right ventricle size and systolic function.  4.There is discrete nodular thickening of the right coronary cusp. Mild  valvular aortic stenosis. At SVI of 38 mL/M2 peak velocity is 1.9 m/s with a  mean gradient of 8 mmHg and dimensionless index is 0.6.  5.Right ventricular systolic pressure is elevated, consistent with mild to  moderate pulmonary hypertension.  6.Ascending Aorta dilatation is present, borderline  Compared to the prior study dated 10/31/2022, there have been no changes.  ______________________________________________________________________________  I      WMSI = 1.00     % Normal = 100     X - Cannot   0 -                      (2) - Mildly 2 -          Segments  Size  Interpret    Hyperkinetic 1 - Normal  Hypokinetic  Hypokinetic  1-2     small                                                     7 -          3-5      moderate  3 - Akinetic 4 -          5 -         6 - Akinetic Dyskinetic   6-14    large               Dyskinetic   Aneurysmal  w/scar       w/scar       15-16   diffuse     Left Ventricle  Left ventricular size, wall motion and function are normal. The ejection  fraction is 60-65%. There is mild concentric left ventricular hypertrophy.  Left ventricular diastolic function is normal. No regional wall motion  abnormalities noted.     Right Ventricle  Normal right ventricle size and systolic function. TAPSE is normal, which is  consistent with normal right ventricular systolic  function.     Atria  The left atrium is moderately dilated. Right atrial size is normal. There is  no color Doppler evidence of an atrial shunt.     Mitral Valve  There is moderate mitral annular calcification. There is trace mitral  regurgitation. There is no mitral valve stenosis.     Tricuspid Valve  The tricuspid valve is not well visualized, but is grossly normal. There is  mild (1+) tricuspid regurgitation. Right ventricular systolic pressure is  elevated, consistent with mild to moderate pulmonary hypertension.     Aortic Valve  The aortic valve is trileaflet. There is discrete nodular thickening of the  right coronary cusp. No aortic regurgitation is present. Mild valvular aortic  stenosis. At SVI of 38 mL/M2 peak velocity is 1.9 m/s with a mean gradient of  8 mmHg and dimensionless index is 0.6.     Pulmonic Valve  The pulmonic valve is not well seen, but is grossly normal. This degree of  valvular regurgitation is within normal limits. There is no pulmonic valvular  stenosis.     Vessels  The aorta root is normal. Ascending Aorta dilatation is present. borderline.  IVC diameter <2.1 cm collapsing >50% with sniff suggests a normal RA pressure  of 3 mmHg.     Pericardium  There is no pericardial effusion.     Rhythm  Sinus rhythm was noted.     ______________________________________________________________________________  MMode/2D Measurements & Calculations  IVSd: 1.7 cm  LVIDd: 3.7 cm  LVIDs: 2.7 cm  LVPWd: 1.6 cm     FS: 27.3 %  LV mass(C)d: 251.4 grams  LV mass(C)dI: 132.2 grams/m2  Ao root diam: 3.1 cm  LA dimension: 3.8 cm  asc Aorta Diam: 3.9 cm  LA/Ao: 1.2  LVOT diam: 2.0 cm  LVOT area: 3.1 cm2  Ao root diam index Ht(cm/m): 2.0  Ao root diam index BSA (cm/m2): 1.6  Asc Ao diam index BSA (cm/m2): 2.1  Asc Ao diam index Ht(cm/m): 2.6  LA Volume (BP): 95.5 ml     LA Volume Index (BP): 50.3 ml/m2  LA Volume Indexed (AL/bp): 53.1 ml/m2  RV Base: 3.9 cm  RWT: 0.87  TAPSE: 3.1 cm     Doppler Measurements &  Calculations  MV E max ruddy: 90.1 cm/sec  MV A max ruddy: 102.0 cm/sec  MV E/A: 0.88  MV max P.4 mmHg  MV mean P.0 mmHg  MV V2 VTI: 35.8 cm  MVA(VTI): 2.0 cm2     MV dec slope: 274.0 cm/sec2  MV dec time: 0.33 sec  Ao V2 max: 182.0 cm/sec  Ao max P.0 mmHg  Ao V2 mean: 127.0 cm/sec  Ao mean P.0 mmHg  Ao V2 VTI: 36.2 cm  RITA(I,D): 2.0 cm2  RITA(V,D): 1.9 cm2  LV V1 max P.8 mmHg  LV V1 max: 109.0 cm/sec  LV V1 VTI: 22.9 cm  SV(LVOT): 71.9 ml  SI(LVOT): 37.8 ml/m2  PA acc time: 0.09 sec  PI end-d ruddy: 109.0 cm/sec  TR max ruddy: 318.0 cm/sec  TR max P.4 mmHg  AV Ruddy Ratio (DI): 0.60  RITA Index (cm2/m2): 1.0  E/E' av.0  Lateral E/e': 14.8  Medial E/e': 17.3  RV S Ruddy: 13.9 cm/sec     ______________________________________________________________________________  Report approved by: Arelis Sofia 2023 01:35 PM

## 2023-12-06 NOTE — PROGRESS NOTES
12/06/23 0830   Appointment Info   Signing Clinician's Name / Credentials (PT) Hawa Pool PT   Rehab Comments (PT) pt seen in ED   Quick Adds   Quick Adds Certification   Living Environment   People in Home spouse   Current Living Arrangements house   Home Accessibility stairs to enter home;stairs within home   Number of Stairs, Main Entrance 2   Stair Railings, Main Entrance none   Number of Stairs, Within Home, Primary seven   Stair Railings, Within Home, Primary railings safe and in good condition   Transportation Anticipated family or friend will provide   Living Environment Comments 7 steps up to the main level, then living on one floor   Self-Care   Usual Activity Tolerance good   Current Activity Tolerance fair   Regular Exercise No   Equipment Currently Used at Home walker, rolling;other (see comments)  (walking sticks)   Fall history within last six months yes   Number of times patient has fallen within last six months 1   General Information   Onset of Illness/Injury or Date of Surgery 12/05/23   Referring Physician YAQUELIN Coelho   Patient/Family Therapy Goals Statement (PT) none stated   Pertinent History of Current Problem (include personal factors and/or comorbidities that impact the POC) 72F presents after GLF fall; pmhx includes NELLIE (BiPAP), myositis, HTN/HLD, HFpEF (65%, 10/22), CKD3; admitted to observation for generalised weakness, possible syncope.   Existing Precautions/Restrictions fall   Cognition   Affect/Mental Status (Cognition) WFL   Orientation Status (Cognition) oriented x 4   Follows Commands (Cognition) WFL   Pain Assessment   Patient Currently in Pain Yes, see Vital Sign flowsheet   Range of Motion (ROM)   ROM Comment BLE ROM WFL, painful L ankle   Strength (Manual Muscle Testing)   Strength Comments generalized weakness throughout, 3+/5 in LE's   Bed Mobility   Bed Mobility rolling right;supine-sit   Rolling Right [place] (Bed Mobility) minimum assist (75% patient effort)    Supine-Sit [place] (Bed Mobility) moderate assist (50% patient effort);verbal cues;1 person assist   Bed Mobility Limitations decreased ability to use arms for pushing/pulling;decreased ability to use legs for bridging/pushing   Impairments Contributing to Impaired Bed Mobility pain;decreased strength   Assistive Device (Bed Mobility) bed rails   Comment, (Bed Mobility) pt pulls on PT hand to initiate movement then needs assist at trunk to come toa full sit   Transfers   Transfers sit-stand transfer   Sit-Stand Transfer   Sit-Stand [place] (Transfers) minimum assist (75% patient effort);1 person assist;verbal cues   Assistive Device (Sit-Stand Transfers) walker, front-wheeled   Comment, (Sit-Stand Transfer) limited significantly by pain L lower leg   Gait/Stairs (Locomotion)   [place] Level (Gait) minimum assist (75% patient effort);verbal cues   Assistive Device (Gait) walker, front-wheeled   Distance in Feet (Gait) 4 small side steps   Pattern (Gait) step-to   Deviations/Abnormal Patterns (Gait) antalgic   Comment, (Gait/Stairs) moves slowly, hesitant to step on the L foot   Balance   Balance Comments uses FWW   Clinical Impression   Criteria for Skilled Therapeutic Intervention Yes, treatment indicated   PT Diagnosis (PT) impaired functional mobility   Influenced by the following impairments weakness, fatigue, pain   Functional limitations due to impairments bed mobility, transfers, gait   Clinical Presentation (PT Evaluation Complexity) evolving   Clinical Presentation Rationale presents as diagnosed   Clinical Decision Making (Complexity) moderate complexity   Planned Therapy Interventions (PT) bed mobility training;gait training;transfer training;strengthening;ROM (range of motion);stair training   Risk & Benefits of therapy have been explained care plan/treatment goals reviewed;patient   PT Total Evaluation Time   PT Dharmesh, Moderate Complexity Minutes (81123) 12   Therapy Certification   Start  of care date 12/06/23   Certification date from 12/06/23   Certification date to 12/13/23   Medical Diagnosis fall, cellulitis LLE   Physical Therapy Goals   PT Frequency Daily   PT Predicted Duration/Target Date for Goal Attainment 12/13/23   PT Goals Bed Mobility;Transfers;Gait;Stairs   PT: Bed Mobility Supine to/from sit;Rolling  (CGA to guide LE's)   PT: Transfers Sit to/from stand;Bed to/from chair;Assistive device   PT: Gait Rolling walker;25 feet  (CGA)   PT: Stairs 7 stairs;Minimal assist  (with rail(s))   Interventions   Interventions Quick Adds Gait Training   Gait Training   Gait Training Minutes (48566) 12   Symptoms Noted During/After Treatment (Gait Training) fatigue   Treatment Detail/Skilled Intervention very slow pace, pt able to walk 6' forward and back   Distance in Feet 6   [place] Level (Gait Training) minimum assist (75% patient effort)   Physical Assistance Level (Gait Training) verbal cues;1 person assist   Weight Bearing (Gait Training) partial weight-bearing   Assistive Device (Gait Training) rolling walker   Pattern Analysis (Gait Training) swing-to gait   Gait Analysis Deviations decreased leni;increased time in double stance;decreased step length;decreased weight-shifting ability   Impairments (Gait Analysis/Training) pain;strength decreased   PT Discharge Planning   PT Plan bed mobility, transfers and progress gt with FWW, LE ROM/strengthening, add stairs as able   PT Discharge Recommendation (DC Rec) Transitional Care Facility   PT Rationale for DC Rec needs hands on assist for all OOB mobility, only able to walk 6' forward and back 2/2 pain, unable to do stairs at this time   PT Brief overview of current status mod assist for bed mobility, min for gt with FWW, 6'   PT Equipment Needed at Discharge walker, rolling   Total Session Time   Timed Code Treatment Minutes 12   Total Session Time (sum of timed and untimed services) 24   [facility]  Services  OUTPATIENT PHYSICAL THERAPY EVALUATION  PLAN OF TREATMENT FOR OUTPATIENT REHABILITATION  (COMPLETE FOR INITIAL CLAIMS ONLY)  Patient's Last Name, First Name, M.I.  YOB: 1950  Desirae Rey                        Provider's Name  [facility] Medical Record No.  [id number]                             Onset Date:  12/05/23   Start of Care Date:  12/06/23   Type:     _X_PT   ___OT   ___SLP Medical Diagnosis:  fall, cellulitis LLE              PT Diagnosis:  impaired functional mobility Visits from SOC:  1     See note for plan of treatment, functional goals and certification details    I CERTIFY THE NEED FOR THESE SERVICES FURNISHED UNDER        THIS PLAN OF TREATMENT AND WHILE UNDER MY CARE     (Physician co-signature of this document indicates review and certification of the therapy plan).

## 2023-12-06 NOTE — H&P
[facility]    History and Physical - Hospitalist Service       Date of Admission:  12/5/2023    Assessment & Plan      Desirae Rey is a 72F presents after GLF fall; pmhx includes NELLIE (BiPAP), myositis, HTN/HLD, HFpEF (65%, 10/22), CKD3; admitted to observation for generalised weakness, possible syncope.    #generalised weakness  #syncope, suspected  -telemetry  -TSH  -TTE (prior 10/22)  -PT/OT evaluations    #chronic pain  -Duloxetine 60mg PO qAM, 30mg PO qPM  -norco 5-325mg PO q4h/PRN   -tylenol PO PRN    #hyponatremia  Borderline.  -BMP trend    #Prerenal azotemia  #CKD3b  -NS 500mL bolus given in ED  -BMP trend    #CK elevation  -NS bolus given  -CK trend    #NELLIE  -continued home BiPAP    #cellulitis, suspected  -ceftraixone 1g IV qD       Observation Goals: -diagnostic tests and consults completed and resulted, -vital signs normal or at patient baseline, Nurse to notify provider when observation goals have been met and patient is ready for discharge.  Diet: Regular Diet Adult  DVT Prophylaxis: Enoxaparin (Lovenox) SQ  Rebollar Catheter: Not present  Lines: None     Cardiac Monitoring: ACTIVE order. Indication: Syncope- low cardiac risk (24 hours)  Code Status: Full Code    Clinically Significant Risk Factors Present on Admission                # Drug Induced Platelet Defect: home medication list includes an antiplatelet medication   # Hypertension: Noted on problem list          # Asthma: noted on problem list        Disposition Plan      Expected Discharge Date: 12/06/2023      Destination: home with family            Orestes Coelho MD  Hospitalist Service  [facility]  Securely message with [employer] (more info)  Text page via [employer] Paging/Directory     ______________________________________________________________________    Chief Complaint   fall    History is obtained from the patient    History of Present Illness   Desirae Rey is a 72F presents after GLF  fall; pmhx includes NELLIE (BiPAP), myositis, HTN/HLD, HFpEF (65%, 10/22), CKD3; admitted to observation for generalised weakness, possible syncope.    Presents after a fall, described as sliding onto floor. Has significant lower left leg pain, with associated redness and swelling.      Past Medical History    Past Medical History:   Diagnosis Date    Allergic rhinitis     Arthritis of back     CHF (congestive heart failure) (H)     Chronic kidney disease     stage 3     De Quervain's disease (tenosynovitis)     Fibromyalgia, primary     GERD (gastroesophageal reflux disease)     Hematuria     chronic    High cholesterol     History of blood clots 09/01/1970    DVT, from birth control, h/o left calf    Hyperlipidemia     Hypertension     Low back pain     Osteoporosis     Pickwickian syndrome (H)     Plantar fasciitis     Proteinuria     Proteinuria     chronic    Sleep apnea     CPAP    Uncomplicated asthma        Past Surgical History   Past Surgical History:   Procedure Laterality Date    CERVICAL FUSION N/A 4/27/2017    Procedure: ANTERIOR CERVICAL DECOMPRESSION FUSION C5-7 BILATERAL;  Surgeon: Basim Barone MD;  Location: [facility] [place];  Service:     CHOLECYSTECTOMY      open    COLONOSCOPY N/A 12/20/2019    Procedure: COLONOSCOPY WITH BIOPSIES;  Surgeon: Lucio Farr MD;  Location: [facility] [place];  Service: Gastroenterology    EYE SURGERY      HAND SURGERY Right     HYSTEROSCOPY DIAGNOSTIC      JOINT REPLACEMENT      bilateral TKA    KNEE SURGERY      RELEASE CARPAL TUNNEL Bilateral        Prior to Admission Medications   Prior to Admission Medications   Prescriptions Last Dose Informant Patient Reported? Taking?   DULOXETINE HCL PO 12/4/2023  Yes Yes   Sig: Take 30 mg by mouth at bedtime   DULoxetine (CYMBALTA) 30 MG capsule 12/4/2023  Yes Yes   Sig: Take 60 mg by mouth every morning   HYDROcodone-acetaminophen 5-325 mg per tablet Unknown  Yes Yes   Sig: Take 1 tablet by mouth every 4  hours as needed for pain Max 6 tablets per day.   Hydroxychloroquine Sulfate 100 MG TABS 2023  Yes Yes   Sig: Take 100 mg by mouth 2 times daily   OXYGEN-AIR DELIVERY SYSTEMS MISC   Yes No   Sig: [OXYGEN-AIR DELIVERY SYSTEMS MISC] Use 3 L As Directed. 3 lpm with activities and as needed at night   Turmeric (CURCUPLEX-95) 500 MG CAPS 2023  Yes Yes   Sig: Take 1 capsule by mouth daily   Vitamin D3 (CHOLECALCIFEROL) 25 mcg (1000 units) tablet 2023  Yes Yes   Sig: Take 50 mcg by mouth at bedtime   albuterol (PROAIR HFA;PROVENTIL HFA;VENTOLIN HFA) 90 mcg/actuation inhaler Unknown  Yes Yes   Sig: Inhale 1 puff into the lungs every 4 hours as needed for shortness of breath / dyspnea   alendronate (FOSAMAX) 70 MG tablet 2023  Yes Yes   Sig: [ALENDRONATE (FOSAMAX) 70 MG TABLET] Take 70 mg by mouth every 7 days. Takes on    aspirin 81 MG EC tablet 2023  Yes Yes   Si tablet Orally Once a day   calcium citrate (CITRACAL) 950 (200 Ca) MG tablet 2023  Yes Yes   Sig: Take 1 tablet by mouth daily   cetirizine (ZYRTEC) 10 MG tablet 2023  Yes Yes   Sig: [CETIRIZINE (ZYRTEC) 10 MG TABLET] Take 10 mg by mouth daily.    cholecalciferol (VITAMIN D3) 25 mcg (1000 units) capsule 2023  Yes Yes   Sig: Take 125 mcg by mouth every morning Take 5000 units in the morning and 2000 units in the evening   docusate sodium (COLACE) 100 MG capsule Unknown  Yes Yes   Sig: Take 100-300 mg by mouth daily as needed   esomeprazole (NEXIUM) 40 MG capsule Unknown  Yes Yes   Sig: Take 40 mg by mouth daily as needed   fluticasone-salmeterol (AIRDUO RESPICLICK) 113-14 MCG/ACT inhaler 2023  No Yes   Sig: Inhale 1 puff into the lungs 2 times daily   furosemide (LASIX) 20 MG tablet 2023  Yes Yes   Sig: Take 40 mg by mouth every morning Take 2 tablets in the morning and 1 tablet at noon.   furosemide (LASIX) 20 MG tablet 2023 at 1200  Yes Yes   Sig: Take 20 mg by mouth every 24 hours At NOON    l-lysine HCl 500 MG TABS tablet 12/4/2023  Yes Yes   Sig: Take 500 mg by mouth 2 times daily   magnesium oxide 250 mg Tab Unknown  Yes Yes   Sig: Take 500 mg by mouth daily as needed (constipation)   metoprolol succinate ER (TOPROL XL) 50 MG 24 hr tablet 12/4/2023  Yes Yes   Sig: Take 50 mg by mouth At Bedtime   modafinil (PROVIGIL) 100 MG tablet 12/4/2023  Yes Yes   Sig: [MODAFINIL (PROVIGIL) 100 MG TABLET] Take 250 mg by mouth daily.   montelukast (SINGULAIR) 10 mg tablet 12/4/2023  Yes Yes   Sig: [MONTELUKAST (SINGULAIR) 10 MG TABLET] Take 10 mg by mouth daily.   senna-docusate (SENOKOT-S/PERICOLACE) 8.6-50 MG tablet Unknown  No Yes   Sig: Take 1 tablet by mouth 2 times daily as needed for constipation   solifenacin (VESICARE) 10 MG tablet 12/4/2023  Yes Yes   Sig: Take 5-10 mg by mouth At Bedtime   valACYclovir (VALTREX) 500 MG tablet Unknown  Yes Yes   Sig: Take 500 mg by mouth 2 times daily as needed (flare)      Facility-Administered Medications: None        Review of Systems    The 10 point Review of Systems is negative other than noted in the HPI or here.      Physical Exam   Vital Signs: Temp: 98.3  F (36.8  C) Temp src: Oral BP: 123/68 Pulse: 84   Resp: 20 SpO2: 98 % O2 Device: Nasal cannula Oxygen Delivery: 3 LPM  Weight: 0 lbs 0 oz    Constitutional: awake, alert, cooperative, no apparent distress, and appears stated age  Respiratory: CTAB  Cardiovascular: RRR no m/g/r  Skin: Banding non-blanchable erythema of the LLE approximately 5 inches in width  Musculoskeletal: shoulder/elbow flexion/extension 5/5 bilaterally and symmetric  Neurologic: Dozes off during examination; AOx3, no focal deficits    Medical Decision Making       45 MINUTES SPENT BY ME on the date of service doing chart review, history, exam, documentation & further activities per the note.  MANAGEMENT DISCUSSED with the following over the past 24 hours: patient   NOTE(S)/MEDICAL RECORDS REVIEWED over the past 24 hours: outpatient  rheumatology, outpatient pulmonology,   Tests ORDERED & REVIEWED in the past 24 hours:  - See lab/imaging results included in the data section of the note      Data     I have personally reviewed the following data over the past 24 hrs:    13.9 (H)  \   14.1   / 158     134 (L) 98 46.7 (H) /  150 (H)   4.5 20 (L) 1.40 (H) \     ALT: 50 AST: 69 (H) AP: 73 TBILI: 0.4   ALB: 3.7 TOT PROTEIN: 7.2 LIPASE: N/A     TSH: 0.63 T4: N/A A1C: N/A     Procal: N/A CRP: 122.80 (H) Lactic Acid: 0.6 (L)         Imaging results reviewed over the past 24 hrs:   Recent Results (from the past 24 hour(s))   Head CT w/o contrast    Narrative    EXAM: CT HEAD W/O CONTRAST  LOCATION: [facility]  DATE: 12/5/2023    INDICATION: New onset confusion. Hallucinations.  COMPARISON: CT head 10/31/2022.  TECHNIQUE: Routine CT Head without IV contrast. Multiplanar reformats. Dose reduction techniques were used.    FINDINGS:  INTRACRANIAL CONTENTS: No intracranial hemorrhage, extraaxial collection, or mass effect. No CT evidence of acute infarct. Mild presumed chronic small vessel ischemic changes. Normal ventricles and sulci.     VISUALIZED ORBITS/SINUSES/MASTOIDS: No intraorbital abnormality. Dependent secretions in the right maxillary sinus. The paranasal sinuses are otherwise clear. No middle ear or mastoid effusion.    BONES/SOFT TISSUES: No acute abnormality.      Impression    IMPRESSION:   1.  No acute intracranial process.  2.  Right maxillary sinus secretions, correlate for sinusitis.   XR Tibia and Fibula Left 2 Views    Narrative    EXAM: XR TIBIA AND FIBULA LEFT 2 VIEWS  LOCATION: [facility]  DATE: 12/5/2023    INDICATION: leg pain after fall  COMPARISON: None.      Impression    IMPRESSION: The left tibia and fibula are negative for fracture. Achilles calcaneal spurring. Postoperative changes total knee arthroplasty. Soft tissue swelling about the ankle.   Foot XR, G/E 3 views, left     Narrative    EXAM: XR FOOT LEFT G/E 3 VIEWS  LOCATION: [facility]  DATE: 12/5/2023    INDICATION: pain after fall  COMPARISON: None.      Impression    IMPRESSION: Plantar and Achilles calcaneal spurring. No evidence for fracture. Degenerative change at the talonavicular joint and first MTP joint.   Cervical spine CT w/o contrast    Narrative    EXAM: CT CERVICAL SPINE W/O CONTRAST  LOCATION: [facility]  DATE: 12/5/2023    INDICATION: Fall, neck pain.  COMPARISON: MRI cervical spine 11/01/2022. CT cervical spine 01/28/2017.  TECHNIQUE: Routine CT Cervical Spine without IV contrast. Multiplanar reformats. Dose reduction techniques were used.    FINDINGS:  VERTEBRA: Normal alignment. Normal vertebral body heights. No fracture or posttraumatic subluxation. Interbody fusion devices at C5-C6 and C6-C7 with ventral fusion hardware. Hardware loosening with lucency surrounding the fixation screws and ventral   migration of the fusion hardware, residing 9 mm anterior to the vertebral bodies.    CANAL/FORAMINA: Mild right foraminal stenosis at C3-C4. Mild spinal canal stenosis C5-C6 with moderate left foraminal stenosis. Mild spinal canal stenosis C6-C7 with severe left and mild right foraminal stenoses. Severe left and mild right foraminal   stenoses at C7-T1.    PARASPINAL: No extraspinal abnormality.      Impression    IMPRESSION:  1.  No fracture or posttraumatic subluxation.  2.  Hardware complication with loosening of ventral fusion plate and screws C5-C7 and ventral migration by 9 mm.   XR Pelvis and Hip Left 2 Views    Narrative    EXAM: XR PELVIS AND HIP LEFT 2 VIEWS  LOCATION: [facility]  DATE: 12/5/2023    INDICATION: LLE pain after fall  COMPARISON: None.      Impression    IMPRESSION: Both hips negative for fracture or dislocation. Mild degenerative change both hip joints. Degenerative change at the SI joints and symphysis pubis. Pelvis  negative for fracture. Chronic enthesitis at the hamstring tendon attachments to the   ischial tuberosities bilaterally.   XR Ankle Left G/E 3 Views    Narrative    EXAM: XR ANKLE LEFT G/E 3 VIEWS  LOCATION: [facility]  DATE: 12/5/2023    INDICATION: Traumatic ankle pain.  COMPARISON: None.      Impression    IMPRESSION: Soft tissue swelling about the ankle. No evidence for fracture. Mild degenerative change at the tibiotalar joint. Plantar and Achilles calcaneal spurring. Degenerative change at the calcaneocuboid articulation and subtalar joints.   US Lower Extremity Venous Duplex Left    Narrative    EXAM: US LOWER EXTREMITY VENOUS DUPLEX LEFT  LOCATION: [facility]  DATE: 12/6/2023    INDICATION: lt leg pain, r o dvt  COMPARISON: None.  TECHNIQUE: Venous Duplex ultrasound of the left lower extremity with and without compression, augmentation and duplex. Color flow and spectral Doppler with waveform analysis performed.    FINDINGS: Exam includes the common femoral, femoral, popliteal, and contralateral common femoral veins as well as segmentally visualized deep calf veins and greater saphenous vein.     LEFT: No deep vein thrombosis. No superficial thrombophlebitis. No popliteal cyst.      Impression    IMPRESSION:  1.  No deep venous thrombosis in the left lower extremity.

## 2023-12-06 NOTE — ED NOTES
[facility] ED Handoff Report    ED Chief Complaint: Fall    ED Diagnosis:  (L03.116) Cellulitis of left lower extremity  Comment: Ultrasound  Plan:     (W19.XXXA,  Y92.009) Fall at home, initial encounter  Comment: Weakness, unknown mechanism for fall  Plan: Monitor, fluids, increase strangth to baseline    (N39.0) Urinary tract infection without hematuria, site unspecified  Comment: possible  Plan: Abx prophylactic for possible UTI        PMH:    Past Medical History:   Diagnosis Date    Allergic rhinitis     Arthritis of back     CHF (congestive heart failure) (H)     Chronic kidney disease     stage 3     De Quervain's disease (tenosynovitis)     Fibromyalgia, primary     GERD (gastroesophageal reflux disease)     Hematuria     chronic    High cholesterol     History of blood clots 09/01/1970    DVT, from birth control, h/o left calf    Hyperlipidemia     Hypertension     Low back pain     Osteoporosis     Pickwickian syndrome (H)     Plantar fasciitis     Proteinuria     Proteinuria     chronic    Sleep apnea     CPAP    Uncomplicated asthma         Code Status:  Prior     Falls Risk: Yes Band: Applied    Current Living Situation/Residence: lives with a significant other     Elimination Status: Continent: No     Activity Level: Total assist/lift    Patients Preferred Language:  English     Needed: Yes    Vital Signs:  BP (!) 164/82   Pulse 91   Temp 98.8  F (37.1  C) (Oral)   Resp 20   SpO2 97%      Cardiac Rhythm: NSR    Pain Score: 0/10    Is the Patient Confused:  No    Last Food or Drink: 12/05/23 at 1200    Focused Assessment:  Lungs clear to auscultation bilaterally, regular rate and NSR. Radial pulses +2. Normocephalic, atraumatic. PERLLA intact.  Abdomen soft, non-distended.  No lower swelling or edema. AO x3, normal mood and affect.    Tests Performed: Done: Labs and Imaging    Treatments Provided:  Rocephin,  bolus    Family Dynamics/Concerns: No    Family Updated On  Visitor Policy: No    Plan of Care Communicated to Family: No    Who Was Updated about Plan of Care:      Belongings Checklist Done and Signed by Patient: Yes    Medications sent with patient:     Covid: asymptomatic , NOT tested    Additional Information:     RN: Tristin Bryan RN   12/5/2023 7:38 PM

## 2023-12-06 NOTE — PROGRESS NOTES
Pt set up on V60 25/12, rate 12, 30%.  Fitted pt with small face mask with no visible redness or breakdown noted.  Pt will have family bring in her home Bipap unit for further use while in the hospital.  RT will continue to monitor.

## 2023-12-06 NOTE — PROGRESS NOTES
PRIMARY DIAGNOSIS: Infection    OUTPATIENT/OBSERVATION GOALS TO BE MET BEFORE DISCHARGE  1. Orthostatic performed: No    2. Tolerating PO medications: Yes    3. Return to near baseline physical activity: No    4. Cleared for discharge by consultants (if involved): No    Discharge Planner Nurse   Safe discharge environment identified: Yes  Barriers to discharge: Yes       Entered by: Hemalatha Sauceda RN 12/06/2023 6:46 AM     Please review provider order for any additional goals.   Nurse to notify provider when observation goals have been met and patient is ready for discharge.    2030-0700: A&Ox4. Tele: sinus dysrthymia HR in 70-80s. Pt very lethargic upon arrival to floor. More awake this AM. BIPAP on overnight, was put on at 0330 and switched to 1L NC at 0630 per pt request. Pt is normally 3L baseline at home but satting >98%. Was able to wean to 1L and O2 remains 98% while awake. Pt has home BIPAP that  will bring in today. Per pt, she is compliant with BIPAP at home. Bladder scanned 453cc and was able to straight cath 800cc out. Purewick and incontinent brief in place. RLE red and hot/warm to touch, 2+ edema, 2+ pedal pulses. PRN norco given for 8/10 RLE pain. U/S negative for DVT.

## 2023-12-06 NOTE — PROGRESS NOTES
Pt arrived to station 4 ED. Pt very lethargic but arousable. Sepsis protocol alarmed, paged Dr. Real. To follow sepsis protocol. Lactic acid ordered and vitals running. Has 500cc bolus running upon arrival. IV rocephin was given in ED.     BP (!) 145/72   Pulse 88   Temp 98.5  F (36.9  C) (Oral)   Resp 20   SpO2 96%

## 2023-12-06 NOTE — PROGRESS NOTES
PRIMARY DIAGNOSIS: GENERALIZED WEAKNESS    OUTPATIENT/OBSERVATION GOALS TO BE MET BEFORE DISCHARGE  1. Orthostatic performed: No    2. Tolerating PO medications: Yes    3. Return to near baseline physical activity: No    4. Cleared for discharge by consultants (if involved): No    Discharge Planner Nurse   Safe discharge environment identified: Yes  Barriers to discharge: Yes       Entered by: Selene Delgadillo RN 12/06/2023 10:07 AM   Patient is refusing and medication from the hospital. Stating that she will use what she has at home. JAMMIE Carlson notified and to the bedside.   Please review provider order for any additional goals.   Nurse to notify provider when observation goals have been met and patient is ready for discharge.

## 2023-12-06 NOTE — PHARMACY-VANCOMYCIN DOSING SERVICE
Pharmacy Vancomycin Initial Note  Date of Service 2023  Patient's  1950  72 year old, female    Indication: Skin and Soft Tissue Infection    Current estimated CrCl = Estimated Creatinine Clearance: 38 mL/min (A) (based on SCr of 1.4 mg/dL (H)).    Creatinine for last 3 days  2023:  4:43 PM Creatinine 1.40 mg/dL  2023:  6:23 AM Creatinine 1.40 mg/dL    Recent Vancomycin Level(s) for last 3 days  No results found for requested labs within last 3 days.      Vancomycin IV Administrations (past 72 hours)        No vancomycin orders with administrations in past 72 hours.                    Nephrotoxins and other renal medications (From now, onward)      Start     Dose/Rate Route Frequency Ordered Stop    23 1500  vancomycin (VANCOCIN) 750 mg in sodium chloride 0.9 % 250 mL intermittent infusion        See Hyperspace for full Linked Orders Report.    750 mg  over 60 Minutes Intravenous EVERY 24 HOURS 23 1429      23 1500  vancomycin (VANCOCIN) 1,500 mg in sodium chloride 0.9 % 250 mL intermittent infusion        See Hyperspace for full Linked Orders Report.    1,500 mg  over 90 Minutes Intravenous ONCE 23 1429      23 1200  furosemide (LASIX) tablet 20 mg        Note to Pharmacy: PTA Sig:Take 20 mg by mouth every 24 hours At NOON      20 mg Oral EVERY 24 HOURS 23 0201      23 0800  furosemide (LASIX) tablet 40 mg        Note to Pharmacy: PTA Sig:Take 40 mg by mouth every morning Take 2 tablets in the morning and 1 tablet at noon.      40 mg Oral EVERY MORNING 23 0201              Contrast Orders - past 72 hours (72h ago, onward)      Start     Dose/Rate Route Frequency Stop    23 1200  perflutren lipid microsphere (DEFINITY) injection SUSP          Intravenous ONCE 23 1114            Ins[employer] Prediction of Planned Initial Vancomycin Regimen    Loading dose: 1500 mg at 15:00 2023.  Regimen: 750 mg IV every 24 hours.  Start  time: 15:00 on 12/07/2023  Exposure target: AUC24 (range)400-600 mg/L.hr   AUC24,ss: 486 mg/L.hr  Probability of AUC24 > 400: 67 %  Ctrough,ss: 15.3 mg/L  Probability of Ctrough,ss > 20: 32 %  Probability of nephrotoxicity (Lodise AMPARO 2009): 11 %          Plan:  Start vancomycin  1500 mg IV once then 750 mg q24 hrs.   Vancomycin monitoring method: AUC  Vancomycin therapeutic monitoring goal: 400-600 mg*h/L  Pharmacy will check vancomycin levels as appropriate in 1-3 Days.    Serum creatinine levels will be ordered daily for the first week of therapy and at least twice weekly for subsequent weeks.      Monica Bhatt, KENAH

## 2023-12-06 NOTE — PROGRESS NOTES
Occupational Therapy      12/06/23 0905   Appointment Info   Signing Clinician's Name / Credentials (OT) Marlene Garcia OTR/L   Quick Adds   Quick Adds Certification   Living Environment   People in Home spouse   Current Living Arrangements house   Home Accessibility stairs to enter home   Number of Stairs, Main Entrance 7   Stair Railings, Main Entrance railings safe and in good condition   Transportation Anticipated family or friend will provide   Living Environment Comments W/I shower w/ GB and shower chair- able to live on main level once in home   Self-Care   Usual Activity Tolerance good   Current Activity Tolerance moderate   Regular Exercise No   Equipment Currently Used at Home other (see comments)  (walking sticks)   Fall history within last six months yes   Number of times patient has fallen within last six months 1   Activity/Exercise/Self-Care Comment Ind. w/ ADL routine at baseline   Instrumental Activities of Daily Living (IADL)   IADL Comments Pt reports spouse drives, pt and spouse share household tasks typically   General Information   Onset of Illness/Injury or Date of Surgery 12/05/23   Referring Physician Orestes Coelho MD   Additional Occupational Profile Info/Pertinent History of Current Problem 72F presents after GLF fall; pmhx includes NELLIE (BiPAP), myositis, HTN/HLD, HFpEF (65%, 10/22), CKD3; admitted to observation for generalised weakness, possible syncope.   Existing Precautions/Restrictions fall   General Observations and Info Pt limited by increased LLE pain   Cognitive Status Examination   Orientation Status orientation to person, place and time   Range of Motion Comprehensive   Comment, General Range of Motion Pt reports decreased BUE shoulder ROM from overuse over time   Bed Mobility   Bed Mobility supine-sit   Supine-Sit [place] (Bed Mobility) moderate assist (50% patient effort)   Assistive Device (Bed Mobility) draw sheet   Transfers   Transfers sit-stand transfer    Sit-Stand Transfer   Sit-Stand Stark (Transfers) contact guard;minimum assist (75% patient effort);2 person assist   Assistive Device (Sit-Stand Transfers) walker, front-wheeled   Sit/Stand Transfer Comments Pt able to complete STS Min/CGAx2 w/ fww no LOB but very painful w/ trnf d/t LLE pain- pt able to take a few small steps no LOB w/ CGA/Min.Ax2   Balance   Balance Assessment standing dynamic balance   Standing Balance: Dynamic contact guard   Position/Device Used, Standing Balance walker, front-wheeled   Activities of Daily Living   BADL Assessment/Intervention lower body dressing   Lower Body Dressing Assessment/Training   Stark Level (Lower Body Dressing) minimum assist (75% patient effort)   Clinical Impression   Criteria for Skilled Therapeutic Interventions Met (OT) Yes, treatment indicated   OT Diagnosis decreased ADL ind.   OT Problem List-Impairments impacting ADL problems related to;activity tolerance impaired;balance;strength;mobility   Assessment of Occupational Performance 1-3 Performance Deficits   Identified Performance Deficits balance, trnfs, dressing, toileting   Planned Therapy Interventions (OT) ADL retraining;balance training;strengthening;transfer training;progressive activity/exercise;home program guidelines   Clinical Decision Making Complexity (OT) problem focused assessment/low complexity   Risk & Benefits of therapy have been explained evaluation/treatment results reviewed;patient   OT Total Evaluation Time   OT Eval, Low Complexity Minutes (92675) 12   Therapy Certification   Medical Diagnosis fall   Start of Care Date 12/06/23   Certification date from 12/06/23   Certification date to 12/13/23   OT Goals   Therapy Frequency (OT) Daily   OT Predicted Duration/Target Date for Goal Attainment 12/13/23   OT Goals Toilet Transfer/Toileting;Transfers;Lower Body Dressing;Hygiene/Grooming   OT: Hygiene/Grooming modified independent;while standing   OT: Lower Body Dressing  Modified independent   OT: Transfer Modified independent;with assistive device   OT: Toilet Transfer/Toileting Modified independent;using adaptive equipment   OT Discharge Planning   OT Plan trnfs, toileting, LB dressing   OT Discharge Recommendation (DC Rec) Transitional Care Facility   OT Rationale for DC Rec Currently pt very limited by increased LLE pain- impacting ability to complete Ind. functional mobility. OT rec. TCU as safest d/c option to enhance ADL ind/trnf safety. pt lives in home w/ multiple steps to enter w/ spouse.   OT Brief overview of current status CGA/Min.Ax1-2 fww no LOB very painful LLE   Total Session Time   Total Session Time (sum of timed and untimed services) 12    [facility]  OUTPATIENT OCCUPATIONAL THERAPY  EVALUATION  PLAN OF TREATMENT FOR OUTPATIENT REHABILITATION  (COMPLETE FOR INITIAL CLAIMS ONLY)  Patient's Last Name, First Name, M.I.  YOB: 1950  CandidoDesirae  M                          Provider's Name  [facility] Medical Record No.  [id number]                             Onset Date:  12/05/23   Start of Care Date:  12/06/23   Type:     ___PT   _X_OT   ___SLP Medical Diagnosis:  fall                    OT Diagnosis:  decreased ADL ind. Visits from SOC:  1     See note for plan of treatment, functional goals and certification details    I CERTIFY THE NEED FOR THESE SERVICES FURNISHED UNDER        THIS PLAN OF TREATMENT AND WHILE UNDER MY CARE     (Physician co-signature of this document indicates review and certification of the therapy plan).

## 2023-12-06 NOTE — PHARMACY-ADMISSION MEDICATION HISTORY
Pharmacist Admission Medication History    Admission medication history is complete. The information provided in this note is only as accurate as the sources available at the time of the update.    Information Source(s): Patient, Clinic records, and CareEverywhere/SureScripts via in-person    Pertinent Information: none    Changes made to PTA medication list:  Added: None  Deleted: azelastine, benzonatate, naloxone, nystatin, prednisone, rosuvastatin, terbinafine, and tizanidine  Changed: albuterol, duloxetine, hydroxychloroquine, furosemide         Allergies reviewed with patient and updates made in EHR: yes    Medication History Completed By: Robert Armendariz [facility] 12/5/2023 7:02 PM    PTA Med List   Medication Sig Last Dose    albuterol (PROAIR HFA;PROVENTIL HFA;VENTOLIN HFA) 90 mcg/actuation inhaler Inhale 1 puff into the lungs every 4 hours as needed for shortness of breath / dyspnea Unknown    alendronate (FOSAMAX) 70 MG tablet [ALENDRONATE (FOSAMAX) 70 MG TABLET] Take 70 mg by mouth every 7 days. Takes on Mondays 12/4/2023    aspirin 81 MG EC tablet 1 tablet Orally Once a day 12/4/2023    calcium citrate (CITRACAL) 950 (200 Ca) MG tablet Take 1 tablet by mouth daily 12/4/2023    cetirizine (ZYRTEC) 10 MG tablet [CETIRIZINE (ZYRTEC) 10 MG TABLET] Take 10 mg by mouth daily.  12/4/2023    cholecalciferol (VITAMIN D3) 25 mcg (1000 units) capsule Take 125 mcg by mouth every morning Take 5000 units in the morning and 2000 units in the evening 12/4/2023    docusate sodium (COLACE) 100 MG capsule Take 100-300 mg by mouth daily as needed Unknown    DULoxetine (CYMBALTA) 30 MG capsule Take 60 mg by mouth every morning 12/4/2023    DULOXETINE HCL PO Take 30 mg by mouth at bedtime 12/4/2023    esomeprazole (NEXIUM) 40 MG capsule Take 40 mg by mouth daily as needed Unknown    fluticasone-salmeterol (AIRDUO RESPICLICK) 113-14 MCG/ACT inhaler Inhale 1 puff into the lungs 2 times daily 12/4/2023    furosemide (LASIX) 20 MG tablet  Take 20 mg by mouth every 24 hours At NOON 12/4/2023 at 1200    furosemide (LASIX) 20 MG tablet Take 40 mg by mouth every morning Take 2 tablets in the morning and 1 tablet at noon. 12/4/2023    HYDROcodone-acetaminophen 5-325 mg per tablet Take 1 tablet by mouth every 4 hours as needed for pain Max 6 tablets per day. Unknown    Hydroxychloroquine Sulfate 100 MG TABS Take 100 mg by mouth 2 times daily 12/4/2023    l-lysine HCl 500 MG TABS tablet Take 500 mg by mouth 2 times daily 12/4/2023    magnesium oxide 250 mg Tab Take 500 mg by mouth daily as needed (constipation) Unknown    metoprolol succinate ER (TOPROL XL) 50 MG 24 hr tablet Take 50 mg by mouth At Bedtime 12/4/2023    modafinil (PROVIGIL) 100 MG tablet [MODAFINIL (PROVIGIL) 100 MG TABLET] Take 250 mg by mouth daily. 12/4/2023    montelukast (SINGULAIR) 10 mg tablet [MONTELUKAST (SINGULAIR) 10 MG TABLET] Take 10 mg by mouth daily. 12/4/2023    senna-docusate (SENOKOT-S/PERICOLACE) 8.6-50 MG tablet Take 1 tablet by mouth 2 times daily as needed for constipation Unknown    solifenacin (VESICARE) 10 MG tablet Take 5-10 mg by mouth At Bedtime 12/4/2023    Turmeric (CURCUPLEX-95) 500 MG CAPS Take 1 capsule by mouth daily 12/4/2023    valACYclovir (VALTREX) 500 MG tablet Take 500 mg by mouth 2 times daily as needed (flare) Unknown    Vitamin D3 (CHOLECALCIFEROL) 25 mcg (1000 units) tablet Take 50 mcg by mouth at bedtime 12/4/2023

## 2023-12-07 ENCOUNTER — APPOINTMENT (OUTPATIENT)
Dept: OCCUPATIONAL THERAPY | Facility: HOSPITAL | Age: 73
DRG: 603 | End: 2023-12-07
Payer: MEDICARE

## 2023-12-07 ENCOUNTER — APPOINTMENT (OUTPATIENT)
Dept: PHYSICAL THERAPY | Facility: HOSPITAL | Age: 73
DRG: 603 | End: 2023-12-07
Payer: MEDICARE

## 2023-12-07 LAB
ANION GAP SERPL CALCULATED.3IONS-SCNC: 11 MMOL/L (ref 7–15)
BACTERIA UR CULT: ABNORMAL
BUN SERPL-MCNC: 61.7 MG/DL (ref 8–23)
CALCIUM SERPL-MCNC: 8.8 MG/DL (ref 8.8–10.2)
CHLORIDE SERPL-SCNC: 101 MMOL/L (ref 98–107)
CK SERPL-CCNC: 604 U/L (ref 26–192)
CREAT SERPL-MCNC: 2 MG/DL (ref 0.51–0.95)
CRP SERPL-MCNC: 174.3 MG/L
DEPRECATED HCO3 PLAS-SCNC: 24 MMOL/L (ref 22–29)
EGFRCR SERPLBLD CKD-EPI 2021: 26 ML/MIN/1.73M2
ERYTHROCYTE [DISTWIDTH] IN BLOOD BY AUTOMATED COUNT: 12 % (ref 10–15)
GLUCOSE SERPL-MCNC: 150 MG/DL (ref 70–99)
HCT VFR BLD AUTO: 35.5 % (ref 35–47)
HGB BLD-MCNC: 11.4 G/DL (ref 11.7–15.7)
LACTATE SERPL-SCNC: 1.1 MMOL/L (ref 0.7–2)
MCH RBC QN AUTO: 32.3 PG (ref 26.5–33)
MCHC RBC AUTO-ENTMCNC: 32.1 G/DL (ref 31.5–36.5)
MCV RBC AUTO: 101 FL (ref 78–100)
PLATELET # BLD AUTO: 172 10E3/UL (ref 150–450)
POTASSIUM SERPL-SCNC: 4.4 MMOL/L (ref 3.4–5.3)
RBC # BLD AUTO: 3.53 10E6/UL (ref 3.8–5.2)
SODIUM SERPL-SCNC: 136 MMOL/L (ref 135–145)
WBC # BLD AUTO: 10.6 10E3/UL (ref 4–11)

## 2023-12-07 PROCEDURE — 97535 SELF CARE MNGMENT TRAINING: CPT | Mod: GO

## 2023-12-07 PROCEDURE — 258N000003 HC RX IP 258 OP 636

## 2023-12-07 PROCEDURE — 86140 C-REACTIVE PROTEIN: CPT | Performed by: FAMILY MEDICINE

## 2023-12-07 PROCEDURE — 250N000011 HC RX IP 250 OP 636: Mod: JZ | Performed by: FAMILY MEDICINE

## 2023-12-07 PROCEDURE — 82550 ASSAY OF CK (CPK): CPT | Performed by: FAMILY MEDICINE

## 2023-12-07 PROCEDURE — 250N000013 HC RX MED GY IP 250 OP 250 PS 637: Performed by: STUDENT IN AN ORGANIZED HEALTH CARE EDUCATION/TRAINING PROGRAM

## 2023-12-07 PROCEDURE — 83605 ASSAY OF LACTIC ACID: CPT

## 2023-12-07 PROCEDURE — 250N000011 HC RX IP 250 OP 636

## 2023-12-07 PROCEDURE — 80048 BASIC METABOLIC PNL TOTAL CA: CPT

## 2023-12-07 PROCEDURE — 97116 GAIT TRAINING THERAPY: CPT | Mod: GP

## 2023-12-07 PROCEDURE — 99231 SBSQ HOSP IP/OBS SF/LOW 25: CPT | Performed by: SURGERY

## 2023-12-07 PROCEDURE — 97530 THERAPEUTIC ACTIVITIES: CPT | Mod: GP

## 2023-12-07 PROCEDURE — 94660 CPAP INITIATION&MGMT: CPT

## 2023-12-07 PROCEDURE — 99232 SBSQ HOSP IP/OBS MODERATE 35: CPT | Mod: FS | Performed by: FAMILY MEDICINE

## 2023-12-07 PROCEDURE — 85027 COMPLETE CBC AUTOMATED: CPT

## 2023-12-07 PROCEDURE — 120N000001 HC R&B MED SURG/OB

## 2023-12-07 PROCEDURE — 99207 PR APP CREDIT; MD BILLING SHARED VISIT: CPT

## 2023-12-07 PROCEDURE — 999N000157 HC STATISTIC RCP TIME EA 10 MIN

## 2023-12-07 RX ORDER — CLINDAMYCIN PHOSPHATE 900 MG/50ML
900 INJECTION, SOLUTION INTRAVENOUS EVERY 8 HOURS
Status: DISCONTINUED | OUTPATIENT
Start: 2023-12-07 | End: 2023-12-08

## 2023-12-07 RX ORDER — SODIUM CHLORIDE 9 MG/ML
INJECTION, SOLUTION INTRAVENOUS CONTINUOUS
Status: DISCONTINUED | OUTPATIENT
Start: 2023-12-07 | End: 2023-12-09

## 2023-12-07 RX ORDER — VANCOMYCIN HYDROCHLORIDE 500 MG/10ML
500 INJECTION, POWDER, LYOPHILIZED, FOR SOLUTION INTRAVENOUS EVERY 24 HOURS
Status: DISCONTINUED | OUTPATIENT
Start: 2023-12-07 | End: 2023-12-07

## 2023-12-07 RX ADMIN — TOLTERODINE 2 MG: 2 CAPSULE, EXTENDED RELEASE ORAL at 09:52

## 2023-12-07 RX ADMIN — HYDROXYCHLOROQUINE SULFATE 100 MG: 200 TABLET, FILM COATED ORAL at 19:15

## 2023-12-07 RX ADMIN — CLINDAMYCIN PHOSPHATE 900 MG: 900 INJECTION, SOLUTION INTRAVENOUS at 17:25

## 2023-12-07 RX ADMIN — Medication 81 MG: at 09:51

## 2023-12-07 RX ADMIN — HYDROCODONE BITARTRATE AND ACETAMINOPHEN 1 TABLET: 5; 325 TABLET ORAL at 01:46

## 2023-12-07 RX ADMIN — Medication 125 MCG: at 09:50

## 2023-12-07 RX ADMIN — HYDROXYCHLOROQUINE SULFATE 100 MG: 200 TABLET, FILM COATED ORAL at 09:51

## 2023-12-07 RX ADMIN — SODIUM CHLORIDE: 9 INJECTION, SOLUTION INTRAVENOUS at 09:47

## 2023-12-07 RX ADMIN — METOPROLOL SUCCINATE 50 MG: 50 TABLET, EXTENDED RELEASE ORAL at 21:25

## 2023-12-07 RX ADMIN — DULOXETINE HYDROCHLORIDE 60 MG: 60 CAPSULE, DELAYED RELEASE PELLETS ORAL at 09:51

## 2023-12-07 RX ADMIN — CEFTRIAXONE SODIUM 1 G: 1 INJECTION, POWDER, FOR SOLUTION INTRAMUSCULAR; INTRAVENOUS at 03:29

## 2023-12-07 RX ADMIN — HYDROCODONE BITARTRATE AND ACETAMINOPHEN 1 TABLET: 5; 325 TABLET ORAL at 09:50

## 2023-12-07 RX ADMIN — MONTELUKAST 10 MG: 10 TABLET, FILM COATED ORAL at 09:51

## 2023-12-07 RX ADMIN — ENOXAPARIN SODIUM 40 MG: 40 INJECTION SUBCUTANEOUS at 21:23

## 2023-12-07 RX ADMIN — CETIRIZINE HYDROCHLORIDE 10 MG: 10 TABLET, FILM COATED ORAL at 09:52

## 2023-12-07 RX ADMIN — ENOXAPARIN SODIUM 40 MG: 40 INJECTION SUBCUTANEOUS at 09:53

## 2023-12-07 RX ADMIN — CLINDAMYCIN PHOSPHATE 900 MG: 900 INJECTION, SOLUTION INTRAVENOUS at 21:22

## 2023-12-07 RX ADMIN — CEFTRIAXONE SODIUM 1 G: 1 INJECTION, POWDER, FOR SOLUTION INTRAMUSCULAR; INTRAVENOUS at 19:15

## 2023-12-07 RX ADMIN — HYDROCODONE BITARTRATE AND ACETAMINOPHEN 1 TABLET: 5; 325 TABLET ORAL at 21:25

## 2023-12-07 RX ADMIN — DULOXETINE HYDROCHLORIDE 30 MG: 30 CAPSULE, DELAYED RELEASE ORAL at 21:25

## 2023-12-07 RX ADMIN — Medication 950 MG: at 09:52

## 2023-12-07 ASSESSMENT — ACTIVITIES OF DAILY LIVING (ADL)
ADLS_ACUITY_SCORE: 34
ADLS_ACUITY_SCORE: 39
ADLS_ACUITY_SCORE: 30
ADLS_ACUITY_SCORE: 30
ADLS_ACUITY_SCORE: 34
ADLS_ACUITY_SCORE: 38
ADLS_ACUITY_SCORE: 38
ADLS_ACUITY_SCORE: 30
ADLS_ACUITY_SCORE: 34
ADLS_ACUITY_SCORE: 38

## 2023-12-07 NOTE — PROGRESS NOTES
[facility]    Medicine Progress Note - Hospitalist Service    Date of Admission:  12/5/2023    Assessment & Plan   Desirae Rey is a 72F presents after GLF fall; pmhx includes NELLIE (BiPAP), myositis, HTN/HLD, HFpEF, CKD3; admitted to observation for generalized weakness, possible syncope and left lower leg pain.     L lower leg pain  Cellulitis  - XR hip/pelvis: Both hips and pelvis negative for fracture or dislocation  - XR L tib/fib and ankle: No fractures, soft tissue swelling about the ankle   - US LE negative for DVT  - Area of erythema marked, some improvement, will need to continue to monitor   - Patient was empirically started on ceftraixone 2g IV. Switched to Vanco IV (12/6). Patient reported x1 emesis s/p Vanco. Given this and renal function, will switch to Clinda IV (12/7)  - Gen surg consulted 12/6 given advancing erythema; no suspicion for necrotizing soft tissue per surgery  - Lymphedema wrapping as able with pain improvement   - BC pending, collected 12/6 (no aerobic growth after 12 hours)    GRACY  CKD3b  - Cr 1.4 (12/6) --> 2.0 (12/7)  -  ml/hr, hold Lasix   - BMP trend  - Discontinue Vanco    Elevated CK/CRP  Leukocytosis (mild)  - CK total 900 (12/5)  --> 884 --> 604 (12/7)  - .8 (12/5) --> 174.3 (12/7), continue to trend   - WBC 13.9 on admission --> 9.8, trend CBC    Generalized Weakness  Ground Level Fall   Woke up at 7 AM in bed, returned to sleep and later woke up on the floor next to her bed at about 9:30 AM   - CT Cspine/Head negative  - Echo with EF 60-65%, no changes compared to the prior study dated 10/31/2022  - Per ED Note with chart review: Over the past 12 months prior to this visit, she had at least 7 episodes of falls    - PT rec TCU placement   - OT rec TCU placement   - Appreciate CM. Patient unwilling to go to TCU at this time. Continue to discuss home care referrals/resources and follow for disposition planning as well as patient financial  "concerns about medications   - Orthostatics to be done    UTI   - UA in  leuk esterase, 18 WBC  - UC showing >100,000 units E. Coli   - Patient given IV ceftriaxone 2g for cellulitis in ED, will continue with 1 g IV ceftriaxone q24 hrs while IP   - Continue I&Os     Fibromyalgia   Polyarthralgia  Osteoarthritis   Chronic pain  MARIA GUADALUPE positive  Continue home regimen below   - Duloxetine 60mg PO qAM, 30mg PO qPM  - Norco 5-325mg PO q4h/PRN   - Tylenol PO PRN  - Hydroxychloroquine 100 mg BID     CHF   Pulmonary Hypertension  Home oxygen use   Uses 3L NC O2 PRN at baseline  - On 1-3 L here, continue as needed. Wean/titrate O2 to keep sats >92   - No clinical signs of decompensation   - I&Os, daily weights     Essential Hypertension   - continue home metoprolol       Hyponatremia (corrected)  134 --> 140  - BMP trend     NELLIE  - Continued home BiPAP          Diet: Regular Diet Adult    DVT Prophylaxis: Enoxaparin (Lovenox) SQ  Rebollar Catheter: Not present  Lines: None     Cardiac Monitoring: None  Code Status: Full Code      Clinically Significant Risk Factors Present on Admission                # Drug Induced Platelet Defect: home medication list includes an antiplatelet medication  # Acute Kidney Injury, unspecified: based on a >150% or 0.3 mg/dL increase in last creatinine compared to past 90 day average, will monitor renal function  # Hypertension: Noted on problem list      # Severe Obesity: Estimated body mass index is 47.5 kg/m  as calculated from the following:    Height as of this encounter: 1.397 m (4' 7\").    Weight as of this encounter: 92.7 kg (204 lb 5.9 oz).       # Asthma: noted on problem list        Disposition Plan      Expected Discharge Date: 12/08/2023    Discharge Delays: IV Medication - consider oral or Home Infusion  Oxygen Needs - Arrange Home O2  Lab Result Pending (enter specific test & time in comments)  Destination: home with family            The patient's care was discussed with the " "Attending Physician, Dr. Gina Tim who independently met with and assessed the patient and is agreement with the assessment and plan     Yun Carlson PA-C  Hospitalist Service  [facility]  Securely message with RELEASEIF (more info)  Text page via yavalu Paging/Directory   ______________________________________________________________________    Interval History   Patient states some improvement in redness overnight. States she did have an episode of vomiting after initiation of Vanco but states this quickly resolved without nausea, abdominal pain. Denies shortness of breath, hives, itching, difficulty breathing. States some of the pain to left leg has improved, but still has \"burning.\" States she had no problems with urination this AM, no dysuria or retention. Denies fevers, chills, sweats. Discussed importance of continuing/taking medications as prescribed/ordered.     Physical Exam   Vital Signs: Temp: 97.9  F (36.6  C) Temp src: Oral BP: 112/64 Pulse: 69   Resp: 18 SpO2: 99 % O2 Device: Nasal cannula Oxygen Delivery: 3 LPM  Weight: 204 lbs 5.86 oz    Constitutional: awake, alert, no apparent distress, and appears stated age  Respiratory: No increased work of breathing, no accessory muscle use, clear to auscultation bilaterally. NC in place  Cardiovascular: Regular rate and rhythm, normal S1 and S2  Skin: There is significant erythema with sharp demarcation circumferentially along the left lower extremity, from the mid-tibia extending to the dorsal aspect and laterally of the foot. 5 inches in greatest width. Erythema is just slightly inferior to area marked. Tender, mild- moderate swelling of the area.   Musculoskeletal: 2+ DP pulses  Neurologic: Awake, alert.   Neuropsychiatric: Appropriate mood, affect and eye contact. Cooperative.    Medical Decision Making             Data     I have personally reviewed the following data over the past 24 hrs:    10.6  \   11.4 (L)   / 172     136 101 " 61.7 (H) /  150 (H)   4.4 24 2.00 (H) \     Procal: N/A CRP: 174.30 (H) Lactic Acid: 1.1         Imaging results reviewed over the past 24 hrs:   Recent Results (from the past 24 hour(s))   Echocardiogram Complete   Result Value    LVEF  60-65%    [facility]    [id number]  [id number]  [id number]  [id number]^SHERRY^ZAK^[place] [address]     Name: BELL THOMAS  MRN: [id number]  : 1950  Study Date: 2023 10:57 AM  Age: 72 yrs  Gender: Female  Patient Location: [facility]  Reason For Study: Syncope  Ordering Physician: ZAK POWELL  Performed By: MARISSA     BSA: 1.9 m2  Height: 60 in  Weight: 209 lb  HR: 71  ______________________________________________________________________________  Procedure  Complete Portable Echo Adult. Definity (NDC #26085-301) given intravenously.  Compared to the prior study dated 10/31/2022, there have been no changes.  ______________________________________________________________________________  Interpretation Summary     1.Left ventricular size, wall motion and function are normal. The ejection  fraction is 60-65%.  2.There is mild concentric left ventricular hypertrophy.  3.Normal right ventricle size and systolic function.  4.There is discrete nodular thickening of the right coronary cusp. Mild  valvular aortic stenosis. At SVI of 38 mL/M2 peak velocity is 1.9 m/s with a  mean gradient of 8 mmHg and dimensionless index is 0.6.  5.Right ventricular systolic pressure is elevated, consistent with mild to  moderate pulmonary hypertension.  6.Ascending Aorta dilatation is present, borderline  Compared to the prior study dated 10/31/2022, there have been no changes.  ______________________________________________________________________________  I      WMSI = 1.00     % Normal = 100     X - Cannot   0 -                      (2) - Mildly 2 -          Segments  Size  Interpret    Hyperkinetic 1 - Normal  Hypokinetic  Hypokinetic  1-2      small                                                     7 -          3-5      moderate  3 - Akinetic 4 -          5 -         6 - Akinetic Dyskinetic   6-14    large               Dyskinetic   Aneurysmal  w/scar       w/scar       15-16   diffuse     Left Ventricle  Left ventricular size, wall motion and function are normal. The ejection  fraction is 60-65%. There is mild concentric left ventricular hypertrophy.  Left ventricular diastolic function is normal. No regional wall motion  abnormalities noted.     Right Ventricle  Normal right ventricle size and systolic function. TAPSE is normal, which is  consistent with normal right ventricular systolic function.     Atria  The left atrium is moderately dilated. Right atrial size is normal. There is  no color Doppler evidence of an atrial shunt.     Mitral Valve  There is moderate mitral annular calcification. There is trace mitral  regurgitation. There is no mitral valve stenosis.     Tricuspid Valve  The tricuspid valve is not well visualized, but is grossly normal. There is  mild (1+) tricuspid regurgitation. Right ventricular systolic pressure is  elevated, consistent with mild to moderate pulmonary hypertension.     Aortic Valve  The aortic valve is trileaflet. There is discrete nodular thickening of the  right coronary cusp. No aortic regurgitation is present. Mild valvular aortic  stenosis. At SVI of 38 mL/M2 peak velocity is 1.9 m/s with a mean gradient of  8 mmHg and dimensionless index is 0.6.     Pulmonic Valve  The pulmonic valve is not well seen, but is grossly normal. This degree of  valvular regurgitation is within normal limits. There is no pulmonic valvular  stenosis.     Vessels  The aorta root is normal. Ascending Aorta dilatation is present. borderline.  IVC diameter <2.1 cm collapsing >50% with sniff suggests a normal RA pressure  of 3 mmHg.     Pericardium  There is no pericardial effusion.     Rhythm  Sinus rhythm was noted.      ______________________________________________________________________________  MMode/2D Measurements & Calculations  IVSd: 1.7 cm  LVIDd: 3.7 cm  LVIDs: 2.7 cm  LVPWd: 1.6 cm     FS: 27.3 %  LV mass(C)d: 251.4 grams  LV mass(C)dI: 132.2 grams/m2  Ao root diam: 3.1 cm  LA dimension: 3.8 cm  asc Aorta Diam: 3.9 cm  LA/Ao: 1.2  LVOT diam: 2.0 cm  LVOT area: 3.1 cm2  Ao root diam index Ht(cm/m): 2.0  Ao root diam index BSA (cm/m2): 1.6  Asc Ao diam index BSA (cm/m2): 2.1  Asc Ao diam index Ht(cm/m): 2.6  LA Volume (BP): 95.5 ml     LA Volume Index (BP): 50.3 ml/m2  LA Volume Indexed (AL/bp): 53.1 ml/m2  RV Base: 3.9 cm  RWT: 0.87  TAPSE: 3.1 cm     Doppler Measurements & Calculations  MV E max ruddy: 90.1 cm/sec  MV A max ruddy: 102.0 cm/sec  MV E/A: 0.88  MV max P.4 mmHg  MV mean P.0 mmHg  MV V2 VTI: 35.8 cm  MVA(VTI): 2.0 cm2     MV dec slope: 274.0 cm/sec2  MV dec time: 0.33 sec  Ao V2 max: 182.0 cm/sec  Ao max P.0 mmHg  Ao V2 mean: 127.0 cm/sec  Ao mean P.0 mmHg  Ao V2 VTI: 36.2 cm  RITA(I,D): 2.0 cm2  RITA(V,D): 1.9 cm2  LV V1 max P.8 mmHg  LV V1 max: 109.0 cm/sec  LV V1 VTI: 22.9 cm  SV(LVOT): 71.9 ml  SI(LVOT): 37.8 ml/m2  PA acc time: 0.09 sec  PI end-d ruddy: 109.0 cm/sec  TR max ruddy: 318.0 cm/sec  TR max P.4 mmHg  AV Ruddy Ratio (DI): 0.60  RITA Index (cm2/m2): 1.0  E/E' av.0  Lateral E/e': 14.8  Medial E/e': 17.3  RV S Ruddy: 13.9 cm/sec     ______________________________________________________________________________  Report approved by: Arelis Sofia 2023 01:35 PM

## 2023-12-07 NOTE — CONSULTS
[facility] Surgery Consultation    Desirae Rey MRN# [id number]   Age: 72 year old YOB: 1950     Date of Admission:  12/5/2023    Reason for consult: cellulitis       Requesting physician: Yun AGUDELO       Level of consult: Consult, follow and place orders           Assessment and Plan:   Assessment:   Cellulitis  Venous or lymphatic stasis disease        Plan:   IV antibiotics as you are doing  Consider wrapping this but at this time point is too painful for her to do once the erythema I think improves the next day or 2 time.  She will do that.  Consider doing a CT scan scan show a lot of edema and swelling.  On palpation today there is no crepitus and she has decent pulses  Venous study does not show any clots or DVTs             Chief Complaint:   -Leg pain and swelling left     History is obtained from the patient         History of Present Illness:   This patient is a 72 year old  female with a significant past medical history of fall who presents with swelling and erythema of her left lower leg.  She was down she had a fall she had a work-up for fractures and did not find any fractures or other injuries negative for DVT she has some erythema on her lower leg in the dependent area consistent with cellulitis and an area of venous stasis injury or lymphatic injury in the past and she has signs of that similar issues in the past other side in the past with discoloration and hemosiderin deposits.             Past Medical History:   I have reviewed this patient's past medical history  Past Medical History:   Diagnosis Date    Allergic rhinitis     Arthritis of back     CHF (congestive heart failure) (H)     Chronic kidney disease     stage 3     De Quervain's disease (tenosynovitis)     Fibromyalgia, primary     GERD (gastroesophageal reflux disease)     Hematuria     chronic    High cholesterol     History of blood clots 09/01/1970    DVT, from birth control, h/o left calf     Hyperlipidemia     Hypertension     Low back pain     Osteoporosis     Pickwickian syndrome (H)     Plantar fasciitis     Proteinuria     Proteinuria     chronic    Sleep apnea     CPAP    Uncomplicated asthma              Past Surgical History:     Past Surgical History:   Procedure Laterality Date    CERVICAL FUSION N/A 4/27/2017    Procedure: ANTERIOR CERVICAL DECOMPRESSION FUSION C5-7 BILATERAL;  Surgeon: Basim Barone MD;  Location: [facility];  Service:     CHOLECYSTECTOMY      open    COLONOSCOPY N/A 12/20/2019    Procedure: COLONOSCOPY WITH BIOPSIES;  Surgeon: Lucio Farr MD;  Location: [facility];  Service: Gastroenterology    EYE SURGERY      HAND SURGERY Right     HYSTEROSCOPY DIAGNOSTIC      JOINT REPLACEMENT      bilateral TKA    KNEE SURGERY      RELEASE CARPAL TUNNEL Bilateral              Social History:     Social History     Tobacco Use    Smoking status: Never    Smokeless tobacco: Never   Substance Use Topics    Alcohol use: No             Family History:     Family History   Problem Relation Age of Onset    Rheumatoid Arthritis Mother     Heart Disease Sister     Chronic Obstructive Pulmonary Disease Father      Family history reviewed and updated in [facility]          Immunizations:   Immunization status is unknown          Allergies:   All allergies reviewed and addressed  Allergies   Allergen Reactions    Latex Rash     Severe rash    Pregabalin Shortness Of Breath     Other Reaction(s): swelling and shortness of breath    Valsartan Unknown     Hyperkalemia    Colchicine Diarrhea    Dicloxacillin      Other Reaction(s): confusion    Gabapentin      Other Reaction(s): Sedation    Latex Unknown     Added based on information entered during case entry, please review and add reactions, type, and severity as needed    Other Drug Allergy (See Comments) Muscle Pain (Myalgia)     Tried lovastatin and simvastatin    Other Environmental Allergy Unknown     Coverlet bandaid ,  3M product; rash    Statins-Hmg-Coa Reductase Inhibitors [Statins] Muscle Pain (Myalgia)     Tried lovastatin and simvastatin    Sulfa Antibiotics Swelling     Facial swelling      Adhesive Tape Rash             Medications:     Current Facility-Administered Medications   Medication    acetaminophen (TYLENOL) tablet 650 mg    Or    acetaminophen (TYLENOL) Suppository 650 mg    aspirin EC tablet 81 mg    calcium citrate (CITRACAL) tablet 950 mg    cefTRIAXone (ROCEPHIN) 1 g vial to attach to  mL bag for ADULTS or NS 50 mL bag for PEDS    cetirizine (zyrTEC) tablet 10 mg    DULoxetine (CYMBALTA) DR capsule 30 mg    DULoxetine (CYMBALTA) DR capsule 60 mg    enoxaparin ANTICOAGULANT (LOVENOX) injection 40 mg    fluticasone-vilanterol (BREO ELLIPTA) 100-25 MCG/ACT inhaler 1 puff    furosemide (LASIX) tablet 20 mg    furosemide (LASIX) tablet 40 mg    HYDROcodone-acetaminophen (NORCO) 5-325 MG per tablet 1 tablet    hydroxychloroquine (PLAQUENIL) half-tab 100 mg    metoprolol succinate ER (TOPROL XL) 24 hr tablet 50 mg    montelukast (SINGULAIR) tablet 10 mg    naloxone (NARCAN) injection 0.2 mg    Or    naloxone (NARCAN) injection 0.4 mg    Or    naloxone (NARCAN) injection 0.2 mg    Or    naloxone (NARCAN) injection 0.4 mg    ondansetron (ZOFRAN ODT) ODT tab 4 mg    Or    ondansetron (ZOFRAN) injection 4 mg    polyethylene glycol (MIRALAX) Packet 17 g    prochlorperazine (COMPAZINE) injection 5 mg    Or    prochlorperazine (COMPAZINE) tablet 5 mg    Or    prochlorperazine (COMPAZINE) suppository 12.5 mg    tolterodine ER (DETROL LA) 24 hr capsule 2 mg    [START ON 12/7/2023] vancomycin (VANCOCIN) 750 mg in sodium chloride 0.9 % 250 mL intermittent infusion    Vitamin D3 (CHOLECALCIFEROL) tablet 125 mcg             Review of Systems:   The Review of Systems is negative other than noted in the HPI          Physical Exam:   Vitals were reviewed  Patient Vitals for the past 8 hrs:   BP Temp Temp src Pulse Resp SpO2  "Height Weight   12/06/23 2204 126/61 -- -- 87 -- -- -- --   12/06/23 2200 99/63 -- -- -- -- -- -- --   12/06/23 2036 134/70 97.9  F (36.6  C) Oral 80 20 100 % -- --   12/06/23 1900 -- 97.9  F (36.6  C) Oral -- -- 100 % -- --   12/06/23 1700 -- -- -- -- -- -- 1.397 m (4' 7\") 97.8 kg (215 lb 9.8 oz)   12/06/23 1549 110/57 98.1  F (36.7  C) Oral -- -- -- -- --     Wt Readings from Last 4 Encounters:   12/06/23 97.8 kg (215 lb 9.8 oz)   09/20/23 95 kg (209 lb 6.4 oz)   08/21/23 95.3 kg (210 lb 3.2 oz)   07/05/23 95.8 kg (211 lb 1.6 oz)     I/O last 3 completed shifts:  In: 238 [P.O.:238]  Out: 800 [Urine:800]  Constitutional:   awake, alert, cooperative, no apparent distress, and appears stated age     Eyes:   Lids and lashes normal, pupils equal, round and reactive to light, extra ocular muscles intact, sclera clear, conjunctiva normal     ENT:   Normocephalic, without obvious abnormality, atraumatic, sinuses nontender on palpation, external ears without lesions, oral pharynx with moist mucous membranes, tonsils without erythema or exudates, gums normal and good dentition.     Neck:   Supple, symmetrical, trachea midline, no adenopathy, thyroid symmetric, not enlarged and no tenderness, skin normal     Back:   Symmetric, no curvature, spinous processes are non-tender on palpation, paraspinous muscles are non-tender on palpation, no costal vertebral tenderness     Lungs:   No increased work of breathing, good air exchange, clear to auscultation bilaterally, no crackles or wheezing     Cardiovascular:   Normal apical impulse, regular rate and rhythm, normal S1 and S2, no S3 or S4, and no murmur noted     Abdomen:   Soft nondistended     Musculoskeletal:   She has tenderness of her calf left side erythema its been marked out she has a little progression of this erythema looks to be in the area of dependency from prior venous stasis or lymphatic stasis disease.  Palpation of the leg is painful and tender to her but there " is no crepitus she has distal pulses intact     Neurologic:   Awake, alert, oriented to name, place and time.  Cranial nerves II-XII are grossly intact.  Motor is 5 out of 5 bilaterally.  Cerebellar finger to nose, heel to shin intact.  Sensory is intact.  Babinski down going, Romberg negative, and gait is normal.     Skin:   Cellulitis of the left lower leg             Data:   All laboratory data reviewed  Results for orders placed or performed during the hospital encounter of 12/05/23 (from the past 24 hour(s))   US Lower Extremity Venous Duplex Left    Narrative    EXAM: US LOWER EXTREMITY VENOUS DUPLEX LEFT  LOCATION: [facility]  DATE: 12/6/2023    INDICATION: lt leg pain, r o dvt  COMPARISON: None.  TECHNIQUE: Venous Duplex ultrasound of the left lower extremity with and without compression, augmentation and duplex. Color flow and spectral Doppler with waveform analysis performed.    FINDINGS: Exam includes the common femoral, femoral, popliteal, and contralateral common femoral veins as well as segmentally visualized deep calf veins and greater saphenous vein.     LEFT: No deep vein thrombosis. No superficial thrombophlebitis. No popliteal cyst.      Impression    IMPRESSION:  1.  No deep venous thrombosis in the left lower extremity.   Basic metabolic panel   Result Value Ref Range    Sodium 140 135 - 145 mmol/L    Potassium 4.1 3.4 - 5.3 mmol/L    Chloride 103 98 - 107 mmol/L    Carbon Dioxide (CO2) 23 22 - 29 mmol/L    Anion Gap 14 7 - 15 mmol/L    Urea Nitrogen 45.7 (H) 8.0 - 23.0 mg/dL    Creatinine 1.40 (H) 0.51 - 0.95 mg/dL    GFR Estimate 40 (L) >60 mL/min/1.73m2    Calcium 9.3 8.8 - 10.2 mg/dL    Glucose 121 (H) 70 - 99 mg/dL   CBC with platelets   Result Value Ref Range    WBC Count 9.8 4.0 - 11.0 10e3/uL    RBC Count 3.85 3.80 - 5.20 10e6/uL    Hemoglobin 12.4 11.7 - 15.7 g/dL    Hematocrit 39.0 35.0 - 47.0 %     (H) 78 - 100 fL    MCH 32.2 26.5 - 33.0 pg    MCHC 31.8  31.5 - 36.5 g/dL    RDW 12.0 10.0 - 15.0 %    Platelet Count 145 (L) 150 - 450 10e3/uL   CK total   Result Value Ref Range     (H) 26 - 192 U/L   Echocardiogram Complete   Result Value Ref Range    LVEF  60-65%     [facility]    [id number]  [id number]  [id number]  [id number]^SHERRY^ZAK^[place] [address]     Name: BELL THOMAS  MRN: [id number]  : 1950  Study Date: 2023 10:57 AM  Age: 72 yrs  Gender: Female  Patient Location: [facility]  Reason For Study: Syncope  Ordering Physician: ZAK POWELL  Performed By:      BSA: 1.9 m2  Height: 60 in  Weight: 209 lb  HR: 71  ______________________________________________________________________________  Procedure  Complete Portable Echo Adult. Definity (NDC #59013-087) given intravenously.  Compared to the prior study dated 10/31/2022, there have been no changes.  ______________________________________________________________________________  Interpretation Summary     1.Left ventricular size, wall motion and function are normal. The ejection  fraction is 60-65%.  2.There is mild concentric left ventricular hypertrophy.  3.Normal right ventricle size and systolic function.  4.There is discrete nodular thickening of the right coronary cusp. Mild  valvular aortic stenosis. At SVI of 38 mL/M2 peak velocity is 1.9 m/s with a  mean gradient of 8 mmHg and dimensionless index is 0.6.  5.Right ventricular systolic pressure is elevated, consistent with mild to  moderate pulmonary hypertension.  6.Ascending Aorta dilatation is present, borderline  Compared to the prior study dated 10/31/2022, there have been no changes.  ______________________________________________________________________________  I      WMSI = 1.00     % Normal = 100     X - Cannot   0 -                      (2) - Mildly 2 -          Segments  Size  Interpret    Hyperkinetic 1 - Normal  Hypokinetic  Hypokinetic  1-2     small                                                      7 -          3-5      moderate  3 - Akinetic 4 -          5 -         6 - Akinetic Dyskinetic   6-14    large               Dyskinetic   Aneurysmal  w/scar       w/scar       15-16   diffuse     Left Ventricle  Left ventricular size, wall motion and function are normal. The ejection  fraction is 60-65%. There is mild concentric left ventricular hypertrophy.  Left ventricular diastolic function is normal. No regional wall motion  abnormalities noted.     Right Ventricle  Normal right ventricle size and systolic function. TAPSE is normal, which is  consistent with normal right ventricular systolic function.     Atria  The left atrium is moderately dilated. Right atrial size is normal. There is  no color Doppler evidence of an atrial shunt.     Mitral Valve  There is moderate mitral annular calcification. There is trace mitral  regurgitation. There is no mitral valve stenosis.     Tricuspid Valve  The tricuspid valve is not well visualized, but is grossly normal. There is  mild (1+) tricuspid regurgitation. Right ventricular systolic pressure is  elevated, consistent with mild to moderate pulmonary hypertension.     Aortic Valve  The aortic valve is trileaflet. There is discrete nodular thickening of the  right coronary cusp. No aortic regurgitation is present. Mild valvular aortic  stenosis. At SVI of 38 mL/M2 peak velocity is 1.9 m/s with a mean gradient of  8 mmHg and dimensionless index is 0.6.     Pulmonic Valve  The pulmonic valve is not well seen, but is grossly normal. This degree of  valvular regurgitation is within normal limits. There is no pulmonic valvular  stenosis.     Vessels  The aorta root is normal. Ascending Aorta dilatation is present. borderline.  IVC diameter <2.1 cm collapsing >50% with sniff suggests a normal RA pressure  of 3 mmHg.     Pericardium  There is no pericardial effusion.     Rhythm  Sinus rhythm was noted.      ______________________________________________________________________________  MMode/2D Measurements & Calculations  IVSd: 1.7 cm  LVIDd: 3.7 cm  LVIDs: 2.7 cm  LVPWd: 1.6 cm     FS: 27.3 %  LV mass(C)d: 251.4 grams  LV mass(C)dI: 132.2 grams/m2  Ao root diam: 3.1 cm  LA dimension: 3.8 cm  asc Aorta Diam: 3.9 cm  LA/Ao: 1.2  LVOT diam: 2.0 cm  LVOT area: 3.1 cm2  Ao root diam index Ht(cm/m): 2.0  Ao root diam index BSA (cm/m2): 1.6  Asc Ao diam index BSA (cm/m2): 2.1  Asc Ao diam index Ht(cm/m): 2.6  LA Volume (BP): 95.5 ml     LA Volume Index (BP): 50.3 ml/m2  LA Volume Indexed (AL/bp): 53.1 ml/m2  RV Base: 3.9 cm  RWT: 0.87  TAPSE: 3.1 cm     Doppler Measurements & Calculations  MV E max ruddy: 90.1 cm/sec  MV A max ruddy: 102.0 cm/sec  MV E/A: 0.88  MV max P.4 mmHg  MV mean P.0 mmHg  MV V2 VTI: 35.8 cm  MVA(VTI): 2.0 cm2     MV dec slope: 274.0 cm/sec2  MV dec time: 0.33 sec  Ao V2 max: 182.0 cm/sec  Ao max P.0 mmHg  Ao V2 mean: 127.0 cm/sec  Ao mean P.0 mmHg  Ao V2 VTI: 36.2 cm  RITA(I,D): 2.0 cm2  RITA(V,D): 1.9 cm2  LV V1 max P.8 mmHg  LV V1 max: 109.0 cm/sec  LV V1 VTI: 22.9 cm  SV(LVOT): 71.9 ml  SI(LVOT): 37.8 ml/m2  PA acc time: 0.09 sec  PI end-d ruddy: 109.0 cm/sec  TR max ruddy: 318.0 cm/sec  TR max P.4 mmHg  AV Ruddy Ratio (DI): 0.60  RITA Index (cm2/m2): 1.0  E/E' av.0  Lateral E/e': 14.8  Medial E/e': 17.3  RV S Ruddy: 13.9 cm/sec     ______________________________________________________________________________  Report approved by: Arelis Sofia 2023 01:35 PM           All imaging studies reviewed by me.     Attestation:  I have reviewed today's vital signs, notes, medications, labs and imaging.    Demetris Rinaldi MD

## 2023-12-07 NOTE — PROGRESS NOTES
General Surgery Progress Note  Hospital Day # 1    Subjective:   CC: Left lower extremity cellulitis  Status: Feeling well, eating regular diet, leg pain improving    Vitals:    12/06/23 2204 12/07/23 0028 12/07/23 0427 12/07/23 0731   BP: 126/61 105/68 131/63 112/64   BP Location:  Right arm Left arm Left arm   Pulse: 87 78 76 69   Resp:  20 18 18   Temp:  98.5  F (36.9  C) 98.2  F (36.8  C) 97.9  F (36.6  C)   TempSrc:  Axillary Oral Oral   SpO2:  100% 98% 99%   Weight:       Height:           Physical Exam:  General: NAD, pleasant  EXT: Left lower extremity cellulitis consistent with venous stasis and cellulitis.  No evidence of any necrotizing soft tissue infection    Recent Labs   Lab 12/07/23  0324   WBC 10.6   HGB 11.4*   HCT 35.5          Recent Labs   Lab 12/07/23  0324 12/06/23  0623 12/05/23  1643      < > 134*   CO2 24   < > 20*   BUN 61.7*   < > 46.7*   ALBUMIN  --   --  3.7   ALKPHOS  --   --  73   ALT  --   --  50   AST  --   --  69*    < > = values in this interval not displayed.       Assessment: Cellulitis    Plan: Recommend conservative medical management of her cellulitis no role for surgery.  Surgery will sign off.  Please call with any questions or concerns    Nick Rosales DO [facility]  [phone]

## 2023-12-07 NOTE — PHARMACY-VANCOMYCIN DOSING SERVICE
Pharmacy Vancomycin Note  Date of Service 2023  Patient's  1950   72 year old, female    Indication: Skin and Soft Tissue Infection  Day of Therapy: 2  Current vancomycin regimen:  750 mg IV q24h  Current vancomycin monitoring method: AUC      InsightRX Prediction of Current Vancomycin Regimen  Loading dose: N/A  Regimen: 750 mg IV every 24 hours.  Start time: 11:18 on 2023  Exposure target: AUC24 (range)400-600 mg/L.hr   AUC24,ss: 721 mg/L.hr  Probability of AUC24 > 400: 92 %  Ctrough,ss: 25 mg/L  Probability of Ctrough,ss > 20: 67 %  Probability of nephrotoxicity (Lodise AMPARO ): 28 %      Current estimated CrCl = Estimated Creatinine Clearance: 37.8 mL/min (A) (based on SCr of 2 mg/dL (H)).    Creatinine for last 3 days  2023:  4:43 PM Creatinine 1.40 mg/dL  2023:  6:23 AM Creatinine 1.40 mg/dL  2023:  3:24 AM Creatinine 2.00 mg/dL    Recent Vancomycin Levels (past 3 days)  No results found for requested labs within last 3 days.    Vancomycin IV Administrations (past 72 hours)                     vancomycin (VANCOCIN) 1,500 mg in sodium chloride 0.9 % 250 mL intermittent infusion (mg) 1,500 mg New Bag 23 1626                    Nephrotoxins and other renal medications (From now, onward)      Start     Dose/Rate Route Frequency Ordered Stop    23 1500  vancomycin (VANCOCIN) 500 mg vial to attach to  mL bag        See Hyperspace for full Linked Orders Report.    500 mg  over 1 Hours Intravenous EVERY 24 HOURS 23 1115      23 1200  [Held by provider]  furosemide (LASIX) tablet 20 mg        (On hold since today at 0820 until manually unheld; held by Gina Tim MDHold Reason: Acute Kidney Injury)   Note to Pharmacy: PTA Sig:Take 20 mg by mouth every 24 hours At NOON      20 mg Oral EVERY 24 HOURS 23 0201      23 0800  [Held by provider]  furosemide (LASIX) tablet 40 mg        (On hold since today at 0820 until manually unheld;  held by Gina Tim MDHold Reason: Acute Kidney Injury)   Note to Pharmacy: PTA Sig:Take 40 mg by mouth every morning Take 2 tablets in the morning and 1 tablet at noon.      40 mg Oral EVERY MORNING 12/06/23 0201                 Contrast Orders - past 72 hours (72h ago, onward)      Start     Dose/Rate Route Frequency Stop    12/06/23 1200  perflutren lipid microsphere (DEFINITY) injection SUSP          Intravenous ONCE 12/06/23 1114            Interpretation of levels and current regimen:  Vancomycin level is reflective of  no level, changing dose based on declining renal function . Predicated  mg/L    Has serum creatinine changed greater than 50% in last 72 hours: No. Changed from 1.4 to 2     Urine output:  unable to determine    Renal Function: Worsening    InsightRX Prediction of Planned New Vancomycin Regimen  Loading dose: N/A  Regimen: 500 mg IV every 24 hours.  Start time: 11:18 on 12/07/2023  Exposure target: AUC24 (range)400-600 mg/L.hr   AUC24,ss: 494 mg/L.hr  Probability of AUC24 > 400: 69 %  Ctrough,ss: 17.1 mg/L  Probability of Ctrough,ss > 20: 37 %  Probability of nephrotoxicity (Lodise AMPARO 2009): 13 %      Plan:  Decrease Dose to vancomycin 500 mg IV q24h  Vancomycin monitoring method: AUC  Vancomycin therapeutic monitoring goal: 400-600 mg*h/L  Pharmacy will check vancomycin levels as appropriate in 1-3 Days.  Serum creatinine levels will be ordered daily for the first week of therapy and at least twice weekly for subsequent weeks.    Diamante Perez, [facility]

## 2023-12-07 NOTE — PLAN OF CARE
"  Problem: Adult Inpatient Plan of Care  Goal: Patient-Specific Goal (Individualized)  Description: You can add care plan individualizations to a care plan. Examples of Individualization might be:  \"Parent requests to be called daily at 9am for status\", \"I have a hard time hearing out of my right ear\", or \"Do not touch me to wake me up as it startles  me\".  Outcome: Progressing     Problem: Adult Inpatient Plan of Care  Goal: Absence of Hospital-Acquired Illness or Injury  Intervention: Identify and Manage Fall Risk  Recent Flowsheet Documentation  Taken 12/6/2023 1700 by Jenni Gonzales, RN  Safety Promotion/Fall Prevention: assistive device/personal items within reach     Problem: Adult Inpatient Plan of Care  Goal: Absence of Hospital-Acquired Illness or Injury  Intervention: Prevent Infection  Recent Flowsheet Documentation  Taken 12/6/2023 1700 by Jenni Gonzales, RN  Infection Prevention:   hand hygiene promoted   rest/sleep promoted   Goal Outcome Evaluation:         VSS. Patient on oxygen 1 liter at rest and up to 4 liters with activity. BIPAP at needed. Patient medicated once for episode of vomiting, Zofran effective.               "

## 2023-12-07 NOTE — PROGRESS NOTES
Care Management Follow Up    Length of Stay (days): 1    Expected Discharge Date: 12/08/2023     Concerns to be Addressed: Care progression - discharge planning     Patient plan of care discussed at interdisciplinary rounds: Yes    Anticipated Discharge Disposition:  Transitional care     Anticipated Discharge Services:  Transitional care  Anticipated Discharge DME:  NA    Patient/family educated on Medicare website which has current facility and service quality ratings:  Yes  Education Provided on the Discharge Plan:  Per team  Patient/Family in Agreement with the Plan:  No    Referrals Placed by CM/SW:  No  Private pay costs discussed: Not applicable    Additional Information:  Met with patient at bedside to discuss PT/OT discharge rec for transitional care. Patient declined.    Per provider, Dr. Tim, patient declined TCU, but will go home with home care RN/PT/OT/HHA. Maybe discharge on Sat.    Met with patient to discuss home care services and patient agreed.   Home care referrals sent    Social Hx: Patient states plan will be to return home with spouse support with home care. Acceptd by [facility] RN/PT/OT/HHA. Spouse willing to transport at discharge.    RNCM to follow for medical progression, recommendations, and final discharge plan.     Kelsie Mtz RN

## 2023-12-07 NOTE — PLAN OF CARE
"  Problem: Adult Inpatient Plan of Care  Goal: Plan of Care Review  Description: The Plan of Care Review/Shift note should be completed every shift.  The Outcome Evaluation is a brief statement about your assessment that the patient is improving, declining, or no change.  This information will be displayed automatically on your shift  note.  Outcome: Progressing  Goal: Patient-Specific Goal (Individualized)  Description: You can add care plan individualizations to a care plan. Examples of Individualization might be:  \"Parent requests to be called daily at 9am for status\", \"I have a hard time hearing out of my right ear\", or \"Do not touch me to wake me up as it startles  me\".  Outcome: Progressing  Goal: Absence of Hospital-Acquired Illness or Injury  Outcome: Progressing  Goal: Optimal Comfort and Wellbeing  Outcome: Progressing  Intervention: Monitor Pain and Promote Comfort  Recent Flowsheet Documentation  Taken 12/7/2023 0944 by Alejandra Mart RN  Pain Management Interventions: medication (see MAR)  Intervention: Provide Person-Centered Care  Recent Flowsheet Documentation  Taken 12/7/2023 0945 by Alejandra Mart RN  Trust Relationship/Rapport: care explained  Goal: Readiness for Transition of Care  Outcome: Progressing     Problem: Pain Acute  Goal: Optimal Pain Control and Function  Outcome: Progressing  Intervention: Develop Pain Management Plan  Recent Flowsheet Documentation  Taken 12/7/2023 0944 by Alejandra Mart RN  Pain Management Interventions: medication (see MAR)     Problem: Infection  Goal: Absence of Infection Signs and Symptoms  Outcome: Progressing     Problem: Gas Exchange Impaired  Goal: Optimal Gas Exchange  Outcome: Progressing     Pt alert and oriented. Left ankle cellulitis red, painful, edema. Area marked. IV antibiotics changed from vanco to clinda IV. On 4L NC, will attempt to wean. LS clear, diminished. Up to chair with assist of 1 and walker. Pt reports increased soreness and weakness to right " arm and right leg and feels mobility is less than when at home. Pt also states she has had decreased mobility and tenderness in both of theses areas prior to hospital. Pt thinks they may be more sore from possible fall. Dr. Tim aware. Pt had imaging on admission and no fractures were found. Pt got Norco x1 for leg pain today.

## 2023-12-07 NOTE — UTILIZATION REVIEW
Admission Status; Secondary Review Determination       Under the authority of the Utilization Management Committee, the utilization review process indicated a secondary review on the above patient. The review outcome is based on review of the medical records, discussions with staff, and applying clinical experience noted on the date of the review.     (x) Inpatient Status Appropriate - This patient's medical care is consistent with medical management for inpatient care and reasonable inpatient medical practice.     RATIONALE FOR DETERMINATION     Ms. Rey is a 73 yo female who presents to the ED after fall.  Noted to have pre-renal azotemia; given NS bolus in the ED but creat increased today and is being started on IVF with close clinical monitoring of her renal function.  Noted to have very painful and rapidly progressive cellulitis of the left lower extremity; gen surgery consulted for possible necrotizing fasciitis.  Abx broadened to IV vanco 12/7/23 and is continuing to require ongoing inpatient evaluation, treatment and close clinical monitoring today.    At the time of admission with the information available to the attending physician more than 2 nights Hospital complex care was anticipated, based on patient risk of adverse outcome if treated as outpatient and complex care required. Inpatient admission is appropriate based on the Medicare guidelines.     The information on this document is developed by the utilization review team in order for the business office to ensure compliance. This only denotes the appropriateness of proper admission status and does not reflect the quality of care rendered.   The definitions of Inpatient Status and Observation Status used in making the determination above are those provided in the CMS Coverage Manual, Chapter 1 and Chapter 6, section 70.4.         Sincerely,     Shayy Sharma, DO  Utilization Review  Physician Advisor  [facility].

## 2023-12-07 NOTE — PLAN OF CARE
Problem: Adult Inpatient Plan of Care  Goal: Optimal Comfort and Wellbeing  Outcome: Progressing  Intervention: Monitor Pain and Promote Comfort  Recent Flowsheet Documentation  Taken 12/7/2023 0028 by Mauricio Juan, RN  Pain Management Interventions: emotional support     Problem: Pain Acute  Goal: Optimal Pain Control and Function  Outcome: Progressing  Intervention: Develop Pain Management Plan  Recent Flowsheet Documentation  Taken 12/7/2023 0028 by Mauricio Juan, RN  Pain Management Interventions: emotional support   Goal Outcome Evaluation:       Pain control with PRN [place]. Periferal IV started after numerous attempts on evening shift. IV antibiotics were resumed.

## 2023-12-07 NOTE — PLAN OF CARE
"Pt arrived on p4 at about 2030. Lung sounds diminished on 3 L nasal cannula with SpO2 100%. She is unable to lift her right arm well, strength 3/5, and left leg strength is 3/5. Right leg is reddened, and inflamed. Pt c/o constant \"burning.\"     Telemetry reads Normal Sinus Rhythm.      Problem: Pain Acute  Goal: Optimal Pain Control and Function  Outcome: Not Progressing  Intervention: Prevent or Manage Pain  Recent Flowsheet Documentation  Taken 12/6/2023 2036 by Dipti Cummings RN  Medication Review/Management:   medications reviewed   high-risk medications identified     "

## 2023-12-08 ENCOUNTER — APPOINTMENT (OUTPATIENT)
Dept: OCCUPATIONAL THERAPY | Facility: HOSPITAL | Age: 73
DRG: 603 | End: 2023-12-08
Payer: MEDICARE

## 2023-12-08 ENCOUNTER — APPOINTMENT (OUTPATIENT)
Dept: PHYSICAL THERAPY | Facility: HOSPITAL | Age: 73
DRG: 603 | End: 2023-12-08
Payer: MEDICARE

## 2023-12-08 LAB
ANION GAP SERPL CALCULATED.3IONS-SCNC: 6 MMOL/L (ref 7–15)
BUN SERPL-MCNC: 66.6 MG/DL (ref 8–23)
CALCIUM SERPL-MCNC: 8.6 MG/DL (ref 8.8–10.2)
CHLORIDE SERPL-SCNC: 108 MMOL/L (ref 98–107)
CK SERPL-CCNC: 332 U/L (ref 26–192)
CREAT SERPL-MCNC: 1.78 MG/DL (ref 0.51–0.95)
CRP SERPL-MCNC: 90.4 MG/L
DEPRECATED HCO3 PLAS-SCNC: 24 MMOL/L (ref 22–29)
EGFRCR SERPLBLD CKD-EPI 2021: 30 ML/MIN/1.73M2
ERYTHROCYTE [DISTWIDTH] IN BLOOD BY AUTOMATED COUNT: 11.9 % (ref 10–15)
GLUCOSE SERPL-MCNC: 120 MG/DL (ref 70–99)
HCT VFR BLD AUTO: 30.9 % (ref 35–47)
HGB BLD-MCNC: 9.4 G/DL (ref 11.7–15.7)
MCH RBC QN AUTO: 32 PG (ref 26.5–33)
MCHC RBC AUTO-ENTMCNC: 30.4 G/DL (ref 31.5–36.5)
MCV RBC AUTO: 105 FL (ref 78–100)
PLATELET # BLD AUTO: 149 10E3/UL (ref 150–450)
POTASSIUM SERPL-SCNC: 4.9 MMOL/L (ref 3.4–5.3)
RBC # BLD AUTO: 2.94 10E6/UL (ref 3.8–5.2)
SODIUM SERPL-SCNC: 138 MMOL/L (ref 135–145)
WBC # BLD AUTO: 4.9 10E3/UL (ref 4–11)

## 2023-12-08 PROCEDURE — 258N000003 HC RX IP 258 OP 636

## 2023-12-08 PROCEDURE — 999N000157 HC STATISTIC RCP TIME EA 10 MIN

## 2023-12-08 PROCEDURE — 120N000001 HC R&B MED SURG/OB

## 2023-12-08 PROCEDURE — 99207 PR APP CREDIT; MD BILLING SHARED VISIT: CPT

## 2023-12-08 PROCEDURE — 250N000011 HC RX IP 250 OP 636: Mod: JZ | Performed by: FAMILY MEDICINE

## 2023-12-08 PROCEDURE — 250N000013 HC RX MED GY IP 250 OP 250 PS 637: Performed by: HOSPITALIST

## 2023-12-08 PROCEDURE — 250N000013 HC RX MED GY IP 250 OP 250 PS 637

## 2023-12-08 PROCEDURE — 36415 COLL VENOUS BLD VENIPUNCTURE: CPT

## 2023-12-08 PROCEDURE — 99232 SBSQ HOSP IP/OBS MODERATE 35: CPT | Mod: FS | Performed by: FAMILY MEDICINE

## 2023-12-08 PROCEDURE — 80048 BASIC METABOLIC PNL TOTAL CA: CPT

## 2023-12-08 PROCEDURE — 250N000013 HC RX MED GY IP 250 OP 250 PS 637: Performed by: STUDENT IN AN ORGANIZED HEALTH CARE EDUCATION/TRAINING PROGRAM

## 2023-12-08 PROCEDURE — 97116 GAIT TRAINING THERAPY: CPT | Mod: GP

## 2023-12-08 PROCEDURE — 250N000011 HC RX IP 250 OP 636

## 2023-12-08 PROCEDURE — 97535 SELF CARE MNGMENT TRAINING: CPT | Mod: GO

## 2023-12-08 PROCEDURE — 97530 THERAPEUTIC ACTIVITIES: CPT | Mod: GP

## 2023-12-08 PROCEDURE — 85027 COMPLETE CBC AUTOMATED: CPT

## 2023-12-08 PROCEDURE — 86140 C-REACTIVE PROTEIN: CPT | Performed by: FAMILY MEDICINE

## 2023-12-08 PROCEDURE — 82550 ASSAY OF CK (CPK): CPT | Performed by: FAMILY MEDICINE

## 2023-12-08 RX ORDER — CEPHALEXIN 500 MG/1
500 CAPSULE ORAL EVERY 8 HOURS SCHEDULED
Status: DISCONTINUED | OUTPATIENT
Start: 2023-12-08 | End: 2023-12-09 | Stop reason: HOSPADM

## 2023-12-08 RX ORDER — CETIRIZINE HYDROCHLORIDE 10 MG/1
10 TABLET ORAL DAILY
Status: DISCONTINUED | OUTPATIENT
Start: 2023-12-09 | End: 2023-12-09 | Stop reason: HOSPADM

## 2023-12-08 RX ADMIN — CEPHALEXIN 500 MG: 500 CAPSULE ORAL at 17:43

## 2023-12-08 RX ADMIN — Medication 950 MG: at 07:58

## 2023-12-08 RX ADMIN — DULOXETINE HYDROCHLORIDE 60 MG: 60 CAPSULE, DELAYED RELEASE PELLETS ORAL at 07:56

## 2023-12-08 RX ADMIN — MICONAZOLE NITRATE ANTIFUNGAL POWDER: 2 POWDER TOPICAL at 21:41

## 2023-12-08 RX ADMIN — ENOXAPARIN SODIUM 40 MG: 40 INJECTION SUBCUTANEOUS at 21:37

## 2023-12-08 RX ADMIN — TOLTERODINE 2 MG: 2 CAPSULE, EXTENDED RELEASE ORAL at 07:58

## 2023-12-08 RX ADMIN — FLUTICASONE FUROATE AND VILANTEROL TRIFENATATE 1 PUFF: 100; 25 POWDER RESPIRATORY (INHALATION) at 07:58

## 2023-12-08 RX ADMIN — SODIUM CHLORIDE: 9 INJECTION, SOLUTION INTRAVENOUS at 09:07

## 2023-12-08 RX ADMIN — CETIRIZINE HYDROCHLORIDE 10 MG: 10 TABLET, FILM COATED ORAL at 07:56

## 2023-12-08 RX ADMIN — CEPHALEXIN 500 MG: 500 CAPSULE ORAL at 21:37

## 2023-12-08 RX ADMIN — MONTELUKAST 10 MG: 10 TABLET, FILM COATED ORAL at 07:56

## 2023-12-08 RX ADMIN — SODIUM CHLORIDE: 9 INJECTION, SOLUTION INTRAVENOUS at 20:54

## 2023-12-08 RX ADMIN — ENOXAPARIN SODIUM 40 MG: 40 INJECTION SUBCUTANEOUS at 11:18

## 2023-12-08 RX ADMIN — Medication 125 MCG: at 07:57

## 2023-12-08 RX ADMIN — CLINDAMYCIN PHOSPHATE 900 MG: 900 INJECTION, SOLUTION INTRAVENOUS at 16:01

## 2023-12-08 RX ADMIN — METOPROLOL SUCCINATE 50 MG: 50 TABLET, EXTENDED RELEASE ORAL at 21:36

## 2023-12-08 RX ADMIN — DULOXETINE HYDROCHLORIDE 30 MG: 30 CAPSULE, DELAYED RELEASE ORAL at 21:37

## 2023-12-08 RX ADMIN — Medication 81 MG: at 07:56

## 2023-12-08 RX ADMIN — HYDROXYCHLOROQUINE SULFATE 100 MG: 200 TABLET, FILM COATED ORAL at 20:18

## 2023-12-08 RX ADMIN — HYDROXYCHLOROQUINE SULFATE 100 MG: 200 TABLET, FILM COATED ORAL at 07:58

## 2023-12-08 RX ADMIN — CLINDAMYCIN PHOSPHATE 900 MG: 900 INJECTION, SOLUTION INTRAVENOUS at 06:22

## 2023-12-08 RX ADMIN — HYDROCODONE BITARTRATE AND ACETAMINOPHEN 1 TABLET: 5; 325 TABLET ORAL at 06:45

## 2023-12-08 ASSESSMENT — ACTIVITIES OF DAILY LIVING (ADL)
ADLS_ACUITY_SCORE: 34

## 2023-12-08 NOTE — PROGRESS NOTES
[facility]    Medicine Progress Note - Hospitalist Service    Date of Admission:  12/5/2023    Assessment & Plan   Desirae Rey is a 72F presents after GLF fall; pmhx includes NELLIE (BiPAP), myositis, HTN/HLD, HFpEF, CKD3; admitted to observation for generalized weakness, possible syncope and left lower leg pain.     L lower leg pain  Cellulitis  - XR hip/pelvis: Both hips and pelvis negative for fracture or dislocation  - XR L tib/fib and ankle: No fractures, soft tissue swelling about the ankle   - US LE negative for DVT  - Gen surg consulted 12/6 given advancing erythema; no suspicion for necrotizing soft tissue per surgery  - Area of erythema marked, continues with improvement, will need to continue to monitor   - Patient was empirically started on ceftraixone 2g IV. Switched to Vanco IV (12/6). Patient reported x1 emesis s/p Vanco. Given this and renal function, will switch to Clinda IV (12/7).   - Patient with continued dysuria (see UTI below) will discontinue clinda and switch to PO Keflex (12/8) given improvement in erythema and dual coverage for cellulitis + UTI  - Lymphedema wrapping as able with pain improvement   - BC pending, collected 12/6 (no aerobic growth after 1 day)    GRACY  CKD3b  - Cr 1.4 (12/6) --> 2.0 (12/7) -> 1.78 (12/8)   -  ml/hr, hold Lasix   - BMP trend  - Discontinue Vanco    Elevated CK/CRP  Leukocytosis (mild)  - CK total 900 (12/5)  --> 884 --> 604 (12/7) --> 332 (12/8)  - .8 (12/5) --> 174.3 (12/7) --> 90.4 (12/8)  - WBC 13.9 on admission --> 9.8, trend CBC    Generalized Weakness  Ground Level Fall   Woke up at 7 AM in bed, returned to sleep and later woke up on the floor next to her bed at about 9:30 AM   - CT Cspine/Head negative  - Echo with EF 60-65%, no changes compared to the prior study dated 10/31/2022  - Per ED Note with chart review: Over the past 12 months prior to this visit, she had at least 7 episodes of falls    - PT rec TCU  "placement   - OT rec TCU placement   - Appreciate CM. Patient unwilling to go to TCU at this time. Continue to discuss home care referrals/resources and follow for disposition planning as well as patient financial concerns about medications   - Orthostatics to be done    UTI   - UA in  leuk esterase, 18 WBC  - UC showing >100,000 units E. Coli   - Patient given IV ceftriaxone 2g for cellulitis in ED  - Patient with continued dysuria. Will discontinue ceftriaxone and begin Keflex PO   - Continue I&Os     Fibromyalgia   Polyarthralgia  Osteoarthritis   Chronic pain  MARIA GUADALUPE positive  Continue home regimen below   - Duloxetine 60mg PO qAM, 30mg PO qPM  - Norco 5-325mg PO q4h/PRN   - Tylenol PO PRN  - Hydroxychloroquine 100 mg BID     CHF   Pulmonary Hypertension  Home oxygen use   Uses 3L NC O2 PRN at baseline  - On 1-3 L here, continue as needed. Wean/titrate O2 to keep sats >92   - No clinical signs of decompensation   - I&Os, daily weights     Essential Hypertension   - continue home metoprolol       Hyponatremia (corrected)  134 --> 140  - BMP trend     NELLIE  - Continued home BiPAP          Diet: Regular Diet Adult    DVT Prophylaxis: Enoxaparin (Lovenox) SQ  Rebollar Catheter: Not present  Lines: None     Cardiac Monitoring: None  Code Status: Full Code      Clinically Significant Risk Factors                  # Hypertension: Noted on problem list        # Severe Obesity: Estimated body mass index is 47.5 kg/m  as calculated from the following:    Height as of this encounter: 1.397 m (4' 7\").    Weight as of this encounter: 92.7 kg (204 lb 5.9 oz)., PRESENT ON ADMISSION     # Asthma: noted on problem list        Disposition Plan      Expected Discharge Date: 12/09/2023    Discharge Delays: IV Medication - consider oral or Home Infusion  Oxygen Needs - Arrange Home O2  Lab Result Pending (enter specific test & time in comments)  Destination: home with family          The patient's care was discussed with the " "Attending Physician, Dr. Gina Tim who independently met with and assessed the patient and is agreement with the assessment and plan     Yun Carlson PA-C  Hospitalist Service  [facility]  Securely message with [employer] (more info)  Text page via [employer] Paging/Directory   ______________________________________________________________________    Interval History   Patient continues with improvement in redness pain to leg, but still has \"burning.\" Denies shortness of breath, hives, itching, difficulty breathing. Does report dysuria but denies retention, hematuria, abdominal pain. Denies fevers, chills, sweats. Discussed PT and OT continued recommendation for TCU placement which patient continues to decline.     Physical Exam   Vital Signs: Temp: 97.6  F (36.4  C) Temp src: Oral BP: (!) 148/63 Pulse: 72   Resp: 18 SpO2: 98 % O2 Device: Nasal cannula Oxygen Delivery: 4 LPM  Weight: 204 lbs 5.86 oz    Constitutional: awake, alert, no apparent distress, and appears stated age  Respiratory: No increased work of breathing, no accessory muscle use, clear to auscultation bilaterally. NC in place  Cardiovascular: Regular rate and rhythm, normal S1 and S2  Skin: There is improving erythema with diffuse demarcation circumferentially along the left lower extremity, from the mid-tibia extending to the dorsal aspect and laterally of the foot. 4 inches in greatest width. Tender, mild swelling of the area.   Musculoskeletal: 2+ DP pulses  Neurologic: Awake, alert.   Neuropsychiatric: Appropriate mood, affect and eye contact. Cooperative.    Medical Decision Making             Data     I have personally reviewed the following data over the past 24 hrs:    4.9  \   9.4 (L)   / 149 (L)     138 108 (H) 66.6 (H) /  120 (H)   4.9 24 1.78 (H) \     Procal: N/A CRP: 90.40 (H) Lactic Acid: N/A         Imaging results reviewed over the past 24 hrs:   No results found for this or any previous visit (from the past 24 " hour(s)).

## 2023-12-08 NOTE — PLAN OF CARE
Problem: Adult Inpatient Plan of Care  Goal: Optimal Comfort and Wellbeing  Outcome: Progressing  Intervention: Monitor Pain and Promote Comfort  Recent Flowsheet Documentation  Taken 12/8/2023 0800 by Nasreen Bray RN  Pain Management Interventions: emotional support  Intervention: Provide Person-Centered Care  Recent Flowsheet Documentation  Taken 12/8/2023 1020 by Nasreen Bray RN  Trust Relationship/Rapport:   care explained   emotional support provided   thoughts/feelings acknowledged     Problem: Pain Acute  Goal: Optimal Pain Control and Function  Outcome: Progressing  Intervention: Develop Pain Management Plan  Recent Flowsheet Documentation  Taken 12/8/2023 0800 by Nasreen Bray, RN  Pain Management Interventions: emotional support  Intervention: Prevent or Manage Pain  Recent Flowsheet Documentation  Taken 12/8/2023 1020 by Nasreen Bray RN  Medication Review/Management: medications reviewed     Problem: Infection  Goal: Absence of Infection Signs and Symptoms  Outcome: Progressing   Goal Outcome Evaluation:       Patient denies pain at rest but does have tenderness of left leg and foot L>R. Noted slight redness to right foot also. Per patient LLE is better. Labs trending down. Continue with IV antibiotics.

## 2023-12-08 NOTE — PROGRESS NOTES
Set up pt's home BiPAP.  Pt's unit is clean and functional.  Oxygen tubing is connected for when pt ready to go on for night.      Dami Holden, RT

## 2023-12-08 NOTE — PLAN OF CARE
"  Problem: Adult Inpatient Plan of Care  Goal: Plan of Care Review  Description: The Plan of Care Review/Shift note should be completed every shift.  The Outcome Evaluation is a brief statement about your assessment that the patient is improving, declining, or no change.  This information will be displayed automatically on your shift  note.  Outcome: Progressing  Flowsheets (Taken 12/7/2023 1857)  Plan of Care Reviewed With: patient  Goal: Patient-Specific Goal (Individualized)  Description: You can add care plan individualizations to a care plan. Examples of Individualization might be:  \"Parent requests to be called daily at 9am for status\", \"I have a hard time hearing out of my right ear\", or \"Do not touch me to wake me up as it startles  me\".  Outcome: Progressing  Goal: Absence of Hospital-Acquired Illness or Injury  Outcome: Progressing  Intervention: Identify and Manage Fall Risk  Recent Flowsheet Documentation  Taken 12/7/2023 1700 by Sean Balderas RN  Safety Promotion/Fall Prevention:   activity supervised   clutter free environment maintained  Intervention: Prevent Skin Injury  Recent Flowsheet Documentation  Taken 12/7/2023 1700 by Sean Balderas RN  Body Position: position maintained  Goal: Optimal Comfort and Wellbeing  Outcome: Progressing  Intervention: Monitor Pain and Promote Comfort  Recent Flowsheet Documentation  Taken 12/7/2023 1700 by Sean Balderas RN  Pain Management Interventions:   emotional support   repositioned   rest  Goal: Readiness for Transition of Care  Outcome: Progressing     Problem: Risk for Delirium  Goal: Improved Sleep  Outcome: Progressing     Problem: Pain Acute  Goal: Optimal Pain Control and Function  Outcome: Progressing  Intervention: Develop Pain Management Plan  Recent Flowsheet Documentation  Taken 12/7/2023 1700 by Sean Balderas RN  Pain Management Interventions:   emotional support   repositioned   rest     Problem: Infection  Goal: Absence of Infection Signs and " Symptoms  Outcome: Progressing     Problem: Gas Exchange Impaired  Goal: Optimal Gas Exchange  Outcome: Progressing  Intervention: Optimize Oxygenation and Ventilation  Recent Flowsheet Documentation  Taken 12/7/2023 1700 by Sean Balderas, RN  Head of Bed (HOB) Positioning: HOB at 20-30 degrees   Goal Outcome Evaluation:      Plan of Care Reviewed With: patient

## 2023-12-08 NOTE — PROGRESS NOTES
Care Management Follow Up    Length of Stay (days): 2    Expected Discharge Date: 12/09/2023     Concerns to be Addressed: Care progression - discharge planning     Patient plan of care discussed at interdisciplinary rounds: Yes    Anticipated Discharge Disposition:  Transitional care     Anticipated Discharge Services:  Transitional care  Anticipated Discharge DME:  NA    Patient/family educated on Medicare website which has current facility and service quality ratings:  Yes  Education Provided on the Discharge Plan:  Per team  Patient/Family in Agreement with the Plan:  No    Referrals Placed by CM/SW:  No  Private pay costs discussed: Not applicable    Additional Information:  Per provider, Dr. Tim, patient maybe discharge tomorrow.    Patient accepted to [facility] for SN/PT/OT/HHA    Social Hx: Patient states plan will be to return home with spouse support with home care. [facility]. Spouse willing to transport at discharge.    RNCM to follow for medical progression, recommendations, and final discharge plan.     Kelsie Mtz RN

## 2023-12-08 NOTE — PLAN OF CARE
Problem: Pain Acute  Goal: Optimal Pain Control and Function  Outcome: Progressing  Intervention: Develop Pain Management Plan  Recent Flowsheet Documentation  Taken 12/7/2023 2359 by Mauricio Juan, RN  Pain Management Interventions: emotional support     Problem: Gas Exchange Impaired  Goal: Optimal Gas Exchange  Outcome: Progressing  Intervention: Optimize Oxygenation and Ventilation  Recent Flowsheet Documentation  Taken 12/7/2023 2359 by Mauricio Juan, RN  Head of Bed (HOB) Positioning: HOB at 20-30 degrees   Goal Outcome Evaluation:       PRN PO Norco for pain. Patient using home Bipap.

## 2023-12-09 ENCOUNTER — APPOINTMENT (OUTPATIENT)
Dept: PHYSICAL THERAPY | Facility: HOSPITAL | Age: 73
DRG: 603 | End: 2023-12-09
Payer: MEDICARE

## 2023-12-09 VITALS
DIASTOLIC BLOOD PRESSURE: 72 MMHG | RESPIRATION RATE: 19 BRPM | WEIGHT: 204.37 LBS | BODY MASS INDEX: 47.3 KG/M2 | OXYGEN SATURATION: 100 % | HEART RATE: 64 BPM | TEMPERATURE: 98.6 F | SYSTOLIC BLOOD PRESSURE: 142 MMHG | HEIGHT: 55 IN

## 2023-12-09 LAB
ANION GAP SERPL CALCULATED.3IONS-SCNC: 8 MMOL/L (ref 7–15)
BUN SERPL-MCNC: 60.5 MG/DL (ref 8–23)
CALCIUM SERPL-MCNC: 8.9 MG/DL (ref 8.8–10.2)
CHLORIDE SERPL-SCNC: 113 MMOL/L (ref 98–107)
CREAT SERPL-MCNC: 1.22 MG/DL (ref 0.51–0.95)
DEPRECATED HCO3 PLAS-SCNC: 21 MMOL/L (ref 22–29)
EGFRCR SERPLBLD CKD-EPI 2021: 47 ML/MIN/1.73M2
ERYTHROCYTE [DISTWIDTH] IN BLOOD BY AUTOMATED COUNT: 11.9 % (ref 10–15)
GLUCOSE SERPL-MCNC: 129 MG/DL (ref 70–99)
HCT VFR BLD AUTO: 30.3 % (ref 35–47)
HGB BLD-MCNC: 9.2 G/DL (ref 11.7–15.7)
MCH RBC QN AUTO: 32.3 PG (ref 26.5–33)
MCHC RBC AUTO-ENTMCNC: 30.4 G/DL (ref 31.5–36.5)
MCV RBC AUTO: 106 FL (ref 78–100)
PLATELET # BLD AUTO: 157 10E3/UL (ref 150–450)
POTASSIUM SERPL-SCNC: 4.9 MMOL/L (ref 3.4–5.3)
RBC # BLD AUTO: 2.85 10E6/UL (ref 3.8–5.2)
SODIUM SERPL-SCNC: 142 MMOL/L (ref 135–145)
WBC # BLD AUTO: 4.3 10E3/UL (ref 4–11)

## 2023-12-09 PROCEDURE — 97116 GAIT TRAINING THERAPY: CPT | Mod: GP

## 2023-12-09 PROCEDURE — 97530 THERAPEUTIC ACTIVITIES: CPT | Mod: GP

## 2023-12-09 PROCEDURE — 250N000013 HC RX MED GY IP 250 OP 250 PS 637

## 2023-12-09 PROCEDURE — 36415 COLL VENOUS BLD VENIPUNCTURE: CPT

## 2023-12-09 PROCEDURE — 258N000003 HC RX IP 258 OP 636

## 2023-12-09 PROCEDURE — 250N000011 HC RX IP 250 OP 636: Mod: JZ | Performed by: FAMILY MEDICINE

## 2023-12-09 PROCEDURE — 250N000013 HC RX MED GY IP 250 OP 250 PS 637: Performed by: STUDENT IN AN ORGANIZED HEALTH CARE EDUCATION/TRAINING PROGRAM

## 2023-12-09 PROCEDURE — 85027 COMPLETE CBC AUTOMATED: CPT

## 2023-12-09 PROCEDURE — 80048 BASIC METABOLIC PNL TOTAL CA: CPT

## 2023-12-09 PROCEDURE — 99239 HOSP IP/OBS DSCHRG MGMT >30: CPT | Performed by: FAMILY MEDICINE

## 2023-12-09 RX ORDER — FUROSEMIDE 10 MG/ML
20 INJECTION INTRAMUSCULAR; INTRAVENOUS ONCE
Status: COMPLETED | OUTPATIENT
Start: 2023-12-09 | End: 2023-12-09

## 2023-12-09 RX ORDER — CEPHALEXIN 500 MG/1
500 CAPSULE ORAL EVERY 8 HOURS
Qty: 15 CAPSULE | Refills: 0 | Status: SHIPPED | OUTPATIENT
Start: 2023-12-09 | End: 2023-12-14

## 2023-12-09 RX ADMIN — SODIUM CHLORIDE: 9 INJECTION, SOLUTION INTRAVENOUS at 07:41

## 2023-12-09 RX ADMIN — CEPHALEXIN 500 MG: 500 CAPSULE ORAL at 05:33

## 2023-12-09 RX ADMIN — HYDROXYCHLOROQUINE SULFATE 100 MG: 200 TABLET, FILM COATED ORAL at 09:26

## 2023-12-09 RX ADMIN — FUROSEMIDE 20 MG: 10 INJECTION, SOLUTION INTRAMUSCULAR; INTRAVENOUS at 09:26

## 2023-12-09 RX ADMIN — HYDROCODONE BITARTRATE AND ACETAMINOPHEN 1 TABLET: 5; 325 TABLET ORAL at 00:47

## 2023-12-09 RX ADMIN — Medication 125 MCG: at 09:25

## 2023-12-09 RX ADMIN — Medication 950 MG: at 09:26

## 2023-12-09 RX ADMIN — CETIRIZINE HYDROCHLORIDE 10 MG: 10 TABLET, FILM COATED ORAL at 09:24

## 2023-12-09 RX ADMIN — ENOXAPARIN SODIUM 40 MG: 40 INJECTION SUBCUTANEOUS at 11:12

## 2023-12-09 RX ADMIN — TOLTERODINE 2 MG: 2 CAPSULE, EXTENDED RELEASE ORAL at 09:26

## 2023-12-09 RX ADMIN — MICONAZOLE NITRATE ANTIFUNGAL POWDER: 2 POWDER TOPICAL at 09:28

## 2023-12-09 RX ADMIN — CEPHALEXIN 500 MG: 500 CAPSULE ORAL at 14:17

## 2023-12-09 RX ADMIN — MONTELUKAST 10 MG: 10 TABLET, FILM COATED ORAL at 09:24

## 2023-12-09 RX ADMIN — Medication 81 MG: at 09:25

## 2023-12-09 RX ADMIN — HYDROCODONE BITARTRATE AND ACETAMINOPHEN 1 TABLET: 5; 325 TABLET ORAL at 09:25

## 2023-12-09 RX ADMIN — DULOXETINE HYDROCHLORIDE 60 MG: 60 CAPSULE, DELAYED RELEASE PELLETS ORAL at 09:25

## 2023-12-09 ASSESSMENT — ACTIVITIES OF DAILY LIVING (ADL)
ADLS_ACUITY_SCORE: 33
ADLS_ACUITY_SCORE: 33
ADLS_ACUITY_SCORE: 34

## 2023-12-09 NOTE — PLAN OF CARE
Occupational Therapy Discharge Summary    Reason for therapy discharge:    Discharged to home     Progress towards therapy goal(s). See goals on Care Plan in [facility] electronic health record for goal details.  Goals not met.  Barriers to achieving goals:   discharge from facility.    Therapy recommendation(s):    Continued therapy is recommended.  Rationale/Recommendations:  decreased ADLs.    Goal Outcome Evaluation:       Mary Reyes, OTR/L  12/9/2023

## 2023-12-09 NOTE — PLAN OF CARE
Physical Therapy Discharge Summary    Reason for therapy discharge:    Discharged to home with home therapy.    Progress towards therapy goal(s). See goals on Care Plan in [facility] electronic health record for goal details.  Goals partially met.  Barriers to achieving goals:   limited tolerance for therapy and discharge from facility.    Therapy recommendation(s):    Continued therapy is recommended.  Rationale/Recommendations:  home PT.

## 2023-12-09 NOTE — DISCHARGE SUMMARY
"[facility]  Hospitalist Discharge Summary      Date of Admission:  12/5/2023  Date of Discharge:  12/9/2023  3:01 PM  Discharging Provider: Gina Tim MD  Discharge Service: Hospitalist Service    Discharge Diagnoses     Left lower extremity complicated cellulitis, improved  Fall with mild rhabdomyolysis, resolved  GRACY on CKD 3  E. coli UTI  Polyarthralgia, MARIA GUADALUPE positive with fibromyalgia  HF PEF  Pulmonary hypertension  Chronic respiratory failure  Hypertension   hyponatremia  NELLIE    Clinically Significant Risk Factors     # Severe Obesity: Estimated body mass index is 47.5 kg/m  as calculated from the following:    Height as of this encounter: 1.397 m (4' 7\").    Weight as of this encounter: 92.7 kg (204 lb 5.9 oz).       Follow-ups Needed After Discharge   Follow-up Appointments     Follow-up and recommended labs and tests       Follow up with primary care provider, Noemy Brito, within 7 days for   hospital follow- up.  The following labs/tests are recommended: BMP in 1-2   weeks.            Unresulted Labs Ordered in the Past 30 Days of this Admission       Date and Time Order Name Status Description    12/6/2023  2:23 PM Blood Culture Arm, Left Preliminary             Discharge Disposition   Discharged to home  Condition at discharge: Stable    Hospital Course   Desirae Rey is a 72F with hx CRH due to NELLIE/OHS (BiPAP), myositis, HTN/HLD, HFpEF, CKD3; admitted after falling at home with generalized weakness and left lower extremity cellulitis.  Unclear if fall was related to cellulitis changes as these were not recognized during time of admission.  Patient was kept here in the hospital where she was treated with IV antibiotics, vancomycin and clindamycin and eventually switched to oral Keflex.  Urine culture grew out E. coli which was treated as well.  Lymphedema remained stable and was not treated with wraps due to tenderness of the left lower extremity.  Developed GRACY treated with " holding Lasix and IV fluids.  Remained stable while here with TCU recommended by PT and OT.  Patient declined TCU due to financial concerns and will be sent home with home care including PT OT RN and home health aide.  Detailed hospital course per below         L lower leg pain  Cellulitis  Lymphedema  - overall improved  --admission work up included; XR hip/pelvis: Both hips and pelvis negative for fracture or dislocation, XR L tib/fib and ankle: No fractures, soft tissue swelling about the ankle , US LE negative for DVT  - Gen surg consulted 12/6 given advancing erythema; no suspicion for necrotizing soft tissue per surgery  - Patient was empirically started on ceftraixone 2g IV. Switched to Vanco IV (12/6). Patient reported x1 emesis s/p Vanco. Given this and renal function, will switch to Clinda IV (12/7).   -changed to keflex with continue clniical improvement including improved CRP  - Lymphedema wrapping as able with pain improvement   - BC NGTD  ---pain in bilateral legs - plan IV lasix times one     GRACY  CKD3b  - resolvied   - stop  ml/hr, hold Lasix   - related to I nfection and  Vanco, stopped after 1 dose     Generalized Weakness  Ground Level Fall   Mild rhabdomyolysis  ---Woke up at 7 AM in bed, returned to sleep and later woke up on the floor next to her bed at about 9:30 AM   - CT Cspine/Head negative  --- CK total 900 (12/5)  --> 884 --> 604 (12/7) --> 332 (12/8)  ---hx frequent falls  - Echo with EF 60-65%, no changes compared to the prior study dated 10/31/2022  -- PT rec TCU placement   - OT rec TCU placement   -  Patient unwilling to go to TCU at this time.   ---home with home care  - Orthostatics to be done     UTI   - UA in  leuk esterase, 18 WBC  - UC showing >100,000 units E. Coli   - Patient given IV ceftriaxone 2g for cellulitis in ED  --disconarge on kefelx               Consultations This Hospital Stay   PHYSICAL THERAPY ADULT IP CONSULT  OCCUPATIONAL THERAPY ADULT IP  CONSULT  CARE MANAGEMENT / SOCIAL WORK IP CONSULT  CARE MANAGEMENT / SOCIAL WORK IP CONSULT  PHARMACY TO DOSE VANCO  PHARMACY TO DOSE VANCO  SURGERY GENERAL IP CONSULT  WOUND OSTOMY CONTINENCE NURSE  IP CONSULT    Code Status   Full Code    Time Spent on this Encounter   I, Gina Tim MD, personally saw the patient today and spent greater than 30 minutes discharging this patient.       Gina Tim MD  [address] [address]  Phone: [phone]  Fax: [phone]  ______________________________________________________________________    Physical Exam   Vital Signs: Temp: 98.6  F (37  C) Temp src: Oral BP: (!) 142/72 Pulse: 64   Resp: 19 SpO2: 100 % O2 Device: Nasal cannula Oxygen Delivery: 4 LPM  Weight: 204 lbs 5.86 oz  General Appearance: Pleasant female no apparent distress  Respiratory: Clear to auscultation bilaterally  Cardiovascular: Regular rate and rhythm without murmurs rubs or gallop  GI: Obese soft and nontender  Skin: Bilateral lymphedema changes with erythema although markedly improved to the left lower extremity  Other: Neurologically grossly intact without focal deficits appreciated       Primary Care Physician   Noemy Brito    Discharge Orders      Home Care Referral      Reason for your hospital stay    Fall, cellutis     Follow-up and recommended labs and tests     Follow up with primary care provider, Noemy Brito, within 7 days for hospital follow- up.  The following labs/tests are recommended: BMP in 1-2 weeks.     Activity    Your activity upon discharge: activity as tolerated     Diet    Follow this diet upon discharge: Orders Placed This Encounter      Regular Diet Adult       Significant Results and Procedures   Most Recent 3 CBC's:  Recent Labs   Lab Test 12/09/23  0653 12/08/23  0619 12/07/23  0324   WBC 4.3 4.9 10.6   HGB 9.2* 9.4* 11.4*   * 105* 101*    149* 172     Most Recent 3 BMP's:  Recent Labs   Lab  Test 12/09/23  0653 12/08/23  0619 12/07/23  0324    138 136   POTASSIUM 4.9 4.9 4.4   CHLORIDE 113* 108* 101   CO2 21* 24 24   BUN 60.5* 66.6* 61.7*   CR 1.22* 1.78* 2.00*   ANIONGAP 8 6* 11   LYNN 8.9 8.6* 8.8   * 120* 150*     7-Day Micro Results       Collected Updated Procedure Result Status      12/06/2023 1442 12/08/2023 1901 Blood Culture Arm, Left [[id number]]    Blood from Arm, Left    Preliminary result Component Value   Culture No growth after 2 days  [P]                12/05/2023 1646 12/07/2023 0001 Urine Culture [[id number]]    (Abnormal)   Urine, Catheter    Final result Component Value   Culture >100,000 CFU/mL Escherichia coli        Susceptibility        Escherichia coli      ABDOULAYE      Ampicillin 16 ug/mL Intermediate      Ampicillin/ Sulbactam 4 ug/mL Susceptible      Cefazolin <=4 ug/mL Susceptible  [1]       Cefepime <=1 ug/mL Susceptible      Cefoxitin 8 ug/mL Susceptible      Ceftazidime <=1 ug/mL Susceptible      Ceftriaxone <=1 ug/mL Susceptible      Ciprofloxacin <=0.25 ug/mL Susceptible      Gentamicin <=1 ug/mL Susceptible      Levofloxacin <=0.12 ug/mL Susceptible      Nitrofurantoin <=16 ug/mL Susceptible      Piperacillin/Tazobactam <=4 ug/mL Susceptible      Tobramycin <=1 ug/mL Susceptible      Trimethoprim/Sulfamethoxazole <=1/19 ug/mL Susceptible                   [1]  Cefazolin ABDOULAYE breakpoints are for the treatment of uncomplicated urinary tract infections. For the treatment of systemic infections, please contact the laboratory for additional testing.                         This SmartLink has not been configured with any valid records.            Results for orders placed or performed during the hospital encounter of 12/05/23   Head CT w/o contrast    Narrative    EXAM: CT HEAD W/O CONTRAST  LOCATION: [facility]  DATE: 12/5/2023    INDICATION: New onset confusion. Hallucinations.  COMPARISON: CT head 10/31/2022.  TECHNIQUE: Routine CT Head  without IV contrast. Multiplanar reformats. Dose reduction techniques were used.    FINDINGS:  INTRACRANIAL CONTENTS: No intracranial hemorrhage, extraaxial collection, or mass effect. No CT evidence of acute infarct. Mild presumed chronic small vessel ischemic changes. Normal ventricles and sulci.     VISUALIZED ORBITS/SINUSES/MASTOIDS: No intraorbital abnormality. Dependent secretions in the right maxillary sinus. The paranasal sinuses are otherwise clear. No middle ear or mastoid effusion.    BONES/SOFT TISSUES: No acute abnormality.      Impression    IMPRESSION:   1.  No acute intracranial process.  2.  Right maxillary sinus secretions, correlate for sinusitis.   Foot XR, G/E 3 views, left    Narrative    EXAM: XR FOOT LEFT G/E 3 VIEWS  LOCATION: [facility]  DATE: 12/5/2023    INDICATION: pain after fall  COMPARISON: None.      Impression    IMPRESSION: Plantar and Achilles calcaneal spurring. No evidence for fracture. Degenerative change at the talonavicular joint and first MTP joint.   XR Ankle Left G/E 3 Views    Narrative    EXAM: XR ANKLE LEFT G/E 3 VIEWS  LOCATION: [facility]  DATE: 12/5/2023    INDICATION: Traumatic ankle pain.  COMPARISON: None.      Impression    IMPRESSION: Soft tissue swelling about the ankle. No evidence for fracture. Mild degenerative change at the tibiotalar joint. Plantar and Achilles calcaneal spurring. Degenerative change at the calcaneocuboid articulation and subtalar joints.   XR Tibia and Fibula Left 2 Views    Narrative    EXAM: XR TIBIA AND FIBULA LEFT 2 VIEWS  LOCATION: [facility]  DATE: 12/5/2023    INDICATION: leg pain after fall  COMPARISON: None.      Impression    IMPRESSION: The left tibia and fibula are negative for fracture. Achilles calcaneal spurring. Postoperative changes total knee arthroplasty. Soft tissue swelling about the ankle.   XR Pelvis and Hip Left 2 Views    Narrative    EXAM:  XR PELVIS AND HIP LEFT 2 VIEWS  LOCATION: [facility]  DATE: 12/5/2023    INDICATION: LLE pain after fall  COMPARISON: None.      Impression    IMPRESSION: Both hips negative for fracture or dislocation. Mild degenerative change both hip joints. Degenerative change at the SI joints and symphysis pubis. Pelvis negative for fracture. Chronic enthesitis at the hamstring tendon attachments to the   ischial tuberosities bilaterally.   Cervical spine CT w/o contrast    Narrative    EXAM: CT CERVICAL SPINE W/O CONTRAST  LOCATION: [facility]  DATE: 12/5/2023    INDICATION: Fall, neck pain.  COMPARISON: MRI cervical spine 11/01/2022. CT cervical spine 01/28/2017.  TECHNIQUE: Routine CT Cervical Spine without IV contrast. Multiplanar reformats. Dose reduction techniques were used.    FINDINGS:  VERTEBRA: Normal alignment. Normal vertebral body heights. No fracture or posttraumatic subluxation. Interbody fusion devices at C5-C6 and C6-C7 with ventral fusion hardware. Hardware loosening with lucency surrounding the fixation screws and ventral   migration of the fusion hardware, residing 9 mm anterior to the vertebral bodies.    CANAL/FORAMINA: Mild right foraminal stenosis at C3-C4. Mild spinal canal stenosis C5-C6 with moderate left foraminal stenosis. Mild spinal canal stenosis C6-C7 with severe left and mild right foraminal stenoses. Severe left and mild right foraminal   stenoses at C7-T1.    PARASPINAL: No extraspinal abnormality.      Impression    IMPRESSION:  1.  No fracture or posttraumatic subluxation.  2.  Hardware complication with loosening of ventral fusion plate and screws C5-C7 and ventral migration by 9 mm.   US Lower Extremity Venous Duplex Left    Narrative    EXAM: US LOWER EXTREMITY VENOUS DUPLEX LEFT  LOCATION: [facility]  DATE: 12/6/2023    INDICATION: lt leg pain, r o dvt  COMPARISON: None.  TECHNIQUE: Venous Duplex ultrasound of  the left lower extremity with and without compression, augmentation and duplex. Color flow and spectral Doppler with waveform analysis performed.    FINDINGS: Exam includes the common femoral, femoral, popliteal, and contralateral common femoral veins as well as segmentally visualized deep calf veins and greater saphenous vein.     LEFT: No deep vein thrombosis. No superficial thrombophlebitis. No popliteal cyst.      Impression    IMPRESSION:  1.  No deep venous thrombosis in the left lower extremity.   Echocardiogram Complete     Value    LVEF  60-65%    [facility]    [id number]  [id number]  [id number]  [id number]^SHERRY^ZAK^[place] [address]     Name: BELL THOMAS  MRN: [id number]  : 1950  Study Date: 2023 10:57 AM  Age: 72 yrs  Gender: Female  Patient Location: [facility]  Reason For Study: Syncope  Ordering Physician: ZAK POWELL  Performed By:      BSA: 1.9 m2  Height: 60 in  Weight: 209 lb  HR: 71  ______________________________________________________________________________  Procedure  Complete Portable Echo Adult. Definity (NDC #71902-060) given intravenously.  Compared to the prior study dated 10/31/2022, there have been no changes.  ______________________________________________________________________________  Interpretation Summary     1.Left ventricular size, wall motion and function are normal. The ejection  fraction is 60-65%.  2.There is mild concentric left ventricular hypertrophy.  3.Normal right ventricle size and systolic function.  4.There is discrete nodular thickening of the right coronary cusp. Mild  valvular aortic stenosis. At SVI of 38 mL/M2 peak velocity is 1.9 m/s with a  mean gradient of 8 mmHg and dimensionless index is 0.6.  5.Right ventricular systolic pressure is elevated, consistent with mild to  moderate pulmonary hypertension.  6.Ascending Aorta dilatation is present, borderline  Compared to the prior study  dated 10/31/2022, there have been no changes.  ______________________________________________________________________________  I      WMSI = 1.00     % Normal = 100     X - Cannot   0 -                      (2) - Mildly 2 -          Segments  Size  Interpret    Hyperkinetic 1 - Normal  Hypokinetic  Hypokinetic  1-2     small                                                     7 -          3-5      moderate  3 - Akinetic 4 -          5 -         6 - Akinetic Dyskinetic   6-14    large               Dyskinetic   Aneurysmal  w/scar       w/scar       15-16   diffuse     Left Ventricle  Left ventricular size, wall motion and function are normal. The ejection  fraction is 60-65%. There is mild concentric left ventricular hypertrophy.  Left ventricular diastolic function is normal. No regional wall motion  abnormalities noted.     Right Ventricle  Normal right ventricle size and systolic function. TAPSE is normal, which is  consistent with normal right ventricular systolic function.     Atria  The left atrium is moderately dilated. Right atrial size is normal. There is  no color Doppler evidence of an atrial shunt.     Mitral Valve  There is moderate mitral annular calcification. There is trace mitral  regurgitation. There is no mitral valve stenosis.     Tricuspid Valve  The tricuspid valve is not well visualized, but is grossly normal. There is  mild (1+) tricuspid regurgitation. Right ventricular systolic pressure is  elevated, consistent with mild to moderate pulmonary hypertension.     Aortic Valve  The aortic valve is trileaflet. There is discrete nodular thickening of the  right coronary cusp. No aortic regurgitation is present. Mild valvular aortic  stenosis. At SVI of 38 mL/M2 peak velocity is 1.9 m/s with a mean gradient of  8 mmHg and dimensionless index is 0.6.     Pulmonic Valve  The pulmonic valve is not well seen, but is grossly normal. This degree of  valvular regurgitation is within normal limits. There  is no pulmonic valvular  stenosis.     Vessels  The aorta root is normal. Ascending Aorta dilatation is present. borderline.  IVC diameter <2.1 cm collapsing >50% with sniff suggests a normal RA pressure  of 3 mmHg.     Pericardium  There is no pericardial effusion.     Rhythm  Sinus rhythm was noted.     ______________________________________________________________________________  MMode/2D Measurements & Calculations  IVSd: 1.7 cm  LVIDd: 3.7 cm  LVIDs: 2.7 cm  LVPWd: 1.6 cm     FS: 27.3 %  LV mass(C)d: 251.4 grams  LV mass(C)dI: 132.2 grams/m2  Ao root diam: 3.1 cm  LA dimension: 3.8 cm  asc Aorta Diam: 3.9 cm  LA/Ao: 1.2  LVOT diam: 2.0 cm  LVOT area: 3.1 cm2  Ao root diam index Ht(cm/m): 2.0  Ao root diam index BSA (cm/m2): 1.6  Asc Ao diam index BSA (cm/m2): 2.1  Asc Ao diam index Ht(cm/m): 2.6  LA Volume (BP): 95.5 ml     LA Volume Index (BP): 50.3 ml/m2  LA Volume Indexed (AL/bp): 53.1 ml/m2  RV Base: 3.9 cm  RWT: 0.87  TAPSE: 3.1 cm     Doppler Measurements & Calculations  MV E max ruddy: 90.1 cm/sec  MV A max ruddy: 102.0 cm/sec  MV E/A: 0.88  MV max P.4 mmHg  MV mean P.0 mmHg  MV V2 VTI: 35.8 cm  MVA(VTI): 2.0 cm2     MV dec slope: 274.0 cm/sec2  MV dec time: 0.33 sec  Ao V2 max: 182.0 cm/sec  Ao max P.0 mmHg  Ao V2 mean: 127.0 cm/sec  Ao mean P.0 mmHg  Ao V2 VTI: 36.2 cm  RITA(I,D): 2.0 cm2  RITA(V,D): 1.9 cm2  LV V1 max P.8 mmHg  LV V1 max: 109.0 cm/sec  LV V1 VTI: 22.9 cm  SV(LVOT): 71.9 ml  SI(LVOT): 37.8 ml/m2  PA acc time: 0.09 sec  PI end-d ruddy: 109.0 cm/sec  TR max ruddy: 318.0 cm/sec  TR max P.4 mmHg  AV Ruddy Ratio (DI): 0.60  RITA Index (cm2/m2): 1.0  E/E' av.0  Lateral E/e': 14.8  Medial E/e': 17.3  RV S Ruddy: 13.9 cm/sec     ______________________________________________________________________________  Report approved by: Arelis Sofia 2023 01:35 PM             Discharge Medications   Discharge Medication List as of 2023  1:46 PM        START taking these  medications    Details   cephALEXin (KEFLEX) 500 MG capsule Take 1 capsule (500 mg) by mouth every 8 hours for 5 days, Disp-15 capsule, R-0, E-Prescribe      miconazole (MICATIN) 2 % external powder Apply topically 2 times daily for 5 daysDisp-43 g, [id number]           CONTINUE these medications which have NOT CHANGED    Details   albuterol (PROAIR HFA;PROVENTIL HFA;VENTOLIN HFA) 90 mcg/actuation inhaler Inhale 1 puff into the lungs every 4 hours as needed for shortness of breath / dyspnea, Historical      alendronate (FOSAMAX) 70 MG tablet [ALENDRONATE (FOSAMAX) 70 MG TABLET] Take 70 mg by mouth every 7 days. Takes on Mondays, R-11, Historical      aspirin 81 MG EC tablet 1 tablet Orally Once a day, Historical      calcium citrate (CITRACAL) 950 (200 Ca) MG tablet Take 1 tablet by mouth daily, Historical      cetirizine (ZYRTEC) 10 MG tablet [CETIRIZINE (ZYRTEC) 10 MG TABLET] Take 10 mg by mouth daily. , Historical      cholecalciferol (VITAMIN D3) 25 mcg (1000 units) capsule Take 125 mcg by mouth every morning Take 5000 units in the morning and 2000 units in the evening, Historical      docusate sodium (COLACE) 100 MG capsule Take 100-300 mg by mouth daily as needed, Historical      !! DULoxetine (CYMBALTA) 30 MG capsule Take 60 mg by mouth every morning, Historical      !! DULOXETINE HCL PO Take 30 mg by mouth at bedtime, Historical      esomeprazole (NEXIUM) 40 MG capsule Take 40 mg by mouth daily as needed, Historical      fluticasone-salmeterol (AIRDUO RESPICLICK) 113-14 MCG/ACT inhaler Inhale 1 puff into the lungs 2 times daily, Disp-60 each, R-11, E-Prescribe      !! furosemide (LASIX) 20 MG tablet Take 20 mg by mouth every 24 hours At NOON, Historical      !! furosemide (LASIX) 20 MG tablet Take 40 mg by mouth every morning Take 2 tablets in the morning and 1 tablet at noon., Historical      HYDROcodone-acetaminophen 5-325 mg per tablet Take 1 tablet by mouth every 4 hours as needed for pain Max 6  tablets per day., Historical      Hydroxychloroquine Sulfate 100 MG TABS Take 100 mg by mouth 2 times daily, Historical      l-lysine HCl 500 MG TABS tablet Take 500 mg by mouth 2 times daily, Historical      magnesium oxide 250 mg Tab Take 500 mg by mouth daily as needed (constipation), Historical      metoprolol succinate ER (TOPROL XL) 50 MG 24 hr tablet Take 50 mg by mouth At Bedtime, Historical      modafinil (PROVIGIL) 100 MG tablet [MODAFINIL (PROVIGIL) 100 MG TABLET] Take 250 mg by mouth daily., Historical      montelukast (SINGULAIR) 10 mg tablet [MONTELUKAST (SINGULAIR) 10 MG TABLET] Take 10 mg by mouth daily., Historical      OXYGEN-AIR DELIVERY SYSTEMS MISC [OXYGEN-AIR DELIVERY SYSTEMS MISC] Use 3 L As Directed. 3 lpm with activities and as needed at nightHistorical      senna-docusate (SENOKOT-S/PERICOLACE) 8.6-50 MG tablet Take 1 tablet by mouth 2 times daily as needed for constipation, Disp-30 tablet, R-0, E-Prescribe      solifenacin (VESICARE) 10 MG tablet Take 5-10 mg by mouth At Bedtime, Historical      Turmeric (CURCUPLEX-95) 500 MG CAPS Take 1 capsule by mouth daily, Historical      valACYclovir (VALTREX) 500 MG tablet Take 500 mg by mouth 2 times daily as needed (flare), Historical      Vitamin D3 (CHOLECALCIFEROL) 25 mcg (1000 units) tablet Take 50 mcg by mouth at bedtime, Historical       !! - Potential duplicate medications found. Please discuss with provider.        Allergies   Allergies   Allergen Reactions    Latex Rash     Severe rash    Pregabalin Shortness Of Breath     Other Reaction(s): swelling and shortness of breath    Valsartan Unknown     Hyperkalemia    Colchicine Diarrhea    Dicloxacillin      Other Reaction(s): confusion    Gabapentin      Other Reaction(s): Sedation    Latex Unknown     Added based on information entered during case entry, please review and add reactions, type, and severity as needed    Other Drug Allergy (See Comments) Muscle Pain (Myalgia)     Tried  lovastatin and simvastatin    Other Environmental Allergy Unknown     Coverlet bandaid , 3M product; rash    Statins-Hmg-Coa Reductase Inhibitors [Statins] Muscle Pain (Myalgia)     Tried lovastatin and simvastatin    Sulfa Antibiotics Swelling     Facial swelling      Adhesive Tape Rash

## 2023-12-09 NOTE — PLAN OF CARE
"  Problem: Adult Inpatient Plan of Care  Goal: Plan of Care Review  Description: The Plan of Care Review/Shift note should be completed every shift.  The Outcome Evaluation is a brief statement about your assessment that the patient is improving, declining, or no change.  This information will be displayed automatically on your shift  note.  Outcome: Progressing  Goal: Patient-Specific Goal (Individualized)  Description: You can add care plan individualizations to a care plan. Examples of Individualization might be:  \"Parent requests to be called daily at 9am for status\", \"I have a hard time hearing out of my right ear\", or \"Do not touch me to wake me up as it startles  me\".  Outcome: Progressing  Goal: Absence of Hospital-Acquired Illness or Injury  Outcome: Progressing  Intervention: Identify and Manage Fall Risk  Recent Flowsheet Documentation  Taken 12/9/2023 0200 by Kalie Soto, RN  Safety Promotion/Fall Prevention: activity supervised  Goal: Optimal Comfort and Wellbeing  Outcome: Progressing  Intervention: Monitor Pain and Promote Comfort  Recent Flowsheet Documentation  Taken 12/9/2023 0100 by Kalie Soto, RN  Pain Management Interventions:   medication (see MAR)   repositioned  Goal: Readiness for Transition of Care  Outcome: Progressing   Goal Outcome Evaluation:       Pt alert, oriented X4, able to use call-light and makes needs known, C/O pain to BLE, got narco at 0040 and was effective, IVF infusing at 100ml                   "

## 2023-12-09 NOTE — PLAN OF CARE
"  Problem: Adult Inpatient Plan of Care  Goal: Plan of Care Review  Description: The Plan of Care Review/Shift note should be completed every shift.  The Outcome Evaluation is a brief statement about your assessment that the patient is improving, declining, or no change.  This information will be displayed automatically on your shift  note.  Outcome: Progressing  Goal: Patient-Specific Goal (Individualized)  Description: You can add care plan individualizations to a care plan. Examples of Individualization might be:  \"Parent requests to be called daily at 9am for status\", \"I have a hard time hearing out of my right ear\", or \"Do not touch me to wake me up as it startles  me\".  Outcome: Progressing  Goal: Absence of Hospital-Acquired Illness or Injury  Outcome: Progressing  Intervention: Identify and Manage Fall Risk  Recent Flowsheet Documentation  Taken 12/8/2023 1600 by Lesli Santos RN  Safety Promotion/Fall Prevention: assistive device/personal items within reach  Intervention: Prevent Infection  Recent Flowsheet Documentation  Taken 12/8/2023 1600 by Lesli Santos RN  Infection Prevention: hand hygiene promoted  Goal: Optimal Comfort and Wellbeing  Outcome: Progressing  Intervention: Provide Person-Centered Care  Recent Flowsheet Documentation  Taken 12/8/2023 1600 by Lesli Santos RN  Trust Relationship/Rapport: care explained  Goal: Readiness for Transition of Care  Outcome: Progressing     Problem: Risk for Delirium  Goal: Improved Sleep  Outcome: Progressing     Problem: Pain Acute  Goal: Optimal Pain Control and Function  Outcome: Progressing  Intervention: Prevent or Manage Pain  Recent Flowsheet Documentation  Taken 12/8/2023 1600 by Lesli Santos RN  Bowel Elimination Promotion: adequate fluid intake promoted  Medication Review/Management: medications reviewed     Problem: Infection  Goal: Absence of Infection Signs and Symptoms  Outcome: Progressing     Problem: Gas Exchange " Impaired  Goal: Optimal Gas Exchange  Outcome: Progressing   Goal Outcome Evaluation:         Pt is alert and oriented x4, able to communicate her needs. Pt skin folds has a redness and MD notified and got order for miconazole powder. Pt has a bruises on her abdomen and said that she just found out. WOC consulted. SBA with walker to the bathroom. NS is infusing at 100ml/hr. Vitals are stable and will continue to monitor.

## 2023-12-09 NOTE — PROGRESS NOTES
Assisted patient with own noc BiPAP system placement for the night. Bled-in 4LPM, Sp02 95%. Added distilled H20 to unit as per patient request. Placed continuous pulse oximeter in room.     Brendon Jenkins, RRT

## 2023-12-09 NOTE — CARE PLAN
Patient left leg continues to get better. Redness receeding from outline. Still having discomfort to feet and toes. Patient worried it may be gout.  MD informed patient that may be just from IV fluids. Was given IV lasix to help with the edema will have PT OT seen at home along with nurse for home care. Discharge to home with  this afternoon at 1500.

## 2023-12-10 LAB
ATRIAL RATE - MUSE: 105 BPM
DIASTOLIC BLOOD PRESSURE - MUSE: NORMAL MMHG
INTERPRETATION ECG - MUSE: NORMAL
P AXIS - MUSE: 84 DEGREES
PR INTERVAL - MUSE: 190 MS
QRS DURATION - MUSE: 98 MS
QT - MUSE: 334 MS
QTC - MUSE: 441 MS
R AXIS - MUSE: -60 DEGREES
SYSTOLIC BLOOD PRESSURE - MUSE: NORMAL MMHG
T AXIS - MUSE: 60 DEGREES
VENTRICULAR RATE- MUSE: 105 BPM

## 2023-12-11 LAB — BACTERIA BLD CULT: NO GROWTH

## 2023-12-19 ENCOUNTER — OFFICE VISIT (OUTPATIENT)
Dept: PULMONOLOGY | Facility: CLINIC | Age: 73
End: 2023-12-19
Attending: INTERNAL MEDICINE
Payer: MEDICARE

## 2023-12-19 VITALS
HEART RATE: 70 BPM | WEIGHT: 206 LBS | DIASTOLIC BLOOD PRESSURE: 64 MMHG | SYSTOLIC BLOOD PRESSURE: 132 MMHG | BODY MASS INDEX: 47.88 KG/M2 | OXYGEN SATURATION: 91 %

## 2023-12-19 DIAGNOSIS — I27.20 PULMONARY HYPERTENSION (H): ICD-10-CM

## 2023-12-19 DIAGNOSIS — J45.30 MILD PERSISTENT ASTHMA WITHOUT COMPLICATION: ICD-10-CM

## 2023-12-19 DIAGNOSIS — J96.11 CHRONIC RESPIRATORY FAILURE WITH HYPOXIA (H): Primary | ICD-10-CM

## 2023-12-19 DIAGNOSIS — R06.00 DYSPNEA, UNSPECIFIED TYPE: ICD-10-CM

## 2023-12-19 DIAGNOSIS — G47.33 OSA (OBSTRUCTIVE SLEEP APNEA): ICD-10-CM

## 2023-12-19 DIAGNOSIS — J45.50 SEVERE PERSISTENT ASTHMA WITHOUT COMPLICATION (H): ICD-10-CM

## 2023-12-19 DIAGNOSIS — U07.1 INFECTION DUE TO 2019 NOVEL CORONAVIRUS: ICD-10-CM

## 2023-12-19 DIAGNOSIS — I50.30 HEART FAILURE WITH PRESERVED EJECTION FRACTION, NYHA CLASS II (H): ICD-10-CM

## 2023-12-19 DIAGNOSIS — I35.0 MILD AORTIC STENOSIS: ICD-10-CM

## 2023-12-19 PROCEDURE — 94760 N-INVAS EAR/PLS OXIMETRY 1: CPT | Performed by: INTERNAL MEDICINE

## 2023-12-19 PROCEDURE — 99214 OFFICE O/P EST MOD 30 MIN: CPT | Mod: 25 | Performed by: INTERNAL MEDICINE

## 2023-12-19 ASSESSMENT — ASTHMA QUESTIONNAIRES
ACT_TOTALSCORE: 18
QUESTION_4 LAST FOUR WEEKS HOW OFTEN HAVE YOU USED YOUR RESCUE INHALER OR NEBULIZER MEDICATION (SUCH AS ALBUTEROL): ONCE A WEEK OR LESS
ACT_TOTALSCORE: 18
QUESTION_2 LAST FOUR WEEKS HOW OFTEN HAVE YOU HAD SHORTNESS OF BREATH: ONCE A DAY
QUESTION_5 LAST FOUR WEEKS HOW WOULD YOU RATE YOUR ASTHMA CONTROL: COMPLETELY CONTROLLED
QUESTION_3 LAST FOUR WEEKS HOW OFTEN DID YOUR ASTHMA SYMPTOMS (WHEEZING, COUGHING, SHORTNESS OF BREATH, CHEST TIGHTNESS OR PAIN) WAKE YOU UP AT NIGHT OR EARLIER THAN USUAL IN THE MORNING: NOT AT ALL
QUESTION_1 LAST FOUR WEEKS HOW MUCH OF THE TIME DID YOUR ASTHMA KEEP YOU FROM GETTING AS MUCH DONE AT WORK, SCHOOL OR AT HOME: MOST OF THE TIME

## 2023-12-19 NOTE — PATIENT INSTRUCTIONS
O2 supplementation 3 LPM with activities OR  O2 supplementation 3 LPM pulse dose with activities   Continue the BiPAP at night with the oxygen.  Continue loratadine one pill daily.  Continue montelukast one pill at bedtime.  AirDuo respiclick one inhalation twice daily. Rinse, gargle, and spit water after use.  Use the albuterol (ProAir) as needed.  Use the azelastine eye drops one drop in each eye twice daily.  Watch you diet, minimize salt intake  Continue lasix 40 mg QAM and 20 mg QPM  Extra water pill if weight is up, or increase shortness of breath   Continue physical therapy   Follow up in 5 months

## 2023-12-19 NOTE — PROGRESS NOTES
PULSE DOSE WALK:    Patient's starting saturation is 96%. After ambulating 300ft on pulse dose setting 3 oxygen saturation is 91%.    Patient is ambulatory within his/her home.     Moshe MARROQUIN CMA

## 2023-12-21 NOTE — PROGRESS NOTES
PULMONARY OUTPATIENT FOLLOW UP NOTE        Assessment:      Chronic respiratory failure   Secondary to NELLIE/OHS, morbid obesity. Underlying asthma  Continue O2 supplementation with activities. 3LPM continuously or 3LPM pulse dose.   Asthma mild persistent   Non tobacco use in the past. Worked in the kitchen area.   PFTs 1/2023 showed normal spirometry, lung volumes and diffusion capacity.   Continue ICS/LABA, albuterol HFA as needed  NELLIE on auto BiPAP  Sleep study 2019 showed a moderate obstructive sleep apnea (AHI=17.1), events more frequently occurring during supine sleep (AHI 40.6/hr vs 8.3/hr).  Nocturnal hypoxia.   Auto-titrating Bilevel with an EPAP min of 12, IPAP max of 25, and PS of 7 cmH2O pluse O2 2 LPM was effective.   Good compliance.   HTN, HFpEF, mild aortic stenosis, mild pulmonary hypertension  Low sodium diet. Titrate BP meds and diuretics.   Physical deconditioning  Continue physical therapy sessions  Obesity Body mass index is 47.88 kg/m .     Plan:      O2 supplementation 3 LPM with activities OR   O2 supplementation 3 LPM pulse dose with activities   Continue the BiPAP at night with the oxygen.  Continue loratadine one pill daily.  Continue montelukast one pill at bedtime.  AirDuo respiclick one inhalation twice daily. Rinse, gargle, and spit water after use.  Use the albuterol (ProAir) as needed.  Use the azelastine eye drops one drop in each eye twice daily.  Watch you diet, minimize salt intake  Continue lasix 40 mg QAM and 20 mg QPM  Extra water pill if weight is up, or increase shortness of breath   Continue physical therapy   Follow up in 5 months        Carlos Clayton  Pulmonary / Critical Care  December 20, 2023    I certify that this patient, Desirae Rey has been under my care (or a nurse practitioner or physican's assistant working with me). This is the face-to-face encounter for oxygen medical necessity.      At the time of this encounter supplemental oxygen is  reasonable and necessary and is expected to improve the patient's condition in a home setting.       Patient has continued oxygen desaturation due to Severe Persistent Asthma J45.50  Chronic Heart Failure I50  Chronic Respiratory Failure with Hypoxia J96.11  NELLIE, Morbid Obesity .  If portability is ordered, is the patient mobile within the home? yes        CC:     Chief Complaint   Patient presents with    Follow Up     Asthma and NELLIE       HPI:      Desirae Rey is a 72 year old female who presents for follow up appointment.  Patient has history of HTN, HFpEF, mild aortic stenosis, asthma, NELLIE/OHS on BiPAP, CKD stage III, DVT LLE 84' while on contraceptives, DJD, C5-7 fusion, chronic back pain, morbid obesity, on home O2 3 LPM with activities.   Recent hospitalization from 12/5 to 12/9/2023 for cellulitis of left lower extremity, acute on CKD, s/p fall with rhabdomyolysis. Finished antibiotics. (Keflex), improvement of redness and pain of LLE.   She was recommended to be discharged to nursing home but patient declined, currently participating on physical therapy sessions.   Reports unchanged exertional dyspnea, activity level is limited, able to walk 1/2 block before stopping. Complaints of back pain, knee pain.   Uses inhalers as instructed.   Denies chest pain, orthopnea or PND, reports chronic swelling of LEs. Takes lasix.   Uses BiPAP every night. Feels somewhat refresh in AM.   No tobacco use in the past      Past Medical History :     Past Medical History:   Diagnosis Date    Allergic rhinitis     Arthritis of back     CHF (congestive heart failure) (H)     Chronic kidney disease     stage 3     De Quervain's disease (tenosynovitis)     Fibromyalgia, primary     GERD (gastroesophageal reflux disease)     Hematuria     chronic    High cholesterol     History of blood clots 09/01/1970    DVT, from birth control, h/o left calf    Hyperlipidemia     Hypertension     Low back pain     Osteoporosis     Nicol  syndrome (H)     Plantar fasciitis     Proteinuria     Proteinuria     chronic    Sleep apnea     CPAP    Uncomplicated asthma         Medications:     Current Outpatient Medications   Medication    albuterol (PROAIR HFA;PROVENTIL HFA;VENTOLIN HFA) 90 mcg/actuation inhaler    alendronate (FOSAMAX) 70 MG tablet    aspirin 81 MG EC tablet    calcium citrate (CITRACAL) 950 (200 Ca) MG tablet    cetirizine (ZYRTEC) 10 MG tablet    cholecalciferol (VITAMIN D3) 25 mcg (1000 units) capsule    docusate sodium (COLACE) 100 MG capsule    DULoxetine (CYMBALTA) 30 MG capsule    DULOXETINE HCL PO    esomeprazole (NEXIUM) 40 MG capsule    fluticasone-salmeterol (AIRDUO RESPICLICK) 113-14 MCG/ACT inhaler    furosemide (LASIX) 20 MG tablet    HYDROcodone-acetaminophen 5-325 mg per tablet    Hydroxychloroquine Sulfate 100 MG TABS    l-lysine HCl 500 MG TABS tablet    magnesium oxide 250 mg Tab    metoprolol succinate ER (TOPROL XL) 50 MG 24 hr tablet    modafinil (PROVIGIL) 100 MG tablet    montelukast (SINGULAIR) 10 mg tablet    OXYGEN-AIR DELIVERY SYSTEMS [facility]    senna-docusate (SENOKOT-S/PERICOLACE) 8.6-50 MG tablet    solifenacin (VESICARE) 10 MG tablet    Turmeric (CURCUPLEX-95) 500 MG CAPS    valACYclovir (VALTREX) 500 MG tablet    Vitamin D3 (CHOLECALCIFEROL) 25 mcg (1000 units) tablet     No current facility-administered medications for this visit.            Social History :     Social History     Socioeconomic History    Marital status:      Spouse name: Not on file    Number of children: Not on file    Years of education: Not on file    Highest education level: Not on file   Occupational History    Not on file   Tobacco Use    Smoking status: Never    Smokeless tobacco: Never   Vaping Use    Vaping Use: Never used   Substance and Sexual Activity    Alcohol use: No    Drug use: No    Sexual activity: Not on file   Other Topics Concern    Not on file   Social History Narrative    Not on file     Social Determinants  of Health     Financial Resource Strain: Not on file   Food Insecurity: Not on file   Transportation Needs: Not on file   Physical Activity: Not on file   Stress: Not on file   Social Connections: Not on file   Interpersonal Safety: Not on file   Housing Stability: Not on file          Family History :     Family History   Problem Relation Age of Onset    Rheumatoid Arthritis Mother     Heart Disease Sister     Chronic Obstructive Pulmonary Disease Father        Review of Systems  A 12 point comprehensive review of systems was negative except as noted.        Objective:     /64 (BP Location: Right arm, Patient Position: Chair, Cuff Size: Adult Regular)   Pulse 70   Wt 93.4 kg (206 lb)   SpO2 91%   BMI 47.88 kg/m      Patient's starting saturation is 96%. After ambulating 300ft on pulse dose setting 3 oxygen saturation is 91%.     Gen: obese, awake, alert, no distress  HEENT: pink conjunctiva, moist mucosa, Mallampati III/IV  Neck: no thyromegaly, masses or JVD  Lungs: clear  CV: regular, no murmurs or gallops appreciated  Abdomen: soft, NT, BS wnl  Ext: swelling of LE, 1+ edema  Neuro: CN II-XII intact, non focal      Diagnostic tests:       Sleep study: (4/24/2019)     Diagnostic Portion:  - Respiratory monitoring showed moderate obstructive sleep apnea (AHI=17.1) during light NREM sleep, with events more frequently occurring during supine sleep (AHI 40.6/hr vs 8.3/hr).  - The patient spent a total of 21.9 minutes at an oxygen saturation below 88%.  Profound hypoxia was noted and after 10 minutes of sleep, 1 LPM of supplemental oxygen was added via nasal cannula (which stabilized hypoxia).  - Mild tachypnea was noted throughout  Titration Portion:  A trial of Bi-level PAP in Spontaneous (S) mode was initiated at 8/4 and increased up to 22/12 cmH2O (supplemental oxygen was turned off).    Respiration was stabilized during NREM sleep, albeit with persistent hypoxia.  However, during REM sleep respiratory  events and profound hypoxia returned.    This was considered an unacceptable titration due to persistence of respiratory events and hypoxia.   Mask leak was also problematic.    - Periodic leg movements were observed with low frequency.  - The patient had a Periodic Limb Movement (PLM) index of 2.1 and the ESTEFANIA PLM index was 0.8  Mean hemoglobin oxygen saturation (SaO2) during the diagnostic portion of the PSG in NREM and REM sleep was 84% with a SaO2 desaturation jorge observed to 61%.  - Sleep-related Hypoxia was present as cumulative exposure time in sleep to SaO2 below 88% surpassed 5 total minutes (in this case, 21.9 minutes).    PFTs: (1/13/2023)          IMAGES:     CT CHEST ABDOMEN PELVIS W/O CONTRAST  LOCATION: [facility]  DATE/TIME: 10/31/2022 1:11 AM  INDICATION: ams tender abd  COMPARISON: None.  FINDINGS:   LUNGS AND PLEURA: Scattered subsegmental atelectasis left lung. No pleural effusion.  MEDIASTINUM/AXILLAE: Atherosclerotic aorta. No adenopathy. No significant pericardial effusion. Mitral annular calcification.  CORONARY ARTERY CALCIFICATION: Moderate.  HEPATOBILIARY: Cholecystectomy.  PANCREAS: Fatty atrophy of the pancreas.  SPLEEN: Normal.  ADRENAL GLANDS: Normal.  KIDNEYS/BLADDER: Normal.  BOWEL: Normal caliber. Diverticulosis. Slight thickening of the colon in the left lower quadrant with mild adjacent inflammation.  LYMPH NODES: Normal.  VASCULATURE: Diffuse, atherosclerotic vascular calcification.  PELVIC ORGANS: Normal.   MUSCULOSKELETAL: Fat-containing hernia umbilical region. Degenerative change osseous structures. Postsurgical change cervical spine.  IMPRESSION:  1.  Mild wall thickening of colon in the left lower quadrant with slight adjacent inflammation. Findings could be due to diverticulitis or focal colitis. Follow-up to confirm resolution.  2.  No bowel obstruction or abscess.      VENOUS US LE 12/5/2023  IMPRESSION:  1.  No deep venous thrombosis in the  left lower extremity.    CARDIAC:     Echocardiogram 10/30/2022  The left ventricle is normal in size.  Left ventricular function is normal.The ejection fraction is 60-65%.  Left ventricular diastolic function is abnormal.   The left atrium is mildly dilated.  There is moderate mitral annular calcification.  Mild valvular aortic stenosis.    NM stress test 10/30/2022    The nuclear stress test is negative for inducible myocardial ischemia.    Left ventricular function is normal.    The left ventricular ejection fraction at rest is 65%.    Echocardiogram 12/5/2023  1.Left ventricular size, wall motion and function are normal. The ejection  fraction is 60-65%.  2.There is mild concentric left ventricular hypertrophy.  3.Normal right ventricle size and systolic function.  4.There is discrete nodular thickening of the right coronary cusp. Mild  valvular aortic stenosis. At SVI of 38 mL/M2 peak velocity is 1.9 m/s with a  mean gradient of 8 mmHg and dimensionless index is 0.6.  5.Right ventricular systolic pressure is elevated, consistent with mild to  moderate pulmonary hypertension.  6.Ascending Aorta dilatation is present, borderline  Compared to the prior study dated 10/31/2022, there have been no changes.

## 2023-12-28 ENCOUNTER — LAB REQUISITION (OUTPATIENT)
Dept: LAB | Facility: CLINIC | Age: 73
End: 2023-12-28
Payer: MEDICARE

## 2023-12-28 PROCEDURE — 80048 BASIC METABOLIC PNL TOTAL CA: CPT | Mod: ORL | Performed by: PHYSICIAN ASSISTANT

## 2023-12-28 PROCEDURE — 87086 URINE CULTURE/COLONY COUNT: CPT | Mod: ORL | Performed by: PHYSICIAN ASSISTANT

## 2023-12-29 LAB
ANION GAP SERPL CALCULATED.3IONS-SCNC: 10 MMOL/L (ref 7–15)
BACTERIA UR CULT: NORMAL
BUN SERPL-MCNC: 38.5 MG/DL (ref 8–23)
CALCIUM SERPL-MCNC: 9.3 MG/DL (ref 8.8–10.2)
CHLORIDE SERPL-SCNC: 104 MMOL/L (ref 98–107)
CREAT SERPL-MCNC: 1.2 MG/DL (ref 0.51–0.95)
DEPRECATED HCO3 PLAS-SCNC: 26 MMOL/L (ref 22–29)
EGFRCR SERPLBLD CKD-EPI 2021: 48 ML/MIN/1.73M2
GLUCOSE SERPL-MCNC: 119 MG/DL (ref 70–99)
POTASSIUM SERPL-SCNC: 5.2 MMOL/L (ref 3.4–5.3)
SODIUM SERPL-SCNC: 140 MMOL/L (ref 135–145)

## 2024-01-15 ENCOUNTER — LAB REQUISITION (OUTPATIENT)
Dept: LAB | Facility: CLINIC | Age: 74
End: 2024-01-15
Payer: MEDICARE

## 2024-01-15 DIAGNOSIS — R60.0 LOCALIZED EDEMA: ICD-10-CM

## 2024-01-15 PROCEDURE — 80053 COMPREHEN METABOLIC PANEL: CPT | Mod: ORL | Performed by: PHYSICIAN ASSISTANT

## 2024-01-15 PROCEDURE — 84443 ASSAY THYROID STIM HORMONE: CPT | Mod: ORL | Performed by: PHYSICIAN ASSISTANT

## 2024-01-16 LAB
ALBUMIN SERPL BCG-MCNC: 3.9 G/DL (ref 3.5–5.2)
ALP SERPL-CCNC: 95 U/L (ref 40–150)
ALT SERPL W P-5'-P-CCNC: 35 U/L (ref 0–50)
ANION GAP SERPL CALCULATED.3IONS-SCNC: 14 MMOL/L (ref 7–15)
AST SERPL W P-5'-P-CCNC: 34 U/L (ref 0–45)
BILIRUB SERPL-MCNC: 0.3 MG/DL
BUN SERPL-MCNC: 45.3 MG/DL (ref 8–23)
CALCIUM SERPL-MCNC: 9.6 MG/DL (ref 8.8–10.2)
CHLORIDE SERPL-SCNC: 101 MMOL/L (ref 98–107)
CREAT SERPL-MCNC: 1.48 MG/DL (ref 0.51–0.95)
DEPRECATED HCO3 PLAS-SCNC: 26 MMOL/L (ref 22–29)
EGFRCR SERPLBLD CKD-EPI 2021: 37 ML/MIN/1.73M2
GLUCOSE SERPL-MCNC: 127 MG/DL (ref 70–99)
POTASSIUM SERPL-SCNC: 4.3 MMOL/L (ref 3.4–5.3)
PROT SERPL-MCNC: 6.7 G/DL (ref 6.4–8.3)
SODIUM SERPL-SCNC: 141 MMOL/L (ref 135–145)
TSH SERPL DL<=0.005 MIU/L-ACNC: 2.58 UIU/ML (ref 0.3–4.2)

## 2024-01-31 ENCOUNTER — LAB REQUISITION (OUTPATIENT)
Dept: LAB | Facility: CLINIC | Age: 74
End: 2024-01-31
Payer: MEDICARE

## 2024-01-31 DIAGNOSIS — I50.9 HEART FAILURE, UNSPECIFIED (H): ICD-10-CM

## 2024-01-31 PROCEDURE — 36415 COLL VENOUS BLD VENIPUNCTURE: CPT | Mod: ORL | Performed by: PHYSICIAN ASSISTANT

## 2024-01-31 PROCEDURE — 80048 BASIC METABOLIC PNL TOTAL CA: CPT | Mod: ORL | Performed by: PHYSICIAN ASSISTANT

## 2024-02-01 LAB
ANION GAP SERPL CALCULATED.3IONS-SCNC: 11 MMOL/L (ref 7–15)
BUN SERPL-MCNC: 28.2 MG/DL (ref 8–23)
CALCIUM SERPL-MCNC: 9.7 MG/DL (ref 8.8–10.2)
CHLORIDE SERPL-SCNC: 101 MMOL/L (ref 98–107)
CREAT SERPL-MCNC: 1.28 MG/DL (ref 0.51–0.95)
DEPRECATED HCO3 PLAS-SCNC: 27 MMOL/L (ref 22–29)
EGFRCR SERPLBLD CKD-EPI 2021: 44 ML/MIN/1.73M2
GLUCOSE SERPL-MCNC: 137 MG/DL (ref 70–99)
POTASSIUM SERPL-SCNC: 4.8 MMOL/L (ref 3.4–5.3)
SODIUM SERPL-SCNC: 139 MMOL/L (ref 135–145)

## 2024-03-01 ENCOUNTER — LAB REQUISITION (OUTPATIENT)
Dept: LAB | Facility: CLINIC | Age: 74
End: 2024-03-01
Payer: MEDICARE

## 2024-03-01 DIAGNOSIS — I50.32 CHRONIC DIASTOLIC (CONGESTIVE) HEART FAILURE (H): ICD-10-CM

## 2024-03-01 PROCEDURE — 80048 BASIC METABOLIC PNL TOTAL CA: CPT | Mod: ORL | Performed by: PHYSICIAN ASSISTANT

## 2024-03-01 PROCEDURE — 36415 COLL VENOUS BLD VENIPUNCTURE: CPT | Mod: ORL | Performed by: PHYSICIAN ASSISTANT

## 2024-03-03 LAB
ANION GAP SERPL CALCULATED.3IONS-SCNC: 11 MMOL/L (ref 7–15)
BUN SERPL-MCNC: 49.2 MG/DL (ref 8–23)
CALCIUM SERPL-MCNC: 10 MG/DL (ref 8.8–10.2)
CHLORIDE SERPL-SCNC: 105 MMOL/L (ref 98–107)
CREAT SERPL-MCNC: 1.22 MG/DL (ref 0.51–0.95)
DEPRECATED HCO3 PLAS-SCNC: 26 MMOL/L (ref 22–29)
EGFRCR SERPLBLD CKD-EPI 2021: 47 ML/MIN/1.73M2
GLUCOSE SERPL-MCNC: 120 MG/DL (ref 70–99)
POTASSIUM SERPL-SCNC: 4.8 MMOL/L (ref 3.4–5.3)
SODIUM SERPL-SCNC: 142 MMOL/L (ref 135–145)

## 2024-04-13 ENCOUNTER — APPOINTMENT (OUTPATIENT)
Dept: ULTRASOUND IMAGING | Facility: HOSPITAL | Age: 74
DRG: 871 | End: 2024-04-13
Attending: EMERGENCY MEDICINE
Payer: MEDICARE

## 2024-04-13 ENCOUNTER — HOSPITAL ENCOUNTER (INPATIENT)
Facility: HOSPITAL | Age: 74
LOS: 10 days | Discharge: NURSING, REHAB OR LONG-TERM CARE | DRG: 871 | End: 2024-04-23
Attending: EMERGENCY MEDICINE | Admitting: INTERNAL MEDICINE
Payer: MEDICARE

## 2024-04-13 ENCOUNTER — APPOINTMENT (OUTPATIENT)
Dept: CT IMAGING | Facility: HOSPITAL | Age: 74
DRG: 871 | End: 2024-04-13
Attending: EMERGENCY MEDICINE
Payer: MEDICARE

## 2024-04-13 DIAGNOSIS — M54.12 CERVICAL RADICULOPATHY: ICD-10-CM

## 2024-04-13 DIAGNOSIS — K57.92 DIVERTICULITIS: ICD-10-CM

## 2024-04-13 DIAGNOSIS — E87.5 HYPERKALEMIA: ICD-10-CM

## 2024-04-13 DIAGNOSIS — L89.612 PRESSURE INJURY OF RIGHT HEEL, STAGE 2 (H): Primary | ICD-10-CM

## 2024-04-13 DIAGNOSIS — J96.21 ACUTE AND CHRONIC RESPIRATORY FAILURE WITH HYPOXIA (H): ICD-10-CM

## 2024-04-13 LAB
ALBUMIN SERPL BCG-MCNC: 3.3 G/DL (ref 3.5–5.2)
ALBUMIN UR-MCNC: 100 MG/DL
ALP SERPL-CCNC: 118 U/L (ref 40–150)
ALT SERPL W P-5'-P-CCNC: 27 U/L (ref 0–50)
ANION GAP SERPL CALCULATED.3IONS-SCNC: 13 MMOL/L (ref 7–15)
APPEARANCE UR: CLEAR
AST SERPL W P-5'-P-CCNC: 54 U/L (ref 0–45)
BACTERIA #/AREA URNS HPF: ABNORMAL /HPF
BASE EXCESS BLDV CALC-SCNC: 2.1 MMOL/L (ref -3–3)
BASOPHILS # BLD AUTO: 0.1 10E3/UL (ref 0–0.2)
BASOPHILS NFR BLD AUTO: 0 %
BILIRUB DIRECT SERPL-MCNC: 0.22 MG/DL (ref 0–0.3)
BILIRUB SERPL-MCNC: 0.5 MG/DL
BILIRUB UR QL STRIP: NEGATIVE
BUN SERPL-MCNC: 37 MG/DL (ref 8–23)
CALCIUM SERPL-MCNC: 9.4 MG/DL (ref 8.8–10.2)
CHLORIDE SERPL-SCNC: 96 MMOL/L (ref 98–107)
COLOR UR AUTO: YELLOW
CREAT SERPL-MCNC: 1.39 MG/DL (ref 0.51–0.95)
CREAT SERPL-MCNC: 1.42 MG/DL (ref 0.51–0.95)
DEPRECATED HCO3 PLAS-SCNC: 25 MMOL/L (ref 22–29)
EGFRCR SERPLBLD CKD-EPI 2021: 39 ML/MIN/1.73M2
EGFRCR SERPLBLD CKD-EPI 2021: 40 ML/MIN/1.73M2
EOSINOPHIL # BLD AUTO: 0.1 10E3/UL (ref 0–0.7)
EOSINOPHIL NFR BLD AUTO: 1 %
ERYTHROCYTE [DISTWIDTH] IN BLOOD BY AUTOMATED COUNT: 12.5 % (ref 10–15)
FLUAV RNA SPEC QL NAA+PROBE: NEGATIVE
FLUBV RNA RESP QL NAA+PROBE: NEGATIVE
GLUCOSE BLDC GLUCOMTR-MCNC: 139 MG/DL (ref 70–99)
GLUCOSE SERPL-MCNC: 138 MG/DL (ref 70–99)
GLUCOSE UR STRIP-MCNC: NEGATIVE MG/DL
HCO3 BLDV-SCNC: 28 MMOL/L (ref 21–28)
HCT VFR BLD AUTO: 34.3 % (ref 35–47)
HGB BLD-MCNC: 11.1 G/DL (ref 11.7–15.7)
HGB UR QL STRIP: ABNORMAL
HOLD SPECIMEN: NORMAL
IMM GRANULOCYTES # BLD: 0.2 10E3/UL
IMM GRANULOCYTES NFR BLD: 1 %
KETONES UR STRIP-MCNC: NEGATIVE MG/DL
LACTATE SERPL-SCNC: 0.8 MMOL/L (ref 0.7–2)
LEUKOCYTE ESTERASE UR QL STRIP: NEGATIVE
LIPASE SERPL-CCNC: 7 U/L (ref 13–60)
LYMPHOCYTES # BLD AUTO: 1 10E3/UL (ref 0.8–5.3)
LYMPHOCYTES NFR BLD AUTO: 5 %
MAGNESIUM SERPL-MCNC: 1.8 MG/DL (ref 1.7–2.3)
MCH RBC QN AUTO: 31.4 PG (ref 26.5–33)
MCHC RBC AUTO-ENTMCNC: 32.4 G/DL (ref 31.5–36.5)
MCV RBC AUTO: 97 FL (ref 78–100)
MONOCYTES # BLD AUTO: 2 10E3/UL (ref 0–1.3)
MONOCYTES NFR BLD AUTO: 9 %
NEUTROPHILS # BLD AUTO: 17.9 10E3/UL (ref 1.6–8.3)
NEUTROPHILS NFR BLD AUTO: 84 %
NITRATE UR QL: NEGATIVE
NRBC # BLD AUTO: 0 10E3/UL
NRBC BLD AUTO-RTO: 0 /100
NT-PROBNP SERPL-MCNC: 5303 PG/ML (ref 0–900)
O2/TOTAL GAS SETTING VFR VENT: 36 %
OXYHGB MFR BLDV: 80 % (ref 70–75)
PCO2 BLDV: 52 MM HG (ref 40–50)
PH BLDV: 7.34 [PH] (ref 7.32–7.43)
PH UR STRIP: 6 [PH] (ref 5–7)
PLATELET # BLD AUTO: 426 10E3/UL (ref 150–450)
PO2 BLDV: 47 MM HG (ref 25–47)
POTASSIUM SERPL-SCNC: 4.2 MMOL/L (ref 3.4–5.3)
PROT SERPL-MCNC: 6.5 G/DL (ref 6.4–8.3)
RBC # BLD AUTO: 3.53 10E6/UL (ref 3.8–5.2)
RBC URINE: 1 /HPF
RSV RNA SPEC NAA+PROBE: NEGATIVE
SAO2 % BLDV: 80.5 % (ref 70–75)
SARS-COV-2 RNA RESP QL NAA+PROBE: NEGATIVE
SODIUM SERPL-SCNC: 134 MMOL/L (ref 135–145)
SP GR UR STRIP: 1.02 (ref 1–1.03)
SQUAMOUS EPITHELIAL: <1 /HPF
TROPONIN T SERPL HS-MCNC: 66 NG/L
TROPONIN T SERPL HS-MCNC: 70 NG/L
UROBILINOGEN UR STRIP-MCNC: <2 MG/DL
WBC # BLD AUTO: 21.2 10E3/UL (ref 4–11)
WBC URINE: 1 /HPF

## 2024-04-13 PROCEDURE — 93970 EXTREMITY STUDY: CPT

## 2024-04-13 PROCEDURE — 36415 COLL VENOUS BLD VENIPUNCTURE: CPT | Performed by: STUDENT IN AN ORGANIZED HEALTH CARE EDUCATION/TRAINING PROGRAM

## 2024-04-13 PROCEDURE — 96365 THER/PROPH/DIAG IV INF INIT: CPT

## 2024-04-13 PROCEDURE — 36415 COLL VENOUS BLD VENIPUNCTURE: CPT | Performed by: EMERGENCY MEDICINE

## 2024-04-13 PROCEDURE — 250N000009 HC RX 250: Performed by: EMERGENCY MEDICINE

## 2024-04-13 PROCEDURE — 85025 COMPLETE CBC W/AUTO DIFF WBC: CPT | Performed by: EMERGENCY MEDICINE

## 2024-04-13 PROCEDURE — 94660 CPAP INITIATION&MGMT: CPT

## 2024-04-13 PROCEDURE — 83690 ASSAY OF LIPASE: CPT | Performed by: EMERGENCY MEDICINE

## 2024-04-13 PROCEDURE — 99233 SBSQ HOSP IP/OBS HIGH 50: CPT | Performed by: INTERNAL MEDICINE

## 2024-04-13 PROCEDURE — 80076 HEPATIC FUNCTION PANEL: CPT | Performed by: EMERGENCY MEDICINE

## 2024-04-13 PROCEDURE — 250N000011 HC RX IP 250 OP 636: Performed by: INTERNAL MEDICINE

## 2024-04-13 PROCEDURE — 87637 SARSCOV2&INF A&B&RSV AMP PRB: CPT | Performed by: EMERGENCY MEDICINE

## 2024-04-13 PROCEDURE — 82805 BLOOD GASES W/O2 SATURATION: CPT | Performed by: EMERGENCY MEDICINE

## 2024-04-13 PROCEDURE — 250N000011 HC RX IP 250 OP 636: Performed by: EMERGENCY MEDICINE

## 2024-04-13 PROCEDURE — G1010 CDSM STANSON: HCPCS

## 2024-04-13 PROCEDURE — 83880 ASSAY OF NATRIURETIC PEPTIDE: CPT | Performed by: EMERGENCY MEDICINE

## 2024-04-13 PROCEDURE — 999N000157 HC STATISTIC RCP TIME EA 10 MIN

## 2024-04-13 PROCEDURE — 94640 AIRWAY INHALATION TREATMENT: CPT

## 2024-04-13 PROCEDURE — 87040 BLOOD CULTURE FOR BACTERIA: CPT | Performed by: EMERGENCY MEDICINE

## 2024-04-13 PROCEDURE — 84484 ASSAY OF TROPONIN QUANT: CPT | Performed by: EMERGENCY MEDICINE

## 2024-04-13 PROCEDURE — 81001 URINALYSIS AUTO W/SCOPE: CPT | Performed by: STUDENT IN AN ORGANIZED HEALTH CARE EDUCATION/TRAINING PROGRAM

## 2024-04-13 PROCEDURE — 82565 ASSAY OF CREATININE: CPT | Performed by: INTERNAL MEDICINE

## 2024-04-13 PROCEDURE — 96361 HYDRATE IV INFUSION ADD-ON: CPT

## 2024-04-13 PROCEDURE — 99291 CRITICAL CARE FIRST HOUR: CPT | Mod: 25

## 2024-04-13 PROCEDURE — 5A09357 ASSISTANCE WITH RESPIRATORY VENTILATION, LESS THAN 24 CONSECUTIVE HOURS, CONTINUOUS POSITIVE AIRWAY PRESSURE: ICD-10-PCS | Performed by: EMERGENCY MEDICINE

## 2024-04-13 PROCEDURE — 250N000013 HC RX MED GY IP 250 OP 250 PS 637: Performed by: INTERNAL MEDICINE

## 2024-04-13 PROCEDURE — 74177 CT ABD & PELVIS W/CONTRAST: CPT | Mod: MG

## 2024-04-13 PROCEDURE — 258N000003 HC RX IP 258 OP 636: Performed by: EMERGENCY MEDICINE

## 2024-04-13 PROCEDURE — 200N000001 HC R&B ICU

## 2024-04-13 PROCEDURE — 93005 ELECTROCARDIOGRAM TRACING: CPT | Performed by: EMERGENCY MEDICINE

## 2024-04-13 PROCEDURE — 83735 ASSAY OF MAGNESIUM: CPT | Performed by: EMERGENCY MEDICINE

## 2024-04-13 RX ORDER — DEXTROSE MONOHYDRATE 25 G/50ML
25-50 INJECTION, SOLUTION INTRAVENOUS
Status: DISCONTINUED | OUTPATIENT
Start: 2024-04-13 | End: 2024-04-23 | Stop reason: HOSPADM

## 2024-04-13 RX ORDER — METRONIDAZOLE 500 MG/100ML
500 INJECTION, SOLUTION INTRAVENOUS EVERY 12 HOURS
Status: DISCONTINUED | OUTPATIENT
Start: 2024-04-14 | End: 2024-04-16

## 2024-04-13 RX ORDER — NICOTINE POLACRILEX 4 MG
15-30 LOZENGE BUCCAL
Status: DISCONTINUED | OUTPATIENT
Start: 2024-04-13 | End: 2024-04-15

## 2024-04-13 RX ORDER — AMOXICILLIN 250 MG
2 CAPSULE ORAL 2 TIMES DAILY PRN
Status: DISCONTINUED | OUTPATIENT
Start: 2024-04-13 | End: 2024-04-23 | Stop reason: HOSPADM

## 2024-04-13 RX ORDER — CIPROFLOXACIN 2 MG/ML
400 INJECTION, SOLUTION INTRAVENOUS EVERY 12 HOURS
Status: DISCONTINUED | OUTPATIENT
Start: 2024-04-14 | End: 2024-04-16

## 2024-04-13 RX ORDER — IOPAMIDOL 755 MG/ML
90 INJECTION, SOLUTION INTRAVASCULAR ONCE
Status: COMPLETED | OUTPATIENT
Start: 2024-04-13 | End: 2024-04-13

## 2024-04-13 RX ORDER — METRONIDAZOLE 500 MG/100ML
500 INJECTION, SOLUTION INTRAVENOUS ONCE
Status: COMPLETED | OUTPATIENT
Start: 2024-04-13 | End: 2024-04-13

## 2024-04-13 RX ORDER — FUROSEMIDE 20 MG
1 TABLET ORAL EVERY EVENING
Status: ON HOLD | COMMUNITY
End: 2024-04-23

## 2024-04-13 RX ORDER — ONDANSETRON 4 MG/1
4 TABLET, ORALLY DISINTEGRATING ORAL EVERY 6 HOURS PRN
Status: DISCONTINUED | OUTPATIENT
Start: 2024-04-13 | End: 2024-04-23 | Stop reason: HOSPADM

## 2024-04-13 RX ORDER — DEXTROSE MONOHYDRATE 25 G/50ML
25-50 INJECTION, SOLUTION INTRAVENOUS
Status: DISCONTINUED | OUTPATIENT
Start: 2024-04-13 | End: 2024-04-15

## 2024-04-13 RX ORDER — CALCIUM CARBONATE 500 MG/1
1000 TABLET, CHEWABLE ORAL 4 TIMES DAILY PRN
Status: DISCONTINUED | OUTPATIENT
Start: 2024-04-13 | End: 2024-04-23 | Stop reason: HOSPADM

## 2024-04-13 RX ORDER — AZELASTINE HYDROCHLORIDE 0.5 MG/ML
1 SOLUTION/ DROPS OPHTHALMIC 2 TIMES DAILY PRN
COMMUNITY

## 2024-04-13 RX ORDER — LIDOCAINE 40 MG/G
CREAM TOPICAL
Status: DISCONTINUED | OUTPATIENT
Start: 2024-04-13 | End: 2024-04-23 | Stop reason: HOSPADM

## 2024-04-13 RX ORDER — METRONIDAZOLE 500 MG/100ML
500 INJECTION, SOLUTION INTRAVENOUS EVERY 8 HOURS
Status: DISCONTINUED | OUTPATIENT
Start: 2024-04-13 | End: 2024-04-13

## 2024-04-13 RX ORDER — CIPROFLOXACIN 2 MG/ML
400 INJECTION, SOLUTION INTRAVENOUS ONCE
Status: COMPLETED | OUTPATIENT
Start: 2024-04-13 | End: 2024-04-13

## 2024-04-13 RX ORDER — IPRATROPIUM BROMIDE AND ALBUTEROL SULFATE 2.5; .5 MG/3ML; MG/3ML
3 SOLUTION RESPIRATORY (INHALATION) ONCE
Status: COMPLETED | OUTPATIENT
Start: 2024-04-13 | End: 2024-04-13

## 2024-04-13 RX ORDER — ACETAMINOPHEN 500 MG
500 TABLET ORAL EVERY 6 HOURS PRN
COMMUNITY

## 2024-04-13 RX ORDER — ONDANSETRON 2 MG/ML
4 INJECTION INTRAMUSCULAR; INTRAVENOUS EVERY 6 HOURS PRN
Status: DISCONTINUED | OUTPATIENT
Start: 2024-04-13 | End: 2024-04-23 | Stop reason: HOSPADM

## 2024-04-13 RX ORDER — NYSTATIN 100000 [USP'U]/G
POWDER TOPICAL 2 TIMES DAILY
COMMUNITY

## 2024-04-13 RX ORDER — DOCUSATE SODIUM 100 MG/1
100 CAPSULE, LIQUID FILLED ORAL 2 TIMES DAILY
Status: DISCONTINUED | OUTPATIENT
Start: 2024-04-14 | End: 2024-04-14

## 2024-04-13 RX ORDER — NICOTINE POLACRILEX 4 MG
15-30 LOZENGE BUCCAL
Status: DISCONTINUED | OUTPATIENT
Start: 2024-04-13 | End: 2024-04-23 | Stop reason: HOSPADM

## 2024-04-13 RX ORDER — AMOXICILLIN 250 MG
1 CAPSULE ORAL 2 TIMES DAILY PRN
Status: DISCONTINUED | OUTPATIENT
Start: 2024-04-13 | End: 2024-04-23 | Stop reason: HOSPADM

## 2024-04-13 RX ORDER — ENOXAPARIN SODIUM 100 MG/ML
40 INJECTION SUBCUTANEOUS EVERY 24 HOURS
Status: DISCONTINUED | OUTPATIENT
Start: 2024-04-13 | End: 2024-04-23 | Stop reason: HOSPADM

## 2024-04-13 RX ADMIN — MICONAZOLE NITRATE ANTIFUNGAL POWDER: 2 POWDER TOPICAL at 22:53

## 2024-04-13 RX ADMIN — IPRATROPIUM BROMIDE AND ALBUTEROL SULFATE 3 ML: .5; 3 SOLUTION RESPIRATORY (INHALATION) at 19:20

## 2024-04-13 RX ADMIN — IOPAMIDOL 90 ML: 755 INJECTION, SOLUTION INTRAVENOUS at 19:38

## 2024-04-13 RX ADMIN — SODIUM CHLORIDE 500 ML: 9 INJECTION, SOLUTION INTRAVENOUS at 19:44

## 2024-04-13 RX ADMIN — METRONIDAZOLE 500 MG: 500 INJECTION, SOLUTION INTRAVENOUS at 21:54

## 2024-04-13 RX ADMIN — FAMOTIDINE 20 MG: 10 INJECTION, SOLUTION INTRAVENOUS at 22:32

## 2024-04-13 RX ADMIN — ENOXAPARIN SODIUM 40 MG: 40 INJECTION SUBCUTANEOUS at 22:32

## 2024-04-13 RX ADMIN — CIPROFLOXACIN 400 MG: 400 INJECTION, SOLUTION INTRAVENOUS at 20:52

## 2024-04-13 ASSESSMENT — ACTIVITIES OF DAILY LIVING (ADL)
ADLS_ACUITY_SCORE: 29
ADLS_ACUITY_SCORE: 38

## 2024-04-13 NOTE — ED TRIAGE NOTES
Pt arrives via EMS from home where she lives with family. She has been weak, short of breath, and having leg pain. Went to clinic and found out she ha 5 blood clots in leg (pt does not remember which leg). Not on thinners or other tx. O2 is 88% on  RA, on 4L she is in mid 90s. Has intermittent lethargy and confusion. 137 BG. Large rash underneath breast and in groin area, as well as foul smelling urine      Triage Assessment (Adult)       Row Name 04/13/24 [id number]          Triage Assessment    Airway WDL WDL        Respiratory WDL    Respiratory WDL X;rhythm/pattern     Rhythm/Pattern, Respiratory shortness of breath;labored;dyspnea upon exertion        Skin Circulation/Temperature WDL    Skin Circulation/Temperature WDL X  redness under breasts bilat        Cardiac WDL    Cardiac WDL WDL        Peripheral/Neurovascular WDL    Peripheral Neurovascular WDL X        Cognitive/Neuro/Behavioral WDL    Cognitive/Neuro/Behavioral WDL X     Level of Consciousness alert     Arousal Level opens eyes spontaneously     Mood/Behavior calm;cooperative        Pupils (CN II)    Pupil PERRLA yes     Pupil Size Left 3 mm     Pupil Size Right 3 mm        Alexandro Coma Scale    Best Eye Response 4-->(E4) spontaneous     Best Motor Response 6-->(M6) obeys commands     Best Verbal Response 4-->(V4) confused  intermittent     [place] Coma Scale Score 14

## 2024-04-13 NOTE — ED PROVIDER NOTES
EMERGENCY DEPARTMENT ENCOUNTER      NAME: Desirae Rey  AGE: 73 year old female  YOB: 1950  MRN: [id number]  EVALUATION DATE & TIME: 4/13/2024  6:15 PM    PCP: Noemy Brito    ED PROVIDER: Abdoulaye Ma M.D.      Chief Complaint   Patient presents with    Shortness of Breath         FINAL IMPRESSION:  1. Diverticulitis    2. Acute and chronic respiratory failure with hypoxia (H)          ED COURSE & MEDICAL DECISION MAKING:    Pertinent Labs & Imaging studies reviewed. (See chart for details)  6:46 PM I met with the patient to gather history and perform an initial exam.  8:22 PM Repeat exam unchanged, patient continues to appear sleepy, discussed findings of diverticulitis.  9:03 PM Spoke with  and RT at bedside now performing evaluation, BiPAP.  9:21 PM I talked with intensivist, Dr. Flores.  9:40 PM I talked with hospitalist, Dr. Gauthier.    73 year old female presents to the Emergency Department for evaluation of shortness of breath.  Patient appears ill, she is tachypneic and on supplemental oxygen.  Abdomen demonstrates diffuse tenderness, denies chest pain, no significant wheezing on auscultation of the lungs.  No significant peripheral edema, no unilateral calf tenderness.  Patient is poor historian but states that she has been having loss of appetite, short of breath.  Clearly denies any falls or trauma, chest pain.  Per report patient had some blood clots found on ultrasound imaging done at [facility].  I was able to find ultrasound venous duplex incompetency study, bilateral performed on on 4/8/2024, report does indicate near diffuse incompetence of the great saphenous vein including terminal incompetence at the saphenofemoral junction however there is no clear report of any deep venous thrombosis.  Per review of the medical record did review discharge summary from [facility] on 12/9/2023 patient was admitted for lower extremity cellulitis, GRACY,  she had a history of pulmonary hypertension and chronic respiratory failure, office visit from 12/19/2023 through pulmonary outpatient follow-up did note history of chronic respiratory failure secondary to NELLIE/OHS, mild persistent asthma.  Certainly concern for ACS, asthma, pulmonary hypertension, CHF, considered but less likely PE since outpatient ultrasound study did not clearly state that she had deep venous thrombosis.  Concern for UTI, less likely appendicitis, diverticulitis, pancreatitis, less likely cholecystitis.  We will obtain screening labs, urine studies, CT of the chest abdomen pelvis, portable chest x-ray and the patient was given DuoNebs.  Consulted respiratory therapy for initiation of BiPAP therapy.    Reassessment: Please see critical care note below.    Medical Decision Making  Obtained supplemental history:Supplemental history obtained?: No  Reviewed external records: External records reviewed?: Documented in chart  Care impacted by chronic illness:Chronic Kidney Disease, Heart Disease, Hyperlipidemia, and Hypertension  Care significantly affected by social determinants of health:N/A  Did you consider but not order tests?: Work up considered but not performed and documented in chart, if applicable  Did you interpret images independently?: Independent interpretation of ECG and images noted in documentation, when applicable.  Consultation discussion with other provider:Did you involve another provider (consultant, MH, pharmacy, etc.)?: I discussed the care with another health care provider, see documentation for details.  Admit.      At the conclusion of the encounter I discussed the results of all of the tests and the disposition. The questions were answered and return precautions provided. The patient or family acknowledged understanding and was agreeable with the care plan.         MEDICATIONS GIVEN IN THE EMERGENCY:  Medications   glucose gel 15-30 g (has no administration in time range)      Or   dextrose 50 % injection 25-50 mL (has no administration in time range)     Or   glucagon injection 1 mg (has no administration in time range)   enoxaparin ANTICOAGULANT (LOVENOX) injection 40 mg (40 mg Subcutaneous $Given 4/13/24 2232)   metroNIDAZOLE (FLAGYL) infusion 500 mg (has no administration in time range)   ciprofloxacin (CIPRO) infusion 400 mg (has no administration in time range)   lidocaine 1 % 0.1-1 mL (has no administration in time range)   lidocaine (LMX4) cream (has no administration in time range)   sodium chloride (PF) 0.9% PF flush 3 mL (3 mLs Intracatheter $Given 4/13/24 2235)   sodium chloride (PF) 0.9% PF flush 3 mL (has no administration in time range)   senna-docusate (SENOKOT-S/PERICOLACE) 8.6-50 MG per tablet 1 tablet (has no administration in time range)     Or   senna-docusate (SENOKOT-S/PERICOLACE) 8.6-50 MG per tablet 2 tablet (has no administration in time range)   calcium carbonate (TUMS) chewable tablet 1,000 mg (has no administration in time range)   docusate sodium (COLACE) capsule 100 mg (has no administration in time range)   ondansetron (ZOFRAN ODT) ODT tab 4 mg (has no administration in time range)     Or   ondansetron (ZOFRAN) injection 4 mg (has no administration in time range)   famotidine (PEPCID) injection 20 mg (20 mg Intravenous $Given 4/13/24 2232)   miconazole (MICATIN) 2 % powder ( Topical $Given 4/13/24 2253)   glucose gel 15-30 g (has no administration in time range)     Or   dextrose 50 % injection 25-50 mL (has no administration in time range)     Or   glucagon injection 1 mg (has no administration in time range)   insulin aspart (NovoLOG) injection (RAPID ACTING) ( Subcutaneous Not Given 4/13/24 2240)   sodium chloride 0.9% BOLUS 500 mL (0 mLs Intravenous Stopped 4/13/24 2025)   ipratropium - albuterol 0.5 mg/2.5 mg/3 mL (DUONEB) neb solution 3 mL (3 mLs Nebulization $Given 4/13/24 1920)   iopamidol (ISOVUE-370) solution 90 mL (90 mLs Intravenous $Given  4/13/24 1938)   ciprofloxacin (CIPRO) infusion 400 mg (0 mg Intravenous Stopped 4/13/24 2152)   metroNIDAZOLE (FLAGYL) infusion 500 mg (500 mg Intravenous $New Bag 4/13/24 2154)       NEW PRESCRIPTIONS STARTED AT TODAY'S ER VISIT  Current Discharge Medication List               =================================================================    HPI    Patient information was obtained from: patient     Use of Intrepreter: N/A       Desirae Rey is a 73 year old female who presents shortness of breath.    Patient provides a limited history.     Patient reports that she is short of breath and has been having a loss of appetite. Endorses pain in her feet bilaterally and is nauseated. Says she has 4-5 blood clots in both of her legs and this diagnosed on Thursday (4/11/2024). Denies chest pain, vomit, and hematuria.     REVIEW OF SYSTEMS   Constitutional:  Denies fever, chills. Endorses loss of appetite  Respiratory:  Denies productive cough. Endorses shortness of breath.   Cardiovascular:  Denies chest pain, palpitations  GI:  Denies abdominal pain, nausea, vomiting, or change in bowel or bladder habits   Musculoskeletal:  Denies any new muscle/joint swelling. Endorses pain in feet bilaterally.   Skin:  Denies rash   Neurologic:  Denies focal weakness  All systems negative except as marked.     PAST MEDICAL HISTORY:  Past Medical History:   Diagnosis Date    Allergic rhinitis     Arthritis of back     CHF (congestive heart failure) (H)     Chronic kidney disease     stage 3     De Quervain's disease (tenosynovitis)     Fibromyalgia, primary     GERD (gastroesophageal reflux disease)     Hematuria     chronic    High cholesterol     History of blood clots 09/01/1970    DVT, from birth control, h/o left calf    Hyperlipidemia     Hypertension     Low back pain     Osteoporosis     Pickwickian syndrome (H)     Plantar fasciitis     Proteinuria     Proteinuria     chronic    Sleep apnea     CPAP    Uncomplicated asthma         PAST SURGICAL HISTORY:  Past Surgical History:   Procedure Laterality Date    CERVICAL FUSION N/A 4/27/2017    Procedure: ANTERIOR CERVICAL DECOMPRESSION FUSION C5-7 BILATERAL;  Surgeon: Basim Barone MD;  Location: [facility] [place];  Service:     CHOLECYSTECTOMY      open    COLONOSCOPY N/A 12/20/2019    Procedure: COLONOSCOPY WITH BIOPSIES;  Surgeon: Lucio Farr MD;  Location: [facility] [place];  Service: Gastroenterology    EYE SURGERY      HAND SURGERY Right     HYSTEROSCOPY DIAGNOSTIC      JOINT REPLACEMENT      bilateral TKA    KNEE SURGERY      RELEASE CARPAL TUNNEL Bilateral          CURRENT MEDICATIONS:    Prior to Admission medications    Medication Sig Start Date End Date Taking? Authorizing Provider   albuterol (PROAIR HFA;PROVENTIL HFA;VENTOLIN HFA) 90 mcg/actuation inhaler Inhale 1 puff into the lungs every 4 hours as needed for shortness of breath / dyspnea 3/13/19   Provider, Historical   alendronate (FOSAMAX) 70 MG tablet [ALENDRONATE (FOSAMAX) 70 MG TABLET] Take 70 mg by mouth every 7 days. Takes on Mondays 7/12/17   Provider, Historical   aspirin 81 MG EC tablet 1 tablet Orally Once a day    Reported, Patient   calcium citrate (CITRACAL) 950 (200 Ca) MG tablet Take 1 tablet by mouth daily    Unknown, Entered By History   cetirizine (ZYRTEC) 10 MG tablet [CETIRIZINE (ZYRTEC) 10 MG TABLET] Take 10 mg by mouth daily.  1/8/14   Provider, Historical   cholecalciferol (VITAMIN D3) 25 mcg (1000 units) capsule Take 125 mcg by mouth every morning Take 5000 units in the morning and 2000 units in the evening    Provider, Historical   docusate sodium (COLACE) 100 MG capsule Take 100-300 mg by mouth daily as needed    Reported, Patient   DULoxetine (CYMBALTA) 30 MG capsule Take 60 mg by mouth every morning    Unknown, Entered By History   DULOXETINE HCL PO Take 30 mg by mouth at bedtime    Unknown, Entered By History   esomeprazole (NEXIUM) 40 MG capsule Take 40 mg by mouth daily as  needed 12/18/13   Provider, Historical   fluticasone-salmeterol (AIRDUO RESPICLICK) 113-14 MCG/ACT inhaler Inhale 1 puff into the lungs 2 times daily 1/18/23   Nitin Giang MD   furosemide (LASIX) 20 MG tablet Take 40 mg by mouth every morning Take 2 tablets in the morning and 1 tablet at noon.    Unknown, Entered By History   HYDROcodone-acetaminophen 5-325 mg per tablet Take 1 tablet by mouth every 4 hours as needed for pain Max 6 tablets per day. 12/20/19   Provider, Historical   Hydroxychloroquine Sulfate 100 MG TABS Take 100 mg by mouth 2 times daily    Unknown, Entered By History   l-lysine HCl 500 MG TABS tablet Take 500 mg by mouth 2 times daily    Unknown, Entered By History   magnesium oxide 250 mg Tab Take 500 mg by mouth daily as needed (constipation) 12/18/13   Provider, Historical   metoprolol succinate ER (TOPROL XL) 50 MG 24 hr tablet Take 50 mg by mouth At Bedtime    Unknown, Entered By History   modafinil (PROVIGIL) 100 MG tablet [MODAFINIL (PROVIGIL) 100 MG TABLET] Take 250 mg by mouth daily. 12/20/19   Provider, Historical   montelukast (SINGULAIR) 10 mg tablet [MONTELUKAST (SINGULAIR) 10 MG TABLET] Take 10 mg by mouth daily. 1/28/14   Jordan Moreno MD   OXYGEN-AIR DELIVERY SYSTEMS MISC [OXYGEN-AIR DELIVERY SYSTEMS MISC] Use 3 L As Directed. 3 lpm with activities and as needed at night 5/8/19   Provider, Historical   senna-docusate (SENOKOT-S/PERICOLACE) 8.6-50 MG tablet Take 1 tablet by mouth 2 times daily as needed for constipation 11/3/22   Jesus Alberto Thomas MD   solifenacin (VESICARE) 10 MG tablet Take 5-10 mg by mouth At Bedtime    Unknown, Entered By History   Turmeric (CURCUPLEX-95) 500 MG CAPS Take 1 capsule by mouth daily    Reported, Patient   valACYclovir (VALTREX) 500 MG tablet Take 500 mg by mouth 2 times daily as needed (flare) 1/15/15   Provider, Historical   Vitamin D3 (CHOLECALCIFEROL) 25 mcg (1000 units) tablet Take 50 mcg by mouth at bedtime    Unknown, Entered  By History        ALLERGIES:  Allergies   Allergen Reactions    Latex Rash     Severe rash    Pregabalin Shortness Of Breath     Other Reaction(s): swelling and shortness of breath    Valsartan Unknown     Hyperkalemia    Colchicine Diarrhea    Dicloxacillin      Other Reaction(s): confusion    Gabapentin      Other Reaction(s): Sedation    Latex Unknown     Added based on information entered during case entry, please review and add reactions, type, and severity as needed    Other Drug Allergy (See Comments) Muscle Pain (Myalgia)     Tried lovastatin and simvastatin    Other Environmental Allergy Unknown     Coverlet bandaid , 3M product; rash    Statins-Hmg-Coa Reductase Inhibitors [Statins] Muscle Pain (Myalgia)     Tried lovastatin and simvastatin    Sulfa Antibiotics Swelling     Facial swelling      Adhesive Tape Rash       FAMILY HISTORY:  Family History   Problem Relation Age of Onset    Rheumatoid Arthritis Mother     Heart Disease Sister     Chronic Obstructive Pulmonary Disease Father        SOCIAL HISTORY:   Social History     Socioeconomic History    Marital status:    Tobacco Use    Smoking status: Never    Smokeless tobacco: Never   Vaping Use    Vaping status: Never Used   Substance and Sexual Activity    Alcohol use: No    Drug use: No     Social Determinants of Health      [facility] [employer] [facility] [employer]    Social Connections       VITALS:  Patient Vitals for the past 24 hrs:   BP Temp Temp src Pulse Resp SpO2 Height Weight   04/13/24 2200 -- 98.4  F (36.9  C) Axillary 78 -- 98 % -- 96.6 kg (212 lb 15.4 oz)   04/13/24 2130 (!) 141/70 -- -- 77 (!) 31 99 % -- --   04/13/24 2113 -- -- -- 79 23 99 % -- --   04/13/24 2100 138/69 -- -- 96 (!) 34 96 % -- --   04/13/24 1942 -- -- -- 96 (!) 32 100 % -- --   04/13/24 1856 -- -- -- 90 (!) 34 93 % -- --   04/13/24 1855 -- -- -- 107 (!) 37 93 % -- --   04/13/24 1854 -- -- -- 117 (!)  "36 93 % -- --   04/13/24 1853 -- -- -- (!) 122 (!) 33 93 % -- --   04/13/24 1852 -- -- -- (!) 123 (!) 33 93 % -- --   04/13/24 1851 -- -- -- 92 (!) 41 93 % -- --   04/13/24 1850 -- -- -- 107 (!) 37 93 % -- --   04/13/24 1849 -- -- -- 107 (!) 49 95 % -- --   04/13/24 1823 -- -- -- -- -- -- 1.397 m (4' 7\") 90.7 kg (200 lb)   04/13/24 1822 128/60 100  F (37.8  C) Oral 87 24 95 % -- --        PHYSICAL EXAM    Constitutional:  Awake, difficult to arouse, increased respiratory rate  HENT:  Normocephalic, Atraumatic. Bilateral external ears normal. Oropharynx moist. Nose normal. Neck- Normal range of motion with no guarding, No midline cervical tenderness, Supple, No stridor.   Eyes:  PERRL, EOMI with no signs of entrapment, Conjunctiva normal, No discharge.   Respiratory: Increased work of breathing.  No wheezing  Cardiovascular:  Normal heart rate, Normal rhythm, No appreciable rubs or gallops.   GI:  Soft, generalized tenderness, No distension, No palpable masses  Musculoskeletal:  Intact distal pulses, No edema. Good range of motion in all major joints. No tenderness to palpation or major deformities noted.  Integument:  Warm, Dry, No erythema, No rash.   Neurologic:  Alert & oriented, Normal motor function, Normal sensory function, No focal deficits noted.   Psychiatric:  Affect normal, Judgment normal, Mood normal.     LAB:  All pertinent labs reviewed and interpreted.  Results for orders placed or performed during the hospital encounter of 04/13/24   CT Chest Pulmonary Embolism w Contrast    Impression    IMPRESSION:  1.  Moderate respiratory motion artifact limits some detail of the subsegmental pulmonary arteries in the lung bases as well as portions of the lungs. No pulmonary emboli appreciated.  2.  No evidence for pulmonary consolidation or pleural fluid.   CT Abdomen Pelvis w Contrast    Impression    IMPRESSION:   1.  Acute diverticulitis involving the upper sigmoid colon.  2.  Moderate formed stool noted in " the colon extending to the level of the site of diverticulitis. Distal to this the colon is decompressed.  3.  No evidence for free fluid or abscess.  4.  Prior cholecystectomy.   US Lower Extremity Venous Duplex Bilateral    Impression    IMPRESSION:  1.  No deep venous thrombosis in the bilateral lower extremities.   UA with Microscopic reflex to Culture    Specimen: Urine, Clean Catch   Result Value Ref Range    Color Urine Yellow Colorless, Straw, Light Yellow, Yellow    Appearance Urine Clear Clear    Glucose Urine Negative Negative mg/dL    Bilirubin Urine Negative Negative    Ketones Urine Negative Negative mg/dL    Specific Gravity Urine 1.022 1.001 - 1.030    Blood Urine 0.03 mg/dL (A) Negative    pH Urine 6.0 5.0 - 7.0    Protein Albumin Urine 100 (A) Negative mg/dL    Urobilinogen Urine <2.0 <2.0 mg/dL    Nitrite Urine Negative Negative    Leukocyte Esterase Urine Negative Negative    Bacteria Urine Few (A) None Seen /HPF    RBC Urine 1 <=2 /HPF    WBC Urine 1 <=5 /HPF    Squamous Epithelials Urine <1 <=1 /HPF   Extra Blue Top Tube   Result Value Ref Range    Hold Specimen JIC    Extra Red Top Tube   Result Value Ref Range    Hold Specimen JIC    Extra Green Top (Lithium Heparin) Tube   Result Value Ref Range    Hold Specimen JIC    Extra Purple Top Tube   Result Value Ref Range    Hold Specimen JIC    Extra Heparinized Syringe   Result Value Ref Range    Hold Specimen JIC    Extra Green Top (Lithium Heparin) ON ICE   Result Value Ref Range    Hold Specimen JIC    Basic metabolic panel   Result Value Ref Range    Sodium 134 (L) 135 - 145 mmol/L    Potassium 4.2 3.4 - 5.3 mmol/L    Chloride 96 (L) 98 - 107 mmol/L    Carbon Dioxide (CO2) 25 22 - 29 mmol/L    Anion Gap 13 7 - 15 mmol/L    Urea Nitrogen 37.0 (H) 8.0 - 23.0 mg/dL    Creatinine 1.39 (H) 0.51 - 0.95 mg/dL    GFR Estimate 40 (L) >60 mL/min/1.73m2    Calcium 9.4 8.8 - 10.2 mg/dL    Glucose 138 (H) 70 - 99 mg/dL   Result Value Ref Range    Troponin  T, High Sensitivity 70 (H) <=14 ng/L   Nt probnp inpatient (BNP)   Result Value Ref Range    N terminal Pro BNP Inpatient 5,303 (H) 0 - 900 pg/mL   Blood gas venous   Result Value Ref Range    pH Venous 7.34 7.32 - 7.43    pCO2 Venous 52 (H) 40 - 50 mm Hg    pO2 Venous 47 25 - 47 mm Hg    Bicarbonate Venous 28 21 - 28 mmol/L    Base Excess/Deficit Venous 2.1 -3.0 - 3.0 mmol/L    FIO2 36     Oxyhemoglobin Venous 80 (H) 70 - 75 %    O2 Sat, Venous 80.5 (H) 70.0 - 75.0 %    Lactic Acid 0.8 0.7 - 2.0 mmol/L   Symptomatic Influenza A/B, RSV, & SARS-CoV2 PCR (COVID-19) Nasopharyngeal    Specimen: Nasopharyngeal; Swab   Result Value Ref Range    Influenza A PCR Negative Negative    Influenza B PCR Negative Negative    RSV PCR Negative Negative    SARS CoV2 PCR Negative Negative   CBC with platelets and differential   Result Value Ref Range    WBC Count 21.2 (H) 4.0 - 11.0 10e3/uL    RBC Count 3.53 (L) 3.80 - 5.20 10e6/uL    Hemoglobin 11.1 (L) 11.7 - 15.7 g/dL    Hematocrit 34.3 (L) 35.0 - 47.0 %    MCV 97 78 - 100 fL    MCH 31.4 26.5 - 33.0 pg    MCHC 32.4 31.5 - 36.5 g/dL    RDW 12.5 10.0 - 15.0 %    Platelet Count 426 150 - 450 10e3/uL    % Neutrophils 84 %    % Lymphocytes 5 %    % Monocytes 9 %    % Eosinophils 1 %    % Basophils 0 %    % Immature Granulocytes 1 %    NRBCs per 100 WBC 0 <1 /100    Absolute Neutrophils 17.9 (H) 1.6 - 8.3 10e3/uL    Absolute Lymphocytes 1.0 0.8 - 5.3 10e3/uL    Absolute Monocytes 2.0 (H) 0.0 - 1.3 10e3/uL    Absolute Eosinophils 0.1 0.0 - 0.7 10e3/uL    Absolute Basophils 0.1 0.0 - 0.2 10e3/uL    Absolute Immature Granulocytes 0.2 <=0.4 10e3/uL    Absolute NRBCs 0.0 10e3/uL   Hepatic function panel   Result Value Ref Range    Protein Total 6.5 6.4 - 8.3 g/dL    Albumin 3.3 (L) 3.5 - 5.2 g/dL    Bilirubin Total 0.5 <=1.2 mg/dL    Alkaline Phosphatase 118 40 - 150 U/L    AST 54 (H) 0 - 45 U/L    ALT 27 0 - 50 U/L    Bilirubin Direct 0.22 0.00 - 0.30 mg/dL   Result Value Ref Range     Lipase 7 (L) 13 - 60 U/L   Result Value Ref Range    Magnesium 1.8 1.7 - 2.3 mg/dL   Result Value Ref Range    Troponin T, High Sensitivity 66 (H) <=14 ng/L   Creatinine   Result Value Ref Range    Creatinine 1.42 (H) 0.51 - 0.95 mg/dL    GFR Estimate 39 (L) >60 mL/min/1.73m2   Glucose by meter   Result Value Ref Range    GLUCOSE BY METER POCT 139 (H) 70 - 99 mg/dL       RADIOLOGY:  US Lower Extremity Venous Duplex Bilateral   Final Result   IMPRESSION:   1.  No deep venous thrombosis in the bilateral lower extremities.      CT Abdomen Pelvis w Contrast   Final Result   IMPRESSION:    1.  Acute diverticulitis involving the upper sigmoid colon.   2.  Moderate formed stool noted in the colon extending to the level of the site of diverticulitis. Distal to this the colon is decompressed.   3.  No evidence for free fluid or abscess.   4.  Prior cholecystectomy.      CT Chest Pulmonary Embolism w Contrast   Final Result   IMPRESSION:   1.  Moderate respiratory motion artifact limits some detail of the subsegmental pulmonary arteries in the lung bases as well as portions of the lungs. No pulmonary emboli appreciated.   2.  No evidence for pulmonary consolidation or pleural fluid.             EKG:    Sinus rhythm, no specific ST acute ischemic changes, no concerning dysrhythmias, did note QTc of 418 ms, when compared to ECG of December 5, 2023, no specific ST acute ischemic change appreciated  I have independently reviewed and interpreted the EKG(s) documented above.    PROCEDURES    CRITICAL CARE NOTE:  Indication: Respiratory distress with hypoxia, diverticulitis  Interventions: Patient placed on continuous pulse oximetry and cardiac telemetry.  Concern for respiratory arrest given increased work of breathing and hypoxia, patient was placed on supplemental oxygen, given DuoNeb and BiPAP.  Laboratory studies here concerning for elevated BNP, troponin of 70.  Initial blood gas showed pH of 7.34, no marked pCO2 retention of  52.  Did note markedly elevated white blood cell count of 21.2 concerning for infection.  COVID, influenza and RSV negative.  CT imaging of the chest reported no acute concerning findings and CT imaging of the abdomen pelvis reported findings concerning for diverticulitis.  Urine showed blood but no signs of infection.  Blood cultures and urine cultures pending.  Patient initiated on broad-spectrum antibiotics, Cipro and Flagyl for her diverticulitis.  Continued to improve on BiPAP will need admission to the ICU for continued management, particularly with the BiPAP settings.  Discussed these findings and need for admission with patient and family and they in agreement.  Discussed care plan with the intensivist and hospitalist.  Treatments: BiPAP, IV Flagyl, IV ciprofloxacin, IV fluids, DuoNeb  Critical Care time excluding procedures: 60 minutes      [employer] Documentation:       I, Rosalind Sauceda, am serving as a scribe to document services personally performed by Abdoulaye Ma MD, based on my observation and the provider's statements to me. IAbdoulaye MD attest that Rosalind Sauceda is acting in a scribe capacity, has observed my performance of the services and has documented them in accordance with my direction.    Abdoulaye Ma M.D.  Emergency Medicine  [facility] EMERGENCY DEPARTMENT  [facility] [facility] [id number] [phone]  Dept: [phone]      Abdoulaye Ma MD  04/13/24 [id number]

## 2024-04-14 PROBLEM — I27.20 PULMONARY HYPERTENSION (H): Status: ACTIVE | Noted: 2024-04-14

## 2024-04-14 PROBLEM — A41.9 SEPSIS (H): Status: ACTIVE | Noted: 2024-04-14

## 2024-04-14 PROBLEM — I50.9 CHF (CONGESTIVE HEART FAILURE) (H): Status: ACTIVE | Noted: 2024-04-14

## 2024-04-14 LAB
ALBUMIN SERPL BCG-MCNC: 3 G/DL (ref 3.5–5.2)
ALP SERPL-CCNC: 128 U/L (ref 40–150)
ALT SERPL W P-5'-P-CCNC: 23 U/L (ref 0–50)
ANION GAP SERPL CALCULATED.3IONS-SCNC: 11 MMOL/L (ref 7–15)
AST SERPL W P-5'-P-CCNC: 41 U/L (ref 0–45)
ATRIAL RATE - MUSE: 85 BPM
BASE EXCESS BLDV CALC-SCNC: 1.7 MMOL/L (ref -3–3)
BASOPHILS # BLD AUTO: 0 10E3/UL (ref 0–0.2)
BASOPHILS NFR BLD AUTO: 0 %
BILIRUB SERPL-MCNC: 0.3 MG/DL
BUN SERPL-MCNC: 32.2 MG/DL (ref 8–23)
CALCIUM SERPL-MCNC: 8.9 MG/DL (ref 8.8–10.2)
CHLORIDE SERPL-SCNC: 102 MMOL/L (ref 98–107)
CREAT SERPL-MCNC: 1.22 MG/DL (ref 0.51–0.95)
DEPRECATED HCO3 PLAS-SCNC: 25 MMOL/L (ref 22–29)
DIASTOLIC BLOOD PRESSURE - MUSE: 60 MMHG
EGFRCR SERPLBLD CKD-EPI 2021: 47 ML/MIN/1.73M2
EOSINOPHIL # BLD AUTO: 0.1 10E3/UL (ref 0–0.7)
EOSINOPHIL NFR BLD AUTO: 1 %
ERYTHROCYTE [DISTWIDTH] IN BLOOD BY AUTOMATED COUNT: 12.5 % (ref 10–15)
GLUCOSE BLDC GLUCOMTR-MCNC: 107 MG/DL (ref 70–99)
GLUCOSE BLDC GLUCOMTR-MCNC: 108 MG/DL (ref 70–99)
GLUCOSE BLDC GLUCOMTR-MCNC: 121 MG/DL (ref 70–99)
GLUCOSE BLDC GLUCOMTR-MCNC: 121 MG/DL (ref 70–99)
GLUCOSE BLDC GLUCOMTR-MCNC: 123 MG/DL (ref 70–99)
GLUCOSE BLDC GLUCOMTR-MCNC: 132 MG/DL (ref 70–99)
GLUCOSE BLDC GLUCOMTR-MCNC: 147 MG/DL (ref 70–99)
GLUCOSE SERPL-MCNC: 110 MG/DL (ref 70–99)
HCO3 BLDV-SCNC: 27 MMOL/L (ref 21–28)
HCT VFR BLD AUTO: 32.8 % (ref 35–47)
HGB BLD-MCNC: 10.5 G/DL (ref 11.7–15.7)
IMM GRANULOCYTES # BLD: 0.1 10E3/UL
IMM GRANULOCYTES NFR BLD: 1 %
INTERPRETATION ECG - MUSE: NORMAL
LYMPHOCYTES # BLD AUTO: 0.8 10E3/UL (ref 0.8–5.3)
LYMPHOCYTES NFR BLD AUTO: 4 %
MAGNESIUM SERPL-MCNC: 1.8 MG/DL (ref 1.7–2.3)
MCH RBC QN AUTO: 31.3 PG (ref 26.5–33)
MCHC RBC AUTO-ENTMCNC: 32 G/DL (ref 31.5–36.5)
MCV RBC AUTO: 98 FL (ref 78–100)
MONOCYTES # BLD AUTO: 1.8 10E3/UL (ref 0–1.3)
MONOCYTES NFR BLD AUTO: 10 %
NEUTROPHILS # BLD AUTO: 16.1 10E3/UL (ref 1.6–8.3)
NEUTROPHILS NFR BLD AUTO: 84 %
NRBC # BLD AUTO: 0 10E3/UL
NRBC BLD AUTO-RTO: 0 /100
O2/TOTAL GAS SETTING VFR VENT: 0 %
OXYHGB MFR BLDV: 95 % (ref 70–75)
P AXIS - MUSE: 63 DEGREES
PCO2 BLDV: 49 MM HG (ref 40–50)
PH BLDV: 7.36 [PH] (ref 7.32–7.43)
PLATELET # BLD AUTO: 374 10E3/UL (ref 150–450)
PO2 BLDV: 79 MM HG (ref 25–47)
POTASSIUM SERPL-SCNC: 4.1 MMOL/L (ref 3.4–5.3)
PR INTERVAL - MUSE: 166 MS
PROT SERPL-MCNC: 6.2 G/DL (ref 6.4–8.3)
QRS DURATION - MUSE: 100 MS
QT - MUSE: 352 MS
QTC - MUSE: 418 MS
R AXIS - MUSE: -56 DEGREES
RBC # BLD AUTO: 3.35 10E6/UL (ref 3.8–5.2)
SAO2 % BLDV: 96.2 % (ref 70–75)
SODIUM SERPL-SCNC: 138 MMOL/L (ref 135–145)
SYSTOLIC BLOOD PRESSURE - MUSE: 128 MMHG
T AXIS - MUSE: 70 DEGREES
TROPONIN T SERPL HS-MCNC: 64 NG/L
VENTRICULAR RATE- MUSE: 85 BPM
WBC # BLD AUTO: 18.9 10E3/UL (ref 4–11)

## 2024-04-14 PROCEDURE — 80053 COMPREHEN METABOLIC PANEL: CPT | Performed by: INTERNAL MEDICINE

## 2024-04-14 PROCEDURE — 84484 ASSAY OF TROPONIN QUANT: CPT | Performed by: INTERNAL MEDICINE

## 2024-04-14 PROCEDURE — 85025 COMPLETE CBC W/AUTO DIFF WBC: CPT | Performed by: INTERNAL MEDICINE

## 2024-04-14 PROCEDURE — 36415 COLL VENOUS BLD VENIPUNCTURE: CPT | Performed by: INTERNAL MEDICINE

## 2024-04-14 PROCEDURE — 99233 SBSQ HOSP IP/OBS HIGH 50: CPT | Mod: GC | Performed by: INTERNAL MEDICINE

## 2024-04-14 PROCEDURE — 250N000012 HC RX MED GY IP 250 OP 636 PS 637: Performed by: INTERNAL MEDICINE

## 2024-04-14 PROCEDURE — 250N000011 HC RX IP 250 OP 636: Performed by: INTERNAL MEDICINE

## 2024-04-14 PROCEDURE — 83735 ASSAY OF MAGNESIUM: CPT | Performed by: INTERNAL MEDICINE

## 2024-04-14 PROCEDURE — 250N000011 HC RX IP 250 OP 636: Mod: JZ | Performed by: INTERNAL MEDICINE

## 2024-04-14 PROCEDURE — 99233 SBSQ HOSP IP/OBS HIGH 50: CPT | Performed by: HOSPITALIST

## 2024-04-14 PROCEDURE — 250N000013 HC RX MED GY IP 250 OP 250 PS 637: Performed by: INTERNAL MEDICINE

## 2024-04-14 PROCEDURE — 250N000013 HC RX MED GY IP 250 OP 250 PS 637: Performed by: HOSPITALIST

## 2024-04-14 PROCEDURE — 82805 BLOOD GASES W/O2 SATURATION: CPT | Performed by: INTERNAL MEDICINE

## 2024-04-14 PROCEDURE — 999N000157 HC STATISTIC RCP TIME EA 10 MIN

## 2024-04-14 PROCEDURE — 94660 CPAP INITIATION&MGMT: CPT

## 2024-04-14 PROCEDURE — 120N000004 HC R&B MS OVERFLOW

## 2024-04-14 RX ORDER — DOCUSATE SODIUM 100 MG/1
200 CAPSULE, LIQUID FILLED ORAL 2 TIMES DAILY
Status: DISCONTINUED | OUTPATIENT
Start: 2024-04-14 | End: 2024-04-23 | Stop reason: HOSPADM

## 2024-04-14 RX ORDER — NALOXONE HYDROCHLORIDE 0.4 MG/ML
0.2 INJECTION, SOLUTION INTRAMUSCULAR; INTRAVENOUS; SUBCUTANEOUS
Status: DISCONTINUED | OUTPATIENT
Start: 2024-04-14 | End: 2024-04-23 | Stop reason: HOSPADM

## 2024-04-14 RX ORDER — POLYETHYLENE GLYCOL 3350 17 G/17G
17 POWDER, FOR SOLUTION ORAL 2 TIMES DAILY PRN
Status: DISCONTINUED | OUTPATIENT
Start: 2024-04-14 | End: 2024-04-23 | Stop reason: HOSPADM

## 2024-04-14 RX ORDER — HYDROXYCHLOROQUINE SULFATE 200 MG/1
200 TABLET, FILM COATED ORAL DAILY
Status: DISCONTINUED | OUTPATIENT
Start: 2024-04-14 | End: 2024-04-23 | Stop reason: HOSPADM

## 2024-04-14 RX ORDER — METOPROLOL TARTRATE 25 MG/1
25 TABLET, FILM COATED ORAL 2 TIMES DAILY
Status: DISCONTINUED | OUTPATIENT
Start: 2024-04-14 | End: 2024-04-14

## 2024-04-14 RX ORDER — DULOXETIN HYDROCHLORIDE 30 MG/1
30 CAPSULE, DELAYED RELEASE ORAL DAILY
Status: DISCONTINUED | OUTPATIENT
Start: 2024-04-14 | End: 2024-04-18

## 2024-04-14 RX ORDER — METOPROLOL TARTRATE 25 MG/1
50 TABLET, FILM COATED ORAL 2 TIMES DAILY
Status: DISCONTINUED | OUTPATIENT
Start: 2024-04-14 | End: 2024-04-17

## 2024-04-14 RX ORDER — ASPIRIN 81 MG/1
81 TABLET, CHEWABLE ORAL DAILY
Status: DISCONTINUED | OUTPATIENT
Start: 2024-04-14 | End: 2024-04-23 | Stop reason: HOSPADM

## 2024-04-14 RX ORDER — IPRATROPIUM BROMIDE AND ALBUTEROL SULFATE 2.5; .5 MG/3ML; MG/3ML
3 SOLUTION RESPIRATORY (INHALATION) EVERY 4 HOURS PRN
Status: DISCONTINUED | OUTPATIENT
Start: 2024-04-14 | End: 2024-04-23 | Stop reason: HOSPADM

## 2024-04-14 RX ORDER — FUROSEMIDE 10 MG/ML
20 INJECTION INTRAMUSCULAR; INTRAVENOUS DAILY
Status: DISCONTINUED | OUTPATIENT
Start: 2024-04-14 | End: 2024-04-15

## 2024-04-14 RX ORDER — NALOXONE HYDROCHLORIDE 0.4 MG/ML
0.4 INJECTION, SOLUTION INTRAMUSCULAR; INTRAVENOUS; SUBCUTANEOUS
Status: DISCONTINUED | OUTPATIENT
Start: 2024-04-14 | End: 2024-04-23 | Stop reason: HOSPADM

## 2024-04-14 RX ORDER — ENOXAPARIN SODIUM 100 MG/ML
40 INJECTION SUBCUTANEOUS EVERY 24 HOURS
Status: DISCONTINUED | OUTPATIENT
Start: 2024-04-14 | End: 2024-04-14

## 2024-04-14 RX ORDER — POLYETHYLENE GLYCOL 3350 17 G/17G
17 POWDER, FOR SOLUTION ORAL DAILY
Status: DISCONTINUED | OUTPATIENT
Start: 2024-04-14 | End: 2024-04-14

## 2024-04-14 RX ORDER — HYDROMORPHONE HYDROCHLORIDE 1 MG/ML
0.3 INJECTION, SOLUTION INTRAMUSCULAR; INTRAVENOUS; SUBCUTANEOUS
Status: DISCONTINUED | OUTPATIENT
Start: 2024-04-14 | End: 2024-04-17

## 2024-04-14 RX ORDER — MODAFINIL 100 MG/1
200 TABLET ORAL DAILY
Status: DISCONTINUED | OUTPATIENT
Start: 2024-04-15 | End: 2024-04-23 | Stop reason: HOSPADM

## 2024-04-14 RX ADMIN — CIPROFLOXACIN 400 MG: 400 INJECTION, SOLUTION INTRAVENOUS at 19:59

## 2024-04-14 RX ADMIN — MICONAZOLE NITRATE ANTIFUNGAL POWDER 1 APPLICATOR: 2 POWDER TOPICAL at 09:07

## 2024-04-14 RX ADMIN — ASPIRIN 81 MG CHEWABLE TABLET 81 MG: 81 TABLET CHEWABLE at 13:23

## 2024-04-14 RX ADMIN — DOCUSATE SODIUM 100 MG: 100 CAPSULE, LIQUID FILLED ORAL at 08:40

## 2024-04-14 RX ADMIN — FAMOTIDINE 20 MG: 10 INJECTION, SOLUTION INTRAVENOUS at 19:58

## 2024-04-14 RX ADMIN — ONDANSETRON 4 MG: 2 INJECTION INTRAMUSCULAR; INTRAVENOUS at 15:19

## 2024-04-14 RX ADMIN — HYDROMORPHONE HYDROCHLORIDE 0.3 MG: 1 INJECTION, SOLUTION INTRAMUSCULAR; INTRAVENOUS; SUBCUTANEOUS at 08:55

## 2024-04-14 RX ADMIN — ENOXAPARIN SODIUM 40 MG: 40 INJECTION SUBCUTANEOUS at 22:29

## 2024-04-14 RX ADMIN — METRONIDAZOLE 500 MG: 500 INJECTION, SOLUTION INTRAVENOUS at 22:26

## 2024-04-14 RX ADMIN — POLYETHYLENE GLYCOL 3350 17 G: 17 POWDER, FOR SOLUTION ORAL at 08:40

## 2024-04-14 RX ADMIN — DOCUSATE SODIUM 200 MG: 100 CAPSULE, LIQUID FILLED ORAL at 19:58

## 2024-04-14 RX ADMIN — INSULIN ASPART 1 UNITS: 100 INJECTION, SOLUTION INTRAVENOUS; SUBCUTANEOUS at 17:52

## 2024-04-14 RX ADMIN — DULOXETINE HYDROCHLORIDE 30 MG: 30 CAPSULE, DELAYED RELEASE ORAL at 14:05

## 2024-04-14 RX ADMIN — MICONAZOLE NITRATE 1 APPLICATOR: 20 POWDER TOPICAL at 09:07

## 2024-04-14 RX ADMIN — FUROSEMIDE 20 MG: 10 INJECTION, SOLUTION INTRAMUSCULAR; INTRAVENOUS at 08:40

## 2024-04-14 RX ADMIN — MAGNESIUM HYDROXIDE 30 ML: 400 SUSPENSION ORAL at 13:23

## 2024-04-14 RX ADMIN — HYDROXYCHLOROQUINE SULFATE 200 MG: 200 TABLET, FILM COATED ORAL at 14:05

## 2024-04-14 RX ADMIN — METRONIDAZOLE 500 MG: 500 INJECTION, SOLUTION INTRAVENOUS at 08:46

## 2024-04-14 RX ADMIN — METOPROLOL TARTRATE 50 MG: 25 TABLET, FILM COATED ORAL at 19:58

## 2024-04-14 RX ADMIN — FAMOTIDINE 20 MG: 10 INJECTION, SOLUTION INTRAVENOUS at 09:17

## 2024-04-14 RX ADMIN — CIPROFLOXACIN 400 MG: 400 INJECTION, SOLUTION INTRAVENOUS at 07:47

## 2024-04-14 ASSESSMENT — ACTIVITIES OF DAILY LIVING (ADL)
ADLS_ACUITY_SCORE: 42
DEPENDENT_IADLS:: SHOPPING;TRANSPORTATION
ADLS_ACUITY_SCORE: 42
ADLS_ACUITY_SCORE: 46
ADLS_ACUITY_SCORE: 42

## 2024-04-14 NOTE — PROGRESS NOTES
PULMONARY / CRITICAL CARE PROGRESS NOTE    Date / Time of Admission:  4/13/2024  6:15 PM    Assessment:     Desirae Rey is a 73 year old female with history of HTN, HFpEF, mild aortic stenosis, asthma, NELLIE/OHS on BiPAP, CKD stage III, DVT LLE 84' while on contraceptives, C5-7 fusion, chronic back pain, widespread polyarthralgias osteoarthritis chondrocalcinosis positive MARIA GUADALUPE and a borderline positive DNA, morbid obesity, on home O2 3 LPM with activities.   Presents to ED complaining of generalized weakness, decrease appetite, nausea, bilateral leg pain.   Patient was tachycardic, tachypneic, afebrile, hypoxic.   Labs showed elevated BUN/Cr, elevated BNP, acute on chronic respiratory acidosis. Negative viral panel.   CT scan showed no pulmonary embolism, no lung infiltrates, acute diverticulitis upper sigmoid, moderate stool, no free abdominal fluid. Venous US LE was negative for DVT.   Started on abx ciprofloxacin and metronidazole. Increase O2 requirements, altered mental status. Started on BiPAP and diuresis. Admitted to ICU for close observation       Chronic respiratory failure   Secondary to NELLIE/OHS, morbid obesity. Underlying asthma  Continue O2 supplementation 3LPM continuously   Metabolic encephalopathy   Minimize pain meds / sedatives   Sepsis secondary to acute diverticulitis   Abx ciprofloxacin and metronidazole   Bowel regimen   Mild leak of troponin   HTN, HFpEF, mild aortic stenosis, mild pulmonary hypertension  Titrate BP meds and diuretics.   CKD stage 3  Intertrigo  Topic miconazole   Hx DVT LLE 84' while on contraceptives  Follow up venous US LE showed no DVT.   Asthma mild persistent   Non tobacco use in the past. Worked in the kitchen area.   PFTs 1/2023 showed normal spirometry, lung volumes and diffusion capacity.   Continue ICS/LABA, albuterol HFA as needed  NELLIE on auto BiPAP  Sleep study 2019 showed a moderate obstructive sleep apnea (AHI=17.1), events more frequently occurring during supine  sleep (AHI 40.6/hr vs 8.3/hr). Nocturnal hypoxia.   Auto-titrating Bilevel with an EPAP min of 12, IPAP max of 25, and PS of 7 cmH2O pluse O2 2 LPM was effective.   Polyarthralgias osteoarthritis chondrocalcinosis positive MARIA GUADALUPE and a borderline positive DNA  On hydroxychloroquine   Obesity Body mass index is 49.5 kg/m .    Advance Directives:  Full code    Plan:   Titrate FiO2 to keep SpO2 > 90%, patient is on chronic 3 LPM  Continue BiPAP at night and as needed  Bronchodilators as needed  Titrate BP meds, diuresis   Abx ciprofloxacin and metronidazole   Topic miconazole   Monitor kidney function   Supplement electrolytes as needed  Full clear liquids  Bowel regimen   H2 blocker for GI prophylaxis   Minimize pain meds and sedatives   DVT prophylaxis lovenox subcutaneous  PT / OT evaluation     Patient can be transferred to telemetry  Please contact me if you have any questions.    Carlos Clayton  Pulmonary / Critical Care  04/14/2024  12:26 PM          ICU DAILY CHECKLIST                           Can patient transfer out of MICU? yes    FAST HUG:    Feeding:  Feeding: yes.  Patient is receiving ORAL    Rebollar: yes  Analgesia/Sedation:no    Thromboembolic prophylaxis: Yes; Mode:  Lovenox and SCDs  HOB>30:  Yes  Stress Ulcer Protocol Active: Yes; Mode: H2 Antagonist  Glycemic Control: Any glucose > 180 no; Mode of Insulin Therapy: Sliding Scale Insulin    INTUBATED:  Can patient have daily waking:  No  Can patient have spontaneous breathing trial:  No    Restraints? no    PHYSICAL THERAPY AND MOBILITY:  Can patient have PT and mobility trial: yes  Activity: PT      Subjective:   HPI:  Desirae Rey is a 73 year old female with history of HTN, HFpEF, mild aortic stenosis, asthma, NELLIE/OHS on BiPAP, CKD stage III, DVT LLE 84' while on contraceptives, C5-7 fusion, chronic back pain, widespread polyarthralgias osteoarthritis chondrocalcinosis positive MARIA GUADALUPE and a borderline positive DNA, morbid obesity, on home  O2 3 LPM with activities.   Presents to ED complaining of generalized weakness, decrease appetite, nausea, bilateral leg pain.   Patient was tachycardic, tachypneic, afebrile, hypoxic.   Labs showed elevated BUN/Cr, elevated BNP, acute on chronic respiratory acidosis. Negative viral panel.   CT scan showed no pulmonary embolism, no lung infiltrates, acute diverticulitis upper sigmoid, moderate stool, no free abdominal fluid. Venous US LE was negative for DVT.   Started on abx ciprofloxacin and metronidazole. Increase O2 requirements, altered mental status. Started on BiPAP and diuresis. Admitted to ICU for close observation     Events overnight   - Improvement of mental status  - Afebrile  - Reports constipation.       Allergies: Latex, Pregabalin, Valsartan, Colchicine, Dicloxacillin, Gabapentin, Latex, Other drug allergy (see comments), Other environmental allergy, Statins-hmg-coa reductase inhibitors [statins], Sulfa antibiotics, and Adhesive tape     MEDS:  Current Facility-Administered Medications   Medication Dose Route Frequency Provider Last Rate Last Admin    calcium carbonate (TUMS) chewable tablet 1,000 mg  1,000 mg Oral 4x Daily PRN eJnnifer Gauthier MD        ciprofloxacin (CIPRO) infusion 400 mg  400 mg Intravenous Q12H Jennifer Gauthier MD   400 mg at 04/14/24 0747    glucose gel 15-30 g  15-30 g Oral Q15 Min PRN Hai Flores MD        Or    dextrose 50 % injection 25-50 mL  25-50 mL Intravenous Q15 Min PRN Hai Flores MD        Or    glucagon injection 1 mg  1 mg Subcutaneous Q15 Min PRN Hai Flores MD        glucose gel 15-30 g  15-30 g Oral Q15 Min PRN Hai Flores MD        Or    dextrose 50 % injection 25-50 mL  25-50 mL Intravenous Q15 Min PRHai Brunner MD        Or    glucagon injection 1 mg  1 mg Subcutaneous Q15 Min PRHai Brunner MD        docusate sodium (COLACE) capsule 200 mg  200 mg Oral BID Patrick Gonzales DO        enoxaparin ANTICOAGULANT (LOVENOX)  injection 40 mg  40 mg Subcutaneous Q24H Hai Flores MD   40 mg at 04/13/24 2232    famotidine (PEPCID) injection 20 mg  20 mg Intravenous Q12H Jennifer Gauthier MD   20 mg at 04/14/24 0917    furosemide (LASIX) injection 20 mg  20 mg Intravenous Daily Jennifer Gauthier MD   20 mg at 04/14/24 0840    HYDROmorphone (PF) (DILAUDID) injection 0.3 mg  0.3 mg Intravenous Q3H PRN Jennifer Gauthier MD   0.3 mg at 04/14/24 0855    insulin aspart (NovoLOG) injection (RAPID ACTING)  1-7 Units Subcutaneous Q4H Hai Flores MD        ipratropium - albuterol 0.5 mg/2.5 mg/3 mL (DUONEB) neb solution 3 mL  3 mL Nebulization Q4H PRN Jennifer Gauthier MD        lidocaine (LMX4) cream   Topical Q1H PRN Jennifer Gauthier MD        lidocaine 1 % 0.1-1 mL  0.1-1 mL Other Q1H PRN Jennifer Gauthier MD        magnesium hydroxide (MILK OF MAGNESIA) suspension 30 mL  30 mL Oral Daily PRN Patrick Gonzales DO        metroNIDAZOLE (FLAGYL) infusion 500 mg  500 mg Intravenous Q12H Hai Flores MD   500 mg at 04/14/24 0846    miconazole (MICATIN) 2 % powder   Topical BID Jennifer Gauthier MD   1 applicator at 04/14/24 0907    miconazole (MICATIN) 2 % powder   Topical BID Hai Flores MD   1 applicator at 04/14/24 0907    naloxone (NARCAN) injection 0.2 mg  0.2 mg Intravenous Q2 Min PRN Hai Flores MD        Or    naloxone (NARCAN) injection 0.4 mg  0.4 mg Intravenous Q2 Min PRN Hai Flores MD        Or    naloxone (NARCAN) injection 0.2 mg  0.2 mg Intramuscular Q2 Min PRN Hai Flores MD        Or    naloxone (NARCAN) injection 0.4 mg  0.4 mg Intramuscular Q2 Min PRN Hai Flores MD        ondansetron (ZOFRAN ODT) ODT tab 4 mg  4 mg Oral Q6H PRN Jennifer Gauthier MD        Or    ondansetron (ZOFRAN) injection 4 mg  4 mg Intravenous Q6H PRN Jennifer Gauthier MD senna-docusate (SENOKOT-S/PERICOLACE) 8.6-50 MG per tablet 1 tablet  1 tablet Oral BID PRN Jennifer Gauthier MD        Or    mica  "(SENOKOT-S/PERICOLACE) 8.6-50 MG per tablet 2 tablet  2 tablet Oral BID PRN Jennifer Gauthier MD        sodium chloride (PF) 0.9% PF flush 3 mL  3 mL Intracatheter Q8H Jennifer Gauthier MD   3 mL at 04/13/24 2235    sodium chloride (PF) 0.9% PF flush 3 mL  3 mL Intracatheter q1 min prn Jennifer Gauthier MD           Objective:   VITALS:  BP (!) 140/65   Pulse 76   Temp 99  F (37.2  C) (Axillary)   Resp (!) 31   Ht 1.397 m (4' 7\")   Wt 96.6 kg (212 lb 15.4 oz)   SpO2 94%   BMI 49.50 kg/m    VENT:  FiO2 (%): 40 %  Resp: (!) 31    EXAM:   Gen: obese, awake, alert, mild distress due to generalized fatigue  HEENT: pale conjunctiva, moist mucosa, Mallampati III/IV  Neck: no thyromegaly, masses or JVD  Lungs: decrease breath sounds at the bases  CV: regular, no murmurs or gallops appreciated  Abdomen: soft, distended , Left lower quadrant pain, no rebound.   Ext: trace edema  Neuro: CN II-XII intact, non focal       Data Review:  Recent Labs   Lab 04/14/24  0900 04/14/24  0546 04/14/24  0413 04/14/24  0138 04/13/24  2223 04/13/24  1831   * 123* 110* 108* 139* 138*      04/14/24 04:13   Sodium 138   Potassium 4.1   Chloride 102   Carbon Dioxide (CO2) 25   Urea Nitrogen 32.2 (H)   Creatinine 1.22 (H)   GFR Estimate 47 (L)   Calcium 8.9   Anion Gap 11   Magnesium 1.8   Albumin 3.0 (L)   Protein Total 6.2 (L)   Alkaline Phosphatase 128   ALT 23   AST 41   Bilirubin Total 0.3      04/14/24 04:13   FIO2 0   Ph Venous 7.36   PCO2 Venous 49   PO2 Venous 79 (H)   O2 Sat, Venous 96.2 (H)   Bicarbonate Venous 27   Base Excess Venous 1.7   Oxyhemoglobin Venous 95 (H)      04/14/24 04:13   WBC 18.9 (H)   Hemoglobin 10.5 (L)   Hematocrit 32.8 (L)   Platelet Count 374   RBC Count 3.35 (L)   MCV 98   MCH 31.3   MCHC 32.0   RDW 12.5   % Neutrophils 84   % Lymphocytes 4   % Monocytes 10   % Eosinophils 1   % Basophils 0     CT ABDOMEN PELVIS W CONTRAST  LOCATION: [facility]  DATE: 4/13/2024  INDICATION: " Pain.  COMPARISON: None.  FINDINGS:   LOWER CHEST: Normal.  HEPATOBILIARY: Cholecystectomy. The liver is unremarkable. No biliary dilatation.  PANCREAS: Normal.  SPLEEN: Normal.  ADRENAL GLANDS: Normal.  KIDNEYS/BLADDER: Bilateral renal cysts have benign features and require no additional follow-up. The urinary bladder is grossly unremarkable.  BOWEL: Moderate inflammatory changes noted in the left lower quadrant surrounding the upper sigmoid colon secondary to acute diverticulitis. Moderate formed stool is noted in the colon up to the level of the site of diverticulitis. This may be resulting in a delay in transit due to the wall thickening and inflammation.  LYMPH NODES: No lymphadenopathy.  VASCULATURE: Normal.  PELVIC ORGANS: No lymphadenopathy. No pericardial effusion.   MUSCULOSKELETAL: Degenerative changes lower thoracic and lumbar spine.  IMPRESSION:   1.  Acute diverticulitis involving the upper sigmoid colon.  2.  Moderate formed stool noted in the colon extending to the level of the site of diverticulitis. Distal to this the colon is decompressed.  3.  No evidence for free fluid or abscess.  4.  Prior cholecystectomy.    US LOWER EXTREMITY VENOUS DUPLEX BILATERAL  LOCATION: [facility]  DATE: 4/13/2024  INDICATION: pain, swelling  COMPARISON: None.  FINDINGS: Exam includes the common femoral, femoral, popliteal veins as well as segmentally visualized deep calf veins and greater saphenous vein.   RIGHT: No deep vein thrombosis. No superficial thrombophlebitis. No popliteal cyst.   LEFT: No deep vein thrombosis. No superficial thrombophlebitis. No popliteal cyst.  IMPRESSION:  1.  No deep venous thrombosis in the bilateral lower extremities.    By:  Carlos Clayton MD, 04/14/2024  12:26 PM    Primary Care Physician:  Noemy Brito

## 2024-04-14 NOTE — CONSULTS
ICU consult    Assessment and plan: 73-year-old woman with NELLIE/OHS, chronic hypoxic respiratory failure and mild asthma admitted with diverticulitis and unfortunately had some progression of respiratory failure in the hospital.  At this point no clear signal for primary heart failure or asthma exacerbation the cause of her issues.    Neuro: Possible mild encephalopathy versus fatigue.  Monitor closely for delirium.  Evaluate for restarting her baseline psych meds when she improved.    Cardiovascular: Presently with reasonable heart rate and blood pressure.  Very elevated BNP in the setting of pulmonary hypertension and possible impaired diastolic function.  She is on a baseline diuretic.  No overt heart failure although exam of her body habitus is difficult.  Hold diuretics tonight and likely restart tomorrow.    Pulmonary: Acute on chronic hypoxic and hypercarbic respiratory failure.  From an oxygen standpoint she is relatively close to baseline and my suspicion as well as the emergency department MD is that she is having some distress due to keeping up with her diverticulitis.  Her home settings per a clinic note are auto titrating bilevel with a EPAP of 12, IPAP of 25 and a pressure support of 7.  We will set her on similar settings here.  Continue supplemental oxygen.  Hold off steroids.    Renal: Chronic kidney disease.  Her creatinine today looks similar to last few months.  She has some azotemia and mild hyponatremia that we can monitor.    Heme: Venous insufficiency but no DVTs on her outpatient ultrasound.  Will treat her with DVT prophylaxis.  Lovenox 40 given her acute illness and CKD.  I am not sure on the trajectory of her kidney function.    ID: Acute diverticulitis.  Treat with Cipro and Flagyl.  Follow blood cultures.  Based on clinical progress can consider consultation tomorrow.    Endo: Hyperglycemia.  Start sliding scale insulin.    This patient is critically ill at high risk of decompensation  "and death.  30 minutes of critical care time thus far today.    HPI: 73-year-old woman with a history of OHS/NELLIE admitted with diverticulitis.  She presented with shortness of breath and poor appetite.  Unfortunately she became more short of breath and was started on BiPAP in the emergency department.  She had a CT scan showing diverticulitis.  She has concern for outpatient ultrasound showing DVT but it sounds like it shows venous insufficiency per my discussions with ER staff.    Medications, allergies and history reviewed    On exam  BP (!) 141/70   Pulse 78   Temp 98.4  F (36.9  C) (Axillary)   Resp (!) 31   Ht 1.397 m (4' 7\")   Wt 96.6 kg (212 lb 15.4 oz)   SpO2 98%   BMI 49.50 kg/m    Body habitus with significant central obesity  Neck supple  BiPAP mask in place  Patient opens eyes briskly to voice and interacts  Chest with distant breath sounds  Regular rate and rhythm  Abdomen soft and no evident pain with gentle palpation  Extremities warm  She has significant fungal type rash in areas with pannus    Labs reviewed including elevated BNP, elevated white count, VBG with normal pH and mildly elevated CO2      "

## 2024-04-14 NOTE — PROGRESS NOTES
"[facility]    Medicine Progress Note - Hospitalist Service    Date of Admission:  4/13/2024    Assessment & Plan   73-year-old female with history of of chronic respiratory failure, CKD, CHF admitted with sepsis, acute diverticulitis and acute on chronic respiratory failure.    #Acute diverticulitis  #Sepsis  CT shows sigmoid diverticulitis, no abscess, stool throughout colon  BCx  Hemodynamically stable  Cipro and Flagyl  Bowel regimen    #Acute on chronic hypoxic respiratory failure  #History of asthma  No wheezing on exam  s/p Bipap  Now stable on NC O2, titrate as needed  PRN Duonebs    #Constipation  Patient reports last BM was 5 days ago  Took Miralax and colace this AM  Add PRN milk of mag  Increase colace  Monitor and add additional agents if needed    #Acute on chronic HFpEF  #Hypertension  Patient appears euvolemic  Continue IV lasix for now  Resume home meds as appropriate    #CKD stage III  Renal function at baseline  Monitor      VTE ppx: Lovenox        Diet: Clear Liquid Diet    Rebollar Catheter: PRESENT, indication: ICU only: hourly urine output needed for patient care  Lines: None     Cardiac Monitoring: ACTIVE order. Indication: ICU  Code Status: Full Code      Clinically Significant Risk Factors Present on Admission           # Hypercalcemia: corrected calcium is >10.1, will monitor as appropriate    # Hypoalbuminemia: Lowest albumin = 3 g/dL at 4/14/2024  4:13 AM, will monitor as appropriate   # Drug Induced Platelet Defect: home medication list includes an antiplatelet medication   # Hypertension: Noted on problem list  # Acute heart failure with preserved ejection fraction: heart failure noted on problem list, last echo with EF >50%, and receiving IV diuretics     # Severe Obesity: Estimated body mass index is 49.5 kg/m  as calculated from the following:    Height as of this encounter: 1.397 m (4' 7\").    Weight as of this encounter: 96.6 kg (212 lb 15.4 oz).       # " Asthma: noted on problem list        Disposition Plan     Medically Ready for Discharge: Anticipated in 2-4 Days             Patrick Gonzales DO  Hospitalist [facility]  Securely message with [employer] (more info)  Text page via [employer] Paging/Directory   ______________________________________________________________________    Interval History   Abdomen feels bloated, no BM x5 days.    Physical Exam   Vital Signs: Temp: 99  F (37.2  C) Temp src: Axillary BP: (!) 140/65 Pulse: 76   Resp: (!) 31 SpO2: 94 % O2 Device: (S) Nasal cannula Oxygen Delivery: 4 LPM  Weight: 212 lbs 15.43 oz    General Appearance:  No acute distress  Respiratory: Clear to auscultation bilaterally  Cardiovascular: Regular rate and rhythm  GI: Hypoactive bowel sounds, abdomen is soft without rebound or guarding  Extremities: No peripheral edema or cyanosis  Neuro: Alert and oriented x 3, normal speech    Medical Decision Making       55 MINUTES SPENT BY ME on the date of service doing chart review, history, exam, documentation & further activities per the note.      Data

## 2024-04-14 NOTE — PROGRESS NOTES
"Pt now AxOX4, GCS 14 (arouses to voice). Pt interactive with this RN, although needing more time to answer due to shortness of breath with long sentences. Last BM per pt was Wednesday the 10th. Pt states that she would like Infectious Disease to see her for her various skin infections that \"keep getting worse\". Pt states her right heel wound is from when she slides off the couch onto the floor and has to use her heels to push herself on the floor.  "

## 2024-04-14 NOTE — PROGRESS NOTES
Received Patient from ED to ICU. Patient awake currently on BIPAP 14/6 R 14 40%. Tolerating well. RT will continue to follow.

## 2024-04-14 NOTE — PROGRESS NOTES
Brief ICU note    Chart reviewed, patient discussed with the emergency department MD.  Patient with diverticulitis requiring BiPAP for respiratory failure.  At baseline she is complex hypoxic respiratory failure due to NELLIE/OHS and obstructive lung disease.  Given the extrapulmonary issue leading to decompensation and the risk for worsening I think ICU is reasonable.  We can plan on following as primary from an ICU standpoint but I would like hospitalist to follow as well as she may be a short stay in the ICU.    Full note to follow    Hai Flores MD

## 2024-04-14 NOTE — MEDICATION SCRIBE - ADMISSION MEDICATION HISTORY
Medication Scribe Admission Medication History    Admission medication history is complete. The information provided in this note is only as accurate as the sources available at the time of the update.    Information Source(s): Family member via in-person    Pertinent Information: Patient's  provided active PTA med list.  Per , patient took only Metoprolol today and all medications were taken yesterday.    Changes made to PTA medication list:  Added: Acetaminophen, Tizanidine, Optivar, Align probiotic, Nystatin(per patient's  list)  Deleted: Flonase, Docusate (per patient's  list)  Changed: None    Allergies reviewed with patient and updates made in EHR: yes    Medication History Completed By: Serena Galarza 4/13/2024 10:01 PM    PTA Med List   Medication Sig Last Dose    acetaminophen (TYLENOL) 500 MG tablet Take 500-1,000 mg by mouth every 6 hours as needed for mild pain Unknown at prn    albuterol (PROAIR HFA;PROVENTIL HFA;VENTOLIN HFA) 90 mcg/actuation inhaler Inhale 1 puff into the lungs every 4 hours as needed for shortness of breath / dyspnea Unknown at prn    alendronate (FOSAMAX) 70 MG tablet [ALENDRONATE (FOSAMAX) 70 MG TABLET] Take 70 mg by mouth every 7 days. Takes on Mondays 4/8/2024 at am    aspirin 81 MG EC tablet 1 tablet Orally Once a day 4/12/2024 at am    azelastine (OPTIVAR) 0.05 % ophthalmic solution Apply 1 drop to eye 2 times daily as needed Unknown at prn    Bacillus Coagulans-Inulin (ALIGN PREBIOTIC-PROBIOTIC PO) Take 1 tablet by mouth daily 4/12/2024 at am    calcium citrate (CITRACAL) 950 (200 Ca) MG tablet Take 1 tablet by mouth daily 4/12/2024 at am    cetirizine (ZYRTEC) 10 MG tablet [CETIRIZINE (ZYRTEC) 10 MG TABLET] Take 10 mg by mouth daily.  4/12/2024 at am    cholecalciferol (VITAMIN D3) 25 mcg (1000 units) capsule Take 125 mcg by mouth every morning Take 5000 units in the morning and 2000 units in the evening 4/12/2024 at am    DULoxetine  (CYMBALTA) 30 MG capsule Take 60 mg by mouth 2 times daily 4/12/2024 at pm    esomeprazole (NEXIUM) 40 MG capsule Take 40 mg by mouth daily as needed Unknown at prn    furosemide (LASIX) 20 MG tablet Take 1 mg by mouth every evening 4/12/2024 at pm    furosemide (LASIX) 20 MG tablet Take 3 tablets by mouth every morning 4/12/2024 at am    HYDROcodone-acetaminophen 5-325 mg per tablet Take 1 tablet by mouth every 4 hours as needed for pain Max 6 tablets per day. Unknown at prn    Hydroxychloroquine Sulfate 100 MG TABS Take 100 mg by mouth 2 times daily 4/12/2024 at pm    l-lysine HCl 500 MG TABS tablet Take 2 tablets by mouth daily 4/12/2024 at am    magnesium oxide 250 mg Tab Take 500 mg by mouth daily 4/12/2024 at am    metoprolol succinate ER (TOPROL XL) 50 MG 24 hr tablet Take 50 mg by mouth daily 4/13/2024 at am    modafinil (PROVIGIL) 100 MG tablet Take 250 mg by mouth daily Take two and half tablet (250 mg ) daily 4/12/2024 at am    montelukast (SINGULAIR) 10 mg tablet Take 10 mg by mouth every evening 4/12/2024 at pm    nystatin (MYCOSTATIN) 725254 UNIT/GM external powder Apply topically 2 times daily 4/12/2024 at pm    OXYGEN-AIR DELIVERY SYSTEMS MISC [OXYGEN-AIR DELIVERY SYSTEMS MISC] Use 3 L As Directed. 3 lpm with activities and as needed at night Unknown at prn    senna-docusate (SENOKOT-S/PERICOLACE) 8.6-50 MG tablet Take 1 tablet by mouth 2 times daily as needed for constipation Unknown at prn    solifenacin (VESICARE) 10 MG tablet Take 5-10 mg by mouth At Bedtime 4/12/2024 at hs    tiZANidine (ZANAFLEX) 4 MG tablet Take 4 mg by mouth 3 times daily as needed for muscle spasms Unknown at prn    Turmeric (CURCUPLEX-95) 500 MG CAPS Take 1 capsule by mouth daily 4/12/2024 at am    valACYclovir (VALTREX) 500 MG tablet Take 500 mg by mouth 2 times daily as needed (flare) Unknown at prn

## 2024-04-14 NOTE — CONSULTS
Care Management Initial Consult    General Information  Assessment completed with: Desirae Tinoco  Type of CM/SW Visit: Initial Assessment    Primary Care Provider verified and updated as needed: Yes   Readmission within the last 30 days:        Reason for Consult: discharge planning  Advance Care Planning: Advance Care Planning Reviewed: present on chart        Communication Assessment  Patient's communication style: spoken language (English or Bilingual)    Hearing Difficulty or Deaf: no   Wear Glasses or Blind: yes    Cognitive  Cognitive/Neuro/Behavioral: .WDL except, all  Level of Consciousness: alert  Arousal Level: arouses to voice, arouses to touch/gentle shaking  Orientation: disoriented to, place, time, situation  Mood/Behavior: withdrawn  Best Language: 0 - No aphasia  Speech: clear    Living Environment:   People in home: spouse  Hugo  Current living Arrangements: house      Able to return to prior arrangements: yes     Family/Social Support:  Care provided by: self, spouse/significant other, homecare agency  Provides care for: no one  Marital Status:             Description of Support System: Supportive       Current Resources:   Patient receiving home care services: Yes     Community Resources: Other (see comment)  Equipment currently used at home: walker, rolling  Supplies currently used at home: Oxygen Tubing/Supplies    Employment/Financial:  Employment Status: retired        Financial Concerns: none   Referral to Financial Worker: No     Does the patient's insurance plan have a 3 day qualifying hospital stay waiver?  Yes     Which insurance plan 3 day waiver is available? [employer]    Will the waiver be used for post-acute placement? Undetermined at this time    Lifestyle & Psychosocial Needs:  Social Determinants of Health     Food Insecurity: Not on file   Depression: Not on file   Housing Stability: Not on file   Tobacco Use: Low Risk  (4/13/2024)    Patient History     Smoking  Tobacco Use: Never     Smokeless Tobacco Use: Never     Passive Exposure: Not on file   Financial Resource Strain: Not on file   Alcohol Use: Not on file   Transportation Needs: Not on file   Physical Activity: Not on file   Interpersonal Safety: Not on file   Stress: Not on file   Social Connections: Unknown (3/9/2023)    [facility]    Social Connections     Frequency of Communication with Friends and Family: Not on file   Health Literacy: Not on file     Functional Status:  Prior to admission patient needed assistance:   Dependent ADLs:: Ambulation-walker, Ambulation-cane  Dependent IADLs:: Shopping, Transportation     Mental Health Status:  Mental Health Status: No Current Concerns       Chemical Dependency Status:  Chemical Dependency Status: No Current Concerns           Values/Beliefs:  Spiritual, Cultural Beliefs, Confucianist Practices, Values that affect care: no          Values/Beliefs Comment: Chris    Additional Information:  Writer met with patient to review role of care management services, discuss goals of care and assess need for any possible services at discharge. Patient alert, answering questions appropriately and engaged in the conversation.  Patient is  and lives in a house with her  Hugo. She is largely independent with activities of daily living at baseline. She has been having more issues lately, she says, but usually independent. She uses a walker (has a cane also) and has home oxygen at 3 liters through [facility]. She states she is open to [facility] and would like writer to notify them of her hospitalization.  Message sent to [facility] liaison.   CM will continue to monitor progression of care, review team recommendations and provide discharge planning assist as needed.      Latanya Hamilton RN

## 2024-04-14 NOTE — H&P
[facility]    History and Physical - Hospitalist Service       Date of Admission:  4/13/2024    Assessment & Plan      Desirae Rey is a 73 year old female with a medical history significant for multiple medical comorbidities including a history of hypertension, hyperlipidemia, diverticulosis, CKD stage III, CHF, obstructive sleep apnea, OHS, mild persistent asthma, pulmonary hypertension, recent DVT, ?  PE, chronic low back pain, other medical comorbidities who is being admitted with sepsis due to acute diverticulitis and acute on chronic hypoxemic respiratory failure due to acute CHF decompensation.    Acute on chronic hypoxemic respiratory failure-likely multifactorial.  Patient with recent history of DVT. ?  PE, acute CHF decompensation.  Patient was given Lovenox in the ER, DuoNebs, Pepcid.  Lasix not given in the ER.  Patient placed on BiPAP in the ER and pulmonary consulted due to multiple lung problems.  Patient is on 3 L of oxygen at home    Sepsis -likely due to acute diverticulitis.  patient started on Cipro floxacillin and Flagyl.  Will continue.     Acute diverticulitis-continue Cipro and Flagyl.  Consider general surgery input    Acute CHF with decompensation-EF 60 to 65%.  TTE from 1223 showed discrete nodular thickening of the right coronary cusp, mild valvular aortic stenosis, mild to moderate pulmonary hypertension.  Will add Lasix daily    NELLIE/OHS-BiPAP as needed .  Management per pulmonary    Mild persistent asthma-optimize lung cares with breathing treatments .  Appreciate pulmonary input    History of DVT/?PE-Lovenox 40 mg daily.  Ultrasound done negative for DVT.  CT chest negative for PE    Pulmonary hypertension-oxygen as needed.  Consider TTE repeat if troponin keeps increasing or if oxygen need keeps increasing.    Constipation- optimize bowel regimen    Skin - fungal infection under breast and right ankle bruse. Consult wound care    Rest of patient's medical  "problems management remains unchanged    Diet:  Cardiac diet  DVT Prophylaxis: Enoxaparin (Lovenox) SQ  Rebollar Catheter: Not present  Lines: None     Cardiac Monitoring: None  Code Status:  Full code    Clinically Significant Risk Factors Present on Admission              # Hypoalbuminemia: Lowest albumin = 3.3 g/dL at 4/13/2024  6:31 PM, will monitor as appropriate   # Drug Induced Platelet Defect: home medication list includes an antiplatelet medication   # Hypertension: Noted on problem list   # Non-Invasive mechanical ventilation: current O2 Device: BiPAP/CPAP  # Acute hypoxic respiratory failure: continue supplemental O2 as needed     # Severe Obesity: Estimated body mass index is 46.48 kg/m  as calculated from the following:    Height as of this encounter: 1.397 m (4' 7\").    Weight as of this encounter: 90.7 kg (200 lb).       # Asthma: noted on problem list        Disposition Plan     Medically Ready for Discharge: Anticipated in 2-4 Days           Jennifer Gauthier MD  Hospitalist Service  [facility]  Securely message with Jini (more info)  Text page via [employer] Paging/Directory     ______________________________________________________________________    Chief Complaint   Shortness of breath  leg  ?  Abdominal pain  Nausea        History of Present Illness         Desirae Rey is a 73 year old female with a medical history significant for multiple medical comorbidities including a history of hypertension, hyperlipidemia, diverticulosis, CKD stage III, CHF, obstructive sleep apnea, OHS, mild persistent asthma, pulmonary hypertension, recent DVT, ?  PE, chronic low back pain, other medical comorbidities who is being admitted with sepsis due to acute diverticulitis and acute on chronic hypoxemic respiratory failure due to acute CHF decompensation.    Patient was seen in her room on admission.  Per report she was at baseline until 430 when she presented with ongoing shortness of breath.  " Patient was brought to the ER with a myriad of complaints.  She was recently diagnosed with bilateral lower extremity DVTs on 4/1/2024.  She presented to the ER with ongoing shortness of breath, increased pain in her bilateral feet and nausea.  Patient denied chest pain, nausea, vomiting, fevers and chills.  Patient also reported decreased loss of appetite.    Preliminary workup done showed a BMP with a sodium of 135, potassium 4.2, creatinine 1.39, 1.22, BNP 5303, VBG with pH of 7.34, pCO2 52, CBC white count 21.2, hemoglobin 11.1, hematocrit 34.3 platelet 428.  COVID test negative, influenza A negative influenza B negative RSV negative.      Narrative & Impression   EXAM: CT CHEST PULMONARY EMBOLISM W CONTRAST  LOCATION: [facility]  DATE: 4/13/2024     INDICATION: SOB  COMPARISON: CT 10/31/2022  TECHNIQUE: CT chest pulmonary angiogram during arterial phase injection of IV contrast. Multiplanar reformats and MIP reconstructions were performed. Dose reduction techniques were used.   CONTRAST: Isovue 370 90 mL      FINDINGS:  ANGIOGRAM CHEST: Pulmonary arteries are normal caliber and negative for pulmonary emboli. The subsegmental pulmonary arteries in the lung bases cannot be adequately assessed due to respiratory motion artifact. Thoracic aorta is negative for dissection.   No CT evidence of right heart strain.     LUNGS AND PLEURA: Moderate respiratory motion artifact. Mild bibasilar atelectasis. No consolidation.     MEDIASTINUM/AXILLAE: No lymphadenopathy. No pericardial effusion.     CORONARY ARTERY CALCIFICATION: Moderate.     UPPER ABDOMEN: Normal.     MUSCULOSKELETAL: Surgical changes of the cervical spine.                                                                      IMPRESSION:  1.  Moderate respiratory motion artifact limits some detail of the subsegmental pulmonary arteries in the lung bases as well as portions of the lungs. No pulmonary emboli appreciated.  2.  No  evidence for pulmonary consolidation or pleural fluid.         Narrative & Impression   EXAM: CT ABDOMEN PELVIS W CONTRAST  LOCATION: [facility]  DATE: 4/13/2024     INDICATION: Pain.  COMPARISON: None.  TECHNIQUE: CT scan of the abdomen and pelvis was performed following injection of IV contrast. Multiplanar reformats were obtained. Dose reduction techniques were used.  CONTRAST: Isovue 370 90 mL     FINDINGS:   LOWER CHEST: Normal.     HEPATOBILIARY: Cholecystectomy. The liver is unremarkable. No biliary dilatation.     PANCREAS: Normal.     SPLEEN: Normal.     ADRENAL GLANDS: Normal.     KIDNEYS/BLADDER: Bilateral renal cysts have benign features and require no additional follow-up. The urinary bladder is grossly unremarkable.     BOWEL: Moderate inflammatory changes noted in the left lower quadrant surrounding the upper sigmoid colon secondary to acute diverticulitis. Moderate formed stool is noted in the colon up to the level of the site of diverticulitis. This may be resulting   in a delay in transit due to the wall thickening and inflammation.     LYMPH NODES: No lymphadenopathy.     VASCULATURE: Normal.     PELVIC ORGANS: No lymphadenopathy. No pericardial effusion.     MUSCULOSKELETAL: Degenerative changes lower thoracic and lumbar spine.                                                                      IMPRESSION:   1.  Acute diverticulitis involving the upper sigmoid colon.  2.  Moderate formed stool noted in the colon extending to the level of the site of diverticulitis. Distal to this the colon is decompressed.  3.  No evidence for free fluid or abscess.  4.  Prior cholecystectomy.       ER intervention included:  MEDICATIONS GIVEN IN THE EMERGENCY:  Medications   glucose gel 15-30 g (has no administration in time range)     Or   dextrose 50 % injection 25-50 mL (has no administration in time range)     Or   glucagon injection 1 mg (has no administration in time range)    enoxaparin ANTICOAGULANT (LOVENOX) injection 40 mg (40 mg Subcutaneous $Given 4/13/24 2232)   metroNIDAZOLE (FLAGYL) infusion 500 mg (has no administration in time range)   ciprofloxacin (CIPRO) infusion 400 mg (has no administration in time range)   lidocaine 1 % 0.1-1 mL (has no administration in time range)   lidocaine (LMX4) cream (has no administration in time range)   sodium chloride (PF) 0.9% PF flush 3 mL (3 mLs Intracatheter $Given 4/13/24 2235)   sodium chloride (PF) 0.9% PF flush 3 mL (has no administration in time range)   senna-docusate (SENOKOT-S/PERICOLACE) 8.6-50 MG per tablet 1 tablet (has no administration in time range)     Or   senna-docusate (SENOKOT-S/PERICOLACE) 8.6-50 MG per tablet 2 tablet (has no administration in time range)   calcium carbonate (TUMS) chewable tablet 1,000 mg (has no administration in time range)   docusate sodium (COLACE) capsule 100 mg (has no administration in time range)   ondansetron (ZOFRAN ODT) ODT tab 4 mg (has no administration in time range)     Or   ondansetron (ZOFRAN) injection 4 mg (has no administration in time range)   famotidine (PEPCID) injection 20 mg (20 mg Intravenous $Given 4/13/24 2232)   miconazole (MICATIN) 2 % powder ( Topical $Given 4/13/24 2253)   glucose gel 15-30 g (has no administration in time range)     Or   dextrose 50 % injection 25-50 mL (has no administration in time range)     Or   glucagon injection 1 mg (has no administration in time range)   insulin aspart (NovoLOG) injection (RAPID ACTING) ( Subcutaneous Not Given 4/13/24 2240)   sodium chloride 0.9% BOLUS 500 mL (0 mLs Intravenous Stopped 4/13/24 2025)   ipratropium - albuterol 0.5 mg/2.5 mg/3 mL (DUONEB) neb solution 3 mL (3 mLs Nebulization $Given 4/13/24 1920)   iopamidol (ISOVUE-370) solution 90 mL (90 mLs Intravenous $Given 4/13/24 1938)   ciprofloxacin (CIPRO) infusion 400 mg (0 mg Intravenous Stopped 4/13/24 2152)   metroNIDAZOLE (FLAGYL) infusion 500 mg (500 mg  Intravenous $New Bag 4/13/24 7396)       TTE: December 2023    Interpretation Summary     1.Left ventricular size, wall motion and function are normal. The ejection  fraction is 60-65%.  2.There is mild concentric left ventricular hypertrophy.  3.Normal right ventricle size and systolic function.  4.There is discrete nodular thickening of the right coronary cusp. Mild  valvular aortic stenosis. At SVI of 38 mL/M2 peak velocity is 1.9 m/s with a  mean gradient of 8 mmHg and dimensionless index is 0.6.  5.Right ventricular systolic pressure is elevated, consistent with mild to  moderate pulmonary hypertension.  6.Ascending Aorta dilatation is present, borderline  Compared to the prior study dated 10/31/2022, there have been no changes.    Case was discussed with intensivist who will assist with management in the ICU.    Past Medical History    Past Medical History:   Diagnosis Date    Allergic rhinitis     Arthritis of back     CHF (congestive heart failure) (H)     Chronic kidney disease     stage 3     De Quervain's disease (tenosynovitis)     Fibromyalgia, primary     GERD (gastroesophageal reflux disease)     Hematuria     chronic    High cholesterol     History of blood clots 09/01/1970    DVT, from birth control, h/o left calf    Hyperlipidemia     Hypertension     Low back pain     Osteoporosis     Pickwickian syndrome (H)     Plantar fasciitis     Proteinuria     Proteinuria     chronic    Sleep apnea     CPAP    Uncomplicated asthma        Past Surgical History   Past Surgical History:   Procedure Laterality Date    CERVICAL FUSION N/A 4/27/2017    Procedure: ANTERIOR CERVICAL DECOMPRESSION FUSION C5-7 BILATERAL;  Surgeon: Basim Barone MD;  Location: [facility];  Service:     CHOLECYSTECTOMY      open    COLONOSCOPY N/A 12/20/2019    Procedure: COLONOSCOPY WITH BIOPSIES;  Surgeon: Lucio Farr MD;  Location: [facility];  Service: Gastroenterology    EYE SURGERY      HAND  SURGERY Right     HYSTEROSCOPY DIAGNOSTIC      JOINT REPLACEMENT      bilateral TKA    KNEE SURGERY      RELEASE CARPAL TUNNEL Bilateral        Prior to Admission Medications   Prior to Admission Medications   Prescriptions Last Dose Informant Patient Reported? Taking?   DULOXETINE HCL PO   Yes No   Sig: Take 30 mg by mouth at bedtime   DULoxetine (CYMBALTA) 30 MG capsule   Yes No   Sig: Take 60 mg by mouth every morning   HYDROcodone-acetaminophen 5-325 mg per tablet   Yes No   Sig: Take 1 tablet by mouth every 4 hours as needed for pain Max 6 tablets per day.   Hydroxychloroquine Sulfate 100 MG TABS   Yes No   Sig: Take 100 mg by mouth 2 times daily   OXYGEN-AIR DELIVERY SYSTEMS MISC   Yes No   Sig: [OXYGEN-AIR DELIVERY SYSTEMS MISC] Use 3 L As Directed. 3 lpm with activities and as needed at night   Turmeric (CURCUPLEX-95) 500 MG CAPS   Yes No   Sig: Take 1 capsule by mouth daily   Vitamin D3 (CHOLECALCIFEROL) 25 mcg (1000 units) tablet   Yes No   Sig: Take 50 mcg by mouth at bedtime   albuterol (PROAIR HFA;PROVENTIL HFA;VENTOLIN HFA) 90 mcg/actuation inhaler   Yes No   Sig: Inhale 1 puff into the lungs every 4 hours as needed for shortness of breath / dyspnea   alendronate (FOSAMAX) 70 MG tablet   Yes No   Sig: [ALENDRONATE (FOSAMAX) 70 MG TABLET] Take 70 mg by mouth every 7 days. Takes on    aspirin 81 MG EC tablet   Yes No   Si tablet Orally Once a day   calcium citrate (CITRACAL) 950 (200 Ca) MG tablet   Yes No   Sig: Take 1 tablet by mouth daily   cetirizine (ZYRTEC) 10 MG tablet   Yes No   Sig: [CETIRIZINE (ZYRTEC) 10 MG TABLET] Take 10 mg by mouth daily.    cholecalciferol (VITAMIN D3) 25 mcg (1000 units) capsule   Yes No   Sig: Take 125 mcg by mouth every morning Take 5000 units in the morning and 2000 units in the evening   docusate sodium (COLACE) 100 MG capsule   Yes No   Sig: Take 100-300 mg by mouth daily as needed   esomeprazole (NEXIUM) 40 MG capsule   Yes No   Sig: Take 40 mg by mouth  daily as needed   fluticasone-salmeterol (AIRDUO RESPICLICK) 113-14 MCG/ACT inhaler   No No   Sig: Inhale 1 puff into the lungs 2 times daily   furosemide (LASIX) 20 MG tablet   Yes No   Sig: Take 40 mg by mouth every morning   furosemide (LASIX) 20 MG tablet   Yes Yes   Sig: Take 20 mg by mouth daily (with lunch)   l-lysine HCl 500 MG TABS tablet   Yes No   Sig: Take 500 mg by mouth 2 times daily   magnesium oxide 250 mg Tab   Yes No   Sig: Take 500 mg by mouth daily as needed (constipation)   metoprolol succinate ER (TOPROL XL) 50 MG 24 hr tablet   Yes No   Sig: Take 50 mg by mouth At Bedtime   modafinil (PROVIGIL) 100 MG tablet   Yes No   Sig: [MODAFINIL (PROVIGIL) 100 MG TABLET] Take 250 mg by mouth daily.   montelukast (SINGULAIR) 10 mg tablet   Yes No   Sig: [MONTELUKAST (SINGULAIR) 10 MG TABLET] Take 10 mg by mouth daily.   senna-docusate (SENOKOT-S/PERICOLACE) 8.6-50 MG tablet   No No   Sig: Take 1 tablet by mouth 2 times daily as needed for constipation   solifenacin (VESICARE) 10 MG tablet   Yes No   Sig: Take 5-10 mg by mouth At Bedtime   valACYclovir (VALTREX) 500 MG tablet   Yes No   Sig: Take 500 mg by mouth 2 times daily as needed (flare)      Facility-Administered Medications: None        Review of Systems    The 10 point Review of Systems is negative other than noted in the HPI or here.     Social History   I have reviewed this patient's social history and updated it with pertinent information if needed.  Social History     Tobacco Use    Smoking status: Never    Smokeless tobacco: Never   Vaping Use    Vaping status: Never Used   Substance Use Topics    Alcohol use: No    Drug use: No         Family History   I have reviewed this patient's family history and updated it with pertinent information if needed.  Family History   Problem Relation Age of Onset    Rheumatoid Arthritis Mother     Heart Disease Sister     Chronic Obstructive Pulmonary Disease Father          Allergies   Allergies   Allergen  Reactions    Latex Rash     Severe rash    Pregabalin Shortness Of Breath     Other Reaction(s): swelling and shortness of breath    Valsartan Unknown     Hyperkalemia    Colchicine Diarrhea    Dicloxacillin      Other Reaction(s): confusion    Gabapentin      Other Reaction(s): Sedation    Latex Unknown     Added based on information entered during case entry, please review and add reactions, type, and severity as needed    Other Drug Allergy (See Comments) Muscle Pain (Myalgia)     Tried lovastatin and simvastatin    Other Environmental Allergy Unknown     Coverlet bandaid , 3M product; rash    Statins-Hmg-Coa Reductase Inhibitors [Statins] Muscle Pain (Myalgia)     Tried lovastatin and simvastatin    Sulfa Antibiotics Swelling     Facial swelling      Adhesive Tape Rash        Physical Exam   Vital Signs: Temp: 100  F (37.8  C) Temp src: Oral BP: 138/69 Pulse: 79   Resp: 23 SpO2: 99 % O2 Device: BiPAP/CPAP Oxygen Delivery: 4 LPM  Weight: 200 lbs 0 oz      General Aox3, appropriate affect, NAD, on 4 L of oxygen  HEENT  MMM, EOMI, PERRL, obese  Chest Adeq E b/l, No wheezing  Heart RRR, No M/R/G  Abd- Soft, NT, BS+  - Deferred,   Extremity- Moving all extremities, No digital clubbing,   No edema  Neuro- Aox3, grossly nonfocal, gait not checked  Skin  + right ankle brice, fungal infection in b/l groin and under breast    Medical Decision Making       85 MINUTES SPENT BY ME on the date of service doing chart review, history, exam, documentation & further activities per the note.      ------------------ MEDICAL DECISION MAKING ------------------------------------------------------------------------------------------------------  MANAGEMENT DISCUSSED with the following over the past 24 hours: Patient and care team       Data   ------------------------- PAST 24 HR DATA REVIEWED -----------------------------------------------    I have personally reviewed the following data over the past 24 hrs:    21.2 (H)  \   11.1  (L)   / 426     134 (L) 96 (L) 37.0 (H) /  108 (H)   4.2 25 1.42 (H) \     ALT: 27 AST: 54 (H) AP: 118 TBILI: 0.5   ALB: 3.3 (L) TOT PROTEIN: 6.5 LIPASE: 7 (L)     Trop: 66 (H) BNP: 5,303 (H)     Procal: N/A CRP: N/A Lactic Acid: 0.8         Imaging results reviewed over the past 24 hrs:   Recent Results (from the past 24 hour(s))   CT Chest Pulmonary Embolism w Contrast    Narrative    EXAM: CT CHEST PULMONARY EMBOLISM W CONTRAST  LOCATION: [facility]  DATE: 4/13/2024    INDICATION: SOB  COMPARISON: CT 10/31/2022  TECHNIQUE: CT chest pulmonary angiogram during arterial phase injection of IV contrast. Multiplanar reformats and MIP reconstructions were performed. Dose reduction techniques were used.   CONTRAST: Isovue 370 90 mL     FINDINGS:  ANGIOGRAM CHEST: Pulmonary arteries are normal caliber and negative for pulmonary emboli. The subsegmental pulmonary arteries in the lung bases cannot be adequately assessed due to respiratory motion artifact. Thoracic aorta is negative for dissection.   No CT evidence of right heart strain.    LUNGS AND PLEURA: Moderate respiratory motion artifact. Mild bibasilar atelectasis. No consolidation.    MEDIASTINUM/AXILLAE: No lymphadenopathy. No pericardial effusion.    CORONARY ARTERY CALCIFICATION: Moderate.    UPPER ABDOMEN: Normal.    MUSCULOSKELETAL: Surgical changes of the cervical spine.      Impression    IMPRESSION:  1.  Moderate respiratory motion artifact limits some detail of the subsegmental pulmonary arteries in the lung bases as well as portions of the lungs. No pulmonary emboli appreciated.  2.  No evidence for pulmonary consolidation or pleural fluid.   CT Abdomen Pelvis w Contrast    Narrative    EXAM: CT ABDOMEN PELVIS W CONTRAST  LOCATION: [facility]  DATE: 4/13/2024    INDICATION: Pain.  COMPARISON: None.  TECHNIQUE: CT scan of the abdomen and pelvis was performed following injection of IV contrast. Multiplanar  reformats were obtained. Dose reduction techniques were used.  CONTRAST: Isovue 370 90 mL    FINDINGS:   LOWER CHEST: Normal.    HEPATOBILIARY: Cholecystectomy. The liver is unremarkable. No biliary dilatation.    PANCREAS: Normal.    SPLEEN: Normal.    ADRENAL GLANDS: Normal.    KIDNEYS/BLADDER: Bilateral renal cysts have benign features and require no additional follow-up. The urinary bladder is grossly unremarkable.    BOWEL: Moderate inflammatory changes noted in the left lower quadrant surrounding the upper sigmoid colon secondary to acute diverticulitis. Moderate formed stool is noted in the colon up to the level of the site of diverticulitis. This may be resulting   in a delay in transit due to the wall thickening and inflammation.    LYMPH NODES: No lymphadenopathy.    VASCULATURE: Normal.    PELVIC ORGANS: No lymphadenopathy. No pericardial effusion.    MUSCULOSKELETAL: Degenerative changes lower thoracic and lumbar spine.      Impression    IMPRESSION:   1.  Acute diverticulitis involving the upper sigmoid colon.  2.  Moderate formed stool noted in the colon extending to the level of the site of diverticulitis. Distal to this the colon is decompressed.  3.  No evidence for free fluid or abscess.  4.  Prior cholecystectomy.   US Lower Extremity Venous Duplex Bilateral    Narrative    EXAM: US LOWER EXTREMITY VENOUS DUPLEX BILATERAL  LOCATION: [facility]  DATE: 4/13/2024    INDICATION: pain, swelling  COMPARISON: None.  TECHNIQUE: Venous Duplex ultrasound of bilateral lower extremities with and without compression, augmentation and duplex. Color flow and spectral Doppler with waveform analysis performed.    FINDINGS: Exam includes the common femoral, femoral, popliteal veins as well as segmentally visualized deep calf veins and greater saphenous vein.     RIGHT: No deep vein thrombosis. No superficial thrombophlebitis. No popliteal cyst.    LEFT: No deep vein thrombosis. No  superficial thrombophlebitis. No popliteal cyst.      Impression    IMPRESSION:  1.  No deep venous thrombosis in the bilateral lower extremities.

## 2024-04-14 NOTE — PLAN OF CARE
"Goal Outcome Evaluation:       [facility] - ICU    RN Progress Note:            Pertinent Assessments:      Please refer to flowsheet rows for full assessment     Patient arrived from ER around 2200 last night. Pt on Bipap, but unable to tolerate home Bipap settings (25/15) for long, needing to go down to 16/8 currently. Pt arouses to repeated verbal stimulation and answers some questions, but seemingly refuses to answer some or is confused and won't answer. Oriented to self only. Pt states pain \"all over\", but pt appears in pain only when she is touched or moved/repositioned. Pt has non-blanchable redness/rash under bilateral breasts and under pannus/in groin area with creamy discharge. Mican powder and interdry placed in these areas. Pt has a bruise to right heel-heels suspended on pillows. No sacral breakdown/redness, mepilex applied regardless.           Key Events - This Shift:       uneventful             Barriers to Discharge / Downgrade:     Pt requiring Bipap, sats drop to 70s when mask was exchanged for bigger one.          '                 "

## 2024-04-15 LAB
ALBUMIN SERPL BCG-MCNC: 2.6 G/DL (ref 3.5–5.2)
ALP SERPL-CCNC: 206 U/L (ref 40–150)
ALT SERPL W P-5'-P-CCNC: 27 U/L (ref 0–50)
ANION GAP SERPL CALCULATED.3IONS-SCNC: 12 MMOL/L (ref 7–15)
AST SERPL W P-5'-P-CCNC: 46 U/L (ref 0–45)
BASOPHILS # BLD AUTO: 0 10E3/UL (ref 0–0.2)
BASOPHILS NFR BLD AUTO: 0 %
BILIRUB SERPL-MCNC: 0.4 MG/DL
BUN SERPL-MCNC: 26.7 MG/DL (ref 8–23)
CALCIUM SERPL-MCNC: 8.8 MG/DL (ref 8.8–10.2)
CHLORIDE SERPL-SCNC: 99 MMOL/L (ref 98–107)
CREAT SERPL-MCNC: 1.25 MG/DL (ref 0.51–0.95)
CRP SERPL-MCNC: 250 MG/L
DEPRECATED HCO3 PLAS-SCNC: 25 MMOL/L (ref 22–29)
EGFRCR SERPLBLD CKD-EPI 2021: 46 ML/MIN/1.73M2
EOSINOPHIL # BLD AUTO: 0.1 10E3/UL (ref 0–0.7)
EOSINOPHIL NFR BLD AUTO: 0 %
ERYTHROCYTE [DISTWIDTH] IN BLOOD BY AUTOMATED COUNT: 12.5 % (ref 10–15)
GLUCOSE BLDC GLUCOMTR-MCNC: 103 MG/DL (ref 70–99)
GLUCOSE BLDC GLUCOMTR-MCNC: 103 MG/DL (ref 70–99)
GLUCOSE BLDC GLUCOMTR-MCNC: 131 MG/DL (ref 70–99)
GLUCOSE BLDC GLUCOMTR-MCNC: 77 MG/DL (ref 70–99)
GLUCOSE BLDC GLUCOMTR-MCNC: 87 MG/DL (ref 70–99)
GLUCOSE BLDC GLUCOMTR-MCNC: 94 MG/DL (ref 70–99)
GLUCOSE SERPL-MCNC: 99 MG/DL (ref 70–99)
HCT VFR BLD AUTO: 32.5 % (ref 35–47)
HGB BLD-MCNC: 10.4 G/DL (ref 11.7–15.7)
IMM GRANULOCYTES # BLD: 0.2 10E3/UL
IMM GRANULOCYTES NFR BLD: 1 %
LYMPHOCYTES # BLD AUTO: 0.8 10E3/UL (ref 0.8–5.3)
LYMPHOCYTES NFR BLD AUTO: 4 %
MAGNESIUM SERPL-MCNC: 1.9 MG/DL (ref 1.7–2.3)
MCH RBC QN AUTO: 31.9 PG (ref 26.5–33)
MCHC RBC AUTO-ENTMCNC: 32 G/DL (ref 31.5–36.5)
MCV RBC AUTO: 100 FL (ref 78–100)
MONOCYTES # BLD AUTO: 1.8 10E3/UL (ref 0–1.3)
MONOCYTES NFR BLD AUTO: 10 %
NEUTROPHILS # BLD AUTO: 16 10E3/UL (ref 1.6–8.3)
NEUTROPHILS NFR BLD AUTO: 85 %
NRBC # BLD AUTO: 0 10E3/UL
NRBC BLD AUTO-RTO: 0 /100
PLATELET # BLD AUTO: 369 10E3/UL (ref 150–450)
POTASSIUM SERPL-SCNC: 5 MMOL/L (ref 3.4–5.3)
PROT SERPL-MCNC: 5.8 G/DL (ref 6.4–8.3)
RBC # BLD AUTO: 3.26 10E6/UL (ref 3.8–5.2)
SODIUM SERPL-SCNC: 136 MMOL/L (ref 135–145)
WBC # BLD AUTO: 19 10E3/UL (ref 4–11)

## 2024-04-15 PROCEDURE — 250N000011 HC RX IP 250 OP 636: Mod: JZ | Performed by: INTERNAL MEDICINE

## 2024-04-15 PROCEDURE — G0463 HOSPITAL OUTPT CLINIC VISIT: HCPCS

## 2024-04-15 PROCEDURE — 86140 C-REACTIVE PROTEIN: CPT | Performed by: INTERNAL MEDICINE

## 2024-04-15 PROCEDURE — 120N000004 HC R&B MS OVERFLOW

## 2024-04-15 PROCEDURE — 99232 SBSQ HOSP IP/OBS MODERATE 35: CPT | Performed by: INTERNAL MEDICINE

## 2024-04-15 PROCEDURE — 36415 COLL VENOUS BLD VENIPUNCTURE: CPT | Performed by: INTERNAL MEDICINE

## 2024-04-15 PROCEDURE — 250N000013 HC RX MED GY IP 250 OP 250 PS 637: Performed by: INTERNAL MEDICINE

## 2024-04-15 PROCEDURE — 94660 CPAP INITIATION&MGMT: CPT

## 2024-04-15 PROCEDURE — 85025 COMPLETE CBC W/AUTO DIFF WBC: CPT | Performed by: HOSPITALIST

## 2024-04-15 PROCEDURE — 999N000248 HC STATISTIC IV INSERT WITH US BY RN

## 2024-04-15 PROCEDURE — 83735 ASSAY OF MAGNESIUM: CPT | Performed by: INTERNAL MEDICINE

## 2024-04-15 PROCEDURE — 80053 COMPREHEN METABOLIC PANEL: CPT | Performed by: INTERNAL MEDICINE

## 2024-04-15 PROCEDURE — 999N000157 HC STATISTIC RCP TIME EA 10 MIN

## 2024-04-15 PROCEDURE — 250N000011 HC RX IP 250 OP 636: Performed by: INTERNAL MEDICINE

## 2024-04-15 PROCEDURE — 99233 SBSQ HOSP IP/OBS HIGH 50: CPT | Performed by: HOSPITALIST

## 2024-04-15 PROCEDURE — 250N000013 HC RX MED GY IP 250 OP 250 PS 637: Performed by: HOSPITALIST

## 2024-04-15 RX ORDER — LACTULOSE 10 G/15ML
10 SOLUTION ORAL EVERY 6 HOURS
Status: COMPLETED | OUTPATIENT
Start: 2024-04-15 | End: 2024-04-15

## 2024-04-15 RX ADMIN — ASPIRIN 81 MG CHEWABLE TABLET 81 MG: 81 TABLET CHEWABLE at 08:34

## 2024-04-15 RX ADMIN — ENOXAPARIN SODIUM 40 MG: 40 INJECTION SUBCUTANEOUS at 20:08

## 2024-04-15 RX ADMIN — METRONIDAZOLE 500 MG: 500 INJECTION, SOLUTION INTRAVENOUS at 22:41

## 2024-04-15 RX ADMIN — FUROSEMIDE 20 MG: 10 INJECTION, SOLUTION INTRAMUSCULAR; INTRAVENOUS at 08:34

## 2024-04-15 RX ADMIN — LACTULOSE 10 G: 10 SOLUTION ORAL at 20:08

## 2024-04-15 RX ADMIN — LACTULOSE 10 G: 10 SOLUTION ORAL at 16:28

## 2024-04-15 RX ADMIN — DOCUSATE SODIUM 200 MG: 100 CAPSULE, LIQUID FILLED ORAL at 08:34

## 2024-04-15 RX ADMIN — CIPROFLOXACIN 400 MG: 400 INJECTION, SOLUTION INTRAVENOUS at 20:07

## 2024-04-15 RX ADMIN — MICONAZOLE NITRATE: 20 POWDER TOPICAL at 08:35

## 2024-04-15 RX ADMIN — MICONAZOLE NITRATE ANTIFUNGAL POWDER: 2 POWDER TOPICAL at 08:35

## 2024-04-15 RX ADMIN — MODAFINIL 200 MG: 100 TABLET ORAL at 08:37

## 2024-04-15 RX ADMIN — DULOXETINE HYDROCHLORIDE 30 MG: 30 CAPSULE, DELAYED RELEASE ORAL at 08:34

## 2024-04-15 RX ADMIN — CIPROFLOXACIN 400 MG: 400 INJECTION, SOLUTION INTRAVENOUS at 08:34

## 2024-04-15 RX ADMIN — METRONIDAZOLE 500 MG: 500 INJECTION, SOLUTION INTRAVENOUS at 12:16

## 2024-04-15 RX ADMIN — HYDROMORPHONE HYDROCHLORIDE 0.3 MG: 1 INJECTION, SOLUTION INTRAMUSCULAR; INTRAVENOUS; SUBCUTANEOUS at 12:17

## 2024-04-15 RX ADMIN — LACTULOSE 10 G: 10 SOLUTION ORAL at 12:16

## 2024-04-15 RX ADMIN — METOPROLOL TARTRATE 50 MG: 25 TABLET, FILM COATED ORAL at 08:34

## 2024-04-15 RX ADMIN — HYDROXYCHLOROQUINE SULFATE 200 MG: 200 TABLET, FILM COATED ORAL at 08:34

## 2024-04-15 RX ADMIN — MICONAZOLE NITRATE: 20 POWDER TOPICAL at 20:09

## 2024-04-15 RX ADMIN — MICONAZOLE NITRATE ANTIFUNGAL POWDER: 2 POWDER TOPICAL at 20:09

## 2024-04-15 RX ADMIN — FAMOTIDINE 20 MG: 10 INJECTION, SOLUTION INTRAVENOUS at 10:04

## 2024-04-15 RX ADMIN — FAMOTIDINE 20 MG: 10 INJECTION, SOLUTION INTRAVENOUS at 22:41

## 2024-04-15 RX ADMIN — METOPROLOL TARTRATE 50 MG: 25 TABLET, FILM COATED ORAL at 20:09

## 2024-04-15 ASSESSMENT — ACTIVITIES OF DAILY LIVING (ADL)
ADLS_ACUITY_SCORE: 46
ADLS_ACUITY_SCORE: 44
ADLS_ACUITY_SCORE: 46
ADLS_ACUITY_SCORE: 40
ADLS_ACUITY_SCORE: 46
ADLS_ACUITY_SCORE: 44
ADLS_ACUITY_SCORE: 42
ADLS_ACUITY_SCORE: 46
ADLS_ACUITY_SCORE: 46
ADLS_ACUITY_SCORE: 42
ADLS_ACUITY_SCORE: 42
ADLS_ACUITY_SCORE: 45
ADLS_ACUITY_SCORE: 42
ADLS_ACUITY_SCORE: 44
ADLS_ACUITY_SCORE: 45
ADLS_ACUITY_SCORE: 44
ADLS_ACUITY_SCORE: 44
ADLS_ACUITY_SCORE: 42
ADLS_ACUITY_SCORE: 44
ADLS_ACUITY_SCORE: 46
ADLS_ACUITY_SCORE: 42

## 2024-04-15 NOTE — PROGRESS NOTES
PULMONARY / CRITICAL CARE PROGRESS NOTE    Date / Time of Admission:  4/13/2024  6:15 PM    Assessment:     Desirae Rey is a 73 year old female with history of HTN, HFpEF, mild aortic stenosis, asthma, NELLIE/OHS on BiPAP, CKD stage III, DVT LLE 84' while on contraceptives, C5-7 fusion, chronic back pain, widespread polyarthralgias osteoarthritis chondrocalcinosis positive MARIA GUADALUPE and a borderline positive DNA, morbid obesity, on home O2 3 LPM with activities.   Presents to ED complaining of generalized weakness, decrease appetite, nausea, bilateral leg pain.   Patient was tachycardic, tachypneic, afebrile, hypoxic.   Labs showed elevated BUN/Cr, elevated BNP, acute on chronic respiratory acidosis. Negative viral panel.   CT scan showed no pulmonary embolism, no lung infiltrates, acute diverticulitis upper sigmoid, moderate stool, no free abdominal fluid. Venous US LE was negative for DVT.   Started on abx ciprofloxacin and metronidazole. Increase O2 requirements, altered mental status. Started on BiPAP and diuresis. Admitted to ICU for close observation       Chronic respiratory failure   Secondary to NELLIE/OHS, morbid obesity. Underlying asthma  Continue O2 supplementation 3LPM continuously   Metabolic encephalopathy   Minimize pain meds / sedatives   Sepsis secondary to acute diverticulitis   Abx ciprofloxacin and metronidazole   Bowel regimen   Mild leak of troponin   HTN, HFpEF, mild aortic stenosis, mild pulmonary hypertension  Titrate BP meds and diuretics.   CKD stage 3  Intertrigo  Topic miconazole   Hx DVT LLE 84' while on contraceptives  Follow up venous US LE showed no DVT.   Asthma mild persistent   Non tobacco use in the past. Worked in the kitchen area.   PFTs 1/2023 showed normal spirometry, lung volumes and diffusion capacity.   Continue ICS/LABA, albuterol HFA as needed  NELLEI on auto BiPAP  Sleep study 2019 showed a moderate obstructive sleep apnea (AHI=17.1), events more frequently occurring during supine  sleep (AHI 40.6/hr vs 8.3/hr). Nocturnal hypoxia.   Auto-titrating Bilevel with an EPAP min of 12, IPAP max of 25, and PS of 7 cmH2O pluse O2 2 LPM was effective.   Polyarthralgias osteoarthritis chondrocalcinosis positive MARIA GUADALUPE and a borderline positive DNA  On hydroxychloroquine   Obesity Body mass index is 49.5 kg/m .    Advance Directives:  Full code    Plan:   Titrate FiO2 to keep SpO2 88-92%, patient is on chronic 3 LPM. Avoid hyperoxia  Continue BiPAP with home settings at night and as needed  Bronchodilators as needed  Abx per [facility]  Minimize pain meds and sedatives   Needs PT / OT evaluation, defer to [facility].     No further recommendations; we'll sign off.  Has follow up scheduled with Dr. Betancourt in pulmonary clinic next month; she should keep that appointment.     Jace Armendariz MD (Avi)  [facility] ([facility])  Clinic [phone]  Fax [phone]               Subjective:   HPI:  Desirae Rey is a 73 year old female with history of HTN, HFpEF, mild aortic stenosis, asthma, NELLIE/OHS on BiPAP, CKD stage III, DVT LLE 84' while on contraceptives, C5-7 fusion, chronic back pain, widespread polyarthralgias osteoarthritis chondrocalcinosis positive MARIA GUADALUPE and a borderline positive DNA, morbid obesity, on home O2 3 LPM with activities.   Presents to ED complaining of generalized weakness, decrease appetite, nausea, bilateral leg pain.   Patient was tachycardic, tachypneic, afebrile, hypoxic.   Labs showed elevated BUN/Cr, elevated BNP, acute on chronic respiratory acidosis. Negative viral panel.   CT scan showed no pulmonary embolism, no lung infiltrates, acute diverticulitis upper sigmoid, moderate stool, no free abdominal fluid. Venous US LE was negative for DVT.   Started on abx ciprofloxacin and metronidazole. Increase O2 requirements, altered mental status. Started on BiPAP and diuresis. Admitted to ICU for close observation     Interim history:  No events. Feels better.  Using hospital BiPAP at night.       Allergies: Latex, Pregabalin, Valsartan, Colchicine, Dicloxacillin, Gabapentin, Latex, Other drug allergy (see comments), Other environmental allergy, Statins-hmg-coa reductase inhibitors [statins], Sulfa antibiotics, and Adhesive tape     MEDS:  Current Facility-Administered Medications   Medication Dose Route Frequency Provider Last Rate Last Admin    aspirin (ASA) chewable tablet 81 mg  81 mg Oral Daily Carlos Bull MD   81 mg at 04/15/24 0834    calcium carbonate (TUMS) chewable tablet 1,000 mg  1,000 mg Oral 4x Daily PRN Jennifer Gauthier MD        ciprofloxacin (CIPRO) infusion 400 mg  400 mg Intravenous Q12H Jennifer Gauthier MD   400 mg at 04/15/24 0834    glucose gel 15-30 g  15-30 g Oral Q15 Min PRN Hai Flores MD        Or    dextrose 50 % injection 25-50 mL  25-50 mL Intravenous Q15 Min PRN Hai Flores MD        Or    glucagon injection 1 mg  1 mg Subcutaneous Q15 Min PRN Hai Flores MD        glucose gel 15-30 g  15-30 g Oral Q15 Min PRN Hai Flores MD        Or    dextrose 50 % injection 25-50 mL  25-50 mL Intravenous Q15 Min PRN Hai Flores MD        Or    glucagon injection 1 mg  1 mg Subcutaneous Q15 Min PRN Hai Flores MD        docusate sodium (COLACE) capsule 200 mg  200 mg Oral BID Patrick Gonzales DO   200 mg at 04/15/24 0834    DULoxetine (CYMBALTA) DR capsule 30 mg  30 mg Oral Daily Carlos Bull MD   30 mg at 04/15/24 0834    enoxaparin ANTICOAGULANT (LOVENOX) injection 40 mg  40 mg Subcutaneous Q24H Hai Flores MD   40 mg at 04/14/24 2229    famotidine (PEPCID) injection 20 mg  20 mg Intravenous Q12H Jennifer Gauthier MD   20 mg at 04/14/24 1958    furosemide (LASIX) injection 20 mg  20 mg Intravenous Daily Jennifer Gauthier MD   20 mg at 04/15/24 0834    HYDROmorphone (PF) (DILAUDID) injection 0.3 mg  0.3 mg Intravenous Q3H PRN Jennifer Gauthier MD   0.3 mg at 04/14/24 9192     hydroxychloroquine (PLAQUENIL) tablet 200 mg  200 mg Oral Daily Carlos Bull MD   200 mg at 04/15/24 0834    insulin aspart (NovoLOG) injection (RAPID ACTING)  1-7 Units Subcutaneous Q4H Hai Flores MD   1 Units at 04/14/24 1752    ipratropium - albuterol 0.5 mg/2.5 mg/3 mL (DUONEB) neb solution 3 mL  3 mL Nebulization Q4H PRN Jennifer Gauthier MD        lidocaine (LMX4) cream   Topical Q1H PRN Jennifer Gauthier MD        lidocaine 1 % 0.1-1 mL  0.1-1 mL Other Q1H PRN Jennifer Gauthier MD        magnesium hydroxide (MILK OF MAGNESIA) suspension 30 mL  30 mL Oral Daily PRN Patrick Gonzales DO   30 mL at 04/14/24 1323    metoprolol tartrate (LOPRESSOR) tablet 50 mg  50 mg Oral BID Carlos Bull MD   50 mg at 04/15/24 0834    metroNIDAZOLE (FLAGYL) infusion 500 mg  500 mg Intravenous Q12H Hai Flores MD   500 mg at 04/14/24 2226    miconazole (MICATIN) 2 % powder   Topical BID Jennifer Gauthier MD   Given at 04/15/24 0835    miconazole (MICATIN) 2 % powder   Topical BID Carlos Bull MD   Given at 04/15/24 0835    modafinil (PROVIGIL) tablet 200 mg  200 mg Oral Daily Carlos Bull MD   200 mg at 04/15/24 0837    naloxone (NARCAN) injection 0.2 mg  0.2 mg Intravenous Q2 Min PRN Hai Flores MD        Or    naloxone (NARCAN) injection 0.4 mg  0.4 mg Intravenous Q2 Min PRN Hai Flores MD        Or    naloxone (NARCAN) injection 0.2 mg  0.2 mg Intramuscular Q2 Min PRN Hai Flores MD        Or    naloxone (NARCAN) injection 0.4 mg  0.4 mg Intramuscular Q2 Min PRN Hai Flores MD        ondansetron (ZOFRAN ODT) ODT tab 4 mg  4 mg Oral Q6H PRN Jennifer Gauthier MD        Or    ondansetron (ZOFRAN) injection 4 mg  4 mg Intravenous Q6H PRN Jennifer Gauthier MD   4 mg at 04/14/24 1517    polyethylene glycol (MIRALAX) Packet 17 g  17 g Oral BID PRN Carlos Bull MD        senna-docusate (SENOKOT-S/PERICOLACE) 8.6-50 MG per tablet  "1 tablet  1 tablet Oral BID PRN Jennifer Gauthier MD        Or    senna-docusate (SENOKOT-S/PERICOLACE) 8.6-50 MG per tablet 2 tablet  2 tablet Oral BID PRN Jennifer Gauthier MD        sodium chloride (PF) 0.9% PF flush 3 mL  3 mL Intracatheter Q8H Jennifer Gauthier MD   3 mL at 04/14/24 1520    sodium chloride (PF) 0.9% PF flush 3 mL  3 mL Intracatheter q1 min prn Jennifer Gauthier MD           Objective:   VITALS:  BP (!) 149/72   Pulse 78   Temp 98  F (36.7  C) (Oral)   Resp 24   Ht 1.397 m (4' 7\")   Wt 96.6 kg (212 lb 15.4 oz)   SpO2 97%   BMI 49.50 kg/m    VENT:  FiO2 (%): 32 %  Resp: 24    EXAM:   Gen: obese, awake, alert, mild distress due to generalized fatigue  HEENT: pale conjunctiva, moist mucosa, Mallampati III/IV  Neck: no thyromegaly, masses or JVD  Lungs: decrease breath sounds at the bases  CV: regular, no murmurs or gallops appreciated  Abdomen: soft, distended , Left lower quadrant pain, no rebound.   Ext: trace edema  Neuro: CN II-XII intact, non focal       Data Review:  Recent Labs   Lab 04/15/24  0831 04/15/24  0418 04/15/24  0400 04/14/24  2334 04/14/24  2133 04/14/24  1749   GLC 94 99 103* 121* 107* 147*      04/14/24 04:13   Sodium 138   Potassium 4.1   Chloride 102   Carbon Dioxide (CO2) 25   Urea Nitrogen 32.2 (H)   Creatinine 1.22 (H)   GFR Estimate 47 (L)   Calcium 8.9   Anion Gap 11   Magnesium 1.8   Albumin 3.0 (L)   Protein Total 6.2 (L)   Alkaline Phosphatase 128   ALT 23   AST 41   Bilirubin Total 0.3      04/14/24 04:13   FIO2 0   Ph Venous 7.36   PCO2 Venous 49   PO2 Venous 79 (H)   O2 Sat, Venous 96.2 (H)   Bicarbonate Venous 27   Base Excess Venous 1.7   Oxyhemoglobin Venous 95 (H)      04/14/24 04:13   WBC 18.9 (H)   Hemoglobin 10.5 (L)   Hematocrit 32.8 (L)   Platelet Count 374   RBC Count 3.35 (L)   MCV 98   MCH 31.3   MCHC 32.0   RDW 12.5   % Neutrophils 84   % Lymphocytes 4   % Monocytes 10   % Eosinophils 1   % Basophils 0     CT ABDOMEN PELVIS W CONTRAST  LOCATION: [facility] " [facility]  DATE: 4/13/2024  INDICATION: Pain.  COMPARISON: None.  FINDINGS:   LOWER CHEST: Normal.  HEPATOBILIARY: Cholecystectomy. The liver is unremarkable. No biliary dilatation.  PANCREAS: Normal.  SPLEEN: Normal.  ADRENAL GLANDS: Normal.  KIDNEYS/BLADDER: Bilateral renal cysts have benign features and require no additional follow-up. The urinary bladder is grossly unremarkable.  BOWEL: Moderate inflammatory changes noted in the left lower quadrant surrounding the upper sigmoid colon secondary to acute diverticulitis. Moderate formed stool is noted in the colon up to the level of the site of diverticulitis. This may be resulting in a delay in transit due to the wall thickening and inflammation.  LYMPH NODES: No lymphadenopathy.  VASCULATURE: Normal.  PELVIC ORGANS: No lymphadenopathy. No pericardial effusion.   MUSCULOSKELETAL: Degenerative changes lower thoracic and lumbar spine.  IMPRESSION:   1.  Acute diverticulitis involving the upper sigmoid colon.  2.  Moderate formed stool noted in the colon extending to the level of the site of diverticulitis. Distal to this the colon is decompressed.  3.  No evidence for free fluid or abscess.  4.  Prior cholecystectomy.    US LOWER EXTREMITY VENOUS DUPLEX BILATERAL  LOCATION: [facility]  DATE: 4/13/2024  INDICATION: pain, swelling  COMPARISON: None.  FINDINGS: Exam includes the common femoral, femoral, popliteal veins as well as segmentally visualized deep calf veins and greater saphenous vein.   RIGHT: No deep vein thrombosis. No superficial thrombophlebitis. No popliteal cyst.   LEFT: No deep vein thrombosis. No superficial thrombophlebitis. No popliteal cyst.  IMPRESSION:  1.  No deep venous thrombosis in the bilateral lower extremities.        Primary Care Physician:  Noemy Brito

## 2024-04-15 NOTE — PROGRESS NOTES
Pt requesting to get up to commode then walk around. Pt wasn't able to sit herself up in bed without assistance, let alone get out of bed and standing. Pt was lelo-lifted to commode where she is resting now and trying to have a bowel movement. Pt to be lelo lifted back to bed. Pt understands her weakness is profound and she is not steady enough to go for a walk. Pt otherwise AxOx4, GCS 15, interacting with this RN.

## 2024-04-15 NOTE — CONSULTS
[facility] Nurse Inpatient Assessment     Consulted for: R ankle/heel; Folds    Summary: Patient already has miconazole powder order for skin folds; continue with current plan of care.    Patient History (according to provider note(s):    Assessment & Plan  Desirae Rey is a 73 year old female with a medical history significant for multiple medical comorbidities including a history of hypertension, hyperlipidemia, diverticulosis, CKD stage III, CHF, obstructive sleep apnea, OHS, mild persistent asthma, pulmonary hypertension, recent DVT, ?  PE, chronic low back pain, other medical comorbidities who is being admitted with sepsis due to acute diverticulitis and acute on chronic hypoxemic respiratory failure due to acute CHF decompensation.     Acute on chronic hypoxemic respiratory failure-likely multifactorial.  Patient with recent history of DVT. ?  PE, acute CHF decompensation.  Patient was given Lovenox in the ER, DuoNebs, Pepcid.  Lasix not given in the ER.  Patient placed on BiPAP in the ER and pulmonary consulted due to multiple lung problems.  Patient is on 3 L of oxygen at home     Sepsis -likely due to acute diverticulitis.  patient started on Cipro floxacillin and Flagyl.  Will continue.      Acute diverticulitis-continue Cipro and Flagyl.  Consider general surgery input     Acute CHF with decompensation-EF 60 to 65%.  TTE from 1223 showed discrete nodular thickening of the right coronary cusp, mild valvular aortic stenosis, mild to moderate pulmonary hypertension.  Will add Lasix daily     NELLIE/OHS-BiPAP as needed  .  Management per pulmonary     Mild persistent asthma-optimize lung cares with breathing treatments .  Appreciate pulmonary input     History of DVT/?PE-Lovenox 40 mg daily.  Ultrasound done negative for DVT.  CT chest negative for PE     Pulmonary hypertension-oxygen as needed.  Consider TTE repeat if troponin keeps increasing or if oxygen need keeps increasing.      Constipation- optimize bowel regimen     Skin - fungal infection under breast and right ankle bruse. Consult wound care     Rest of patient's medical problems management remains unchanged    Assessment:    Pressure Injury Location: R heel plantar/lateral    Wound type: Pressure Injury     Pressure Injury Stage: 2 v. DTPI, present on admission   Wound history/plan of care:   Patient states area of injury has been present for awhile    Wound base: 100 % epidermis, with appearance of reabsorbed blister due to discoloration on hyperkeratotic tissue it is difficult to tell if it was a stage 2 serous fluid filled blister initially or a DTPI serosang fluid filled blister upon initial presentation     Palpation of the wound bed: firm      Drainage: none     Description of drainage: none     Measurements (length x width x depth, in cm) 3 cm  x 4 cm       Tunneling N/A     Undermining N/A  Periwound skin: Intact      Color: normal and consistent with surrounding tissue      Temperature: normal   Odor: none  Pain:  constant, worse with touch  Pain intervention prior to dressing change: patient tolerated well and slow and gentle cares   Treatment goal: Maintain (prevention of deterioration) and Protection  STATUS: initial assessment  Supplies ordered: supplies stored on unit    Treatment Plan:     R heel/ankle - float on pillows at all times while in bed/recliner; may leave open to air    Orders: Written    RECOMMEND PRIMARY TEAM ORDER: None, at this time  Education provided: plan of care  Discussed plan of care with: Patient  WOC nurse follow-up plan: signing off  Notify WOC if wound(s) deteriorate.  Nursing to notify the Provider(s) and re-consult the WOC Nurse if new skin concern.    DATA:     Current support surface: Standard  Standard gel/foam mattress (IsoFlex, Atmos air, etc)  Containment of urine/stool: Incontinence Protocol  BMI: Body mass index is 49.5 kg/m .   Active diet order: Orders Placed This Encounter       Clear Liquid Diet     Output: I/O last 3 completed shifts:  In: 360 [P.O.:360]  Out: 1805 [Urine:1805]     Labs:   Recent Labs   Lab 04/15/24  0418   ALBUMIN 2.6*   HGB 10.4*   WBC 19.0*     Pressure injury risk assessment:   Sensory Perception: 3-->slightly limited  Moisture: 2-->very moist  Activity: 2-->chairfast  Mobility: 2-->very limited  Nutrition: 2-->probably inadequate  Friction and Shear: 2-->potential problem  Isaiah Score: 13    Hui Quevedo MSN RN CWOCN  Pager no longer is use, please contact through [employer] group: [facility] [employer]

## 2024-04-15 NOTE — PLAN OF CARE
"  Problem: Adult Inpatient Plan of Care  Goal: Plan of Care Review  Description: The Plan of Care Review/Shift note should be completed every shift.  The Outcome Evaluation is a brief statement about your assessment that the patient is improving, declining, or no change.  This information will be displayed automatically on your shift  note.  Outcome: Progressing  Goal: Patient-Specific Goal (Individualized)  Description: You can add care plan individualizations to a care plan. Examples of Individualization might be:  \"Parent requests to be called daily at 9am for status\", \"I have a hard time hearing out of my right ear\", or \"Do not touch me to wake me up as it startles  me\".  Outcome: Progressing  Goal: Absence of Hospital-Acquired Illness or Injury  Outcome: Progressing  Intervention: Identify and Manage Fall Risk  Recent Flowsheet Documentation  Taken 4/15/2024 1632 by Gabby Posadas RN  Safety Promotion/Fall Prevention:   clutter free environment maintained   lighting adjusted   increase visualization of patient   patient and family education   room door open   room near nurse's station   safety round/check completed   nonskid shoes/slippers when out of bed  Taken 4/15/2024 1237 by Gabby Posadas RN  Safety Promotion/Fall Prevention:   clutter free environment maintained   lighting adjusted   increase visualization of patient   patient and family education   room door open   room near nurse's station   safety round/check completed   nonskid shoes/slippers when out of bed  Taken 4/15/2024 0949 by Gabby Posadas, RN  Safety Promotion/Fall Prevention:   clutter free environment maintained   lighting adjusted   increase visualization of patient   patient and family education   room door open   room near nurse's station   safety round/check completed   nonskid shoes/slippers when out of bed  Intervention: Prevent Skin Injury  Recent Flowsheet Documentation  Taken 4/15/2024 1632 by Gabby Posadas, " RN  Body Position:   right   turned   side-lying 30 degrees  Device Skin Pressure Protection: absorbent pad utilized/changed  Taken 4/15/2024 1520 by Gabby Posadas RN  Body Position: right  Taken 4/15/2024 1237 by Gabby Posadas RN  Body Position:   right   turned   side-lying 30 degrees  Taken 4/15/2024 0949 by Gabby Posadas RN  Body Position:   right   turned   side-lying 30 degrees  Intervention: Prevent and Manage VTE (Venous Thromboembolism) Risk  Recent Flowsheet Documentation  Taken 4/15/2024 1632 by Gabby Posadas RN  VTE Prevention/Management: foot pump device on  Intervention: Prevent Infection  Recent Flowsheet Documentation  Taken 4/15/2024 1632 by Gabby Posadas RN  Infection Prevention:   hand hygiene promoted   rest/sleep promoted   single patient room provided  Taken 4/15/2024 1237 by Gabby Posadas RN  Infection Prevention:   hand hygiene promoted   rest/sleep promoted   single patient room provided  Taken 4/15/2024 0949 by Gabby Posadas RN  Infection Prevention:   hand hygiene promoted   rest/sleep promoted   single patient room provided  Goal: Optimal Comfort and Wellbeing  Outcome: Progressing  Intervention: Monitor Pain and Promote Comfort  Recent Flowsheet Documentation  Taken 4/15/2024 1217 by Gabby Posadas RN  Pain Management Interventions: medication (see MAR)  Taken 4/15/2024 0949 by Gabby Posadas RN  Pain Management Interventions: medication (see MAR)  Intervention: Provide Person-Centered Care  Recent Flowsheet Documentation  Taken 4/15/2024 1632 by Gabby Posadas RN  Trust Relationship/Rapport:   care explained   choices provided   questions encouraged   reassurance provided   thoughts/feelings acknowledged  Taken 4/15/2024 1237 by Gabby Posaads RN  Trust Relationship/Rapport:   care explained   choices provided   questions encouraged   reassurance provided   thoughts/feelings acknowledged  Taken 4/15/2024 0949 by Gabby Posadas  RN  Trust Relationship/Rapport:   care explained   choices provided   questions encouraged   reassurance provided   thoughts/feelings acknowledged   Goal Outcome Evaluation:  Turned to her sides LE kept elevated with pillows  Bilateral groin with Inter dry  in place  noted the area to be pink  skin intact Rt heel kept elevated with a pillow  this is noted  to have a  dry   scab .Lactulose 2 doses given had a large BM once Tolerating liquid diet .Given Dilaudid 0.3 mg IV with good pain control pain was down to 3/10 from 8/10 before dose and will continue to monitor pain level.

## 2024-04-15 NOTE — PROGRESS NOTES
"[facility]    Medicine Progress Note - Hospitalist Service    Date of Admission:  4/13/2024    Assessment & Plan   73-year-old female with history of of chronic respiratory failure, CKD, CHF admitted with sepsis, acute diverticulitis and acute on chronic respiratory failure.    #Acute diverticulitis  #Sepsis  CT shows sigmoid diverticulitis, no abscess, stool throughout colon  BCx neg x1 day  Hemodynamically stable  IV Cipro and Flagyl    #Constipation  Patient reports last BM was 5 days prior to admission  Continue colace  Add Lactulose today  Monitor and add additional agents if needed    #Acute on chronic hypoxic respiratory failure  #History of asthma  #NELLIE  No wheezing on exam  s/p Bipap  Now stable on NC O2, titrate as needed  PRN Duonebs  CPAP at night    #Acute on chronic HFpEF  #Hypertension  Appears hypovolemic today  Stop IV lasix  ASA, beta-blocker  Monitor respiratory and volume status    #CKD stage III  Renal function at baseline  Monitor      VTE ppx: Lovenox          Diet: Clear Liquid Diet    Rebollar Catheter: PRESENT, indication: ICU only: hourly urine output needed for patient care  Lines: None     Cardiac Monitoring: ACTIVE order. Indication: ICU  Code Status: Full Code      Clinically Significant Risk Factors           # Hypercalcemia: corrected calcium is >10.1, will monitor as appropriate    # Hypoalbuminemia: Lowest albumin = 2.6 g/dL at 4/15/2024  4:18 AM, will monitor as appropriate     # Hypertension: Noted on problem list  # Acute heart failure with preserved ejection fraction: heart failure noted on problem list, last echo with EF >50%, and receiving IV diuretics       # Severe Obesity: Estimated body mass index is 49.5 kg/m  as calculated from the following:    Height as of this encounter: 1.397 m (4' 7\").    Weight as of this encounter: 96.6 kg (212 lb 15.4 oz)., PRESENT ON ADMISSION     # Financial/Environmental Concerns: none  # Asthma: noted on problem list    "     Disposition Plan     Medically Ready for Discharge: Anticipated in 2-4 Days             Patrick Gonzales DO  Hospitalist Service  [facility]  Securely message with [employer] (more info)  Text page via [employer] Paging/Directory   ______________________________________________________________________    Interval History   Slept well last night. Still no BM    Physical Exam   Vital Signs: Temp: 98  F (36.7  C) Temp src: Oral BP: (!) 149/72 Pulse: 78   Resp: 24 SpO2: 97 % O2 Device: Nasal cannula Oxygen Delivery: 3 LPM  Weight: 212 lbs 15.43 oz    General Appearance:  No acute distress  Respiratory: Clear to auscultation bilaterally  Cardiovascular: Regular rate and rhythm  GI: Normal bowel sounds, abdomen is soft, mildly distended with no rebound    Medical Decision Making       55 MINUTES SPENT BY ME on the date of service doing chart review, history, exam, documentation & further activities per the note.      Data

## 2024-04-15 NOTE — PLAN OF CARE
Goal Outcome Evaluation:      [facility] - ICU    RN Progress Note:            Pertinent Assessments:      Please refer to flowsheet rows for full assessment     Pt is A/Ox4, Lung Sounds diminished on NC 3L. Small BM today with multiple bowel meds given. Was able to stand by the side of the bed with 2 assist and was pivoted to the commode. Pt very weak and was hoyered to chair and Therapy was ordered. Pt normally is able to walk.            Key Events - This Shift:     Stable VSS and Tolerating O2 per NC. Was down graded this shift.

## 2024-04-15 NOTE — PLAN OF CARE
Goal Outcome Evaluation:       [facility] - ICU    RN Progress Note:            Pertinent Assessments:      Please refer to flowsheet rows for full assessment     Patient remains AxOx4, GCS 15. T optimistic about her improved respiratory function and hopefull regarding mobility. That being said, pt was unable to sit up in bed or get out of bed even with assist of two staff members with a gait belt and walker. Pt required lelo lift to commode, had exceptionally small hard BM, then lelo back bed. Pt tolerating bipap overnight with lower IPAP/EPAP with good tidal volumes and oxygenation.            Key Events - This Shift:       Uneventful.               Barriers to Discharge / Downgrade:     Downgraded from ICU status yesterday. Pt requiring oxygen still. Mobility status could be difficult.

## 2024-04-15 NOTE — PROGRESS NOTES
Report called to Gabby MARKS, patient to transfer to P321. June lift used for transferring beds. Patient A/Ox4, tolerating 3L NC. Able to make needs known.

## 2024-04-15 NOTE — PROGRESS NOTES
Care Management Follow Up    Length of Stay (days): 2    Expected Discharge Date: 04/18/2024     Concerns to be Addressed:  IV medications, antibioticx2, Dilaudid, Lasix, PPI, therapy eval and recs    Patient plan of care discussed at interdisciplinary rounds: Yes    Anticipated Discharge Disposition:  TBD     Anticipated Discharge Services:  TBD    Anticipated Discharge DME:  TB       Additional Information:  Patient with history of of chronic respiratory failure, CKD, CHF admitted with sepsis, acute diverticulitis and acute on chronic respiratory failure.   O2 3L (baseline), IV medications, robledo catheter in place.      Therapy recommendations pending.          Social History:  Patient is  and lives in a house with her  Hugo. She is largely independent with activities of daily living at baseline. She has been having more issues lately, she says, but usually independent. She uses a walker (has a cane also) and has home oxygen at 3 liters through [facility]. She is open to [facility].  is primary family contact and family will transport at discharge.      4/15/24:  Final discharge plan pending progression and recommendations.        Merline Laboy RN

## 2024-04-16 ENCOUNTER — APPOINTMENT (OUTPATIENT)
Dept: PHYSICAL THERAPY | Facility: HOSPITAL | Age: 74
DRG: 871 | End: 2024-04-16
Attending: INTERNAL MEDICINE
Payer: MEDICARE

## 2024-04-16 ENCOUNTER — APPOINTMENT (OUTPATIENT)
Dept: OCCUPATIONAL THERAPY | Facility: HOSPITAL | Age: 74
DRG: 871 | End: 2024-04-16
Attending: INTERNAL MEDICINE
Payer: MEDICARE

## 2024-04-16 LAB
ANION GAP SERPL CALCULATED.3IONS-SCNC: 9 MMOL/L (ref 7–15)
BASOPHILS # BLD AUTO: 0 10E3/UL (ref 0–0.2)
BASOPHILS NFR BLD AUTO: 0 %
BUN SERPL-MCNC: 28.4 MG/DL (ref 8–23)
CALCIUM SERPL-MCNC: 8.8 MG/DL (ref 8.8–10.2)
CHLORIDE SERPL-SCNC: 97 MMOL/L (ref 98–107)
CREAT SERPL-MCNC: 1.44 MG/DL (ref 0.51–0.95)
DEPRECATED HCO3 PLAS-SCNC: 28 MMOL/L (ref 22–29)
EGFRCR SERPLBLD CKD-EPI 2021: 38 ML/MIN/1.73M2
EOSINOPHIL # BLD AUTO: 0.4 10E3/UL (ref 0–0.7)
EOSINOPHIL NFR BLD AUTO: 3 %
ERYTHROCYTE [DISTWIDTH] IN BLOOD BY AUTOMATED COUNT: 12.7 % (ref 10–15)
GLUCOSE BLDC GLUCOMTR-MCNC: 118 MG/DL (ref 70–99)
GLUCOSE BLDC GLUCOMTR-MCNC: 136 MG/DL (ref 70–99)
GLUCOSE BLDC GLUCOMTR-MCNC: 214 MG/DL (ref 70–99)
GLUCOSE BLDC GLUCOMTR-MCNC: 97 MG/DL (ref 70–99)
GLUCOSE SERPL-MCNC: 95 MG/DL (ref 70–99)
HCT VFR BLD AUTO: 31 % (ref 35–47)
HGB BLD-MCNC: 9.5 G/DL (ref 11.7–15.7)
IMM GRANULOCYTES # BLD: 0.2 10E3/UL
IMM GRANULOCYTES NFR BLD: 2 %
LYMPHOCYTES # BLD AUTO: 1.1 10E3/UL (ref 0.8–5.3)
LYMPHOCYTES NFR BLD AUTO: 8 %
MAGNESIUM SERPL-MCNC: 1.9 MG/DL (ref 1.7–2.3)
MCH RBC QN AUTO: 30.9 PG (ref 26.5–33)
MCHC RBC AUTO-ENTMCNC: 30.6 G/DL (ref 31.5–36.5)
MCV RBC AUTO: 101 FL (ref 78–100)
MONOCYTES # BLD AUTO: 1.4 10E3/UL (ref 0–1.3)
MONOCYTES NFR BLD AUTO: 10 %
NEUTROPHILS # BLD AUTO: 10.7 10E3/UL (ref 1.6–8.3)
NEUTROPHILS NFR BLD AUTO: 77 %
NRBC # BLD AUTO: 0 10E3/UL
NRBC BLD AUTO-RTO: 0 /100
PLATELET # BLD AUTO: 365 10E3/UL (ref 150–450)
POTASSIUM SERPL-SCNC: 5 MMOL/L (ref 3.4–5.3)
RBC # BLD AUTO: 3.07 10E6/UL (ref 3.8–5.2)
SODIUM SERPL-SCNC: 134 MMOL/L (ref 135–145)
WBC # BLD AUTO: 13.9 10E3/UL (ref 4–11)

## 2024-04-16 PROCEDURE — 999N000157 HC STATISTIC RCP TIME EA 10 MIN

## 2024-04-16 PROCEDURE — 83735 ASSAY OF MAGNESIUM: CPT | Performed by: INTERNAL MEDICINE

## 2024-04-16 PROCEDURE — 250N000013 HC RX MED GY IP 250 OP 250 PS 637: Performed by: HOSPITALIST

## 2024-04-16 PROCEDURE — 85025 COMPLETE CBC W/AUTO DIFF WBC: CPT | Performed by: HOSPITALIST

## 2024-04-16 PROCEDURE — 99232 SBSQ HOSP IP/OBS MODERATE 35: CPT | Performed by: HOSPITALIST

## 2024-04-16 PROCEDURE — 94660 CPAP INITIATION&MGMT: CPT

## 2024-04-16 PROCEDURE — 250N000013 HC RX MED GY IP 250 OP 250 PS 637: Performed by: INTERNAL MEDICINE

## 2024-04-16 PROCEDURE — 36415 COLL VENOUS BLD VENIPUNCTURE: CPT | Performed by: HOSPITALIST

## 2024-04-16 PROCEDURE — 97535 SELF CARE MNGMENT TRAINING: CPT | Mod: GO

## 2024-04-16 PROCEDURE — 120N000004 HC R&B MS OVERFLOW

## 2024-04-16 PROCEDURE — 250N000011 HC RX IP 250 OP 636: Performed by: INTERNAL MEDICINE

## 2024-04-16 PROCEDURE — 97162 PT EVAL MOD COMPLEX 30 MIN: CPT | Mod: GP | Performed by: PHYSICAL THERAPIST

## 2024-04-16 PROCEDURE — 80048 BASIC METABOLIC PNL TOTAL CA: CPT | Performed by: HOSPITALIST

## 2024-04-16 PROCEDURE — 97166 OT EVAL MOD COMPLEX 45 MIN: CPT | Mod: GO

## 2024-04-16 PROCEDURE — 97530 THERAPEUTIC ACTIVITIES: CPT | Mod: GP | Performed by: PHYSICAL THERAPIST

## 2024-04-16 RX ORDER — CIPROFLOXACIN 500 MG/1
500 TABLET, FILM COATED ORAL EVERY 12 HOURS SCHEDULED
Status: DISCONTINUED | OUTPATIENT
Start: 2024-04-16 | End: 2024-04-18

## 2024-04-16 RX ORDER — LACTULOSE 10 G/15ML
10 SOLUTION ORAL EVERY 6 HOURS
Status: COMPLETED | OUTPATIENT
Start: 2024-04-16 | End: 2024-04-16

## 2024-04-16 RX ORDER — METRONIDAZOLE 500 MG/1
500 TABLET ORAL 2 TIMES DAILY
Status: DISCONTINUED | OUTPATIENT
Start: 2024-04-16 | End: 2024-04-18

## 2024-04-16 RX ADMIN — MICONAZOLE NITRATE: 20 POWDER TOPICAL at 09:01

## 2024-04-16 RX ADMIN — FAMOTIDINE 20 MG: 10 INJECTION, SOLUTION INTRAVENOUS at 21:27

## 2024-04-16 RX ADMIN — DULOXETINE HYDROCHLORIDE 30 MG: 30 CAPSULE, DELAYED RELEASE ORAL at 09:00

## 2024-04-16 RX ADMIN — CIPROFLOXACIN 400 MG: 400 INJECTION, SOLUTION INTRAVENOUS at 08:48

## 2024-04-16 RX ADMIN — CIPROFLOXACIN 500 MG: 500 TABLET, FILM COATED ORAL at 21:27

## 2024-04-16 RX ADMIN — ASPIRIN 81 MG CHEWABLE TABLET 81 MG: 81 TABLET CHEWABLE at 08:56

## 2024-04-16 RX ADMIN — FAMOTIDINE 20 MG: 10 INJECTION, SOLUTION INTRAVENOUS at 09:00

## 2024-04-16 RX ADMIN — ENOXAPARIN SODIUM 40 MG: 40 INJECTION SUBCUTANEOUS at 21:26

## 2024-04-16 RX ADMIN — METOPROLOL TARTRATE 50 MG: 25 TABLET, FILM COATED ORAL at 08:57

## 2024-04-16 RX ADMIN — MICONAZOLE NITRATE ANTIFUNGAL POWDER: 2 POWDER TOPICAL at 21:28

## 2024-04-16 RX ADMIN — MICONAZOLE NITRATE: 20 POWDER TOPICAL at 21:27

## 2024-04-16 RX ADMIN — METRONIDAZOLE 500 MG: 500 TABLET ORAL at 21:26

## 2024-04-16 RX ADMIN — LACTULOSE 10 G: 10 SOLUTION ORAL at 10:18

## 2024-04-16 RX ADMIN — METOPROLOL TARTRATE 50 MG: 25 TABLET, FILM COATED ORAL at 21:27

## 2024-04-16 RX ADMIN — DOCUSATE SODIUM 200 MG: 100 CAPSULE, LIQUID FILLED ORAL at 08:56

## 2024-04-16 RX ADMIN — HYDROXYCHLOROQUINE SULFATE 200 MG: 200 TABLET, FILM COATED ORAL at 08:57

## 2024-04-16 RX ADMIN — MODAFINIL 200 MG: 100 TABLET ORAL at 08:56

## 2024-04-16 RX ADMIN — METRONIDAZOLE 500 MG: 500 INJECTION, SOLUTION INTRAVENOUS at 09:59

## 2024-04-16 RX ADMIN — MICONAZOLE NITRATE ANTIFUNGAL POWDER: 2 POWDER TOPICAL at 09:01

## 2024-04-16 ASSESSMENT — ACTIVITIES OF DAILY LIVING (ADL)
ADLS_ACUITY_SCORE: 46

## 2024-04-16 NOTE — PLAN OF CARE
"  Problem: Adult Inpatient Plan of Care  Goal: Plan of Care Review  Description: The Plan of Care Review/Shift note should be completed every shift.  The Outcome Evaluation is a brief statement about your assessment that the patient is improving, declining, or no change.  This information will be displayed automatically on your shift  note.  Outcome: Progressing  Goal: Patient-Specific Goal (Individualized)  Description: You can add care plan individualizations to a care plan. Examples of Individualization might be:  \"Parent requests to be called daily at 9am for status\", \"I have a hard time hearing out of my right ear\", or \"Do not touch me to wake me up as it startles  me\".  Outcome: Progressing  Goal: Absence of Hospital-Acquired Illness or Injury  Outcome: Progressing  Intervention: Identify and Manage Fall Risk  Recent Flowsheet Documentation  Taken 4/16/2024 0400 by Sriram Campbell, RN  Safety Promotion/Fall Prevention:   clutter free environment maintained   lighting adjusted   increase visualization of patient   patient and family education   room door open   room near nurse's station   safety round/check completed   nonskid shoes/slippers when out of bed   activity supervised   room organization consistent   mobility aid in reach   increased rounding and observation  Taken 4/16/2024 0000 by Sriram Campbell, RN  Safety Promotion/Fall Prevention:   clutter free environment maintained   lighting adjusted   increase visualization of patient   patient and family education   room door open   room near nurse's station   safety round/check completed   nonskid shoes/slippers when out of bed   activity supervised   room organization consistent   mobility aid in reach   increased rounding and observation  Taken 4/15/2024 2100 by Sriram Campbell, RN  Safety Promotion/Fall Prevention:   clutter free environment maintained   lighting adjusted   increase visualization of patient   patient and family " education   room door open   room near nurse's station   safety round/check completed   nonskid shoes/slippers when out of bed   activity supervised   room organization consistent   mobility aid in reach   increased rounding and observation  Intervention: Prevent Skin Injury  Recent Flowsheet Documentation  Taken 4/16/2024 0400 by Sriram Campbell RN  Body Position:   right   turned   side-lying 30 degrees  Skin Protection:   adhesive use limited   drying agents applied  Device Skin Pressure Protection: absorbent pad utilized/changed  Taken 4/16/2024 0000 by Sriram Campbell RN  Body Position:   right   turned   side-lying 30 degrees  Skin Protection:   adhesive use limited   drying agents applied  Device Skin Pressure Protection: absorbent pad utilized/changed  Taken 4/15/2024 2100 by Sriram Campbell RN  Body Position:   right   turned   side-lying 30 degrees  Skin Protection:   adhesive use limited   drying agents applied  Device Skin Pressure Protection: absorbent pad utilized/changed  Intervention: Prevent and Manage VTE (Venous Thromboembolism) Risk  Recent Flowsheet Documentation  Taken 4/16/2024 0400 by Sriram Campbell RN  VTE Prevention/Management: foot pump device on  Taken 4/16/2024 0000 by Sriram Campbell RN  VTE Prevention/Management: foot pump device on  Taken 4/15/2024 2100 by Sriram Campbell RN  VTE Prevention/Management: foot pump device on  Intervention: Prevent Infection  Recent Flowsheet Documentation  Taken 4/16/2024 0400 by Sriram Campbell RN  Infection Prevention:   hand hygiene promoted   rest/sleep promoted   single patient room provided  Taken 4/16/2024 0000 by Sriram Campbell RN  Infection Prevention:   hand hygiene promoted   rest/sleep promoted   single patient room provided  Taken 4/15/2024 2100 by Sriram Campbell RN  Infection Prevention:   hand hygiene promoted   rest/sleep promoted   single patient room  provided  Goal: Optimal Comfort and Wellbeing  Outcome: Progressing  Intervention: Provide Person-Centered Care  Recent Flowsheet Documentation  Taken 4/16/2024 0400 by Sriram Campbell RN  Trust Relationship/Rapport:   care explained   choices provided   questions encouraged   reassurance provided   thoughts/feelings acknowledged  Taken 4/16/2024 0000 by Sriram Campbell RN  Trust Relationship/Rapport:   care explained   choices provided   questions encouraged   reassurance provided   thoughts/feelings acknowledged  Taken 4/15/2024 2100 by Sriram Campbell RN  Trust Relationship/Rapport:   care explained   choices provided   questions encouraged   reassurance provided   thoughts/feelings acknowledged  Goal: Readiness for Transition of Care  Outcome: Progressing   Goal Outcome Evaluation:    Cardiac:  NSR, rate 60's.  Respiratory:  16-20 non-labored.  Sat's: Maintaining mid 90's at 3L NC.  LS: Diminished mid fields and bases, bilat.  Neuro:  Moderate ROM X4 extremities.  Weak/deconditioned.  Needing overhead lift of heavy assist of X2-3 to chair.  GI:  Passing flatus.  No BM for HS/NOC.  BS: Hypoactive X4 quadrants.    :  Good UOP (see I/O).  Rebollar/cath remains in place.  Pain:  Denies.  Ambulation:  Heavy assist X2-3, overhead lift.   Labs:  AM labs pending.  Plan:  Treat infection with IV abx.  Monitor GI S&S.  Reposition as indicated.

## 2024-04-16 NOTE — PROGRESS NOTES
"[facility]    Medicine Progress Note - Hospitalist Service    Date of Admission:  4/13/2024    Assessment & Plan   73-year-old female with history of of chronic respiratory failure, NELLIE/OHS, CKD, CHF admitted with sepsis, acute diverticulitis and acute on chronic respiratory failure.    #Acute diverticulitis  #Sepsis  CT shows sigmoid diverticulitis, no abscess, stool throughout colon  BCx neg x2 days  Hemodynamically stable  IV Cipro and Flagyl, switch to PO, day #3 of course    #Constipation  Patient reports last BM was 5 days prior to admission  Had BM yesterday but still has large stool burden  Continue colace and Lactulose today    #Acute on chronic hypoxic respiratory failure  #NELLIE, OHS  #Severe obesity  No wheezing on exam  s/p Bipap  Now stable on NC O2, titrate as needed  PRN Duonebs  BiPAP/CPAP at night    #Acute on chronic HFpEF  #Hypertension  Appears hypovolemic   Stopped IV lasix  ASA, beta-blocker  Monitor respiratory and volume status  Resume home lasix when needed    #CKD stage III  Renal function at baseline  Monitor    #Moderate malnutrition  Advancing diet as tolerated      VTE ppx: Lovenox          Diet: Snacks/Supplements Adult: Expedite Bottle; With Meals  Full Liquid Diet    Rebollar Catheter: PRESENT, indication: Other (Comment) (Less mobility)  Lines: None     Cardiac Monitoring: None  Code Status: Full Code      Clinically Significant Risk Factors              # Hypoalbuminemia: Lowest albumin = 2.6 g/dL at 4/15/2024  4:18 AM, will monitor as appropriate     # Hypertension: Noted on problem list    # Acute heart failure with preserved ejection fraction: heart failure noted on problem list, last echo with EF >50%, and receiving IV diuretics       # Severe Obesity: Estimated body mass index is 49.09 kg/m  as calculated from the following:    Height as of this encounter: 1.397 m (4' 7\").    Weight as of this encounter: 95.8 kg (211 lb 3.2 oz)., PRESENT ON ADMISSION  # " "Moderate Malnutrition: based on nutrition assessment, PRESENT ON ADMISSION     # Financial/Environmental Concerns: none  # Asthma: noted on problem list        Disposition Plan     Medically Ready for Discharge: Anticipated in 2-4 Days             Patrick Gonzales DO  Hospitalist Service  [facility]  Securely message with [employer] (more info)  Text page via [employer] Paging/Directory   ______________________________________________________________________    Interval History   Feeling a little better today. Had BM yesterday but says she still feels \"full\"    Physical Exam   Vital Signs: Temp: 97.8  F (36.6  C) Temp src: Oral BP: 133/79 Pulse: 80   Resp: 20 SpO2: 96 % O2 Device: Nasal cannula Oxygen Delivery: 3 LPM  Weight: 211 lbs 3.21 oz    General Appearance:  No acute distress  Respiratory: Clear to auscultation bilaterally  Cardiovascular: Regular rate and rhythm  GI: Normal bowel sounds, abdomen is soft, mildly distended with no rebound  Extremities: No peripheral edema or cyanosis    Medical Decision Making             Data     "

## 2024-04-16 NOTE — PROGRESS NOTES
"   04/16/24 0744   Appointment Info   Signing Clinician's Name / Credentials (PT) Marquita Lee, PT, DPT   Living Environment   People in Home spouse   Current Living Arrangements house   Home Accessibility stairs to enter home;stairs within home   Number of Stairs, Main Entrance 1   Stair Railings, Main Entrance none   Number of Stairs, Within Home, Primary seven   Stair Railings, Within Home, Primary railings safe and in good condition   Self-Care   Equipment Currently Used at Home walker, rolling  (FWW, 4WW)   Fall history within last six months yes   Number of times patient has fallen within last six months   (\"I can't remember\")   Activity/Exercise/Self-Care Comment Pt independent with ADLs at baseline. States spouse assists with helping hair. Spouse assists with IADLs.   General Information   Onset of Illness/Injury or Date of Surgery 04/13/24   Referring Physician Carlos Bull MD   Patient/Family Therapy Goals Statement (PT) None stated.   Pertinent History of Current Problem (include personal factors and/or comorbidities that impact the POC) Per H&P: \"73 year old female with a medical history significant for multiple medical comorbidities including a history of hypertension, hyperlipidemia, diverticulosis, CKD stage III, CHF, obstructive sleep apnea, OHS, mild persistent asthma, pulmonary hypertension, recent DVT, ?  PE, chronic low back pain, other medical comorbidities who is being admitted with sepsis due to acute diverticulitis and acute on chronic hypoxemic respiratory failure due to acute CHF decompensation.\"   Existing Precautions/Restrictions fall;oxygen therapy device and L/min  (3L O2)   Range of Motion (ROM)   Range of Motion ROM deficits secondary to pain;ROM deficits secondary to weakness   Strength (Manual Muscle Testing)   Strength (Manual Muscle Testing) Deficits observed during functional mobility   Bed Mobility   Bed Mobility supine-sit;sit-supine   Supine-Sit " [place] (Bed Mobility) moderate assist (50% patient effort);2 person assist   Bed Mobility Limitations decreased ability to use arms for pushing/pulling;decreased ability to use legs for bridging/pushing   Impairments Contributing to Impaired Bed Mobility pain;decreased strength   Assistive Device (Bed Mobility) bed rails;draw sheet   Transfers   Transfers sit-stand transfer;bed-chair transfer   Transfer Safety Concerns Noted decreased weight-shifting ability   Impairments Contributing to Impaired Transfers impaired balance;decreased strength   Bed-Chair Transfer   Assistive Device (Bed-Chair Transfers) other (see comments)  (arm-in-arm)   Bed-Chair [place] (Transfers) minimum assist (75% patient effort);2 person assist;verbal cues   Sit-Stand Transfer   Sit-Stand [place] (Transfers) minimum assist (75% patient effort);2 person assist;verbal cues   Assistive Device (Sit-Stand Transfers) other (see comments)  (arm-in-arm)   Gait/Stairs (Locomotion)   [place] Level (Gait) minimum assist (75% patient effort);2 person assist;verbal cues   Assistive Device (Gait) other (see comments)  (arm-in-arm)   Distance in Feet (Gait) 3' bed > chair   Pattern (Gait) step-to   Deviations/Abnormal Patterns (Gait) leni decreased;gait speed decreased   Balance   Balance Comments Verbal cues for anterior weight shift while sitting EOB to improve sitting balance. Once balance established, able to maintain with stand-by to contact guard assist of 1.   Clinical Impression   Criteria for Skilled Therapeutic Intervention Yes, treatment indicated   PT Diagnosis (PT) impaired functional mobility   Influenced by the following impairments decreased strength, pain, endurance, balance   Functional limitations due to impairments transfers, ambulation   Clinical Presentation (PT Evaluation Complexity) evolving   Clinical Presentation Rationale Clinical judgment.   Clinical Decision Making (Complexity) moderate complexity    Planned Therapy Interventions (PT) balance training;bed mobility training;gait training;home exercise program;neuromuscular re-education;patient/family education;strengthening;ROM (range of motion);transfer training   Risk & Benefits of therapy have been explained evaluation/treatment results reviewed;participants voiced agreement with care plan;participants included;patient   PT Total Evaluation Time   PT Eval, Moderate Complexity Minutes (02439) 10   Physical Therapy Goals   PT Frequency 5x/week   PT Predicted Duration/Target Date for Goal Attainment 04/23/24   PT Goals Bed Mobility;Transfers;Gait   PT: Bed Mobility Supervision/stand-by assist;Supine to/from sit   PT: Transfers Minimal assist;Sit to/from stand;Assistive device   PT: Gait Minimal assist;Assistive device;Rolling walker;50 feet   Interventions   Interventions Quick Adds Therapeutic Activity   Therapeutic Activity   Therapeutic Activities: dynamic activities to improve functional performance Minutes (71899) 10   Treatment Detail/Skilled Intervention Patient completes additional sit <> stand from EOB with arm-in-arm, min assist of 2. Educated patient on head/hips relationship to improve sitting balance. Verbal cues for hand placement and wieght shifting to improve sitting balance at EOB. Pt seated in chair at end of session, call light in reach, chair alarm engaged.   PT Discharge Planning   PT Plan sit <> stand transfers with FWW, LE strengthening   PT Discharge Recommendation (DC Rec) Transitional Care Facility   PT Rationale for DC Rec Patient requiring assist of 2 for bed mobility and transfers. Patient is typically independent at baseline. Recommend TCU at discharge.   PT Brief overview of current status Supine > sit, mod assist of 2. Sit <> stand, pivot transfer, min assist of 2 with arm-in-arm.   PT Equipment Needed at Discharge   (TBD pending progress)   Total Session Time   Timed Code Treatment Minutes 10   Total Session Time (sum of timed  and untimed services) 20

## 2024-04-16 NOTE — PLAN OF CARE
"  Problem: Adult Inpatient Plan of Care  Goal: Plan of Care Review  Description: The Plan of Care Review/Shift note should be completed every shift.  The Outcome Evaluation is a brief statement about your assessment that the patient is improving, declining, or no change.  This information will be displayed automatically on your shift  note.  Outcome: Progressing  Goal: Patient-Specific Goal (Individualized)  Description: You can add care plan individualizations to a care plan. Examples of Individualization might be:  \"Parent requests to be called daily at 9am for status\", \"I have a hard time hearing out of my right ear\", or \"Do not touch me to wake me up as it startles  me\".  Outcome: Progressing  Goal: Absence of Hospital-Acquired Illness or Injury  Outcome: Progressing  Intervention: Identify and Manage Fall Risk  Recent Flowsheet Documentation  Taken 4/16/2024 1738 by Gabby Posadas, RN  Safety Promotion/Fall Prevention:   clutter free environment maintained   lighting adjusted   increase visualization of patient   patient and family education   room door open   room near nurse's station   safety round/check completed   nonskid shoes/slippers when out of bed   activity supervised   room organization consistent   mobility aid in reach   increased rounding and observation  Taken 4/16/2024 1310 by Gabby Posadas, RN  Safety Promotion/Fall Prevention:   clutter free environment maintained   lighting adjusted   increase visualization of patient   patient and family education   room door open   room near nurse's station   safety round/check completed   nonskid shoes/slippers when out of bed   activity supervised   room organization consistent   mobility aid in reach   increased rounding and observation  Taken 4/16/2024 0845 by Gabby Posadas, RN  Safety Promotion/Fall Prevention:   clutter free environment maintained   lighting adjusted   increase visualization of patient   patient and family education   room " door open   room near nurse's station   safety round/check completed   nonskid shoes/slippers when out of bed   activity supervised   room organization consistent   mobility aid in reach   increased rounding and observation  Intervention: Prevent Skin Injury  Recent Flowsheet Documentation  Taken 4/16/2024 1738 by Gabby Posadas RN  Body Position:   right   turned   side-lying 30 degrees  Skin Protection:   adhesive use limited   drying agents applied  Device Skin Pressure Protection: absorbent pad utilized/changed  Taken 4/16/2024 1736 by Gabby Posadas RN  Body Position:   left   turned   foot of bed elevated   heels elevated   legs elevated   lower extremity elevated   log-rolled   side-lying   supine   weight shifting  Taken 4/16/2024 1310 by Gabby Posadas RN  Body Position:   right   turned   side-lying 30 degrees  Skin Protection:   adhesive use limited   drying agents applied  Device Skin Pressure Protection: absorbent pad utilized/changed  Taken 4/16/2024 0845 by Gabby Posadas RN  Body Position:   right   turned   side-lying 30 degrees  Skin Protection:   adhesive use limited   drying agents applied  Device Skin Pressure Protection: absorbent pad utilized/changed  Intervention: Prevent and Manage VTE (Venous Thromboembolism) Risk  Recent Flowsheet Documentation  Taken 4/16/2024 1738 by Gabby Posadas RN  VTE Prevention/Management: foot pump device on  Taken 4/16/2024 1310 by Gabby Posadas RN  VTE Prevention/Management: foot pump device on  Taken 4/16/2024 0845 by Gabby Posadas RN  VTE Prevention/Management: foot pump device on  Intervention: Prevent Infection  Recent Flowsheet Documentation  Taken 4/16/2024 1738 by Gabby Posadas RN  Infection Prevention:   hand hygiene promoted   rest/sleep promoted   single patient room provided  Taken 4/16/2024 1310 by Gabby Posadas RN  Infection Prevention:   hand hygiene promoted   rest/sleep promoted   single patient room  provided  Taken 4/16/2024 0845 by Gabby Posadas, RN  Infection Prevention:   hand hygiene promoted   rest/sleep promoted   single patient room provided  Goal: Optimal Comfort and Wellbeing  Outcome: Progressing  Intervention: Provide Person-Centered Care  Recent Flowsheet Documentation  Taken 4/16/2024 1738 by Gabby Posadas, RN  Trust Relationship/Rapport:   care explained   choices provided   questions encouraged   reassurance provided   thoughts/feelings acknowledged  Taken 4/16/2024 1310 by Gabby Posadas, RN  Trust Relationship/Rapport:   care explained   choices provided   questions encouraged   reassurance provided   thoughts/feelings acknowledged  Taken 4/16/2024 0845 by Gabby Posadas, RN  Trust Relationship/Rapport:   care explained   choices provided   questions encouraged   reassurance provided   thoughts/feelings acknowledged   Goal Outcome Evaluation:  Was  up in the recliner with a Lift device  and patient able to Pivot to commode with assist of 2  no complaints of pain bilateral groin cleansed with soap and water  and Interdry replaced  skin on both groins pink skin is intact no drainage noted .there is an area on the  abdomen  on the Rt lower Quadrant that is red and with signs of edema but intact .

## 2024-04-16 NOTE — PROGRESS NOTES
"CLINICAL NUTRITION SERVICES - ASSESSMENT NOTE     Nutrition Prescription    RECOMMENDATIONS FOR MDs/PROVIDERS TO ORDER:  Recommend MVI/M daily to meet RDIs, poor oral intakes >1 week   Thiamin x 10 days with malnutrition     Malnutrition Status:    Moderate malnutrition  In Context of:  Acute illness or injury    Recommendations already ordered by Registered Dietitian (RD):  Expedite bottle daily for wound healing     Future/Additional Recommendations:  Monitor diet adv, po intake, weight, wound healing, labs, BM.      REASON FOR ASSESSMENT  Desirae Rey is a/an 73 year old female assessed by the dietitian for Admission Nutrition Risk Screen for unsure weight loss, decreased appetite.     HPI: Pt with a medical history significant for multiple medical comorbidities including a history of hypertension, hyperlipidemia, diverticulosis, CKD stage III, CHF, obstructive sleep apnea, OHS, mild persistent asthma, pulmonary hypertension, recent DVT, ?  PE, chronic low back pain, other medical comorbidities who is being admitted with sepsis due to acute diverticulitis and acute on chronic hypoxemic respiratory failure due to acute CHF decompensation.     NUTRITION HISTORY  Met with pt at bedside this AM. Pt reports no appetite and decreased oral intakes for at least x1 week. Pt states she has been having abdominal cramping and was constipated for x5 days. Pt with BMs this admit. Pt notes plan to advance to full liquid diet today. Pt unsure of any weight changes.   NKFA    CURRENT NUTRITION ORDERS  Diet: Clear Liquid  Intake/Tolerance: Poor po intakes <75% >1 week   4/14- 100% x1 meal, 167 kcal, 0 g protein   4/15- 100% x2 meals, 535 kcal, 0 g protein     LABS  Reviewed  Na 134(L)   BUN 28.4(H)   Creat 1.44(H)   BG  last 24 hr    MEDICATIONS  Reviewed   Scheduled cipro, colace, cymbalta, lovenox, pepcid, plaquenil, novolog, lopressor, flagyl, provigil     ANTHROPOMETRICS  Height: 139.7 cm (4' 7\")  Most Recent Weight: " 95.8 kg (211 lb 3.2 oz)    IBW: 39.8 kg  BMI: Obesity Grade III BMI >40  Weight History:  Wt Readings from Last 10 Encounters:   04/16/24 95.8 kg (211 lb 3.2 oz)   12/19/23 93.4 kg (206 lb)   12/07/23 92.7 kg (204 lb 5.9 oz)   09/20/23 95 kg (209 lb 6.4 oz)   08/21/23 95.3 kg (210 lb 3.2 oz)   07/05/23 95.8 kg (211 lb 1.6 oz)   05/31/23 95.8 kg (211 lb 4.8 oz)   01/18/23 96.3 kg (212 lb 3.2 oz)   11/01/22 95.7 kg (210 lb 14.4 oz)   06/03/20 104.4 kg (230 lb 1.6 oz)      Dosing Weight: 53.8 kg adjusted wt    ASSESSED NUTRITION NEEDS  Estimated Energy Needs: 0883-4081 kcals/day (25 - 30 kcals/kg)  Justification: Maintenance  Estimated Protein Needs: 65+ grams protein/day (1.2+ grams of pro/kg)  Justification: CKD, Increased needs, and Wound healing  Estimated Fluid Needs: 1345 mL/day (25 mL/kg)   Justification: Maintenance    PHYSICAL FINDINGS/GI CONCERNS  See malnutrition section below.  Per Flowsheets:   GI: obese/nondistended abd, abd pain/cramping   BM: last noted x1 this AM, x2 4/15   Skin: Abd wound, PI wound- heel, WOC following   Edema: Mild BLE    MALNUTRITION:  % Weight Loss:  None noted  % Intake:  <75% for > 7 days (moderate malnutrition)  Subcutaneous Fat Loss:  None observed  Muscle Loss:  Clavicle bone region mild depletion and Acromion bone region mild depletion  Fluid Retention:  Mild BLE    Malnutrition Diagnosis: Moderate malnutrition  In Context of:  Acute illness or injury    NUTRITION DIAGNOSIS  Malnutrition related to acute illness, abd pain as evidenced by poor po intakes <75% for at least x1 week.       Increased nutrient needs related to wound healing as evidenced by Right heel pressure injury       INTERVENTIONS  Implementation  Expedite bottle daily for wound healing   Recommend MVI/M daily to meet RDIs   Education- emphasized adequate oral intakes, reviewed sources of protein for wound healing     Goals  Diet advance 1-2 days   Pt to meet >75% nutritional needs   Electrolytes WNL   Wound  healing      Monitoring/Evaluation  Progress toward goals will be monitored and evaluated per protocol.

## 2024-04-16 NOTE — PROGRESS NOTES
"   04/16/24 0745   Appointment Info   Signing Clinician's Name / Credentials (OT) Fabiola Perla OTD OTR/L   Living Environment   People in Home spouse   Current Living Arrangements house   Home Accessibility stairs to enter home;stairs within home   Number of Stairs, Main Entrance 1   Stair Railings, Main Entrance none   Living Environment Comments Walk in shower with shower chair   Self-Care   Activity/Exercise/Self-Care Comment Typically ind with all ADLs,  assists with hair d/t \"stiff neck\" splits IADLs with    General Information   Onset of Illness/Injury or Date of Surgery 04/13/24   Referring Physician Patrick Gonzales DO   Patient/Family Therapy Goal Statement (OT) To return home, to get stronger   Additional Occupational Profile Info/Pertinent History of Current Problem 73-year-old female with history of of chronic respiratory failure, CKD, CHF admitted with sepsis, acute diverticulitis and acute on chronic respiratory failure.   Existing Precautions/Restrictions fall   Cognitive Status Examination   Orientation Status orientation to person, place and time   Affect/Mental Status (Cognitive) WFL   Follows Commands increased processing time needed;delayed response/completion;verbal cues/prompting required   Posture   Posture forward head position   Range of Motion Comprehensive   General Range of Motion bilateral upper extremity ROM WFL   Strength Comprehensive (MMT)   General Manual Muscle Testing (MMT) Assessment upper extremity strength deficits identified   Bed Mobility   Bed Mobility supine-sit   Supine-Sit [place] (Bed Mobility) maximum assist (25% patient effort);2 person assist   Transfers   Transfers sit-stand transfer;toilet transfer   Sit-Stand Transfer   Sit-Stand [place] (Transfers) minimum assist (75% patient effort);2 person assist   Toilet Transfer   [place] Level (Toilet Transfer) minimum assist (75% patient effort);2 person assist   Balance   Balance Assessment standing " dynamic balance   Standing Balance: Dynamic minimal assist;2-person assist   Activities of Daily Living   BADL Assessment/Intervention lower body dressing;toileting;grooming   Lower Body Dressing Assessment/Training   [place] Level (Lower Body Dressing) maximum assist (25% patient effort)   Grooming Assessment/Training   Position (Grooming) supported sitting   [place] Level (Grooming) minimum assist (75% patient effort)   Toileting   [place] Level (Toileting) maximum assist (25% patient effort)   Clinical Impression   Criteria for Skilled Therapeutic Interventions Met (OT) Yes, treatment indicated   OT Diagnosis Decreased ind with ADLs and safety   Influenced by the following impairments Acute/chronic respiratory failure   OT Problem List-Impairments impacting ADL activity tolerance impaired;balance;mobility;strength;pain   Assessment of Occupational Performance 3-5 Performance Deficits   Identified Performance Deficits dressing, toileting, bathing, fxl transfers/mobility, endurance   Planned Therapy Interventions (OT) ADL retraining;balance training;strengthening;transfer training;progressive activity/exercise;risk factor education   Clinical Decision Making Complexity (OT) detailed assessment/moderate complexity   Risk & Benefits of therapy have been explained evaluation/treatment results reviewed;care plan/treatment goals reviewed;risks/benefits reviewed;current/potential barriers reviewed;patient   OT Total Evaluation Time   OT Eval, Moderate Complexity Minutes (27424) 8   OT Goals   Therapy Frequency (OT) 5 times/week   OT Predicted Duration/Target Date for Goal Attainment 04/23/24   OT Goals Hygiene/Grooming;Lower Body Dressing;Toilet Transfer/Toileting   OT: Hygiene/Grooming modified independent;while standing   OT: Lower Body Dressing Supervision/stand-by assist   OT: Toilet Transfer/Toileting Modified independent;toilet transfer;cleaning and garment management   Self-Care/Home Management    Self-Care/Home Mgmt/ADL, Compensatory, Meal Prep Minutes (03471) 12   Symptoms Noted During/After Treatment (Meal Preparation/Planning Training) fatigue   Treatment Detail/Skilled Intervention Eval completed, treatment initiated. Tolerated sitting EOB ~ 5 minutes - progressed from mod A to SBA with cueing for weight shifting and eyes open. Patient completes additional STS from EOB arm/arm with min A x 2. Completed seated g/h with min A for toothpaste and reaching for items. Pt in chair with lelo sling placed, call light in reach.   OT Discharge Planning   OT Plan Progress transfers with walker, toileting, dressing, g/h standing as able   OT Discharge Recommendation (DC Rec) Transitional Care Facility   OT Rationale for DC Rec Pt currently requires A x 2 for all transfers and ADLs, recommend TCU to progress ind and safety with ADLs   OT Brief overview of current status Min A x 2 transfer to chair, set up seated g/h   Total Session Time   Timed Code Treatment Minutes 12   Total Session Time (sum of timed and untimed services) 20

## 2024-04-17 ENCOUNTER — APPOINTMENT (OUTPATIENT)
Dept: OCCUPATIONAL THERAPY | Facility: HOSPITAL | Age: 74
DRG: 871 | End: 2024-04-17
Payer: MEDICARE

## 2024-04-17 ENCOUNTER — APPOINTMENT (OUTPATIENT)
Dept: CT IMAGING | Facility: HOSPITAL | Age: 74
DRG: 871 | End: 2024-04-17
Attending: INTERNAL MEDICINE
Payer: MEDICARE

## 2024-04-17 LAB
ANION GAP SERPL CALCULATED.3IONS-SCNC: 8 MMOL/L (ref 7–15)
BASE EXCESS BLDA CALC-SCNC: 4.5 MMOL/L (ref -3–3)
BASOPHILS # BLD AUTO: 0 10E3/UL (ref 0–0.2)
BASOPHILS NFR BLD AUTO: 0 %
BUN SERPL-MCNC: 28.8 MG/DL (ref 8–23)
CALCIUM SERPL-MCNC: 9.2 MG/DL (ref 8.8–10.2)
CHLORIDE SERPL-SCNC: 97 MMOL/L (ref 98–107)
COHGB MFR BLD: 99.4 % (ref 95–96)
CREAT SERPL-MCNC: 1.32 MG/DL (ref 0.51–0.95)
DEPRECATED HCO3 PLAS-SCNC: 28 MMOL/L (ref 22–29)
EGFRCR SERPLBLD CKD-EPI 2021: 43 ML/MIN/1.73M2
EOSINOPHIL # BLD AUTO: 0.3 10E3/UL (ref 0–0.7)
EOSINOPHIL NFR BLD AUTO: 3 %
ERYTHROCYTE [DISTWIDTH] IN BLOOD BY AUTOMATED COUNT: 12.6 % (ref 10–15)
GLUCOSE BLDC GLUCOMTR-MCNC: 111 MG/DL (ref 70–99)
GLUCOSE BLDC GLUCOMTR-MCNC: 120 MG/DL (ref 70–99)
GLUCOSE SERPL-MCNC: 93 MG/DL (ref 70–99)
HCO3 BLD-SCNC: 30 MMOL/L (ref 21–28)
HCT VFR BLD AUTO: 32.6 % (ref 35–47)
HGB BLD-MCNC: 9.7 G/DL (ref 11.7–15.7)
IMM GRANULOCYTES # BLD: 0.3 10E3/UL
IMM GRANULOCYTES NFR BLD: 2 %
LYMPHOCYTES # BLD AUTO: 1.1 10E3/UL (ref 0.8–5.3)
LYMPHOCYTES NFR BLD AUTO: 8 %
MAGNESIUM SERPL-MCNC: 2 MG/DL (ref 1.7–2.3)
MCH RBC QN AUTO: 30.3 PG (ref 26.5–33)
MCHC RBC AUTO-ENTMCNC: 29.8 G/DL (ref 31.5–36.5)
MCV RBC AUTO: 102 FL (ref 78–100)
MONOCYTES # BLD AUTO: 1.6 10E3/UL (ref 0–1.3)
MONOCYTES NFR BLD AUTO: 13 %
NEUTROPHILS # BLD AUTO: 9.1 10E3/UL (ref 1.6–8.3)
NEUTROPHILS NFR BLD AUTO: 74 %
NRBC # BLD AUTO: 0 10E3/UL
NRBC BLD AUTO-RTO: 0 /100
O2/TOTAL GAS SETTING VFR VENT: 32 %
PCO2 BLD: 52 MM HG (ref 35–45)
PH BLD: 7.37 [PH] (ref 7.35–7.45)
PLATELET # BLD AUTO: 393 10E3/UL (ref 150–450)
PO2 BLD: 123 MM HG (ref 80–105)
POTASSIUM SERPL-SCNC: 5.1 MMOL/L (ref 3.4–5.3)
RBC # BLD AUTO: 3.2 10E6/UL (ref 3.8–5.2)
SAO2 % BLDA: 98 % (ref 92–100)
SODIUM SERPL-SCNC: 133 MMOL/L (ref 135–145)
WBC # BLD AUTO: 12.4 10E3/UL (ref 4–11)

## 2024-04-17 PROCEDURE — 82805 BLOOD GASES W/O2 SATURATION: CPT | Performed by: INTERNAL MEDICINE

## 2024-04-17 PROCEDURE — 250N000013 HC RX MED GY IP 250 OP 250 PS 637: Performed by: INTERNAL MEDICINE

## 2024-04-17 PROCEDURE — 83735 ASSAY OF MAGNESIUM: CPT | Performed by: INTERNAL MEDICINE

## 2024-04-17 PROCEDURE — 94799 UNLISTED PULMONARY SVC/PX: CPT

## 2024-04-17 PROCEDURE — 80048 BASIC METABOLIC PNL TOTAL CA: CPT | Performed by: HOSPITALIST

## 2024-04-17 PROCEDURE — 94660 CPAP INITIATION&MGMT: CPT

## 2024-04-17 PROCEDURE — 99233 SBSQ HOSP IP/OBS HIGH 50: CPT | Performed by: INTERNAL MEDICINE

## 2024-04-17 PROCEDURE — 999N000157 HC STATISTIC RCP TIME EA 10 MIN

## 2024-04-17 PROCEDURE — 85025 COMPLETE CBC W/AUTO DIFF WBC: CPT | Performed by: HOSPITALIST

## 2024-04-17 PROCEDURE — 36600 WITHDRAWAL OF ARTERIAL BLOOD: CPT

## 2024-04-17 PROCEDURE — 250N000013 HC RX MED GY IP 250 OP 250 PS 637: Performed by: HOSPITALIST

## 2024-04-17 PROCEDURE — 120N000004 HC R&B MS OVERFLOW

## 2024-04-17 PROCEDURE — 250N000011 HC RX IP 250 OP 636: Performed by: INTERNAL MEDICINE

## 2024-04-17 PROCEDURE — 36415 COLL VENOUS BLD VENIPUNCTURE: CPT | Performed by: HOSPITALIST

## 2024-04-17 PROCEDURE — 70450 CT HEAD/BRAIN W/O DYE: CPT | Mod: MG

## 2024-04-17 PROCEDURE — 97535 SELF CARE MNGMENT TRAINING: CPT | Mod: GO

## 2024-04-17 RX ORDER — FAMOTIDINE 20 MG/1
20 TABLET, FILM COATED ORAL DAILY
Status: DISCONTINUED | OUTPATIENT
Start: 2024-04-18 | End: 2024-04-18

## 2024-04-17 RX ORDER — METOPROLOL SUCCINATE 50 MG/1
50 TABLET, EXTENDED RELEASE ORAL DAILY
Status: DISCONTINUED | OUTPATIENT
Start: 2024-04-18 | End: 2024-04-23 | Stop reason: HOSPADM

## 2024-04-17 RX ORDER — ACETAMINOPHEN 325 MG/1
650 TABLET ORAL EVERY 6 HOURS PRN
Status: DISCONTINUED | OUTPATIENT
Start: 2024-04-17 | End: 2024-04-23 | Stop reason: HOSPADM

## 2024-04-17 RX ORDER — MONTELUKAST SODIUM 10 MG/1
10 TABLET ORAL EVERY EVENING
Status: DISCONTINUED | OUTPATIENT
Start: 2024-04-17 | End: 2024-04-23 | Stop reason: HOSPADM

## 2024-04-17 RX ORDER — FUROSEMIDE 20 MG
40 TABLET ORAL EVERY MORNING
Status: DISCONTINUED | OUTPATIENT
Start: 2024-04-18 | End: 2024-04-23 | Stop reason: HOSPADM

## 2024-04-17 RX ORDER — POLYETHYLENE GLYCOL 3350 17 G/17G
17 POWDER, FOR SOLUTION ORAL DAILY
Status: DISCONTINUED | OUTPATIENT
Start: 2024-04-17 | End: 2024-04-17

## 2024-04-17 RX ADMIN — MICONAZOLE NITRATE: 20 POWDER TOPICAL at 08:39

## 2024-04-17 RX ADMIN — CIPROFLOXACIN 500 MG: 500 TABLET, FILM COATED ORAL at 08:41

## 2024-04-17 RX ADMIN — METOPROLOL TARTRATE 50 MG: 25 TABLET, FILM COATED ORAL at 08:41

## 2024-04-17 RX ADMIN — CIPROFLOXACIN 500 MG: 500 TABLET, FILM COATED ORAL at 21:49

## 2024-04-17 RX ADMIN — MICONAZOLE NITRATE: 20 POWDER TOPICAL at 21:49

## 2024-04-17 RX ADMIN — MONTELUKAST 10 MG: 10 TABLET, FILM COATED ORAL at 21:49

## 2024-04-17 RX ADMIN — FAMOTIDINE 20 MG: 10 INJECTION, SOLUTION INTRAVENOUS at 12:15

## 2024-04-17 RX ADMIN — MICONAZOLE NITRATE ANTIFUNGAL POWDER: 2 POWDER TOPICAL at 21:50

## 2024-04-17 RX ADMIN — DOCUSATE SODIUM 200 MG: 100 CAPSULE, LIQUID FILLED ORAL at 08:41

## 2024-04-17 RX ADMIN — MODAFINIL 200 MG: 100 TABLET ORAL at 08:48

## 2024-04-17 RX ADMIN — HYDROXYCHLOROQUINE SULFATE 200 MG: 200 TABLET, FILM COATED ORAL at 08:41

## 2024-04-17 RX ADMIN — METRONIDAZOLE 500 MG: 500 TABLET ORAL at 21:49

## 2024-04-17 RX ADMIN — ENOXAPARIN SODIUM 40 MG: 40 INJECTION SUBCUTANEOUS at 17:40

## 2024-04-17 RX ADMIN — METRONIDAZOLE 500 MG: 500 TABLET ORAL at 08:42

## 2024-04-17 RX ADMIN — ASPIRIN 81 MG CHEWABLE TABLET 81 MG: 81 TABLET CHEWABLE at 08:41

## 2024-04-17 RX ADMIN — DULOXETINE HYDROCHLORIDE 30 MG: 30 CAPSULE, DELAYED RELEASE ORAL at 08:48

## 2024-04-17 RX ADMIN — MICONAZOLE NITRATE ANTIFUNGAL POWDER: 2 POWDER TOPICAL at 08:39

## 2024-04-17 ASSESSMENT — ACTIVITIES OF DAILY LIVING (ADL)
ADLS_ACUITY_SCORE: 46

## 2024-04-17 NOTE — PLAN OF CARE
"  Problem: Adult Inpatient Plan of Care  Goal: Plan of Care Review  Description: The Plan of Care Review/Shift note should be completed every shift.  The Outcome Evaluation is a brief statement about your assessment that the patient is improving, declining, or no change.  This information will be displayed automatically on your shift  note.  Outcome: Progressing  Goal: Patient-Specific Goal (Individualized)  Description: You can add care plan individualizations to a care plan. Examples of Individualization might be:  \"Parent requests to be called daily at 9am for status\", \"I have a hard time hearing out of my right ear\", or \"Do not touch me to wake me up as it startles  me\".  Outcome: Progressing  Goal: Absence of Hospital-Acquired Illness or Injury  Outcome: Progressing  Intervention: Identify and Manage Fall Risk  Recent Flowsheet Documentation  Taken 4/17/2024 0400 by Sriram Campbell, RN  Safety Promotion/Fall Prevention:   clutter free environment maintained   lighting adjusted   increase visualization of patient   patient and family education   room door open   room near nurse's station   safety round/check completed   nonskid shoes/slippers when out of bed   activity supervised   room organization consistent   mobility aid in reach   increased rounding and observation  Taken 4/17/2024 0000 by Sriram Campbell, RN  Safety Promotion/Fall Prevention:   clutter free environment maintained   lighting adjusted   increase visualization of patient   patient and family education   room door open   room near nurse's station   safety round/check completed   nonskid shoes/slippers when out of bed   activity supervised   room organization consistent   mobility aid in reach   increased rounding and observation  Taken 4/16/2024 2000 by Sriram Campbell, RN  Safety Promotion/Fall Prevention:   clutter free environment maintained   lighting adjusted   increase visualization of patient   patient and family " education   room door open   room near nurse's station   safety round/check completed   nonskid shoes/slippers when out of bed   activity supervised   room organization consistent   mobility aid in reach   increased rounding and observation  Intervention: Prevent Skin Injury  Recent Flowsheet Documentation  Taken 4/17/2024 0400 by Sriram Campbell RN  Body Position:   right   turned   side-lying 30 degrees  Skin Protection:   adhesive use limited   drying agents applied  Device Skin Pressure Protection: absorbent pad utilized/changed  Taken 4/17/2024 0000 by Sriram Campbell RN  Body Position:   right   turned   side-lying 30 degrees  Skin Protection:   adhesive use limited   drying agents applied  Device Skin Pressure Protection: absorbent pad utilized/changed  Taken 4/16/2024 2000 by Sriram Campbell RN  Body Position:   right   turned   side-lying 30 degrees  Skin Protection:   adhesive use limited   drying agents applied  Device Skin Pressure Protection: absorbent pad utilized/changed  Intervention: Prevent and Manage VTE (Venous Thromboembolism) Risk  Recent Flowsheet Documentation  Taken 4/17/2024 0400 by Sriram Campbell RN  VTE Prevention/Management: foot pump device on  Taken 4/17/2024 0000 by Sriram Campbell RN  VTE Prevention/Management: foot pump device on  Taken 4/16/2024 2000 by Sriram Campbell RN  VTE Prevention/Management: foot pump device on  Intervention: Prevent Infection  Recent Flowsheet Documentation  Taken 4/17/2024 0400 by Sriram Campbell RN  Infection Prevention:   hand hygiene promoted   rest/sleep promoted   single patient room provided  Taken 4/17/2024 0000 by Sriram Campbell RN  Infection Prevention:   hand hygiene promoted   rest/sleep promoted   single patient room provided  Taken 4/16/2024 2000 by Sriram Campbell RN  Infection Prevention:   hand hygiene promoted   rest/sleep promoted   single patient room  provided  Goal: Optimal Comfort and Wellbeing  Outcome: Progressing  Intervention: Provide Person-Centered Care  Recent Flowsheet Documentation  Taken 4/17/2024 0400 by Sriram Campbell RN  Trust Relationship/Rapport:   care explained   choices provided   questions encouraged   reassurance provided   thoughts/feelings acknowledged  Taken 4/17/2024 0000 by Sriram Campbell RN  Trust Relationship/Rapport:   care explained   choices provided   questions encouraged   reassurance provided   thoughts/feelings acknowledged  Taken 4/16/2024 2000 by Sriram Campbell RN  Trust Relationship/Rapport:   care explained   choices provided   questions encouraged   reassurance provided   thoughts/feelings acknowledged  Goal: Readiness for Transition of Care  Outcome: Progressing   Goal Outcome Evaluation:        Cardiac:  NSR with occasional 1st AVB and BBB, rate 60's.  Respiratory:  16-20 non-labored.  Sat's: Maintaining mid 90's with Bi-PAP at 30% FiO2.  LS: Diminished mid fields and bases, bilat.  Neuro:  Moderate ROM X4 extremities.  Systemically weak/deconditioned.  Needing overhead lift of heavy assist of X 2-3 to chair.  GI:  No BM for HS/NOC.  BS: Hypoactive X4 quadrants.    Wound sites: Abdomin and buttocks healing well.  :  Good UOP (see I/O).  Rebollar/cath remains in place.  Pain:  Denies.  Ambulation:  Heavy assist X2-3, overhead lift.   Labs:  AM labs pending.  Plan:  Treat infection with IV abx.  Monitor GI S&S.  Reposition as indicated.

## 2024-04-17 NOTE — PLAN OF CARE
"  Problem: Adult Inpatient Plan of Care  Goal: Plan of Care Review  Description: The Plan of Care Review/Shift note should be completed every shift.  The Outcome Evaluation is a brief statement about your assessment that the patient is improving, declining, or no change.  This information will be displayed automatically on your shift  note.  Outcome: Progressing  Goal: Patient-Specific Goal (Individualized)  Description: You can add care plan individualizations to a care plan. Examples of Individualization might be:  \"Parent requests to be called daily at 9am for status\", \"I have a hard time hearing out of my right ear\", or \"Do not touch me to wake me up as it startles  me\".  Outcome: Progressing  Goal: Absence of Hospital-Acquired Illness or Injury  Outcome: Progressing  Intervention: Identify and Manage Fall Risk  Recent Flowsheet Documentation  Taken 4/17/2024 1626 by Gabby Posadas, RN  Safety Promotion/Fall Prevention:   clutter free environment maintained   lighting adjusted   increase visualization of patient   patient and family education   room door open   room near nurse's station   safety round/check completed   nonskid shoes/slippers when out of bed   activity supervised   room organization consistent   mobility aid in reach   increased rounding and observation  Taken 4/17/2024 1232 by Gabby Posadas, RN  Safety Promotion/Fall Prevention:   clutter free environment maintained   lighting adjusted   increase visualization of patient   patient and family education   room door open   room near nurse's station   safety round/check completed   nonskid shoes/slippers when out of bed   activity supervised   room organization consistent   mobility aid in reach   increased rounding and observation  Taken 4/17/2024 0829 by Gabby Posadas, RN  Safety Promotion/Fall Prevention:   clutter free environment maintained   lighting adjusted   increase visualization of patient   patient and family education   room " door open   room near nurse's station   safety round/check completed   nonskid shoes/slippers when out of bed   activity supervised   room organization consistent   mobility aid in reach   increased rounding and observation  Intervention: Prevent Skin Injury  Recent Flowsheet Documentation  Taken 4/17/2024 1626 by Gabby Posadas RN  Body Position:   right   turned   side-lying 30 degrees  Skin Protection:   adhesive use limited   drying agents applied  Device Skin Pressure Protection: absorbent pad utilized/changed  Taken 4/17/2024 1232 by Gabby Posadas RN  Body Position:   right   turned   side-lying 30 degrees  Skin Protection:   adhesive use limited   drying agents applied  Device Skin Pressure Protection: absorbent pad utilized/changed  Taken 4/17/2024 0829 by Gabby Posadas RN  Body Position:   right   turned   side-lying 30 degrees  Skin Protection:   adhesive use limited   drying agents applied  Device Skin Pressure Protection: absorbent pad utilized/changed  Intervention: Prevent and Manage VTE (Venous Thromboembolism) Risk  Recent Flowsheet Documentation  Taken 4/17/2024 1626 by Gabby Posadas RN  VTE Prevention/Management: foot pump device on  Taken 4/17/2024 1232 by Gabby Posadas RN  VTE Prevention/Management: foot pump device on  Taken 4/17/2024 0829 by Gabby Posadas RN  VTE Prevention/Management: foot pump device on  Intervention: Prevent Infection  Recent Flowsheet Documentation  Taken 4/17/2024 1626 by Gbaby Posadas RN  Infection Prevention:   hand hygiene promoted   rest/sleep promoted   single patient room provided  Taken 4/17/2024 1232 by Gabby Posadas RN  Infection Prevention:   hand hygiene promoted   rest/sleep promoted   single patient room provided  Taken 4/17/2024 0829 by Gabby Posadas RN  Infection Prevention:   hand hygiene promoted   rest/sleep promoted   single patient room provided  Goal: Optimal Comfort and Wellbeing  Outcome:  Progressing  Intervention: Provide Person-Centered Care  Recent Flowsheet Documentation  Taken 4/17/2024 1626 by Gabby Posadas, RN  Trust Relationship/Rapport:   care explained   choices provided   questions encouraged   reassurance provided   thoughts/feelings acknowledged  Taken 4/17/2024 1232 by Gabby Posadas, RN  Trust Relationship/Rapport:   care explained   choices provided   questions encouraged   reassurance provided   thoughts/feelings acknowledged  Taken 4/17/2024 0829 by Gabby Posadas, RN  Trust Relationship/Rapport:   care explained   choices provided   questions encouraged   reassurance provided   thoughts/feelings acknowledged   Goal Outcome Evaluation:  Skin on the bilateral groin and below breast pink but dry  skin is intact     Verbalized that she is having hallucinations and seeing things  DR ORTIZ ordered to do ABG and CT of head Telemetry was discontinued . Was slinged to chair and back .

## 2024-04-17 NOTE — PROGRESS NOTES
Care Management Follow Up    Length of Stay (days): 4    Expected Discharge Date: 04/18/2024     Concerns to be Addressed:       Patient plan of care discussed at interdisciplinary rounds:     Anticipated Discharge Disposition:  TCU     Anticipated Discharge Services:    Anticipated Discharge DME:      Patient/family educated on Medicare website which has current facility and service quality ratings:    Education Provided on the Discharge Plan:    Patient/Family in Agreement with the Plan:      Referrals Placed by CM/SW:    Private pay costs discussed:     Additional Information:  CM met with Pt related to PT and OT recommending TCU. Pt accepted list of TCU options, but declined making any choices at this time prefering to visit with a friend here for a visit.    CM will continue to follow care progression and aide in discharge planning as needed.      Rosa Maria Barnes RN

## 2024-04-17 NOTE — PROGRESS NOTES
[facility]    Medicine Progress Note - Hospitalist Service    Date of Admission:  4/13/2024    Assessment & Plan   73-year-old female with history of of chronic respiratory failure, NELLIE/OHS, CKD, CHF who was admitted with sepsis due to acute diverticulitis and acute on chronic respiratory failure.    #Acute diverticulitis  --CT shows sigmoid diverticulitis, no abscess, stool throughout colon  --BCx no growth so far  --Hemodynamically stable, WBC count significantly improved  --Patient tolerating full liquid diet, advance to mechanical soft diet today  --IV Cipro and Flagyl, switch to PO on 4/16    #Constipation  Patient reports last BM was 5 days prior to admission  --Since yesterday patient having multiple soft bowel movements per nursing staff  --Continue Colace    #Acute on chronic hypoxic respiratory failure  #NELLIE, OHS on BiPAP/CPAP at home and on 3 L nasal cannula at home  #Severe obesity  --No wheezing on exam  --BiPAP/CPAP at night and when taking naps during the daytime  -- Incentive spirometry, flutter valve.  As needed neb.  Ambulate as able.  --Appreciated pulmonary input    #Acute on chronic HFpEF  # Essential hypertension  --ASA, beta-blocker  --Monitor respiratory and volume status  --IV Lasix discontinued, resume home Lasix from tomorrow    #CKD stage III  --Renal function seems fairly at baseline.  Monitor labs closely      #Physical deconditioning;  -- At baseline patient reported not ambulating much.  Continue PT OT    #Hallucination;  -- Patient reported visual hallucination  -- Nonfocal neurological exam  -- Check ABG and CT head  -- Discontinued narcotic pain medications  -- neurocheck   --requested pharmacy to review medication if any medication that contributing hallucination    #Candida intertrigo;  -- Continue local care and antifungal treatment              Diet: Snacks/Supplements Adult: Expedite Bottle; With Meals  Mechanical/Dental Soft Diet    DVT Prophylaxis:  "Enoxaparin (Lovenox) SQ  Rebollar Catheter: PRESENT, indication: Other (Comment) (Less mobility)  Lines: None     Cardiac Monitoring: None  Code Status: Full Code      Clinically Significant Risk Factors              # Hypoalbuminemia: Lowest albumin = 2.6 g/dL at 4/15/2024  4:18 AM, will monitor as appropriate     # Hypertension: Noted on problem list    # Acute heart failure with preserved ejection fraction: heart failure noted on problem list, last echo with EF >50%, and receiving IV diuretics       # Severe Obesity: Estimated body mass index is 47.29 kg/m  as calculated from the following:    Height as of this encounter: 1.397 m (4' 7\").    Weight as of this encounter: 92.3 kg (203 lb 7.8 oz)., PRESENT ON ADMISSION  # Moderate Malnutrition: based on nutrition assessment, PRESENT ON ADMISSION     # Financial/Environmental Concerns: none  # Asthma: noted on problem list        Disposition Plan     Medically Ready for Discharge: Anticipated Tomorrow             Onofre Harvey MD  Hospitalist Service  [facility]  Securely message with [employer] (more info)  Text page via [employer] Paging/Directory   ______________________________________________________________________    Interval History   Patient is new to me.  Patient seen and examined.  Notes, labs, imaging report personally reviewed.  Patient denied feeling short of breath or chest pain.  Denied abdomen pain, nausea, vomiting.  Reported had 3 soft bowel movements and tolerating full liquid diet.  Patient however concerned about hallucination and confusion.  Denied focal weakness.  Telemetry negative for significant arrhythmia and discontinued  Discussed with nursing staffs, reported he used BiPAP last night.  Discussed with respiratory therapist.  Nursing staff reported that patient requiring assist of at least 2 to move her from bed to chair and also reported using June lift.  Therapy team recommending TCU.  Patient and patient's  reported " "having bad experience with TCU in the past and does not want to go to TCU.    I did inform patient's  how much assist patient is needing at this time and benefit from TCU to get more rehab.  Patient's  reported \"every time she comes to hospital you guys are forcing her to go to nursing home, do you guys get commission for that\".  I informed him that we provide our best medical recommendation for the patient but we never forced patient or family.    Physical Exam   Vital Signs: Temp: 98.2  F (36.8  C) Temp src: Oral BP: 107/58 Pulse: 65   Resp: 20 SpO2: 99 % O2 Device: Nasal cannula Oxygen Delivery: 3 LPM  Weight: 203 lbs 7.75 oz      General: Not in obvious distress.  HEENT: Normocephalic, supple neck  Chest: Clear to auscultation bilateral anteriorly, no wheezing  Heart: S1S2 normal, regular  Abdomen: Soft.  Obese, no significant tenderness appreciated  Extremities: trace legs swelling  Neuro: Awake, reported having visual hallucination, intermittent confusion, follow simple commands appropriately, speech clear, moves all extremities      Medical Decision Making       55 MINUTES SPENT BY ME on the date of service doing chart review, history, exam, documentation & further activities per the note.      Data     I have personally reviewed the following data over the past 24 hrs:    12.4 (H)  \   9.7 (L)   / 393     133 (L) 97 (L) 28.8 (H) /  120 (H)   5.1 28 1.32 (H) \       Imaging results reviewed over the past 24 hrs:   No results found for this or any previous visit (from the past 24 hour(s)).  "

## 2024-04-17 NOTE — PROGRESS NOTES
ABG collected from right arm. Patient was on 3L NC/0.32 FIO2 when ABG was collected. ABG draw was collected without adverse reactions. RT will follow.    Theodore Good, RT

## 2024-04-18 ENCOUNTER — APPOINTMENT (OUTPATIENT)
Dept: PHYSICAL THERAPY | Facility: HOSPITAL | Age: 74
DRG: 871 | End: 2024-04-18
Payer: MEDICARE

## 2024-04-18 LAB
ANION GAP SERPL CALCULATED.3IONS-SCNC: 6 MMOL/L (ref 7–15)
BACTERIA BLD CULT: NO GROWTH
BACTERIA BLD CULT: NO GROWTH
BASE EXCESS BLDA CALC-SCNC: 6.3 MMOL/L (ref -3–3)
BUN SERPL-MCNC: 27.2 MG/DL (ref 8–23)
CALCIUM SERPL-MCNC: 9.4 MG/DL (ref 8.8–10.2)
CHLORIDE SERPL-SCNC: 96 MMOL/L (ref 98–107)
COHGB MFR BLD: 99.6 % (ref 95–96)
CREAT SERPL-MCNC: 1.29 MG/DL (ref 0.51–0.95)
DEPRECATED HCO3 PLAS-SCNC: 30 MMOL/L (ref 22–29)
EGFRCR SERPLBLD CKD-EPI 2021: 44 ML/MIN/1.73M2
GLUCOSE SERPL-MCNC: 106 MG/DL (ref 70–99)
HCO3 BLD-SCNC: 32 MMOL/L (ref 21–28)
O2/TOTAL GAS SETTING VFR VENT: 30 %
PCO2 BLD: 55 MM HG (ref 35–45)
PH BLD: 7.37 [PH] (ref 7.35–7.45)
PO2 BLD: 106 MM HG (ref 80–105)
POTASSIUM SERPL-SCNC: 5.2 MMOL/L (ref 3.4–5.3)
SAO2 % BLDA: 99 % (ref 92–100)
SODIUM SERPL-SCNC: 132 MMOL/L (ref 135–145)

## 2024-04-18 PROCEDURE — 250N000013 HC RX MED GY IP 250 OP 250 PS 637: Performed by: INTERNAL MEDICINE

## 2024-04-18 PROCEDURE — 36415 COLL VENOUS BLD VENIPUNCTURE: CPT | Performed by: INTERNAL MEDICINE

## 2024-04-18 PROCEDURE — 36600 WITHDRAWAL OF ARTERIAL BLOOD: CPT

## 2024-04-18 PROCEDURE — 99222 1ST HOSP IP/OBS MODERATE 55: CPT

## 2024-04-18 PROCEDURE — 250N000013 HC RX MED GY IP 250 OP 250 PS 637: Performed by: HOSPITALIST

## 2024-04-18 PROCEDURE — 999N000157 HC STATISTIC RCP TIME EA 10 MIN

## 2024-04-18 PROCEDURE — 120N000004 HC R&B MS OVERFLOW

## 2024-04-18 PROCEDURE — 82805 BLOOD GASES W/O2 SATURATION: CPT

## 2024-04-18 PROCEDURE — 250N000013 HC RX MED GY IP 250 OP 250 PS 637

## 2024-04-18 PROCEDURE — 97530 THERAPEUTIC ACTIVITIES: CPT | Mod: GP | Performed by: PHYSICAL THERAPIST

## 2024-04-18 PROCEDURE — 99232 SBSQ HOSP IP/OBS MODERATE 35: CPT | Performed by: INTERNAL MEDICINE

## 2024-04-18 PROCEDURE — 80048 BASIC METABOLIC PNL TOTAL CA: CPT | Performed by: INTERNAL MEDICINE

## 2024-04-18 PROCEDURE — 250N000011 HC RX IP 250 OP 636: Performed by: INTERNAL MEDICINE

## 2024-04-18 PROCEDURE — 94799 UNLISTED PULMONARY SVC/PX: CPT

## 2024-04-18 RX ORDER — HYDRALAZINE HYDROCHLORIDE 20 MG/ML
5 INJECTION INTRAMUSCULAR; INTRAVENOUS EVERY 6 HOURS PRN
Status: DISCONTINUED | OUTPATIENT
Start: 2024-04-18 | End: 2024-04-23 | Stop reason: HOSPADM

## 2024-04-18 RX ORDER — DULOXETIN HYDROCHLORIDE 30 MG/1
30 CAPSULE, DELAYED RELEASE ORAL 2 TIMES DAILY
Status: DISCONTINUED | OUTPATIENT
Start: 2024-04-18 | End: 2024-04-19

## 2024-04-18 RX ORDER — ALBUTEROL SULFATE 90 UG/1
2 AEROSOL, METERED RESPIRATORY (INHALATION) EVERY 4 HOURS PRN
Status: DISCONTINUED | OUTPATIENT
Start: 2024-04-18 | End: 2024-04-23 | Stop reason: HOSPADM

## 2024-04-18 RX ADMIN — DOCUSATE SODIUM 200 MG: 100 CAPSULE, LIQUID FILLED ORAL at 20:41

## 2024-04-18 RX ADMIN — ASPIRIN 81 MG CHEWABLE TABLET 81 MG: 81 TABLET CHEWABLE at 09:03

## 2024-04-18 RX ADMIN — METOPROLOL SUCCINATE 50 MG: 50 TABLET, EXTENDED RELEASE ORAL at 09:05

## 2024-04-18 RX ADMIN — MICONAZOLE NITRATE ANTIFUNGAL POWDER: 2 POWDER TOPICAL at 20:42

## 2024-04-18 RX ADMIN — FUROSEMIDE 40 MG: 20 TABLET ORAL at 09:03

## 2024-04-18 RX ADMIN — CIPROFLOXACIN 500 MG: 500 TABLET, FILM COATED ORAL at 09:05

## 2024-04-18 RX ADMIN — ENOXAPARIN SODIUM 40 MG: 40 INJECTION SUBCUTANEOUS at 17:07

## 2024-04-18 RX ADMIN — AMOXICILLIN AND CLAVULANATE POTASSIUM 1 TABLET: 875; 125 TABLET, FILM COATED ORAL at 20:41

## 2024-04-18 RX ADMIN — HYDROXYCHLOROQUINE SULFATE 200 MG: 200 TABLET, FILM COATED ORAL at 09:05

## 2024-04-18 RX ADMIN — METRONIDAZOLE 500 MG: 500 TABLET ORAL at 09:05

## 2024-04-18 RX ADMIN — MICONAZOLE NITRATE ANTIFUNGAL POWDER: 2 POWDER TOPICAL at 09:04

## 2024-04-18 RX ADMIN — MODAFINIL 200 MG: 100 TABLET ORAL at 09:03

## 2024-04-18 RX ADMIN — FAMOTIDINE 20 MG: 20 TABLET ORAL at 09:05

## 2024-04-18 RX ADMIN — DOCUSATE SODIUM 200 MG: 100 CAPSULE, LIQUID FILLED ORAL at 09:03

## 2024-04-18 RX ADMIN — DULOXETINE HYDROCHLORIDE 30 MG: 30 CAPSULE, DELAYED RELEASE ORAL at 09:03

## 2024-04-18 RX ADMIN — MONTELUKAST 10 MG: 10 TABLET, FILM COATED ORAL at 20:42

## 2024-04-18 RX ADMIN — MICONAZOLE NITRATE: 20 POWDER TOPICAL at 20:42

## 2024-04-18 RX ADMIN — DULOXETINE HYDROCHLORIDE 30 MG: 30 CAPSULE, DELAYED RELEASE ORAL at 20:44

## 2024-04-18 RX ADMIN — POLYETHYLENE GLYCOL 3350 17 G: 17 POWDER, FOR SOLUTION ORAL at 05:33

## 2024-04-18 RX ADMIN — MICONAZOLE NITRATE: 20 POWDER TOPICAL at 09:04

## 2024-04-18 ASSESSMENT — ACTIVITIES OF DAILY LIVING (ADL)
ADLS_ACUITY_SCORE: 46
ADLS_ACUITY_SCORE: 42
ADLS_ACUITY_SCORE: 42
ADLS_ACUITY_SCORE: 46
ADLS_ACUITY_SCORE: 44
ADLS_ACUITY_SCORE: 44
ADLS_ACUITY_SCORE: 48
ADLS_ACUITY_SCORE: 48
ADLS_ACUITY_SCORE: 42
ADLS_ACUITY_SCORE: 46
ADLS_ACUITY_SCORE: 46
ADLS_ACUITY_SCORE: 42
ADLS_ACUITY_SCORE: 46
ADLS_ACUITY_SCORE: 42
ADLS_ACUITY_SCORE: 46
ADLS_ACUITY_SCORE: 48
ADLS_ACUITY_SCORE: 46
ADLS_ACUITY_SCORE: 44

## 2024-04-18 NOTE — PLAN OF CARE
"  Problem: Adult Inpatient Plan of Care  Goal: Plan of Care Review  Description: The Plan of Care Review/Shift note should be completed every shift.  The Outcome Evaluation is a brief statement about your assessment that the patient is improving, declining, or no change.  This information will be displayed automatically on your shift  note.  Outcome: Progressing  Goal: Patient-Specific Goal (Individualized)  Description: You can add care plan individualizations to a care plan. Examples of Individualization might be:  \"Parent requests to be called daily at 9am for status\", \"I have a hard time hearing out of my right ear\", or \"Do not touch me to wake me up as it startles  me\".  Outcome: Progressing  Goal: Absence of Hospital-Acquired Illness or Injury  Outcome: Progressing  Intervention: Identify and Manage Fall Risk  Recent Flowsheet Documentation  Taken 4/18/2024 0000 by Sriram Campbell, RN  Safety Promotion/Fall Prevention:   clutter free environment maintained   lighting adjusted   increase visualization of patient   patient and family education   room door open   room near nurse's station   safety round/check completed   nonskid shoes/slippers when out of bed   activity supervised   room organization consistent   mobility aid in reach   increased rounding and observation  Taken 4/17/2024 2100 by Sriram Campbell, RN  Safety Promotion/Fall Prevention:   clutter free environment maintained   lighting adjusted   increase visualization of patient   patient and family education   room door open   room near nurse's station   safety round/check completed   nonskid shoes/slippers when out of bed   activity supervised   room organization consistent   mobility aid in reach   increased rounding and observation  Intervention: Prevent Skin Injury  Recent Flowsheet Documentation  Taken 4/18/2024 0000 by Sriram Campbell, RN  Body Position:   right   turned   side-lying 30 degrees  Skin Protection:   adhesive " use limited   drying agents applied  Device Skin Pressure Protection: absorbent pad utilized/changed  Taken 4/17/2024 2234 by Sriram Campbell RN  Body Position:   left   right   turned  Taken 4/17/2024 2100 by Sriram Campbell RN  Body Position:   right   turned   side-lying 30 degrees  Skin Protection:   adhesive use limited   drying agents applied  Device Skin Pressure Protection: absorbent pad utilized/changed  Intervention: Prevent and Manage VTE (Venous Thromboembolism) Risk  Recent Flowsheet Documentation  Taken 4/18/2024 0000 by Sriram Campbell RN  VTE Prevention/Management: foot pump device on  Taken 4/17/2024 2100 by Sriram Campbell RN  VTE Prevention/Management: foot pump device on  Intervention: Prevent Infection  Recent Flowsheet Documentation  Taken 4/18/2024 0000 by Sriram Campbell RN  Infection Prevention:   hand hygiene promoted   rest/sleep promoted   single patient room provided  Taken 4/17/2024 2100 by Sriram Campbell RN  Infection Prevention:   hand hygiene promoted   rest/sleep promoted   single patient room provided  Goal: Optimal Comfort and Wellbeing  Outcome: Progressing  Intervention: Provide Person-Centered Care  Recent Flowsheet Documentation  Taken 4/18/2024 0000 by Sriram Campbell RN  Trust Relationship/Rapport:   care explained   choices provided   questions encouraged   reassurance provided   thoughts/feelings acknowledged  Taken 4/17/2024 2100 by Sriram Campbell RN  Trust Relationship/Rapport:   care explained   choices provided   questions encouraged   reassurance provided   thoughts/feelings acknowledged  Goal: Readiness for Transition of Care  Outcome: Progressing   Goal Outcome Evaluation:      Cardiac:  Telemetry discontinued.  Med/surgical.  Respiratory:  16-20 non-labored.  Sat's: Maintaining mid 90's with Bi-PAP at 30% FiO2 and 3 L NC..  LS: Diminished mid fields and bases, bilat.  Neuro:  Moderate ROM X4  extremities.  Systemically weak/deconditioned.  Needing overhead lift of heavy assist of X 2-3 to chair.  GI:  BS: Hypoactive X4 quadrants.    Wound sites: Abdomin and buttocks healing well.  :  Good UOP (see I/O).  Rebollar/cath remains in place.  Pain:  Denies.  Ambulation:  Heavy assist X2-3, overhead lift.   Labs:  AM labs pending.  Plan:  Treat infection with IV abx.  Monitor GI S&S.  Reposition as indicated.

## 2024-04-18 NOTE — SIGNIFICANT EVENT
Significant Event Note    Time of event: 11:26 PM April 17, 2024    Description of event:  Notified by nursing that RT requesting repeat ABG order after being started on BiPAP.  Got ABG at 4:35PM this afternoon, which showed pCO2 52.   Has reportedly been hallucinating throughout the day, other workup has been negative including head CT.   Per nursing, patient is otherwise doing well.     Plan:  Repeat ABG ordered.     Discussed with: bedside nurse    Marquise Munguia MD

## 2024-04-18 NOTE — PHARMACY-CONSULT NOTE
Pharmacy note: Consult  Briefly describe the reason for this consult: Please review current patient medications if any contributing visual hallucination.       Current home meds, may cause visual hallucination or hallucination in general.  -alendronate: visual disturbance and auditory hallucinations   -duloxetine: hallucinations  -esomeprazole: abnormal vision and hallucinations  - hydroxychloroquine: abnormal color vision and other visual effects.  - metoprolol succinate: hallucinations  - modafinil: hallucinations  - montelukast: hallucinations  -solifenacin: hallucinations  -tizandidine: hallucinations  -valacyclovir: hallucinations    Thanks  Jordan Ness [facility] on 4/17/2024 at 8:48 PM

## 2024-04-18 NOTE — CONSULTS
Consultation - [facility]  Desirae Rey,  1950, MRN [id number]    [facility]  Diverticulitis [K57.92]  Acute and chronic respiratory failure with hypoxia (H) [J96.21]    PCP: Noemy Brito, [phone]   Code status:  Full Code       Extended Emergency Contact Information  Primary Emergency Contact: Hugo Rey  Address: [address] [place] [address] [place] [address] [facility]  Home Phone: [phone]  Relation: Spouse  Secondary Emergency Contact: Ricardo Rey  Mobile Phone: [phone]  Relation: Son       Assessment and Plan   Active Problems:    Diverticulitis    Acute and chronic respiratory failure with hypoxia (H)    Sepsis (H)    CHF (congestive heart failure) (H)    Pulmonary hypertension (H)    Impression: Diverticulitis.  Seems to be responding to antimicrobial chemotherapy.    Confusion and hallucinations.  Sounds more like lucid hallucinations from description.  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  She says symptoms have been going on for 6 months.    Concern for adverse drug reaction listed to dicloxacillin, but tells me she is tolerated Augmentin without difficulty.    Recommendations: I think we can safely switch her antimicrobial chemotherapy to Augmentin 875 mg p.o. twice daily for 10 days.    Would recommend reviewing other medications regarding possible etiologies for hallucinations.    Thank you for consulting [facility]    Ivan Palmer MD  [phone]     Chief Complaint <principal problem not specified>       HPI   We have been requested by Dr. ORTIZ to evaluate Desirae Rey for diverticulitis who is a 73 year old year old female.    Patient is a pleasant 73-year-old woman known to me from working here at [facility] for several decades.  She staffed the doctor's lounge during  lunchtime.  She greets me by name when I walk in the room.    I am asked to see the patient to assist with antimicrobial chemotherapy for her diverticulitis.  Additionally, there is concern for hallucinations.  Time my visit, patient seems to be oriented, but comments that she has been seeing things including a man washing his feet and her sink in the bathroom.  Someone breaking into her house but she knows is not her son.  She tells me the symptoms been going on for 6 months.    She says her abdominal pain is better and she has been passing stool.  She says she has been eating.    Dicloxacillin is listed as an adverse drug reaction, but she says she is tolerated Augmentin without difficulty.       Medical History  Patient Active Problem List   Diagnosis    Fibromyalgia    Osteoporosis    Obesity    Osteoarthritis    NELLIE (obstructive sleep apnea)    Immunology Studies Nonspecific Abnormal Findings    Allergic Rhinitis    Anemia    Hematuria    Proteinuria    Renal Insufficiency    Synovitis    Pseudogout    Chondrocalcinosis    Pain During Urination (Dysuria)    Cervical radiculopathy    Benign essential hypertension    Hyperkalemia    Acute respiratory failure with hypoxia (H)    GRACY (acute kidney injury) (H24)    Blood loss anemia    Morbid obesity due to excess calories (H)    Cubital tunnel syndrome, right    Morbid obesity, unspecified obesity type (H)    Chronic kidney disease, stage 2 (mild)    Hyperlipidemia, unspecified hyperlipidemia type    Other specified hypotension    Sleep apnea    CKD (chronic kidney disease) stage 3, GFR 30-59 ml/min (H)    Hypertension    Diastolic dysfunction    Altered mental status, unspecified altered mental status type    Acute weakness    Adult failure to thrive    Chronic heart failure with preserved ejection fraction (HFpEF) (H)    Elevated troponin    Moderate persistent asthma without complication    Cellulitis of left lower extremity    Fall at home, initial encounter     Urinary tract infection without hematuria, site unspecified    Diverticulitis    Acute and chronic respiratory failure with hypoxia (H)    Sepsis (H)    CHF (congestive heart failure) (H)    Pulmonary hypertension (H)     Past Medical History:   Diagnosis Date    Allergic rhinitis     Arthritis of back     CHF (congestive heart failure) (H)     Chronic kidney disease     stage 3     De Quervain's disease (tenosynovitis)     Fibromyalgia, primary     GERD (gastroesophageal reflux disease)     Hematuria     chronic    High cholesterol     History of blood clots 09/01/1970    DVT, from birth control, h/o left calf    Hyperlipidemia     Hypertension     Low back pain     Osteoporosis     Pickwickian syndrome (H)     Plantar fasciitis     Proteinuria     Proteinuria     chronic    Sleep apnea     CPAP    Uncomplicated asthma     Surgical History  She  has a past surgical history that includes knee surgery; Hand surgery (Right); Release carpal tunnel (Bilateral); joint replacement; Cholecystectomy; Hysteroscopy Diagnostic; Cervical Fusion (N/A, 4/27/2017); Eye surgery; and Colonoscopy (N/A, 12/20/2019).   Social History  Reviewed, and she  reports that she has never smoked. She has never used smokeless tobacco. She reports that she does not drink alcohol and does not use drugs.   Allergies  Allergies   Allergen Reactions    Latex Rash     Severe rash    Pregabalin Shortness Of Breath     Other Reaction(s): swelling and shortness of breath    Valsartan Unknown     Hyperkalemia    Colchicine Diarrhea    Dicloxacillin      Other Reaction(s): confusion, says she has tolerated augmentin without difficulty.     Gabapentin      Other Reaction(s): Sedation    Latex Unknown     Added based on information entered during case entry, please review and add reactions, type, and severity as needed    Other Drug Allergy (See Comments) Muscle Pain (Myalgia)     Tried lovastatin and simvastatin    Other Environmental Allergy Unknown      Coverlet bandaid , 3M product; rash    Statins-Hmg-Coa Reductase Inhibitors [Statins] Muscle Pain (Myalgia)     Tried lovastatin and simvastatin    Sulfa Antibiotics Swelling     Facial swelling      Adhesive Tape Rash    Family History  Noncontributory to current problem except as mentioned above    Psychosocial Needs  Social History     Social History Narrative    Not on file     Additional psychosocial needs reviewed per nursing assessment.     Prior to Admission Medications   Medications Prior to Admission   Medication Sig Dispense Refill Last Dose    acetaminophen (TYLENOL) 500 MG tablet Take 500-1,000 mg by mouth every 6 hours as needed for mild pain   Unknown at prn    albuterol (PROAIR HFA;PROVENTIL HFA;VENTOLIN HFA) 90 mcg/actuation inhaler Inhale 1 puff into the lungs every 4 hours as needed for shortness of breath / dyspnea   Unknown at prn    alendronate (FOSAMAX) 70 MG tablet [ALENDRONATE (FOSAMAX) 70 MG TABLET] Take 70 mg by mouth every 7 days. Takes on Mondays 11 4/8/2024 at am    aspirin 81 MG EC tablet 1 tablet Orally Once a day   4/14/2024 at am    azelastine (OPTIVAR) 0.05 % ophthalmic solution Apply 1 drop to eye 2 times daily as needed   Unknown at prn    Bacillus Coagulans-Inulin (ALIGN PREBIOTIC-PROBIOTIC PO) Take 1 tablet by mouth daily   4/12/2024 at am    calcium citrate (CITRACAL) 950 (200 Ca) MG tablet Take 1 tablet by mouth daily   4/12/2024 at am    cetirizine (ZYRTEC) 10 MG tablet [CETIRIZINE (ZYRTEC) 10 MG TABLET] Take 10 mg by mouth daily.    4/12/2024 at am    cholecalciferol (VITAMIN D3) 25 mcg (1000 units) capsule Take 125 mcg by mouth every morning Take 5000 units in the morning and 2000 units in the evening   4/12/2024 at am    DULoxetine (CYMBALTA) 30 MG capsule Take 60 mg by mouth 2 times daily   4/14/2024 at pm    esomeprazole (NEXIUM) 40 MG capsule Take 40 mg by mouth daily as needed   Unknown at prn    furosemide (LASIX) 20 MG tablet Take 1 mg by mouth every evening    4/12/2024 at pm    furosemide (LASIX) 20 MG tablet Take 3 tablets by mouth every morning   4/12/2024 at am    HYDROcodone-acetaminophen 5-325 mg per tablet Take 1 tablet by mouth every 4 hours as needed for pain Max 6 tablets per day.   Unknown at prn    Hydroxychloroquine Sulfate 100 MG TABS Take 100 mg by mouth 2 times daily   4/14/2024 at pm    l-lysine HCl 500 MG TABS tablet Take 2 tablets by mouth daily   4/12/2024 at am    magnesium oxide 250 mg Tab Take 500 mg by mouth daily   4/12/2024 at am    metoprolol succinate ER (TOPROL XL) 50 MG 24 hr tablet Take 50 mg by mouth daily   4/13/2024 at am    modafinil (PROVIGIL) 100 MG tablet Take 250 mg by mouth daily Take two and half tablet (250 mg ) daily   4/14/2024 at am    montelukast (SINGULAIR) 10 mg tablet Take 10 mg by mouth every evening   4/12/2024 at pm    nystatin (MYCOSTATIN) 401007 UNIT/GM external powder Apply topically 2 times daily   4/12/2024 at pm    OXYGEN-AIR DELIVERY SYSTEMS MISC [OXYGEN-AIR DELIVERY SYSTEMS MISC] Use 3 L As Directed. 3 lpm with activities and as needed at night   Unknown at prn    senna-docusate (SENOKOT-S/PERICOLACE) 8.6-50 MG tablet Take 1 tablet by mouth 2 times daily as needed for constipation 30 tablet 0 Unknown at prn    solifenacin (VESICARE) 10 MG tablet Take 5-10 mg by mouth At Bedtime   4/12/2024 at hs    tiZANidine (ZANAFLEX) 4 MG tablet Take 4 mg by mouth 3 times daily as needed for muscle spasms   Unknown at prn    Turmeric (CURCUPLEX-95) 500 MG CAPS Take 1 capsule by mouth daily   4/12/2024 at am    valACYclovir (VALTREX) 500 MG tablet Take 500 mg by mouth 2 times daily as needed (flare)   Unknown at prn          Review of Systems:  Pertinent items are noted in HPI., otherwise all others negative. Physical Exam:  Temp:  [97.8  F (36.6  C)-98.2  F (36.8  C)] 97.9  F (36.6  C)  Pulse:  [65-79] 79  Resp:  [18-22] 22  BP: (107-138)/(58-82) 138/82  FiO2 (%):  [30 %] 30 %  SpO2:  [91 %-99 %] 97 %    /82 (BP  "Location: Right arm)   Pulse 79   Temp 97.9  F (36.6  C) (Oral)   Resp 22   Ht 1.397 m (4' 7\")   Wt 97.4 kg (214 lb 11.7 oz)   SpO2 97%   BMI 49.91 kg/m    General appearance: alert, appears stated age, and cooperative  Head: Normocephalic, without obvious abnormality, atraumatic  Eyes: Extraocular muscles intact, no icterus  Neck: no adenopathy and supple, symmetrical, trachea midline  Lungs:  Somewhat distant breath sounds  Heart: Rate and rhythm, no murmur  Abdomen: soft, non-tender; bowel sounds normal; no masses,  no organomegaly  Extremities: Warm and dry  Skin: Skin color, texture, turgor normal. No rashes or lesions  Neurologic: Alert and responsive.  Recognizes me and addresses me by name       Pertinent Labs  Lab Results: personally reviewed.   WBC Count   Date/Time Value Ref Range Status   04/17/2024 04:24 AM 12.4 (H) 4.0 - 11.0 10e3/uL Final   04/16/2024 04:49 AM 13.9 (H) 4.0 - 11.0 10e3/uL Final   04/15/2024 04:18 AM 19.0 (H) 4.0 - 11.0 10e3/uL Final     Hemoglobin   Date/Time Value Ref Range Status   04/17/2024 04:24 AM 9.7 (L) 11.7 - 15.7 g/dL Final   04/16/2024 04:49 AM 9.5 (L) 11.7 - 15.7 g/dL Final   04/15/2024 04:18 AM 10.4 (L) 11.7 - 15.7 g/dL Final     Hematocrit   Date/Time Value Ref Range Status   04/17/2024 04:24 AM 32.6 (L) 35.0 - 47.0 % Final   04/16/2024 04:49 AM 31.0 (L) 35.0 - 47.0 % Final   04/15/2024 04:18 AM 32.5 (L) 35.0 - 47.0 % Final     Platelet Count   Date/Time Value Ref Range Status   04/17/2024 04:24  150 - 450 10e3/uL Final   04/16/2024 04:49  150 - 450 10e3/uL Final   04/15/2024 04:18  150 - 450 10e3/uL Final        Sodium   Date/Time Value Ref Range Status   04/18/2024 04:13  (L) 135 - 145 mmol/L Final     Comment:     Reference intervals for this test were updated on 09/26/2023 to more accurately reflect our healthy population. There may be differences in the flagging of prior results with similar values performed with this method. " Interpretation of those prior results can be made in the context of the updated reference intervals.    04/17/2024 04:24  (L) 135 - 145 mmol/L Final     Comment:     Reference intervals for this test were updated on 09/26/2023 to more accurately reflect our healthy population. There may be differences in the flagging of prior results with similar values performed with this method. Interpretation of those prior results can be made in the context of the updated reference intervals.    04/16/2024 04:49  (L) 135 - 145 mmol/L Final     Comment:     Reference intervals for this test were updated on 09/26/2023 to more accurately reflect our healthy population. There may be differences in the flagging of prior results with similar values performed with this method. Interpretation of those prior results can be made in the context of the updated reference intervals.      Carbon Dioxide (CO2)   Date/Time Value Ref Range Status   04/18/2024 04:13 AM 30 (H) 22 - 29 mmol/L Final   04/17/2024 04:24 AM 28 22 - 29 mmol/L Final   04/16/2024 04:49 AM 28 22 - 29 mmol/L Final   12/15/2021 04:25 PM 33 (H) 22 - 31 mmol/L Final   11/30/2021 03:14 PM 30 22 - 31 mmol/L Final   10/20/2021 04:01 PM 30 22 - 31 mmol/L Final     Urea Nitrogen   Date/Time Value Ref Range Status   04/18/2024 04:13 AM 27.2 (H) 8.0 - 23.0 mg/dL Final   04/17/2024 04:24 AM 28.8 (H) 8.0 - 23.0 mg/dL Final   04/16/2024 04:49 AM 28.4 (H) 8.0 - 23.0 mg/dL Final   12/15/2021 04:25 PM 62 (H) 8 - 22 mg/dL Final   11/30/2021 03:14 PM 65 (H) 8 - 22 mg/dL Final   10/20/2021 04:01 PM 55 (H) 8 - 22 mg/dL Final     Creatinine   Date Value Ref Range Status   04/18/2024 1.29 (H) 0.51 - 0.95 mg/dL Final     7-Day Micro Results       Collected Updated Procedure Result Status      04/13/2024 2050 04/17/2024 2317 Blood Culture Artery, Carotid, left [[id number]]   Blood from Artery, Carotid, left    Preliminary result Component Value   Culture No growth after 4 days  [P]                 04/13/2024 2050 04/17/2024 2317 Blood Culture Artery, Carotid, Right [[id number]]   Blood from Artery, Carotid, Right    Preliminary result Component Value   Culture No growth after 4 days  [P]                04/13/2024 1857 04/13/2024 1946 Symptomatic Influenza A/B, RSV, & SARS-CoV2 PCR (COVID-19) Nasopharyngeal [[id number]]    Swab from Nasopharyngeal    Final result Component Value   Influenza A PCR Negative   Influenza B PCR Negative   RSV PCR Negative   SARS CoV2 PCR Negative   NEGATIVE: SARS-CoV-2 (COVID-19) RNA not detected, presumed negative.                       Pertinent Radiology  Radiology Results:     CT Head w/o Contrast    Result Date: 4/17/2024  EXAM: CT HEAD W/O CONTRAST LOCATION: [facility] DATE: 4/17/2024 INDICATION: Confusion, hallucination. COMPARISON: None. TECHNIQUE: Routine CT Head without IV contrast. Multiplanar reformats. Dose reduction techniques were used. FINDINGS: INTRACRANIAL CONTENTS: No finding for intracranial hemorrhage, mass, or acute infarct. Ventricles are normal in size. Normal gray-white matter differentiation. No mass effect or midline shift. Cerebellar tonsils are normally positioned. Sella is unremarkable for technique. Corpus callosum is normally formed. VISUALIZED ORBITS/SINUSES/MASTOIDS: Prior cataract surgeries. Both globes are borderline proptotic with prominence of the orbital fat. No abnormal enlarged extraocular muscles to strongly suggest thyroid related orbitopathy and findings are favored to relate to body habitus. Dependent secretions right maxillary sinus suggesting active sinus disease. Trace ethmoid sinus mucosal thickening. No middle ear or mastoid effusion. BONES/SOFT TISSUES: Calvarium is intact, without fracture or suspicious lytic or blastic foci. Benign hyperostosis frontalis internus.     IMPRESSION: 1.  Dependent air-fluid level/dependent secretions right maxillary sinus compatible with active sinus  disease. 2.  No finding for intracranial hemorrhage, mass, or acute infarct.    CT Abdomen Pelvis w Contrast    Result Date: 4/13/2024  EXAM: CT ABDOMEN PELVIS W CONTRAST LOCATION: [facility] DATE: 4/13/2024 INDICATION: Pain. COMPARISON: None. TECHNIQUE: CT scan of the abdomen and pelvis was performed following injection of IV contrast. Multiplanar reformats were obtained. Dose reduction techniques were used. CONTRAST: Isovue 370 90 mL FINDINGS: LOWER CHEST: Normal. HEPATOBILIARY: Cholecystectomy. The liver is unremarkable. No biliary dilatation. PANCREAS: Normal. SPLEEN: Normal. ADRENAL GLANDS: Normal. KIDNEYS/BLADDER: Bilateral renal cysts have benign features and require no additional follow-up. The urinary bladder is grossly unremarkable. BOWEL: Moderate inflammatory changes noted in the left lower quadrant surrounding the upper sigmoid colon secondary to acute diverticulitis. Moderate formed stool is noted in the colon up to the level of the site of diverticulitis. This may be resulting in a delay in transit due to the wall thickening and inflammation. LYMPH NODES: No lymphadenopathy. VASCULATURE: Normal. PELVIC ORGANS: No lymphadenopathy. No pericardial effusion. MUSCULOSKELETAL: Degenerative changes lower thoracic and lumbar spine.     IMPRESSION: 1.  Acute diverticulitis involving the upper sigmoid colon. 2.  Moderate formed stool noted in the colon extending to the level of the site of diverticulitis. Distal to this the colon is decompressed. 3.  No evidence for free fluid or abscess. 4.  Prior cholecystectomy.    CT Chest Pulmonary Embolism w Contrast    Result Date: 4/13/2024  EXAM: CT CHEST PULMONARY EMBOLISM W CONTRAST LOCATION: [facility] DATE: 4/13/2024 INDICATION: SOB COMPARISON: CT 10/31/2022 TECHNIQUE: CT chest pulmonary angiogram during arterial phase injection of IV contrast. Multiplanar reformats and MIP reconstructions were performed. Dose  reduction techniques were used. CONTRAST: Isovue 370 90 mL FINDINGS: ANGIOGRAM CHEST: Pulmonary arteries are normal caliber and negative for pulmonary emboli. The subsegmental pulmonary arteries in the lung bases cannot be adequately assessed due to respiratory motion artifact. Thoracic aorta is negative for dissection. No CT evidence of right heart strain. LUNGS AND PLEURA: Moderate respiratory motion artifact. Mild bibasilar atelectasis. No consolidation. MEDIASTINUM/AXILLAE: No lymphadenopathy. No pericardial effusion. CORONARY ARTERY CALCIFICATION: Moderate. UPPER ABDOMEN: Normal. MUSCULOSKELETAL: Surgical changes of the cervical spine.     IMPRESSION: 1.  Moderate respiratory motion artifact limits some detail of the subsegmental pulmonary arteries in the lung bases as well as portions of the lungs. No pulmonary emboli appreciated. 2.  No evidence for pulmonary consolidation or pleural fluid.    US Lower Extremity Venous Duplex Bilateral    Result Date: 4/13/2024  EXAM: US LOWER EXTREMITY VENOUS DUPLEX BILATERAL LOCATION: [facility] DATE: 4/13/2024 INDICATION: pain, swelling COMPARISON: None. TECHNIQUE: Venous Duplex ultrasound of bilateral lower extremities with and without compression, augmentation and duplex. Color flow and spectral Doppler with waveform analysis performed. FINDINGS: Exam includes the common femoral, femoral, popliteal veins as well as segmentally visualized deep calf veins and greater saphenous vein. RIGHT: No deep vein thrombosis. No superficial thrombophlebitis. No popliteal cyst. LEFT: No deep vein thrombosis. No superficial thrombophlebitis. No popliteal cyst.     IMPRESSION: 1.  No deep venous thrombosis in the bilateral lower extremities.

## 2024-04-18 NOTE — PHARMACY-CONSULT NOTE
Pharmacy Consultation     The pharmacist was consulted to assess the patient's current medications for potential contributions to their visual hallucinations.    Findings:    Upon thorough review of medications prescribed to the patient since admission, the following are identified as potential contributors for the visual phenomena:     Ciprofloxacin   Ciprofloxacin, a fluoroquinolone, has been associated with various psychiatric and neurologic effects. These include dizziness, restlessness, confusion, agitation, insomnia, and drowsiness. Less common but more severe reactions such as delusions, hallucinations and seizures have also been reported. ([facility], 2010)     Famotidine   Famotidine side effects may include anxiety, depression, drowsiness, fatigue, hallucination, insomnia, seizure (von Einsilda 2002), taste disorder    Furosemide  The frequency of ocular hallucinations is not specified     Metoprolol   The frequency of ocular hallucinations is not specified     Metronidazole   Metronidazole has been linked to various CNS effects, including confusion (Kevan 2011), dysarthria (Boot 2017), encephalopathy (Bud 2016), rayshawn (Reina 2021), neurocerebellar toxicity (Gabino 2008), paresthesia (So 2021), peripheral neuropathy (Cruz 2018), seizure (Jp 2012), and vertigo (Rachel 2013)    Ocular Side Effects:  All of the above medications have been reported to cause ocular side effects, such as seeing shimmers or halos around objects. Scintillating phenomena, characterized by a luminous appearance with a jagged outline, are also commonly observed.     Conclusion:  These findings suggest that the patient's acute onset of visual hallucinations may be influenced by medication regimen initiated during the current hospital stay. Further risk and benefit evaluation, along with possible adjustment of the medication plan in collaboration with the team, may be considered.     No Barrientos, DorieD.

## 2024-04-18 NOTE — PROGRESS NOTES
Care Management Follow Up    Length of Stay (days): 5    Expected Discharge Date: 04/19/2024     Concerns to be Addressed:     discharge planning   Patient plan of care discussed at interdisciplinary rounds: Yes    Anticipated Discharge Disposition:  per family      Anticipated Discharge Services:  per family   Anticipated Discharge DME:  none    Patient/family educated on Medicare website which has current facility and service quality ratings:  yes  Education Provided on the Discharge Plan:  yes  Patient/Family in Agreement with the Plan:  yes    Referrals Placed by CM/SW:  none  Private pay costs discussed: Not applicable    Additional Information:  SW received call from patient relations stating that patient's daughter would like to speak with her, and that patient is agreeable to SW speaking with the daughter.     SW called patient's daughter, Tiny, to discuss discharge planning. Tiny asked if her mom could get a HHA to assist with making meals. SW educated Tiny on HHA vs PCA and how to get PCA; either private pay or waivered services. Tiny requested I speak with patient's spouse about waivers. Tiny stated that they did not want TCU. SVEN discussed current recs for TCU and patient's assist needs. Tiny questioned why she wasn't moving well. SVEN educated her on deconditioning, muscle loss, and weakness from illness/less movement. Tiny states that people do go to help, but that is more assist then she has available. Tiny reports she will speak with her dad.     JOE Chung

## 2024-04-18 NOTE — PROGRESS NOTES
[facility]    Medicine Progress Note - Hospitalist Service    Date of Admission:  4/13/2024    Assessment & Plan   73-year-old female with history of of chronic respiratory failure, NELLIE/OHS, CKD, CHF who was admitted with sepsis due to acute diverticulitis and acute on chronic respiratory failure.    #Acute diverticulitis  --CT shows sigmoid diverticulitis, no abscess, stool throughout colon  --BCx no growth so far  --Hemodynamically stable, WBC count significantly improved  -- On 4/18, advanced diet to low fiber diet.  --IV Cipro and Flagyl, switch to PO on 4/16.  However, concern for confusion with antibiotics and changed to Augmentin on 4/18.  Appreciated ID input    #Constipation;  -- Admission CT imaging reported a stool throughout the colon  Patient reports last BM was 5 days prior to admission  -- Improved with bowel regimen.  Continue Colace    #Acute on chronic hypoxic respiratory failure;  #NELLIE, OHS on BiPAP/CPAP and 3L NC at home;  #Severe obesity;  --No wheezing on exam  --BiPAP/CPAP at night and when taking naps during the daytime  --Incentive spirometry, flutter valve.  As needed neb.  Ambulate as able.  --Appreciated pulmonary input    #Acute on chronic HFpEF;  # Essential hypertension;  --ASA, beta-blocker  --IV diuretics discontinued, resumed home Lasix at 40 mg daily on 4/18.  -- Monitor respiratory status and volume status closely.    #CKD stage III;  #Mild hyperkalemia;  --Renal function seems fairly at baseline.  Low potassium diet.  Monitor labs closely    #Physical deconditioning;  -- At baseline patient reported not ambulating much.  Continue PT OT    # Visual hallucination;  -- Nonfocal neurological exam.  ABG fairly unremarkable given her history of NELLIE and OHS.  CT head negative for acute intracranial process  -- Discontinued narcotic pain medications  --Patient reported that she recently lost her brother and a lot of stress, complaining of visual oxygenation and  "intermittent confusion.  Psychiatry consulted  -- Continue neurocheck    #Candida intertrigo;  -- Continue local care and antifungal treatment.  Appreciated wound care nurse input            Diet: Snacks/Supplements Adult: Expedite Bottle; With Meals  Combination Diet 3 gm K Diet (low potassium); Low Fiber Diet; 2 gm NA Diet    DVT Prophylaxis: Enoxaparin (Lovenox) SQ  Rebollar Catheter: PRESENT, indication: Other (Comment) (Less mobility)  Lines: None     Cardiac Monitoring: None  Code Status: Full Code      Clinically Significant Risk Factors              # Hypoalbuminemia: Lowest albumin = 2.6 g/dL at 4/15/2024  4:18 AM, will monitor as appropriate     # Hypertension: Noted on problem list  # Chronic heart failure with preserved ejection fraction: heart failure noted on problem list and last echo with EF >50%       # Severe Obesity: Estimated body mass index is 49.91 kg/m  as calculated from the following:    Height as of this encounter: 1.397 m (4' 7\").    Weight as of this encounter: 97.4 kg (214 lb 11.7 oz).   # Moderate Malnutrition: based on nutrition assessment    # Financial/Environmental Concerns: none  # Asthma: noted on problem list        Disposition Plan     Medically Ready for Discharge: Anticipated Tomorrow             Onofre Harvey MD  Hospitalist Service  [facility]  Securely message with [employer] (more info)  Text page via [employer] Paging/Directory   ______________________________________________________________________    Interval History   Patient seen and examined.  Notes, labs, imaging report personally reviewed.  Patient denied feeling short of breath or chest pain.  Denied nausea or vomiting, reported good appetite, having bowel movements and abdominal pain almost resolved.  However, patient main concern is ongoing visual hallucination and intermittent confusion.  Patient seems answering questions appropriately.  Patient also reported a lot of stress due to losing her brother " recently.  Discussed with physical therapist, reported patient can stand on her feet but hard time ambulating and patient afraid of knee buckling up though that has not happened before therapy team.  Discussed with nursing staff.  Discussed with ID provider.  Called patient's  who did not  phone, left message with callback number.  Discussed with care manager/.      Physical Exam   Vital Signs: Temp: 98.8  F (37.1  C) Temp src: Oral BP: (!) 166/91 Pulse: 85   Resp: 18 SpO2: 96 % O2 Device: Nasal cannula Oxygen Delivery: 3 LPM  Weight: 214 lbs 11.65 oz      General: Not in obvious distress.  HEENT: Normal cephalic, supple neck  Chest: Clear to auscultation bilateral anteriorly, no wheezing  Heart: S1S2 normal, regular  Abdomen: Soft.  Obese, no significant tenderness appreciated  Extremities: + legs swelling  Neuro: alert and awake, follows simple commands appropriately, did not appreciate confusion during my visit, still complaining of intermittent visual hallucination, moves all extremities, able to lift both lower extremities against resistance and did not complain of back pain when lifting legs.      Medical Decision Making             Data     I have personally reviewed the following data over the past 24 hrs:    N/A  \   N/A   / N/A     132 (L) 96 (L) 27.2 (H) /  106 (H)   5.2 30 (H) 1.29 (H) \       Imaging results reviewed over the past 24 hrs:   Recent Results (from the past 24 hour(s))   CT Head w/o Contrast    Narrative    EXAM: CT HEAD W/O CONTRAST  LOCATION: [facility]  DATE: 4/17/2024    INDICATION: Confusion, hallucination.  COMPARISON: None.  TECHNIQUE: Routine CT Head without IV contrast. Multiplanar reformats. Dose reduction techniques were used.    FINDINGS:  INTRACRANIAL CONTENTS: No finding for intracranial hemorrhage, mass, or acute infarct. Ventricles are normal in size. Normal gray-white matter differentiation. No mass effect or midline  shift.    Cerebellar tonsils are normally positioned. Sella is unremarkable for technique. Corpus callosum is normally formed.    VISUALIZED ORBITS/SINUSES/MASTOIDS: Prior cataract surgeries. Both globes are borderline proptotic with prominence of the orbital fat. No abnormal enlarged extraocular muscles to strongly suggest thyroid related orbitopathy and findings are favored to   relate to body habitus. Dependent secretions right maxillary sinus suggesting active sinus disease. Trace ethmoid sinus mucosal thickening. No middle ear or mastoid effusion.    BONES/SOFT TISSUES: Calvarium is intact, without fracture or suspicious lytic or blastic foci. Benign hyperostosis frontalis internus.      Impression    IMPRESSION:  1.  Dependent air-fluid level/dependent secretions right maxillary sinus compatible with active sinus disease.    2.  No finding for intracranial hemorrhage, mass, or acute infarct.

## 2024-04-18 NOTE — PLAN OF CARE
"  Problem: Adult Inpatient Plan of Care  Goal: Plan of Care Review  Description: The Plan of Care Review/Shift note should be completed every shift.  The Outcome Evaluation is a brief statement about your assessment that the patient is improving, declining, or no change.  This information will be displayed automatically on your shift  note.  Outcome: Progressing  Goal: Patient-Specific Goal (Individualized)  Description: You can add care plan individualizations to a care plan. Examples of Individualization might be:  \"Parent requests to be called daily at 9am for status\", \"I have a hard time hearing out of my right ear\", or \"Do not touch me to wake me up as it startles  me\".  Outcome: Progressing  Goal: Absence of Hospital-Acquired Illness or Injury  Outcome: Progressing  Intervention: Identify and Manage Fall Risk  Recent Flowsheet Documentation  Taken 4/18/2024 0902 by Gabby Posadas, RN  Safety Promotion/Fall Prevention:   clutter free environment maintained   lighting adjusted   increase visualization of patient   patient and family education   room door open   room near nurse's station   safety round/check completed   nonskid shoes/slippers when out of bed   activity supervised   room organization consistent   mobility aid in reach   increased rounding and observation  Intervention: Prevent Skin Injury  Recent Flowsheet Documentation  Taken 4/18/2024 0902 by Gabby Posadas, RN  Body Position:   right   turned   side-lying 30 degrees  Skin Protection:   adhesive use limited   drying agents applied  Device Skin Pressure Protection: absorbent pad utilized/changed  Intervention: Prevent and Manage VTE (Venous Thromboembolism) Risk  Recent Flowsheet Documentation  Taken 4/18/2024 0902 by Gabby Posadas, RN  VTE Prevention/Management: foot pump device on  Intervention: Prevent Infection  Recent Flowsheet Documentation  Taken 4/18/2024 0902 by Gabby Posadas, RN  Infection Prevention:   hand hygiene " promoted   rest/sleep promoted   single patient room provided  Goal: Optimal Comfort and Wellbeing  Outcome: Progressing  Intervention: Provide Person-Centered Care  Recent Flowsheet Documentation  Taken 4/18/2024 0902 by Gabby Posadas, RN  Trust Relationship/Rapport:   care explained   choices provided   questions encouraged   reassurance provided   thoughts/feelings acknowledged   Goal Outcome Evaluation:  Still having hallucinations there is a consult to Psychiatrist for the above Symptoms was up to commode and to the recliner with a lift device .Been requesting the Expedite drink from The nutrition services for the last 3 days to no avail Dr ORTIZ informed called Nutritionist and was told that she will reach out to the Nutrition services and will let the unit know .

## 2024-04-18 NOTE — PROGRESS NOTES
CLINICAL NUTRITION SERVICES NOTE    Diet: 3 gm K, 2 gm Na  Supplement: Expedite daily with breakfast to promote wound healing. Pt's Expedite was delivered today this was confirmed earlier by another RD who checked pt's room.  Wounds healing per nursing notes.  Intake: Pt is eating 100% of her meals per pt and flow sheets.  Pt reports she is disoriented, and was a little forgetful during our brief conversation.  We talked about where she is and the time.  She knows she is in the hospital. Pt did not seem to be in any distress and thanked writer.  Currently not appropriate for diet education.  Will continue to monitor.

## 2024-04-19 ENCOUNTER — APPOINTMENT (OUTPATIENT)
Dept: MRI IMAGING | Facility: HOSPITAL | Age: 74
DRG: 871 | End: 2024-04-19
Attending: INTERNAL MEDICINE
Payer: MEDICARE

## 2024-04-19 ENCOUNTER — APPOINTMENT (OUTPATIENT)
Dept: OCCUPATIONAL THERAPY | Facility: HOSPITAL | Age: 74
DRG: 871 | End: 2024-04-19
Payer: MEDICARE

## 2024-04-19 ENCOUNTER — APPOINTMENT (OUTPATIENT)
Dept: PHYSICAL THERAPY | Facility: HOSPITAL | Age: 74
DRG: 871 | End: 2024-04-19
Payer: MEDICARE

## 2024-04-19 LAB
ANION GAP SERPL CALCULATED.3IONS-SCNC: 7 MMOL/L (ref 7–15)
BUN SERPL-MCNC: 33.6 MG/DL (ref 8–23)
CALCIUM SERPL-MCNC: 9.5 MG/DL (ref 8.8–10.2)
CHLORIDE SERPL-SCNC: 97 MMOL/L (ref 98–107)
CREAT SERPL-MCNC: 1.14 MG/DL (ref 0.51–0.95)
DEPRECATED HCO3 PLAS-SCNC: 32 MMOL/L (ref 22–29)
EGFRCR SERPLBLD CKD-EPI 2021: 51 ML/MIN/1.73M2
GLUCOSE BLDC GLUCOMTR-MCNC: 147 MG/DL (ref 70–99)
GLUCOSE BLDC GLUCOMTR-MCNC: 180 MG/DL (ref 70–99)
GLUCOSE SERPL-MCNC: 148 MG/DL (ref 70–99)
PLATELET # BLD AUTO: 404 10E3/UL (ref 150–450)
POTASSIUM SERPL-SCNC: 5.2 MMOL/L (ref 3.4–5.3)
SODIUM SERPL-SCNC: 136 MMOL/L (ref 135–145)

## 2024-04-19 PROCEDURE — 99232 SBSQ HOSP IP/OBS MODERATE 35: CPT | Performed by: INTERNAL MEDICINE

## 2024-04-19 PROCEDURE — 72148 MRI LUMBAR SPINE W/O DYE: CPT | Mod: ME

## 2024-04-19 PROCEDURE — 250N000011 HC RX IP 250 OP 636: Performed by: INTERNAL MEDICINE

## 2024-04-19 PROCEDURE — 999N000156 HC STATISTIC RCP CONSULT EA 30 MIN

## 2024-04-19 PROCEDURE — 97110 THERAPEUTIC EXERCISES: CPT | Mod: GP | Performed by: PHYSICAL THERAPIST

## 2024-04-19 PROCEDURE — 250N000013 HC RX MED GY IP 250 OP 250 PS 637: Performed by: HOSPITALIST

## 2024-04-19 PROCEDURE — 120N000004 HC R&B MS OVERFLOW

## 2024-04-19 PROCEDURE — 36415 COLL VENOUS BLD VENIPUNCTURE: CPT | Performed by: INTERNAL MEDICINE

## 2024-04-19 PROCEDURE — 250N000013 HC RX MED GY IP 250 OP 250 PS 637

## 2024-04-19 PROCEDURE — 94660 CPAP INITIATION&MGMT: CPT

## 2024-04-19 PROCEDURE — 250N000013 HC RX MED GY IP 250 OP 250 PS 637: Performed by: INTERNAL MEDICINE

## 2024-04-19 PROCEDURE — 97535 SELF CARE MNGMENT TRAINING: CPT | Mod: GO

## 2024-04-19 PROCEDURE — 85049 AUTOMATED PLATELET COUNT: CPT | Performed by: INTERNAL MEDICINE

## 2024-04-19 PROCEDURE — 80048 BASIC METABOLIC PNL TOTAL CA: CPT | Performed by: INTERNAL MEDICINE

## 2024-04-19 PROCEDURE — 97530 THERAPEUTIC ACTIVITIES: CPT | Mod: GP | Performed by: PHYSICAL THERAPIST

## 2024-04-19 PROCEDURE — 99222 1ST HOSP IP/OBS MODERATE 55: CPT | Performed by: REGISTERED NURSE

## 2024-04-19 PROCEDURE — 99232 SBSQ HOSP IP/OBS MODERATE 35: CPT

## 2024-04-19 PROCEDURE — 999N000157 HC STATISTIC RCP TIME EA 10 MIN

## 2024-04-19 RX ORDER — DULOXETIN HYDROCHLORIDE 30 MG/1
30 CAPSULE, DELAYED RELEASE ORAL DAILY
Status: DISCONTINUED | OUTPATIENT
Start: 2024-04-20 | End: 2024-04-20

## 2024-04-19 RX ADMIN — FUROSEMIDE 40 MG: 20 TABLET ORAL at 08:17

## 2024-04-19 RX ADMIN — ASPIRIN 81 MG CHEWABLE TABLET 81 MG: 81 TABLET CHEWABLE at 08:17

## 2024-04-19 RX ADMIN — DOCUSATE SODIUM 200 MG: 100 CAPSULE, LIQUID FILLED ORAL at 20:58

## 2024-04-19 RX ADMIN — Medication 60 ML: at 09:03

## 2024-04-19 RX ADMIN — MONTELUKAST 10 MG: 10 TABLET, FILM COATED ORAL at 20:58

## 2024-04-19 RX ADMIN — AMOXICILLIN AND CLAVULANATE POTASSIUM 1 TABLET: 875; 125 TABLET, FILM COATED ORAL at 20:58

## 2024-04-19 RX ADMIN — ACETAMINOPHEN 650 MG: 325 TABLET ORAL at 21:22

## 2024-04-19 RX ADMIN — DOCUSATE SODIUM 200 MG: 100 CAPSULE, LIQUID FILLED ORAL at 08:17

## 2024-04-19 RX ADMIN — SODIUM ZIRCONIUM CYCLOSILICATE 5 G: 5 POWDER, FOR SUSPENSION ORAL at 11:04

## 2024-04-19 RX ADMIN — MICONAZOLE NITRATE: 20 POWDER TOPICAL at 20:59

## 2024-04-19 RX ADMIN — MICONAZOLE NITRATE ANTIFUNGAL POWDER: 2 POWDER TOPICAL at 21:21

## 2024-04-19 RX ADMIN — ANORECTAL OINTMENT: 15.7; .44; 24; 20.6 OINTMENT TOPICAL at 14:10

## 2024-04-19 RX ADMIN — ENOXAPARIN SODIUM 40 MG: 40 INJECTION SUBCUTANEOUS at 17:53

## 2024-04-19 RX ADMIN — DULOXETINE HYDROCHLORIDE 30 MG: 30 CAPSULE, DELAYED RELEASE ORAL at 08:17

## 2024-04-19 RX ADMIN — HYDROXYCHLOROQUINE SULFATE 200 MG: 200 TABLET, FILM COATED ORAL at 08:18

## 2024-04-19 RX ADMIN — MICONAZOLE NITRATE ANTIFUNGAL POWDER: 2 POWDER TOPICAL at 08:20

## 2024-04-19 RX ADMIN — AMOXICILLIN AND CLAVULANATE POTASSIUM 1 TABLET: 875; 125 TABLET, FILM COATED ORAL at 08:17

## 2024-04-19 RX ADMIN — ANORECTAL OINTMENT: 15.7; .44; 24; 20.6 OINTMENT TOPICAL at 20:58

## 2024-04-19 RX ADMIN — MODAFINIL 200 MG: 100 TABLET ORAL at 08:17

## 2024-04-19 RX ADMIN — MICONAZOLE NITRATE: 20 POWDER TOPICAL at 08:18

## 2024-04-19 RX ADMIN — METOPROLOL SUCCINATE 50 MG: 50 TABLET, EXTENDED RELEASE ORAL at 08:17

## 2024-04-19 RX ADMIN — ANORECTAL OINTMENT: 15.7; .44; 24; 20.6 OINTMENT TOPICAL at 08:18

## 2024-04-19 ASSESSMENT — ACTIVITIES OF DAILY LIVING (ADL)
ADLS_ACUITY_SCORE: 48
ADLS_ACUITY_SCORE: 44
ADLS_ACUITY_SCORE: 48

## 2024-04-19 NOTE — PLAN OF CARE
Problem: Gas Exchange Impaired  Goal: Optimal Gas Exchange  Outcome: Progressing  Intervention: Optimize Oxygenation and Ventilation  Recent Flowsheet Documentation  Taken 4/19/2024 1600 by Deneen Parsons RN  Head of Bed (HOB) Positioning: HOB at 30 degrees  Taken 4/19/2024 1000 by Deneen Parsons RN  Head of Bed (HOB) Positioning: HOB at 30-45 degrees  Taken 4/19/2024 0800 by Deneen Parsons RN  Head of Bed (HOB) Positioning: HOB at 30 degrees     Problem: Skin Injury Risk Increased  Goal: Skin Health and Integrity  Intervention: Optimize Skin Protection  Recent Flowsheet Documentation  Taken 4/19/2024 1600 by Deneen Parsons RN  Activity Management:   other (see comments)   activity adjusted per tolerance  Head of Bed (HOB) Positioning: HOB at 30 degrees  Taken 4/19/2024 1000 by Deneen Parsons RN  Head of Bed (HOB) Positioning: HOB at 30-45 degrees  Taken 4/19/2024 0800 by Deneen Parsons RN  Activity Management:   other (see comments)   activity adjusted per tolerance  Head of Bed (HOB) Positioning: HOB at 30 degrees     Goal Outcome Evaluation:       Pt is A/O x 4. Assist to the chair with lelo lift. VSS. On NC 3L. Keeping SpO2 91-93%. Med-surg. Neuro checks done and intact. No acute pain or SOB reported today by pt. Rebollar catheter discontinued today. Pt voiding with purewick in place and maintaining good UOP. Has been sitting in the chair most of the shift. Position changed Q2 hours shifting weight with pillows. Dr. Harvey assessing at the bedside and pt reported chronic back pain. Lumbar MRI ordered. Pt now down at MRI.

## 2024-04-19 NOTE — PROGRESS NOTES
"Infectious Disease Progress Note    Assessment/Plan  Impression: Diverticulitis.  Seems to be responding to antimicrobial chemotherapy.     Confusion and hallucinations.  Sounds more like lucid hallucinations from description.  She does not seem confused when I meet with her in fact she greets me by name having known me for many years in the doctor's lounge.  She says symptoms have been going on for 6 months. Her  says for several weeks.      Concern for adverse drug reaction listed to dicloxacillin, but tells me she is tolerated Augmentin without difficulty.     Recommendations: I think we can safely switch her antimicrobial chemotherapy to Augmentin 875 mg p.o. twice daily for 10 days.     Would recommend reviewing other medications regarding possible etiologies for hallucinations.    Active Problems:    Diverticulitis    Acute and chronic respiratory failure with hypoxia (H)    Sepsis (H)    CHF (congestive heart failure) (H)    Pulmonary hypertension (H)      CALEB BACH MD  [phone]      Subjective  \"I think I threw up\"  Denies pain.  \"Things are a little shadowy\"    Objective    Vital signs in last 24 hours  Temp:  [98.2  F (36.8  C)-98.8  F (37.1  C)] 98.2  F (36.8  C)  Pulse:  [74-85] 74  Resp:  [18-24] 24  BP: (117-166)/(70-91) 165/82  FiO2 (%):  [30 %] 30 %  SpO2:  [95 %-99 %] 95 %  Wt Readings from Last 3 Encounters:   04/19/24 97.2 kg (214 lb 4.6 oz)   12/19/23 93.4 kg (206 lb)   12/07/23 92.7 kg (204 lb 5.9 oz)       Intake/Output last 3 shifts  I/O last 3 completed shifts:  In: 963 [P.O.:960; I.V.:3]  Out: 3600 [Urine:3600]  Intake/Output this shift:  I/O this shift:  In: 250 [P.O.:250]  Out: 800 [Urine:800]    Review of Systems   Pertinent items are noted in HPI., otherwise negative.    Physical Exam  General appearance: alert, appears stated age, and cooperative  Head: Normocephalic, without obvious abnormality, atraumatic  Eyes: Extraocular muscles intact, no icterus  Neck: no " "adenopathy and supple, symmetrical, trachea midline  Lungs:  Somewhat distant breath sounds, maybe a few crackles  Heart: Rate and rhythm, no murmur  Abdomen: soft, non-tender; bowel sounds normal; no masses,  no organomegaly  Extremities: Warm and dry  Skin: Skin color, texture, turgor normal. No rashes or lesions  Neurologic: Alert and responsive.  Recognizes me and addresses me by name Knows she is at [facility] \"[facility] favorite place\"    Pertinent Labs   Lab Results: personally reviewed.     Recent Labs   Lab 04/19/24  0452 04/17/24  0424 04/16/24  0449 04/15/24  0418   WBC  --  12.4* 13.9* 19.0*   HGB  --  9.7* 9.5* 10.4*   HCT  --  32.6* 31.0* 32.5*    393 365 369        Recent Labs   Lab 04/19/24 0452 04/18/24 0413 04/17/24 0424    132* 133*   CO2 32* 30* 28   BUN 33.6* 27.2* 28.8*     Creatinine   Date Value Ref Range Status   04/19/2024 1.14 (H) 0.51 - 0.95 mg/dL Final       No results found for: \"CULT\"  7-Day Micro Results       Collected Updated Procedure Result Status      04/13/2024 2050 04/18/2024 2316 Blood Culture Artery, Carotid, left [[id number]]   Blood from Artery, Carotid, left    Final result Component Value   Culture No Growth               04/13/2024 2050 04/18/2024 2316 Blood Culture Artery, Carotid, Right [[id number]]   Blood from Artery, Carotid, Right    Final result Component Value   Culture No Growth               04/13/2024 1857 04/13/2024 1946 Symptomatic Influenza A/B, RSV, & SARS-CoV2 PCR (COVID-19) Nasopharyngeal [[id number]]    Swab from Nasopharyngeal    Final result Component Value   Influenza A PCR Negative   Influenza B PCR Negative   RSV PCR Negative   SARS CoV2 PCR Negative   NEGATIVE: SARS-CoV-2 (COVID-19) RNA not detected, presumed negative.                    Pertinent Radiology   Radiology Results:   CT Head w/o Contrast    Result Date: 4/17/2024  EXAM: CT HEAD W/O CONTRAST LOCATION: [facility] DATE: 4/17/2024 INDICATION: " Confusion, hallucination. COMPARISON: None. TECHNIQUE: Routine CT Head without IV contrast. Multiplanar reformats. Dose reduction techniques were used. FINDINGS: INTRACRANIAL CONTENTS: No finding for intracranial hemorrhage, mass, or acute infarct. Ventricles are normal in size. Normal gray-white matter differentiation. No mass effect or midline shift. Cerebellar tonsils are normally positioned. Sella is unremarkable for technique. Corpus callosum is normally formed. VISUALIZED ORBITS/SINUSES/MASTOIDS: Prior cataract surgeries. Both globes are borderline proptotic with prominence of the orbital fat. No abnormal enlarged extraocular muscles to strongly suggest thyroid related orbitopathy and findings are favored to relate to body habitus. Dependent secretions right maxillary sinus suggesting active sinus disease. Trace ethmoid sinus mucosal thickening. No middle ear or mastoid effusion. BONES/SOFT TISSUES: Calvarium is intact, without fracture or suspicious lytic or blastic foci. Benign hyperostosis frontalis internus.     IMPRESSION: 1.  Dependent air-fluid level/dependent secretions right maxillary sinus compatible with active sinus disease. 2.  No finding for intracranial hemorrhage, mass, or acute infarct.    CT Abdomen Pelvis w Contrast    Result Date: 4/13/2024  EXAM: CT ABDOMEN PELVIS W CONTRAST LOCATION: [facility] DATE: 4/13/2024 INDICATION: Pain. COMPARISON: None. TECHNIQUE: CT scan of the abdomen and pelvis was performed following injection of IV contrast. Multiplanar reformats were obtained. Dose reduction techniques were used. CONTRAST: Isovue 370 90 mL FINDINGS: LOWER CHEST: Normal. HEPATOBILIARY: Cholecystectomy. The liver is unremarkable. No biliary dilatation. PANCREAS: Normal. SPLEEN: Normal. ADRENAL GLANDS: Normal. KIDNEYS/BLADDER: Bilateral renal cysts have benign features and require no additional follow-up. The urinary bladder is grossly unremarkable. BOWEL: Moderate  inflammatory changes noted in the left lower quadrant surrounding the upper sigmoid colon secondary to acute diverticulitis. Moderate formed stool is noted in the colon up to the level of the site of diverticulitis. This may be resulting in a delay in transit due to the wall thickening and inflammation. LYMPH NODES: No lymphadenopathy. VASCULATURE: Normal. PELVIC ORGANS: No lymphadenopathy. No pericardial effusion. MUSCULOSKELETAL: Degenerative changes lower thoracic and lumbar spine.     IMPRESSION: 1.  Acute diverticulitis involving the upper sigmoid colon. 2.  Moderate formed stool noted in the colon extending to the level of the site of diverticulitis. Distal to this the colon is decompressed. 3.  No evidence for free fluid or abscess. 4.  Prior cholecystectomy.    CT Chest Pulmonary Embolism w Contrast    Result Date: 4/13/2024  EXAM: CT CHEST PULMONARY EMBOLISM W CONTRAST LOCATION: [facility] DATE: 4/13/2024 INDICATION: SOB COMPARISON: CT 10/31/2022 TECHNIQUE: CT chest pulmonary angiogram during arterial phase injection of IV contrast. Multiplanar reformats and MIP reconstructions were performed. Dose reduction techniques were used. CONTRAST: Isovue 370 90 mL FINDINGS: ANGIOGRAM CHEST: Pulmonary arteries are normal caliber and negative for pulmonary emboli. The subsegmental pulmonary arteries in the lung bases cannot be adequately assessed due to respiratory motion artifact. Thoracic aorta is negative for dissection. No CT evidence of right heart strain. LUNGS AND PLEURA: Moderate respiratory motion artifact. Mild bibasilar atelectasis. No consolidation. MEDIASTINUM/AXILLAE: No lymphadenopathy. No pericardial effusion. CORONARY ARTERY CALCIFICATION: Moderate. UPPER ABDOMEN: Normal. MUSCULOSKELETAL: Surgical changes of the cervical spine.     IMPRESSION: 1.  Moderate respiratory motion artifact limits some detail of the subsegmental pulmonary arteries in the lung bases as well as  portions of the lungs. No pulmonary emboli appreciated. 2.  No evidence for pulmonary consolidation or pleural fluid.    US Lower Extremity Venous Duplex Bilateral    Result Date: 4/13/2024  EXAM: US LOWER EXTREMITY VENOUS DUPLEX BILATERAL LOCATION: [facility] DATE: 4/13/2024 INDICATION: pain, swelling COMPARISON: None. TECHNIQUE: Venous Duplex ultrasound of bilateral lower extremities with and without compression, augmentation and duplex. Color flow and spectral Doppler with waveform analysis performed. FINDINGS: Exam includes the common femoral, femoral, popliteal veins as well as segmentally visualized deep calf veins and greater saphenous vein. RIGHT: No deep vein thrombosis. No superficial thrombophlebitis. No popliteal cyst. LEFT: No deep vein thrombosis. No superficial thrombophlebitis. No popliteal cyst.     IMPRESSION: 1.  No deep venous thrombosis in the bilateral lower extremities.

## 2024-04-19 NOTE — PLAN OF CARE
Problem: Intestinal Obstruction  Goal: Optimal Bowel Function  Outcome: Progressing     Problem: Intestinal Obstruction  Goal: Absence of Infection Signs and Symptoms  Outcome: Progressing     Problem: Intestinal Obstruction  Goal: Optimal Pain Control and Function  Outcome: Progressing     Problem: Gas Exchange Impaired  Goal: Optimal Gas Exchange  Outcome: Progressing   Goal Outcome Evaluation:    Patient up to bedside commode at start of shift with heavy assist of 2. Had a medium BM. VSS. Using Bipap at noc at 30% Fi02 when sleeping. 3 L 02 through nasal canula when awake. Denies pain. Continues with po antibiotics. No confusion episode during the shift.

## 2024-04-19 NOTE — PROGRESS NOTES
[facility] [place]    Medicine Progress Note - Hospitalist Service    Date of Admission:  4/13/2024    Assessment & Plan   73-year-old female with history of of chronic respiratory failure, NELLIE/OHS, CKD, CHF who was admitted with sepsis due to acute diverticulitis and acute on chronic respiratory failure.    #Acute diverticulitis  --CT shows sigmoid diverticulitis, no abscess, stool throughout colon  --BCx no growth.  --Hemodynamically stable, WBC count significantly improved  -- On 4/18, advanced diet to low fiber diet.  --IV Cipro and Flagyl, switch to PO on 4/16.  However, patient having visual hallucination and confusion which could be secondary to antibiotics.  ID consulted, antibiotics Cipro and Flagyl discontinued and started on Augmentin on 4/18.    #Constipation;  -- Admission CT imaging reported a stool throughout the colon  Patient reports last BM was 5 days prior to admission  -- Improved with bowel regimen.  Continue Colace    #Acute on chronic hypoxic respiratory failure;  #NELLIE, OHS on BiPAP/CPAP and 3L NC at home;  #Severe obesity;  --No wheezing on exam  --BiPAP/CPAP at night and when taking naps during the daytime  --Incentive spirometry, flutter valve.  As needed neb.  Ambulate as able.  --Appreciated pulmonary input    #Acute on chronic HFpEF;  # Essential hypertension;  --ASA, beta-blocker  --IV diuretics discontinued, resumed home Lasix at 40 mg daily on 4/18.  -- Monitor respiratory status and volume status closely.    #CKD stage III;  #Mild hyperkalemia;  --Renal function seems fairly at baseline.  Low potassium diet.  Monitor labs closely    # Visual hallucination;  -- Nonfocal neurological exam.  ABG fairly unremarkable given her history of NELLIE and OHS.  CT head negative for acute intracranial process  -- Discontinued narcotic pain medications.  Ciprofloxacin discontinued.  --Patient reported that she recently lost her brother and a lot of stress  --Psychiatry consulted,  "recommended to wean off Cymbalta as may be contributing.  Cymbalta dose decreased, weaned off as able  -- Continue neurocheck    #Candida intertrigo;  #; Pressure injury on right heel plantar/lateral area  -- Continue local care and antifungal treatment.  Offload pressure as instructed by wound care nurse  --Appreciated wound care nurse input    #Physical deconditioning;  #History of chronic low back pain;  #History of bilateral lower extremity numbness  --Patient reported taking Cymbalta for lower extremity numbness, now concerned that may be contributing hallucination, confusion and weaning off.  Patient reported Neurontin did not work in the past.  -- At baseline patient reported not ambulating much.  Discussed with physical therapist, requiring assist of 2 to 3 staffs, requiring June lifting.  Patient reports history of chronic low back pain and intermittent radiculopathy since several years.  Discussed with patient and patient's , will get MRI lumbar spine        Diet: Snacks/Supplements Adult: Expedite Bottle; With Meals  Combination Diet 2 gm K Diet; Low Fiber Diet; 2 gm NA Diet    DVT Prophylaxis: Enoxaparin (Lovenox) SQ  Rebollar Catheter: Not present  Lines: None     Cardiac Monitoring: None  Code Status: Full Code      Clinically Significant Risk Factors              # Hypoalbuminemia: Lowest albumin = 2.6 g/dL at 4/15/2024  4:18 AM, will monitor as appropriate     # Hypertension: Noted on problem list  # Chronic heart failure with preserved ejection fraction: heart failure noted on problem list and last echo with EF >50%       # Severe Obesity: Estimated body mass index is 49.81 kg/m  as calculated from the following:    Height as of this encounter: 1.397 m (4' 7\").    Weight as of this encounter: 97.2 kg (214 lb 4.6 oz).   # Moderate Malnutrition: based on nutrition assessment    # Financial/Environmental Concerns: none  # Asthma: noted on problem list        Disposition Plan     Medically Ready " for Discharge: Anticipated Tomorrow             Onofre Harvey MD  Hospitalist Service  [facility]  Securely message with [employer] (more info)  Text page via [employer] Paging/Directory   ______________________________________________________________________    Interval History   Patient seen and examined.  Notes, labs, imaging report personally reviewed.  No acute issues reported overnight.  Patient reports hallucination improving.  She still feels confused.  However, asking questions appropriately, answering questions appropriately, recognizing staff she worked with them before at [facility], knowing where she is.  Patient reported abdominal pain improving.  Denied nausea or vomiting.  Denies shortness of breath.  Discussed with nursing staff multiple times per  Discussed with ID provider.  Discussed with psychiatry.  Discussed with patient's  at bedside in detail about ongoing medical issues, management.  Patient's  reports that prior to hospitalization patient requiring help at home to ambulate from bed to chair and reported sliding from the chair multiple times at home and needing help to get back to chair, also reported needing assistance to move her feet while getting into the car.  Also informed patient's  that patient is requiring around 2-3 staffs assist and now on higher lift.  Patient is afraid of knee buckling down and fall.    Physical Exam   Vital Signs: Temp: 98.2  F (36.8  C) Temp src: Axillary BP: (!) 165/82 Pulse: 74   Resp: 24 SpO2: 95 % O2 Device: Nasal cannula Oxygen Delivery: 3 LPM  Weight: 214 lbs 4.59 oz      General: Not in obvious distress.  HEENT: Normocephalic, supple neck  Chest: Clear to auscultation bilateral anteriorly, no wheezing  Heart: S1S2 normal, regular  Abdomen: Soft.  Obese, scattered superficial bruises mostly on right lateral lower abdomen from subcu Lovenox.  Candida intertrigo on groin seems improving  Extremities: + legs  swelling  Neuro: Awake, follows simple commands appropriately, moves all extremities, able to move both lower extremities against some resistance and did not complain of back pain, does report numbness on both.        Medical Decision Making             Data     I have personally reviewed the following data over the past 24 hrs:    N/A  \   N/A   / 404     136 97 (L) 33.6 (H) /  148 (H)   5.2 32 (H) 1.14 (H) \       Imaging results reviewed over the past 24 hrs:   No results found for this or any previous visit (from the past 24 hour(s)).

## 2024-04-19 NOTE — CONSULTS
"      Initial Psychiatric Consult   Consult date: April 19, 2024         Reason for Consult, requesting source:    Visual hallucinations, confusion, reports on stress from passing of her brother    Requesting source: Onofre Harvey    Labs and imaging reviewed. Provider contacted with recommendations.           HPI:   Psychiatry seeing patient today regarding visual hallucinations and confusion, as well as reports of stress from passing of her brother.      73-year-old female with history of of chronic respiratory failure, NELLIE/OHS, CKD, CHF who was admitted with sepsis due to acute diverticulitis and acute on chronic respiratory failure.     # Visual hallucination;  -- Nonfocal neurological exam.  ABG fairly unremarkable given her history of NELLIE and OHS.  CT head negative for acute intracranial process  -- Discontinued narcotic pain medications  --Patient reported that she recently lost her brother and a lot of stress, complaining of visual oxygenation and intermittent confusion.  Psychiatry consulted  -- Continue neurocheck    Today, patient reports that she feels \"ok\" and denies depression or anxiety. She does note some \"lack of danni\" recently, and some decrease in motivation. However, she does not believe that this is to the point where she needs medication management. Patient shares that she has had life changes, such as retiring four years ago, which she has no entirely adjusted to. Patient reports that her brother-in-law passed away in August 2023. She shares that she and her  have not had a chance to grief this loss, as they have been busy cleaning in preparation to sell their brother/brother-in-law's home.     Patient reports that hallucinations began when she started Cymbalta for pain. She shares that these hallucinations typically occur at night, and were for several days prior to her hospitalization. However, beyond these few days, she reports to not have experienced hallucinations. She shares that these " hallucinations have improved.         Past Psychiatric History:   Patient denies psychiatric history. Cymbalta was initiated for pain management.         Substance Use and History:     Tobacco Use    Smoking status: Never    Smokeless tobacco: Never   Substance Use Topics    Alcohol use: No           Past Medical History:   PAST MEDICAL HISTORY:   Past Medical History:   Diagnosis Date    Allergic rhinitis     Arthritis of back     CHF (congestive heart failure) (H)     Chronic kidney disease     stage 3     De Quervain's disease (tenosynovitis)     Fibromyalgia, primary     GERD (gastroesophageal reflux disease)     Hematuria     chronic    High cholesterol     History of blood clots 09/01/1970    DVT, from birth control, h/o left calf    Hyperlipidemia     Hypertension     Low back pain     Osteoporosis     Pickwickian syndrome (H)     Plantar fasciitis     Proteinuria     Proteinuria     chronic    Sleep apnea     CPAP    Uncomplicated asthma        PAST SURGICAL HISTORY:   Past Surgical History:   Procedure Laterality Date    CERVICAL FUSION N/A 4/27/2017    Procedure: ANTERIOR CERVICAL DECOMPRESSION FUSION C5-7 BILATERAL;  Surgeon: Basim Barone MD;  Location: [facility] [place];  Service:     CHOLECYSTECTOMY      open    COLONOSCOPY N/A 12/20/2019    Procedure: COLONOSCOPY WITH BIOPSIES;  Surgeon: Lucio Farr MD;  Location: [facility] [place];  Service: Gastroenterology    EYE SURGERY      HAND SURGERY Right     HYSTEROSCOPY DIAGNOSTIC      JOINT REPLACEMENT      bilateral TKA    KNEE SURGERY      RELEASE CARPAL TUNNEL Bilateral              Family History:   FAMILY HISTORY:   Family History   Problem Relation Age of Onset    Rheumatoid Arthritis Mother     Heart Disease Sister     Chronic Obstructive Pulmonary Disease Father            Social History:   Patient lives at home with her , Hugo. She has four children and 15 grand children.          Physical ROS:   The 10 point Review of  Systems is negative other than noted in the HPI or here.           Medications:     Current Facility-Administered Medications   Medication Dose Route Frequency Provider Last Rate Last Admin    amoxicillin-clavulanate (AUGMENTIN) 875-125 MG per tablet 1 tablet  1 tablet Oral Q12H [facility] (08/20) Ivan Palmer MD   1 tablet at 04/18/24 2041    aspirin (ASA) chewable tablet 81 mg  81 mg Oral Daily Carlos Bull MD   81 mg at 04/18/24 0903    docusate sodium (COLACE) capsule 200 mg  200 mg Oral BID Patrick Gonzales DO   200 mg at 04/18/24 2041    DULoxetine (CYMBALTA) DR capsule 30 mg  30 mg Oral BID Onofre Harvey MD   30 mg at 04/18/24 2044    enoxaparin ANTICOAGULANT (LOVENOX) injection 40 mg  40 mg Subcutaneous Q24H Hai Flores MD   40 mg at 04/18/24 1707    furosemide (LASIX) tablet 40 mg  40 mg Oral QAM Onofre Harvey MD   40 mg at 04/18/24 0903    hydroxychloroquine (PLAQUENIL) tablet 200 mg  200 mg Oral Daily Carlos Bull MD   200 mg at 04/18/24 0905    menthol-zinc oxide (CALMOSEPTINE) 0.44-20.6 % ointment OINT   Topical TID Onofre Harvey MD        metoprolol succinate ER (TOPROL XL) 24 hr tablet 50 mg  50 mg Oral Daily Onofre Harvey MD   50 mg at 04/18/24 0905    miconazole (MICATIN) 2 % powder   Topical BID Jennifer Gauthier MD   Given at 04/18/24 2042    miconazole (MICATIN) 2 % powder   Topical BID Carlos Bull MD   Given at 04/18/24 2042    modafinil (PROVIGIL) tablet 200 mg  200 mg Oral Daily Carlos Bull MD   200 mg at 04/18/24 0903    montelukast (SINGULAIR) tablet 10 mg  10 mg Oral QPM Onofre Harvey MD   10 mg at 04/18/24 2042    sodium chloride (PF) 0.9% PF flush 3 mL  3 mL Intracatheter Q8H Jennifer Gauthier MD   3 mL at 04/19/24 0539    sodium zirconium cyclosilicate (LOKELMA) packet 5 g  5 g Oral Once Onofre Harvey MD        wound support modular (EXPEDITE) bottle 60 mL  60 mL Oral Daily Patrick Gonzales, DO                  Allergies:     Allergies    Allergen Reactions    Latex Rash     Severe rash    Pregabalin Shortness Of Breath     Other Reaction(s): swelling and shortness of breath    Valsartan Unknown     Hyperkalemia    Colchicine Diarrhea    Dicloxacillin      Other Reaction(s): confusion, says she has tolerated augmentin without difficulty.     Gabapentin      Other Reaction(s): Sedation    Latex Unknown     Added based on information entered during case entry, please review and add reactions, type, and severity as needed    Other Drug Allergy (See Comments) Muscle Pain (Myalgia)     Tried lovastatin and simvastatin    Other Environmental Allergy Unknown     Coverlet bandaid , 3M product; rash    Statins-Hmg-Coa Reductase Inhibitors [Statins] Muscle Pain (Myalgia)     Tried lovastatin and simvastatin    Sulfa Antibiotics Swelling     Facial swelling      Adhesive Tape Rash          Labs:     Recent Results (from the past 48 hour(s))   Glucose by meter    Collection Time: 04/17/24  7:57 AM   Result Value Ref Range    GLUCOSE BY METER POCT 120 (H) 70 - 99 mg/dL   Blood gas arterial    Collection Time: 04/17/24  4:35 PM   Result Value Ref Range    pH Arterial 7.37 7.35 - 7.45    pCO2 Arterial 52 (H) 35 - 45 mm Hg    pO2 Arterial 123 (H) 80 - 105 mm Hg    FIO2 32     Bicarbonate Arterial 30 (H) 21 - 28 mmol/L    Base Excess/Deficit Arterial 4.5 (H) -3.0 - 3.0 mmol/L    Oxyhemoglobin Arterial 98 92 - 100 %    O2 Sat, Arterial 99.4 (H) 95.0 - 96.0 %   Glucose by meter    Collection Time: 04/17/24  5:44 PM   Result Value Ref Range    GLUCOSE BY METER POCT 111 (H) 70 - 99 mg/dL   Blood gas arterial    Collection Time: 04/18/24  2:12 AM   Result Value Ref Range    pH Arterial 7.37 7.35 - 7.45    pCO2 Arterial 55 (H) 35 - 45 mm Hg    pO2 Arterial 106 (H) 80 - 105 mm Hg    FIO2 30     Bicarbonate Arterial 32 (H) 21 - 28 mmol/L    Base Excess/Deficit Arterial 6.3 (H) -3.0 - 3.0 mmol/L    Oxyhemoglobin Arterial 99 92 - 100 %    O2 Sat, Arterial 99.6 (H) 95.0 -  "96.0 %   Basic metabolic panel    Collection Time: 04/18/24  4:13 AM   Result Value Ref Range    Sodium 132 (L) 135 - 145 mmol/L    Potassium 5.2 3.4 - 5.3 mmol/L    Chloride 96 (L) 98 - 107 mmol/L    Carbon Dioxide (CO2) 30 (H) 22 - 29 mmol/L    Anion Gap 6 (L) 7 - 15 mmol/L    Urea Nitrogen 27.2 (H) 8.0 - 23.0 mg/dL    Creatinine 1.29 (H) 0.51 - 0.95 mg/dL    GFR Estimate 44 (L) >60 mL/min/1.73m2    Calcium 9.4 8.8 - 10.2 mg/dL    Glucose 106 (H) 70 - 99 mg/dL   Glucose by meter    Collection Time: 04/19/24  3:54 AM   Result Value Ref Range    GLUCOSE BY METER POCT 147 (H) 70 - 99 mg/dL   Platelet count    Collection Time: 04/19/24  4:52 AM   Result Value Ref Range    Platelet Count 404 150 - 450 10e3/uL   Basic metabolic panel    Collection Time: 04/19/24  4:52 AM   Result Value Ref Range    Sodium 136 135 - 145 mmol/L    Potassium 5.2 3.4 - 5.3 mmol/L    Chloride 97 (L) 98 - 107 mmol/L    Carbon Dioxide (CO2) 32 (H) 22 - 29 mmol/L    Anion Gap 7 7 - 15 mmol/L    Urea Nitrogen 33.6 (H) 8.0 - 23.0 mg/dL    Creatinine 1.14 (H) 0.51 - 0.95 mg/dL    GFR Estimate 51 (L) >60 mL/min/1.73m2    Calcium 9.5 8.8 - 10.2 mg/dL    Glucose 148 (H) 70 - 99 mg/dL          Physical and Psychiatric Examination:     /87 (BP Location: Right arm)   Pulse 75   Temp 98.3  F (36.8  C) (Axillary)   Resp 22   Ht 1.397 m (4' 7\")   Wt 97.2 kg (214 lb 4.6 oz)   SpO2 97%   BMI 49.81 kg/m    Weight is 214 lbs 4.59 oz  Body mass index is 49.81 kg/m .    Physical Exam:  I have reviewed the physical exam as documented by by the medical team and agree with findings and assessment and have no additional findings to add at this time.         MSE:   Patient calm and cooperative. Alert and oriented x4. Denies AH/VH. Feels some \"lack of danni\" but denies depression or SI/SIB.              DSM-5 Diagnosis:   Unspecified neurocognitive disorder, delirium          Assessment:   Psychiatry seeing patient today regarding visual hallucinations, " "confusion and increased stress from the passing of her brother-in-law. Patient does not have prior mental health diagnoses, and denies symptoms consistent with depression or anxiety. She does note some \"lack of danni\" but appears motivated to make changes and participate in activities she finds enjoyable.    Patient reports her hallucination began when she started Cymbalta. As this was started for pain, it might be reasonable to find an alternative medication. Patient reports that her hallucination have improved somewhat in severity and have not occurred today. Patient reports family had some concerns for dementia, but there have been no other cognitive changes noted.  It would be reasonable to follow up with outpatient neurology if dementia continues to be of concern. Patient reports taking Vicodin for pain management on an outpatient basis, which could contribute to confusion and delirium.            Summary of Recommendations:   1) Could consider holding Cymbalta, given that patient reports hallucinations began when she started this medication    2) Provided worksheets for patient to access regarding goal-setting and mindfulness    3) Can refer to outpatient individual therapy, but patient declined at this time          Page me or re-consult psychiatry as needed.       Elza Deleon, JUNIOR, APRN  Consult/Liaison Psychiatry  [facility]   Contact information available via [facility] Paging/Directory.  If I am not available, please call [facility] intake ([phone])              "

## 2024-04-19 NOTE — PLAN OF CARE
Problem: Adult Inpatient Plan of Care  Goal: Absence of Hospital-Acquired Illness or Injury  Outcome: Progressing  Intervention: Identify and Manage Fall Risk  Recent Flowsheet Documentation  Taken 4/18/2024 1534 by Milli Candelaria RN  Safety Promotion/Fall Prevention:   clutter free environment maintained   nonskid shoes/slippers when out of bed   patient and family education   lighting adjusted   room door open  Intervention: Prevent Skin Injury  Recent Flowsheet Documentation  Taken 4/18/2024 1740 by Milli Candelaria RN  Body Position:   turned   left  Intervention: Prevent and Manage VTE (Venous Thromboembolism) Risk  Recent Flowsheet Documentation  Taken 4/18/2024 1534 by Milli Candelaria RN  VTE Prevention/Management: SCDs (sequential compression devices) on  Intervention: Prevent Infection  Recent Flowsheet Documentation  Taken 4/18/2024 1534 by Milli Candelaria RN  Infection Prevention:   rest/sleep promoted   hand hygiene promoted     Problem: Risk for Delirium  Goal: Improved Attention and Thought Clarity  Outcome: Progressing  Intervention: Maximize Cognitive Function  Recent Flowsheet Documentation  Taken 4/18/2024 1534 by Milli Candelaria RN  Sensory Stimulation Regulation:   lighting decreased   care clustered  Reorientation Measures: clock in view     Problem: Skin Injury Risk Increased  Goal: Skin Health and Integrity  Outcome: Progressing  Intervention: Plan: Nurse Driven Intervention: Moisture Management  Recent Flowsheet Documentation  Taken 4/18/2024 1534 by Milli Candelaria RN  Moisture Interventions:   Urinary collection device   Encourage regular toileting  Intervention: Plan: Nurse Driven Intervention: Friction and Shear  Recent Flowsheet Documentation  Taken 4/18/2024 1534 by Milli Candelaria RN  Friction/Shear Interventions: HOB 30 degrees or less  Intervention: Optimize Skin Protection  Recent Flowsheet Documentation  Taken 4/18/2024 1740 by Milli Candelaria RN  Activity Management:  activity adjusted per tolerance  Head of Bed (HOB) Positioning: HOB at 30 degrees     Goal Outcome Evaluation:  A/Ox 4. Neuro's intact. Denied hallucinations. Kept at 2L NC 02 on days, BIPAP at night. SBP initially 160', recheck at 110's. Denied pain. Diminished clear breath sounds, no wheezing. Rebollar intact, drained. Cleansed under breast/pannus, powder applied. Excoriated right buttock, mepilex applied. Will request Calmoseptine cream for skin protection. Small stool. Repositioned in bed. Had late lunch and dinner.

## 2024-04-19 NOTE — TREATMENT PLAN
[facility] Treatment Plan    Patient Score: 5  Patient Acuity: 5    Clinical Indication for Therapy: atelectasis    Therapy Ordered: Flutter valve QID    Assessment Summary: PT appears to be at baseline for 02 needs. She was able to demonstrate proper use of flutter valve and performs independently. Change to PRN    Dangelo Rosales, RT  4/19/2024

## 2024-04-19 NOTE — PROGRESS NOTES
Patient placed on BIPAP ST 16/6 R:16 30%. No adverse reactions or obstructions noted. RT will follow.    Theodore Good, RT

## 2024-04-20 ENCOUNTER — APPOINTMENT (OUTPATIENT)
Dept: MRI IMAGING | Facility: HOSPITAL | Age: 74
DRG: 871 | End: 2024-04-20
Attending: PHYSICIAN ASSISTANT
Payer: MEDICARE

## 2024-04-20 LAB
ANION GAP SERPL CALCULATED.3IONS-SCNC: 13 MMOL/L (ref 7–15)
BUN SERPL-MCNC: 36.3 MG/DL (ref 8–23)
CALCIUM SERPL-MCNC: 9.4 MG/DL (ref 8.8–10.2)
CHLORIDE SERPL-SCNC: 99 MMOL/L (ref 98–107)
CREAT SERPL-MCNC: 1.12 MG/DL (ref 0.51–0.95)
DEPRECATED HCO3 PLAS-SCNC: 28 MMOL/L (ref 22–29)
EGFRCR SERPLBLD CKD-EPI 2021: 52 ML/MIN/1.73M2
GLUCOSE BLDC GLUCOMTR-MCNC: 107 MG/DL (ref 70–99)
GLUCOSE BLDC GLUCOMTR-MCNC: 112 MG/DL (ref 70–99)
GLUCOSE BLDC GLUCOMTR-MCNC: 117 MG/DL (ref 70–99)
GLUCOSE BLDC GLUCOMTR-MCNC: 120 MG/DL (ref 70–99)
GLUCOSE SERPL-MCNC: 100 MG/DL (ref 70–99)
OSMOLALITY SERPL: 297 MMOL/KG (ref 280–301)
POTASSIUM SERPL-SCNC: 5.6 MMOL/L (ref 3.4–5.3)
POTASSIUM SERPL-SCNC: 5.6 MMOL/L (ref 3.4–5.3)
SODIUM SERPL-SCNC: 140 MMOL/L (ref 135–145)

## 2024-04-20 PROCEDURE — 84133 ASSAY OF URINE POTASSIUM: CPT | Performed by: INTERNAL MEDICINE

## 2024-04-20 PROCEDURE — 99222 1ST HOSP IP/OBS MODERATE 55: CPT | Performed by: INTERNAL MEDICINE

## 2024-04-20 PROCEDURE — 82043 UR ALBUMIN QUANTITATIVE: CPT | Performed by: INTERNAL MEDICINE

## 2024-04-20 PROCEDURE — 84132 ASSAY OF SERUM POTASSIUM: CPT | Performed by: INTERNAL MEDICINE

## 2024-04-20 PROCEDURE — 80048 BASIC METABOLIC PNL TOTAL CA: CPT | Performed by: INTERNAL MEDICINE

## 2024-04-20 PROCEDURE — 99232 SBSQ HOSP IP/OBS MODERATE 35: CPT | Performed by: INTERNAL MEDICINE

## 2024-04-20 PROCEDURE — 250N000013 HC RX MED GY IP 250 OP 250 PS 637: Performed by: INTERNAL MEDICINE

## 2024-04-20 PROCEDURE — 94660 CPAP INITIATION&MGMT: CPT

## 2024-04-20 PROCEDURE — 250N000011 HC RX IP 250 OP 636: Performed by: INTERNAL MEDICINE

## 2024-04-20 PROCEDURE — G1010 CDSM STANSON: HCPCS

## 2024-04-20 PROCEDURE — 250N000013 HC RX MED GY IP 250 OP 250 PS 637

## 2024-04-20 PROCEDURE — 999N000157 HC STATISTIC RCP TIME EA 10 MIN

## 2024-04-20 PROCEDURE — 210N000001 HC R&B IMCU HEART CARE

## 2024-04-20 PROCEDURE — 83930 ASSAY OF BLOOD OSMOLALITY: CPT | Performed by: INTERNAL MEDICINE

## 2024-04-20 PROCEDURE — 83935 ASSAY OF URINE OSMOLALITY: CPT | Performed by: INTERNAL MEDICINE

## 2024-04-20 PROCEDURE — 36415 COLL VENOUS BLD VENIPUNCTURE: CPT | Performed by: INTERNAL MEDICINE

## 2024-04-20 PROCEDURE — 250N000013 HC RX MED GY IP 250 OP 250 PS 637: Performed by: HOSPITALIST

## 2024-04-20 PROCEDURE — A9585 GADOBUTROL INJECTION: HCPCS | Performed by: INTERNAL MEDICINE

## 2024-04-20 PROCEDURE — 99222 1ST HOSP IP/OBS MODERATE 55: CPT | Performed by: PHYSICIAN ASSISTANT

## 2024-04-20 PROCEDURE — 255N000002 HC RX 255 OP 636: Performed by: INTERNAL MEDICINE

## 2024-04-20 RX ORDER — DULOXETIN HYDROCHLORIDE 30 MG/1
30 CAPSULE, DELAYED RELEASE ORAL 2 TIMES DAILY
Status: DISCONTINUED | OUTPATIENT
Start: 2024-04-20 | End: 2024-04-23 | Stop reason: HOSPADM

## 2024-04-20 RX ORDER — GADOBUTROL 604.72 MG/ML
9 INJECTION INTRAVENOUS ONCE
Status: COMPLETED | OUTPATIENT
Start: 2024-04-20 | End: 2024-04-20

## 2024-04-20 RX ADMIN — ACETAMINOPHEN 650 MG: 325 TABLET ORAL at 04:39

## 2024-04-20 RX ADMIN — DULOXETINE HYDROCHLORIDE 30 MG: 30 CAPSULE, DELAYED RELEASE ORAL at 09:19

## 2024-04-20 RX ADMIN — GADOBUTROL 9 ML: 604.72 INJECTION INTRAVENOUS at 13:57

## 2024-04-20 RX ADMIN — DOCUSATE SODIUM 200 MG: 100 CAPSULE, LIQUID FILLED ORAL at 09:17

## 2024-04-20 RX ADMIN — ANORECTAL OINTMENT: 15.7; .44; 24; 20.6 OINTMENT TOPICAL at 21:11

## 2024-04-20 RX ADMIN — ACETAMINOPHEN 650 MG: 325 TABLET ORAL at 09:13

## 2024-04-20 RX ADMIN — SODIUM ZIRCONIUM CYCLOSILICATE 10 G: 10 POWDER, FOR SUSPENSION ORAL at 12:07

## 2024-04-20 RX ADMIN — HYDROXYCHLOROQUINE SULFATE 200 MG: 200 TABLET, FILM COATED ORAL at 09:16

## 2024-04-20 RX ADMIN — AMOXICILLIN AND CLAVULANATE POTASSIUM 1 TABLET: 875; 125 TABLET, FILM COATED ORAL at 20:07

## 2024-04-20 RX ADMIN — ANORECTAL OINTMENT: 15.7; .44; 24; 20.6 OINTMENT TOPICAL at 14:42

## 2024-04-20 RX ADMIN — DOCUSATE SODIUM 200 MG: 100 CAPSULE, LIQUID FILLED ORAL at 20:07

## 2024-04-20 RX ADMIN — METOPROLOL SUCCINATE 50 MG: 50 TABLET, EXTENDED RELEASE ORAL at 09:18

## 2024-04-20 RX ADMIN — ENOXAPARIN SODIUM 40 MG: 40 INJECTION SUBCUTANEOUS at 17:23

## 2024-04-20 RX ADMIN — FUROSEMIDE 40 MG: 20 TABLET ORAL at 09:18

## 2024-04-20 RX ADMIN — AMOXICILLIN AND CLAVULANATE POTASSIUM 1 TABLET: 875; 125 TABLET, FILM COATED ORAL at 09:17

## 2024-04-20 RX ADMIN — MICONAZOLE NITRATE: 20 POWDER TOPICAL at 21:11

## 2024-04-20 RX ADMIN — MICONAZOLE NITRATE: 20 POWDER TOPICAL at 09:19

## 2024-04-20 RX ADMIN — ASPIRIN 81 MG CHEWABLE TABLET 81 MG: 81 TABLET CHEWABLE at 09:17

## 2024-04-20 RX ADMIN — DULOXETINE HYDROCHLORIDE 30 MG: 30 CAPSULE, DELAYED RELEASE ORAL at 21:08

## 2024-04-20 RX ADMIN — Medication 60 ML: at 09:37

## 2024-04-20 RX ADMIN — MONTELUKAST 10 MG: 10 TABLET, FILM COATED ORAL at 21:06

## 2024-04-20 RX ADMIN — MICONAZOLE NITRATE ANTIFUNGAL POWDER: 2 POWDER TOPICAL at 09:19

## 2024-04-20 RX ADMIN — MODAFINIL 200 MG: 100 TABLET ORAL at 09:17

## 2024-04-20 RX ADMIN — ANORECTAL OINTMENT: 15.7; .44; 24; 20.6 OINTMENT TOPICAL at 09:20

## 2024-04-20 ASSESSMENT — ACTIVITIES OF DAILY LIVING (ADL)
ADLS_ACUITY_SCORE: 46
ADLS_ACUITY_SCORE: 44
ADLS_ACUITY_SCORE: 46
ADLS_ACUITY_SCORE: 44
ADLS_ACUITY_SCORE: 44
ADLS_ACUITY_SCORE: 48
ADLS_ACUITY_SCORE: 44
ADLS_ACUITY_SCORE: 46
ADLS_ACUITY_SCORE: 46
ADLS_ACUITY_SCORE: 44
ADLS_ACUITY_SCORE: 43
ADLS_ACUITY_SCORE: 48
ADLS_ACUITY_SCORE: 46
ADLS_ACUITY_SCORE: 44
ADLS_ACUITY_SCORE: 44
ADLS_ACUITY_SCORE: 48
ADLS_ACUITY_SCORE: 46
ADLS_ACUITY_SCORE: 44
ADLS_ACUITY_SCORE: 46
ADLS_ACUITY_SCORE: 44
ADLS_ACUITY_SCORE: 48

## 2024-04-20 NOTE — PROGRESS NOTES
Infectious Disease Progress Note    Assessment/Plan  Impression: Diverticulitis.  Seems to be responding to antimicrobial chemotherapy. Active issue      Confusion and hallucinations.  Sounds more like lucid hallucinations from description.  She does not seem confused when I meet with her in fact she greets me by name having known me for many years in the doctor's lounge.  She says symptoms have been going on for 6 months. Her  says for several weeks.      Concern for adverse drug reaction listed to dicloxacillin, but tells me she is tolerated Augmentin without difficulty.     Recommendations: I think we can safely switch her antimicrobial chemotherapy to Augmentin 875 mg p.o. twice daily for 10 days.     Would recommend reviewing other medications regarding possible etiologies for hallucinations.    Please call with questions or change in clinical status over the weekend. ID will follow up next week.     Active Problems:    Diverticulitis    Acute and chronic respiratory failure with hypoxia (H)    Sepsis (H)    CHF (congestive heart failure) (H)    Pulmonary hypertension (H)      Marlene Cabello MD  [phone]      Subjective  First visit by me. Resting on BiPAP. Stays breathing and abdomen are doing better, tolerating antibiotics.     Objective    Vital signs in last 24 hours  Temp:  [97  F (36.1  C)-98.1  F (36.7  C)] 97  F (36.1  C)  Pulse:  [62-77] 76  Resp:  [18-22] 20  BP: (126-163)/(63-76) 163/63  SpO2:  [92 %-99 %] 99 %  Wt Readings from Last 3 Encounters:   04/20/24 93.7 kg (206 lb 9.1 oz)   12/19/23 93.4 kg (206 lb)   12/07/23 92.7 kg (204 lb 5.9 oz)       Intake/Output last 3 shifts  I/O last 3 completed shifts:  In: 950 [P.O.:950]  Out: 2150 [Urine:2150]  Intake/Output this shift:  No intake/output data recorded.    Review of Systems   Pertinent items are noted in HPI., otherwise negative.    Physical Exam  General appearance: alert, appears stated age, and cooperative  Head: Normocephalic,  "without obvious abnormality, atraumatic  Lungs:  Somewhat distant breath sounds, maybe a few crackles. On BiPAP  Heart: Rate and rhythm, no murmur  Abdomen: soft, non-tender; bowel sounds normal; no masses,  no organomegaly  Extremities: Warm and dry  Skin: Skin color, texture, turgor normal. No rashes or lesions  Neurologic: was sleeping but wakes up and answers questions.     Pertinent Labs   Lab Results: personally reviewed.     Recent Labs   Lab 04/19/24  0452 04/17/24 0424 04/16/24  0449 04/15/24  0418   WBC  --  12.4* 13.9* 19.0*   HGB  --  9.7* 9.5* 10.4*   HCT  --  32.6* 31.0* 32.5*    393 365 369        Recent Labs   Lab 04/20/24  0507 04/19/24 0452 04/18/24  0413    136 132*   CO2 28 32* 30*   BUN 36.3* 33.6* 27.2*     Creatinine   Date Value Ref Range Status   04/20/2024 1.12 (H) 0.51 - 0.95 mg/dL Final       No results found for: \"CULT\"  7-Day Micro Results       Collected Updated Procedure Result Status      04/13/2024 2050 04/18/2024 2316 Blood Culture Artery, Carotid, left [[id number]]   Blood from Artery, Carotid, left    Final result Component Value   Culture No Growth               04/13/2024 2050 04/18/2024 2316 Blood Culture Artery, Carotid, Right [[id number]]   Blood from Artery, Carotid, Right    Final result Component Value   Culture No Growth               04/13/2024 1857 04/13/2024 1946 Symptomatic Influenza A/B, RSV, & SARS-CoV2 PCR (COVID-19) Nasopharyngeal [[id number]]    Swab from Nasopharyngeal    Final result Component Value   Influenza A PCR Negative   Influenza B PCR Negative   RSV PCR Negative   SARS CoV2 PCR Negative   NEGATIVE: SARS-CoV-2 (COVID-19) RNA not detected, presumed negative.                    Pertinent Radiology   Radiology Results:   MR Lumbar Spine w/o Contrast    Result Date: 4/19/2024  EXAM: MR LUMBAR SPINE W/O CONTRAST LOCATION: [facility] DATE: 4/19/2024 INDICATION: Low back pain, hx of  lumbar spinal stenosis. " COMPARISON: None. TECHNIQUE: Routine Lumbar Spine MRI without IV contrast. FINDINGS: Transitional vertebral anatomy with a partially sacralized L5 vertebral body.  Careful attention to the numbering convention is recommended prior to any future percutaneous or surgical intervention. Retrolisthesis of T12 on L1 measuring 4 mm and L2 on L3  measuring 2 mm. Anterolisthesis of L4 and L5 measuring 3 mm and L5 on S1 measuring 2 mm. The vertebral bodies of the lumbar spine otherwise have normal stature, alignment, and marrow signal intensity. Normal distal spinal cord and cauda equina with conus medullaris at the inferior plate of L2. No extraspinal abnormality. Unremarkable visualized bony pelvis. T10-T11: Symmetric disc bulge and moderate facet arthropathy without significant spinal canal narrowing. Moderate bilateral neural foraminal narrowing. T11/T12: Symmetric disc bulge, facet arthropathy and ligamentum flavum thickening with moderate spinal canal narrowing. Severe left and moderate right neural foraminal narrowing. T12-L1: Symmetric disc bulge and moderate facet arthropathy with mild spinal canal narrowing. Severe right and mild left neural foraminal narrowing. L1-L2: Symmetric disc bulge and moderate facet arthropathy with mild spinal canal narrowing. Severe right and moderate left neural foraminal narrowing. L2-L3: Symmetric disc bulge, advanced facet arthropathy and ligamentum flavum thickening contributing to severe spinal canal narrowing. Severe bilateral neural foraminal narrowing. L3-L4: Symmetric disc bulge, advanced facet arthropathy and ligamentum flavum thickening contributing to severe spinal canal narrowing. Severe left and moderate right neural foraminal narrowing. L4-L5: Symmetric disc bulge, facet arthropathy and ligamentum flavum thickening contributing to severe spinal canal narrowing. Severe bilateral neural foraminal narrowing. L5-S1: Normal disc signal and disc height. No posterior disc bulge  or spinal canal narrowing. Mild bilateral neural foraminal narrowing. S1/S2: No posterior disc bulge or spinal canal narrowing. No neural foraminal narrowing.     IMPRESSION: 1.  Transitional vertebral anatomy with a partially sacralized L5 vertebral body.  Careful attention to the numbering convention is recommended prior to any future percutaneous or surgical intervention. 2.  Multilevel degenerative disc disease of the lumbar spine with disc height loss, most pronounced at the L2/L3 - L4/L5 levels where there is severe spinal canal narrowing. 3.  Multilevel posterolateral disc disease and facet arthropathy with severe multilevel neural foraminal narrowing as described above.    CT Head w/o Contrast    Result Date: 4/17/2024  EXAM: CT HEAD W/O CONTRAST LOCATION: [facility] DATE: 4/17/2024 INDICATION: Confusion, hallucination. COMPARISON: None. TECHNIQUE: Routine CT Head without IV contrast. Multiplanar reformats. Dose reduction techniques were used. FINDINGS: INTRACRANIAL CONTENTS: No finding for intracranial hemorrhage, mass, or acute infarct. Ventricles are normal in size. Normal gray-white matter differentiation. No mass effect or midline shift. Cerebellar tonsils are normally positioned. Sella is unremarkable for technique. Corpus callosum is normally formed. VISUALIZED ORBITS/SINUSES/MASTOIDS: Prior cataract surgeries. Both globes are borderline proptotic with prominence of the orbital fat. No abnormal enlarged extraocular muscles to strongly suggest thyroid related orbitopathy and findings are favored to relate to body habitus. Dependent secretions right maxillary sinus suggesting active sinus disease. Trace ethmoid sinus mucosal thickening. No middle ear or mastoid effusion. BONES/SOFT TISSUES: Calvarium is intact, without fracture or suspicious lytic or blastic foci. Benign hyperostosis frontalis internus.     IMPRESSION: 1.  Dependent air-fluid level/dependent secretions right  maxillary sinus compatible with active sinus disease. 2.  No finding for intracranial hemorrhage, mass, or acute infarct.    CT Abdomen Pelvis w Contrast    Result Date: 4/13/2024  EXAM: CT ABDOMEN PELVIS W CONTRAST LOCATION: [facility] DATE: 4/13/2024 INDICATION: Pain. COMPARISON: None. TECHNIQUE: CT scan of the abdomen and pelvis was performed following injection of IV contrast. Multiplanar reformats were obtained. Dose reduction techniques were used. CONTRAST: Isovue 370 90 mL FINDINGS: LOWER CHEST: Normal. HEPATOBILIARY: Cholecystectomy. The liver is unremarkable. No biliary dilatation. PANCREAS: Normal. SPLEEN: Normal. ADRENAL GLANDS: Normal. KIDNEYS/BLADDER: Bilateral renal cysts have benign features and require no additional follow-up. The urinary bladder is grossly unremarkable. BOWEL: Moderate inflammatory changes noted in the left lower quadrant surrounding the upper sigmoid colon secondary to acute diverticulitis. Moderate formed stool is noted in the colon up to the level of the site of diverticulitis. This may be resulting in a delay in transit due to the wall thickening and inflammation. LYMPH NODES: No lymphadenopathy. VASCULATURE: Normal. PELVIC ORGANS: No lymphadenopathy. No pericardial effusion. MUSCULOSKELETAL: Degenerative changes lower thoracic and lumbar spine.     IMPRESSION: 1.  Acute diverticulitis involving the upper sigmoid colon. 2.  Moderate formed stool noted in the colon extending to the level of the site of diverticulitis. Distal to this the colon is decompressed. 3.  No evidence for free fluid or abscess. 4.  Prior cholecystectomy.    CT Chest Pulmonary Embolism w Contrast    Result Date: 4/13/2024  EXAM: CT CHEST PULMONARY EMBOLISM W CONTRAST LOCATION: [facility] DATE: 4/13/2024 INDICATION: SOB COMPARISON: CT 10/31/2022 TECHNIQUE: CT chest pulmonary angiogram during arterial phase injection of IV contrast. Multiplanar reformats and  MIP reconstructions were performed. Dose reduction techniques were used. CONTRAST: Isovue 370 90 mL FINDINGS: ANGIOGRAM CHEST: Pulmonary arteries are normal caliber and negative for pulmonary emboli. The subsegmental pulmonary arteries in the lung bases cannot be adequately assessed due to respiratory motion artifact. Thoracic aorta is negative for dissection. No CT evidence of right heart strain. LUNGS AND PLEURA: Moderate respiratory motion artifact. Mild bibasilar atelectasis. No consolidation. MEDIASTINUM/AXILLAE: No lymphadenopathy. No pericardial effusion. CORONARY ARTERY CALCIFICATION: Moderate. UPPER ABDOMEN: Normal. MUSCULOSKELETAL: Surgical changes of the cervical spine.     IMPRESSION: 1.  Moderate respiratory motion artifact limits some detail of the subsegmental pulmonary arteries in the lung bases as well as portions of the lungs. No pulmonary emboli appreciated. 2.  No evidence for pulmonary consolidation or pleural fluid.    US Lower Extremity Venous Duplex Bilateral    Result Date: 4/13/2024  EXAM: US LOWER EXTREMITY VENOUS DUPLEX BILATERAL LOCATION: [facility] DATE: 4/13/2024 INDICATION: pain, swelling COMPARISON: None. TECHNIQUE: Venous Duplex ultrasound of bilateral lower extremities with and without compression, augmentation and duplex. Color flow and spectral Doppler with waveform analysis performed. FINDINGS: Exam includes the common femoral, femoral, popliteal veins as well as segmentally visualized deep calf veins and greater saphenous vein. RIGHT: No deep vein thrombosis. No superficial thrombophlebitis. No popliteal cyst. LEFT: No deep vein thrombosis. No superficial thrombophlebitis. No popliteal cyst.     IMPRESSION: 1.  No deep venous thrombosis in the bilateral lower extremities.

## 2024-04-20 NOTE — PLAN OF CARE
Problem: Adult Inpatient Plan of Care  Goal: Absence of Hospital-Acquired Illness or Injury  Intervention: Prevent Skin Injury  Recent Flowsheet Documentation  Taken 4/20/2024 0434 by Myron Sidhu RN  Body Position:   right   turned   legs elevated  Taken 4/20/2024 0015 by Myron Sidhu RN  Body Position: turned  Skin Protection: drying agents applied  Device Skin Pressure Protection: absorbent pad utilized/changed  Taken 4/19/2024 2100 by Myron Sidhu RN  Body Position:   turned   right  Skin Protection: drying agents applied  Device Skin Pressure Protection: absorbent pad utilized/changed     Problem: Adult Inpatient Plan of Care  Goal: Absence of Hospital-Acquired Illness or Injury  Intervention: Prevent and Manage VTE (Venous Thromboembolism) Risk  Recent Flowsheet Documentation  Taken 4/20/2024 0434 by Myron Sidhu RN  VTE Prevention/Management: SCDs (sequential compression devices) on  Taken 4/20/2024 0015 by Myron Sidhu RN  VTE Prevention/Management: SCDs (sequential compression devices) on  Taken 4/19/2024 2100 by Myron Sidhu RN  VTE Prevention/Management: SCDs (sequential compression devices) on     Problem: Adult Inpatient Plan of Care  Goal: Optimal Comfort and Wellbeing  Intervention: Monitor Pain and Promote Comfort  Recent Flowsheet Documentation  Taken 4/20/2024 0524 by Myron Sidhu RN  Pain Management Interventions:   therapeutic presence   care clustered  Taken 4/20/2024 0439 by Myron Sidhu RN  Pain Management Interventions: medication (see MAR)  Taken 4/19/2024 2207 by Myron Sidhu RN  Pain Management Interventions:   emotional support   pillow support provided  Taken 4/19/2024 2056 by Myron Sidhu RN  Pain Management Interventions: medication (see MAR)     Problem: Risk for Delirium  Goal: Improved Behavioral Control  Intervention: Prevent and Manage Agitation  Recent Flowsheet Documentation  Taken 4/20/2024 0015 by Myron Sidhu  RN  Environment Familiarity/Consistency: daily routine followed  Taken 4/19/2024 2100 by Myron Sidhu RN  Environment Familiarity/Consistency: daily routine followed    Pt alert and oriented x4, vitals stable through the night, reported pain on both lower extremities, tylenol provided as well as repositioning, pt reported relief. Pt on BiPAP overnight.  Patients abdominal folds and under breast reddened, powder applied.

## 2024-04-20 NOTE — CONSULTS
[facility]    Neurosurgery Consultation     Date of Admission:  4/13/2024  Date of Consult (When I saw the patient): 04/20/24      ASSESSMENT/PLAN:     # Progressive chronic low back pain   # Multi-level degenerative changes in the lumbar spine including mult-level facet arthropathy and severe spinal stenosis   # Chronic neck pain with hx of prior cervical fusion surgery in 2017 with documented pseudoarthrosis    -Cervical spine CT 12/2023 with loosening of cervical fusion hardware  # Frequent falls at home, generalized weakness   # Acute diverticulitis, receiving IV antibiotics   # Acute on chronic respiratory failure   # CKD stage III   # Morbid obesity       Ms. Rey is a pleasant 73 y.o. female currently admitted for acute diverticulitis and acute on chronic respiratory failure. She has a history of chronic low back pain that has been present for over 20 years, gradually worsening in the last 5 years as she has become more inactive since retiring. Neurosurgery Team was consulted this admission due to low back pain with spinal stenosis noted on lumbar spine imaging. She denies classic radicular symptoms into the extremities.     Lumbar spine MRI findings were reviewed with Ms. Rey. She is noted to have multi-level degenerative changes in her lumbar spine with severe spinal stenosis at the L2-3, L3-4 and L4-5 levels. On exam, there is mild right-sided dorsiflexion weakness that has been present for six months or so. She is noted to have generalized weakness of the upper extremities. Negative babinski signs bilaterally.     Given her multiple co-morbidities, we discuss today that surgery on the low back should remain a last resort option. She is agreeable to this  that she would like to avoid surgery if at all possible. She does describe increased urinary frequency (possibly due to her diuretics), increased imbalance and frequent falls. She has a hx of a failed cervical spinal fusion.  Additional cervical spinal imaging warranted.       Neurosurgery plan:   --Conservative measures for chronic low back pain (particularly in the setting of active infection)   --Cervical spine MRI W/WO to rule out progressive central stenosis/cord compression (in the setting of urinary concerns, increasing imbalance and frequent falls, diffuse weakness)   --Outpatient follow-up in the Neurosurgery Clinic regarding lumbar spine degenerative changes (pt agreeable)       Case reviewed with Dr. Shaw   Thank you for having us be involved in the care of Desirae Rey.       Cheli Saravia PA-C  [facility] Neurosurgery    Please page on-call service with additional questions or concerns.     ______________________________________________________________________    HPI:    Desirae Rey is a pleasant 73 year old female with a medical history notable for chronic respiratory failure, NELLIE/OHS, CKD, CHF. She has a distant history of cervical spinal fusion in 2017. She was admitted on 4/13/24 with sepsis due to acute diverticulitis and acute on chronic respiratory failure.    Neurosurgery consultation was requested for evaluation of low back pain with lower extremity radiculopathy.    Ms. Rey was seen at bedside this morning. She reports general physical decline over the last five years that is concerning to her. Since retiring from work in 2019, she has had gradual progression of her bilateral low back pain (pain in the low back has been present since 2008). She denies classic radicular symptoms. She has diffuse neuropathy throughout her feet. She denies focal weakness in the legs but feels that her legs are giving out on her easier. Mild dorsiflexion weakness is noted on the right on exam, she thinks this has been present for six months or so. She is falling more at home for no apparent reason. Increased imbalance is noted. She has noticed increased urinary urgency and frequency but notes she is on diuretic  "medication. No loss of bowl or bladder control. Posterior neck pain has been worsening in the last couple of months. She would like to avoid spine surgery if at all possible but is  worried about her declining physical health. She has a history of a cervical spine fusion several years ago that \"never healed\".       Past Medical History:   Diagnosis Date    Allergic rhinitis     Arthritis of back     CHF (congestive heart failure) (H)     Chronic kidney disease     stage 3     De Quervain's disease (tenosynovitis)     Fibromyalgia, primary     GERD (gastroesophageal reflux disease)     Hematuria     chronic    High cholesterol     History of blood clots 09/01/1970    DVT, from birth control, h/o left calf    Hyperlipidemia     Hypertension     Low back pain     Osteoporosis     Pickwickian syndrome (H)     Plantar fasciitis     Proteinuria     Proteinuria     chronic    Sleep apnea     CPAP    Uncomplicated asthma        Past Surgical History:   Procedure Laterality Date    CERVICAL FUSION N/A 4/27/2017    Procedure: ANTERIOR CERVICAL DECOMPRESSION FUSION C5-7 BILATERAL;  Surgeon: Basim Barone MD;  Location: [facility];  Service:     CHOLECYSTECTOMY      open    COLONOSCOPY N/A 12/20/2019    Procedure: COLONOSCOPY WITH BIOPSIES;  Surgeon: Lucio Farr MD;  Location: [facility];  Service: Gastroenterology    EYE SURGERY      HAND SURGERY Right     HYSTEROSCOPY DIAGNOSTIC      JOINT REPLACEMENT      bilateral TKA    KNEE SURGERY      RELEASE CARPAL TUNNEL Bilateral        Allergies   Allergen Reactions    Latex Rash     Severe rash    Pregabalin Shortness Of Breath     Other Reaction(s): swelling and shortness of breath    Valsartan Unknown     Hyperkalemia    Colchicine Diarrhea    Dicloxacillin      Other Reaction(s): confusion, says she has tolerated augmentin without difficulty.     Gabapentin      Other Reaction(s): Sedation    Latex Unknown     Added based on information entered " during case entry, please review and add reactions, type, and severity as needed    Other Drug Allergy (See Comments) Muscle Pain (Myalgia)     Tried lovastatin and simvastatin    Other Environmental Allergy Unknown     Coverlet bandaid , 3M product; rash    Statins-Hmg-Coa Reductase Inhibitors [Statins] Muscle Pain (Myalgia)     Tried lovastatin and simvastatin    Sulfa Antibiotics Swelling     Facial swelling      Adhesive Tape Rash       Social History     Tobacco Use    Smoking status: Never    Smokeless tobacco: Never   Substance Use Topics    Alcohol use: No       Family History   Problem Relation Age of Onset    Rheumatoid Arthritis Mother     Heart Disease Sister     Chronic Obstructive Pulmonary Disease Father        Scheduled Medications  Current Facility-Administered Medications   Medication Dose Route Frequency Provider Last Rate Last Admin    amoxicillin-clavulanate (AUGMENTIN) 875-125 MG per tablet 1 tablet  1 tablet Oral Q12H [facility] (08/20) Ivan Palmer MD   1 tablet at 04/20/24 0917    aspirin (ASA) chewable tablet 81 mg  81 mg Oral Daily Carlos Bull MD   81 mg at 04/20/24 0917    docusate sodium (COLACE) capsule 200 mg  200 mg Oral BID Patrick Gonzales DO   200 mg at 04/20/24 0917    DULoxetine (CYMBALTA) DR capsule 30 mg  30 mg Oral Daily Onofre Harvey MD   30 mg at 04/20/24 0919    enoxaparin ANTICOAGULANT (LOVENOX) injection 40 mg  40 mg Subcutaneous Q24H Hai Flores MD   40 mg at 04/19/24 1753    furosemide (LASIX) tablet 40 mg  40 mg Oral QAM Onofre Harvey MD   40 mg at 04/20/24 0918    hydroxychloroquine (PLAQUENIL) tablet 200 mg  200 mg Oral Daily Carlos Bull MD   200 mg at 04/20/24 0916    menthol-zinc oxide (CALMOSEPTINE) 0.44-20.6 % ointment OINT   Topical TID Onofre Harvey MD   Given at 04/20/24 0920    metoprolol succinate ER (TOPROL XL) 24 hr tablet 50 mg  50 mg Oral Daily Onofre Harvey MD   50 mg at 04/20/24 0918    miconazole (MICATIN) 2 % powder    "Topical BID Jennifer Gauthier MD   Given at 04/20/24 0919    miconazole (MICATIN) 2 % powder   Topical BID Carlos Bull MD   Given at 04/20/24 0919    modafinil (PROVIGIL) tablet 200 mg  200 mg Oral Daily Carlos Bull MD   200 mg at 04/20/24 0917    montelukast (SINGULAIR) tablet 10 mg  10 mg Oral QPM Onofre Harvey MD   10 mg at 04/19/24 2058    sodium chloride (PF) 0.9% PF flush 3 mL  3 mL Intracatheter Q8H Jennifer Gauthier MD   3 mL at 04/20/24 0648    sodium zirconium cyclosilicate (LOKELMA) packet 10 g  10 g Oral Daily Stephan Pickens MD        wound support modular (EXPEDITE) bottle 60 mL  60 mL Oral Daily Patrick Gonzales, DO   60 mL at 04/20/24 0937       Home Medications  Reviewed.       ROS: 10 point ROS neg other than the symptoms noted above in the HPI.    Vitals:  BP (!) 163/63   Pulse 76   Temp 97  F (36.1  C) (Axillary)   Resp 20   Ht 1.397 m (4' 7\")   Wt 93.7 kg (206 lb 9.1 oz)   SpO2 99%   BMI 48.01 kg/m    Body mass index is 48.01 kg/m .  I/O last 3 completed shifts:  In: 950 [P.O.:950]  Out: 2150 [Urine:2150]    EXAM:   Patient appears comfortable, conversational, and in no apparent distress.   Head: Normocephalic, without obvious abnormality, atraumatic, no facial asymmetry.   CN II-XII grossly intact, alert and appropriate with conversation and following commands.   No focal weakness but bilateral upper extremity strength diffusely diminished (4/5) with 3+/5 hand intrinsics strength on right. No focal upper extremity weakness. Negative hoffmans bilaterally.   Intact sensation throughout lower extremities (she describes diffuse neuropathy but no focal numbness)    Negative for pain with straight leg raise bilaterally .     LE muscle strength  Right  Left    Iliopsoas (hip flexion)  5/5  5/5    Quad (knee extension)  5/5  5/5    Hamstring (knee flexion)  5/5  5/5    Gastrocnemius (PF)  5/5  5/5    Tibialis Ant. (DF)  4/5 5/5    EHL  5/5 5/5      Negative Babinski " bilaterally. Negative for clonus.   Calves are soft and non-tender bilaterally.       IMAGING:     Lumbar spine MRI (4/19/24)   IMPRESSION:  1.  Transitional vertebral anatomy with a partially sacralized L5 vertebral body.  Careful attention to the numbering convention is recommended prior to any future percutaneous or surgical intervention.   2.  Multilevel degenerative disc disease of the lumbar spine with disc height loss, most pronounced at the L2/L3 - L4/L5 levels where there is severe spinal canal narrowing.  3.  Multilevel posterolateral disc disease and facet arthropathy with severe multilevel neural foraminal narrowing as described above

## 2024-04-20 NOTE — PROGRESS NOTES
Addendum - daughter Sharron called today (4/21/24) per patient's request with update on plan, she verbalized understanding.     Neurosurgery update     Cervical spine MRI without evidence of severe central stenosis or cord compression.     Our team will reach out to Ms. Rey to arrange outpatient follow-up with Dr. Shaw directly to further discuss possible surgical options for management of severe lumbar stenosis once her current infection resolves.     Neurosurgery will sign off, please reach out with any question or concerns.       Cheli Saravia PA-C   4/20/24    Plan made with Dr. Shaw

## 2024-04-20 NOTE — CONSULTS
RENAL CONSULT NOTE    REQUESTING PHYSICIAN: Dr. ORTIZ    REASON FOR CONSULT: recurrent hyperkalemia    ASSESSMENT/PLAN:  Hyperkalemia - recurrent, intermittent hyperkalemia for the last several years.  Previously saw Nephrology years ago and taken off ARB and not taking any longer.  Can be ween with heparin not on unfractionated heparin, we don't usually see Lovenox do this.  Acid base with chronic resp acidosis but pH suggests she's pretty well compensated.  Recently started on lasix. Does have some CKD but seems out of proportion to her GFR.  No other obvious meds. Says she's trying to watch her K intake.  Recs:  - etiology unclear to me, no real culprit meds, denies eating outside of her diet  - check urine lytes (K to Cr ratio, TTKG)  - change to 10g lokelma daily to stay out of trouble    CKD - creatinine ont he lower side of her baseline at 1.1, have seen her up to 1.4 at times.  Some AKIs in the past.  UA actually pretty clean other than some proteinuria which has never been quantified, will check    Diverticulitis - admitting diagnosis and improving     Chronic Resp Failure - OHS/NELLIE.  3L NC at home.  On bipap/CPAP.  Pulm saw earlier in the stay.  Now on some lower dose lasix    Chronic HFpEF - currently on oral lasix    Chronic back pain - Neurosurgery to see today      HPI: Mrs. Rey is a 73 year old with a past medical history significant for NELLIE, OHS, chronic resp failure, obesity, HLD, chronic pain, GERD and CKD who was admitted a week ago for diverticulitis and acute on chronic resp failure.  Has been treated for this and seems to be improving.  Has had recurrent hyperkalemia not responsive to a dose of Lokelma and low K diet and Nephrology consulted.      Discussed with patient.  Doesn't really remember previously having this issue.  Discussed she had more problems on ARB many years ago.  No longer on this. Not really on a culprit medication currently.  Acid/Base status looks okay    Discussed with  Dr. ORTIZ    REVIEW OF SYSTEMS: a 12 point review of systems was reviewed and negative other than noted in the HPI above.      Past Medical History:   Diagnosis Date    Allergic rhinitis     Arthritis of back     CHF (congestive heart failure) (H)     Chronic kidney disease     stage 3     De Quervain's disease (tenosynovitis)     Fibromyalgia, primary     GERD (gastroesophageal reflux disease)     Hematuria     chronic    High cholesterol     History of blood clots 09/01/1970    DVT, from birth control, h/o left calf    Hyperlipidemia     Hypertension     Low back pain     Osteoporosis     Pickwickian syndrome (H)     Plantar fasciitis     Proteinuria     Proteinuria     chronic    Sleep apnea     CPAP    Uncomplicated asthma        Current Facility-Administered Medications   Medication Dose Route Frequency Provider Last Rate Last Admin    acetaminophen (TYLENOL) tablet 650 mg  650 mg Oral Q6H PRN Onofre Ortiz MD   650 mg at 04/20/24 0913    albuterol (PROVENTIL HFA/VENTOLIN HFA) inhaler  2 puff Inhalation Q4H PRN Onofre Ortiz MD        amoxicillin-clavulanate (AUGMENTIN) 875-125 MG per tablet 1 tablet  1 tablet Oral Q12H [facility] (08/20) Ivan Palmer MD   1 tablet at 04/20/24 0917    aspirin (ASA) chewable tablet 81 mg  81 mg Oral Daily Carlos Bull MD   81 mg at 04/20/24 0917    calcium carbonate (TUMS) chewable tablet 1,000 mg  1,000 mg Oral 4x Daily PRN Jennifer Gauthier MD        glucose gel 15-30 g  15-30 g Oral Q15 Min PRN Hai Flores MD        Or    dextrose 50 % injection 25-50 mL  25-50 mL Intravenous Q15 Min PRN Hai Flores MD        Or    glucagon injection 1 mg  1 mg Subcutaneous Q15 Min PRN Hai Flores MD        docusate sodium (COLACE) capsule 200 mg  200 mg Oral BID Patrick Gonzales DO   200 mg at 04/20/24 0917    DULoxetine (CYMBALTA) DR capsule 30 mg  30 mg Oral Daily Onofre Ortiz MD   30 mg at 04/20/24 0919    enoxaparin ANTICOAGULANT (LOVENOX) injection 40 mg  40 mg  Subcutaneous Q24H Hai Flores MD   40 mg at 04/19/24 1753    furosemide (LASIX) tablet 40 mg  40 mg Oral QAM Onofre Harvey MD   40 mg at 04/20/24 0918    hydrALAZINE (APRESOLINE) injection 5 mg  5 mg Intravenous Q6H PRN Onofre Harvey MD        hydroxychloroquine (PLAQUENIL) tablet 200 mg  200 mg Oral Daily Carlos Bull MD   200 mg at 04/20/24 0916    ipratropium - albuterol 0.5 mg/2.5 mg/3 mL (DUONEB) neb solution 3 mL  3 mL Nebulization Q4H PRN Jennifer Gauthier MD        lidocaine (LMX4) cream   Topical Q1H PRN Jennifer Gauthier MD        lidocaine 1 % 0.1-1 mL  0.1-1 mL Other Q1H PRN Jennifer Gauthier MD        magnesium hydroxide (MILK OF MAGNESIA) suspension 30 mL  30 mL Oral Daily PRN Patrick Gonzales DO   30 mL at 04/14/24 1323    menthol-zinc oxide (CALMOSEPTINE) 0.44-20.6 % ointment OINT   Topical TID Onofre Harvey MD   Given at 04/20/24 0920    metoprolol succinate ER (TOPROL XL) 24 hr tablet 50 mg  50 mg Oral Daily Onofre Harvey MD   50 mg at 04/20/24 0918    miconazole (MICATIN) 2 % powder   Topical BID Jennifer Gauthier MD   Given at 04/20/24 0919    miconazole (MICATIN) 2 % powder   Topical BID Carlos Bull MD   Given at 04/20/24 0919    modafinil (PROVIGIL) tablet 200 mg  200 mg Oral Daily Carlos Bull MD   200 mg at 04/20/24 0917    montelukast (SINGULAIR) tablet 10 mg  10 mg Oral QPM Onofre Harvey MD   10 mg at 04/19/24 2058    naloxone (NARCAN) injection 0.2 mg  0.2 mg Intravenous Q2 Min PRN Hai Flores MD        Or    naloxone (NARCAN) injection 0.4 mg  0.4 mg Intravenous Q2 Min PRN Hai Flores MD        Or    naloxone (NARCAN) injection 0.2 mg  0.2 mg Intramuscular Q2 Min PRN Hai Flores MD        Or    naloxone (NARCAN) injection 0.4 mg  0.4 mg Intramuscular Q2 Min PRN Hai Flores MD        ondansetron (ZOFRAN ODT) ODT tab 4 mg  4 mg Oral Q6H PRN Jennifer Gauthier MD        Or    ondansetron (ZOFRAN) injection 4 mg  4 mg Intravenous Q6H PRN  Jennifer Gauthier MD   4 mg at 04/14/24 1519    polyethylene glycol (MIRALAX) Packet 17 g  17 g Oral BID PRN Carlos Bull MD   17 g at 04/18/24 0533    senna-docusate (SENOKOT-S/PERICOLACE) 8.6-50 MG per tablet 1 tablet  1 tablet Oral BID PRN Jennifer Gauthier MD        Or    senna-docusate (SENOKOT-S/PERICOLACE) 8.6-50 MG per tablet 2 tablet  2 tablet Oral BID PRN Jennifer Gauthier MD        sodium chloride (PF) 0.9% PF flush 3 mL  3 mL Intracatheter Q8H Jennifer Gauthier MD   3 mL at 04/20/24 0648    sodium chloride (PF) 0.9% PF flush 3 mL  3 mL Intracatheter q1 min prn Jennifer Gauthier MD        sodium zirconium cyclosilicate (LOKELMA) packet 10 g  10 g Oral Daily Stephan Pickens MD        wound support modular (EXPEDITE) bottle 60 mL  60 mL Oral Daily Patrick Gonzales DO   60 mL at 04/20/24 0937       No current outpatient medications on file.      ALLERGIES/SENSITIVITIES:  Allergies   Allergen Reactions    Latex Rash     Severe rash    Pregabalin Shortness Of Breath     Other Reaction(s): swelling and shortness of breath    Valsartan Unknown     Hyperkalemia    Colchicine Diarrhea    Dicloxacillin      Other Reaction(s): confusion, says she has tolerated augmentin without difficulty.     Gabapentin      Other Reaction(s): Sedation    Latex Unknown     Added based on information entered during case entry, please review and add reactions, type, and severity as needed    Other Drug Allergy (See Comments) Muscle Pain (Myalgia)     Tried lovastatin and simvastatin    Other Environmental Allergy Unknown     Coverlet bandaid , 3M product; rash    Statins-Hmg-Coa Reductase Inhibitors [Statins] Muscle Pain (Myalgia)     Tried lovastatin and simvastatin    Sulfa Antibiotics Swelling     Facial swelling      Adhesive Tape Rash     Social History     Tobacco Use    Smoking status: Never    Smokeless tobacco: Never   Vaping Use    Vaping status: Never Used   Substance Use Topics    Alcohol use: No    Drug use: No      I have reviewed this patient's family history and updated it with pertinent information if needed.  Family History   Problem Relation Age of Onset    Rheumatoid Arthritis Mother     Heart Disease Sister     Chronic Obstructive Pulmonary Disease Father          PHYSICAL EXAM:  Physical Exam   Temp: 97  F (36.1  C) Temp src: Axillary BP: (!) 163/63 Pulse: 76   Resp: 20 SpO2: 99 % O2 Device: BiPAP/CPAP Oxygen Delivery: 3 LPM  Vitals:    04/18/24 0420 04/19/24 0353 04/20/24 0431   Weight: 97.4 kg (214 lb 11.7 oz) 97.2 kg (214 lb 4.6 oz) 93.7 kg (206 lb 9.1 oz)     Vital Signs with Ranges  Temp:  [97  F (36.1  C)-98.1  F (36.7  C)] 97  F (36.1  C)  Pulse:  [62-77] 76  Resp:  [18-22] 20  BP: (126-163)/(63-76) 163/63  SpO2:  [92 %-99 %] 99 %  I/O last 3 completed shifts:  In: 950 [P.O.:950]  Out: 2150 [Urine:2150]    @TMAXR(24)@    Patient Vitals for the past 72 hrs:   Weight   04/20/24 0431 93.7 kg (206 lb 9.1 oz)   04/19/24 0353 97.2 kg (214 lb 4.6 oz)   04/18/24 0420 97.4 kg (214 lb 11.7 oz)       General - A & O x 3, NAD  HEENT - on NC o2  Neck - JVD hard to assess 2/2 body habitus  Respiratory - Lungs very distant, no crackles  Cardiovascular - AP RRR with murmur  Abdomen - soft, nontender, obese  Extremities - trace edema  Integumentary - intact, good turgor, no rash/lesions  Neurologic - grossly intact  Psych:  Judgement intact, affect WNL  :  purewick    Laboratory:     Recent Labs   Lab 04/19/24  0452 04/17/24  0424 04/16/24  0449 04/15/24  0418 04/14/24  0413 04/13/24  1831   WBC  --  12.4* 13.9* 19.0* 18.9* 21.2*   RBC  --  3.20* 3.07* 3.26* 3.35* 3.53*   HGB  --  9.7* 9.5* 10.4* 10.5* 11.1*   HCT  --  32.6* 31.0* 32.5* 32.8* 34.3*    393 365 369 374 426       Basic Metabolic Panel:  Recent Labs   Lab 04/20/24  0904 04/20/24  0507 04/20/24  0407 04/20/24  0011 04/19/24  2129 04/19/24  0452 04/19/24  0354 04/18/24  0413 04/17/24  0757 04/17/24  0424 04/16/24  0759 04/16/24  0449 04/15/24  0831  04/15/24  0418   NA  --  140  --   --   --  136  --  132*  --  133*  --  134*  --  136   POTASSIUM  --  5.6*  5.6*  --   --   --  5.2  --  5.2  --  5.1  --  5.0  --  5.0   CHLORIDE  --  99  --   --   --  97*  --  96*  --  97*  --  97*  --  99   CO2  --  28  --   --   --  32*  --  30*  --  28  --  28  --  25   BUN  --  36.3*  --   --   --  33.6*  --  27.2*  --  28.8*  --  28.4*  --  26.7*   CR  --  1.12*  --   --   --  1.14*  --  1.29*  --  1.32*  --  1.44*  --  1.25*   * 100* 107* 117* 180* 148*   < > 106*   < > 93   < > 95   < > 99   LYNN  --  9.4  --   --   --  9.5  --  9.4  --  9.2  --  8.8  --  8.8    < > = values in this interval not displayed.       INRNo lab results found in last 7 days.    Recent Labs   Lab Test 04/20/24  0507 04/19/24  0452   POTASSIUM 5.6*  5.6* 5.2   CHLORIDE 99 97*   BUN 36.3* 33.6*      Recent Labs   Lab Test 04/15/24  0418 04/14/24  0413 04/13/24  2031 01/15/24  1618 12/05/23  1646   ALBUMIN 2.6* 3.0*  --    < >  --    BILITOTAL 0.4 0.3  --    < >  --    ALT 27 23  --    < >  --    AST 46* 41  --    < >  --    PROTEIN  --   --  100*  --  100*    < > = values in this interval not displayed.       Personally reviewed today's laboratory studies      Thank you for involving us in the care of this patient. We will continue to follow along with you.      Stephan Pickens  [facility]  [phone]

## 2024-04-20 NOTE — PROGRESS NOTES
[facility]    Medicine Progress Note - Hospitalist Service    Date of Admission:  4/13/2024    Assessment & Plan   73-year-old female with history of of chronic respiratory failure, NELLIE/OHS, CKD, CHF who was admitted with sepsis due to acute diverticulitis and acute on chronic respiratory failure.    #Acute diverticulitis  --CT shows sigmoid diverticulitis, no abscess, stool throughout colon  --BCx no growth.  --Hemodynamically stable, WBC count significantly improved  --On 4/18, advanced diet to low fiber diet and tolerated.  --Constipation improved with bowel regimen.  Continue  --IV Cipro and Flagyl, switch to PO on 4/16.  However, patient having visual hallucination and confusion which could be secondary to antibiotics.  ID consulted, antibiotics Cipro and Flagyl discontinued and started on Augmentin on 4/18.    #Acute on chronic hypoxic respiratory failure;  #NELLIE, OHS on BiPAP/CPAP and 3L NC at home;  #Severe obesity;  --No wheezing on exam  --BiPAP/CPAP at night and when taking naps during the daytime  --Incentive spirometry, flutter valve.  As needed neb.  Ambulate as able.  --Appreciated pulmonary input    #Acute on chronic HFpEF;  # Essential hypertension;  --ASA, beta-blocker  --IV diuretics discontinued, resumed home Lasix at 40 mg daily on 4/18.  -- Monitor respiratory status and volume status closely.    #CKD stage III;  #Mild hyperkalemia;  --Renal function seems fairly at baseline.    --Low potassium diet, Lokelma but serum potassium trending up, nephrology consulted and personally discussed.    --Monitor labs closely    # Visual hallucination; improved  -- Nonfocal neurological exam.    -- Given history of NELLIE/OHS, checked ABG and fairly unremarkable.  --CT head negative for acute intracranial process  -- Discontinued narcotic pain medications.  Ciprofloxacin/Flagyl discontinued.  --Patient reported that she recently lost her brother and a lot of stress  --Psychiatry  consulted, recommended to wean off Cymbalta as may be contributing.  However, unlikely Cymbalta contributing as patient has been on this medications for some time.  --Continue clinical monitoring    #Candida intertrigo;  #Pressure injury on right heel plantar/lateral area;  -- Continue local care and antifungal treatment.  Offload pressure as instructed by wound care nurse  --Appreciated wound care nurse input    #Physical deconditioning;  # Progressive chronic low back pain;  # Chronic bilateral lower extremity neuropathy;  #History of cervical fusion surgery in 2017;  --Patient reported taking Cymbalta for lower extremity numbness, concern that may be contributing hallucination but unlikely.  However, will continue only at home half dose 30 mg twice daily. Patient reported Neurontin did not work in the past.  -- At baseline patient reported not ambulating much.  Discussed with physical therapist, requiring assist of 2 to 3 staffs, requiring June lifting.   -- MRI lumbar spine with multilevel degenerative changes, severe spinal stenosis, neurosurgery consulted, appreciated input, they have ordered cervical MRI.  -- PT OT following.          Diet: Snacks/Supplements Adult: Expedite Bottle; With Meals  Combination Diet 2 gm K Diet; Low Fiber Diet; 2 gm NA Diet    DVT Prophylaxis: Enoxaparin (Lovenox) SQ  Rebollar Catheter: Not present  Lines: None     Cardiac Monitoring: ACTIVE order. Indication: Electrolyte Imbalance (24 hours)- Magnesium <1.3 mg/ml; Potassium < =2.8 or > 5.5 mg/ml  Code Status: Full Code      Clinically Significant Risk Factors        # Hyperkalemia: Highest K = 5.6 mmol/L in last 2 days, will monitor as appropriate       # Hypoalbuminemia: Lowest albumin = 2.6 g/dL at 4/15/2024  4:18 AM, will monitor as appropriate     # Hypertension: Noted on problem list  # Chronic heart failure with preserved ejection fraction: heart failure noted on problem list and last echo with EF >50%       # Severe Obesity:  "Estimated body mass index is 48.01 kg/m  as calculated from the following:    Height as of this encounter: 1.397 m (4' 7\").    Weight as of this encounter: 93.7 kg (206 lb 9.1 oz).   # Moderate Malnutrition: based on nutrition assessment    # Financial/Environmental Concerns: none  # Asthma: noted on problem list        Disposition Plan     Medically Ready for Discharge: Anticipated Tomorrow             Onofre Harvey MD  Hospitalist Service  [facility]  Securely message with [employer] (more info)  Text page via [employer] Paging/Directory   ______________________________________________________________________    Interval History   Patient seen and examined.  Notes, labs, imaging report personally reviewed.  No acute issues reported overnight.  Patient reported visual hallucination improved.  However, still reports feeling intermittent confusion.  Patient complaining of bilateral lower extremity numbness.  Patient does have chronic back pain.  Discussed with nursing staffs.  Discussed with nephrologist.  I did not call family member today as I had detailed conversation with patient's  and patient at bedside yesterday.    Physical Exam   Vital Signs: Temp: 98  F (36.7  C) Temp src: Oral BP: 121/64 Pulse: 69   Resp: 18 SpO2: 95 % O2 Device: Nasal cannula Oxygen Delivery: 3 LPM  Weight: 206 lbs 9.14 oz      General: Not in obvious distress.  HEENT: Normal cephalic, supple neck  Chest: Clear to auscultation bilateral anteriorly, no wheezing  Heart: S1S2 normal, regular  Abdomen: Soft.  Morbidly obese, no significant tenderness  Extremities: + legs swelling  Neuro: alert and awake, moves all extremities but has generalized motor weakness and more on lower extremities        Medical Decision Making             Data     I have personally reviewed the following data over the past 24 hrs:    N/A  \   N/A   / N/A     140 99 36.3 (H) /  112 (H)   5.6 (H); 5.6 (H) 28 1.12 (H) \       Imaging results reviewed " over the past 24 hrs:   Recent Results (from the past 24 hour(s))   MR Lumbar Spine w/o Contrast    Narrative    EXAM: MR LUMBAR SPINE W/O CONTRAST  LOCATION: [facility]  DATE: 4/19/2024    INDICATION: Low back pain, hx of  lumbar spinal stenosis.  COMPARISON: None.  TECHNIQUE: Routine Lumbar Spine MRI without IV contrast.    FINDINGS:   Transitional vertebral anatomy with a partially sacralized L5 vertebral body.  Careful attention to the numbering convention is recommended prior to any future percutaneous or surgical intervention. Retrolisthesis of T12 on L1 measuring 4 mm and L2 on L3   measuring 2 mm. Anterolisthesis of L4 and L5 measuring 3 mm and L5 on S1 measuring 2 mm. The vertebral bodies of the lumbar spine otherwise have normal stature, alignment, and marrow signal intensity. Normal distal spinal cord and cauda equina with   conus medullaris at the inferior plate of L2. No extraspinal abnormality. Unremarkable visualized bony pelvis.    T10-T11: Symmetric disc bulge and moderate facet arthropathy without significant spinal canal narrowing. Moderate bilateral neural foraminal narrowing.    T11/T12: Symmetric disc bulge, facet arthropathy and ligamentum flavum thickening with moderate spinal canal narrowing. Severe left and moderate right neural foraminal narrowing.    T12-L1: Symmetric disc bulge and moderate facet arthropathy with mild spinal canal narrowing. Severe right and mild left neural foraminal narrowing.     L1-L2: Symmetric disc bulge and moderate facet arthropathy with mild spinal canal narrowing. Severe right and moderate left neural foraminal narrowing.    L2-L3: Symmetric disc bulge, advanced facet arthropathy and ligamentum flavum thickening contributing to severe spinal canal narrowing. Severe bilateral neural foraminal narrowing.     L3-L4: Symmetric disc bulge, advanced facet arthropathy and ligamentum flavum thickening contributing to severe spinal canal  narrowing. Severe left and moderate right neural foraminal narrowing.    L4-L5: Symmetric disc bulge, facet arthropathy and ligamentum flavum thickening contributing to severe spinal canal narrowing. Severe bilateral neural foraminal narrowing.    L5-S1: Normal disc signal and disc height. No posterior disc bulge or spinal canal narrowing. Mild bilateral neural foraminal narrowing.    S1/S2: No posterior disc bulge or spinal canal narrowing. No neural foraminal narrowing.      Impression    IMPRESSION:  1.  Transitional vertebral anatomy with a partially sacralized L5 vertebral body.  Careful attention to the numbering convention is recommended prior to any future percutaneous or surgical intervention.   2.  Multilevel degenerative disc disease of the lumbar spine with disc height loss, most pronounced at the L2/L3 - L4/L5 levels where there is severe spinal canal narrowing.  3.  Multilevel posterolateral disc disease and facet arthropathy with severe multilevel neural foraminal narrowing as described above.

## 2024-04-20 NOTE — PROGRESS NOTES
"Care Management Follow Up    Length of Stay (days): 7    Expected Discharge Date: 04/21/2024     Concerns to be Addressed:     Care progression  Patient plan of care discussed at interdisciplinary rounds: Yes    Anticipated Discharge Disposition:  Transitional Care (patient/spouse declined)     Anticipated Discharge Services:  Home care - [facility]  Anticipated Discharge DME:  NA    Patient/family educated on Medicare website which has current facility and service quality ratings:  NA  Education Provided on the Discharge Plan:  Yes per team  Patient/Family in Agreement with the Plan:  No, patient agreed to home care    Referrals Placed by CM/SW:  Yes, home care  Private pay costs discussed: Not applicable    Additional Information:  [facility] to see which [facility] currently open to patient care.    Social Hx: \" and largely independent. Use a walker and has home O2 (3 liters through [facility]). Open to [employer] . Goal is home. TCU rec'd, Pt/spouse declined (patient Ax 2-3, or lift). Transport TBD.\"    RNCM to follow for medical progression, recommendations, and final discharge plan.     Kelsie Mtz RN     "

## 2024-04-20 NOTE — PLAN OF CARE
"  Problem: Adult Inpatient Plan of Care  Goal: Plan of Care Review  Description: The Plan of Care Review/Shift note should be completed every shift.  The Outcome Evaluation is a brief statement about your assessment that the patient is improving, declining, or no change.  This information will be displayed automatically on your shift  note.  Outcome: Progressing  Flowsheets (Taken 4/20/2024 1117)  Plan of Care Reviewed With: patient  Overall Patient Progress: improving  Goal: Patient-Specific Goal (Individualized)  Description: You can add care plan individualizations to a care plan. Examples of Individualization might be:  \"Parent requests to be called daily at 9am for status\", \"I have a hard time hearing out of my right ear\", or \"Do not touch me to wake me up as it startles  me\".  Outcome: Progressing  Goal: Absence of Hospital-Acquired Illness or Injury  Outcome: Progressing  Intervention: Identify and Manage Fall Risk  Recent Flowsheet Documentation  Taken 4/20/2024 0918 by Quirino Maradiaga RN  Safety Promotion/Fall Prevention:   clutter free environment maintained   lighting adjusted  Intervention: Prevent Skin Injury  Recent Flowsheet Documentation  Taken 4/20/2024 0918 by Quirino Maradiaga RN  Skin Protection: drying agents applied  Device Skin Pressure Protection: absorbent pad utilized/changed  Intervention: Prevent Infection  Recent Flowsheet Documentation  Taken 4/20/2024 0918 by Quirino Maradiaga RN  Infection Prevention:   hand hygiene promoted   rest/sleep promoted  Goal: Optimal Comfort and Wellbeing  Outcome: Progressing  Intervention: Monitor Pain and Promote Comfort  Recent Flowsheet Documentation  Taken 4/20/2024 0913 by Quirino Maradiaga RN  Pain Management Interventions: medication (see MAR)  Goal: Readiness for Transition of Care  Outcome: Progressing     Goal Outcome Evaluation:      Plan of Care Reviewed With: patient    Overall Patient Progress: improving    0800 - Pt. A&O x4 and on 3L of O2. Had headache of " 8. Gave Tylenol (see Mar). Call light within reach. Purwik in place.     1230 - VSS no change from AM. Denies pain. Pt. Up in chair. Call light within reach.     1600 - VSS no change from AM. Denies pain. Purwik in place. Call light within reach.

## 2024-04-21 ENCOUNTER — APPOINTMENT (OUTPATIENT)
Dept: PHYSICAL THERAPY | Facility: HOSPITAL | Age: 74
DRG: 871 | End: 2024-04-21
Payer: MEDICARE

## 2024-04-21 LAB
ANION GAP SERPL CALCULATED.3IONS-SCNC: 4 MMOL/L (ref 7–15)
BUN SERPL-MCNC: 35.4 MG/DL (ref 8–23)
CALCIUM SERPL-MCNC: 9.4 MG/DL (ref 8.8–10.2)
CHLORIDE SERPL-SCNC: 94 MMOL/L (ref 98–107)
CREAT SERPL-MCNC: 1.07 MG/DL (ref 0.51–0.95)
CREAT UR-MCNC: 20.6 MG/DL
DEPRECATED HCO3 PLAS-SCNC: 37 MMOL/L (ref 22–29)
EGFRCR SERPLBLD CKD-EPI 2021: 55 ML/MIN/1.73M2
ERYTHROCYTE [DISTWIDTH] IN BLOOD BY AUTOMATED COUNT: 13.2 % (ref 10–15)
GLUCOSE BLDC GLUCOMTR-MCNC: 137 MG/DL (ref 70–99)
GLUCOSE BLDC GLUCOMTR-MCNC: 151 MG/DL (ref 70–99)
GLUCOSE SERPL-MCNC: 95 MG/DL (ref 70–99)
HCT VFR BLD AUTO: 37.7 % (ref 35–47)
HGB BLD-MCNC: 11.2 G/DL (ref 11.7–15.7)
MCH RBC QN AUTO: 30.7 PG (ref 26.5–33)
MCHC RBC AUTO-ENTMCNC: 29.7 G/DL (ref 31.5–36.5)
MCV RBC AUTO: 103 FL (ref 78–100)
MICROALBUMIN UR-MCNC: 62.2 MG/L
MICROALBUMIN/CREAT UR: 301.94 MG/G CR (ref 0–25)
OSMOLALITY UR: 320 MMOL/KG (ref 100–1200)
PLATELET # BLD AUTO: 371 10E3/UL (ref 150–450)
POTASSIUM SERPL-SCNC: 4.8 MMOL/L (ref 3.4–5.3)
POTASSIUM UR-SCNC: 19.3 MMOL/L
RBC # BLD AUTO: 3.65 10E6/UL (ref 3.8–5.2)
SODIUM SERPL-SCNC: 135 MMOL/L (ref 135–145)
VIT B12 SERPL-MCNC: 1007 PG/ML (ref 232–1245)
WBC # BLD AUTO: 8 10E3/UL (ref 4–11)

## 2024-04-21 PROCEDURE — 80048 BASIC METABOLIC PNL TOTAL CA: CPT | Performed by: INTERNAL MEDICINE

## 2024-04-21 PROCEDURE — 250N000013 HC RX MED GY IP 250 OP 250 PS 637: Performed by: INTERNAL MEDICINE

## 2024-04-21 PROCEDURE — 250N000011 HC RX IP 250 OP 636: Performed by: INTERNAL MEDICINE

## 2024-04-21 PROCEDURE — 99232 SBSQ HOSP IP/OBS MODERATE 35: CPT | Performed by: INTERNAL MEDICINE

## 2024-04-21 PROCEDURE — 94660 CPAP INITIATION&MGMT: CPT

## 2024-04-21 PROCEDURE — 36415 COLL VENOUS BLD VENIPUNCTURE: CPT | Performed by: INTERNAL MEDICINE

## 2024-04-21 PROCEDURE — 999N000157 HC STATISTIC RCP TIME EA 10 MIN

## 2024-04-21 PROCEDURE — 97530 THERAPEUTIC ACTIVITIES: CPT | Mod: GP | Performed by: PHYSICAL THERAPIST

## 2024-04-21 PROCEDURE — 97110 THERAPEUTIC EXERCISES: CPT | Mod: GP | Performed by: PHYSICAL THERAPIST

## 2024-04-21 PROCEDURE — 85014 HEMATOCRIT: CPT | Performed by: INTERNAL MEDICINE

## 2024-04-21 PROCEDURE — 250N000013 HC RX MED GY IP 250 OP 250 PS 637

## 2024-04-21 PROCEDURE — 82607 VITAMIN B-12: CPT | Performed by: INTERNAL MEDICINE

## 2024-04-21 PROCEDURE — 250N000013 HC RX MED GY IP 250 OP 250 PS 637: Performed by: HOSPITALIST

## 2024-04-21 PROCEDURE — 210N000001 HC R&B IMCU HEART CARE

## 2024-04-21 RX ORDER — HYDROCHLOROTHIAZIDE 12.5 MG/1
12.5 TABLET ORAL DAILY
Status: DISCONTINUED | OUTPATIENT
Start: 2024-04-21 | End: 2024-04-23 | Stop reason: HOSPADM

## 2024-04-21 RX ORDER — ACETAMINOPHEN 325 MG/1
650 TABLET ORAL 3 TIMES DAILY
Status: DISCONTINUED | OUTPATIENT
Start: 2024-04-21 | End: 2024-04-23 | Stop reason: HOSPADM

## 2024-04-21 RX ADMIN — DULOXETINE HYDROCHLORIDE 30 MG: 30 CAPSULE, DELAYED RELEASE ORAL at 20:56

## 2024-04-21 RX ADMIN — ASPIRIN 81 MG CHEWABLE TABLET 81 MG: 81 TABLET CHEWABLE at 08:41

## 2024-04-21 RX ADMIN — DOCUSATE SODIUM 200 MG: 100 CAPSULE, LIQUID FILLED ORAL at 20:49

## 2024-04-21 RX ADMIN — MODAFINIL 200 MG: 100 TABLET ORAL at 08:41

## 2024-04-21 RX ADMIN — AMOXICILLIN AND CLAVULANATE POTASSIUM 1 TABLET: 875; 125 TABLET, FILM COATED ORAL at 20:49

## 2024-04-21 RX ADMIN — DULOXETINE HYDROCHLORIDE 30 MG: 30 CAPSULE, DELAYED RELEASE ORAL at 08:41

## 2024-04-21 RX ADMIN — ACETAMINOPHEN 650 MG: 325 TABLET ORAL at 15:10

## 2024-04-21 RX ADMIN — ANORECTAL OINTMENT 2 G: 15.7; .44; 24; 20.6 OINTMENT TOPICAL at 20:50

## 2024-04-21 RX ADMIN — MONTELUKAST 10 MG: 10 TABLET, FILM COATED ORAL at 20:49

## 2024-04-21 RX ADMIN — DOCUSATE SODIUM 200 MG: 100 CAPSULE, LIQUID FILLED ORAL at 08:41

## 2024-04-21 RX ADMIN — Medication 60 ML: at 08:36

## 2024-04-21 RX ADMIN — MICONAZOLE NITRATE 2 APPLICATOR: 20 POWDER TOPICAL at 20:50

## 2024-04-21 RX ADMIN — HYDROCHLOROTHIAZIDE 12.5 MG: 12.5 TABLET ORAL at 12:36

## 2024-04-21 RX ADMIN — ACETAMINOPHEN 650 MG: 325 TABLET ORAL at 08:40

## 2024-04-21 RX ADMIN — MICONAZOLE NITRATE ANTIFUNGAL POWDER: 2 POWDER TOPICAL at 08:42

## 2024-04-21 RX ADMIN — AMOXICILLIN AND CLAVULANATE POTASSIUM 1 TABLET: 875; 125 TABLET, FILM COATED ORAL at 08:41

## 2024-04-21 RX ADMIN — ACETAMINOPHEN 650 MG: 325 TABLET ORAL at 20:49

## 2024-04-21 RX ADMIN — HYDROXYCHLOROQUINE SULFATE 200 MG: 200 TABLET, FILM COATED ORAL at 08:40

## 2024-04-21 RX ADMIN — ANORECTAL OINTMENT: 15.7; .44; 24; 20.6 OINTMENT TOPICAL at 15:12

## 2024-04-21 RX ADMIN — SODIUM ZIRCONIUM CYCLOSILICATE 10 G: 10 POWDER, FOR SUSPENSION ORAL at 08:50

## 2024-04-21 RX ADMIN — MICONAZOLE NITRATE ANTIFUNGAL POWDER 2 APPLICATOR: 2 POWDER TOPICAL at 20:50

## 2024-04-21 RX ADMIN — ANORECTAL OINTMENT: 15.7; .44; 24; 20.6 OINTMENT TOPICAL at 08:41

## 2024-04-21 RX ADMIN — ENOXAPARIN SODIUM 40 MG: 40 INJECTION SUBCUTANEOUS at 17:02

## 2024-04-21 RX ADMIN — FUROSEMIDE 40 MG: 20 TABLET ORAL at 08:41

## 2024-04-21 RX ADMIN — MICONAZOLE NITRATE: 20 POWDER TOPICAL at 08:41

## 2024-04-21 RX ADMIN — METOPROLOL SUCCINATE 50 MG: 50 TABLET, EXTENDED RELEASE ORAL at 08:41

## 2024-04-21 ASSESSMENT — ACTIVITIES OF DAILY LIVING (ADL)
ADLS_ACUITY_SCORE: 48
ADLS_ACUITY_SCORE: 46
ADLS_ACUITY_SCORE: 48
ADLS_ACUITY_SCORE: 45
ADLS_ACUITY_SCORE: 48
ADLS_ACUITY_SCORE: 46
ADLS_ACUITY_SCORE: 48
ADLS_ACUITY_SCORE: 45
ADLS_ACUITY_SCORE: 46
ADLS_ACUITY_SCORE: 48
ADLS_ACUITY_SCORE: 48
ADLS_ACUITY_SCORE: 45
ADLS_ACUITY_SCORE: 48
ADLS_ACUITY_SCORE: 48

## 2024-04-21 NOTE — PROGRESS NOTES
RENAL NOTE- new to me, reviewed chart.     REQUESTING PHYSICIAN: Dr. ORTIZ    REASON FOR CONSULT: recurrent hyperkalemia    ASSESSMENT/PLAN:  Hyperkalemia - recurrent, intermittent hyperkalemia for the last several years.  Previously saw Nephrology years ago and taken off ARB and not taking any longer.  Does have some CKD baseline. Her TTKG is low c/w inadequate renal K excretion, prob low renin / gloria state. I would not use fludrocortisone here due to hx of HTN / edema but we can try low dose hydrochlorothiazide to promote renal K clearance and hope to keep off lokelma long term.   Recs:  - add hydrochlorothiazide 12.5 mg/day, will inc if BP/ Na tolerate. Cont lasix for now. Try hold lokelma  She should plan to f/up with ANC after discharge, her spouse also goes to our clinic.     CKD - creatinine ont he lower side of her baseline at 1.1, have seen her up to 1.4 at times.  Some AKIs in the past.  UA actually pretty clean , urine alb 302mg/g creat  Wonder if her eGFR is lower than estimated by Creat due to low muscle mass.  Will check cystatin C GFR      Diverticulitis - admitting diagnosis and improving     Chronic Resp Failure - OHS/NELLIE.  3L NC at home.  On bipap/CPAP.  Pulm saw earlier in the stay.  Now on some lower dose lasix    Chronic HFpEF - currently on oral lasix    Chronic back pain - Neurosurgery to see today      HPI: Mrs. Rey is a 73 year old with a past medical history significant for NELLIE, OHS, chronic resp failure, obesity, HLD, chronic pain, GERD and CKD who was admitted a week ago for diverticulitis and acute on chronic resp failure, diverticulitis treated and  improving.  Has had recurrent hyperkalemia not responsive to a dose of Lokelma and low K diet and Nephrology consulted. Intolerant of ARB In past due to hyperK, no hx acidosis, no other meds and was on lasix PTA.     Up in chair, previously lelo lift but stood and pivoted there today. O/w minimal acitivity no PT yet.   Eating ok, knows  some high k foods to limit.   No sob  No voiding sx.     REVIEW OF SYSTEMS: a 12 point review of systems was reviewed and negative other than noted in the HPI above.      Past Medical History:   Diagnosis Date    Allergic rhinitis     Arthritis of back     CHF (congestive heart failure) (H)     Chronic kidney disease     stage 3     De Quervain's disease (tenosynovitis)     Fibromyalgia, primary     GERD (gastroesophageal reflux disease)     Hematuria     chronic    High cholesterol     History of blood clots 09/01/1970    DVT, from birth control, h/o left calf    Hyperlipidemia     Hypertension     Low back pain     Osteoporosis     Pickwickian syndrome (H)     Plantar fasciitis     Proteinuria     Proteinuria     chronic    Sleep apnea     CPAP    Uncomplicated asthma        Current Facility-Administered Medications   Medication Dose Route Frequency Provider Last Rate Last Admin    acetaminophen (TYLENOL) tablet 650 mg  650 mg Oral TID Onofre Harvey MD        acetaminophen (TYLENOL) tablet 650 mg  650 mg Oral Q6H PRN Onofre Harvey MD   650 mg at 04/21/24 0840    albuterol (PROVENTIL HFA/VENTOLIN HFA) inhaler  2 puff Inhalation Q4H PRN Onofre Harvey MD        amoxicillin-clavulanate (AUGMENTIN) 875-125 MG per tablet 1 tablet  1 tablet Oral Q12H [facility] (08/20) Ivan Palmer MD   1 tablet at 04/21/24 0841    aspirin (ASA) chewable tablet 81 mg  81 mg Oral Daily Carlos Bull MD   81 mg at 04/21/24 0841    calcium carbonate (TUMS) chewable tablet 1,000 mg  1,000 mg Oral 4x Daily PRN Jennifer Gauthier MD        glucose gel 15-30 g  15-30 g Oral Q15 Min PRN Hai Flores MD        Or    dextrose 50 % injection 25-50 mL  25-50 mL Intravenous Q15 Min PRN Hai Flores MD        Or    glucagon injection 1 mg  1 mg Subcutaneous Q15 Min PRN Hai Flores MD        docusate sodium (COLACE) capsule 200 mg  200 mg Oral BID Patrick Gonzales DO   200 mg at 04/21/24 0841    DULoxetine (CYMBALTA) DR capsule 30 mg   30 mg Oral BID Onofre Harvey MD   30 mg at 04/21/24 0841    enoxaparin ANTICOAGULANT (LOVENOX) injection 40 mg  40 mg Subcutaneous Q24H Hai lFores MD   40 mg at 04/20/24 1723    furosemide (LASIX) tablet 40 mg  40 mg Oral QAM Onofre Harvey MD   40 mg at 04/21/24 0841    hydrALAZINE (APRESOLINE) injection 5 mg  5 mg Intravenous Q6H PRN Onofre Harvey MD        hydroCHLOROthiazide tablet 12.5 mg  12.5 mg Oral Daily Tiny Alvarado MD        hydroxychloroquine (PLAQUENIL) tablet 200 mg  200 mg Oral Daily Carlos Bull MD   200 mg at 04/21/24 0840    ipratropium - albuterol 0.5 mg/2.5 mg/3 mL (DUONEB) neb solution 3 mL  3 mL Nebulization Q4H PRN Jennifer Gauthier MD        lidocaine (LMX4) cream   Topical Q1H PRN Jennifer Gauthier MD        lidocaine 1 % 0.1-1 mL  0.1-1 mL Other Q1H PRN Jennifer Gauthier MD        magnesium hydroxide (MILK OF MAGNESIA) suspension 30 mL  30 mL Oral Daily PRN Patrick Gonzales DO   30 mL at 04/14/24 1323    menthol-zinc oxide (CALMOSEPTINE) 0.44-20.6 % ointment OINT   Topical TID Onofre Harvey MD   Given at 04/21/24 0841    metoprolol succinate ER (TOPROL XL) 24 hr tablet 50 mg  50 mg Oral Daily Onofre Harvey MD   50 mg at 04/21/24 0841    miconazole (MICATIN) 2 % powder   Topical BID Jennifer Gauthier MD   Given at 04/21/24 0841    miconazole (MICATIN) 2 % powder   Topical BID Carlos Bull MD   Given at 04/21/24 0842    modafinil (PROVIGIL) tablet 200 mg  200 mg Oral Daily Carlos Bull MD   200 mg at 04/21/24 0841    montelukast (SINGULAIR) tablet 10 mg  10 mg Oral QPM Onofre Harvey MD   10 mg at 04/20/24 2106    naloxone (NARCAN) injection 0.2 mg  0.2 mg Intravenous Q2 Min PRN Hai Flores MD        Or    naloxone (NARCAN) injection 0.4 mg  0.4 mg Intravenous Q2 Min PRN Hai Flores MD        Or    naloxone (NARCAN) injection 0.2 mg  0.2 mg Intramuscular Q2 Min PRN Hai Flores MD        Or    naloxone (NARCAN) injection 0.4 mg  0.4 mg  Intramuscular Q2 Min PRN Hai Flores MD        ondansetron (ZOFRAN ODT) ODT tab 4 mg  4 mg Oral Q6H PRN Jennifer Gauthier MD        Or    ondansetron (ZOFRAN) injection 4 mg  4 mg Intravenous Q6H PRN Jennifer Gauthier MD   4 mg at 04/14/24 1519    polyethylene glycol (MIRALAX) Packet 17 g  17 g Oral BID PRN Carlos Bull MD   17 g at 04/18/24 0533    senna-docusate (SENOKOT-S/PERICOLACE) 8.6-50 MG per tablet 1 tablet  1 tablet Oral BID PRN Jennifer Gauthier MD        Or    senna-docusate (SENOKOT-S/PERICOLACE) 8.6-50 MG per tablet 2 tablet  2 tablet Oral BID PRN Jennifer Gauthier MD        sodium chloride (PF) 0.9% PF flush 3 mL  3 mL Intracatheter Q8H Jennifer Gauthier MD   3 mL at 04/20/24 1442    sodium chloride (PF) 0.9% PF flush 3 mL  3 mL Intracatheter q1 min prn Jennifer Gauthier MD        sodium zirconium cyclosilicate (LOKELMA) packet 10 g  10 g Oral Daily Stephan Pickens MD   10 g at 04/21/24 0850    wound support modular (EXPEDITE) bottle 60 mL  60 mL Oral Daily Patrick Gonzales DO   60 mL at 04/21/24 0836       No current outpatient medications on file.      ALLERGIES/SENSITIVITIES:  Allergies   Allergen Reactions    Latex Rash     Severe rash    Pregabalin Shortness Of Breath     Other Reaction(s): swelling and shortness of breath    Valsartan Unknown     Hyperkalemia    Ciprofloxacin Visual Disturbance, Hallucination and Confusion     Likely contributed visual hallucination and confusion    Colchicine Diarrhea    Dicloxacillin      Other Reaction(s): confusion, says she has tolerated augmentin without difficulty.     Gabapentin      Other Reaction(s): Sedation    Latex Unknown     Added based on information entered during case entry, please review and add reactions, type, and severity as needed    Other Drug Allergy (See Comments) Muscle Pain (Myalgia)     Tried lovastatin and simvastatin    Other Environmental Allergy Unknown     Coverlet bandaid , 3M product; rash    Statins-Hmg-Coa  Reductase Inhibitors [Statins] Muscle Pain (Myalgia)     Tried lovastatin and simvastatin    Sulfa Antibiotics Swelling     Facial swelling      Adhesive Tape Rash     Social History     Tobacco Use    Smoking status: Never    Smokeless tobacco: Never   Vaping Use    Vaping status: Never Used   Substance Use Topics    Alcohol use: No    Drug use: No     I have reviewed this patient's family history and updated it with pertinent information if needed.  Family History   Problem Relation Age of Onset    Rheumatoid Arthritis Mother     Heart Disease Sister     Chronic Obstructive Pulmonary Disease Father          PHYSICAL EXAM:  Physical Exam   Temp: 97.5  F (36.4  C) Temp src: Oral BP: (!) 129/92 Pulse: 71   Resp: 20 SpO2: 99 % O2 Device: Nasal cannula Oxygen Delivery: 3 LPM  Vitals:    04/19/24 0353 04/20/24 0431 04/21/24 0500   Weight: 97.2 kg (214 lb 4.6 oz) 93.7 kg (206 lb 9.1 oz) 94.8 kg (208 lb 14.4 oz)     Vital Signs with Ranges  Temp:  [97.5  F (36.4  C)-98.3  F (36.8  C)] 97.5  F (36.4  C)  Pulse:  [63-73] 71  Resp:  [18-22] 20  BP: (119-147)/(62-92) 129/92  FiO2 (%):  [30 %] 30 %  SpO2:  [96 %-99 %] 99 %  I/O last 3 completed shifts:  In: 640 [P.O.:640]  Out: 900 [Urine:900]    @TMAXR(24)@    Patient Vitals for the past 72 hrs:   Weight   04/21/24 0500 94.8 kg (208 lb 14.4 oz)   04/20/24 0431 93.7 kg (206 lb 9.1 oz)   04/19/24 0353 97.2 kg (214 lb 4.6 oz)       General - A & O x 3, NAD  HEENT - on NC o2  Neck - JVD hard to assess 2/2 body habitus  Respiratory - Lungs very distant, no crackles and normal resp effort.   Cardiovascular - AP RRR with murmur  Abdomen - soft, nontender, obese  Extremities - trace edema  Integumentary - intact, good turgor, no rash/lesions  Neurologic - grossly intact  Psych:  Judgement intact, affect WNL  : no robledo    Laboratory:     Recent Labs   Lab 04/21/24  0449 04/19/24  0452 04/17/24  0424 04/16/24  0449 04/15/24  0418   WBC 8.0  --  12.4* 13.9* 19.0*   RBC 3.65*  --   3.20* 3.07* 3.26*   HGB 11.2*  --  9.7* 9.5* 10.4*   HCT 37.7  --  32.6* 31.0* 32.5*    404 393 365 369       Basic Metabolic Panel:  Recent Labs   Lab 04/21/24  0449 04/20/24  1216 04/20/24  0904 04/20/24  0507 04/20/24  0407 04/20/24  0011 04/19/24 2129 04/19/24 0452 04/19/24 0354 04/18/24 0413 04/17/24 0757 04/17/24 0424 04/16/24 0759 04/16/24 0449     --   --  140  --   --   --  136  --  132*  --  133*  --  134*   POTASSIUM 4.8  --   --  5.6*  5.6*  --   --   --  5.2  --  5.2  --  5.1  --  5.0   CHLORIDE 94*  --   --  99  --   --   --  97*  --  96*  --  97*  --  97*   CO2 37*  --   --  28  --   --   --  32*  --  30*  --  28  --  28   BUN 35.4*  --   --  36.3*  --   --   --  33.6*  --  27.2*  --  28.8*  --  28.4*   CR 1.07*  --   --  1.12*  --   --   --  1.14*  --  1.29*  --  1.32*  --  1.44*   GLC 95 112* 120* 100* 107* 117*   < > 148*   < > 106*   < > 93   < > 95   LYNN 9.4  --   --  9.4  --   --   --  9.5  --  9.4  --  9.2  --  8.8    < > = values in this interval not displayed.       INRNo lab results found in last 7 days.    Recent Labs   Lab Test 04/20/24 0507 04/19/24 0452   POTASSIUM 5.6*  5.6* 5.2   CHLORIDE 99 97*   BUN 36.3* 33.6*      Recent Labs   Lab Test 04/15/24  0418 04/14/24  0413 04/13/24  2031 01/15/24  1618 12/05/23  1646   ALBUMIN 2.6* 3.0*  --    < >  --    BILITOTAL 0.4 0.3  --    < >  --    ALT 27 23  --    < >  --    AST 46* 41  --    < >  --    PROTEIN  --   --  100*  --  100*    < > = values in this interval not displayed.     TTKG 3 % which is lower than expected in setting of hyperkalemia.   Personally reviewed today's laboratory studies      Thank you for involving us in the care of this patient. We will continue to follow along with you.      Tiny Alvarado MD  [facility]  [phone]

## 2024-04-21 NOTE — PROGRESS NOTES
[facility]    Medicine Progress Note - Hospitalist Service    Date of Admission:  4/13/2024    Assessment & Plan   73-year-old female with history of of chronic respiratory failure, NELLIE/OHS, CKD, CHF who was admitted with sepsis due to acute diverticulitis and acute on chronic respiratory failure.    #Acute diverticulitis  --CT shows sigmoid diverticulitis, no abscess, stool throughout colon  --BCx no growth.  --Hemodynamically stable, WBC count significantly improved  --On 4/18, advanced diet to low fiber diet and tolerated.  --Constipation improved with bowel regimen.  Continue  --IV Cipro and Flagyl, switch to PO on 4/16.  However, patient having visual hallucination and confusion which could be secondary to antibiotics.  ID consulted, antibiotics Cipro and Flagyl discontinued and started on Augmentin on 4/18.    #Acute on chronic hypoxic respiratory failure;  #NELLIE, OHS on BiPAP/CPAP and 3L NC at home;  #Severe obesity;  --No wheezing on exam  --BiPAP/CPAP at night and when taking naps during the daytime  --Incentive spirometry, flutter valve.  As needed neb.  Ambulate as able.  --Appreciated pulmonary input    #Acute on chronic HFpEF;  # Essential hypertension;  --ASA, beta-blocker  --IV diuretics discontinued, resumed home Lasix at 40 mg daily on 4/18.  -- Monitor respiratory status and volume status closely.    #CKD stage III;  #Mild hyperkalemia;  --Renal function seems fairly at baseline.    --Low potassium diet, Lokelma but serum potassium trending up and nephrology consulted.  Appreciated input.  On daily Lokelma and potassium better today.    # Visual hallucination; improved  -- Nonfocal neurological exam.    --Given history of NELLIE/OHS, checked ABG and fairly unremarkable.  --CT head negative for acute intracranial process  -- Discontinued IV narcotics, discontinued ciprofloxacin.  --Patient reported that she recently lost her brother and a lot of stress  --Psychiatry consulted,  recommended to wean off Cymbalta as may be contributing.  However, unlikely Cymbalta contributing as patient has been on this medications for some time and her symptoms improved while on Cymbalta.  --Continue clinical monitoring    #Candida intertrigo;  #Pressure injury on right heel plantar/lateral area;  -- Continue local care and antifungal treatment.  Offload pressure as instructed by wound care nurse  --Appreciated wound care nurse input    #Physical deconditioning;  # Progressive chronic low back pain;  # Chronic bilateral lower extremity neuropathy;  #History of cervical fusion surgery in 2017;  --Patient reported taking Cymbalta for lower extremity numbness, concern that may be contributing hallucination but unlikely, refer above.  However, will keep on half of home dose, 30 mg twice daily.  Of note, Neurontin and Lyrica are listed as allergy.  -- At baseline patient reported not ambulating much.  Discussed with physical therapist, requiring assist of 2 to 3 staffs, requiring June lifting.   -- MRI lumbar spine with multilevel degenerative changes, severe spinal stenosis, appreciate neurosurgery input, reported they arrange outpatient follow-up.   -- Continue scheduled Tylenol.  Try to PTA avoid narcotics, muscle relaxant but may need if continue pain/spasm issues.    -- PT OT following.          Diet: Snacks/Supplements Adult: Expedite Bottle; With Meals  Combination Diet 2 gm K Diet; Low Fiber Diet; 2 gm NA Diet    DVT Prophylaxis: Enoxaparin (Lovenox) SQ  Rebollar Catheter: Not present  Lines: None     Cardiac Monitoring: None  Code Status: Full Code      Clinically Significant Risk Factors        # Hyperkalemia: Highest K = 5.6 mmol/L in last 2 days, will monitor as appropriate       # Hypoalbuminemia: Lowest albumin = 2.6 g/dL at 4/15/2024  4:18 AM, will monitor as appropriate     # Hypertension: Noted on problem list  # Chronic heart failure with preserved ejection fraction: heart failure noted on  "problem list and last echo with EF >50%       # Severe Obesity: Estimated body mass index is 48.55 kg/m  as calculated from the following:    Height as of this encounter: 1.397 m (4' 7\").    Weight as of this encounter: 94.8 kg (208 lb 14.4 oz).   # Moderate Malnutrition: based on nutrition assessment    # Financial/Environmental Concerns: none  # Asthma: noted on problem list        Disposition Plan     Medically Ready for Discharge: Anticipated Tomorrow             Onofre Harvey MD  Hospitalist Service  [facility]  Securely message with [employer] (more info)  Text page via [facility] Paging/Directory   ______________________________________________________________________    Interval History   Patient seen and examined.  Notes, labs, imaging report personally reviewed.  No acute issues reported overnight.  Patient looks more alert and awake.  Denied visual hallucination.  Patient denied short of breath or chest pain or abdominal pain.  However, complaining of generalized body stiffening, lower extremity numbness and requesting for Vicodin, reported taking at home.  Discussed about concern confusion and agreed with the scheduled Tylenol but if that does not work may need to consider small dose narcotics or maybe PTA tizanidine.  Patient reported she does not tolerate NSAIDs.  Patient was okay to call her daughter, I had detailed conversation with patient's daughter,  explained about ongoing medical issues/management including diverticulitis management which is improving, lab abnormalities including leukocytosis improving, hyperkalemia which is improving.  Also discussed about generalized weakness, chronic back pain and neurology recommendation.  Also informed that patient is requiring 2-3 staffs assist for moving from bed to chair and sometimes requiring June lift.  She did mention that patient having limited mobility at home prior to hospitalization and her father sometimes requiring help from " neighbors.  Also discussed about discharge plan as patient seems medically stable.  Also informed that our recommendation is TCU discharge at this time.  I answered all her questions to best of my knowledge.    Physical Exam   Vital Signs: Temp: 97.5  F (36.4  C) Temp src: Oral BP: (!) 129/92 Pulse: 71   Resp: 20 SpO2: 99 % O2 Device: Nasal cannula Oxygen Delivery: 3 LPM  Weight: 208 lbs 14.4 oz      General: Not in obvious distress.  HEENT: Normocephalic, supple neck  Chest: Clear to auscultation bilateral anteriorly, no wheezing  Heart: S1S2 normal, regular  Abdomen: Soft.  Obese, nontender  Extremities: trace legs swelling  Neuro: alert and awake, moves all extremities but has generalized motor weakness        Medical Decision Making             Data     I have personally reviewed the following data over the past 24 hrs:    8.0  \   11.2 (L)   / 371     135 94 (L) 35.4 (H) /  95   4.8 37 (H) 1.07 (H) \       Imaging results reviewed over the past 24 hrs:   Recent Results (from the past 24 hour(s))   MR Cervical Spine w/o & w Contrast    Narrative    EXAM: MR CERVICAL SPINE W/O and W CONTRAST  LOCATION: [facility]  DATE: 4/20/2024    INDICATION: hx cervical fusion; r o central stenosis  COMPARISON: CT from 12/05/2023.  CONTRAST: 9ml gadavist  TECHNIQUE: MRI Cervical Spine without and with IV contrast.    FINDINGS:   Postsurgical changes of instrumented anterior spinal fusion C5-C7. Normal vertebral body heights, alignment and marrow signal. Multilevel Schmorl's node-like endplate indentations. No abnormal cord signal. Right greater than left mastoid effusions.    Craniovertebral junction and C1-C2: Normal.    C2-C3: Normal disc height. No herniation. Right greater than left facet arthropathy. No spinal canal or neural foraminal stenosis.     C3-C4: Normal disc height. Uncovertebral spurring and bilateral facet arthropathy. Mild bilateral neural foraminal stenosis. No spinal canal stenosis.      C4-C5: Disc osteophyte complex and bilateral uncinate spurring. Mild to moderate bilateral neural foraminal stenosis. No spinal canal stenosis.     C5-C6: Fusion level. Uncovertebral spurring resulting in moderate left neural foraminal stenosis. No spinal canal stenosis. No right neural foraminal stenosis.     C6-C7: Fusion level. Uncovertebral spurring. Bilateral facet arthropathy. Mild to moderate bilateral neural foraminal stenosis. No spinal canal stenosis.    C7-T1: Mild disc height loss and disc degeneration. Disc osteophyte complex and bilateral uncinate spurring. Bilateral facet arthropathy. No spinal canal or neural foraminal stenosis.      Impression    IMPRESSION:  1.  Postsurgical changes of instrumented anterior spinal fusion C5-C7. Minimal prevertebral edema at C3-C4 level.  2.  Multilevel cervical spondylosis without high-grade spinal canal stenosis. Multilevel neural foraminal stenosis detailed above.  3.  No abnormal spinal cord signal. No abnormal contrast enhancement.

## 2024-04-21 NOTE — PLAN OF CARE
Problem: Risk for Delirium  Goal: Improved Attention and Thought Clarity  4/21/2024 0651 by Alanis Rosales RN  Outcome: Progressing  4/21/2024 0651 by Alanis Rosales RN  Outcome: Not Progressing  Intervention: Maximize Cognitive Function  Recent Flowsheet Documentation  Taken 4/21/2024 0332 by Alanis Rosales RN  Sensory Stimulation Regulation:   lighting decreased   care clustered  Reorientation Measures: clock in view  Taken 4/21/2024 0000 by Alanis Rosales RN  Sensory Stimulation Regulation:   lighting decreased   care clustered  Reorientation Measures: clock in view  Taken 4/20/2024 2000 by Alanis Rosales RN  Sensory Stimulation Regulation:   lighting decreased   care clustered  Reorientation Measures: clock in view     Problem: Skin Injury Risk Increased  Goal: Skin Health and Integrity  4/21/2024 0651 by Alanis Rosales RN  Outcome: Progressing  4/21/2024 0651 by Alanis Rosales RN  Outcome: Not Progressing  Intervention: Plan: Nurse Driven Intervention: Moisture Management  Recent Flowsheet Documentation  Taken 4/21/2024 0332 by Alanis Rosales RN  Moisture Interventions: Urinary collection device  Taken 4/21/2024 0000 by Alanis Rosales RN  Moisture Interventions: Urinary collection device  Taken 4/20/2024 2000 by Alanis Rosales RN  Moisture Interventions: Urinary collection device  Intervention: Plan: Nurse Driven Intervention: Friction and Shear  Recent Flowsheet Documentation  Taken 4/21/2024 0332 by Alanis Rosales RN  Friction/Shear Interventions: HOB 30 degrees or less  Taken 4/21/2024 0000 by Alanis Rosales RN  Friction/Shear Interventions: HOB 30 degrees or less  Taken 4/20/2024 2000 by Alanis Rosales RN  Friction/Shear Interventions: HOB 30 degrees or less  Intervention: Optimize Skin Protection  Recent Flowsheet Documentation  Taken 4/21/2024 0332 by Alanis Rosales RN  Activity Management: activity  adjusted per tolerance  Taken 4/21/2024 0000 by Alanis Rosales, RN  Activity Management: activity adjusted per tolerance  Taken 4/20/2024 2000 by Alanis Rosales, RN  Activity Management: activity adjusted per tolerance     Goal Outcome Evaluation:  VSS overnight.  Repositioned every 2 hours for skin health. No complaints of pain.  Bipap on overnight

## 2024-04-21 NOTE — PLAN OF CARE
"  Problem: Adult Inpatient Plan of Care  Goal: Plan of Care Review  Description: The Plan of Care Review/Shift note should be completed every shift.  The Outcome Evaluation is a brief statement about your assessment that the patient is improving, declining, or no change.  This information will be displayed automatically on your shift  note.  Outcome: Progressing  Flowsheets (Taken 4/21/2024 1037)  Plan of Care Reviewed With: patient  Overall Patient Progress: improving  Goal: Patient-Specific Goal (Individualized)  Description: You can add care plan individualizations to a care plan. Examples of Individualization might be:  \"Parent requests to be called daily at 9am for status\", \"I have a hard time hearing out of my right ear\", or \"Do not touch me to wake me up as it startles  me\".  Outcome: Progressing  Goal: Absence of Hospital-Acquired Illness or Injury  Outcome: Progressing  Intervention: Identify and Manage Fall Risk  Recent Flowsheet Documentation  Taken 4/21/2024 0841 by Quirino Maradiaga RN  Safety Promotion/Fall Prevention:   clutter free environment maintained   lighting adjusted  Intervention: Prevent Infection  Recent Flowsheet Documentation  Taken 4/21/2024 0841 by Quirino Maradiaga RN  Infection Prevention:   hand hygiene promoted   rest/sleep promoted  Goal: Optimal Comfort and Wellbeing  Outcome: Progressing  Intervention: Monitor Pain and Promote Comfort  Recent Flowsheet Documentation  Taken 4/21/2024 0841 by Quirino Maradiaga RN  Pain Management Interventions:   medication (see MAR)   repositioned  Goal: Readiness for Transition of Care  Outcome: Progressing     Goal Outcome Evaluation:      Plan of Care Reviewed With: patient    Overall Patient Progress: improving    Pt. A&O x4 and on room air. Shoulders and arms are sore. Gave Tylenol (see Mar). Pt. Up in chair. Call light within reach.   "

## 2024-04-21 NOTE — PROGRESS NOTES
"Care Management Follow Up    Length of Stay (days): 8    Expected Discharge Date: 04/21/2024     Concerns to be Addressed:     Care progression  Patient plan of care discussed at interdisciplinary rounds: Yes    Anticipated Discharge Disposition:  Transitional Care (patient/spouse declined)     Anticipated Discharge Services:  Home care - [facility]  Anticipated Discharge DME:  NA    Patient/family educated on Medicare website which has current facility and service quality ratings:  NA  Education Provided on the Discharge Plan:  Yes per team  Patient/Family in Agreement with the Plan:  No, patient agreed to home care    Referrals Placed by CM/SW:  Yes, home care  Private pay costs discussed: Not applicable    Additional Information:  Met patient at bedside to discuss PT/OT discharge rec for Transitional care.  Patient reported, \"I haven't discuss with my family.\"  Patient has the TCU list, but \"I want to avoid going to a TCU.\"    Patient prefer to return home with home care services    Social Hx: \"Live w/spouse in a house. Largely independent. Use a walker and has home O2 (3 liters through [facility]). Open to [facility] RN/PT/OT/HHA. Rehab rec TCU, Pt/spouse declined (patient Ax 2-3, or lift). Transport TBD.\"    RNCM to follow for medical progression, recommendations, and final discharge plan.     Kelsie Mtz RN     "

## 2024-04-22 ENCOUNTER — APPOINTMENT (OUTPATIENT)
Dept: PHYSICAL THERAPY | Facility: HOSPITAL | Age: 74
DRG: 871 | End: 2024-04-22
Payer: MEDICARE

## 2024-04-22 ENCOUNTER — APPOINTMENT (OUTPATIENT)
Dept: OCCUPATIONAL THERAPY | Facility: HOSPITAL | Age: 74
DRG: 871 | End: 2024-04-22
Payer: MEDICARE

## 2024-04-22 LAB
ANION GAP SERPL CALCULATED.3IONS-SCNC: 2 MMOL/L (ref 7–15)
BUN SERPL-MCNC: 40.7 MG/DL (ref 8–23)
CALCIUM SERPL-MCNC: 9.3 MG/DL (ref 8.8–10.2)
CHLORIDE SERPL-SCNC: 94 MMOL/L (ref 98–107)
CREAT SERPL-MCNC: 1.14 MG/DL (ref 0.51–0.95)
DEPRECATED HCO3 PLAS-SCNC: 38 MMOL/L (ref 22–29)
EGFRCR SERPLBLD CKD-EPI 2021: 51 ML/MIN/1.73M2
GLUCOSE BLDC GLUCOMTR-MCNC: 102 MG/DL (ref 70–99)
GLUCOSE BLDC GLUCOMTR-MCNC: 128 MG/DL (ref 70–99)
GLUCOSE SERPL-MCNC: 112 MG/DL (ref 70–99)
PLATELET # BLD AUTO: 353 10E3/UL (ref 150–450)
POTASSIUM SERPL-SCNC: 5.3 MMOL/L (ref 3.4–5.3)
SODIUM SERPL-SCNC: 134 MMOL/L (ref 135–145)

## 2024-04-22 PROCEDURE — 85049 AUTOMATED PLATELET COUNT: CPT | Performed by: INTERNAL MEDICINE

## 2024-04-22 PROCEDURE — 250N000013 HC RX MED GY IP 250 OP 250 PS 637

## 2024-04-22 PROCEDURE — 99232 SBSQ HOSP IP/OBS MODERATE 35: CPT | Performed by: STUDENT IN AN ORGANIZED HEALTH CARE EDUCATION/TRAINING PROGRAM

## 2024-04-22 PROCEDURE — 999N000157 HC STATISTIC RCP TIME EA 10 MIN

## 2024-04-22 PROCEDURE — 210N000001 HC R&B IMCU HEART CARE

## 2024-04-22 PROCEDURE — 250N000013 HC RX MED GY IP 250 OP 250 PS 637: Performed by: INTERNAL MEDICINE

## 2024-04-22 PROCEDURE — 80048 BASIC METABOLIC PNL TOTAL CA: CPT | Performed by: INTERNAL MEDICINE

## 2024-04-22 PROCEDURE — 250N000011 HC RX IP 250 OP 636: Performed by: INTERNAL MEDICINE

## 2024-04-22 PROCEDURE — 36415 COLL VENOUS BLD VENIPUNCTURE: CPT | Performed by: INTERNAL MEDICINE

## 2024-04-22 PROCEDURE — 94660 CPAP INITIATION&MGMT: CPT

## 2024-04-22 PROCEDURE — 99232 SBSQ HOSP IP/OBS MODERATE 35: CPT | Performed by: INTERNAL MEDICINE

## 2024-04-22 PROCEDURE — 97530 THERAPEUTIC ACTIVITIES: CPT | Mod: GP | Performed by: PHYSICAL THERAPIST

## 2024-04-22 PROCEDURE — 250N000013 HC RX MED GY IP 250 OP 250 PS 637: Performed by: HOSPITALIST

## 2024-04-22 PROCEDURE — 99232 SBSQ HOSP IP/OBS MODERATE 35: CPT | Performed by: PHYSICIAN ASSISTANT

## 2024-04-22 PROCEDURE — 82610 CYSTATIN C: CPT | Performed by: PHYSICIAN ASSISTANT

## 2024-04-22 PROCEDURE — 97535 SELF CARE MNGMENT TRAINING: CPT | Mod: GO

## 2024-04-22 PROCEDURE — 97110 THERAPEUTIC EXERCISES: CPT | Mod: GO

## 2024-04-22 RX ORDER — TIZANIDINE 2 MG/1
2 TABLET ORAL 3 TIMES DAILY PRN
Status: DISCONTINUED | OUTPATIENT
Start: 2024-04-22 | End: 2024-04-23 | Stop reason: HOSPADM

## 2024-04-22 RX ADMIN — ANORECTAL OINTMENT: 15.7; .44; 24; 20.6 OINTMENT TOPICAL at 20:54

## 2024-04-22 RX ADMIN — MICONAZOLE NITRATE: 20 POWDER TOPICAL at 20:53

## 2024-04-22 RX ADMIN — ACETAMINOPHEN 650 MG: 325 TABLET ORAL at 20:51

## 2024-04-22 RX ADMIN — FUROSEMIDE 40 MG: 20 TABLET ORAL at 08:47

## 2024-04-22 RX ADMIN — ASPIRIN 81 MG CHEWABLE TABLET 81 MG: 81 TABLET CHEWABLE at 08:46

## 2024-04-22 RX ADMIN — AMOXICILLIN AND CLAVULANATE POTASSIUM 1 TABLET: 875; 125 TABLET, FILM COATED ORAL at 20:52

## 2024-04-22 RX ADMIN — ACETAMINOPHEN 650 MG: 325 TABLET ORAL at 08:44

## 2024-04-22 RX ADMIN — ONDANSETRON 4 MG: 2 INJECTION INTRAMUSCULAR; INTRAVENOUS at 18:32

## 2024-04-22 RX ADMIN — DULOXETINE HYDROCHLORIDE 30 MG: 30 CAPSULE, DELAYED RELEASE ORAL at 20:51

## 2024-04-22 RX ADMIN — MONTELUKAST 10 MG: 10 TABLET, FILM COATED ORAL at 20:52

## 2024-04-22 RX ADMIN — MICONAZOLE NITRATE ANTIFUNGAL POWDER: 2 POWDER TOPICAL at 09:03

## 2024-04-22 RX ADMIN — HYDROCHLOROTHIAZIDE 12.5 MG: 12.5 TABLET ORAL at 08:48

## 2024-04-22 RX ADMIN — Medication 60 ML: at 09:00

## 2024-04-22 RX ADMIN — DOCUSATE SODIUM 200 MG: 100 CAPSULE, LIQUID FILLED ORAL at 20:56

## 2024-04-22 RX ADMIN — DOCUSATE SODIUM 200 MG: 100 CAPSULE, LIQUID FILLED ORAL at 08:46

## 2024-04-22 RX ADMIN — TIZANIDINE 2 MG: 2 TABLET ORAL at 13:21

## 2024-04-22 RX ADMIN — ENOXAPARIN SODIUM 40 MG: 40 INJECTION SUBCUTANEOUS at 18:32

## 2024-04-22 RX ADMIN — MODAFINIL 200 MG: 100 TABLET ORAL at 09:00

## 2024-04-22 RX ADMIN — AMOXICILLIN AND CLAVULANATE POTASSIUM 1 TABLET: 875; 125 TABLET, FILM COATED ORAL at 08:43

## 2024-04-22 RX ADMIN — ANORECTAL OINTMENT: 15.7; .44; 24; 20.6 OINTMENT TOPICAL at 08:49

## 2024-04-22 RX ADMIN — ACETAMINOPHEN 650 MG: 325 TABLET ORAL at 12:11

## 2024-04-22 RX ADMIN — METOPROLOL SUCCINATE 50 MG: 50 TABLET, EXTENDED RELEASE ORAL at 08:47

## 2024-04-22 RX ADMIN — HYDROXYCHLOROQUINE SULFATE 200 MG: 200 TABLET, FILM COATED ORAL at 08:48

## 2024-04-22 RX ADMIN — MICONAZOLE NITRATE ANTIFUNGAL POWDER: 2 POWDER TOPICAL at 20:53

## 2024-04-22 RX ADMIN — ANORECTAL OINTMENT: 15.7; .44; 24; 20.6 OINTMENT TOPICAL at 13:22

## 2024-04-22 RX ADMIN — DULOXETINE HYDROCHLORIDE 30 MG: 30 CAPSULE, DELAYED RELEASE ORAL at 08:52

## 2024-04-22 RX ADMIN — MICONAZOLE NITRATE: 20 POWDER TOPICAL at 08:49

## 2024-04-22 ASSESSMENT — ACTIVITIES OF DAILY LIVING (ADL)
ADLS_ACUITY_SCORE: 48
ADLS_ACUITY_SCORE: 44
ADLS_ACUITY_SCORE: 48
ADLS_ACUITY_SCORE: 44
ADLS_ACUITY_SCORE: 48
ADLS_ACUITY_SCORE: 46

## 2024-04-22 NOTE — PROGRESS NOTES
V60 used overnight until ~0400. Patient currently is on 3 lpm nasal cannula. Encouraged patient to bring in her home BiPAP unit. Patient ok to use hospital's unit for the time being as she is anticipating discharge within a day or so.     Kelsie Canseco, RT

## 2024-04-22 NOTE — PROGRESS NOTES
V60 unit was replaced to hospital unit [id number]. Settings have been set to previous patient settings. RT will follow.     Theodore Good, RT

## 2024-04-22 NOTE — CONSULTS
"SPIRITUAL HEALTH SERVICES Progress Note  [facility], P3    Saw pt Desirae Rey per consult order/length of stay.    Patient/Family Understanding of Illness and Goals of Care - Desirae shared about her medical condition and history. She stated that she has \"been going downhill\" the past 6 months with numerous falls at home. She is expecting to go to TCU at discharge prior to going home.     Distress and Loss - Ongoing health challenges over past year.     Strengths, Coping, and Resources - Family is source of support. She is , has four children, and 15 grandchildren.     Meaning, Beliefs, and Spirituality - Patient comes from Jehovah's witness willie background and derives meaning, purpose, and comfort from willie. She shared about her willie journey. She has not been able to go to [employer] recently. She had many years of attendance at [employer], and then in recent years started attending [employer] and [employer]. She shared about her desire to \"be closer to God\" and she shared some history of her willie experiences. Patient welcomed prayer and expressed appreciation for the visit.      Plan of Care - A  will continue to visit as able or per request by patient/family/staff.      Lee Canales MDiv, [facility]  /Manager Spiritual Health Services  [phone]       Spiritual Health Services is available 24/7 for emergent requests and consults, either by paging the on-call  or by entering an ASAP/STAT consult in [facility], which will also page the on-call .    "

## 2024-04-22 NOTE — PLAN OF CARE
Had a BM this am on the commode. Skin showing signs of healing. (Multipal areas)  Hopes to discharge to TCU today. Currently up in chair- will use ceiling lift to transfer back to bed. Has multiple bags of belongings to send with. States her  will bring her home CPAP unit to the TCU. States she has all of her belongings. Any Hunter RN    Problem: Gas Exchange Impaired  Goal: Optimal Gas Exchange  Outcome: Progressing  Intervention: Optimize Oxygenation and Ventilation  Recent Flowsheet Documentation  Taken 4/22/2024 1126 by Any Hunter RN  Head of Bed (HOB) Positioning: (upright for breakfast) --  Taken 4/22/2024 0800 by Any Hunter RN  Head of Bed (HOB) Positioning: (upright for breakfast) --     Problem: Intestinal Obstruction  Goal: Optimal Bowel Function  Outcome: Progressing  Intervention: Promote Bowel Function  Recent Flowsheet Documentation  Taken 4/22/2024 1126 by Any Hunter RN  Body Position: heels elevated  Head of Bed (HOB) Positioning: (upright for breakfast) --  Taken 4/22/2024 0800 by Any Hunter RN  Body Position: heels elevated  Head of Bed (HOB) Positioning: (upright for breakfast) --

## 2024-04-22 NOTE — PROGRESS NOTES
Infectious Disease Progress Note    Assessment/Plan  Impression: Diverticulitis.  Seems to be responding to antimicrobial chemotherapy. Active issue      Confusion and hallucinations.  Sounds more like lucid hallucinations from description.  Resolved.     Concern for adverse drug reaction listed to dicloxacillin, but told my colleague she is tolerated Augmentin without difficulty.     Recommendations:   Complete 10 days of p.o. Augmentin 875/125 mg twice daily.  Peripheral neuropathy workup and management per primary team.    Discussed with the patient, nursing staff.    ID will sign off.    Active Problems:    Diverticulitis    Acute and chronic respiratory failure with hypoxia (H)    Sepsis (H)    CHF (congestive heart failure) (H)    Pulmonary hypertension (H)      Fabiana Larry MD  [phone]      Subjective  Doing better overall.  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     Objective    Vital signs in last 24 hours  Temp:  [97.5  F (36.4  C)-98.1  F (36.7  C)] 97.5  F (36.4  C)  Pulse:  [55-71] 71  Resp:  [18-20] 18  BP: (118-171)/(59-87) 127/75  SpO2:  [95 %-99 %] 95 %  Wt Readings from Last 3 Encounters:   04/22/24 93.1 kg (205 lb 4 oz)   12/19/23 93.4 kg (206 lb)   12/07/23 92.7 kg (204 lb 5.9 oz)       Intake/Output last 3 shifts  I/O last 3 completed shifts:  In: 923 [P.O.:920; I.V.:3]  Out: 2750 [Urine:2750]  Intake/Output this shift:  No intake/output data recorded.    Review of Systems   Pertinent items are noted in HPI., otherwise negative.    Physical Exam  General appearance: alert, appears stated age, and cooperative  Head: Normocephalic, without obvious abnormality, atraumatic  Lungs: Clear bilaterally.  Heart: Rate and rhythm, no murmur  Abdomen: soft, non-tender; bowel sounds normal; no masses,  no organomegaly  Extremities: Warm and dry  Skin: Skin color, texture, turgor normal. No rashes or lesions  Neurologic: was sleeping but wakes up and  "answers questions.     Pertinent Labs   Lab Results: personally reviewed.     Recent Labs   Lab 04/22/24 0439 04/21/24 0449 04/19/24 0452 04/17/24 0424 04/16/24 0449   WBC  --  8.0  --  12.4* 13.9*   HGB  --  11.2*  --  9.7* 9.5*   HCT  --  37.7  --  32.6* 31.0*    [phone] 365        Recent Labs   Lab 04/22/24 0439 04/21/24 0449 04/20/24  0507   * 135 140   CO2 38* 37* 28   BUN 40.7* 35.4* 36.3*     Creatinine   Date Value Ref Range Status   04/22/2024 1.14 (H) 0.51 - 0.95 mg/dL Final       No results found for: \"CULT\"  7-Day Micro Results       No results found for the last 168 hours.            Pertinent Radiology   Radiology Results:   MR Cervical Spine w/o & w Contrast    Result Date: 4/20/2024  EXAM: MR CERVICAL SPINE W/O and W CONTRAST LOCATION: [facility] DATE: 4/20/2024 INDICATION: hx cervical fusion; r o central stenosis COMPARISON: CT from 12/05/2023. CONTRAST: 9ml gadavist TECHNIQUE: MRI Cervical Spine without and with IV contrast. FINDINGS: Postsurgical changes of instrumented anterior spinal fusion C5-C7. Normal vertebral body heights, alignment and marrow signal. Multilevel Schmorl's node-like endplate indentations. No abnormal cord signal. Right greater than left mastoid effusions. Craniovertebral junction and C1-C2: Normal. C2-C3: Normal disc height. No herniation. Right greater than left facet arthropathy. No spinal canal or neural foraminal stenosis. C3-C4: Normal disc height. Uncovertebral spurring and bilateral facet arthropathy. Mild bilateral neural foraminal stenosis. No spinal canal stenosis. C4-C5: Disc osteophyte complex and bilateral uncinate spurring. Mild to moderate bilateral neural foraminal stenosis. No spinal canal stenosis. C5-C6: Fusion level. Uncovertebral spurring resulting in moderate left neural foraminal stenosis. No spinal canal stenosis. No right neural foraminal stenosis. C6-C7: Fusion level. Uncovertebral spurring. Bilateral " facet arthropathy. Mild to moderate bilateral neural foraminal stenosis. No spinal canal stenosis. C7-T1: Mild disc height loss and disc degeneration. Disc osteophyte complex and bilateral uncinate spurring. Bilateral facet arthropathy. No spinal canal or neural foraminal stenosis.     IMPRESSION: 1.  Postsurgical changes of instrumented anterior spinal fusion C5-C7. Minimal prevertebral edema at C3-C4 level. 2.  Multilevel cervical spondylosis without high-grade spinal canal stenosis. Multilevel neural foraminal stenosis detailed above. 3.  No abnormal spinal cord signal. No abnormal contrast enhancement.    MR Lumbar Spine w/o Contrast    Result Date: 4/19/2024  EXAM: MR LUMBAR SPINE W/O CONTRAST LOCATION: [facility] DATE: 4/19/2024 INDICATION: Low back pain, hx of  lumbar spinal stenosis. COMPARISON: None. TECHNIQUE: Routine Lumbar Spine MRI without IV contrast. FINDINGS: Transitional vertebral anatomy with a partially sacralized L5 vertebral body.  Careful attention to the numbering convention is recommended prior to any future percutaneous or surgical intervention. Retrolisthesis of T12 on L1 measuring 4 mm and L2 on L3  measuring 2 mm. Anterolisthesis of L4 and L5 measuring 3 mm and L5 on S1 measuring 2 mm. The vertebral bodies of the lumbar spine otherwise have normal stature, alignment, and marrow signal intensity. Normal distal spinal cord and cauda equina with conus medullaris at the inferior plate of L2. No extraspinal abnormality. Unremarkable visualized bony pelvis. T10-T11: Symmetric disc bulge and moderate facet arthropathy without significant spinal canal narrowing. Moderate bilateral neural foraminal narrowing. T11/T12: Symmetric disc bulge, facet arthropathy and ligamentum flavum thickening with moderate spinal canal narrowing. Severe left and moderate right neural foraminal narrowing. T12-L1: Symmetric disc bulge and moderate facet arthropathy with mild spinal canal  narrowing. Severe right and mild left neural foraminal narrowing. L1-L2: Symmetric disc bulge and moderate facet arthropathy with mild spinal canal narrowing. Severe right and moderate left neural foraminal narrowing. L2-L3: Symmetric disc bulge, advanced facet arthropathy and ligamentum flavum thickening contributing to severe spinal canal narrowing. Severe bilateral neural foraminal narrowing. L3-L4: Symmetric disc bulge, advanced facet arthropathy and ligamentum flavum thickening contributing to severe spinal canal narrowing. Severe left and moderate right neural foraminal narrowing. L4-L5: Symmetric disc bulge, facet arthropathy and ligamentum flavum thickening contributing to severe spinal canal narrowing. Severe bilateral neural foraminal narrowing. L5-S1: Normal disc signal and disc height. No posterior disc bulge or spinal canal narrowing. Mild bilateral neural foraminal narrowing. S1/S2: No posterior disc bulge or spinal canal narrowing. No neural foraminal narrowing.     IMPRESSION: 1.  Transitional vertebral anatomy with a partially sacralized L5 vertebral body.  Careful attention to the numbering convention is recommended prior to any future percutaneous or surgical intervention. 2.  Multilevel degenerative disc disease of the lumbar spine with disc height loss, most pronounced at the L2/L3 - L4/L5 levels where there is severe spinal canal narrowing. 3.  Multilevel posterolateral disc disease and facet arthropathy with severe multilevel neural foraminal narrowing as described above.    CT Head w/o Contrast    Result Date: 4/17/2024  EXAM: CT HEAD W/O CONTRAST LOCATION: [facility] DATE: 4/17/2024 INDICATION: Confusion, hallucination. COMPARISON: None. TECHNIQUE: Routine CT Head without IV contrast. Multiplanar reformats. Dose reduction techniques were used. FINDINGS: INTRACRANIAL CONTENTS: No finding for intracranial hemorrhage, mass, or acute infarct. Ventricles are normal in  size. Normal gray-white matter differentiation. No mass effect or midline shift. Cerebellar tonsils are normally positioned. Sella is unremarkable for technique. Corpus callosum is normally formed. VISUALIZED ORBITS/SINUSES/MASTOIDS: Prior cataract surgeries. Both globes are borderline proptotic with prominence of the orbital fat. No abnormal enlarged extraocular muscles to strongly suggest thyroid related orbitopathy and findings are favored to relate to body habitus. Dependent secretions right maxillary sinus suggesting active sinus disease. Trace ethmoid sinus mucosal thickening. No middle ear or mastoid effusion. BONES/SOFT TISSUES: Calvarium is intact, without fracture or suspicious lytic or blastic foci. Benign hyperostosis frontalis internus.     IMPRESSION: 1.  Dependent air-fluid level/dependent secretions right maxillary sinus compatible with active sinus disease. 2.  No finding for intracranial hemorrhage, mass, or acute infarct.    CT Abdomen Pelvis w Contrast    Result Date: 4/13/2024  EXAM: CT ABDOMEN PELVIS W CONTRAST LOCATION: [facility] DATE: 4/13/2024 INDICATION: Pain. COMPARISON: None. TECHNIQUE: CT scan of the abdomen and pelvis was performed following injection of IV contrast. Multiplanar reformats were obtained. Dose reduction techniques were used. CONTRAST: Isovue 370 90 mL FINDINGS: LOWER CHEST: Normal. HEPATOBILIARY: Cholecystectomy. The liver is unremarkable. No biliary dilatation. PANCREAS: Normal. SPLEEN: Normal. ADRENAL GLANDS: Normal. KIDNEYS/BLADDER: Bilateral renal cysts have benign features and require no additional follow-up. The urinary bladder is grossly unremarkable. BOWEL: Moderate inflammatory changes noted in the left lower quadrant surrounding the upper sigmoid colon secondary to acute diverticulitis. Moderate formed stool is noted in the colon up to the level of the site of diverticulitis. This may be resulting in a delay in transit due to the wall  thickening and inflammation. LYMPH NODES: No lymphadenopathy. VASCULATURE: Normal. PELVIC ORGANS: No lymphadenopathy. No pericardial effusion. MUSCULOSKELETAL: Degenerative changes lower thoracic and lumbar spine.     IMPRESSION: 1.  Acute diverticulitis involving the upper sigmoid colon. 2.  Moderate formed stool noted in the colon extending to the level of the site of diverticulitis. Distal to this the colon is decompressed. 3.  No evidence for free fluid or abscess. 4.  Prior cholecystectomy.    CT Chest Pulmonary Embolism w Contrast    Result Date: 4/13/2024  EXAM: CT CHEST PULMONARY EMBOLISM W CONTRAST LOCATION: [facility] DATE: 4/13/2024 INDICATION: SOB COMPARISON: CT 10/31/2022 TECHNIQUE: CT chest pulmonary angiogram during arterial phase injection of IV contrast. Multiplanar reformats and MIP reconstructions were performed. Dose reduction techniques were used. CONTRAST: Isovue 370 90 mL FINDINGS: ANGIOGRAM CHEST: Pulmonary arteries are normal caliber and negative for pulmonary emboli. The subsegmental pulmonary arteries in the lung bases cannot be adequately assessed due to respiratory motion artifact. Thoracic aorta is negative for dissection. No CT evidence of right heart strain. LUNGS AND PLEURA: Moderate respiratory motion artifact. Mild bibasilar atelectasis. No consolidation. MEDIASTINUM/AXILLAE: No lymphadenopathy. No pericardial effusion. CORONARY ARTERY CALCIFICATION: Moderate. UPPER ABDOMEN: Normal. MUSCULOSKELETAL: Surgical changes of the cervical spine.     IMPRESSION: 1.  Moderate respiratory motion artifact limits some detail of the subsegmental pulmonary arteries in the lung bases as well as portions of the lungs. No pulmonary emboli appreciated. 2.  No evidence for pulmonary consolidation or pleural fluid.    US Lower Extremity Venous Duplex Bilateral    Result Date: 4/13/2024  EXAM: US LOWER EXTREMITY VENOUS DUPLEX BILATERAL LOCATION: [facility] DATE: 4/13/2024 INDICATION: pain, swelling COMPARISON: None. TECHNIQUE: Venous Duplex ultrasound of bilateral lower extremities with and without compression, augmentation and duplex. Color flow and spectral Doppler with waveform analysis performed. FINDINGS: Exam includes the common femoral, femoral, popliteal veins as well as segmentally visualized deep calf veins and greater saphenous vein. RIGHT: No deep vein thrombosis. No superficial thrombophlebitis. No popliteal cyst. LEFT: No deep vein thrombosis. No superficial thrombophlebitis. No popliteal cyst.     IMPRESSION: 1.  No deep venous thrombosis in the bilateral lower extremities.

## 2024-04-22 NOTE — PROGRESS NOTES
[facility]    Medicine Progress Note - Hospitalist Service    Date of Admission:  4/13/2024    Assessment & Plan   73-year-old female with history of of chronic respiratory failure, NELLIE/OHS, CKD, CHF who was admitted with sepsis due to acute diverticulitis and acute on chronic respiratory failure.    #Acute diverticulitis  --CT shows sigmoid diverticulitis, no abscess, stool throughout colon  --BCx no growth.  --Hemodynamically stable, WBC count significantly improved  --On 4/18, advanced diet to low fiber diet and tolerating  --Constipation improved with bowel regimen.    --IV Cipro and Flagyl, switch to PO on 4/16.  However, patient having visual hallucination and confusion which could be secondary to antibiotics.  ID consulted, antibiotics Cipro and Flagyl discontinued and started on Augmentin on 4/18 for 10 days.    #Acute on chronic hypoxic respiratory failure;  #NELLIE, OHS on BiPAP/CPAP and 3L NC at home;  #Severe obesity;  --No wheezing on exam  --BiPAP/CPAP at night and when taking naps during the daytime  --Incentive spirometry, flutter valve.  As needed neb.  Ambulate as able.  --Appreciated pulmonary input and they signed off    #Acute on chronic HFpEF;  # Essential hypertension;  --ASA, beta-blocker  --IV diuretics discontinued, resumed home Lasix at 40 mg daily on 4/18.  -- Monitor respiratory status and volume status closely.    #CKD stage III;  #Mild hyperkalemia;  --Renal function seems fairly at baseline.    --Low potassium diet, Lokelma but serum potassium trending up and nephrology consulted.  Patient initiated on HCTZ and Lokelma discontinued.  However, serum potassium up today again.  Defer management to nephrology.    # Visual hallucination; improved  -- Nonfocal neurological exam.    --Given history of NELLIE/OHS, checked ABG and fairly unremarkable.  --CT head negative for acute intracranial process  -- Discontinued IV narcotics, discontinued ciprofloxacin.  --Patient  reported that she recently lost her brother and a lot of stress  --Psychiatry consulted, recommended to wean off Cymbalta as may be contributing.  However, unlikely Cymbalta contributing as patient has been on this medications for some time and her symptoms improved while on Cymbalta.  --Continue clinical monitoring    #Candida intertrigo;  #Pressure injury on right heel plantar/lateral area;  -- Continue local care and antifungal treatment.  Offload pressure as instructed by wound care nurse  --Appreciated wound care nurse input    #Physical deconditioning;  # Progressive chronic low back pain;  # Chronic bilateral lower extremity neuropathy;  #History of cervical fusion surgery in 2017;  --Patient reported taking Cymbalta for lower extremity numbness, concern that may be contributing hallucination but unlikely, refer above.  However, will keep on half of home dose, 30 mg twice daily.  Of note, Neurontin and Lyrica are listed as allergy.  -- At baseline patient reported not ambulating much.  Discussed with physical therapist, requiring assist of 2 to 3 staffs, requiring June lifting.   -- MRI lumbar spine with multilevel degenerative changes, severe spinal stenosis, appreciate neurosurgery input, reported they arrange outpatient follow-up.   -- Continue scheduled Tylenol.  PTA as needed tizanidine at lower dose.  Monitor for confusion.  Try to avoid narcotics if able  -- PT OT recommending TCU    Addendum;  -- Patient and family members requesting palliative care consult.  Discussed with patient's  and daughter.          Diet: Snacks/Supplements Adult: Expedite Bottle; With Meals  Combination Diet 2 gm K Diet; Low Fiber Diet; 2 gm NA Diet    DVT Prophylaxis: Enoxaparin (Lovenox) SQ  Rebollar Catheter: Not present  Lines: None     Cardiac Monitoring: None  Code Status: Full Code      Clinically Significant Risk Factors              # Hypoalbuminemia: Lowest albumin = 2.6 g/dL at 4/15/2024  4:18 AM, will monitor  "as appropriate     # Hypertension: Noted on problem list  # Chronic heart failure with preserved ejection fraction: heart failure noted on problem list and last echo with EF >50%       # Severe Obesity: Estimated body mass index is 47.7 kg/m  as calculated from the following:    Height as of this encounter: 1.397 m (4' 7\").    Weight as of this encounter: 93.1 kg (205 lb 4 oz).   # Moderate Malnutrition: based on nutrition assessment    # Financial/Environmental Concerns: none  # Asthma: noted on problem list        Disposition Plan     Medically Ready for Discharge: Anticipated Tomorrow             Onofre Harvey MD  Hospitalist Service  [facility]  Securely message with [employer] (more info)  Text page via [employer] Paging/Directory   ______________________________________________________________________    Interval History   Patient seen and examined.  Notes, labs, imaging report personally reviewed.  Patient denied abdomen pain, nausea, vomiting.  Denied short of breath or chest pain.  Reported whole body stiffness this morning and would like to restart home as needed muscle relaxant.  Patient reported that now she would like to go to TCU.  Discussed with nursing staff.  Discussed with care manager/.  I had detailed conversation with patient's daughter yesterday and no new information to update today.  Addendum;  Received message from patient's nurse stating patient and family are requesting a palliative care consult and daughter is also requesting a call for update and to be a part of palliative care consult.  I called patient's nurse immediately after receiving message to inquire if family members at bedside.  Patient's nurse reported family at bedside but they have to leave.  Called patient's daughter, patient's  was also on phone.  Patient's  reported he usually not available until noon and asking if palliative care can call her daughter and requesting for care " conference in afternoon time.  Palliative care consult placed.    Physical Exam   Vital Signs: Temp: 97.5  F (36.4  C) Temp src: Oral BP: 127/75 Pulse: 71   Resp: 18 SpO2: 95 % O2 Device: Nasal cannula Oxygen Delivery: 3 LPM  Weight: 205 lbs 3.97 oz      General: Not in obvious distress.  HEENT: Normocephalic, supple neck  Chest: Clear to auscultation bilateral anteriorly, no wheezing  Heart: S1S2 normal, regular  Abdomen: Soft.  Obese, nontender  Extremities: trace legs swelling  Neuro: alert and awake, follows simple commands appropriately, moves all extremities but has generalized motor weakness        Medical Decision Making             Data     I have personally reviewed the following data over the past 24 hrs:    N/A  \   N/A   / 353     134 (L) 94 (L) 40.7 (H) /  102 (H)   5.3 38 (H) 1.14 (H) \       Imaging results reviewed over the past 24 hrs:   No results found for this or any previous visit (from the past 24 hour(s)).

## 2024-04-22 NOTE — PLAN OF CARE
"  Patient is A/O.  Patient had 9/10 pain at her feet in the 1200 hour, PRN tylenol given.  At 1330 patient reported pain at site was now 6/10, PRN Zanaflex given.  Patient has dry infrequent cough, diminished lungs, and is on 3 L NC.  Patient has +2 edema at legs.  Patient is anticipated to discharge to TCU either today or tomorrow, waiting for opening.  Renal panel, nutrition consult, and spiritual health consult have been ordered since the 1200 hour.     Tele:  Med Surg    Problem: Adult Inpatient Plan of Care  Goal: Plan of Care Review  Description: The Plan of Care Review/Shift note should be completed every shift.  The Outcome Evaluation is a brief statement about your assessment that the patient is improving, declining, or no change.  This information will be displayed automatically on your shift  note.  Outcome: Progressing  Goal: Patient-Specific Goal (Individualized)  Description: You can add care plan individualizations to a care plan. Examples of Individualization might be:  \"Parent requests to be called daily at 9am for status\", \"I have a hard time hearing out of my right ear\", or \"Do not touch me to wake me up as it startles  me\".  Outcome: Progressing  Goal: Absence of Hospital-Acquired Illness or Injury  Outcome: Progressing  Goal: Optimal Comfort and Wellbeing  Outcome: Progressing  Intervention: Monitor Pain and Promote Comfort  Recent Flowsheet Documentation  Taken 4/22/2024 1321 by Mauricio Bhatt, RN  Pain Management Interventions:   medication (see MAR)   repositioned     Freddy Bhatt RN    "

## 2024-04-22 NOTE — PROGRESS NOTES
Leg pumps are taken off frequently to rest pt' legs per her request due to cellulitis. Shins are red and painfully when touched. + 1 edema on the lower extremity.

## 2024-04-22 NOTE — PROGRESS NOTES
RENAL NOTE- new to me, reviewed chart.     REQUESTING PHYSICIAN: Dr. ORTIZ    REASON FOR CONSULT: Recurrent hyperkalemia    ASSESSMENT/PLAN:  Hyperkalemia - recurrent, intermittent hyperkalemia for the last several years.  Previously saw Nephrology years ago and taken off ARB and not taking any longer.  Does have some CKD at baseline. Her TTKG is low c/w inadequate renal K excretion, prob low renin / gloria state. I would not use fludrocortisone here due to hx of HTN / edema but we can try low dose hydrochlorothiazide to promote renal K clearance and hope to keep off lokelma long term.   Recs:  Cont hydrochlorothiazide 12.5 mg/day, will inc if BP/ Na tolerate. Cont lasix for now. Try to hold lokelma if able. Low K diet, will have RD visit with pt. K 5.3 this AM, will dose Lokelma if K > 5.5  Daily BMP  She should plan to f/up with ANC after discharge, her spouse also goes to our clinic, appointment made 5/10/2024 at 9 AM with Daphne AGUDELO at our [place] clinic.  Our office will follow-up with her to confirm.     CKD - creatinine ont he lower side of her baseline at 1.1s, have seen her up to 1.4 at times.  Some AKIs in the past.  UA actually pretty clean , urine alb 302mg/g creat  Wonder if her eGFR is lower than estimated by Creat due to low muscle mass.  Will check cystatin C GFR, pending       Diverticulitis - admitting diagnosis and improving     Chronic Resp Failure - OHS/NELLIE.  3L NC at home.  On bipap/CPAP.  Pulm saw earlier in the stay.  Now on lower dose lasix vs PTA     Chronic HFpEF - currently on oral lasix and diuresing adequately     Chronic back pain - Neurosurgery consulted       HPI:   She seems to be tolerating the addition of hydrochlorothiazide, blood pressure not getting too low  She does feel she is urinating more frequently and larger volumes  Tells me that she has been trying to make conscious effort to limit potassium intake in her diet  We discussed low potassium diet recommendations and she is  also amendable to meeting with RD to further discuss  Feels her breathing is about the same      REVIEW OF SYSTEMS: a 12 point review of systems was reviewed and negative other than noted in the HPI above.      Past Medical History:   Diagnosis Date    Allergic rhinitis     Arthritis of back     CHF (congestive heart failure) (H)     Chronic kidney disease     stage 3     De Quervain's disease (tenosynovitis)     Fibromyalgia, primary     GERD (gastroesophageal reflux disease)     Hematuria     chronic    High cholesterol     History of blood clots 09/01/1970    DVT, from birth control, h/o left calf    Hyperlipidemia     Hypertension     Low back pain     Osteoporosis     Pickwickian syndrome (H)     Plantar fasciitis     Proteinuria     Proteinuria     chronic    Sleep apnea     CPAP    Uncomplicated asthma        Current Facility-Administered Medications   Medication Dose Route Frequency Provider Last Rate Last Admin    acetaminophen (TYLENOL) tablet 650 mg  650 mg Oral TID Onofre Harvey MD   650 mg at 04/22/24 0844    acetaminophen (TYLENOL) tablet 650 mg  650 mg Oral Q6H PRN Onofre Harvey MD   650 mg at 04/22/24 1211    albuterol (PROVENTIL HFA/VENTOLIN HFA) inhaler  2 puff Inhalation Q4H PRN Onofre Harvey MD        amoxicillin-clavulanate (AUGMENTIN) 875-125 MG per tablet 1 tablet  1 tablet Oral Q12H [facility] (08/20) Ivan Palmer MD   1 tablet at 04/22/24 0843    aspirin (ASA) chewable tablet 81 mg  81 mg Oral Daily Carlos Bull MD   81 mg at 04/22/24 0846    calcium carbonate (TUMS) chewable tablet 1,000 mg  1,000 mg Oral 4x Daily PRN Jennifer Gauthier MD        glucose gel 15-30 g  15-30 g Oral Q15 Min PRN Hai Flores MD        Or    dextrose 50 % injection 25-50 mL  25-50 mL Intravenous Q15 Min PRN Hai Flores MD        Or    glucagon injection 1 mg  1 mg Subcutaneous Q15 Min PRN Hai Flores MD        docusate sodium (COLACE) capsule 200 mg  200 mg Oral BID Patrick Gonzales, DO   200  mg at 04/22/24 0846    DULoxetine (CYMBALTA) DR capsule 30 mg  30 mg Oral BID Onofre Harvey MD   30 mg at 04/22/24 0852    enoxaparin ANTICOAGULANT (LOVENOX) injection 40 mg  40 mg Subcutaneous Q24H Hai Flores MD   40 mg at 04/21/24 1702    furosemide (LASIX) tablet 40 mg  40 mg Oral QAM Onofre Harvey MD   40 mg at 04/22/24 0847    hydrALAZINE (APRESOLINE) injection 5 mg  5 mg Intravenous Q6H PRN Onofre Harvey MD        hydroCHLOROthiazide tablet 12.5 mg  12.5 mg Oral Daily Tiny Alvarado MD   12.5 mg at 04/22/24 0848    hydroxychloroquine (PLAQUENIL) tablet 200 mg  200 mg Oral Daily Carlos Bull MD   200 mg at 04/22/24 0848    ipratropium - albuterol 0.5 mg/2.5 mg/3 mL (DUONEB) neb solution 3 mL  3 mL Nebulization Q4H PRN Jennifer Gauthier MD        lidocaine (LMX4) cream   Topical Q1H PRN Jennifer Gauthier MD        lidocaine 1 % 0.1-1 mL  0.1-1 mL Other Q1H PRN Jennifer Gauthier MD        magnesium hydroxide (MILK OF MAGNESIA) suspension 30 mL  30 mL Oral Daily PRN Patrick Gonzales DO   30 mL at 04/14/24 1323    menthol-zinc oxide (CALMOSEPTINE) 0.44-20.6 % ointment OINT   Topical TID Onofre Harvey MD   Given at 04/22/24 0849    metoprolol succinate ER (TOPROL XL) 24 hr tablet 50 mg  50 mg Oral Daily Onofre Harvey MD   50 mg at 04/22/24 0847    miconazole (MICATIN) 2 % powder   Topical BID Jennifer Gauthier MD   Given at 04/22/24 0849    miconazole (MICATIN) 2 % powder   Topical BID Carlos Bull MD   Given at 04/22/24 0903    modafinil (PROVIGIL) tablet 200 mg  200 mg Oral Daily Carlos Bull MD   200 mg at 04/22/24 0900    montelukast (SINGULAIR) tablet 10 mg  10 mg Oral QPM Onofre Harvey MD   10 mg at 04/21/24 2049    naloxone (NARCAN) injection 0.2 mg  0.2 mg Intravenous Q2 Min PRN Hai Flores MD        Or    naloxone (NARCAN) injection 0.4 mg  0.4 mg Intravenous Q2 Min PRN Hai Flores MD        Or    naloxone (NARCAN) injection 0.2 mg  0.2 mg Intramuscular Q2 Min  PRN Hai Flores MD        Or    naloxone (NARCAN) injection 0.4 mg  0.4 mg Intramuscular Q2 Min PRN Hai Flores MD        ondansetron (ZOFRAN ODT) ODT tab 4 mg  4 mg Oral Q6H PRN Jennifer Gauthier MD        Or    ondansetron (ZOFRAN) injection 4 mg  4 mg Intravenous Q6H PRN Jennifer Gauthier MD   4 mg at 04/14/24 1519    polyethylene glycol (MIRALAX) Packet 17 g  17 g Oral BID PRN Carlos Bull MD   17 g at 04/18/24 0533    senna-docusate (SENOKOT-S/PERICOLACE) 8.6-50 MG per tablet 1 tablet  1 tablet Oral BID PRN Jennifer Gauthier MD        Or    senna-docusate (SENOKOT-S/PERICOLACE) 8.6-50 MG per tablet 2 tablet  2 tablet Oral BID PRN Jennifer Gauthier MD        sodium chloride (PF) 0.9% PF flush 3 mL  3 mL Intracatheter Q8H Jennifer Gauthier MD   3 mL at 04/22/24 0903    sodium chloride (PF) 0.9% PF flush 3 mL  3 mL Intracatheter q1 min prn Jennifer Gauthier MD        [Held by provider] sodium zirconium cyclosilicate (LOKELMA) packet 10 g  10 g Oral Daily Stephan Pickens MD   10 g at 04/21/24 0850    tiZANidine (ZANAFLEX) tablet 2 mg  2 mg Oral TID PRN Onofre Harvey MD        wound support modular (EXPEDITE) bottle 60 mL  60 mL Oral Daily Patrick Gonzales DO   60 mL at 04/22/24 0900       No current outpatient medications on file.      ALLERGIES/SENSITIVITIES:  Allergies   Allergen Reactions    Latex Rash     Severe rash    Pregabalin Shortness Of Breath     Other Reaction(s): swelling and shortness of breath    Valsartan Unknown     Hyperkalemia    Ciprofloxacin Visual Disturbance, Hallucination and Confusion     Likely contributed visual hallucination and confusion    Colchicine Diarrhea    Dicloxacillin      Other Reaction(s): confusion, says she has tolerated augmentin without difficulty.     Gabapentin      Other Reaction(s): Sedation    Latex Unknown     Added based on information entered during case entry, please review and add reactions, type, and severity as needed    Other Drug Allergy (See  Comments) Muscle Pain (Myalgia)     Tried lovastatin and simvastatin    Other Environmental Allergy Unknown     Coverlet bandaid , 3M product; rash    Statins-Hmg-Coa Reductase Inhibitors [Statins] Muscle Pain (Myalgia)     Tried lovastatin and simvastatin    Sulfa Antibiotics Swelling     Facial swelling      Adhesive Tape Rash     Social History     Tobacco Use    Smoking status: Never    Smokeless tobacco: Never   Vaping Use    Vaping status: Never Used   Substance Use Topics    Alcohol use: No    Drug use: No     I have reviewed this patient's family history and updated it with pertinent information if needed.  Family History   Problem Relation Age of Onset    Rheumatoid Arthritis Mother     Heart Disease Sister     Chronic Obstructive Pulmonary Disease Father          PHYSICAL EXAM:  Physical Exam   Temp: 97.9  F (36.6  C) Temp src: Oral BP: 130/71 Pulse: 71   Resp: 18 SpO2: 95 % O2 Device: Nasal cannula Oxygen Delivery: 3 LPM  Vitals:    04/20/24 0431 04/21/24 0500 04/22/24 0412   Weight: 93.7 kg (206 lb 9.1 oz) 94.8 kg (208 lb 14.4 oz) 93.1 kg (205 lb 4 oz)     Vital Signs with Ranges  Temp:  [97.5  F (36.4  C)-98.1  F (36.7  C)] 97.9  F (36.6  C)  Pulse:  [55-71] 71  Resp:  [18-20] 18  BP: (118-171)/(59-87) 130/71  SpO2:  [95 %-99 %] 95 %  I/O last 3 completed shifts:  In: 923 [P.O.:920; I.V.:3]  Out: 2750 [Urine:2750]    @TMAXR(24)@    Patient Vitals for the past 72 hrs:   Weight   04/22/24 0412 93.1 kg (205 lb 4 oz)   04/21/24 0500 94.8 kg (208 lb 14.4 oz)   04/20/24 0431 93.7 kg (206 lb 9.1 oz)       General - A & O x 3, NAD  HEENT - on NC o2  Neck - JVD hard to assess 2/2 body habitus  Respiratory - Lungs very distant, no crackles and normal resp effort.   Cardiovascular - AP RRR with murmur  Abdomen - soft, nontender, obese  Extremities - trace-1+ edema  Integumentary - intact, good turgor, no rash/lesions  Neurologic - grossly intact  Psych:  Judgement intact, affect WNL  : Pure wick in place, light  yellow output in canister    Laboratory:     Recent Labs   Lab 04/22/24  0439 04/21/24 0449 04/19/24 0452 04/17/24 0424 04/16/24 0449   WBC  --  8.0  --  12.4* 13.9*   RBC  --  3.65*  --  3.20* 3.07*   HGB  --  11.2*  --  9.7* 9.5*   HCT  --  37.7  --  32.6* 31.0*    371 404 393 365       Basic Metabolic Panel:  Recent Labs   Lab 04/22/24  1205 04/22/24  0713 04/22/24  0439 04/21/24  1723 04/21/24  1240 04/21/24  0449 04/20/24  0904 04/20/24  0507 04/19/24 2129 04/19/24 0452 04/19/24 0354 04/18/24 0413 04/17/24 0757 04/17/24 0424   NA  --   --  134*  --   --  135  --  140  --  136  --  132*  --  133*   POTASSIUM  --   --  5.3  --   --  4.8  --  5.6*  5.6*  --  5.2  --  5.2  --  5.1   CHLORIDE  --   --  94*  --   --  94*  --  99  --  97*  --  96*  --  97*   CO2  --   --  38*  --   --  37*  --  28  --  32*  --  30*  --  28   BUN  --   --  40.7*  --   --  35.4*  --  36.3*  --  33.6*  --  27.2*  --  28.8*   CR  --   --  1.14*  --   --  1.07*  --  1.12*  --  1.14*  --  1.29*  --  1.32*   * 102* 112* 151* 137* 95   < > 100*   < > 148*   < > 106*   < > 93   LYNN  --   --  9.3  --   --  9.4  --  9.4  --  9.5  --  9.4  --  9.2    < > = values in this interval not displayed.       INRNo lab results found in last 7 days.    Recent Labs   Lab Test 04/20/24  0507 04/19/24  0452   POTASSIUM 5.6*  5.6* 5.2   CHLORIDE 99 97*   BUN 36.3* 33.6*      Recent Labs   Lab Test 04/15/24  0418 04/14/24  0413 04/13/24  2031 01/15/24  1618 12/05/23  1646   ALBUMIN 2.6* 3.0*  --    < >  --    BILITOTAL 0.4 0.3  --    < >  --    ALT 27 23  --    < >  --    AST 46* 41  --    < >  --    PROTEIN  --   --  100*  --  100*    < > = values in this interval not displayed.     TTKG 3 % which is lower than expected in setting of hyperkalemia.   Personally reviewed today's laboratory studies      Thank you for involving us in the care of this patient. We will continue to follow along with you.      Graham Toney PA-C  [facility]  [phone]

## 2024-04-22 NOTE — PLAN OF CARE
Problem: Adult Inpatient Plan of Care  Goal: Plan of Care Review  Description: The Plan of Care Review/Shift note should be completed every shift.  The Outcome Evaluation is a brief statement about your assessment that the patient is improving, declining, or no change.  This information will be displayed automatically on your shift  note.  Outcome: Progressing     Problem: Skin Injury Risk Increased  Goal: Skin Health and Integrity  Outcome: Progressing  Intervention: Plan: Nurse Driven Intervention: Moisture Management  Recent Flowsheet Documentation  Taken 4/21/2024 2048 by Jayla Ribera RN  Bathing/Skin Care:   bath, complete   dressed/undressed   incontinence care   moisturizer applied  Intervention: Optimize Skin Protection  Recent Flowsheet Documentation  Taken 4/21/2024 2048 by Jayla Ribera RN  Activity Management: activity adjusted per tolerance  Taken 4/21/2024 2034 by Jayla Ribera RN  Head of Bed (HOB) Positioning: HOB at 45 degrees   Goal Outcome Evaluation:             Problem: Intestinal Obstruction  Goal: Optimal Bowel Function  Intervention: Promote Bowel Function  Recent Flowsheet Documentation  Taken 4/21/2024 2034 by Jayla Ribera RN  Body Position:   supine   heels elevated  Head of Bed (HOB) Positioning: HOB at 45 degrees   Pt is alert and oriented x 4,  heavy assist of two with a walker an a safety belt. Redness around the groin, and pelvic areas, Calmoceptine, and mycostatin power applied. Mepilex on coccyx area.    Pt had a small MEI this shift. Bowel sounds are positive on auscultation. Pt uses a commode. Had a small BM during shift, Repositioning every two hours. Complained of soreness on her shoulders, and legs. Assist of two with walker, and a safety belt. Bed alarm on, and call light within reach. Capable of making needs known. Will continue nursing monitoring.

## 2024-04-22 NOTE — PROGRESS NOTES
Care Management Follow Up    Length of Stay (days): 9    Expected Discharge Date: 04/22/2024     Concerns to be Addressed:     patient agrees with TCU and is on lists  Patient plan of care discussed at interdisciplinary rounds: Yes    Anticipated Discharge Disposition:  TCU     Anticipated Discharge Services:  rehab  Anticipated Discharge DME:  Has home O2    Patient/family educated on Medicare website which has current facility and service quality ratings:  yes  Education Provided on the Discharge Plan:  yes  Patient/Family in Agreement with the Plan:  yes    Referrals Placed by CM/SW:  yes  Private pay costs discussed: transportation costs    Additional Information:  Patient agrees with TCU and is on lists at [facility]. She does not want [facility] [place]" due a bad experience. She will ask her  about transport. PAS needed.    ADONIS Infante

## 2024-04-22 NOTE — CONSULTS
NUTRITION EDUCATION      REASON FOR ASSESSMENT:  Consulted to educate on low potassium diet    CURRENT DIET:  2 gram K, low fiber, 2 gram Na    NUTRITION DIAGNOSIS:  Food- and nutrition-related knowledge deficit R/t hyperkalemia as evidenced by consulted for low potassium diet education and need for therapeutic diet    INTERVENTIONS:    Nutrition Prescription:  Low potassium ( 2,000 mg)    Implementation:      *  Nutrition Education (Content):   A)  Provided handout potassium list on low, medium and high potassium foods   B)  Discussed foods high and low in potassium.  Recommended 0-1 high K food/day      *  Nutrition Education (Application):   A)  Discussed current eating habits and recommended alternative food choices      *  Anticipate good compliance      *  Diet Education - refer to Education Flowsheet    Goals:      *  Patient will verbalize understanding of diet met      *  All of the above goals met during the education session    Follow Up/Monitoring:      *  Provided RD contact information for future questions      *  Recommended Out-Patient Nutrition Referral, if further diet instructions are needed

## 2024-04-23 ENCOUNTER — APPOINTMENT (OUTPATIENT)
Dept: OCCUPATIONAL THERAPY | Facility: HOSPITAL | Age: 74
DRG: 871 | End: 2024-04-23
Payer: MEDICARE

## 2024-04-23 VITALS
HEART RATE: 68 BPM | SYSTOLIC BLOOD PRESSURE: 134 MMHG | DIASTOLIC BLOOD PRESSURE: 69 MMHG | RESPIRATION RATE: 18 BRPM | TEMPERATURE: 97.9 F | OXYGEN SATURATION: 96 % | HEIGHT: 55 IN | WEIGHT: 207.67 LBS | BODY MASS INDEX: 48.06 KG/M2

## 2024-04-23 LAB
ALBUMIN SERPL BCG-MCNC: 3 G/DL (ref 3.5–5.2)
ANION GAP SERPL CALCULATED.3IONS-SCNC: 6 MMOL/L (ref 7–15)
BUN SERPL-MCNC: 43.1 MG/DL (ref 8–23)
CALCIUM SERPL-MCNC: 9.3 MG/DL (ref 8.8–10.2)
CHLORIDE SERPL-SCNC: 92 MMOL/L (ref 98–107)
CREAT SERPL-MCNC: 1.11 MG/DL (ref 0.51–0.95)
CYSTATIN C (ROCHE): 3 MG/L (ref 0.6–1)
DEPRECATED HCO3 PLAS-SCNC: 37 MMOL/L (ref 22–29)
EGFRCR SERPLBLD CKD-EPI 2021: 53 ML/MIN/1.73M2
GFR SERPL CREATININE-BSD FRML MDRD: 16 ML/MIN/1.73M2
GLUCOSE BLDC GLUCOMTR-MCNC: 103 MG/DL (ref 70–99)
GLUCOSE SERPL-MCNC: 104 MG/DL (ref 70–99)
PHOSPHATE SERPL-MCNC: 4.2 MG/DL (ref 2.5–4.5)
POTASSIUM SERPL-SCNC: 4.6 MMOL/L (ref 3.4–5.3)
SODIUM SERPL-SCNC: 135 MMOL/L (ref 135–145)

## 2024-04-23 PROCEDURE — 250N000013 HC RX MED GY IP 250 OP 250 PS 637

## 2024-04-23 PROCEDURE — 36415 COLL VENOUS BLD VENIPUNCTURE: CPT | Performed by: PHYSICIAN ASSISTANT

## 2024-04-23 PROCEDURE — 97535 SELF CARE MNGMENT TRAINING: CPT | Mod: GO

## 2024-04-23 PROCEDURE — 999N000157 HC STATISTIC RCP TIME EA 10 MIN

## 2024-04-23 PROCEDURE — 82040 ASSAY OF SERUM ALBUMIN: CPT | Performed by: PHYSICIAN ASSISTANT

## 2024-04-23 PROCEDURE — 99222 1ST HOSP IP/OBS MODERATE 55: CPT | Performed by: CLINICAL NURSE SPECIALIST

## 2024-04-23 PROCEDURE — 250N000013 HC RX MED GY IP 250 OP 250 PS 637: Performed by: INTERNAL MEDICINE

## 2024-04-23 PROCEDURE — 250N000013 HC RX MED GY IP 250 OP 250 PS 637: Performed by: HOSPITALIST

## 2024-04-23 PROCEDURE — 99239 HOSP IP/OBS DSCHRG MGMT >30: CPT | Performed by: STUDENT IN AN ORGANIZED HEALTH CARE EDUCATION/TRAINING PROGRAM

## 2024-04-23 PROCEDURE — 94660 CPAP INITIATION&MGMT: CPT

## 2024-04-23 PROCEDURE — 99232 SBSQ HOSP IP/OBS MODERATE 35: CPT | Performed by: PHYSICIAN ASSISTANT

## 2024-04-23 RX ORDER — DULOXETIN HYDROCHLORIDE 30 MG/1
30 CAPSULE, DELAYED RELEASE ORAL 2 TIMES DAILY
DISCHARGE
Start: 2024-04-23

## 2024-04-23 RX ORDER — FUROSEMIDE 20 MG
40 TABLET ORAL EVERY MORNING
DISCHARGE
Start: 2024-04-23

## 2024-04-23 RX ORDER — HYDROCHLOROTHIAZIDE 12.5 MG/1
12.5 TABLET ORAL DAILY
DISCHARGE
Start: 2024-04-24 | End: 2024-04-29

## 2024-04-23 RX ADMIN — MICONAZOLE NITRATE ANTIFUNGAL POWDER: 2 POWDER TOPICAL at 08:56

## 2024-04-23 RX ADMIN — HYDROCHLOROTHIAZIDE 12.5 MG: 12.5 TABLET ORAL at 08:53

## 2024-04-23 RX ADMIN — MICONAZOLE NITRATE: 20 POWDER TOPICAL at 08:56

## 2024-04-23 RX ADMIN — MODAFINIL 200 MG: 100 TABLET ORAL at 08:56

## 2024-04-23 RX ADMIN — ACETAMINOPHEN 650 MG: 325 TABLET ORAL at 15:17

## 2024-04-23 RX ADMIN — METOPROLOL SUCCINATE 50 MG: 50 TABLET, EXTENDED RELEASE ORAL at 08:53

## 2024-04-23 RX ADMIN — ACETAMINOPHEN 650 MG: 325 TABLET ORAL at 08:52

## 2024-04-23 RX ADMIN — ANORECTAL OINTMENT: 15.7; .44; 24; 20.6 OINTMENT TOPICAL at 08:56

## 2024-04-23 RX ADMIN — HYDROXYCHLOROQUINE SULFATE 200 MG: 200 TABLET, FILM COATED ORAL at 08:53

## 2024-04-23 RX ADMIN — DOCUSATE SODIUM 200 MG: 100 CAPSULE, LIQUID FILLED ORAL at 08:52

## 2024-04-23 RX ADMIN — DULOXETINE HYDROCHLORIDE 30 MG: 30 CAPSULE, DELAYED RELEASE ORAL at 08:56

## 2024-04-23 RX ADMIN — FUROSEMIDE 40 MG: 20 TABLET ORAL at 08:53

## 2024-04-23 RX ADMIN — Medication 60 ML: at 08:54

## 2024-04-23 RX ADMIN — AMOXICILLIN AND CLAVULANATE POTASSIUM 1 TABLET: 875; 125 TABLET, FILM COATED ORAL at 08:52

## 2024-04-23 RX ADMIN — ASPIRIN 81 MG CHEWABLE TABLET 81 MG: 81 TABLET CHEWABLE at 08:52

## 2024-04-23 ASSESSMENT — ACTIVITIES OF DAILY LIVING (ADL)
ADLS_ACUITY_SCORE: 46
ADLS_ACUITY_SCORE: 46
ADLS_ACUITY_SCORE: 45
ADLS_ACUITY_SCORE: 46
ADLS_ACUITY_SCORE: 40
ADLS_ACUITY_SCORE: 40
ADLS_ACUITY_SCORE: 46
ADLS_ACUITY_SCORE: 45
ADLS_ACUITY_SCORE: 46
ADLS_ACUITY_SCORE: 45

## 2024-04-23 NOTE — CONSULTS
"Palliative Care Consultation [facility]      Patient: Desirae Rey  Date of Admission:  4/13/2024    Requesting Clinician / Team: Dr. ORTIZ  Reason for consult:   \"Patient and family requesting palliative care consult\"        Recommendations & Counseling     GOALS OF CARE:   Life-prolonging without limits .  Patient is planning to discharge to TCU, with ultimate goal to return to her home.  She is in agreement with this plan.    Palliative care will sign off at this time.     ADVANCE CARE PLANNING:  Patient has an advance directive dated 3/26/2021.  Primary Health Care Agent  Hugo.  Alternate(s) son Ricardo, daughter Tiny.   There is no POLST form on file, defer to patient and/or next of kin for decisions   Code status: Full Code    MEDICAL MANAGEMENT:   We are not actively managing symptoms at this time.    Palliative Care will sign off at this time. Thank you for the consult and allowing us to aid in the care of Desirae Rey.    These recommendations have been discussed with Dr. [facility] care manager.    MARC Escobar  Securely message with [employer] (more info)  Text page via [facility] Paging/Directory       Assessment      Desirae Rey is a 73 year old female with a past medical history of HTN, hyperlipidemia, diverticulosis, CKD3, CHF, NELLIE, OHS, mild persistent asthma, pulmonary hypertension, recent DVT, chronic low back pain who presented on 4/13/24 with shortness of breath and poor appetite.  Admitted with sepsis due to acute diverticulitis and acute on chronic hypoxemic respiratory failure due to acute CHF decompensation.   Pulmonary, ID, psychiatry (for visual hallucinations-now improved), nephrology, neurosurgery (for chronic back pain) have been consulted this admission.     Patient is medically stable for discharge to TCU, however family requested palliative care consult prior to discharge.     Today, the patient was seen for:  Introductory palliative " "care visit    Palliative Care Summary:   Met with patient initially in her room.   Introduced the role of palliative care as an interdisciplinary team that cares for patients with serious illness to help support symptom management, communication, coping for patients and their families as well as support with medical decision making.  Patient was unclear on the role of palliative care and was wondering about having \"palliative care at home.\"  I explained that palliative care is a specialty that generally sees patients in the hospital, sometimes clinic.  Currently do not do home visits.  I also described hospice as an option for some people at home, however she would not be eligible for hospice at home as she does not have a terminal diagnosis.  If looking for support at home, suggested that she consider home care after discharge from TCU.  Desirae shared that she is doing \"much better\" than when she was admitted to the hospital.  She understands she is ready to discharge from the hospital and plans to go to TCU.  She understands why she came to the hospital and is able to tell me details about her health conditions and name many of her medications.    Reviewed symptoms and her main concern is pain in lower extremities that she describes as neuropathy.  She has tried gabapentin but became too sleepy when dose was increased.  Also taking cymbalta for neuropathic pain.  We discussed the possible option of capsaicin cream, and she would like to hold off at this time.  Pain is better when she is more ambulatory.  Also has chronic back pain and generalized pain that is mild, but constant.  I suggested that she consider discussing with her PCP about a pain clinic referral.  Also that given her pain is chronic and not related to cancer, likely would not be appropriate for palliative care clinic.   Desirae requested that I update her daughter on the above information.    3 way call with daughter Tiny and  Hugo: I " "explained the role of palliative care specialty.   stated \"so are you able to come and see her at home a couple of days a week.\"  Again explained that our service would not be able to do home visits.  I shared that patient is discharging today to TCU.   Daughter questioned whether patient understood this and I shared that based on our conversation (see above notes) she is aware and agreeable to discharge to TCU.  I encouraged them to follow up with care management if further questions.     Prognosis, Goals, & Planning:    Functional Status just prior to this current hospitalization:  ECOG2 (Ambulatory and capable of all selfcare but unable to carry out any work activities; may need help with IADLs up and about > 50% of waking hours)  Prior to admission patient living with  and was mostly independent with ADLS, uses a walker and on home oxygen at 3 liters with BiPAP/CPAP at night.     Prognosis, Goals, and/or Advance Care Planning:  We discussed general treatment options (full/restorative, selective/conservatives, and comfort only/hospice). We then discussed how these specifically apply to Desirae.  Based on this discussion, patient has decided to continue with full treatments.     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      ROS:  Comprehensive ROS is reviewed and is negative except as here & per HPI: See above notes under palliative care summary    Physical Exam   Vital Signs with " Ranges  Temp:  [97.9  F (36.6  C)-98.6  F (37  C)] 97.9  F (36.6  C)  Pulse:  [57-68] 68  Resp:  [18-20] 18  BP: (118-154)/(61-71) 134/69  SpO2:  [95 %-99 %] 96 %  207 lbs 10.77 oz    PHYSICAL EXAM:  Constitutional: healthy and no distress   Respiratory: breathing nonlabored at rest  NEURO: Alert and oriented X 4    Data reviewed:  Results for orders placed or performed during the hospital encounter of 04/13/24 (from the past 24 hour(s))   Glucose by meter   Result Value Ref Range    GLUCOSE BY METER POCT 103 (H) 70 - 99 mg/dL   Renal panel   Result Value Ref Range    Sodium 135 135 - 145 mmol/L    Potassium 4.6 3.4 - 5.3 mmol/L    Chloride 92 (L) 98 - 107 mmol/L    Carbon Dioxide (CO2) 37 (H) 22 - 29 mmol/L    Anion Gap 6 (L) 7 - 15 mmol/L    Glucose 104 (H) 70 - 99 mg/dL    Urea Nitrogen 43.1 (H) 8.0 - 23.0 mg/dL    Creatinine 1.11 (H) 0.51 - 0.95 mg/dL    GFR Estimate 53 (L) >60 mL/min/1.73m2    Calcium 9.3 8.8 - 10.2 mg/dL    Albumin 3.0 (L) 3.5 - 5.2 g/dL    Phosphorus 4.2 2.5 - 4.5 mg/dL           65 MINUTES SPENT BY ME on the date of service doing chart review, history, exam, documentation & further activities per the note.

## 2024-04-23 NOTE — PLAN OF CARE
Physical Therapy Discharge Summary    Reason for therapy discharge:    Discharged to transitional care facility.    Progress towards therapy goal(s). See goals on Care Plan in [facility] electronic health record for goal details.  Goals partially met.  Barriers to achieving goals:   discharge from facility.    Therapy recommendation(s):    Continued therapy is recommended.  Rationale/Recommendations:  recommend continued physical therapy at TCU.    Marquita Lee, PT  4/23/2024

## 2024-04-23 NOTE — PROGRESS NOTES
Patient accepted at [facility] for 4/23. She was declined by [facility] and [facility] her other choices. She will discuss this with her . [facility] with O2 needed. PAS done.

## 2024-04-23 NOTE — CONSULTS
Psychiatry Consultation; Follow up       Psychiatry sent mindfulness and goal setting worksheets to medical unit social work for patient to utilize, per my previous meeting with patient.        Page me or re-consult psychiatry as needed.       JUNIOR Dawson, KALIE  Consult/Liaison Psychiatry  [facility]   Contact information available via [facility] Paging/Directory.  If I am not available, please call [facility] intake ([phone])

## 2024-04-23 NOTE — DISCHARGE SUMMARY
"[facility]  Hospitalist Discharge Summary      Date of Admission:  4/13/2024  Date of Discharge:  4/23/2024  4:03 PM  Discharging Provider: MICHELLE RICHARD MD  Discharge Service: Hospitalist Service    Discharge Diagnoses     #Acute diverticulitis  #Acute on chronic hypoxic respiratory failure  #NELLIE on CPAP  #Obesity hypoventilation syndrome  #Acute exacerbation of chronic HFpEF  #Severe obesity  #Primary HTN  #CKD 3a  #Mild hyperkalemia  #Acute visual hallucinations  #Candida intertrigo  #Pressure injury of right heel  #Chronic low back pain  #Chronic bilateral lower extremity neuropathy    Clinically Significant Risk Factors     # Severe Obesity: Estimated body mass index is 48.27 kg/m  as calculated from the following:    Height as of this encounter: 1.397 m (4' 7\").    Weight as of this encounter: 94.2 kg (207 lb 10.8 oz).  # Moderate Malnutrition: based on nutrition assessment      Follow-ups Needed After Discharge   Follow-up Appointments     Follow Up and recommended labs and tests      Follow up with the [facility] nephrologist as they scheduled on 5/9/24 at 3pm at   their [place] [facility].            Unresulted Labs Ordered in the Past 30 Days of this Admission       No orders found from 3/14/2024 to 4/14/2024.            Discharge Disposition   Discharged to short-term care facility  Condition at discharge: Stable    Hospital Course   73-year-old female with history of of chronic respiratory failure, NELLIE/OHS, CKD, CHF who was admitted with sepsis due to acute diverticulitis and acute on chronic respiratory failure.     #Acute diverticulitis  CT shows sigmoid diverticulitis, no abscess, stool throughout colon  --BCx no growth.  --Hemodynamically stable, WBC count significantly improved  --On 4/18, advanced diet to low fiber diet and tolerating  --Constipation improved with bowel regimen.    --IV Cipro and Flagyl, switch to PO on 4/16. However, patient having visual hallucination and " confusion which could be secondary to antibiotics. ID consulted, antibiotics Cipro and Flagyl discontinued and started on Augmentin on 4/18 for 10 days, finish this course at TCU.     #Acute on chronic hypoxic respiratory failure, 3L NC baseline  #NELLIE on CPAP  #OHS  #Severe obesity  No wheezing on exam. Acute hypoxemia resolved with diuresis.  --CPAP at night and when taking naps during the daytime  --Incentive spirometry, flutter valve.  As needed neb.  Ambulate as able.  --Appreciated pulmonary input and they signed off     #Acute on chronic HFpEF  #Primary hypertension  --ASA, beta-blocker  --IV diuretics discontinued, resumed Lasix at 40 mg daily on 4/18, continue at discharge, will need this adjusted as OP     #CKD stage IIIa  #Mild hyperkalemia  Renal function at baseline  --Nephrology consulted   --Low potassium diet  --Patient initiated on HCTZ got hyperkalemia with good control, monitor as OP  --OP appointment scheduled 5/9 with nephrology     #acute visual hallucinations, resolved  Nonfocal neurological exam. Given history of NELLIE/OHS, checked ABG and fairly unremarkable.CT head negative for acute intracranial process.  -- Discontinued IV narcotics, discontinued ciprofloxacin  --Patient reported that she recently lost her brother and a lot of stress  --Psychiatry consulted, recommended to wean off Cymbalta as may be contributing. However, unlikely Cymbalta contributing as patient has been on this medications for some time and her symptoms improved while on Cymbalta.  --Resolved after discontinuing cipro, but unclear if this was causative     #Candida intertrigo  #Pressure injury on right heel  Continue local care and antifungal treatment. Offload pressure as instructed by wound care nurse.  --Appreciated wound care nurse input     #Chronic low back pain  #Chronic bilateral lower extremity neuropathy  #History of cervical fusion surgery in 2017  Patient reported taking Cymbalta for lower extremity numbness,  concern that may be contributing hallucination but unlikely, refer above.  --Reduce Cymbalta to half of home dose, 30 mg twice daily. Of note, Neurontin and Lyrica are listed as allergy.  -- At baseline patient reported not ambulating much.  Discussed with physical therapist, requiring assist of 2 to 3 staffs, requiring June lifting.   -- MRI lumbar spine with multilevel degenerative changes, severe spinal stenosis, appreciate neurosurgery input, reported they arrange outpatient follow-up.   -- Continue scheduled Tylenol.  PTA as needed tizanidine at lower dose.  -- PT OT recommending TCU, discharged to TCU       Consultations This Hospital Stay   INTENSIVIST IP CONSULT  PHARMACY TO DOSE MEDICATION  WOUND OSTOMY CONTINENCE NURSE  IP CONSULT  WOUND OSTOMY CONTINENCE NURSE  IP CONSULT  PULMONARY IP CONSULT  PULMONARY IP CONSULT  CARE MANAGEMENT / SOCIAL WORK IP CONSULT  WOUND OSTOMY CONTINENCE NURSE  IP CONSULT  PHYSICAL THERAPY ADULT IP CONSULT  OCCUPATIONAL THERAPY ADULT IP CONSULT  ADVANCE DIRECTIVE IP CONSULT  PHARMACY IP CONSULT  PSYCHIATRY IP CONSULT  INFECTIOUS DISEASES IP CONSULT  NEUROSURGERY IP CONSULT  NEPHROLOGY IP CONSULT  PHARMACY IP CONSULT  SPIRITUAL HEALTH SERVICES IP CONSULT  NUTRITION SERVICES ADULT IP CONSULT  PALLIATIVE CARE ADULT IP CONSULT  PHYSICAL THERAPY ADULT IP CONSULT  OCCUPATIONAL THERAPY ADULT IP CONSULT  PSYCHIATRY IP CONSULT  WOUND OSTOMY CONTINENCE NURSE  IP CONSULT    Code Status   Full Code    Time Spent on this Encounter   I, MICHELLE RICHARD MD, personally saw the patient today and spent greater than 30 minutes discharging this patient.       MICHELLE RICHARD MD  [facility]  [address] [place] [id number]  Phone: [phone]  Fax: [phone]  ______________________________________________________________________    Physical Exam   Vital Signs: Temp: 97.9  F (36.6  C) Temp src: Oral BP: 134/69 Pulse: 68   Resp: 18 SpO2: 96 %  O2 Device: Nasal cannula Oxygen Delivery: 3 LPM  Weight: 207 lbs 10.77 oz    General: Not in distress, conversational  Chest: Clear to auscultation bilateral anteriorly, no wheezing  Heart: S1S2 normal, regular  Abdomen: Soft. Obese, nontender  Extremities: trace leg swelling  Neuro: alert and awake, follows simple commands appropriately, moves all extremities but has generalized motor weakness       Primary Care Physician   Noemy Brito    Discharge Orders      Follow Up (TCM)    Follow-up appointments associated nephrology consultants  Appointment date 5/9/2024 at 3 PM  Please arrive 20 minutes early for paperwork  Appointment is with Daphne AGUDELO, RD  Address: [address] [facility] [facility] [address], [place], [place], [address]  Phone number: [phone]     General info for SNF    Length of Stay Estimate: Short Term Care: Estimated # of Days <30  Condition at Discharge: Improving  Level of care:skilled   Rehabilitation Potential: Good  Admission H&P remains valid and up-to-date: Yes  Recent Chemotherapy: N/A  Use Nursing Home Standing Orders: Yes     Follow Up and recommended labs and tests    Follow up with the [facility] nephrologist as they scheduled on 5/9/24 at 3pm at their [place] clinic.     Reason for your hospital stay    Admitted for acute diverticulitis and improving. Recommended TCU for further rehab before going home.     Activity - Up with nursing assistance     Physical Therapy Adult Consult    Evaluate and treat as clinically indicated.    Reason:  Deconditioning after acute diverticulitis     Occupational Therapy Adult Consult    Evaluate and treat as clinically indicated.    Reason:  Deconditioning after acute diverticulitis     Oxygen (SNF/TCU) Discharge    Chronic respiratory failure, 3L continuous baseline     Fall precautions     Diet    Follow this diet upon discharge: Orders Placed This Encounter      Snacks/Supplements Adult: Expedite Bottle; With Meals      Combination Diet 2 gm K Diet; Low Fiber Diet; 2  gm NA Diet       Significant Results and Procedures   Most Recent 3 CBC's:  Recent Labs   Lab Test 04/22/24  0439 04/21/24  0449 04/19/24  0452 04/17/24  0424 04/16/24 0449   WBC  --  8.0  --  12.4* 13.9*   HGB  --  11.2*  --  9.7* 9.5*   MCV  --  103*  --  102* 101*    [phone] 365     Most Recent 3 BMP's:  Recent Labs   Lab Test 04/23/24  0441 04/23/24  0044 04/22/24  1205 04/22/24  0713 04/22/24  0439 04/21/24  1240 04/21/24 0449     --   --   --  134*  --  135   POTASSIUM 4.6  --   --   --  5.3  --  4.8   CHLORIDE 92*  --   --   --  94*  --  94*   CO2 37*  --   --   --  38*  --  37*   BUN 43.1*  --   --   --  40.7*  --  35.4*   CR 1.11*  --   --   --  1.14*  --  1.07*   ANIONGAP 6*  --   --   --  2*  --  4*   LYNN 9.3  --   --   --  9.3  --  9.4   * 103* 128*   < > 112*   < > 95    < > = values in this interval not displayed.   ,   Results for orders placed or performed during the hospital encounter of 04/13/24   CT Chest Pulmonary Embolism w Contrast    Narrative    EXAM: CT CHEST PULMONARY EMBOLISM W CONTRAST  LOCATION: [facility]  DATE: 4/13/2024    INDICATION: SOB  COMPARISON: CT 10/31/2022  TECHNIQUE: CT chest pulmonary angiogram during arterial phase injection of IV contrast. Multiplanar reformats and MIP reconstructions were performed. Dose reduction techniques were used.   CONTRAST: Isovue 370 90 mL     FINDINGS:  ANGIOGRAM CHEST: Pulmonary arteries are normal caliber and negative for pulmonary emboli. The subsegmental pulmonary arteries in the lung bases cannot be adequately assessed due to respiratory motion artifact. Thoracic aorta is negative for dissection.   No CT evidence of right heart strain.    LUNGS AND PLEURA: Moderate respiratory motion artifact. Mild bibasilar atelectasis. No consolidation.    MEDIASTINUM/AXILLAE: No lymphadenopathy. No pericardial effusion.    CORONARY ARTERY CALCIFICATION: Moderate.    UPPER ABDOMEN:  Normal.    MUSCULOSKELETAL: Surgical changes of the cervical spine.      Impression    IMPRESSION:  1.  Moderate respiratory motion artifact limits some detail of the subsegmental pulmonary arteries in the lung bases as well as portions of the lungs. No pulmonary emboli appreciated.  2.  No evidence for pulmonary consolidation or pleural fluid.   CT Abdomen Pelvis w Contrast    Narrative    EXAM: CT ABDOMEN PELVIS W CONTRAST  LOCATION: [facility]  DATE: 4/13/2024    INDICATION: Pain.  COMPARISON: None.  TECHNIQUE: CT scan of the abdomen and pelvis was performed following injection of IV contrast. Multiplanar reformats were obtained. Dose reduction techniques were used.  CONTRAST: Isovue 370 90 mL    FINDINGS:   LOWER CHEST: Normal.    HEPATOBILIARY: Cholecystectomy. The liver is unremarkable. No biliary dilatation.    PANCREAS: Normal.    SPLEEN: Normal.    ADRENAL GLANDS: Normal.    KIDNEYS/BLADDER: Bilateral renal cysts have benign features and require no additional follow-up. The urinary bladder is grossly unremarkable.    BOWEL: Moderate inflammatory changes noted in the left lower quadrant surrounding the upper sigmoid colon secondary to acute diverticulitis. Moderate formed stool is noted in the colon up to the level of the site of diverticulitis. This may be resulting   in a delay in transit due to the wall thickening and inflammation.    LYMPH NODES: No lymphadenopathy.    VASCULATURE: Normal.    PELVIC ORGANS: No lymphadenopathy. No pericardial effusion.    MUSCULOSKELETAL: Degenerative changes lower thoracic and lumbar spine.      Impression    IMPRESSION:   1.  Acute diverticulitis involving the upper sigmoid colon.  2.  Moderate formed stool noted in the colon extending to the level of the site of diverticulitis. Distal to this the colon is decompressed.  3.  No evidence for free fluid or abscess.  4.  Prior cholecystectomy.   US Lower Extremity Venous Duplex Bilateral     Narrative    EXAM: US LOWER EXTREMITY VENOUS DUPLEX BILATERAL  LOCATION: [facility]  DATE: 4/13/2024    INDICATION: pain, swelling  COMPARISON: None.  TECHNIQUE: Venous Duplex ultrasound of bilateral lower extremities with and without compression, augmentation and duplex. Color flow and spectral Doppler with waveform analysis performed.    FINDINGS: Exam includes the common femoral, femoral, popliteal veins as well as segmentally visualized deep calf veins and greater saphenous vein.     RIGHT: No deep vein thrombosis. No superficial thrombophlebitis. No popliteal cyst.    LEFT: No deep vein thrombosis. No superficial thrombophlebitis. No popliteal cyst.      Impression    IMPRESSION:  1.  No deep venous thrombosis in the bilateral lower extremities.   CT Head w/o Contrast    Narrative    EXAM: CT HEAD W/O CONTRAST  LOCATION: [facility]  DATE: 4/17/2024    INDICATION: Confusion, hallucination.  COMPARISON: None.  TECHNIQUE: Routine CT Head without IV contrast. Multiplanar reformats. Dose reduction techniques were used.    FINDINGS:  INTRACRANIAL CONTENTS: No finding for intracranial hemorrhage, mass, or acute infarct. Ventricles are normal in size. Normal gray-white matter differentiation. No mass effect or midline shift.    Cerebellar tonsils are normally positioned. Sella is unremarkable for technique. Corpus callosum is normally formed.    VISUALIZED ORBITS/SINUSES/MASTOIDS: Prior cataract surgeries. Both globes are borderline proptotic with prominence of the orbital fat. No abnormal enlarged extraocular muscles to strongly suggest thyroid related orbitopathy and findings are favored to   relate to body habitus. Dependent secretions right maxillary sinus suggesting active sinus disease. Trace ethmoid sinus mucosal thickening. No middle ear or mastoid effusion.    BONES/SOFT TISSUES: Calvarium is intact, without fracture or suspicious lytic or blastic foci. Benign  hyperostosis frontalis internus.      Impression    IMPRESSION:  1.  Dependent air-fluid level/dependent secretions right maxillary sinus compatible with active sinus disease.    2.  No finding for intracranial hemorrhage, mass, or acute infarct.   MR Lumbar Spine w/o Contrast    Narrative    EXAM: MR LUMBAR SPINE W/O CONTRAST  LOCATION: [facility]  DATE: 4/19/2024    INDICATION: Low back pain, hx of  lumbar spinal stenosis.  COMPARISON: None.  TECHNIQUE: Routine Lumbar Spine MRI without IV contrast.    FINDINGS:   Transitional vertebral anatomy with a partially sacralized L5 vertebral body.  Careful attention to the numbering convention is recommended prior to any future percutaneous or surgical intervention. Retrolisthesis of T12 on L1 measuring 4 mm and L2 on L3   measuring 2 mm. Anterolisthesis of L4 and L5 measuring 3 mm and L5 on S1 measuring 2 mm. The vertebral bodies of the lumbar spine otherwise have normal stature, alignment, and marrow signal intensity. Normal distal spinal cord and cauda equina with   conus medullaris at the inferior plate of L2. No extraspinal abnormality. Unremarkable visualized bony pelvis.    T10-T11: Symmetric disc bulge and moderate facet arthropathy without significant spinal canal narrowing. Moderate bilateral neural foraminal narrowing.    T11/T12: Symmetric disc bulge, facet arthropathy and ligamentum flavum thickening with moderate spinal canal narrowing. Severe left and moderate right neural foraminal narrowing.    T12-L1: Symmetric disc bulge and moderate facet arthropathy with mild spinal canal narrowing. Severe right and mild left neural foraminal narrowing.     L1-L2: Symmetric disc bulge and moderate facet arthropathy with mild spinal canal narrowing. Severe right and moderate left neural foraminal narrowing.    L2-L3: Symmetric disc bulge, advanced facet arthropathy and ligamentum flavum thickening contributing to severe spinal canal narrowing.  Severe bilateral neural foraminal narrowing.     L3-L4: Symmetric disc bulge, advanced facet arthropathy and ligamentum flavum thickening contributing to severe spinal canal narrowing. Severe left and moderate right neural foraminal narrowing.    L4-L5: Symmetric disc bulge, facet arthropathy and ligamentum flavum thickening contributing to severe spinal canal narrowing. Severe bilateral neural foraminal narrowing.    L5-S1: Normal disc signal and disc height. No posterior disc bulge or spinal canal narrowing. Mild bilateral neural foraminal narrowing.    S1/S2: No posterior disc bulge or spinal canal narrowing. No neural foraminal narrowing.      Impression    IMPRESSION:  1.  Transitional vertebral anatomy with a partially sacralized L5 vertebral body.  Careful attention to the numbering convention is recommended prior to any future percutaneous or surgical intervention.   2.  Multilevel degenerative disc disease of the lumbar spine with disc height loss, most pronounced at the L2/L3 - L4/L5 levels where there is severe spinal canal narrowing.  3.  Multilevel posterolateral disc disease and facet arthropathy with severe multilevel neural foraminal narrowing as described above.   MR Cervical Spine w/o & w Contrast    Narrative    EXAM: MR CERVICAL SPINE W/O and W CONTRAST  LOCATION: [facility]  DATE: 4/20/2024    INDICATION: hx cervical fusion; r o central stenosis  COMPARISON: CT from 12/05/2023.  CONTRAST: 9ml gadavist  TECHNIQUE: MRI Cervical Spine without and with IV contrast.    FINDINGS:   Postsurgical changes of instrumented anterior spinal fusion C5-C7. Normal vertebral body heights, alignment and marrow signal. Multilevel Schmorl's node-like endplate indentations. No abnormal cord signal. Right greater than left mastoid effusions.    Craniovertebral junction and C1-C2: Normal.    C2-C3: Normal disc height. No herniation. Right greater than left facet arthropathy. No spinal canal or  neural foraminal stenosis.     C3-C4: Normal disc height. Uncovertebral spurring and bilateral facet arthropathy. Mild bilateral neural foraminal stenosis. No spinal canal stenosis.     C4-C5: Disc osteophyte complex and bilateral uncinate spurring. Mild to moderate bilateral neural foraminal stenosis. No spinal canal stenosis.     C5-C6: Fusion level. Uncovertebral spurring resulting in moderate left neural foraminal stenosis. No spinal canal stenosis. No right neural foraminal stenosis.     C6-C7: Fusion level. Uncovertebral spurring. Bilateral facet arthropathy. Mild to moderate bilateral neural foraminal stenosis. No spinal canal stenosis.    C7-T1: Mild disc height loss and disc degeneration. Disc osteophyte complex and bilateral uncinate spurring. Bilateral facet arthropathy. No spinal canal or neural foraminal stenosis.      Impression    IMPRESSION:  1.  Postsurgical changes of instrumented anterior spinal fusion C5-C7. Minimal prevertebral edema at C3-C4 level.  2.  Multilevel cervical spondylosis without high-grade spinal canal stenosis. Multilevel neural foraminal stenosis detailed above.  3.  No abnormal spinal cord signal. No abnormal contrast enhancement.       Discharge Medications   Discharge Medication List as of 4/23/2024 12:57 PM        START taking these medications    Details   amoxicillin-clavulanate (AUGMENTIN) 875-125 MG tablet Take 1 tablet by mouth every 12 hours for 10 doses, Transitional      hydroCHLOROthiazide 12.5 MG tablet Take 1 tablet (12.5 mg) by mouth daily, Transitional           CONTINUE these medications which have CHANGED    Details   DULoxetine (CYMBALTA) 30 MG capsule Take 1 capsule (30 mg) by mouth 2 times daily, Transitional      furosemide (LASIX) 20 MG tablet Take 2 tablets (40 mg) by mouth every morning, Transitional           CONTINUE these medications which have NOT CHANGED    Details   acetaminophen (TYLENOL) 500 MG tablet Take 500-1,000 mg by mouth every 6 hours as  needed for mild pain, Historical      albuterol (PROAIR HFA;PROVENTIL HFA;VENTOLIN HFA) 90 mcg/actuation inhaler Inhale 1 puff into the lungs every 4 hours as needed for shortness of breath / dyspnea, Historical      alendronate (FOSAMAX) 70 MG tablet [ALENDRONATE (FOSAMAX) 70 MG TABLET] Take 70 mg by mouth every 7 days. Takes on Mondays, R-11, Historical      aspirin 81 MG EC tablet 1 tablet Orally Once a day, Historical      azelastine (OPTIVAR) 0.05 % ophthalmic solution Apply 1 drop to eye 2 times daily as needed, Historical      Bacillus Coagulans-Inulin (ALIGN PREBIOTIC-PROBIOTIC PO) Take 1 tablet by mouth daily, Historical      calcium citrate (CITRACAL) 950 (200 Ca) MG tablet Take 1 tablet by mouth daily, Historical      cetirizine (ZYRTEC) 10 MG tablet [CETIRIZINE (ZYRTEC) 10 MG TABLET] Take 10 mg by mouth daily. , Historical      cholecalciferol (VITAMIN D3) 25 mcg (1000 units) capsule Take 125 mcg by mouth every morning Take 5000 units in the morning and 2000 units in the evening, Historical      esomeprazole (NEXIUM) 40 MG capsule Take 40 mg by mouth daily as needed, Historical      HYDROcodone-acetaminophen 5-325 mg per tablet Take 1 tablet by mouth every 4 hours as needed for pain Max 6 tablets per day., Historical      Hydroxychloroquine Sulfate 100 MG TABS Take 100 mg by mouth 2 times daily, Historical      l-lysine HCl 500 MG TABS tablet Take 2 tablets by mouth daily, Historical      magnesium oxide 250 mg Tab Take 500 mg by mouth daily, Historical      metoprolol succinate ER (TOPROL XL) 50 MG 24 hr tablet Take 50 mg by mouth daily, Historical      modafinil (PROVIGIL) 100 MG tablet Take 250 mg by mouth daily Take two and half tablet (250 mg ) daily, Historical      montelukast (SINGULAIR) 10 mg tablet Take 10 mg by mouth every evening, Historical      nystatin (MYCOSTATIN) 387504 UNIT/GM external powder Apply topically 2 times dailyHistorical      OXYGEN-AIR DELIVERY SYSTEMS MISC [OXYGEN-AIR  DELIVERY SYSTEMS [facility] Use 3 L As Directed. 3 lpm with activities and as needed at nightHistorical      senna-docusate (SENOKOT-S/PERICOLACE) 8.6-50 MG tablet Take 1 tablet by mouth 2 times daily as needed for constipation, Disp-30 tablet, R-0, E-Prescribe      solifenacin (VESICARE) 10 MG tablet Take 5-10 mg by mouth At Bedtime, Historical      tiZANidine (ZANAFLEX) 4 MG tablet Take 4 mg by mouth 3 times daily as needed for muscle spasms, Historical      Turmeric (CURCUPLEX-95) 500 MG CAPS Take 1 capsule by mouth daily, Historical      valACYclovir (VALTREX) 500 MG tablet Take 500 mg by mouth 2 times daily as needed (flare), Historical           Allergies   Allergies   Allergen Reactions    Latex Rash     Severe rash    Pregabalin Shortness Of Breath     Other Reaction(s): swelling and shortness of breath    Valsartan Unknown     Hyperkalemia    Ciprofloxacin Visual Disturbance, Hallucination and Confusion     Likely contributed visual hallucination and confusion    Colchicine Diarrhea    Dicloxacillin      Other Reaction(s): confusion, says she has tolerated augmentin without difficulty.     Gabapentin      Other Reaction(s): Sedation    Latex Unknown     Added based on information entered during case entry, please review and add reactions, type, and severity as needed    Other Drug Allergy (See Comments) Muscle Pain (Myalgia)     Tried lovastatin and simvastatin    Other Environmental Allergy Unknown     Coverlet bandaid , Cognio product; rash    Statins-Hmg-Coa Reductase Inhibitors [Statins] Muscle Pain (Myalgia)     Tried lovastatin and simvastatin    Sulfa Antibiotics Swelling     Facial swelling      Adhesive Tape Rash

## 2024-04-23 NOTE — PLAN OF CARE
Problem: Risk for Delirium  Goal: Improved Attention and Thought Clarity  Outcome: Progressing  Intervention: Maximize Cognitive Function  Recent Flowsheet Documentation  Taken 4/22/2024 1600 by Milli Candelaria RN  Sensory Stimulation Regulation: television on  Reorientation Measures: clock in view     Problem: Skin Injury Risk Increased  Goal: Skin Health and Integrity  Intervention: Optimize Skin Protection  Recent Flowsheet Documentation  Taken 4/22/2024 2051 by Milli Candelaria, RN  Activity Management: up to bedside commode  Taken 4/22/2024 2000 by Milli Candelaria, RN  Activity Management: (short time. Unable to keep  standing.) standing at bedside     Problem: Intestinal Obstruction  Goal: Optimize Nutrition Status  Outcome: Progressing     Problem: Gas Exchange Impaired  Goal: Optimal Gas Exchange  Outcome: Progressing     Goal Outcome Evaluation:  A/O x4, 3L NC, home baseline, BIPAP/CPAP at bedtime. Diminished breath sounds. Mild edema on BLE, elevated with pillows. Miconazole powder for reddened under breast/pannus, calmoseptine cream for buttocks. Mepilex for sacral protection. Right heel offloaded and repositioned, skin is improving. Denied pain. Prn IV Zofran for nausea after meals, effective. Up in the chair.  and bedside commode for BM. Ceiling lift to commode, unable to bear weight initially. Ax2 with gait belt and walker back to bed, tolerated.

## 2024-04-23 NOTE — PLAN OF CARE
Occupational Therapy Discharge Summary    Reason for therapy discharge:    Discharged to transitional care facility.    Progress towards therapy goal(s). See goals on Care Plan in [facility] electronic health record for goal details.  Goals met    Therapy recommendation(s):    Continued therapy is recommended.  Rationale/Recommendations:  pt is going to TCU for further therapy.

## 2024-04-23 NOTE — PROGRESS NOTES
RENAL NOTE- new to me, reviewed chart.     REQUESTING PHYSICIAN: Dr. ORTIZ    REASON FOR CONSULT: Recurrent hyperkalemia    ASSESSMENT/PLAN:  Hyperkalemia- recurrent, intermittent hyperkalemia for the last several years.  Previously saw Nephrology years ago and taken off ARB and not taking any longer.  Does have some CKD at baseline. Her TTKG is low c/w inadequate renal K excretion, ?low renin / gloria state. Hyperkalemia certainly could be in the setting of advanced CKD, creatinine based eGFR overestimates her renal function in setting of low muscle mass, Cystatin C 3.0 with a corresponding eGFR 16% which may be a more accurate representation of her true renal function. I would not use fludrocortisone here due to hx of HTN / edema but we can try low dose hydrochlorothiazide to promote renal K clearance and hope to keep off lokelma long term.  She will need to abide by a low potassium diet, I provided education, as did RD    Recs:  Cont hydrochlorothiazide 12.5 mg/day, consider inc if BP/ Na tolerate. Cont lasix. Try to hold lokelma if able. Low K diet, s/p RD visit with pt 4/22. K 4.6 this AM, will dose Lokelma if K > 5.5  Renal diet recommended, she did meet with registered dietitian 4/22  Daily BMP for now   She should plan to f/up with ANC after discharge, her spouse also goes to our clinic, appointment made 5/9/2024 at 3 PM with Daphne AGUDELO at our [facility].  Our office will follow-up with her to confirm. Orders entered into discharge navigator   Nephrology will sign off, please reconsult us if we can be of any further assistance    CKD- creatinine ont he lower side of her baseline at 1.1s, have seen her up to 1.4 at times.  Some AKIs in the past.  UA actually pretty clean , urine alb 302mg/g creat  Creatinine based eGFR overestimates her renal function in setting of low muscle mass, Cystatin C 3.0 with a corresponding eGFR 16% which may be a more accurate representation of her true renal  function      Diverticulitis- admitting diagnosis and improving     Chronic Resp Failure- OHS/NELLIE.  3L NC at home.  On bipap/CPAP.  Pulm saw earlier in the stay.  Now on lower dose lasix vs PTA     Chronic HFpEF- currently on oral lasix and diuresing adequately     Chronic back pain- Neurosurgery consulted       HPI:   Feels the meeting with the registered dietitian yesterday was very helpful  She did get some handouts with dietary recommendations to avoid high potassium content foods and better alternatives  She is amendable to outpatient follow-up in the kidney clinic for management of her advanced CKD, and chronic hyperkalemia  She does not like morning appointments, did reschedule for the afternoon as above and entered into the discharge navigator  She has no urinary complaints  Denies any breathing troubles  All questions answered      REVIEW OF SYSTEMS: a 12 point review of systems was reviewed and negative other than noted in the HPI above.      Past Medical History:   Diagnosis Date    Allergic rhinitis     Arthritis of back     CHF (congestive heart failure) (H)     Chronic kidney disease     stage 3     De Quervain's disease (tenosynovitis)     Fibromyalgia, primary     GERD (gastroesophageal reflux disease)     Hematuria     chronic    High cholesterol     History of blood clots 09/01/1970    DVT, from birth control, h/o left calf    Hyperlipidemia     Hypertension     Low back pain     Osteoporosis     Pickwickian syndrome (H)     Plantar fasciitis     Proteinuria     Proteinuria     chronic    Sleep apnea     CPAP    Uncomplicated asthma        Current Facility-Administered Medications   Medication Dose Route Frequency Provider Last Rate Last Admin    acetaminophen (TYLENOL) tablet 650 mg  650 mg Oral TID Onofre Harvey MD   650 mg at 04/23/24 0852    acetaminophen (TYLENOL) tablet 650 mg  650 mg Oral Q6H PRN Onofre Harvey MD   650 mg at 04/22/24 1211    albuterol (PROVENTIL HFA/VENTOLIN HFA) inhaler  2 puff  Inhalation Q4H PRN Onofre Harvey MD        amoxicillin-clavulanate (AUGMENTIN) 875-125 MG per tablet 1 tablet  1 tablet Oral Q12H RADHA (08/20) Ivan Palmer MD   1 tablet at 04/23/24 0852    aspirin (ASA) chewable tablet 81 mg  81 mg Oral Daily Carlos Bull MD   81 mg at 04/23/24 0852    calcium carbonate (TUMS) chewable tablet 1,000 mg  1,000 mg Oral 4x Daily PRN Jennifer Gauthier MD        glucose gel 15-30 g  15-30 g Oral Q15 Min PRN Hai Flores MD        Or    dextrose 50 % injection 25-50 mL  25-50 mL Intravenous Q15 Min PRN Hai Flores MD        Or    glucagon injection 1 mg  1 mg Subcutaneous Q15 Min PRN Hai Flores MD        docusate sodium (COLACE) capsule 200 mg  200 mg Oral BID Patrick Gonzales DO   200 mg at 04/23/24 0852    DULoxetine (CYMBALTA) DR capsule 30 mg  30 mg Oral BID Onofre Harvey MD   30 mg at 04/23/24 0856    enoxaparin ANTICOAGULANT (LOVENOX) injection 40 mg  40 mg Subcutaneous Q24H Hai Flores MD   40 mg at 04/22/24 1832    furosemide (LASIX) tablet 40 mg  40 mg Oral QAM Onofre Harvey MD   40 mg at 04/23/24 0853    hydrALAZINE (APRESOLINE) injection 5 mg  5 mg Intravenous Q6H PRN Onofre Harvey MD        hydroCHLOROthiazide tablet 12.5 mg  12.5 mg Oral Daily Tiny Alvarado MD   12.5 mg at 04/23/24 0853    hydroxychloroquine (PLAQUENIL) tablet 200 mg  200 mg Oral Daily Carlos Bull MD   200 mg at 04/23/24 0853    ipratropium - albuterol 0.5 mg/2.5 mg/3 mL (DUONEB) neb solution 3 mL  3 mL Nebulization Q4H PRN Jennifer Gauthier MD        lidocaine (LMX4) cream   Topical Q1H PRN Jennifer Gauthier MD        lidocaine 1 % 0.1-1 mL  0.1-1 mL Other Q1H PRN Jennifer Gauthier MD        magnesium hydroxide (MILK OF MAGNESIA) suspension 30 mL  30 mL Oral Daily PRN Patrick Gonzales DO   30 mL at 04/14/24 1323    menthol-zinc oxide (CALMOSEPTINE) 0.44-20.6 % ointment OINT   Topical TID Onofre Harvey MD   Given at 04/23/24 0856    metoprolol succinate ER (TOPROL  XL) 24 hr tablet 50 mg  50 mg Oral Daily Onofre Harvey MD   50 mg at 04/23/24 0853    miconazole (MICATIN) 2 % powder   Topical BID Jennifer Gauthier MD   Given at 04/23/24 0856    miconazole (MICATIN) 2 % powder   Topical BID Carlos Bull MD   Given at 04/23/24 0856    modafinil (PROVIGIL) tablet 200 mg  200 mg Oral Daily Carlos Bull MD   200 mg at 04/23/24 0856    montelukast (SINGULAIR) tablet 10 mg  10 mg Oral QPM Onofre Harvey MD   10 mg at 04/22/24 2052    naloxone (NARCAN) injection 0.2 mg  0.2 mg Intravenous Q2 Min PRN Hai Flores MD        Or    naloxone (NARCAN) injection 0.4 mg  0.4 mg Intravenous Q2 Min PRN Hai Flores MD        Or    naloxone (NARCAN) injection 0.2 mg  0.2 mg Intramuscular Q2 Min PRN Hai Flores MD        Or    naloxone (NARCAN) injection 0.4 mg  0.4 mg Intramuscular Q2 Min PRN Hai Flores MD        ondansetron (ZOFRAN ODT) ODT tab 4 mg  4 mg Oral Q6H PRN Jennifer Gauthier MD        Or    ondansetron (ZOFRAN) injection 4 mg  4 mg Intravenous Q6H PRN Jennifer Gauthier MD   4 mg at 04/22/24 1832    polyethylene glycol (MIRALAX) Packet 17 g  17 g Oral BID PRN Carlos Bull MD   17 g at 04/18/24 0533    senna-docusate (SENOKOT-S/PERICOLACE) 8.6-50 MG per tablet 1 tablet  1 tablet Oral BID PRN Jennifer Gauthier MD        Or    senna-docusate (SENOKOT-S/PERICOLACE) 8.6-50 MG per tablet 2 tablet  2 tablet Oral BID PRN Jennifer Gauthier MD        sodium chloride (PF) 0.9% PF flush 3 mL  3 mL Intracatheter Q8H Jennifer Gauthier MD   3 mL at 04/22/24 2103    sodium chloride (PF) 0.9% PF flush 3 mL  3 mL Intracatheter q1 min prn Jennifer Gauthier MD        [Held by provider] sodium zirconium cyclosilicate (LOKELMA) packet 10 g  10 g Oral Daily Stephan Pickens MD   10 g at 04/21/24 0850    tiZANidine (ZANAFLEX) tablet 2 mg  2 mg Oral TID PRN Onofre Harvey MD   2 mg at 04/22/24 1321    wound support modular (EXPEDITE) bottle 60 mL  60 mL Oral  Daily Patrick Gonzales, DO   60 mL at 04/23/24 0854       No current outpatient medications on file.      ALLERGIES/SENSITIVITIES:  Allergies   Allergen Reactions    Latex Rash     Severe rash    Pregabalin Shortness Of Breath     Other Reaction(s): swelling and shortness of breath    Valsartan Unknown     Hyperkalemia    Ciprofloxacin Visual Disturbance, Hallucination and Confusion     Likely contributed visual hallucination and confusion    Colchicine Diarrhea    Dicloxacillin      Other Reaction(s): confusion, says she has tolerated augmentin without difficulty.     Gabapentin      Other Reaction(s): Sedation    Latex Unknown     Added based on information entered during case entry, please review and add reactions, type, and severity as needed    Other Drug Allergy (See Comments) Muscle Pain (Myalgia)     Tried lovastatin and simvastatin    Other Environmental Allergy Unknown     Coverlet bandaid , 3M product; rash    Statins-Hmg-Coa Reductase Inhibitors [Statins] Muscle Pain (Myalgia)     Tried lovastatin and simvastatin    Sulfa Antibiotics Swelling     Facial swelling      Adhesive Tape Rash     Social History     Tobacco Use    Smoking status: Never    Smokeless tobacco: Never   Vaping Use    Vaping status: Never Used   Substance Use Topics    Alcohol use: No    Drug use: No     I have reviewed this patient's family history and updated it with pertinent information if needed.  Family History   Problem Relation Age of Onset    Rheumatoid Arthritis Mother     Heart Disease Sister     Chronic Obstructive Pulmonary Disease Father          PHYSICAL EXAM:  Physical Exam   Temp: 97.9  F (36.6  C) Temp src: Oral BP: 134/69 Pulse: 68   Resp: 18 SpO2: 96 % O2 Device: Nasal cannula Oxygen Delivery: 3 LPM  Vitals:    04/21/24 0500 04/22/24 0412 04/23/24 0344   Weight: 94.8 kg (208 lb 14.4 oz) 93.1 kg (205 lb 4 oz) 94.2 kg (207 lb 10.8 oz)     Vital Signs with Ranges  Temp:  [97.9  F (36.6  C)-98.6  F (37  C)] 97.9  F (36.6   C)  Pulse:  [57-68] 68  Resp:  [18-20] 18  BP: (118-154)/(61-71) 134/69  SpO2:  [95 %-99 %] 96 %  I/O last 3 completed shifts:  In: 1673 [P.O.:1670; I.V.:3]  Out: 250 [Urine:250]    @TMAXR(24)@    Patient Vitals for the past 72 hrs:   Weight   04/23/24 0344 94.2 kg (207 lb 10.8 oz)   04/22/24 0412 93.1 kg (205 lb 4 oz)   04/21/24 0500 94.8 kg (208 lb 14.4 oz)       General - A & O x 3, NAD  HEENT - on NC o2  Neck - JVD hard to assess 2/2 body habitus  Respiratory - Lungs very distant, no crackles and normal resp effort.   Cardiovascular - AP RRR with murmur  Abdomen - soft, nontender, obese  Extremities - trace-1+ edema  Integumentary - intact, good turgor, no rash/lesions  Neurologic - grossly intact  Psych:  Judgement intact, affect WNL  : Pure wick in place, light yellow output in canister    Laboratory:     Recent Labs   Lab 04/22/24  0439 04/21/24  0449 04/19/24  0452 04/17/24  0424   WBC  --  8.0  --  12.4*   RBC  --  3.65*  --  3.20*   HGB  --  11.2*  --  9.7*   HCT  --  37.7  --  32.6*    [phone]       Basic Metabolic Panel:  Recent Labs   Lab 04/23/24  0441 04/23/24  0044 04/22/24  1205 04/22/24  0713 04/22/24  0439 04/21/24  1723 04/21/24  1240 04/21/24  0449 04/20/24  0904 04/20/24  0507 04/19/24 2129 04/19/24  0452 04/19/24  0354 04/18/24  0413     --   --   --  134*  --   --  135  --  140  --  136  --  132*   POTASSIUM 4.6  --   --   --  5.3  --   --  4.8  --  5.6*  5.6*  --  5.2  --  5.2   CHLORIDE 92*  --   --   --  94*  --   --  94*  --  99  --  97*  --  96*   CO2 37*  --   --   --  38*  --   --  37*  --  28  --  32*  --  30*   BUN 43.1*  --   --   --  40.7*  --   --  35.4*  --  36.3*  --  33.6*  --  27.2*   CR 1.11*  --   --   --  1.14*  --   --  1.07*  --  1.12*  --  1.14*  --  1.29*   * 103* 128* 102* 112* 151*   < > 95   < > 100*   < > 148*   < > 106*   LYNN 9.3  --   --   --  9.3  --   --  9.4  --  9.4  --  9.5  --  9.4    < > = values in this interval not displayed.        INRNo lab results found in last 7 days.    Recent Labs   Lab Test 04/20/24  0507 04/19/24  0452   POTASSIUM 5.6*  5.6* 5.2   CHLORIDE 99 97*   BUN 36.3* 33.6*      Recent Labs   Lab Test 04/15/24  0418 04/14/24  0413 04/13/24  2031 01/15/24  1618 12/05/23  1646   ALBUMIN 2.6* 3.0*  --    < >  --    BILITOTAL 0.4 0.3  --    < >  --    ALT 27 23  --    < >  --    AST 46* 41  --    < >  --    PROTEIN  --   --  100*  --  100*    < > = values in this interval not displayed.     TTKG 3 % which is lower than expected in setting of hyperkalemia.   Personally reviewed today's laboratory studies      Thank you for involving us in the care of this patient. We will continue to follow along with you.      Graham Toney PA-C  [facility]  [phone]

## 2024-04-23 NOTE — PROGRESS NOTES
Care Management Discharge Note    Discharge Date: 04/23/2024       Discharge Disposition:  transitional care    Discharge Services:  rehab    Discharge DME:  O2     Discharge Transportation:  authorized [facility] wheelchair, ride at [phone]    Private pay costs discussed: transportation costs    Does the patient's insurance plan have a 3 day qualifying hospital stay waiver?  No    PAS Confirmation Code: [id number]  Patient/family educated on Medicare website which has current facility and service quality ratings:  yes    Education Provided on the Discharge Plan:  yes  Persons Notified of Discharge Plans: patient, daughter, Tiny and   Patient/Family in Agreement with the Plan:  yes    Handoff Referral Completed: Yes    Additional Information:  Patient to go to TCU at [facility] 4/23. No beds available at [facility] or [facility] which were her other choices. Tiny updated with ride time. Orders faxed.    ADONIS Infante

## 2024-04-23 NOTE — PROGRESS NOTES
Pt. On BIPAP, able to talk ot me a little. Falls back asleep.   Spot check Blood sugar per orders but no  insulin ordered.   .

## 2024-04-23 NOTE — PLAN OF CARE
Problem: Adult Inpatient Plan of Care  Goal: Readiness for Transition of Care  Outcome: Adequate for Care Transition   Goal Outcome Evaluation:    Vital signs stable. Denied pain. On baseline 3L O2. Ambulated to bathroom and chair with assist x 2 with walker.     Discharging to TCU. Wheelchair transport scheduled between [id number]. Patient dressed and belongings packed.

## 2024-04-24 ENCOUNTER — TRANSITIONAL CARE UNIT VISIT (OUTPATIENT)
Dept: GERIATRICS | Facility: CLINIC | Age: 74
End: 2024-04-24
Payer: MEDICARE

## 2024-04-24 ENCOUNTER — TELEPHONE (OUTPATIENT)
Dept: NEUROSURGERY | Facility: CLINIC | Age: 74
End: 2024-04-24
Payer: MEDICARE

## 2024-04-24 VITALS
WEIGHT: 181.5 LBS | RESPIRATION RATE: 18 BRPM | BODY MASS INDEX: 42.01 KG/M2 | SYSTOLIC BLOOD PRESSURE: 120 MMHG | TEMPERATURE: 97.8 F | DIASTOLIC BLOOD PRESSURE: 67 MMHG | OXYGEN SATURATION: 96 % | HEART RATE: 68 BPM | HEIGHT: 55 IN

## 2024-04-24 DIAGNOSIS — B37.31 YEAST INFECTION OF THE VAGINA: ICD-10-CM

## 2024-04-24 DIAGNOSIS — I10 BENIGN ESSENTIAL HYPERTENSION: ICD-10-CM

## 2024-04-24 DIAGNOSIS — R21 RASH: ICD-10-CM

## 2024-04-24 DIAGNOSIS — N18.31 STAGE 3A CHRONIC KIDNEY DISEASE (H): ICD-10-CM

## 2024-04-24 DIAGNOSIS — M54.12 CERVICAL RADICULOPATHY: Primary | ICD-10-CM

## 2024-04-24 DIAGNOSIS — R62.7 ADULT FAILURE TO THRIVE: ICD-10-CM

## 2024-04-24 DIAGNOSIS — K57.32 DIVERTICULITIS OF COLON: ICD-10-CM

## 2024-04-24 DIAGNOSIS — I50.32 CHRONIC HEART FAILURE WITH PRESERVED EJECTION FRACTION (HFPEF) (H): ICD-10-CM

## 2024-04-24 DIAGNOSIS — R53.81 PHYSICAL DECONDITIONING: Primary | ICD-10-CM

## 2024-04-24 DIAGNOSIS — K64.4 EXTERNAL HEMORRHOIDS: ICD-10-CM

## 2024-04-24 PROBLEM — J96.21 ACUTE AND CHRONIC RESPIRATORY FAILURE WITH HYPOXIA (H): Status: RESOLVED | Noted: 2024-04-13 | Resolved: 2024-04-24

## 2024-04-24 PROCEDURE — 99310 SBSQ NF CARE HIGH MDM 45: CPT | Performed by: NURSE PRACTITIONER

## 2024-04-24 RX ORDER — HYDROCODONE BITARTRATE AND ACETAMINOPHEN 5; 325 MG/1; MG/1
1 TABLET ORAL EVERY 4 HOURS PRN
Qty: 60 TABLET | Refills: 0 | Status: SHIPPED | OUTPATIENT
Start: 2024-04-24

## 2024-04-24 RX ORDER — MODAFINIL 100 MG/1
250 TABLET ORAL DAILY
Qty: 90 TABLET | Refills: 0 | Status: SHIPPED | OUTPATIENT
Start: 2024-04-24

## 2024-04-24 NOTE — LETTER
4/24/2024        RE: Desirae Rey  [address]   [facility] [address]        [facility] GERIATRICS    PRIMARY CARE PROVIDER AND CLINIC:  Noemy Brito PA-C, [address] / [facility] [address]  Chief Complaint   Patient presents with     Hospital F/U      [facility] Record Number:  [id number]  Place of Service where encounter took place:  [facility] ([facility] [[id number]]    Desirae Rey  is a 73 year old  (1950), admitted to the above facility from  [facility]. Hospital stay 4/13/24 through 4/23/24. Patient with PMH chronic respiratory failure, NELLIE, CKD, CHF, and chronic back pain was admitted with sepsis due to acute diverticulitis. Initially treated with IV Cipro and Flagyl, transitioned to oral on 4/16, but she developed hallucination and confusion. It was felt that antibiotics were causing this, ID switched her to Augmentin. Hallucinations resolved.     HPI obtained from patient visit, review of nursing home record, discussion with facility staff, and Epic review.     HPI:    Patient reports that she is feeling much better as far as her infection goes. She is not having any abdominal symptoms. She does now have a vaginal yeast infection from antibiotics as well as hemorrhoid discomfort. No bleeding from rectum. In discussing the goal of her being at [facility], to gain strength, she does say she has had several falls this year. Her  also has several medical issues. They are considering [facility], but have not made any progress toward this. They currently live in a split level home. They had to call paramedics 3 times in one day due to her falling and not being able to get back up.     CODE STATUS/ADVANCE DIRECTIVES DISCUSSION:  Full Code    ALLERGIES:   Allergies   Allergen Reactions     Latex Rash     Severe rash     Pregabalin Shortness Of Breath     Other Reaction(s): swelling and shortness of breath     Valsartan Unknown      Hyperkalemia     Ciprofloxacin Visual Disturbance, Hallucination and Confusion     Likely contributed visual hallucination and confusion     Colchicine Diarrhea     Dicloxacillin      Other Reaction(s): confusion, says she has tolerated augmentin without difficulty.      Gabapentin      Other Reaction(s): Sedation     Latex Unknown     Added based on information entered during case entry, please review and add reactions, type, and severity as needed     Other Drug Allergy (See Comments) Muscle Pain (Myalgia)     Tried lovastatin and simvastatin     Other Environmental Allergy Unknown     Coverlet bandaid , 3M product; rash     Statins-Hmg-Coa Reductase Inhibitors [Statins] Muscle Pain (Myalgia)     Tried lovastatin and simvastatin     Sulfa Antibiotics Swelling     Facial swelling       Adhesive Tape Rash      PAST MEDICAL HISTORY:   Past Medical History:   Diagnosis Date     Allergic rhinitis      Arthritis of back      CHF (congestive heart failure) (H)      Chronic kidney disease     stage 3      De Quervain's disease (tenosynovitis)      Fibromyalgia, primary      GERD (gastroesophageal reflux disease)      Hematuria     chronic     High cholesterol      History of blood clots 09/01/1970    DVT, from birth control, h/o left calf     Hyperlipidemia      Hypertension      Low back pain      Osteoporosis      Pickwickian syndrome (H)      Plantar fasciitis      Proteinuria      Proteinuria     chronic     Sleep apnea     CPAP     Uncomplicated asthma       PAST SURGICAL HISTORY:   has a past surgical history that includes knee surgery; Hand surgery (Right); Release carpal tunnel (Bilateral); joint replacement; Cholecystectomy; Hysteroscopy Diagnostic; Cervical Fusion (N/A, 4/27/2017); Eye surgery; and Colonoscopy (N/A, 12/20/2019).  FAMILY HISTORY: family history includes Chronic Obstructive Pulmonary Disease in her father; Heart Disease in her sister; Rheumatoid Arthritis in her mother.  SOCIAL HISTORY:   reports  that she has never smoked. She has never used smokeless tobacco. She reports that she does not drink alcohol and does not use drugs.  Patient's living condition: lives with spouse    Post Discharge Medication Reconciliation Status:   MED REC REQUIRED  Post Medication Reconciliation Status:  Discharge medications reconciled, continue medications without change       Current Outpatient Medications   Medication Sig Dispense Refill     acetaminophen (TYLENOL) 500 MG tablet Take 500-1,000 mg by mouth every 6 hours as needed for mild pain       albuterol (PROAIR HFA;PROVENTIL HFA;VENTOLIN HFA) 90 mcg/actuation inhaler Inhale 1 puff into the lungs every 4 hours as needed for shortness of breath / dyspnea       alendronate (FOSAMAX) 70 MG tablet [ALENDRONATE (FOSAMAX) 70 MG TABLET] Take 70 mg by mouth every 7 days. Takes on Mondays 11     amoxicillin-clavulanate (AUGMENTIN) 875-125 MG tablet Take 1 tablet by mouth every 12 hours for 10 doses       aspirin 81 MG EC tablet 1 tablet Orally Once a day       azelastine (OPTIVAR) 0.05 % ophthalmic solution Apply 1 drop to eye 2 times daily as needed       Bacillus Coagulans-Inulin (ALIGN PREBIOTIC-PROBIOTIC PO) Take 1 tablet by mouth daily       calcium citrate (CITRACAL) 950 (200 Ca) MG tablet Take 1 tablet by mouth daily       cetirizine (ZYRTEC) 10 MG tablet [CETIRIZINE (ZYRTEC) 10 MG TABLET] Take 10 mg by mouth daily.        cholecalciferol (VITAMIN D3) 25 mcg (1000 units) capsule Take 125 mcg by mouth every morning Take 5000 units in the morning and 2000 units in the evening       DULoxetine (CYMBALTA) 30 MG capsule Take 1 capsule (30 mg) by mouth 2 times daily       furosemide (LASIX) 20 MG tablet Take 2 tablets (40 mg) by mouth every morning       hydroCHLOROthiazide 12.5 MG tablet Take 1 tablet (12.5 mg) by mouth daily       HYDROcodone-acetaminophen (NORCO) 5-325 MG tablet Take 1 tablet by mouth every 4 hours as needed for pain Max 6 tablets per day. 60 tablet 0      "Hydroxychloroquine Sulfate 100 MG TABS Take 100 mg by mouth 2 times daily       l-lysine HCl 500 MG TABS tablet Take 2 tablets by mouth daily       magnesium oxide 250 mg Tab Take 500 mg by mouth daily       metoprolol succinate ER (TOPROL XL) 50 MG 24 hr tablet Take 50 mg by mouth daily       modafinil (PROVIGIL) 100 MG tablet Take 2.5 tablets (250 mg) by mouth daily Take two and half tablet (250 mg ) daily 90 tablet 0     montelukast (SINGULAIR) 10 mg tablet Take 10 mg by mouth every evening       nystatin (MYCOSTATIN) 440465 UNIT/GM external powder Apply topically 2 times daily       omeprazole (PRILOSEC) 20 MG DR capsule Take 40 mg by mouth daily       senna-docusate (SENOKOT-S/PERICOLACE) 8.6-50 MG tablet Take 1 tablet by mouth 2 times daily as needed for constipation 30 tablet 0     solifenacin (VESICARE) 10 MG tablet Take 5-10 mg by mouth At Bedtime       tiZANidine (ZANAFLEX) 4 MG tablet Take 4 mg by mouth 3 times daily as needed for muscle spasms       Turmeric (CURCUPLEX-95) 500 MG CAPS Take 1 capsule by mouth daily       valACYclovir (VALTREX) 500 MG tablet Take 500 mg by mouth 2 times daily as needed (flare)       OXYGEN-AIR DELIVERY SYSTEMS MISC [OXYGEN-AIR DELIVERY SYSTEMS MISC] Use 3 L As Directed. 3 lpm with activities and as needed at night       No current facility-administered medications for this visit.       ROS:  10 point ROS of systems including Constitutional, Eyes, Respiratory, Cardiovascular, Gastroenterology, Genitourinary, Integumentary, Musculoskeletal, Psychiatric were all negative except for pertinent positives noted in my HPI.    Vitals:  /67   Pulse 68   Temp 97.8  F (36.6  C)   Resp 18   Ht 1.397 m (4' 7\")   Wt 82.3 kg (181 lb 8 oz)   SpO2 96%   BMI 42.18 kg/m    Exam:  GENERAL APPEARANCE:  Alert, in no distress  ENT:  Mouth and posterior oropharynx normal, moist mucous membranes  EYES:  EOM normal, conjunctiva and lids normal  RESP:  lungs clear to auscultation , no " respiratory distress  CV:  Palpation and auscultation of heart done , regular rate and rhythm, no murmur, rub, or gallop, no edema  SKIN:  large dark purple bruise on abdomen, several superficial open areas LLQ, consistent with open blisters  PSYCH:  oriented X 3, affect and mood normal    Lab/Diagnostic data:  Recent labs in [facility] reviewed by me today.     ASSESSMENT/PLAN:  (R53.81) Physical deconditioning  (primary encounter diagnosis)  Comment: Acute due to illness on chronic decline  Plan: PT/OT eval and treat, discharge planning per their recommendations.    (K57.32) Diverticulitis of colon  Comment: Asymptomatic  Plan: Complete antibiotics as ordered. Monitor for abdominal symptoms    (B37.31) Yeast infection of the vagina  Comment: Due to antibiotic treatment  Plan: Monistat suppository x 3 days    (K64.4) External hemorrhoids  Comment: Did not examine anus, but symptoms consistent with hemorrhoids  Plan: Preparation H prn    (R21) Rash  Comment: Rash on abdomen could represent shingles, but it is not clear. When rash was noted, she said she has had discomfort there, but with all of her various issues it was difficult for her to pinpoint symptoms. Rash is mild, if she is having pain, this is mild as well. Would not recommend antiviral therapy  Plan: Monitor rash    (I50.32) Chronic heart failure with preserved ejection fraction (HFpEF) (H)  Comment: Chronic: CHF due to effects of HTN/Heart Disease. Currently: compensated.  Plan: Monitor for shortness of breath, wheezing, increasing lower extremity edema and change in activity tolerance.     (I10) Benign essential hypertension  Comment: Chronic, controlled  Plan: Continue current POC with no changes at this time and adjustments as needed.    (N18.31) Stage 3a chronic kidney disease (H)  Comment: Chronic Kidney Disease due to: Hypertension.  Plan: Avoid nephrotoxic medications and adjust medications per renal function. Monitor renal function prn. Refer to plan  of care for Hypertension.        Orders:  Preparation H cream QID prn  Monistat vaginal suppository daily x 3 days       Total time spent with patient visit was 45 min including patient visit and review of past records.     Electronically signed by:  KALIE Ovalle CNP                     Sincerely,        KALIE Ovalle CNP

## 2024-04-24 NOTE — TELEPHONE ENCOUNTER
Date: April 24, 2024  (Provide the date when patient was called)  Provider: MD     Provider/Other: Dr. Shaw  (with whom  should patient ondina with)  Reason for out-going call: Hospital F/U   (Reason patient was contacted)    Detailed message: ldvm for patient to call back and schedule hospital follow up to discuss surgical options.

## 2024-04-24 NOTE — PROGRESS NOTES
[facility] GERIATRICS    PRIMARY CARE PROVIDER AND CLINIC:  Noemy Brito PA-C, [address] / [facility] [address]  Chief Complaint   Patient presents with    Hospital F/U      [facility] Medical Record Number:  [id number]  Place of Service where encounter took place:  [facility] ([facility]) [[id number]]    Desirae Rey  is a 73 year old  (1950), admitted to the above facility from  [facility]. Hospital stay 4/13/24 through 4/23/24. Patient with PMH chronic respiratory failure, NELLIE, CKD, CHF, and chronic back pain was admitted with sepsis due to acute diverticulitis. Initially treated with IV Cipro and Flagyl, transitioned to oral on 4/16, but she developed hallucination and confusion. It was felt that antibiotics were causing this, ID switched her to Augmentin. Hallucinations resolved.     HPI obtained from patient visit, review of nursing home record, discussion with facility staff, and Epic review.     HPI:    Patient reports that she is feeling much better as far as her infection goes. She is not having any abdominal symptoms. She does now have a vaginal yeast infection from antibiotics as well as hemorrhoid discomfort. No bleeding from rectum. In discussing the goal of her being at [facility], to gain strength, she does say she has had several falls this year. Her  also has several medical issues. They are considering retirement, but have not made any progress toward this. They currently live in a split level home. They had to call paramedics 3 times in one day due to her falling and not being able to get back up.     CODE STATUS/ADVANCE DIRECTIVES DISCUSSION:  Full Code    ALLERGIES:   Allergies   Allergen Reactions    Latex Rash     Severe rash    Pregabalin Shortness Of Breath     Other Reaction(s): swelling and shortness of breath    Valsartan Unknown     Hyperkalemia    Ciprofloxacin Visual Disturbance, Hallucination and Confusion     Likely contributed visual  hallucination and confusion    Colchicine Diarrhea    Dicloxacillin      Other Reaction(s): confusion, says she has tolerated augmentin without difficulty.     Gabapentin      Other Reaction(s): Sedation    Latex Unknown     Added based on information entered during case entry, please review and add reactions, type, and severity as needed    Other Drug Allergy (See Comments) Muscle Pain (Myalgia)     Tried lovastatin and simvastatin    Other Environmental Allergy Unknown     Coverlet bandaid , 3M product; rash    Statins-Hmg-Coa Reductase Inhibitors [Statins] Muscle Pain (Myalgia)     Tried lovastatin and simvastatin    Sulfa Antibiotics Swelling     Facial swelling      Adhesive Tape Rash      PAST MEDICAL HISTORY:   Past Medical History:   Diagnosis Date    Allergic rhinitis     Arthritis of back     CHF (congestive heart failure) (H)     Chronic kidney disease     stage 3     De Quervain's disease (tenosynovitis)     Fibromyalgia, primary     GERD (gastroesophageal reflux disease)     Hematuria     chronic    High cholesterol     History of blood clots 09/01/1970    DVT, from birth control, h/o left calf    Hyperlipidemia     Hypertension     Low back pain     Osteoporosis     Pickwickian syndrome (H)     Plantar fasciitis     Proteinuria     Proteinuria     chronic    Sleep apnea     CPAP    Uncomplicated asthma       PAST SURGICAL HISTORY:   has a past surgical history that includes knee surgery; Hand surgery (Right); Release carpal tunnel (Bilateral); joint replacement; Cholecystectomy; Hysteroscopy Diagnostic; Cervical Fusion (N/A, 4/27/2017); Eye surgery; and Colonoscopy (N/A, 12/20/2019).  FAMILY HISTORY: family history includes Chronic Obstructive Pulmonary Disease in her father; Heart Disease in her sister; Rheumatoid Arthritis in her mother.  SOCIAL HISTORY:   reports that she has never smoked. She has never used smokeless tobacco. She reports that she does not drink alcohol and does not use  drugs.  Patient's living condition: lives with spouse    Post Discharge Medication Reconciliation Status:   MED REC REQUIRED  Post Medication Reconciliation Status:  Discharge medications reconciled, continue medications without change       Current Outpatient Medications   Medication Sig Dispense Refill    acetaminophen (TYLENOL) 500 MG tablet Take 500-1,000 mg by mouth every 6 hours as needed for mild pain      albuterol (PROAIR HFA;PROVENTIL HFA;VENTOLIN HFA) 90 mcg/actuation inhaler Inhale 1 puff into the lungs every 4 hours as needed for shortness of breath / dyspnea      alendronate (FOSAMAX) 70 MG tablet [ALENDRONATE (FOSAMAX) 70 MG TABLET] Take 70 mg by mouth every 7 days. Takes on Mondays 11    amoxicillin-clavulanate (AUGMENTIN) 875-125 MG tablet Take 1 tablet by mouth every 12 hours for 10 doses      aspirin 81 MG EC tablet 1 tablet Orally Once a day      azelastine (OPTIVAR) 0.05 % ophthalmic solution Apply 1 drop to eye 2 times daily as needed      Bacillus Coagulans-Inulin (ALIGN PREBIOTIC-PROBIOTIC PO) Take 1 tablet by mouth daily      calcium citrate (CITRACAL) 950 (200 Ca) MG tablet Take 1 tablet by mouth daily      cetirizine (ZYRTEC) 10 MG tablet [CETIRIZINE (ZYRTEC) 10 MG TABLET] Take 10 mg by mouth daily.       cholecalciferol (VITAMIN D3) 25 mcg (1000 units) capsule Take 125 mcg by mouth every morning Take 5000 units in the morning and 2000 units in the evening      DULoxetine (CYMBALTA) 30 MG capsule Take 1 capsule (30 mg) by mouth 2 times daily      furosemide (LASIX) 20 MG tablet Take 2 tablets (40 mg) by mouth every morning      hydroCHLOROthiazide 12.5 MG tablet Take 1 tablet (12.5 mg) by mouth daily      HYDROcodone-acetaminophen (NORCO) 5-325 MG tablet Take 1 tablet by mouth every 4 hours as needed for pain Max 6 tablets per day. 60 tablet 0    Hydroxychloroquine Sulfate 100 MG TABS Take 100 mg by mouth 2 times daily      l-lysine HCl 500 MG TABS tablet Take 2 tablets by mouth daily    "   magnesium oxide 250 mg Tab Take 500 mg by mouth daily      metoprolol succinate ER (TOPROL XL) 50 MG 24 hr tablet Take 50 mg by mouth daily      modafinil (PROVIGIL) 100 MG tablet Take 2.5 tablets (250 mg) by mouth daily Take two and half tablet (250 mg ) daily 90 tablet 0    montelukast (SINGULAIR) 10 mg tablet Take 10 mg by mouth every evening      nystatin (MYCOSTATIN) 354735 UNIT/GM external powder Apply topically 2 times daily      omeprazole (PRILOSEC) 20 MG DR capsule Take 40 mg by mouth daily      senna-docusate (SENOKOT-S/PERICOLACE) 8.6-50 MG tablet Take 1 tablet by mouth 2 times daily as needed for constipation 30 tablet 0    solifenacin (VESICARE) 10 MG tablet Take 5-10 mg by mouth At Bedtime      tiZANidine (ZANAFLEX) 4 MG tablet Take 4 mg by mouth 3 times daily as needed for muscle spasms      Turmeric (CURCUPLEX-95) 500 MG CAPS Take 1 capsule by mouth daily      valACYclovir (VALTREX) 500 MG tablet Take 500 mg by mouth 2 times daily as needed (flare)      OXYGEN-AIR DELIVERY SYSTEMS MISC [OXYGEN-AIR DELIVERY SYSTEMS MISC] Use 3 L As Directed. 3 lpm with activities and as needed at night       No current facility-administered medications for this visit.       ROS:  10 point ROS of systems including Constitutional, Eyes, Respiratory, Cardiovascular, Gastroenterology, Genitourinary, Integumentary, Musculoskeletal, Psychiatric were all negative except for pertinent positives noted in my HPI.    Vitals:  /67   Pulse 68   Temp 97.8  F (36.6  C)   Resp 18   Ht 1.397 m (4' 7\")   Wt 82.3 kg (181 lb 8 oz)   SpO2 96%   BMI 42.18 kg/m    Exam:  GENERAL APPEARANCE:  Alert, in no distress  ENT:  Mouth and posterior oropharynx normal, moist mucous membranes  EYES:  EOM normal, conjunctiva and lids normal  RESP:  lungs clear to auscultation , no respiratory distress  CV:  Palpation and auscultation of heart done , regular rate and rhythm, no murmur, rub, or gallop, no edema  SKIN:  large dark purple " bruise on abdomen, several superficial open areas LLQ, consistent with open blisters  PSYCH:  oriented X 3, affect and mood normal    Lab/Diagnostic data:  Recent labs in [facility] reviewed by me today.     ASSESSMENT/PLAN:  (R53.81) Physical deconditioning  (primary encounter diagnosis)  Comment: Acute due to illness on chronic decline  Plan: PT/OT eval and treat, discharge planning per their recommendations.    (K57.32) Diverticulitis of colon  Comment: Asymptomatic  Plan: Complete antibiotics as ordered. Monitor for abdominal symptoms    (B37.31) Yeast infection of the vagina  Comment: Due to antibiotic treatment  Plan: Monistat suppository x 3 days    (K64.4) External hemorrhoids  Comment: Did not examine anus, but symptoms consistent with hemorrhoids  Plan: Preparation H prn    (R21) Rash  Comment: Rash on abdomen could represent shingles, but it is not clear. When rash was noted, she said she has had discomfort there, but with all of her various issues it was difficult for her to pinpoint symptoms. Rash is mild, if she is having pain, this is mild as well. Would not recommend antiviral therapy  Plan: Monitor rash    (I50.32) Chronic heart failure with preserved ejection fraction (HFpEF) (H)  Comment: Chronic: CHF due to effects of HTN/Heart Disease. Currently: compensated.  Plan: Monitor for shortness of breath, wheezing, increasing lower extremity edema and change in activity tolerance.     (I10) Benign essential hypertension  Comment: Chronic, controlled  Plan: Continue current POC with no changes at this time and adjustments as needed.    (N18.31) Stage 3a chronic kidney disease (H)  Comment: Chronic Kidney Disease due to: Hypertension.  Plan: Avoid nephrotoxic medications and adjust medications per renal function. Monitor renal function prn. Refer to plan of care for Hypertension.        Orders:  Preparation H cream QID prn  Monistat vaginal suppository daily x 3 days       Total time spent with patient visit  was 45 min including patient visit and review of past records.     Electronically signed by:  KALIE Ovalle CNP

## 2024-04-24 NOTE — TELEPHONE ENCOUNTER
----- Message from Avani Rodriguez RN sent at 4/22/2024  9:20 AM CDT -----  Regarding: FW: Follow-up    ----- Message -----  From: Cheil Saravia PA-C  Sent: 4/20/2024   4:38 PM CDT  To: Avani Rodriguez RN  Subject: Follow-up                                        Please arrange hospital follow-up directly with Dr. Shaw to discuss possible surgical options for lumbar spinal stenosis. Ideally within the next month.     Thank you!   Cheli

## 2024-04-26 ENCOUNTER — DOCUMENTATION ONLY (OUTPATIENT)
Dept: GERIATRICS | Facility: CLINIC | Age: 74
End: 2024-04-26
Payer: MEDICARE

## 2024-04-27 ENCOUNTER — LAB REQUISITION (OUTPATIENT)
Dept: LAB | Facility: CLINIC | Age: 74
End: 2024-04-27
Payer: MEDICARE

## 2024-04-27 DIAGNOSIS — I10 ESSENTIAL (PRIMARY) HYPERTENSION: ICD-10-CM

## 2024-04-27 DIAGNOSIS — D64.9 ANEMIA, UNSPECIFIED: ICD-10-CM

## 2024-04-29 ENCOUNTER — TRANSITIONAL CARE UNIT VISIT (OUTPATIENT)
Dept: GERIATRICS | Facility: CLINIC | Age: 74
End: 2024-04-29
Payer: MEDICARE

## 2024-04-29 ENCOUNTER — TELEPHONE (OUTPATIENT)
Dept: NEUROSURGERY | Facility: CLINIC | Age: 74
End: 2024-04-29
Payer: MEDICARE

## 2024-04-29 ENCOUNTER — TELEPHONE (OUTPATIENT)
Dept: GERIATRICS | Facility: CLINIC | Age: 74
End: 2024-04-29

## 2024-04-29 VITALS
DIASTOLIC BLOOD PRESSURE: 64 MMHG | SYSTOLIC BLOOD PRESSURE: 142 MMHG | BODY MASS INDEX: 42.01 KG/M2 | WEIGHT: 181.5 LBS | HEIGHT: 55 IN | RESPIRATION RATE: 18 BRPM | HEART RATE: 72 BPM | TEMPERATURE: 96.8 F | OXYGEN SATURATION: 95 %

## 2024-04-29 DIAGNOSIS — B37.31 YEAST INFECTION OF THE VAGINA: ICD-10-CM

## 2024-04-29 DIAGNOSIS — I50.32 CHRONIC HEART FAILURE WITH PRESERVED EJECTION FRACTION (HFPEF) (H): ICD-10-CM

## 2024-04-29 DIAGNOSIS — K57.32 DIVERTICULITIS OF COLON: ICD-10-CM

## 2024-04-29 DIAGNOSIS — N18.31 STAGE 3A CHRONIC KIDNEY DISEASE (H): ICD-10-CM

## 2024-04-29 DIAGNOSIS — B02.9 VARICELLA ZOSTER: Primary | ICD-10-CM

## 2024-04-29 DIAGNOSIS — I10 BENIGN ESSENTIAL HYPERTENSION: ICD-10-CM

## 2024-04-29 LAB
ANION GAP SERPL CALCULATED.3IONS-SCNC: 13 MMOL/L (ref 7–15)
BUN SERPL-MCNC: 42.6 MG/DL (ref 8–23)
CALCIUM SERPL-MCNC: 9.6 MG/DL (ref 8.8–10.2)
CHLORIDE SERPL-SCNC: 92 MMOL/L (ref 98–107)
CREAT SERPL-MCNC: 1.31 MG/DL (ref 0.51–0.95)
DEPRECATED HCO3 PLAS-SCNC: 30 MMOL/L (ref 22–29)
EGFRCR SERPLBLD CKD-EPI 2021: 43 ML/MIN/1.73M2
ERYTHROCYTE [DISTWIDTH] IN BLOOD BY AUTOMATED COUNT: 13.4 % (ref 10–15)
GLUCOSE SERPL-MCNC: 109 MG/DL (ref 70–99)
HCT VFR BLD AUTO: 33 % (ref 35–47)
HGB BLD-MCNC: 9.9 G/DL (ref 11.7–15.7)
MCH RBC QN AUTO: 30.3 PG (ref 26.5–33)
MCHC RBC AUTO-ENTMCNC: 30 G/DL (ref 31.5–36.5)
MCV RBC AUTO: 101 FL (ref 78–100)
PLATELET # BLD AUTO: 381 10E3/UL (ref 150–450)
POTASSIUM SERPL-SCNC: 4.3 MMOL/L (ref 3.4–5.3)
RBC # BLD AUTO: 3.27 10E6/UL (ref 3.8–5.2)
SODIUM SERPL-SCNC: 135 MMOL/L (ref 135–145)
WBC # BLD AUTO: 7 10E3/UL (ref 4–11)

## 2024-04-29 PROCEDURE — 82374 ASSAY BLOOD CARBON DIOXIDE: CPT | Performed by: NURSE PRACTITIONER

## 2024-04-29 PROCEDURE — 36415 COLL VENOUS BLD VENIPUNCTURE: CPT | Performed by: NURSE PRACTITIONER

## 2024-04-29 PROCEDURE — 85049 AUTOMATED PLATELET COUNT: CPT | Performed by: NURSE PRACTITIONER

## 2024-04-29 PROCEDURE — 99309 SBSQ NF CARE MODERATE MDM 30: CPT | Performed by: NURSE PRACTITIONER

## 2024-04-29 PROCEDURE — P9604 ONE-WAY ALLOW PRORATED TRIP: HCPCS | Performed by: NURSE PRACTITIONER

## 2024-04-29 RX ORDER — ONDANSETRON 4 MG/1
4 TABLET, FILM COATED ORAL EVERY 6 HOURS PRN
COMMUNITY

## 2024-04-29 NOTE — TELEPHONE ENCOUNTER
Date: April 29, 2024  (Provide the date when patient was called)  Provider: MD     Provider/Other: Dr. Shaw  (with whom  should patient ondina with)  Reason for out-going call: Hospital F/U   (Reason patient was contacted)    Detailed message: ldvm for patient to c/b and schedule a Hospital F/U with Dr. Shaw to discuss possible surgical treatment.

## 2024-04-29 NOTE — PROGRESS NOTES
"[facility] GERIATRICS    Chief Complaint   Patient presents with    RECHECK     HPI:  Desirae Rey is a 73 year old  (1950), who is being seen today for an episodic care visit at: [facility] ([facility]) [[id number]]. [facility] stay 4/13/24 through 4/23/24. Patient with PMH chronic respiratory failure, NELLIE, CKD, CHF, and chronic back pain was admitted with sepsis due to acute diverticulitis. Initially treated with IV Cipro and Flagyl, transitioned to oral on 4/16, but she developed hallucination and confusion. It was felt that antibiotics were causing this, ID switched her to Augmentin. Hallucinations resolved.     Today's concern is:   Patient's rash that was noted last week did expand and became more consistent with shingles. She started valacyclovir 4/27/24. She is having quite a bit of pain at the rash. She is interested in topical therapy if possible. She also still has symptoms of vaginal yeast infection and would like more monistat as well yeast under breasts that is not responding to nystatin powder. She says she is having constipation. She was having 3 BMs per day, one yesterday. She wants to make sure that she does not have to strain.    Allergies, and PMH/PSH reviewed in EPIC today.  REVIEW OF SYSTEMS:  4 point ROS including Respiratory, CV, GI and , other than that noted in the HPI,  is negative    Objective:   BP (!) 142/64   Pulse 72   Temp 96.8  F (36  C)   Resp 18   Ht 1.397 m (4' 7\")   Wt 82.3 kg (181 lb 8 oz)   SpO2 95%   BMI 42.18 kg/m    GENERAL APPEARANCE:  Alert, in no distress  RESP:  no respiratory distress  SKIN:  dark red rash under both breasts, shingles rash is on left abdomen around to her back  PSYCH:  oriented X 3, affect and mood normal    Labs:  BMP, CBC pending    Assessment/Plan:  (B02.9) Varicella zoster  (primary encounter diagnosis)  Comment: Patient is having neuralgia with this, but it is not severe. Will order lidocaine cream. " Hopefully she will improve with the antiviral medication and her pain will not worsen.   Plan: Lidocaine cream BID    (K57.32) Diverticulitis of colon  Comment: Asymptomatic. Antibiotics now completed. Will add senna conservatively  Plan: Monitor for abdominal pain, fever. Senna-S 1 tab daily. Monitor bowel function. Adjust medication as clinically indicated.    (B37.31) Yeast infection of the vagina  Comment: Due to antibiotic treatment  Plan: Monistat supp daily x 7 days    (I50.32) Chronic heart failure with preserved ejection fraction (HFpEF) (H)  Comment: Chronic: CHF due to effects of HTN/Heart Disease. Currently: compensated.  Plan: Monitor for shortness of breath, wheezing, increasing lower extremity edema and change in activity tolerance.     (I10) Benign essential hypertension  Comment: Chronic, controlled  Plan: Continue current POC with no changes at this time and adjustments as needed.    (N18.31) Stage 3a chronic kidney disease (H)  Comment: Chronic Kidney Disease due to: Hypertension.  Plan: Avoid nephrotoxic medications and adjust medications per renal function.       Orders:  Senna-S 1 tab daily  Lidocaine cream 4% BID  Intradry daily under breasts    Electronically signed by: KALIE Ovalle CNP

## 2024-04-29 NOTE — LETTER
"    4/29/2024        RE: Desirae Rey  [address]   [place] [address]        [facility] GERIATRICS    Chief Complaint   Patient presents with     RECHECK     HPI:  Desirae Rey is a 73 year old  (1950), who is being seen today for an episodic care visit at: [facility] ([facility]) [[id number]]. [facility] stay 4/13/24 through 4/23/24. Patient with PMH chronic respiratory failure, NELLIE, CKD, CHF, and chronic back pain was admitted with sepsis due to acute diverticulitis. Initially treated with IV Cipro and Flagyl, transitioned to oral on 4/16, but she developed hallucination and confusion. It was felt that antibiotics were causing this, ID switched her to Augmentin. Hallucinations resolved.     Today's concern is:   Patient's rash that was noted last week did expand and became more consistent with shingles. She started valacyclovir 4/27/24. She is having quite a bit of pain at the rash. She is interested in topical therapy if possible. She also still has symptoms of vaginal yeast infection and would like more monistat as well yeast under breasts that is not responding to nystatin powder. She says she is having constipation. She was having 3 BMs per day, one yesterday. She wants to make sure that she does not have to strain.    Allergies, and PMH/PSH reviewed in [facility] today.  REVIEW OF SYSTEMS:  4 point ROS including Respiratory, CV, GI and , other than that noted in the HPI,  is negative    Objective:   BP (!) 142/64   Pulse 72   Temp 96.8  F (36  C)   Resp 18   Ht 1.397 m (4' 7\")   Wt 82.3 kg (181 lb 8 oz)   SpO2 95%   BMI 42.18 kg/m    GENERAL APPEARANCE:  Alert, in no distress  RESP:  no respiratory distress  SKIN:  dark red rash under both breasts, shingles rash is on left abdomen around to her back  PSYCH:  oriented X 3, affect and mood normal    Labs:  BMP, CBC pending    Assessment/Plan:  (B02.9) Varicella zoster  (primary encounter " diagnosis)  Comment: Patient is having neuralgia with this, but it is not severe. Will order lidocaine cream. Hopefully she will improve with the antiviral medication and her pain will not worsen.   Plan: Lidocaine cream BID    (K57.32) Diverticulitis of colon  Comment: Asymptomatic. Antibiotics now completed. Will add senna conservatively  Plan: Monitor for abdominal pain, fever. Senna-S 1 tab daily. Monitor bowel function. Adjust medication as clinically indicated.    (B37.31) Yeast infection of the vagina  Comment: Due to antibiotic treatment  Plan: Monistat supp daily x 7 days    (I50.32) Chronic heart failure with preserved ejection fraction (HFpEF) (H)  Comment: Chronic: CHF due to effects of HTN/Heart Disease. Currently: compensated.  Plan: Monitor for shortness of breath, wheezing, increasing lower extremity edema and change in activity tolerance.     (I10) Benign essential hypertension  Comment: Chronic, controlled  Plan: Continue current POC with no changes at this time and adjustments as needed.    (N18.31) Stage 3a chronic kidney disease (H)  Comment: Chronic Kidney Disease due to: Hypertension.  Plan: Avoid nephrotoxic medications and adjust medications per renal function.       Orders:  Senna-S 1 tab daily  Lidocaine cream 4% BID  Intradry daily under breasts    Electronically signed by: KALIE Ovalle CNP           Sincerely,        KALIE Ovalle CNP

## 2024-04-29 NOTE — TELEPHONE ENCOUNTER
[facility] Geriatrics Lab Note     Provider: KALIE Ledesma CNP   [facility]    Allergies   Allergen Reactions    Latex Rash     Severe rash    Pregabalin Shortness Of Breath     Other Reaction(s): swelling and shortness of breath    Valsartan Unknown     Hyperkalemia    Ciprofloxacin Visual Disturbance, Hallucination and Confusion     Likely contributed visual hallucination and confusion    Colchicine Diarrhea    Dicloxacillin      Other Reaction(s): confusion, says she has tolerated augmentin without difficulty.     Gabapentin      Other Reaction(s): Sedation    Latex Unknown     Added based on information entered during case entry, please review and add reactions, type, and severity as needed    Other Drug Allergy (See Comments) Muscle Pain (Myalgia)     Tried lovastatin and simvastatin    Other Environmental Allergy Unknown     Coverlet bandaid , 3M product; rash    Statins-Hmg-Coa Reductase Inhibitors [Statins] Muscle Pain (Myalgia)     Tried lovastatin and simvastatin    Sulfa Antibiotics Swelling     Facial swelling      Adhesive Tape Rash       Labs Reviewed by provider: BMP and CBC     Verbal Order/Direction given by Provider:   1) Discontinue hydrochlorothiazide.   2) BMP 5/2/24    Provider giving Order:  KALIE Ledesma CNP     Verbal Order given to: Tai Bedoya RN

## 2024-04-29 NOTE — TELEPHONE ENCOUNTER
----- Message from Avani Rodriguez RN sent at 4/22/2024 10:42 AM CDT -----  Regarding: FW: Follow-up    ----- Message -----  From: Cheli Saravia PA-C  Sent: 4/20/2024   4:38 PM CDT  To: Avani Rodriguez RN  Subject: Follow-up                                        Please arrange hospital follow-up directly with Dr. Shaw to discuss possible surgical options for lumbar spinal stenosis. Ideally within the next month.     Thank you!   Cheli

## 2024-05-01 ENCOUNTER — LAB REQUISITION (OUTPATIENT)
Dept: LAB | Facility: CLINIC | Age: 74
End: 2024-05-01
Payer: MEDICARE

## 2024-05-01 DIAGNOSIS — I10 ESSENTIAL (PRIMARY) HYPERTENSION: ICD-10-CM

## 2024-05-01 DIAGNOSIS — D64.9 ANEMIA, UNSPECIFIED: ICD-10-CM

## 2024-05-02 PROCEDURE — P9603 ONE-WAY ALLOW PRORATED MILES: HCPCS | Performed by: INTERNAL MEDICINE

## 2024-05-02 PROCEDURE — 80048 BASIC METABOLIC PNL TOTAL CA: CPT | Performed by: INTERNAL MEDICINE

## 2024-05-02 PROCEDURE — 36415 COLL VENOUS BLD VENIPUNCTURE: CPT | Performed by: INTERNAL MEDICINE

## 2024-05-03 ENCOUNTER — TRANSITIONAL CARE UNIT VISIT (OUTPATIENT)
Dept: GERIATRICS | Facility: CLINIC | Age: 74
End: 2024-05-03
Payer: MEDICARE

## 2024-05-03 VITALS
OXYGEN SATURATION: 98 % | RESPIRATION RATE: 18 BRPM | WEIGHT: 202 LBS | DIASTOLIC BLOOD PRESSURE: 60 MMHG | SYSTOLIC BLOOD PRESSURE: 120 MMHG | HEART RATE: 65 BPM | HEIGHT: 55 IN | BODY MASS INDEX: 46.75 KG/M2 | TEMPERATURE: 97.3 F

## 2024-05-03 DIAGNOSIS — G62.9 PERIPHERAL POLYNEUROPATHY: ICD-10-CM

## 2024-05-03 DIAGNOSIS — R53.81 PHYSICAL DECONDITIONING: Primary | ICD-10-CM

## 2024-05-03 LAB
ANION GAP SERPL CALCULATED.3IONS-SCNC: 11 MMOL/L (ref 7–15)
BUN SERPL-MCNC: 52.3 MG/DL (ref 8–23)
CALCIUM SERPL-MCNC: 9.4 MG/DL (ref 8.8–10.2)
CHLORIDE SERPL-SCNC: 97 MMOL/L (ref 98–107)
CREAT SERPL-MCNC: 1.27 MG/DL (ref 0.51–0.95)
DEPRECATED HCO3 PLAS-SCNC: 29 MMOL/L (ref 22–29)
EGFRCR SERPLBLD CKD-EPI 2021: 45 ML/MIN/1.73M2
GLUCOSE SERPL-MCNC: 101 MG/DL (ref 70–99)
POTASSIUM SERPL-SCNC: 4.3 MMOL/L (ref 3.4–5.3)
SODIUM SERPL-SCNC: 137 MMOL/L (ref 135–145)

## 2024-05-03 PROCEDURE — 99309 SBSQ NF CARE MODERATE MDM 30: CPT | Performed by: NURSE PRACTITIONER

## 2024-05-03 RX ORDER — PREGABALIN 25 MG/1
25 CAPSULE ORAL 2 TIMES DAILY
Qty: 30 CAPSULE | Refills: 0 | Status: SHIPPED | OUTPATIENT
Start: 2024-05-03 | End: 2024-05-18

## 2024-05-03 NOTE — PROGRESS NOTES
"[facility] GERIATRICS    Chief Complaint   Patient presents with    RECHECK     HPI:  Desirae Rey is a 73 year old  (1950), who is being seen today for an episodic care visit at: [facility] ([facility]) [[id number]].  [facility] stay 4/13/24 through 4/23/24. Patient with PMH chronic respiratory failure, NELLIE, CKD, CHF, and chronic back pain was admitted with sepsis due to acute diverticulitis. Initially treated with IV Cipro and Flagyl, transitioned to oral on 4/16, but she developed hallucination and confusion. It was felt that antibiotics were causing this, ID switched her to Augmentin. Hallucinations resolved.     Today's concern is:   Patient is concerned today that she might be discharged soon. Provider advises her that no date has been selected at this time. She then says she has a care conference scheduled for 5/8. She would not be discharged before then and typically patients are given several days' notice. Per therapy update, she is walking 10-35 feet with a 2WW, but she is unsteady. She says that she doesn't think her walking will get much better because of her feet. She feels like she is walking on thick skin even though she isn't. She cannot wear shoes, this makes her neuropathy worse. Her feet feel numb, tingly and painful. She seems to indicate this is all new, but then says she has tried gabapentin in the past. Low dose was not helpful, but higher doses gave her too many side effects. Provider offers Lyrica. She is interested in trying a low dose of this. She is also educated that this will likely be chronic for her and she will need to determine how she can adapt her life to it.    Allergies, and PMH/PSH reviewed in EPIC today.  REVIEW OF SYSTEMS:  4 point ROS including Respiratory, CV, GI and , other than that noted in the HPI,  is negative    Objective:   /60   Pulse 65   Temp 97.3  F (36.3  C)   Resp 18   Ht 1.397 m (4' 7\")   Wt 91.6 kg (202 lb) "   SpO2 98%   BMI 46.95 kg/m    GENERAL APPEARANCE:  Alert, in no distress  ENT:  Mouth and posterior oropharynx normal, moist mucous membranes  EYES:  EOM normal, conjunctiva and lids normal  RESP:  no respiratory distress  CV:  peripheral edema 2+ in feet  PSYCH:  oriented X 3, affect and mood normal      Assessment/Plan:  (R53.81) Physical deconditioning  (primary encounter diagnosis)  Comment: Improving, but will likely reach max potential soon  Plan: PT/OT eval and treat, discharge planning per their recommendations.    (G62.9) Peripheral polyneuropathy  Comment: Chronic. If this were related to a spine issue, she is not a good surgical candidate. She likely will require a higher level of care soon, be predominantly wheelchair bound.   Plan: Lyrica 25mg BID      Electronically signed by: KALIE Ovalle CNP

## 2024-05-03 NOTE — LETTER
5/3/2024        RE: Desirae Rey  [address] [place] [address]        [facility] GERIATRICS    Chief Complaint   Patient presents with     RECHECK     HPI:  Desirae Rey is a 73 year old  (1950), who is being seen today for an episodic care visit at: [facility] ([facility]) [[id number]].  [facility] stay 4/13/24 through 4/23/24. Patient with PMH chronic respiratory failure, NELLIE, CKD, CHF, and chronic back pain was admitted with sepsis due to acute diverticulitis. Initially treated with IV Cipro and Flagyl, transitioned to oral on 4/16, but she developed hallucination and confusion. It was felt that antibiotics were causing this, ID switched her to Augmentin. Hallucinations resolved.     Today's concern is:   Patient is concerned today that she might be discharged soon. Provider advises her that no date has been selected at this time. She then says she has a care conference scheduled for 5/8. She would not be discharged before then and typically patients are given several days' notice. Per therapy update, she is walking 10-35 feet with a 2WW, but she is unsteady. She says that she doesn't think her walking will get much better because of her feet. She feels like she is walking on thick skin even though she isn't. She cannot wear shoes, this makes her neuropathy worse. Her feet feel numb, tingly and painful. She seems to indicate this is all new, but then says she has tried gabapentin in the past. Low dose was not helpful, but higher doses gave her too many side effects. Provider offers Lyrica. She is interested in trying a low dose of this. She is also educated that this will likely be chronic for her and she will need to determine how she can adapt her life to it.    Allergies, and PMH/PSH reviewed in EPIC today.  REVIEW OF SYSTEMS:  4 point ROS including Respiratory, CV, GI and , other than that noted in the HPI,  is negative    Objective:   /60   " Pulse 65   Temp 97.3  F (36.3  C)   Resp 18   Ht 1.397 m (4' 7\")   Wt 91.6 kg (202 lb)   SpO2 98%   BMI 46.95 kg/m    GENERAL APPEARANCE:  Alert, in no distress  ENT:  Mouth and posterior oropharynx normal, moist mucous membranes  EYES:  EOM normal, conjunctiva and lids normal  RESP:  no respiratory distress  CV:  peripheral edema 2+ in feet  PSYCH:  oriented X 3, affect and mood normal      Assessment/Plan:  (R53.81) Physical deconditioning  (primary encounter diagnosis)  Comment: Improving, but will likely reach max potential soon  Plan: PT/OT eval and treat, discharge planning per their recommendations.    (G62.9) Peripheral polyneuropathy  Comment: Chronic. If this were related to a spine issue, she is not a good surgical candidate. She likely will require a higher level of care soon, be predominantly wheelchair bound.   Plan: Lyrica 25mg BID      Electronically signed by: KALIE Ovalle CNP           Sincerely,        KALIE Ovalle CNP      "

## 2024-05-07 ENCOUNTER — TRANSITIONAL CARE UNIT VISIT (OUTPATIENT)
Dept: GERIATRICS | Facility: CLINIC | Age: 74
End: 2024-05-07
Payer: MEDICARE

## 2024-05-07 VITALS
HEIGHT: 55 IN | DIASTOLIC BLOOD PRESSURE: 69 MMHG | WEIGHT: 202 LBS | SYSTOLIC BLOOD PRESSURE: 138 MMHG | RESPIRATION RATE: 17 BRPM | TEMPERATURE: 97.3 F | BODY MASS INDEX: 46.75 KG/M2 | OXYGEN SATURATION: 98 % | HEART RATE: 66 BPM

## 2024-05-07 DIAGNOSIS — R53.81 PHYSICAL DECONDITIONING: ICD-10-CM

## 2024-05-07 DIAGNOSIS — G47.33 OSA (OBSTRUCTIVE SLEEP APNEA): ICD-10-CM

## 2024-05-07 DIAGNOSIS — G62.9 PERIPHERAL POLYNEUROPATHY: ICD-10-CM

## 2024-05-07 DIAGNOSIS — J30.9 ALLERGIC RHINITIS, UNSPECIFIED SEASONALITY, UNSPECIFIED TRIGGER: ICD-10-CM

## 2024-05-07 DIAGNOSIS — J96.11 CHRONIC HYPOXIC RESPIRATORY FAILURE (H): ICD-10-CM

## 2024-05-07 DIAGNOSIS — E66.2 OBESITY HYPOVENTILATION SYNDROME (H): ICD-10-CM

## 2024-05-07 DIAGNOSIS — I50.32 CHRONIC HEART FAILURE WITH PRESERVED EJECTION FRACTION (HFPEF) (H): Primary | ICD-10-CM

## 2024-05-07 DIAGNOSIS — K21.9 GASTROESOPHAGEAL REFLUX DISEASE WITHOUT ESOPHAGITIS: ICD-10-CM

## 2024-05-07 DIAGNOSIS — M25.50 POLYARTHRALGIA: ICD-10-CM

## 2024-05-07 DIAGNOSIS — N18.30 STAGE 3 CHRONIC KIDNEY DISEASE, UNSPECIFIED WHETHER STAGE 3A OR 3B CKD (H): ICD-10-CM

## 2024-05-07 DIAGNOSIS — M19.90 OSTEOARTHRITIS, UNSPECIFIED OSTEOARTHRITIS TYPE, UNSPECIFIED SITE: ICD-10-CM

## 2024-05-07 DIAGNOSIS — N32.81 OVERACTIVE BLADDER: ICD-10-CM

## 2024-05-07 DIAGNOSIS — I12.9 BENIGN HYPERTENSIVE KIDNEY DISEASE WITH CHRONIC KIDNEY DISEASE STAGE I THROUGH STAGE IV, OR UNSPECIFIED: ICD-10-CM

## 2024-05-07 PROCEDURE — 99305 1ST NF CARE MODERATE MDM 35: CPT | Performed by: INTERNAL MEDICINE

## 2024-05-07 NOTE — LETTER
5/7/2024        RE: Desirae Rey  [address]   [facility] [address]        [facility] GERIATRICS  INITIAL VISIT NOTE  May 7, 2024      PRIMARY CARE PROVIDER AND CLINIC:  Noemy Brito [address] / [facility] [address]    [facility] Medical Record Number:  [id number]  Place of Service where encounter took place:  [facility] ([facility]) [[id number]]    Chief Complaint   Patient presents with     Hospital F/U       HPI:    Desirae Rey is a 73 year old  (1950) [facility].  Medical history is notable for chronic hypoxic respiratory failure (3L), obesity hypoventilation syndrome, NELLIE, HFpEF, polyarthralgia and CKD.  She was hospitalized at [facility] from 4/13/2024 to 4/23/2024 where she was admitted with sepsis secondary to acute sigmoid diverticulitis and acute on chronic respiratory failure.  Antibiotics changed to Augmentin to complete a 10-day course at the [facility].  Nephrology consulted and she was started on hydrochlorothiazide in setting of hyperkalemia with plan for close outpatient follow-up.  PTA duloxetine was decreased.  MRI lumbar spine with severe spinal stenosis for which neurosurgery is recommending outpatient follow-up.  She was admitted to this facility for  rehab, medical management, and nursing care.      History obtained from: facility chart records, facility staff, patient report, and [facility] chart review.      At U, BMP on 4/29 with increasing Cr in setting of addition of hydrochlorothiazide and this was discontinued. No hyper-K on recheck. She was started on pregabalin on 5/3/24 for peripheral neuropathy.     Today, Ms. Rey was seen in her room late morning laying in bed with her CPAP on.  She removed it and was able to engage in a conversation.  Overall she is doing okay.  She rescheduled her nephrology appointment from 5/9 to a date after TCU discharge.      She feels the addition of pregabalin on 5/3  has been helpful with the itching tingling feeling in her hands, but she really has not noticed a difference in her feet.  Discussed that she has only had 2, maybe 3, days of this medication and so it is wonderful that she is seeing some effects but likely is not seeing full effect.  She does not feel confused, lethargic or dizzy.  Discussed would want to let this continue at current dose for a bit longer before evaluating if it would make sense to do any dose adjustment.      No chest pain or dyspnea.  No belly pain.  No diarrhea or constipation.  Reviewed vital signs with her.  Talked with nursing and no acute concerns today.  She is working with therapies.  Her main concern is being safe when she goes home so that she does not have a fall and return back to rehab.    CODE STATUS: CPR/Full code     ALLERGIES:  Allergies   Allergen Reactions     Latex Rash     Severe rash     Pregabalin Shortness Of Breath     Other Reaction(s): swelling and shortness of breath     Valsartan Unknown     Hyperkalemia     Ciprofloxacin Visual Disturbance, Hallucination and Confusion     Likely contributed visual hallucination and confusion     Colchicine Diarrhea     Dicloxacillin      Other Reaction(s): confusion, says she has tolerated augmentin without difficulty.      Gabapentin      Other Reaction(s): Sedation     Latex Unknown     Added based on information entered during case entry, please review and add reactions, type, and severity as needed     Other Drug Allergy (See Comments) Muscle Pain (Myalgia)     Tried lovastatin and simvastatin     Other Environmental Allergy Unknown     Coverlet bandaid , 3M product; rash     Statins-Hmg-Coa Reductase Inhibitors [Statins] Muscle Pain (Myalgia)     Tried lovastatin and simvastatin     Sulfa Antibiotics Swelling     Facial swelling       Adhesive Tape Rash       PAST MEDICAL HISTORY:   Past Medical History:   Diagnosis Date     Allergic rhinitis      Arthritis of back      CHF  (congestive heart failure) (H)      Chronic kidney disease     stage 3      De Quervain's disease (tenosynovitis)      Fibromyalgia, primary      GERD (gastroesophageal reflux disease)      Hematuria     chronic     High cholesterol      History of blood clots 09/01/1970    DVT, from birth control, h/o left calf     Hyperlipidemia      Hypertension      Low back pain      Osteoporosis      Pickwickian syndrome (H)      Plantar fasciitis      Proteinuria      Proteinuria     chronic     Sleep apnea     CPAP     Uncomplicated asthma        PAST SURGICAL HISTORY:   Past Surgical History:   Procedure Laterality Date     CERVICAL FUSION N/A 4/27/2017    Procedure: ANTERIOR CERVICAL DECOMPRESSION FUSION C5-7 BILATERAL;  Surgeon: Basim Barone MD;  Location: [facility];  Service:      CHOLECYSTECTOMY      open     COLONOSCOPY N/A 12/20/2019    Procedure: COLONOSCOPY WITH BIOPSIES;  Surgeon: Lucio Farr MD;  Location: [facility];  Service: Gastroenterology     EYE SURGERY       HAND SURGERY Right      HYSTEROSCOPY DIAGNOSTIC       JOINT REPLACEMENT      bilateral TKA     KNEE SURGERY       RELEASE CARPAL TUNNEL Bilateral        FAMILY HISTORY:   Family History   Problem Relation Age of Onset     Rheumatoid Arthritis Mother      Heart Disease Sister      Chronic Obstructive Pulmonary Disease Father        SOCIAL HISTORY:   Patient's living condition:  with spouse in a house    MEDICATIONS:  Post Discharge Medication Reconciliation Status: discharge medications reconciled and changed, per note/orders.  Current Outpatient Medications   Medication Sig Dispense Refill     acetaminophen (TYLENOL) 500 MG tablet Take 500 mg by mouth every 6 hours as needed for mild pain       albuterol (PROAIR HFA;PROVENTIL HFA;VENTOLIN HFA) 90 mcg/actuation inhaler Inhale 1 puff into the lungs every 4 hours as needed for shortness of breath / dyspnea       alendronate (FOSAMAX) 70 MG tablet [ALENDRONATE (FOSAMAX)  70 MG TABLET] Take 70 mg by mouth every 7 days. Takes on Mondays 11     aspirin 81 MG EC tablet 1 tablet Orally Once a day       azelastine (OPTIVAR) 0.05 % ophthalmic solution Apply 1 drop to eye 2 times daily as needed       Bacillus Coagulans-Inulin (ALIGN PREBIOTIC-PROBIOTIC PO) Take 1 tablet by mouth daily       calcium citrate (CITRACAL) 950 (200 Ca) MG tablet Take 1 tablet by mouth daily       cetirizine (ZYRTEC) 10 MG tablet [CETIRIZINE (ZYRTEC) 10 MG TABLET] Take 10 mg by mouth daily.        cholecalciferol (VITAMIN D3) 25 mcg (1000 units) capsule Take 125 mcg by mouth every morning Take 5000 units in the morning and 2000 units in the evening       DULoxetine (CYMBALTA) 30 MG capsule Take 1 capsule (30 mg) by mouth 2 times daily       furosemide (LASIX) 20 MG tablet Take 2 tablets (40 mg) by mouth every morning       HYDROcodone-acetaminophen (NORCO) 5-325 MG tablet Take 1 tablet by mouth every 4 hours as needed for pain Max 6 tablets per day. 60 tablet 0     Hydroxychloroquine Sulfate 100 MG TABS Take 100 mg by mouth 2 times daily       l-lysine HCl 500 MG TABS tablet Take 2 tablets by mouth daily       magnesium oxide 250 mg Tab Take 500 mg by mouth daily       metoprolol succinate ER (TOPROL XL) 50 MG 24 hr tablet Take 50 mg by mouth daily       modafinil (PROVIGIL) 100 MG tablet Take 2.5 tablets (250 mg) by mouth daily Take two and half tablet (250 mg ) daily 90 tablet 0     montelukast (SINGULAIR) 10 mg tablet Take 10 mg by mouth every evening       nystatin (MYCOSTATIN) 241454 UNIT/GM external powder Apply topically 2 times daily       omeprazole (PRILOSEC) 20 MG DR capsule Take 40 mg by mouth daily       ondansetron (ZOFRAN) 4 MG tablet Take 4 mg by mouth every 6 hours as needed for nausea       pregabalin (LYRICA) 25 MG capsule Take 1 capsule (25 mg) by mouth 2 times daily 30 capsule 0     senna-docusate (SENOKOT-S/PERICOLACE) 8.6-50 MG tablet Take 1 tablet by mouth 2 times daily as needed for  "constipation 30 tablet 0     solifenacin (VESICARE) 10 MG tablet Take 5-10 mg by mouth at bedtime       tiZANidine (ZANAFLEX) 4 MG tablet Take 4 mg by mouth 3 times daily as needed for muscle spasms       Turmeric (CURCUPLEX-95) 500 MG CAPS Take 1 capsule by mouth daily       valACYclovir (VALTREX) 500 MG tablet Take 500 mg by mouth 2 times daily as needed (flare)       OXYGEN-AIR DELIVERY SYSTEMS MISC [OXYGEN-AIR DELIVERY SYSTEMS MISC] Use 3 L As Directed. 3 lpm with activities and as needed at night         ROS:  6 point ROS neg other than the symptoms noted above in the HPI.    PHYSICAL EXAM:  /69   Pulse 66   Temp 97.3  F (36.3  C)   Resp 17   Ht 1.397 m (4' 7\")   Wt 91.6 kg (202 lb)   SpO2 98%   BMI 46.95 kg/m    Gen: laying in bed,, alert, cooperative and in no acute distress  HEENT: good hearing acuity  Resp: breathing non labored, no tachypnea   Ext: no LE edema  Neuro: CX II-XII grossly in tact; ROM in all four extremities grossly in tact  Psych: alert and oriented x3; normal affect      LABORATORY/IMAGING DATA:  Reviewed as per [facility] and/or [facility]    ASSESSMENT/PLAN:    Chronic Hypoxic Respiratory Failure (3L)  Obesity Hypoventilation Syndrome   NELLIE  On baseline O2. Wearing CPAP when I entered the room  -- modafinil 250 mg daily  -- montelukast 10 mg in the evening   -- albuterol MDI PRN  -- CPAP per home settings    HFpEF, HTN  SBPs 120s-140s. HR 60s. Weight 181 --? 202? Difficult to assess clinical volume status given body habitus. Hydrochlorothiazide started in patient was discontinued at TCU due to rising Cr.   -- furosemide 40 mg daily, metoprolol XL 50 mg daily   -- follow BPs, weights, clinical volume status as able    Polyarthralgia  By chart review.   -- hydroxychloroquine 100  mg BID   -- duloxetine 30 mg BID (dose decreased inpatient)  -- APAP 500 mg q6h PRN, Norco 5-325 mg - 1 tab q4h PRN, tizanidine 4 mg TID PRN     Peripheral Neuropathy  Longstanding. New on pregabalin at " TCU and states some benefit to her hands.   -- pregabalin 25 mg BID (new on 5/4/24)  -- duloxetine 30 mg BID    CKD, Stage III  Baseline Cr 1.2-1.4 Cr 1.27 on 5/2. Hydrochlorothiazide started inpatient for hyper-K and discontinued at TCU. No hyper-K on repeat lab.   -- periodic BMP  -- initial appt with nephrology on 5/9 - she's rescheduled to after TCU discharge    Osteoporosis  -- alendronate 70 mg weekly    GERD  -- omeprazole 40 mg daily     Overactive Bladder  PTA on solifenacin   -- oxybutynin ER 10 mg as therapeutic interchange at TCU    Allergic Rhinitis  -- cetirizine 10 mg daily     Physical Deconditioning  In setting of hospitalization and underlying medical conditions  -- ongoing PT/OT        Electronically signed by:  Gail Montelongo MD                          Sincerely,        Gail Montelongo MD

## 2024-05-08 NOTE — PROGRESS NOTES
[facility] GERIATRICS  INITIAL VISIT NOTE  May 7, 2024      PRIMARY CARE PROVIDER AND CLINIC:  Noemy Brito [address] / [facility] [address]    [facility] Medical Record Number:  [id number]  Place of Service where encounter took place:  [facility] ([facility]) [[id number]]    Chief Complaint   Patient presents with    Hospital F/U       HPI:    Desirae Rey is a 73 year old  (1950) [facility]  Medical history is notable for chronic hypoxic respiratory failure (3L), obesity hypoventilation syndrome, NELLIE, HFpEF, polyarthralgia and CKD.  She was hospitalized at [facility] from 4/13/2024 to 4/23/2024 where she was admitted with sepsis secondary to acute sigmoid diverticulitis and acute on chronic respiratory failure.  Antibiotics changed to Augmentin to complete a 10-day course at the [facility].  Nephrology consulted and she was started on hydrochlorothiazide in setting of hyperkalemia with plan for close outpatient follow-up.  PTA duloxetine was decreased.  MRI lumbar spine with severe spinal stenosis for which neurosurgery is recommending outpatient follow-up.  She was admitted to this facility for  rehab, medical management, and nursing care.      History obtained from: facility chart records, facility staff, patient report, and [facility] chart review.      At U, BMP on 4/29 with increasing Cr in setting of addition of hydrochlorothiazide and this was discontinued. No hyper-K on recheck. She was started on pregabalin on 5/3/24 for peripheral neuropathy.     Today, Ms. Rey was seen in her room late morning laying in bed with her CPAP on.  She removed it and was able to engage in a conversation.  Overall she is doing okay.  She rescheduled her nephrology appointment from 5/9 to a date after TCU discharge.      She feels the addition of pregabalin on 5/3 has been helpful with the itching tingling feeling in her hands, but she really has not  noticed a difference in her feet.  Discussed that she has only had 2, maybe 3, days of this medication and so it is wonderful that she is seeing some effects but likely is not seeing full effect.  She does not feel confused, lethargic or dizzy.  Discussed would want to let this continue at current dose for a bit longer before evaluating if it would make sense to do any dose adjustment.      No chest pain or dyspnea.  No belly pain.  No diarrhea or constipation.  Reviewed vital signs with her.  Talked with nursing and no acute concerns today.  She is working with therapies.  Her main concern is being safe when she goes home so that she does not have a fall and return back to rehab.    CODE STATUS: CPR/Full code     ALLERGIES:  Allergies   Allergen Reactions    Latex Rash     Severe rash    Pregabalin Shortness Of Breath     Other Reaction(s): swelling and shortness of breath    Valsartan Unknown     Hyperkalemia    Ciprofloxacin Visual Disturbance, Hallucination and Confusion     Likely contributed visual hallucination and confusion    Colchicine Diarrhea    Dicloxacillin      Other Reaction(s): confusion, says she has tolerated augmentin without difficulty.     Gabapentin      Other Reaction(s): Sedation    Latex Unknown     Added based on information entered during case entry, please review and add reactions, type, and severity as needed    Other Drug Allergy (See Comments) Muscle Pain (Myalgia)     Tried lovastatin and simvastatin    Other Environmental Allergy Unknown     Coverlet bandaid , [place] product; rash    Statins-Hmg-Coa Reductase Inhibitors [Statins] Muscle Pain (Myalgia)     Tried lovastatin and simvastatin    Sulfa Antibiotics Swelling     Facial swelling      Adhesive Tape Rash       PAST MEDICAL HISTORY:   Past Medical History:   Diagnosis Date    Allergic rhinitis     Arthritis of back     CHF (congestive heart failure) (H)     Chronic kidney disease     stage 3     De Quervain's disease  (tenosynovitis)     Fibromyalgia, primary     GERD (gastroesophageal reflux disease)     Hematuria     chronic    High cholesterol     History of blood clots 09/01/1970    DVT, from birth control, h/o left calf    Hyperlipidemia     Hypertension     Low back pain     Osteoporosis     Pickwickian syndrome (H)     Plantar fasciitis     Proteinuria     Proteinuria     chronic    Sleep apnea     CPAP    Uncomplicated asthma        PAST SURGICAL HISTORY:   Past Surgical History:   Procedure Laterality Date    CERVICAL FUSION N/A 4/27/2017    Procedure: ANTERIOR CERVICAL DECOMPRESSION FUSION C5-7 BILATERAL;  Surgeon: Basim Barone MD;  Location: [facility];  Service:     CHOLECYSTECTOMY      open    COLONOSCOPY N/A 12/20/2019    Procedure: COLONOSCOPY WITH BIOPSIES;  Surgeon: Lucio Farr MD;  Location: [facility];  Service: Gastroenterology    EYE SURGERY      HAND SURGERY Right     HYSTEROSCOPY DIAGNOSTIC      JOINT REPLACEMENT      bilateral TKA    KNEE SURGERY      RELEASE CARPAL TUNNEL Bilateral        FAMILY HISTORY:   Family History   Problem Relation Age of Onset    Rheumatoid Arthritis Mother     Heart Disease Sister     Chronic Obstructive Pulmonary Disease Father        SOCIAL HISTORY:   Patient's living condition:  with spouse in a house    MEDICATIONS:  Post Discharge Medication Reconciliation Status: discharge medications reconciled and changed, per note/orders.  Current Outpatient Medications   Medication Sig Dispense Refill    acetaminophen (TYLENOL) 500 MG tablet Take 500 mg by mouth every 6 hours as needed for mild pain      albuterol (PROAIR HFA;PROVENTIL HFA;VENTOLIN HFA) 90 mcg/actuation inhaler Inhale 1 puff into the lungs every 4 hours as needed for shortness of breath / dyspnea      alendronate (FOSAMAX) 70 MG tablet [ALENDRONATE (FOSAMAX) 70 MG TABLET] Take 70 mg by mouth every 7 days. Takes on Mondays 11    aspirin 81 MG EC tablet 1 tablet Orally Once a day       azelastine (OPTIVAR) 0.05 % ophthalmic solution Apply 1 drop to eye 2 times daily as needed      Bacillus Coagulans-Inulin (ALIGN PREBIOTIC-PROBIOTIC PO) Take 1 tablet by mouth daily      calcium citrate (CITRACAL) 950 (200 Ca) MG tablet Take 1 tablet by mouth daily      cetirizine (ZYRTEC) 10 MG tablet [CETIRIZINE (ZYRTEC) 10 MG TABLET] Take 10 mg by mouth daily.       cholecalciferol (VITAMIN D3) 25 mcg (1000 units) capsule Take 125 mcg by mouth every morning Take 5000 units in the morning and 2000 units in the evening      DULoxetine (CYMBALTA) 30 MG capsule Take 1 capsule (30 mg) by mouth 2 times daily      furosemide (LASIX) 20 MG tablet Take 2 tablets (40 mg) by mouth every morning      HYDROcodone-acetaminophen (NORCO) 5-325 MG tablet Take 1 tablet by mouth every 4 hours as needed for pain Max 6 tablets per day. 60 tablet 0    Hydroxychloroquine Sulfate 100 MG TABS Take 100 mg by mouth 2 times daily      l-lysine HCl 500 MG TABS tablet Take 2 tablets by mouth daily      magnesium oxide 250 mg Tab Take 500 mg by mouth daily      metoprolol succinate ER (TOPROL XL) 50 MG 24 hr tablet Take 50 mg by mouth daily      modafinil (PROVIGIL) 100 MG tablet Take 2.5 tablets (250 mg) by mouth daily Take two and half tablet (250 mg ) daily 90 tablet 0    montelukast (SINGULAIR) 10 mg tablet Take 10 mg by mouth every evening      nystatin (MYCOSTATIN) 409897 UNIT/GM external powder Apply topically 2 times daily      omeprazole (PRILOSEC) 20 MG DR capsule Take 40 mg by mouth daily      ondansetron (ZOFRAN) 4 MG tablet Take 4 mg by mouth every 6 hours as needed for nausea      pregabalin (LYRICA) 25 MG capsule Take 1 capsule (25 mg) by mouth 2 times daily 30 capsule 0    senna-docusate (SENOKOT-S/PERICOLACE) 8.6-50 MG tablet Take 1 tablet by mouth 2 times daily as needed for constipation 30 tablet 0    solifenacin (VESICARE) 10 MG tablet Take 5-10 mg by mouth at bedtime      tiZANidine (ZANAFLEX) 4 MG tablet Take 4 mg by  "mouth 3 times daily as needed for muscle spasms      Turmeric (CURCUPLEX-95) 500 MG CAPS Take 1 capsule by mouth daily      valACYclovir (VALTREX) 500 MG tablet Take 500 mg by mouth 2 times daily as needed (flare)      OXYGEN-AIR DELIVERY SYSTEMS MISC [OXYGEN-AIR DELIVERY SYSTEMS MISC] Use 3 L As Directed. 3 lpm with activities and as needed at night         ROS:  6 point ROS neg other than the symptoms noted above in the HPI.    PHYSICAL EXAM:  /69   Pulse 66   Temp 97.3  F (36.3  C)   Resp 17   Ht 1.397 m (4' 7\")   Wt 91.6 kg (202 lb)   SpO2 98%   BMI 46.95 kg/m    Gen: laying in bed,, alert, cooperative and in no acute distress  HEENT: good hearing acuity  Resp: breathing non labored, no tachypnea   Ext: no LE edema  Neuro: CX II-XII grossly in tact; ROM in all four extremities grossly in tact  Psych: alert and oriented x3; normal affect      LABORATORY/IMAGING DATA:  Reviewed as per [facility] and/or [facility]    ASSESSMENT/PLAN:    Chronic Hypoxic Respiratory Failure (3L)  Obesity Hypoventilation Syndrome   NELLIE  On baseline O2. Wearing CPAP when I entered the room  -- modafinil 250 mg daily  -- montelukast 10 mg in the evening   -- albuterol MDI PRN  -- CPAP per home settings    HFpEF, HTN  SBPs 120s-140s. HR 60s. Weight 181 --? 202? Difficult to assess clinical volume status given body habitus. Hydrochlorothiazide started in patient was discontinued at TCU due to rising Cr.   -- furosemide 40 mg daily, metoprolol XL 50 mg daily   -- follow BPs, weights, clinical volume status as able    Polyarthralgia  By chart review.   -- hydroxychloroquine 100  mg BID   -- duloxetine 30 mg BID (dose decreased inpatient)  -- APAP 500 mg q6h PRN, Norco 5-325 mg - 1 tab q4h PRN, tizanidine 4 mg TID PRN     Peripheral Neuropathy  Longstanding. New on pregabalin at [facility] and states some benefit to her hands.   -- pregabalin 25 mg BID (new on 5/4/24)  -- duloxetine 30 mg BID    CKD, Stage III  Baseline Cr 1.2-1.4 Cr " 1.27 on 5/2. Hydrochlorothiazide started inpatient for hyper-K and discontinued at TCU. No hyper-K on repeat lab.   -- periodic BMP  -- initial appt with nephrology on 5/9 - she's rescheduled to after TCU discharge    Osteoporosis  -- alendronate 70 mg weekly    GERD  -- omeprazole 40 mg daily     Overactive Bladder  PTA on solifenacin   -- oxybutynin ER 10 mg as therapeutic interchange at TCU    Allergic Rhinitis  -- cetirizine 10 mg daily     Physical Deconditioning  In setting of hospitalization and underlying medical conditions  -- ongoing PT/OT        Electronically signed by:  Gail Montelongo MD

## 2024-05-13 ENCOUNTER — TRANSITIONAL CARE UNIT VISIT (OUTPATIENT)
Dept: GERIATRICS | Facility: CLINIC | Age: 74
End: 2024-05-13
Payer: MEDICARE

## 2024-05-13 VITALS
DIASTOLIC BLOOD PRESSURE: 78 MMHG | WEIGHT: 211.2 LBS | BODY MASS INDEX: 48.88 KG/M2 | SYSTOLIC BLOOD PRESSURE: 158 MMHG | OXYGEN SATURATION: 97 % | HEART RATE: 64 BPM | RESPIRATION RATE: 16 BRPM | TEMPERATURE: 97 F | HEIGHT: 55 IN

## 2024-05-13 DIAGNOSIS — R53.81 PHYSICAL DECONDITIONING: Primary | ICD-10-CM

## 2024-05-13 DIAGNOSIS — G62.9 NEUROPATHY: ICD-10-CM

## 2024-05-13 DIAGNOSIS — I50.32 CHRONIC HEART FAILURE WITH PRESERVED EJECTION FRACTION (HFPEF) (H): ICD-10-CM

## 2024-05-13 PROCEDURE — 99309 SBSQ NF CARE MODERATE MDM 30: CPT | Performed by: NURSE PRACTITIONER

## 2024-05-13 NOTE — PROGRESS NOTES
"[facility] GERIATRICS    Chief Complaint   Patient presents with    RECHECK     HPI:  Desirae Rey is a 73 year old  (1950), who is being seen today for an episodic care visit at: [facility] ([facility]) [[id number]].     Today's concern is:   Patient continues to make progress with therapy. Today she walked the stairs 3 times. She is still struggling to get out of bed. This has been an issue at home too. She frequently rolls/falls out of bed when trying to get up. Provider asks if she would be open to getting a hospital bed. She thinks that may be a good idea, but wants to ask her  first. She also would not be able to pay for it if it isn't covered by insurance. Lyrica was started last week. She has noted improvement in the symptoms in her hands, but not her feet. Provider advises that she can give it a few weeks and if she wants to try increasing the dose then, she can discuss with her PCP. She does not have any other concerns today    Allergies, and PMH/PSH reviewed in [facility] today.  REVIEW OF SYSTEMS:  4 point ROS including Respiratory, CV, GI and , other than that noted in the HPI,  is negative    Objective:   BP (!) 158/78   Pulse 64   Temp 97  F (36.1  C)   Resp 16   Ht 1.397 m (4' 7\")   Wt 95.8 kg (211 lb 3.2 oz)   SpO2 97%   BMI 49.09 kg/m    GENERAL APPEARANCE:  Alert, in no distress  ENT:  Mouth and posterior oropharynx normal, moist mucous membranes  EYES:  EOM normal, conjunctiva and lids normal  RESP:  no respiratory distress  PSYCH:  oriented X 3, affect and mood normal      Assessment/Plan:  (R53.81) Physical deconditioning  (primary encounter diagnosis)  Comment: Improving. She would certainly qualify for a hospital bed if she were interested. Will update social work and have them check in later this week  Plan: PT/OT eval and treat, discharge planning per their recommendations.    (G62.9) Neuropathy  Comment: Some improvement on Lyrica, no side effects noted  Plan: " Continue current POC with no changes at this time and adjustments as needed. F/up with PCP    (I50.32) Chronic heart failure with preserved ejection fraction (HFpEF) (H)  Comment: Chronic: CHF due to effects of HTN/Heart Disease. Currently: compensated.  Plan: Monitor for shortness of breath, wheezing, increasing lower extremity edema and change in activity tolerance.         Electronically signed by: KALIE Ovalle CNP        Cartilage Graft Text: The defect edges were debeveled with a #15 scalpel blade.  Given the location of the defect, shape of the defect, the fact the defect involved a full thickness cartilage defect a cartilage graft was deemed most appropriate.  An appropriate donor site was identified, cleansed, and anesthetized. The cartilage graft was then harvested and transferred to the recipient site, oriented appropriately and then sutured into place.  The secondary defect was then repaired using a primary closure.

## 2024-05-13 NOTE — LETTER
"    5/13/2024        RE: Desirae Rey  [address]   [place] [address]        [facility] GERIATRICS    Chief Complaint   Patient presents with     RECHECK     HPI:  Desirae Rey is a 73 year old  (1950), who is being seen today for an episodic care visit at: [facility] ([facility]) [[id number]].     Today's concern is:   Patient continues to make progress with therapy. Today she walked the stairs 3 times. She is still struggling to get out of bed. This has been an issue at home too. She frequently rolls/falls out of bed when trying to get up. Provider asks if she would be open to getting a hospital bed. She thinks that may be a good idea, but wants to ask her  first. She also would not be able to pay for it if it isn't covered by insurance. Lyrica was started last week. She has noted improvement in the symptoms in her hands, but not her feet. Provider advises that she can give it a few weeks and if she wants to try increasing the dose then, she can discuss with her PCP. She does not have any other concerns today    Allergies, and PMH/PSH reviewed in [facility] today.  REVIEW OF SYSTEMS:  4 point ROS including Respiratory, CV, GI and , other than that noted in the HPI,  is negative    Objective:   BP (!) 158/78   Pulse 64   Temp 97  F (36.1  C)   Resp 16   Ht 1.397 m (4' 7\")   Wt 95.8 kg (211 lb 3.2 oz)   SpO2 97%   BMI 49.09 kg/m    GENERAL APPEARANCE:  Alert, in no distress  ENT:  Mouth and posterior oropharynx normal, moist mucous membranes  EYES:  EOM normal, conjunctiva and lids normal  RESP:  no respiratory distress  PSYCH:  oriented X 3, affect and mood normal      Assessment/Plan:  (R53.81) Physical deconditioning  (primary encounter diagnosis)  Comment: Improving. She would certainly qualify for a hospital bed if she were interested. Will update social work and have them check in later this week  Plan: PT/OT eval and treat, discharge planning per their " recommendations.    (G62.9) Neuropathy  Comment: Some improvement on Lyrica, no side effects noted  Plan: Continue current POC with no changes at this time and adjustments as needed. F/up with PCP    (I50.32) Chronic heart failure with preserved ejection fraction (HFpEF) (H)  Comment: Chronic: CHF due to effects of HTN/Heart Disease. Currently: compensated.  Plan: Monitor for shortness of breath, wheezing, increasing lower extremity edema and change in activity tolerance.         Electronically signed by: KALIE Ovalle CNP           Sincerely,        KALIE Ovalle CNP

## 2024-05-15 ENCOUNTER — DOCUMENTATION ONLY (OUTPATIENT)
Dept: GERIATRICS | Facility: CLINIC | Age: 74
End: 2024-05-15
Payer: MEDICARE

## 2024-05-15 DIAGNOSIS — M54.12 CERVICAL RADICULOPATHY: Primary | ICD-10-CM

## 2024-05-15 DIAGNOSIS — M62.81 GENERALIZED MUSCLE WEAKNESS: ICD-10-CM

## 2024-05-15 DIAGNOSIS — R29.6 FALLS FREQUENTLY: ICD-10-CM

## 2024-05-15 DIAGNOSIS — I50.32 CHRONIC HEART FAILURE WITH PRESERVED EJECTION FRACTION (HFPEF) (H): ICD-10-CM

## 2024-05-15 NOTE — PROGRESS NOTES
[facility] GERIATRICS  Face to Face and Medical Necessity Statement for DME Provider visit    Patient: Desirae Rey  Gender: female  YOB: 1950  [place] Medical Record Number: [id number]  Demographics:  [address]   [place] [address]  [phone] (home)   Social Security Number: [id number]  Primary Care Provider: Noemy Brito  Insurance: Payor: MEDICARE / Plan: MEDICARE / Product Type: Medicare /     HPI: Desirae Rey is a 73 year old (1950), who is being seen today for a face to face provider visit at No question data found.. Medical necessity statement for DME included.     This patient requires the following: DME Ordered and Medical Necessity Statement   Hospital bed  Semi-electric with half side rails    Patient requires a semi electric hospital bed for use after discharge due to (M54.12) Cervical radiculopathy, (I50.32) Chronic heart failure with preserved ejection fraction (HFpEF), (M62.81) Generalized muscle weakness and (R29.6) Falls frequently. Patient requires changes in body position that are not feasible with a regular bed due to above diagnosis. Patient requires a bed height different than that of a fixed height bed to permit transfers from bed to wheelchair, chair, and standing position. Patient also requires frequent or immediate change in body positioning due to the above diagnosis and to alleviate pain.      Pt needing above DME with expected length of need of 99 months due to medical necessity associated with following diagnosis:     Cervical radiculopathy  Chronic heart failure with preserved ejection fraction (HFpEF) (H)  Generalized muscle weakness  Falls frequently      Past Medical History:   has a past medical history of Allergic rhinitis, Arthritis of back, CHF (congestive heart failure) (H), Chronic kidney disease, De Quervain's disease (tenosynovitis), Fibromyalgia, primary, GERD (gastroesophageal reflux disease), Hematuria, High  "cholesterol, History of blood clots (09/01/1970), Hyperlipidemia, Hypertension, Low back pain, Osteoporosis, Pickwickian syndrome (H), Plantar fasciitis, Proteinuria, Proteinuria, Sleep apnea, and Uncomplicated asthma.    Review of Systems:  4 point ROS including Respiratory, CV, GI and , other than that noted in the HPI,  is negative    Exam:  Height: 5'5\"  Weight: 210 lbs  GENERAL APPEARANCE:  Alert, in no distress  ENT:  Mouth and posterior oropharynx normal, moist mucous membranes  EYES:  EOM normal, conjunctiva and lids normal  RESP:  no respiratory distress  PSYCH:  oriented X 3, affect and mood normal    Assessment/Plan:  1. Cervical radiculopathy    2. Chronic heart failure with preserved ejection fraction (HFpEF) (H)    3. Generalized muscle weakness    4. Falls frequently        Orders:  1. Facility staff/TC to contact DME company to get their order form for provider to fill out    ELECTRONICALLY SIGNED BY ALEKSANDRA CERTIFIED PROVIDER: KALIE Ovalle CNP   NPI: [id number]  [facility]  [address] [place] [address]    Addendum requested by DME company to further elaborate on medical diagnoses:  (M54.12) Cervical radiculopathy  (primary encounter diagnosis)  Comment: Patient has chronic pain and neuropathy that makes it challenging for her to get in and out of bed. Symptoms also require frequent changes in body position to alleviate pain    (I50.32) Chronic heart failure with preserved ejection fraction (HFpEF) (H)  Comment: Patient has chronic hypoxia and dyspnea. Unable to lay flat. She requires elevation of her feet due to reduce edema    (M62.81) Generalized muscle weakness  (R29.6) Falls frequently  Comment: Patient has difficulty with bed mobility, getting out of bed. She has had many falls at home due to rolling out of bed while trying to get up. She has had to use EMS in these situations to help get her off the floor. Using a hospital bed with railings will " greatly reduce this risk and reduce use of ira resources    KALIE Ovalle CNP on 5/20/2024 at 9:38 AM

## 2024-05-18 DIAGNOSIS — G62.9 PERIPHERAL POLYNEUROPATHY: ICD-10-CM

## 2024-05-18 RX ORDER — PREGABALIN 25 MG/1
25 CAPSULE ORAL 2 TIMES DAILY
Qty: 30 CAPSULE | Refills: 0 | Status: SHIPPED | OUTPATIENT
Start: 2024-05-18

## 2024-05-22 ENCOUNTER — DISCHARGE SUMMARY NURSING HOME (OUTPATIENT)
Dept: GERIATRICS | Facility: CLINIC | Age: 74
End: 2024-05-22
Payer: MEDICARE

## 2024-05-22 VITALS
HEART RATE: 62 BPM | WEIGHT: 210.8 LBS | DIASTOLIC BLOOD PRESSURE: 76 MMHG | BODY MASS INDEX: 48.99 KG/M2 | TEMPERATURE: 96.9 F | RESPIRATION RATE: 18 BRPM | SYSTOLIC BLOOD PRESSURE: 157 MMHG | OXYGEN SATURATION: 94 %

## 2024-05-22 DIAGNOSIS — R53.81 PHYSICAL DECONDITIONING: Primary | ICD-10-CM

## 2024-05-22 DIAGNOSIS — I50.32 CHRONIC HEART FAILURE WITH PRESERVED EJECTION FRACTION (HFPEF) (H): ICD-10-CM

## 2024-05-22 DIAGNOSIS — B02.9 HERPES ZOSTER WITHOUT COMPLICATION: ICD-10-CM

## 2024-05-22 DIAGNOSIS — G62.9 PERIPHERAL POLYNEUROPATHY: ICD-10-CM

## 2024-05-22 DIAGNOSIS — I87.2 VENOUS STASIS DERMATITIS OF BOTH LOWER EXTREMITIES: ICD-10-CM

## 2024-05-22 DIAGNOSIS — I10 BENIGN ESSENTIAL HYPERTENSION: ICD-10-CM

## 2024-05-22 PROCEDURE — 99316 NF DSCHRG MGMT 30 MIN+: CPT | Performed by: NURSE PRACTITIONER

## 2024-05-22 NOTE — LETTER
5/22/2024        RE: Desirae Rey  [address] [place] [address]        [facility] GERIATRICS DISCHARGE SUMMARY  PATIENT'S NAME: Desirae Rey  YOB: 1950  MEDICAL RECORD NUMBER:  [id number]  Place of Service where encounter took place:  [facility] [[id number]]    PRIMARY CARE PROVIDER AND CLINIC RESPONSIBLE AFTER TRANSFER:   Noemy Brito PA-C, [address] [facility] [place] [address]    Non-FMG Provider     Transferring providers: KALIE Ovalle CNP, Gail Montelongo MD  Recent Hospitalization/ED:  Hospital  [facility] stay 4/13/24 to 4/23/24.  Date of SNF Admission: April 13, 2024  Date of SNF (anticipated) Discharge: May 24, 2024  Discharged to: previous independent home  DME: hospital bed    CODE STATUS/ADVANCE DIRECTIVES DISCUSSION:  Full Code   ALLERGIES: Latex, Pregabalin, Valsartan, Ciprofloxacin, Colchicine, Dicloxacillin, Gabapentin, Latex, Other drug allergy (see comments), Other environmental allergy, Statins-hmg-coa reductase inhibitors [statins], Sulfa antibiotics, and Adhesive tape    NURSING FACILITY COURSE   Medication Changes/Rationale:   Lyrica added for neuropathy    Summary of nursing facility stay:   Desirae Rey  is a 73 year old  (1950), admitted to the above facility from  [facility]. Hospital stay 4/13/24 through 4/23/24. Patient with PMH chronic respiratory failure, NELLIE, CKD, CHF, and chronic back pain was admitted with sepsis due to acute diverticulitis. Initially treated with IV Cipro and Flagyl, transitioned to oral on 4/16, but she developed hallucination and confusion. It was felt that antibiotics were causing this, ID switched her to Augmentin. Hallucinations resolved.     TCU course complicated by vaginal and skin candidal infections, shingles on left abdomen    Physical deconditioning  Improving. No falls while at TCU. She did say that many of her falls have  happened when trying to get out of bed (which she has struggled with here as well). A hospital bed has been ordered for her with side rails, which should hopefully reduce her risk of falling out of bed. She will have home PT/OT to continue with her strengthening and safety.     Peripheral polyneuropathy  Lyrica was started at a low dose. She is tolerating this without side effects. She has noticed improvement in the symptoms in her hands, but not in her feet. She describes the sensation in her feet as if she has a 1 inch thick callous along the whole bottom. She also has pain in the top of her feet. She is going to see a vascular specialists. Provider advises her to see neurology as well.    Chronic heart failure with preserved ejection fraction (HFpEF) (H)  Venous stasis dermatitis of both lower extremities  Patient was concerned today about redness on her legs. Both legs have 2+ edema. Skin above ankles is pink, consistent with venous stasis. She says she does wear compression socks at home, but has not been wearing them here. Her weight is up a few pounds from admission, but this is likely due to the edema in her legs. ACE wraps ordered for her last couple of days here and then she will wear her socks at home. She is advised to contact her PCP if edema worsens and weight continues to climb.    Benign essential hypertension  Well controlled    Herpes zoster without complication  Shortly after admission, patient developed a shingles rash on her left abdomen. It did not spread very far, she was treated with antivirals. She says she still has some mild pain and itching, but the rash is gone      Discharge Medications:  Current Outpatient Medications   Medication Sig Dispense Refill     acetaminophen (TYLENOL) 500 MG tablet Take 500 mg by mouth every 6 hours as needed for mild pain       albuterol (PROAIR HFA;PROVENTIL HFA;VENTOLIN HFA) 90 mcg/actuation inhaler Inhale 1 puff into the lungs every 4 hours as needed for  shortness of breath / dyspnea       alendronate (FOSAMAX) 70 MG tablet [ALENDRONATE (FOSAMAX) 70 MG TABLET] Take 70 mg by mouth every 7 days. Takes on Mondays 11     aspirin 81 MG EC tablet 1 tablet Orally Once a day       azelastine (OPTIVAR) 0.05 % ophthalmic solution Apply 1 drop to eye 2 times daily as needed       Bacillus Coagulans-Inulin (ALIGN PREBIOTIC-PROBIOTIC PO) Take 1 tablet by mouth daily       calcium citrate (CITRACAL) 950 (200 Ca) MG tablet Take 1 tablet by mouth daily       cetirizine (ZYRTEC) 10 MG tablet [CETIRIZINE (ZYRTEC) 10 MG TABLET] Take 10 mg by mouth daily.        cholecalciferol (VITAMIN D3) 25 mcg (1000 units) capsule Take 125 mcg by mouth every morning Take 5000 units in the morning and 2000 units in the evening       DULoxetine (CYMBALTA) 30 MG capsule Take 1 capsule (30 mg) by mouth 2 times daily       furosemide (LASIX) 20 MG tablet Take 2 tablets (40 mg) by mouth every morning       HYDROcodone-acetaminophen (NORCO) 5-325 MG tablet Take 1 tablet by mouth every 4 hours as needed for pain Max 6 tablets per day. 60 tablet 0     Hydroxychloroquine Sulfate 100 MG TABS Take 100 mg by mouth 2 times daily       l-lysine HCl 500 MG TABS tablet Take 2 tablets by mouth daily       magnesium oxide 250 mg Tab Take 500 mg by mouth daily       metoprolol succinate ER (TOPROL XL) 50 MG 24 hr tablet Take 50 mg by mouth daily       modafinil (PROVIGIL) 100 MG tablet Take 2.5 tablets (250 mg) by mouth daily Take two and half tablet (250 mg ) daily 90 tablet 0     montelukast (SINGULAIR) 10 mg tablet Take 10 mg by mouth every evening       nystatin (MYCOSTATIN) 147724 UNIT/GM external powder Apply topically 2 times daily       omeprazole (PRILOSEC) 20 MG DR capsule Take 40 mg by mouth daily       ondansetron (ZOFRAN) 4 MG tablet Take 4 mg by mouth every 6 hours as needed for nausea       OXYGEN-AIR DELIVERY SYSTEMS MISC [OXYGEN-AIR DELIVERY SYSTEMS MISC] Use 3 L As Directed. 3 lpm with activities  and as needed at night       pregabalin (LYRICA) 25 MG capsule Take 1 capsule (25 mg) by mouth 2 times daily 30 capsule 0     senna-docusate (SENOKOT-S/PERICOLACE) 8.6-50 MG tablet Take 1 tablet by mouth 2 times daily as needed for constipation 30 tablet 0     solifenacin (VESICARE) 10 MG tablet Take 5-10 mg by mouth at bedtime       tiZANidine (ZANAFLEX) 4 MG tablet Take 4 mg by mouth 3 times daily as needed for muscle spasms       Turmeric (CURCUPLEX-95) 500 MG CAPS Take 1 capsule by mouth daily       valACYclovir (VALTREX) 500 MG tablet Take 500 mg by mouth 2 times daily as needed (flare)          Controlled medications:   OK to send remaining Norco and Lyrica     Past Medical History:   Past Medical History:   Diagnosis Date     Allergic rhinitis      Arthritis of back      CHF (congestive heart failure) (H)      Chronic kidney disease     stage 3      De Quervain's disease (tenosynovitis)      Fibromyalgia, primary      GERD (gastroesophageal reflux disease)      Hematuria     chronic     High cholesterol      History of blood clots 09/01/1970    DVT, from birth control, h/o left calf     Hyperlipidemia      Hypertension      Low back pain      Osteoporosis      Pickwickian syndrome (H)      Plantar fasciitis      Proteinuria      Proteinuria     chronic     Sleep apnea     CPAP     Uncomplicated asthma      Physical Exam:   Vitals: BP (!) 157/76   Pulse 62   Temp 96.9  F (36.1  C)   Resp 18   Wt 95.6 kg (210 lb 12.8 oz)   SpO2 94%   BMI 48.99 kg/m    BMI: Body mass index is 48.99 kg/m .  GENERAL APPEARANCE:  Alert, in no distress  ENT:  Mouth and posterior oropharynx normal, moist mucous membranes  EYES:  EOM normal, conjunctiva and lids normal  RESP:  no respiratory distress  CV:  peripheral edema 2+ in BLE, skin above ankles pink  PSYCH:  oriented X 3, affect and mood normal     SNF labs: Labs done in SNF are in [facility] EPIC. Please refer to them using [facility] Everywhere.    DISCHARGE PLAN:  Follow  up labs: No labs orders/due  Medical Follow Up:      Follow up with primary care provider in 2-3 weeks  [facility] scheduled appointments:  Appointments in Next Year      May 28, 2024  4:00 PM  (Arrive by 3:45 PM)  Return Patient with Carlos Clayton MD  [facility] ([facility]) [phone]           Discharge Services: Home Care:  Occupational Therapy, Physical Therapy, Registered Nurse, and Home Health Aide      TOTAL DISCHARGE TIME:   Greater than 30 minutes  Electronically signed by:  KALIE Ovalle CNP       Documentation of Face to Face and Certification for Home Health Services    I certify that services are/were furnished while this patient was under the care of a physician and that a physician or an allowed non-physician practitioner (NPP), had a face-to-face encounter that meets the physician face-to-face encounter requirements. The encounter was in whole, or in part, related to the primary reason for home health. The patient is confined to his/her home and needs intermittent skilled nursing, physical therapy, speech-language pathology, or the continued need for occupational therapy. A plan of care has been established by a physician and is periodically reviewed by a physician.  Date of Face-to-Face Encounter: 5/22/2024.    I certify that, based on my findings, the following services are medically necessary home health services: Nursing, Occupational Therapy, and Physical Therapy.    My clinical findings support the need for the above skilled services because: Requires assistance of another person or specialized equipment to access medical services because patient: Is unable to walk greater than 100 feet without rest...    Patient to re-establish plan of care with their PCP within 7-10 days after leaving the facility to reestablish care.  Medicare certified PECOS provider: KALIE Ovalle CNP  Date: May 22,  2024                    Sincerely,        KALIE Ovalle CNP

## 2024-05-22 NOTE — PROGRESS NOTES
[facility] GERIATRICS DISCHARGE SUMMARY  PATIENT'S NAME: Desirae Rey  YOB: 1950  MEDICAL RECORD NUMBER:  [id number]  Place of Service where encounter took place:  [facility] ([facility]) [[id number]]    PRIMARY CARE PROVIDER AND CLINIC RESPONSIBLE AFTER TRANSFER:   Noemy Brito PA-C, [address] [facility] / [facility] [address]    Non-FMG Provider     Transferring providers: KALIE Ovalle CNP, Gail Montelongo MD  Recent Hospitalization/ED:  Hospital  [facility] stay 4/13/24 to 4/23/24.  Date of SNF Admission: April 13, 2024  Date of SNF (anticipated) Discharge: May 24, 2024  Discharged to: previous independent home  DME: hospital bed    CODE STATUS/ADVANCE DIRECTIVES DISCUSSION:  Full Code   ALLERGIES: Latex, Pregabalin, Valsartan, Ciprofloxacin, Colchicine, Dicloxacillin, Gabapentin, Latex, Other drug allergy (see comments), Other environmental allergy, Statins-hmg-coa reductase inhibitors [statins], Sulfa antibiotics, and Adhesive tape    NURSING FACILITY COURSE   Medication Changes/Rationale:   Lyrica added for neuropathy    Summary of nursing facility stay:   Desirae Rey  is a 73 year old  (1950), admitted to the above facility from  [facility]. Hospital stay 4/13/24 through 4/23/24. Patient with PMH chronic respiratory failure, NELLIE, CKD, CHF, and chronic back pain was admitted with sepsis due to acute diverticulitis. Initially treated with IV Cipro and Flagyl, transitioned to oral on 4/16, but she developed hallucination and confusion. It was felt that antibiotics were causing this, ID switched her to Augmentin. Hallucinations resolved.     TCU course complicated by vaginal and skin candidal infections, shingles on left abdomen    Physical deconditioning  Improving. No falls while at TCU. She did say that many of her falls have happened when trying to get out of bed (which she has struggled with here as well). A  hospital bed has been ordered for her with side rails, which should hopefully reduce her risk of falling out of bed. She will have home PT/OT to continue with her strengthening and safety.     Peripheral polyneuropathy  Lyrica was started at a low dose. She is tolerating this without side effects. She has noticed improvement in the symptoms in her hands, but not in her feet. She describes the sensation in her feet as if she has a 1 inch thick callous along the whole bottom. She also has pain in the top of her feet. She is going to see a vascular specialists. Provider advises her to see neurology as well.    Chronic heart failure with preserved ejection fraction (HFpEF) (H)  Venous stasis dermatitis of both lower extremities  Patient was concerned today about redness on her legs. Both legs have 2+ edema. Skin above ankles is pink, consistent with venous stasis. She says she does wear compression socks at home, but has not been wearing them here. Her weight is up a few pounds from admission, but this is likely due to the edema in her legs. ACE wraps ordered for her last couple of days here and then she will wear her socks at home. She is advised to contact her PCP if edema worsens and weight continues to climb.    Benign essential hypertension  Well controlled    Herpes zoster without complication  Shortly after admission, patient developed a shingles rash on her left abdomen. It did not spread very far, she was treated with antivirals. She says she still has some mild pain and itching, but the rash is gone      Discharge Medications:  Current Outpatient Medications   Medication Sig Dispense Refill    acetaminophen (TYLENOL) 500 MG tablet Take 500 mg by mouth every 6 hours as needed for mild pain      albuterol (PROAIR HFA;PROVENTIL HFA;VENTOLIN HFA) 90 mcg/actuation inhaler Inhale 1 puff into the lungs every 4 hours as needed for shortness of breath / dyspnea      alendronate (FOSAMAX) 70 MG tablet [ALENDRONATE  (FOSAMAX) 70 MG TABLET] Take 70 mg by mouth every 7 days. Takes on Mondays 11    aspirin 81 MG EC tablet 1 tablet Orally Once a day      azelastine (OPTIVAR) 0.05 % ophthalmic solution Apply 1 drop to eye 2 times daily as needed      Bacillus Coagulans-Inulin (ALIGN PREBIOTIC-PROBIOTIC PO) Take 1 tablet by mouth daily      calcium citrate (CITRACAL) 950 (200 Ca) MG tablet Take 1 tablet by mouth daily      cetirizine (ZYRTEC) 10 MG tablet [CETIRIZINE (ZYRTEC) 10 MG TABLET] Take 10 mg by mouth daily.       cholecalciferol (VITAMIN D3) 25 mcg (1000 units) capsule Take 125 mcg by mouth every morning Take 5000 units in the morning and 2000 units in the evening      DULoxetine (CYMBALTA) 30 MG capsule Take 1 capsule (30 mg) by mouth 2 times daily      furosemide (LASIX) 20 MG tablet Take 2 tablets (40 mg) by mouth every morning      HYDROcodone-acetaminophen (NORCO) 5-325 MG tablet Take 1 tablet by mouth every 4 hours as needed for pain Max 6 tablets per day. 60 tablet 0    Hydroxychloroquine Sulfate 100 MG TABS Take 100 mg by mouth 2 times daily      l-lysine HCl 500 MG TABS tablet Take 2 tablets by mouth daily      magnesium oxide 250 mg Tab Take 500 mg by mouth daily      metoprolol succinate ER (TOPROL XL) 50 MG 24 hr tablet Take 50 mg by mouth daily      modafinil (PROVIGIL) 100 MG tablet Take 2.5 tablets (250 mg) by mouth daily Take two and half tablet (250 mg ) daily 90 tablet 0    montelukast (SINGULAIR) 10 mg tablet Take 10 mg by mouth every evening      nystatin (MYCOSTATIN) 035492 UNIT/GM external powder Apply topically 2 times daily      omeprazole (PRILOSEC) 20 MG DR capsule Take 40 mg by mouth daily      ondansetron (ZOFRAN) 4 MG tablet Take 4 mg by mouth every 6 hours as needed for nausea      OXYGEN-AIR DELIVERY SYSTEMS MISC [OXYGEN-AIR DELIVERY SYSTEMS MISC] Use 3 L As Directed. 3 lpm with activities and as needed at night      pregabalin (LYRICA) 25 MG capsule Take 1 capsule (25 mg) by mouth 2 times  daily 30 capsule 0    senna-docusate (SENOKOT-S/PERICOLACE) 8.6-50 MG tablet Take 1 tablet by mouth 2 times daily as needed for constipation 30 tablet 0    solifenacin (VESICARE) 10 MG tablet Take 5-10 mg by mouth at bedtime      tiZANidine (ZANAFLEX) 4 MG tablet Take 4 mg by mouth 3 times daily as needed for muscle spasms      Turmeric (CURCUPLEX-95) 500 MG CAPS Take 1 capsule by mouth daily      valACYclovir (VALTREX) 500 MG tablet Take 500 mg by mouth 2 times daily as needed (flare)          Controlled medications:   OK to send remaining Norco and Lyrica     Past Medical History:   Past Medical History:   Diagnosis Date    Allergic rhinitis     Arthritis of back     CHF (congestive heart failure) (H)     Chronic kidney disease     stage 3     De Quervain's disease (tenosynovitis)     Fibromyalgia, primary     GERD (gastroesophageal reflux disease)     Hematuria     chronic    High cholesterol     History of blood clots 09/01/1970    DVT, from birth control, h/o left calf    Hyperlipidemia     Hypertension     Low back pain     Osteoporosis     Pickwickian syndrome (H)     Plantar fasciitis     Proteinuria     Proteinuria     chronic    Sleep apnea     CPAP    Uncomplicated asthma      Physical Exam:   Vitals: BP (!) 157/76   Pulse 62   Temp 96.9  F (36.1  C)   Resp 18   Wt 95.6 kg (210 lb 12.8 oz)   SpO2 94%   BMI 48.99 kg/m    BMI: Body mass index is 48.99 kg/m .  GENERAL APPEARANCE:  Alert, in no distress  ENT:  Mouth and posterior oropharynx normal, moist mucous membranes  EYES:  EOM normal, conjunctiva and lids normal  RESP:  no respiratory distress  CV:  peripheral edema 2+ in BLE, skin above ankles pink  PSYCH:  oriented X 3, affect and mood normal     SNF labs: Labs done in SNF are in [place] EPIC. Please refer to them using EPIC/Care Everywhere.    DISCHARGE PLAN:  Follow up labs: No labs orders/due  Medical Follow Up:      Follow up with primary care provider in 2-3 weeks  Current [place]  scheduled appointments:  Appointments in Next Year      May 28, 2024  4:00 PM  (Arrive by 3:45 PM)  Return Patient with Carlos Clayton MD  [facility] [phone]           Discharge Services: Home Care:  Occupational Therapy, Physical Therapy, Registered Nurse, and Home Health Aide      TOTAL DISCHARGE TIME:   Greater than 30 minutes  Electronically signed by:  KALIE Ovalle CNP       Documentation of Face to Face and Certification for Home Health Services    I certify that services are/were furnished while this patient was under the care of a physician and that a physician or an allowed non-physician practitioner (NPP), had a face-to-face encounter that meets the physician face-to-face encounter requirements. The encounter was in whole, or in part, related to the primary reason for home health. The patient is confined to his/her home and needs intermittent skilled nursing, physical therapy, speech-language pathology, or the continued need for occupational therapy. A plan of care has been established by a physician and is periodically reviewed by a physician.  Date of Face-to-Face Encounter: 5/22/2024.    I certify that, based on my findings, the following services are medically necessary home health services: Nursing, Occupational Therapy, and Physical Therapy.    My clinical findings support the need for the above skilled services because: Requires assistance of another person or specialized equipment to access medical services because patient: Is unable to walk greater than 100 feet without rest...    Patient to re-establish plan of care with their PCP within 7-10 days after leaving the facility to reestablish care.  Medicare certified PECOS provider: KALIE Ovalle CNP  Date: May 22, 2024

## 2024-05-31 NOTE — TELEPHONE ENCOUNTER
[facility] Call Center    Phone Message    May a detailed message be left on voicemail: yes     Reason for Call: Other: Patient is calling regarding scheduling with Dr. Shaw she would like more information before scheduling. She is not looking to get surgery.       Action Taken: Other: Neurosurgery    Travel Screening: Not Applicable     Date of Service:

## 2024-06-04 ENCOUNTER — TELEPHONE (OUTPATIENT)
Dept: NEUROSURGERY | Facility: CLINIC | Age: 74
End: 2024-06-04
Payer: MEDICARE

## 2024-06-04 DIAGNOSIS — M54.50 LOW BACK PAIN: Primary | ICD-10-CM

## 2024-06-05 NOTE — TELEPHONE ENCOUNTER
Date: June 5, 2024    Provider: Dr. Colin Ramirez MD     Provider/Other: needs to ondina XR prior to 6/13 appt     Reason for out-going call: FOLLOW-UP      Detailed message: ldvm for patient to c/b and ondina XR prior to 6/13 appt with Colin.

## 2024-06-13 ENCOUNTER — OFFICE VISIT (OUTPATIENT)
Dept: NEUROSURGERY | Facility: CLINIC | Age: 74
End: 2024-06-13
Payer: MEDICARE

## 2024-06-13 ENCOUNTER — HOSPITAL ENCOUNTER (OUTPATIENT)
Dept: RADIOLOGY | Facility: HOSPITAL | Age: 74
Discharge: HOME | End: 2024-06-13
Attending: SURGERY | Admitting: SURGERY
Payer: MEDICARE

## 2024-06-13 VITALS
OXYGEN SATURATION: 92 % | WEIGHT: 205 LBS | HEIGHT: 55 IN | SYSTOLIC BLOOD PRESSURE: 154 MMHG | BODY MASS INDEX: 47.44 KG/M2 | DIASTOLIC BLOOD PRESSURE: 70 MMHG | HEART RATE: 76 BPM

## 2024-06-13 DIAGNOSIS — M54.50 LOW BACK PAIN: ICD-10-CM

## 2024-06-13 DIAGNOSIS — Z98.1 S/P CERVICAL SPINAL FUSION: Primary | ICD-10-CM

## 2024-06-13 DIAGNOSIS — M54.16 RADICULOPATHY, LUMBAR REGION: ICD-10-CM

## 2024-06-13 DIAGNOSIS — M54.2 NECK PAIN: ICD-10-CM

## 2024-06-13 PROCEDURE — 99214 OFFICE O/P EST MOD 30 MIN: CPT | Performed by: SURGERY

## 2024-06-13 PROCEDURE — G2211 COMPLEX E/M VISIT ADD ON: HCPCS | Performed by: SURGERY

## 2024-06-13 PROCEDURE — 72110 X-RAY EXAM L-2 SPINE 4/>VWS: CPT

## 2024-06-13 ASSESSMENT — PAIN SCALES - GENERAL: PAINLEVEL: EXTREME PAIN (8)

## 2024-06-13 NOTE — LETTER
"6/13/2024      Desirae Rey  [address] [place] [address]      Dear Colleague,    Thank you for referring your patient, Desirae Rey, to the [facility]. Please see a copy of my visit note below.    NEUROSURGERY FOLLOW UP  NOTE    Desirae Rey comes today in follow-up.  Was last seen when she was hospitalized for an infection on 4/20/24.  She is now off antibiotics.Patient   Continues to complain of chronic low back pain and leg and feet pain. Also across the shoulders and down both arms she experiences pain.   Last formal physical therapy years ago. Urinary frequency with incontinence. Balance and frequent falls. Foot weakness. Also notes \"thickening\" of feet..     PHYSICAL EXAM:   Constitutional: BP (!) 154/70   Pulse 76   Ht 1.397 m (4' 7\")   Wt 93 kg (205 lb)   SpO2 92%   BMI 47.65 kg/m       Mental Status: A & O in no acute distress.  Affect is appropriate.  Speech is fluent.  Recent and remote memory are intact.  Attention span and concentration are normal.     Motor:  Normal bulk and tone all muscle groups of upper and lower extremities.    IMAGING:   I personally reviewed all radiographic images        CONSULTATION ASSESSMENT AND PLAN:    Prior anterior cervical decompression and fusion at OSH with significant loosening of plate with ventral migration of 9 mm at cervical 5-7. Reports no current difficulties with swallowing. Recommend cervical flex ex. Patient appears more symptomatic from lumbar spine, however, would consider addressing hardware migration in cervical spine prior given degree of migration. We also discussed possibility of multi-level lumbar laminectomies. Patient is not sure she would like to consider additional surgery at this time. She and her significant  other appeared to understand that without surgery her symptom may not improve. In the meantime recommend a lumbar 5-sacral 1 interlaminar epidural and physical therapy. She will follow " up after cervical imaging and injection in a few weeks.     Monica Shaw MD      CC:     Noemy Brito  [address] [facility] [address]      Again, thank you for allowing me to participate in the care of your patient.        Sincerely,        Monica Shaw MD

## 2024-06-13 NOTE — PROGRESS NOTES
"NEUROSURGERY FOLLOW UP  NOTE    Desirae Rey comes today in follow-up.  Was last seen when she was hospitalized for an infection on 4/20/24.  She is now off antibiotics.Patient   Continues to complain of chronic low back pain and leg and feet pain. Also across the shoulders and down both arms she experiences pain.   Last formal physical therapy years ago. Urinary frequency with incontinence. Balance and frequent falls. Foot weakness. Also notes \"thickening\" of feet..     PHYSICAL EXAM:   Constitutional: BP (!) 154/70   Pulse 76   Ht 1.397 m (4' 7\")   Wt 93 kg (205 lb)   SpO2 92%   BMI 47.65 kg/m       Mental Status: A & O in no acute distress.  Affect is appropriate.  Speech is fluent.  Recent and remote memory are intact.  Attention span and concentration are normal.     Motor:  Normal bulk and tone all muscle groups of upper and lower extremities.    IMAGING:   I personally reviewed all radiographic images        CONSULTATION ASSESSMENT AND PLAN:    Prior anterior cervical decompression and fusion at OSH with significant loosening of plate with ventral migration of 9 mm at cervical 5-7. Reports no current difficulties with swallowing. Recommend cervical flex ex. Patient appears more symptomatic from lumbar spine, however, would consider addressing hardware migration in cervical spine prior given degree of migration. We also discussed possibility of multi-level lumbar laminectomies. Patient is not sure she would like to consider additional surgery at this time. She and her significant  other appeared to understand that without surgery her symptom may not improve. In the meantime recommend a lumbar 5-sacral 1 interlaminar epidural and physical therapy. She will follow up after cervical imaging and injection in a few weeks.     Monica Shaw MD      CC:     Noemy Brito  [address] [facility] [address]  "

## 2024-08-06 ENCOUNTER — DOCUMENTATION ONLY (OUTPATIENT)
Dept: PULMONOLOGY | Facility: CLINIC | Age: 74
End: 2024-08-06
Payer: MEDICARE

## 2024-08-06 DIAGNOSIS — J96.11 CHRONIC RESPIRATORY FAILURE WITH HYPOXIA (H): Primary | ICD-10-CM

## 2024-08-06 DIAGNOSIS — I50.9 HEART FAILURE, UNSPECIFIED (H): ICD-10-CM

## 2024-08-06 DIAGNOSIS — J45.30 MILD PERSISTENT ASTHMA WITHOUT COMPLICATION: ICD-10-CM

## 2024-08-06 DIAGNOSIS — I50.30 HEART FAILURE WITH PRESERVED EJECTION FRACTION, NYHA CLASS I (H): ICD-10-CM

## 2024-09-10 ENCOUNTER — THERAPY VISIT (OUTPATIENT)
Dept: PHYSICAL THERAPY | Facility: REHABILITATION | Age: 74
End: 2024-09-10
Attending: SURGERY
Payer: MEDICARE

## 2024-09-10 DIAGNOSIS — M54.42 CHRONIC BILATERAL LOW BACK PAIN WITH BILATERAL SCIATICA: Primary | ICD-10-CM

## 2024-09-10 DIAGNOSIS — G89.29 CHRONIC BILATERAL LOW BACK PAIN WITH BILATERAL SCIATICA: Primary | ICD-10-CM

## 2024-09-10 DIAGNOSIS — M54.41 CHRONIC BILATERAL LOW BACK PAIN WITH BILATERAL SCIATICA: Primary | ICD-10-CM

## 2024-09-10 PROBLEM — M54.50 LOW BACK PAIN: Status: ACTIVE | Noted: 2024-09-10

## 2024-09-10 PROCEDURE — 97110 THERAPEUTIC EXERCISES: CPT | Mod: GP

## 2024-09-10 NOTE — PROGRESS NOTES
"PHYSICAL THERAPY EVALUATION  Type of Visit: Evaluation       Fall Risk Screen:  Fall screen completed by: PT  Have you fallen 2 or more times in the past year?: Yes  Have you fallen and had an injury in the past year?: Yes  Timed Up and Go score (seconds): NT  Is patient a fall risk?: Yes; Department fall risk interventions implemented    Subjective       Presenting condition or subjective complaint:    Date of onset: 06/13/24    Relevant medical history:     Dates & types of surgery: neck fusion 2014 both knees replaced 2010 [id number] hernia reoair carpul tunnel both hands dequavein syndrome right hand    Prior diagnostic imaging/testing results: MRI; CT scan; X-ray; EMG; Bone scan     Prior therapy history for the same diagnosis, illness or injury:        Prior Level of Function  Transfers: Assistive equipment  Ambulation: Assistive equipment  ADL: Assistive equipment  IADL: Driving, Finances, Housekeeping, Laundry, Meal preparation, Medication management, Yard work    Living Environment  Social support: With a significant other or spouse   Type of home: 2-story; Basement   Stairs to enter the home: Yes 2 Is there a railing: No     Ramp: No   Stairs inside the home: Yes 7     Help at home: Self Cares (home health aide/personal care attendant, family, etc); Home management tasks (cooking, cleaning); Medication and/or finances; Home and Yard maintenance tasks; Assist for driving and community activities  Equipment owned: Bedrail; Grab bars; Raised toilet seat; Bath bench; Dressing equipment     Employment: No    Hobbies/Interests: crocheting cake deorating puzzling music walking(which i cant do right now)i dont have good balance    Patient goals for therapy:      Per H&P: \" Desirae Rey comes today in follow-up.  Was last seen when she was hospitalized for an infection on 4/20/24.  She is now off antibiotics.Patient   Continues to complain of chronic low back pain and leg and feet pain. Also across the " "shoulders and down both arms she experiences pain.   Last formal physical therapy years ago. Urinary frequency with incontinence. Balance and frequent falls. Foot weakness. Also notes \"thickening\" of feet..    Prior anterior cervical decompression and fusion at OSH with significant loosening of plate with ventral migration of 9 mm at cervical 5-7. Reports no current difficulties with swallowing. Recommend cervical flex ex. Patient appears more symptomatic from lumbar spine, however, would consider addressing hardware migration in cervical spine prior given degree of migration. We also discussed possibility of multi-level lumbar laminectomies. Patient is not sure she would like to consider additional surgery at this time. She and her significant  other appeared to understand that without surgery her symptom may not improve. In the meantime recommend a lumbar 5-sacral 1 interlaminar epidural and physical therapy. She will follow up after cervical imaging and injection in a few weeks. \"      Subjective: Pt reports that she has been falling a lot over the last few years. She reports that she has had multiple injections for her back, but they don't last very long.      Objective   LUMBAR SPINE EVALUATION  PAIN: Pain Level at Rest: 7/10  Pain Level with Use: 10/10  Pain Location: cervical spine, thoracic spine, and lumbar spine  Pain Quality: Numb, Sharp, Stabbing, Tingling, and feet feel very heavy and calloused  Pain Frequency: constant or daily  Pain is Worst: nighttime  Pain is Exacerbated By: walking, standing, sitting  Pain is Relieved By: cold and vicodin  Pain Progression: Worsened    POSTURE: Standing Posture: Rounded shoulders, Forward head, Thoracic kyphosis increased  Sitting Posture: Rounded shoulders, Forward head, Thoracic kyphosis increased  GAIT:   Assistive Device(s): Cane (single end)2-3 years   Gait Deviations: Antalgic  Stride length decreased  Nu decreased  BALANCE/PROPRIOCEPTION: Single Leg " Stance Eyes Open (seconds): Unable to perform without UE assist. 4 sec R, 2 sec L with UE support    ROM:   (Degrees) Left AROM Left PROM  Right AROM Right PROM   Hip Flexion 105  111    Hip Extension 8  5    Hip Abduction       Hip Adduction       Hip Internal Rotation       Hip External Rotation       Knee Flexion       Knee Extension       Lumbar Side glide Hand to lat jt line knee Hand to mid thigh   Lumbar Flexion Hands to knees, painful   Lumbar Extension Limited 75%, painful       STRENGTH:   HIP L R   Flex 4- 4-   Ext 4- 3-   ABD 3- 3-   ADD 5 5   ER 5 5   IR 5 5   KNEE     Flex 5 5   Ext 5 5     DTR S: WNL  CORD SIGNS: WNL  DERMATOMES: NT - pt reports numbness and tingling in both feet in all toes    LUMBAR/HIP Special Tests:    Left Right   JEN Positive Positive   FADIR/Labrum/CALEB Positive Positive   Good's Positive Positive   Piriformis Positive Positive   SLR Positive Positive   Slump Positive Positive      SPINAL SEGMENTAL CONCLUSIONS: L4-L5 hypomobile and painful d/t guarding.     Assessment & Plan   CLINICAL IMPRESSIONS  Medical Diagnosis: M54.50 (ICD-10-CM) - Low back pain    Treatment Diagnosis: Cervicalgia, limited ROM, neck weakness   Impression/Assessment: Patient is a 73 year old female with low back pain complaints. Her pain is exacerbated by her clear level of deconditioning. Walking down the hallway approximately 25 feet brought her to fatigue and required a seated rest break. She reports that her meds create a lot of dehydration, which is challenging for her. She shows signs of poor coordiantion and mobility with multi joint movements such as stairs and sit to supine transfers.  The following significant findings have been identified: Pain, Decreased ROM/flexibility, Decreased joint mobility, Decreased strength, Impaired balance, Impaired sensation, Decreased activity tolerance, and Impaired posture. These impairments interfere with their ability to perform self care tasks, recreational  activities, household chores, driving , household mobility, and community mobility as compared to previous level of function.     Clinical Decision Making (Complexity):  Clinical Presentation: Stable/Uncomplicated  Clinical Presentation Rationale: based on medical and personal factors listed in PT evaluation  Clinical Decision Making (Complexity): Low complexity    PLAN OF CARE  Treatment Interventions:  Modalities: Cryotherapy, E-stim, Hot Pack  Interventions: Gait Training, Manual Therapy, Neuromuscular Re-education, Therapeutic Activity, Therapeutic Exercise, Self-Care/Home Management    Long Term Goals     PT Goal 1  Goal Description: Pt will improve Lumbar SB and ROT by 15 degrees in order to be able to look over their shoulder while driving  Target Date: 12/03/24  PT Goal 2  Goal Description: Pt will improve hip abduction and extension strength to > 4.5 in order to improve pelvic stability while walking  Target Date: 12/03/24  PT Goal 3  Goal Description: Pt will demonstrate appropriate adherence to HEP as evidenced by improved control and muscular coordination with prescribed exercises.  Target Date: 12/03/24      Frequency of Treatment: 1x per week x 4 weeks  Duration of Treatment: 12 weeks    Education Assessment:   Learner/Method: Patient;Listening;Reading;Demonstration  Education Comments: Education provided regarding appropriate response to exercise, the difference between pain and soreness, as well as the importance of time, effort and consistency with HEP.    Risks and benefits of evaluation/treatment have been explained.   Patient/Family/caregiver agrees with Plan of Care.     Evaluation Time:     PT Eval, Low Complexity Minutes (76646): 15     Signing Clinician: Nick De Souza PT        [facility]                                                                                   OUTPATIENT PHYSICAL THERAPY      PLAN OF TREATMENT FOR OUTPATIENT REHABILITATION    Patient's Last Name, First Name, Desirae Bhat YOB: 1950   Provider's Name   [facility]   Medical Record No.  [id number]     Onset Date: 06/13/24  Start of Care Date: 09/10/24     Medical Diagnosis:  M54.50 (ICD-10-CM) - Low back pain      PT Treatment Diagnosis:  Cervicalgia, limited ROM, neck weakness Plan of Treatment  Frequency/Duration: 1x per week x 4 weeks/ 12 weeks    Certification date from 09/10/24 to 12/03/24         See note for plan of treatment details and functional goals     Nick De Souza, PT                         I CERTIFY THE NEED FOR THESE SERVICES FURNISHED UNDER        THIS PLAN OF TREATMENT AND WHILE UNDER MY CARE     (Physician attestation of this document indicates review and certification of the therapy plan).              Referring Provider:  Monica Shaw    Initial Assessment  See Epic Evaluation- Start of Care Date: 09/10/24

## 2024-09-30 ENCOUNTER — LAB REQUISITION (OUTPATIENT)
Dept: LAB | Facility: CLINIC | Age: 74
End: 2024-09-30
Payer: MEDICARE

## 2024-09-30 PROCEDURE — 80053 COMPREHEN METABOLIC PANEL: CPT | Mod: ORL | Performed by: PHYSICIAN ASSISTANT

## 2024-09-30 PROCEDURE — 80061 LIPID PANEL: CPT | Mod: ORL | Performed by: PHYSICIAN ASSISTANT

## 2024-10-01 LAB
ALBUMIN SERPL BCG-MCNC: 3.7 G/DL (ref 3.5–5.2)
ALP SERPL-CCNC: 80 U/L (ref 40–150)
ALT SERPL W P-5'-P-CCNC: 15 U/L (ref 0–50)
ANION GAP SERPL CALCULATED.3IONS-SCNC: 13 MMOL/L (ref 7–15)
AST SERPL W P-5'-P-CCNC: 21 U/L (ref 0–45)
BILIRUB SERPL-MCNC: 0.2 MG/DL
BUN SERPL-MCNC: 47.5 MG/DL (ref 8–23)
CALCIUM SERPL-MCNC: 9.6 MG/DL (ref 8.8–10.4)
CHLORIDE SERPL-SCNC: 100 MMOL/L (ref 98–107)
CHOLEST SERPL-MCNC: 202 MG/DL
CREAT SERPL-MCNC: 1.83 MG/DL (ref 0.51–0.95)
EGFRCR SERPLBLD CKD-EPI 2021: 29 ML/MIN/1.73M2
FASTING STATUS PATIENT QL REPORTED: ABNORMAL
FASTING STATUS PATIENT QL REPORTED: ABNORMAL
GLUCOSE SERPL-MCNC: 92 MG/DL (ref 70–99)
HCO3 SERPL-SCNC: 24 MMOL/L (ref 22–29)
HDLC SERPL-MCNC: 39 MG/DL
LDLC SERPL CALC-MCNC: 127 MG/DL
NONHDLC SERPL-MCNC: 163 MG/DL
POTASSIUM SERPL-SCNC: 5.2 MMOL/L (ref 3.4–5.3)
PROT SERPL-MCNC: 6.6 G/DL (ref 6.4–8.3)
SODIUM SERPL-SCNC: 137 MMOL/L (ref 135–145)
TRIGL SERPL-MCNC: 178 MG/DL

## 2024-10-10 NOTE — PROGRESS NOTES
DISCHARGE  Reason for Discharge: Seen for eval on 9/10, but patient has failed to schedule further appointments.    Discharge Plan: Patient to continue home program.    Referring Provider:  Monica Shaw

## 2024-11-06 ENCOUNTER — TELEPHONE (OUTPATIENT)
Dept: VASCULAR SURGERY | Facility: CLINIC | Age: 74
End: 2024-11-06
Payer: MEDICARE

## 2024-11-06 NOTE — TELEPHONE ENCOUNTER
Patient called asking if we are able to bill medicare and [employer].  She then asked how long to get in to see Dr. Rinaldi.  I asked her if she's seen him before or if she has a referral to be seen.  She replied that she has not seen him but she's seen Dr. Shaw.  I confirmed she meant the Dr. Shaw from neuro and she said yes.  I then confirmed she was trying to call vascular, since Dr. Shaw is not part of our clinic and she said yes, that she wanted to see Dr. Rinaldi for the thickening on the bottom of her foot.  She then said she's worried about a blockage in her legs. After a bit of back of forth questioning/explaining, she agreed that she will ask her PCP to send over a referral so that we can determine which is the best provider and if any imaging might be needed in order to be seen in our clinic.

## 2024-12-20 ENCOUNTER — APPOINTMENT (OUTPATIENT)
Dept: CT IMAGING | Facility: HOSPITAL | Age: 74
DRG: 064 | End: 2024-12-20
Attending: EMERGENCY MEDICINE
Payer: MEDICARE

## 2024-12-20 ENCOUNTER — APPOINTMENT (OUTPATIENT)
Dept: MRI IMAGING | Facility: HOSPITAL | Age: 74
DRG: 064 | End: 2024-12-20
Attending: EMERGENCY MEDICINE
Payer: MEDICARE

## 2024-12-20 ENCOUNTER — HOSPITAL ENCOUNTER (INPATIENT)
Facility: HOSPITAL | Age: 74
LOS: 4 days | Discharge: HOME HEALTH | DRG: 064 | End: 2024-12-24
Attending: EMERGENCY MEDICINE | Admitting: INTERNAL MEDICINE
Payer: MEDICARE

## 2024-12-20 ENCOUNTER — APPOINTMENT (OUTPATIENT)
Dept: RADIOLOGY | Facility: HOSPITAL | Age: 74
DRG: 064 | End: 2024-12-20
Attending: EMERGENCY MEDICINE
Payer: MEDICARE

## 2024-12-20 DIAGNOSIS — M48.061 SPINAL STENOSIS OF LUMBAR REGION, UNSPECIFIED WHETHER NEUROGENIC CLAUDICATION PRESENT: ICD-10-CM

## 2024-12-20 DIAGNOSIS — I63.9 ACUTE ISCHEMIC STROKE (H): Primary | ICD-10-CM

## 2024-12-20 DIAGNOSIS — M48.061 SPINAL STENOSIS OF LUMBAR REGION WITHOUT NEUROGENIC CLAUDICATION: ICD-10-CM

## 2024-12-20 DIAGNOSIS — R33.9 URINARY RETENTION: ICD-10-CM

## 2024-12-20 DIAGNOSIS — J96.11 CHRONIC RESPIRATORY FAILURE WITH HYPOXIA (H): ICD-10-CM

## 2024-12-20 DIAGNOSIS — G93.40 ENCEPHALOPATHY, UNSPECIFIED TYPE: ICD-10-CM
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
## 2024-12-20 DIAGNOSIS — N39.0 URINARY TRACT INFECTION WITHOUT HEMATURIA, SITE UNSPECIFIED: ICD-10-CM

## 2024-12-20 DIAGNOSIS — I63.9 CEREBROVASCULAR ACCIDENT (CVA) INVOLVING CEREBELLUM (H): ICD-10-CM
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
## 2024-12-20 LAB
ALBUMIN SERPL BCG-MCNC: 4 G/DL (ref 3.5–5.2)
ALBUMIN UR-MCNC: 50 MG/DL
ALP SERPL-CCNC: 85 U/L (ref 40–150)
ALT SERPL W P-5'-P-CCNC: 42 U/L (ref 0–50)
AMPHETAMINES UR QL SCN: ABNORMAL
ANION GAP SERPL CALCULATED.3IONS-SCNC: 17 MMOL/L (ref 7–15)
APPEARANCE UR: CLEAR
AST SERPL W P-5'-P-CCNC: 98 U/L (ref 0–45)
BACTERIA #/AREA URNS HPF: ABNORMAL /HPF
BARBITURATES UR QL SCN: ABNORMAL
BASE EXCESS BLDV CALC-SCNC: 1.4 MMOL/L (ref -3–3)
BASOPHILS # BLD AUTO: 0 10E3/UL (ref 0–0.2)
BASOPHILS NFR BLD AUTO: 0 %
BENZODIAZ UR QL SCN: ABNORMAL
BILIRUB DIRECT SERPL-MCNC: <0.2 MG/DL (ref 0–0.3)
BILIRUB SERPL-MCNC: 0.4 MG/DL
BILIRUB UR QL STRIP: NEGATIVE
BUN SERPL-MCNC: 60.9 MG/DL (ref 8–23)
BZE UR QL SCN: ABNORMAL
CALCIUM SERPL-MCNC: 10.4 MG/DL (ref 8.8–10.4)
CANNABINOIDS UR QL SCN: ABNORMAL
CHLORIDE SERPL-SCNC: 103 MMOL/L (ref 98–107)
COLOR UR AUTO: ABNORMAL
CREAT SERPL-MCNC: 2.13 MG/DL (ref 0.51–0.95)
CRP SERPL-MCNC: 73.1 MG/L
EGFRCR SERPLBLD CKD-EPI 2021: 24 ML/MIN/1.73M2
EOSINOPHIL # BLD AUTO: 0.1 10E3/UL (ref 0–0.7)
EOSINOPHIL NFR BLD AUTO: 1 %
ERYTHROCYTE [DISTWIDTH] IN BLOOD BY AUTOMATED COUNT: 12.6 % (ref 10–15)
ERYTHROCYTE [SEDIMENTATION RATE] IN BLOOD BY WESTERGREN METHOD: 29 MM/HR (ref 0–30)
ETHANOL SERPL-MCNC: <0.01 G/DL
FENTANYL UR QL: ABNORMAL
GLUCOSE SERPL-MCNC: 145 MG/DL (ref 70–99)
GLUCOSE UR STRIP-MCNC: NEGATIVE MG/DL
HCO3 BLDV-SCNC: 28 MMOL/L (ref 21–28)
HCO3 SERPL-SCNC: 23 MMOL/L (ref 22–29)
HCT VFR BLD AUTO: 39.4 % (ref 35–47)
HGB BLD-MCNC: 12.7 G/DL (ref 11.7–15.7)
HGB UR QL STRIP: ABNORMAL
HOLD SPECIMEN: NORMAL
HYALINE CASTS: 6 /LPF
IMM GRANULOCYTES # BLD: 0.1 10E3/UL
IMM GRANULOCYTES NFR BLD: 1 %
KETONES UR STRIP-MCNC: NEGATIVE MG/DL
LACTATE SERPL-SCNC: 1 MMOL/L (ref 0.7–2)
LEUKOCYTE ESTERASE UR QL STRIP: ABNORMAL
LYMPHOCYTES # BLD AUTO: 1 10E3/UL (ref 0.8–5.3)
LYMPHOCYTES NFR BLD AUTO: 10 %
MCH RBC QN AUTO: 31.6 PG (ref 26.5–33)
MCHC RBC AUTO-ENTMCNC: 32.2 G/DL (ref 31.5–36.5)
MCV RBC AUTO: 98 FL (ref 78–100)
MONOCYTES # BLD AUTO: 1.4 10E3/UL (ref 0–1.3)
MONOCYTES NFR BLD AUTO: 13 %
NEUTROPHILS # BLD AUTO: 8.4 10E3/UL (ref 1.6–8.3)
NEUTROPHILS NFR BLD AUTO: 77 %
NITRATE UR QL: NEGATIVE
NRBC # BLD AUTO: 0 10E3/UL
NRBC BLD AUTO-RTO: 0 /100
NT-PROBNP SERPL-MCNC: 2904 PG/ML (ref 0–900)
O2/TOTAL GAS SETTING VFR VENT: 24 %
OPIATES UR QL SCN: ABNORMAL
OXYHGB MFR BLDV: 82 % (ref 70–75)
PCO2 BLDV: 53 MM HG (ref 40–50)
PCP QUAL URINE (ROCHE): ABNORMAL
PH BLDV: 7.33 [PH] (ref 7.32–7.43)
PH UR STRIP: 5.5 [PH] (ref 5–7)
PLATELET # BLD AUTO: 250 10E3/UL (ref 150–450)
PO2 BLDV: 52 MM HG (ref 25–47)
POTASSIUM SERPL-SCNC: 4.1 MMOL/L (ref 3.4–5.3)
PROCALCITONIN SERPL IA-MCNC: 0.21 NG/ML
PROT SERPL-MCNC: 7.1 G/DL (ref 6.4–8.3)
RBC # BLD AUTO: 4.02 10E6/UL (ref 3.8–5.2)
RBC URINE: 1 /HPF
SAO2 % BLDV: 83.2 % (ref 70–75)
SODIUM SERPL-SCNC: 143 MMOL/L (ref 135–145)
SP GR UR STRIP: 1.01 (ref 1–1.03)
SQUAMOUS EPITHELIAL: <1 /HPF
TROPONIN T SERPL HS-MCNC: 46 NG/L
TROPONIN T SERPL HS-MCNC: 48 NG/L
TSH SERPL DL<=0.005 MIU/L-ACNC: 1.26 UIU/ML (ref 0.3–4.2)
UROBILINOGEN UR STRIP-MCNC: <2 MG/DL
WBC # BLD AUTO: 11 10E3/UL (ref 4–11)
WBC URINE: 17 /HPF

## 2024-12-20 PROCEDURE — 85004 AUTOMATED DIFF WBC COUNT: CPT | Performed by: EMERGENCY MEDICINE

## 2024-12-20 PROCEDURE — 84145 PROCALCITONIN (PCT): CPT | Performed by: EMERGENCY MEDICINE

## 2024-12-20 PROCEDURE — 74176 CT ABD & PELVIS W/O CONTRAST: CPT | Mod: MA

## 2024-12-20 PROCEDURE — 96365 THER/PROPH/DIAG IV INF INIT: CPT

## 2024-12-20 PROCEDURE — 36415 COLL VENOUS BLD VENIPUNCTURE: CPT | Performed by: STUDENT IN AN ORGANIZED HEALTH CARE EDUCATION/TRAINING PROGRAM

## 2024-12-20 PROCEDURE — 99223 1ST HOSP IP/OBS HIGH 75: CPT | Mod: AI | Performed by: INTERNAL MEDICINE

## 2024-12-20 PROCEDURE — 258N000003 HC RX IP 258 OP 636: Performed by: EMERGENCY MEDICINE

## 2024-12-20 PROCEDURE — 70450 CT HEAD/BRAIN W/O DYE: CPT | Mod: MA

## 2024-12-20 PROCEDURE — 36415 COLL VENOUS BLD VENIPUNCTURE: CPT | Performed by: EMERGENCY MEDICINE

## 2024-12-20 PROCEDURE — 85652 RBC SED RATE AUTOMATED: CPT | Performed by: EMERGENCY MEDICINE

## 2024-12-20 PROCEDURE — 83605 ASSAY OF LACTIC ACID: CPT | Performed by: EMERGENCY MEDICINE

## 2024-12-20 PROCEDURE — 82465 ASSAY BLD/SERUM CHOLESTEROL: CPT | Performed by: INTERNAL MEDICINE

## 2024-12-20 PROCEDURE — 70553 MRI BRAIN STEM W/O & W/DYE: CPT | Mod: MA

## 2024-12-20 PROCEDURE — 99285 EMERGENCY DEPT VISIT HI MDM: CPT | Mod: 25

## 2024-12-20 PROCEDURE — 81003 URINALYSIS AUTO W/O SCOPE: CPT | Performed by: EMERGENCY MEDICINE

## 2024-12-20 PROCEDURE — 86140 C-REACTIVE PROTEIN: CPT | Performed by: EMERGENCY MEDICINE

## 2024-12-20 PROCEDURE — 83880 ASSAY OF NATRIURETIC PEPTIDE: CPT | Performed by: EMERGENCY MEDICINE

## 2024-12-20 PROCEDURE — 87186 SC STD MICRODIL/AGAR DIL: CPT | Performed by: EMERGENCY MEDICINE

## 2024-12-20 PROCEDURE — 82310 ASSAY OF CALCIUM: CPT | Performed by: EMERGENCY MEDICINE

## 2024-12-20 PROCEDURE — 71045 X-RAY EXAM CHEST 1 VIEW: CPT

## 2024-12-20 PROCEDURE — 82248 BILIRUBIN DIRECT: CPT | Performed by: EMERGENCY MEDICINE

## 2024-12-20 PROCEDURE — 84443 ASSAY THYROID STIM HORMONE: CPT | Performed by: EMERGENCY MEDICINE

## 2024-12-20 PROCEDURE — 96375 TX/PRO/DX INJ NEW DRUG ADDON: CPT

## 2024-12-20 PROCEDURE — 96361 HYDRATE IV INFUSION ADD-ON: CPT

## 2024-12-20 PROCEDURE — 83036 HEMOGLOBIN GLYCOSYLATED A1C: CPT | Performed by: INTERNAL MEDICINE

## 2024-12-20 PROCEDURE — 82077 ASSAY SPEC XCP UR&BREATH IA: CPT | Performed by: EMERGENCY MEDICINE

## 2024-12-20 PROCEDURE — 72158 MRI LUMBAR SPINE W/O & W/DYE: CPT | Mod: MA

## 2024-12-20 PROCEDURE — 82805 BLOOD GASES W/O2 SATURATION: CPT | Performed by: EMERGENCY MEDICINE

## 2024-12-20 PROCEDURE — 72125 CT NECK SPINE W/O DYE: CPT | Mod: MA

## 2024-12-20 PROCEDURE — 85048 AUTOMATED LEUKOCYTE COUNT: CPT | Performed by: EMERGENCY MEDICINE

## 2024-12-20 PROCEDURE — 80307 DRUG TEST PRSMV CHEM ANLYZR: CPT | Performed by: EMERGENCY MEDICINE

## 2024-12-20 PROCEDURE — 250N000011 HC RX IP 250 OP 636: Performed by: EMERGENCY MEDICINE

## 2024-12-20 PROCEDURE — 84484 ASSAY OF TROPONIN QUANT: CPT | Performed by: EMERGENCY MEDICINE

## 2024-12-20 PROCEDURE — 120N000001 HC R&B MED SURG/OB

## 2024-12-20 PROCEDURE — 82374 ASSAY BLOOD CARBON DIOXIDE: CPT | Performed by: EMERGENCY MEDICINE

## 2024-12-20 RX ORDER — CALCIUM CARBONATE 500 MG/1
1000 TABLET, CHEWABLE ORAL 4 TIMES DAILY PRN
Status: DISCONTINUED | OUTPATIENT
Start: 2024-12-20 | End: 2024-12-24 | Stop reason: HOSPADM

## 2024-12-20 RX ORDER — CEFTRIAXONE 2 G/1
2 INJECTION, POWDER, FOR SOLUTION INTRAMUSCULAR; INTRAVENOUS EVERY 24 HOURS
Status: DISCONTINUED | OUTPATIENT
Start: 2024-12-21 | End: 2024-12-24

## 2024-12-20 RX ORDER — ONDANSETRON 2 MG/ML
4 INJECTION INTRAMUSCULAR; INTRAVENOUS EVERY 6 HOURS PRN
Status: DISCONTINUED | OUTPATIENT
Start: 2024-12-20 | End: 2024-12-24 | Stop reason: HOSPADM

## 2024-12-20 RX ORDER — DOCUSATE SODIUM 100 MG/1
1-3 CAPSULE, LIQUID FILLED ORAL DAILY PRN
COMMUNITY

## 2024-12-20 RX ORDER — CEFTRIAXONE 2 G/1
2 INJECTION, POWDER, FOR SOLUTION INTRAMUSCULAR; INTRAVENOUS EVERY 24 HOURS
Status: DISCONTINUED | OUTPATIENT
Start: 2024-12-21 | End: 2024-12-20

## 2024-12-20 RX ORDER — PRENATAL VIT 91/IRON/FOLIC/DHA 28-975-200
COMBINATION PACKAGE (EA) ORAL 2 TIMES DAILY
COMMUNITY

## 2024-12-20 RX ORDER — NALOXONE HYDROCHLORIDE 1 MG/ML
0.4 INJECTION INTRAMUSCULAR; INTRAVENOUS; SUBCUTANEOUS ONCE
Status: COMPLETED | OUTPATIENT
Start: 2024-12-20 | End: 2024-12-20

## 2024-12-20 RX ORDER — ONDANSETRON 4 MG/1
4 TABLET, ORALLY DISINTEGRATING ORAL EVERY 6 HOURS PRN
Status: DISCONTINUED | OUTPATIENT
Start: 2024-12-20 | End: 2024-12-24 | Stop reason: HOSPADM

## 2024-12-20 RX ORDER — ROSUVASTATIN CALCIUM 10 MG/1
10 TABLET, FILM COATED ORAL DAILY
COMMUNITY

## 2024-12-20 RX ORDER — NALOXONE HYDROCHLORIDE 1 MG/ML
1 INJECTION INTRAMUSCULAR; INTRAVENOUS; SUBCUTANEOUS ONCE
Status: DISCONTINUED | OUTPATIENT
Start: 2024-12-20 | End: 2024-12-20

## 2024-12-20 RX ORDER — AMOXICILLIN 250 MG
1 CAPSULE ORAL 2 TIMES DAILY PRN
Status: DISCONTINUED | OUTPATIENT
Start: 2024-12-20 | End: 2024-12-24 | Stop reason: HOSPADM

## 2024-12-20 RX ORDER — AMOXICILLIN 250 MG
2 CAPSULE ORAL 2 TIMES DAILY PRN
Status: DISCONTINUED | OUTPATIENT
Start: 2024-12-20 | End: 2024-12-24 | Stop reason: HOSPADM

## 2024-12-20 RX ORDER — CEFTRIAXONE 1 G/1
1 INJECTION, POWDER, FOR SOLUTION INTRAMUSCULAR; INTRAVENOUS ONCE
Status: COMPLETED | OUTPATIENT
Start: 2024-12-20 | End: 2024-12-20

## 2024-12-20 RX ORDER — FLUTICASONE PROPIONATE AND SALMETEROL 113; 14 UG/1; UG/1
1 POWDER, METERED RESPIRATORY (INHALATION) 2 TIMES DAILY
COMMUNITY

## 2024-12-20 RX ORDER — LIDOCAINE 40 MG/G
CREAM TOPICAL
Status: DISCONTINUED | OUTPATIENT
Start: 2024-12-20 | End: 2024-12-23

## 2024-12-20 RX ADMIN — NALOXONE HYDROCHLORIDE 0.4 MG: 1 INJECTION PARENTERAL at 22:33

## 2024-12-20 RX ADMIN — SODIUM CHLORIDE 500 ML: 9 INJECTION, SOLUTION INTRAVENOUS at 20:28

## 2024-12-20 RX ADMIN — CEFTRIAXONE SODIUM 1 G: 1 INJECTION, POWDER, FOR SOLUTION INTRAMUSCULAR; INTRAVENOUS at 21:39

## 2024-12-20 ASSESSMENT — ACTIVITIES OF DAILY LIVING (ADL)
ADLS_ACUITY_SCORE: 65
ADLS_ACUITY_SCORE: 65
ADLS_ACUITY_SCORE: 59
ADLS_ACUITY_SCORE: 65
ADLS_ACUITY_SCORE: 59
ADLS_ACUITY_SCORE: 65
ADLS_ACUITY_SCORE: 65

## 2024-12-20 NOTE — ED PROVIDER NOTES
EMERGENCY DEPARTMENT NOTE     Name: Desirae Rey    Age/Sex: 74 year old female   MRN: [id number]   Evaluation Date & Time:  12/20/2024  4:58 PM    PCP:    Daysi Bryan   ED Provider: Henri Keith D.O.       CHIEF COMPLAINT    Generalized Weakness     HISTORY OF PRESENT ILLNESS   Desirae Rey is a 74 year old year old female with a relevant past history of CKD, renal insufficiency, HLD, NELLIE and CHF, who presents to the ED for evaluation of generalized weakness.    Per EMS, the patient has been generally weak for the past week. Today, she was sitting in a chair and slid down the chair onto the ground. She did not hit her head and is not on blood thinners. She lives in an independent home with her  who called EMS today.    Upon ED arrival, the patient urinated on herself and her RN noted that her urine had a foul odor. She is not currently on antibiotics.     Per the patient, she denies abdominal pain.     DIAGNOSIS & DISPOSITION/MEDICAL DECISION MAKING   No diagnosis found.    EMERGENCY DEPARTMENT COURSE   5:15 PM I met with the patient to gather history and to perform my initial exam.  We discussed treatment options and the plan for care while in the Emergency Department.  7:46 PM I rechecked the patient who is a little more alert. She now complains of lower abdominal pain on exam.   10:07 PM I spoke with Dr. Gauthier, hospitalist.  Triage vital signs: /57 temp 98.1 pulse 80 SpO2 RA 94%  Differential diagnosis considered included but not limited to:  Tumors:      - Primary      - Metastatic Hemorrhage:      - Spontaneous      - Traumatic   Edema:      - HTN encephalopathy      - Obstructive hydrocephalus      - Tumor   Seizure:   Metabolic/Autoregulatory causes:                                  Hypoglycemia/hyperglycemia  Hyponatremia/hypernatremia  Hypocalcemia/hypercalcemia  Hypothermia/hyperthermia  Hypothyroidism  Hypovolemia  Hypercarbia  Hypoxemia  Pharmacologic/Toxic  causes:   Medication effects:      - Steroids      - Sedatives      - Opiates      - Sleep aids      - Anticholinergics      - Antiepileptics      - Polypharmacy   Alcohols:      - Ethanol (ETOH)      - Methanol/ethylene glyco   Illicit drugs   Withdrawal:      - Alcohol      - Benzodiazepine      - Opiate  Infectious causes:   Primary CNS:      - Meningitis      - Encephalitis      - Abscesses   Other infection:      - UTI      - Pneumonia      - Skin/decubitus ulcer      - Intra-abdominal      - Viral syndrome     Psychiatric disorder:    - delirium    MDM: Patient on exam and GCS 14, was seen to move all extremities without apparent focal deficit.  Cardiopulmonary exam normal.  Abdomen on initial exam without tenderness.  Diagnostic studies:  CT of the head: No acute process  CT cervical spine: Chronic loosening of hardware no acute change, no fracture  Chest x-ray: No infiltrate  CT abdomen pelvis: Bladder distention otherwise no acute abnormality  Laboratory studies were obtained and interpreted by myself:  Bladder scan greater than 800 mL.  Patient unable to do adequate voiding trial and Rebollar catheter was placed.  Urinalysis with pyuria and 17 WBCs, few bacteria nitrate negative  Creatinine 2.1 with GFR 24 decreased from baseline probable GRACY, glucose 145, normal CO2, anion gap minimally elevated at 17, other electrolytes including sodium within normal limits.  Initial troponin 48 on repeat 46.  BNP 2904  ESR 29, CRP 73  LFTs within normal limits,  WBC 11, lactic acid 1, procalcitonin 0.21  VBG with pCO2 53, pH 7.33 interpreted as chronic hypercarbia without acute respiratory failure  Patient received IV Narcan with no change in mental status.  Patient has remained afebrile.  Unlikely represent primary CNS CNS infection.  Considered polypharmacy with Lyrica and opioids but no dramatic response with Narcan.  Patient noted to have urinary retention and unable to do adequate strength assessment or neuroexam  "secondary to AMS for possible central cord syndrome as cause of urinary retention with history of chronic pain and will obtain MRI.  CT of the head negative for acute process but will also obtain MRI brain to exclude acute/subacute ischemic stroke as cause of AMS.  Patient was signed out at change of shift to Dr. Solis for follow-up of MRI of brain and lumbar spine.  I discussed case with the hospitalist service Dr. Perez and patient will be admitted for continued evaluation     Discharge Vital Signs:BP (!) 166/80   Pulse 80   Temp 98.2  F (36.8  C) (Oral)   Resp 23   Ht 1.397 m (4' 7\")   Wt 90.7 kg (200 lb)   SpO2 97%   BMI 46.48 kg/m     PROCEDURES:   None  Diagnostic studies:  CT Abdomen Pelvis w/o Contrast   Final Result   IMPRESSION:    1.  No definite etiology for symptoms. Abundant formed stool throughout colon but no obstruction or inflammatory findings.   2.  Large capacity urinary bladder raises question of potential outlet obstruction or neurogenic state though there is no hydronephrosis.         CT Cervical Spine w/o Contrast   Final Result   IMPRESSION:   HEAD CT:   1.  No acute intracranial process.   2.  Layering debris in the right maxillary sinus. This may represent acute sinusitis in the appropriate clinical setting.      CERVICAL SPINE CT:   1.  No CT evidence for acute fracture or post traumatic subluxation.   2.  No significant change in instrumented anterior spinal fusion C5-C7 with ventral migration of the plate and screws.      CT Head w/o Contrast   Final Result   IMPRESSION:   HEAD CT:   1.  No acute intracranial process.   2.  Layering debris in the right maxillary sinus. This may represent acute sinusitis in the appropriate clinical setting.      CERVICAL SPINE CT:   1.  No CT evidence for acute fracture or post traumatic subluxation.   2.  No significant change in instrumented anterior spinal fusion C5-C7 with ventral migration of the plate and screws.      XR Chest Port 1 View "   Final Result   IMPRESSION:       Shallow lung inflation which accentuates the heart and mediastinum. Mildly enlarged cardiac silhouette. Mild atheromatous calcifications in aortic arch.      Angular foci of atelectasis are present in the left base near the diaphragm and the inferior to the right hilum. The mid and lateral thirds of the lungs are clear. Diaphragm curvature is preserved. No visible pleural fluid. No pneumothorax.      Prior ACDF. There are deformities of both humeral heads with subchondral sclerosis and flattening representing advanced osteoarthrosis or possibly old AVN.      MR Brain w/o Contrast    (Results Pending)   MR Lumbar Spine w/o & w Contrast    (Results Pending)     Labs Ordered and Resulted from Time of ED Arrival to Time of ED Departure   BASIC METABOLIC PANEL - Abnormal       Result Value    Sodium 143      Potassium 4.1      Chloride 103      Carbon Dioxide (CO2) 23      Anion Gap 17 (*)     Urea Nitrogen 60.9 (*)     Creatinine 2.13 (*)     GFR Estimate 24 (*)     Calcium 10.4      Glucose 145 (*)    HEPATIC FUNCTION PANEL - Abnormal    Protein Total 7.1      Albumin 4.0      Bilirubin Total 0.4      Alkaline Phosphatase 85      AST 98 (*)     ALT 42      Bilirubin Direct <0.20     TROPONIN T, HIGH SENSITIVITY - Abnormal    Troponin T, High Sensitivity 48 (*)    BLOOD GAS VENOUS - Abnormal    pH Venous 7.33      pCO2 Venous 53 (*)     pO2 Venous 52 (*)     Bicarbonate Venous 28      Base Excess/Deficit Venous 1.4      FIO2 24      Oxyhemoglobin Venous 82 (*)     O2 Sat, Venous 83.2 (*)    NT PROBNP INPATIENT - Abnormal    N terminal Pro BNP Inpatient 2,904 (*)    CRP INFLAMMATION - Abnormal    CRP Inflammation 73.10 (*)    ROUTINE UA WITH MICROSCOPIC REFLEX TO CULTURE - Abnormal    Color Urine Light Yellow      Appearance Urine Clear      Glucose Urine Negative      Bilirubin Urine Negative      Ketones Urine Negative      Specific Gravity Urine 1.014      Blood Urine 0.2 mg/dL (*)      pH Urine 5.5      Protein Albumin Urine 50 (*)     Urobilinogen Urine <2.0      Nitrite Urine Negative      Leukocyte Esterase Urine 250 Marciano/uL (*)     Bacteria Urine Few (*)     RBC Urine 1      WBC Urine 17 (*)     Squamous Epithelials Urine <1      Hyaline Casts Urine 6 (*)    CBC WITH PLATELETS AND DIFFERENTIAL - Abnormal    WBC Count 11.0      RBC Count 4.02      Hemoglobin 12.7      Hematocrit 39.4      MCV 98      MCH 31.6      MCHC 32.2      RDW 12.6      Platelet Count 250      % Neutrophils 77      % Lymphocytes 10      % Monocytes 13      % Eosinophils 1      % Basophils 0      % Immature Granulocytes 1      NRBCs per 100 WBC 0      Absolute Neutrophils 8.4 (*)     Absolute Lymphocytes 1.0      Absolute Monocytes 1.4 (*)     Absolute Eosinophils 0.1      Absolute Basophils 0.0      Absolute Immature Granulocytes 0.1      Absolute NRBCs 0.0     TROPONIN T, HIGH SENSITIVITY - Abnormal    Troponin T, High Sensitivity 46 (*)    URINE DRUG SCREEN PANEL - Abnormal    Amphetamines Urine Screen Negative      Barbituates Urine Screen Negative      Benzodiazepine Urine Screen Negative      Cannabinoids Urine Screen Negative      Cocaine Urine Screen Negative      Fentanyl Qual Urine Screen Negative      Opiates Urine Screen Positive (*)     PCP Urine Screen Negative     LACTIC ACID WHOLE BLOOD WITH 1X REPEAT IN 2 HR WHEN >2 - Normal    Lactic Acid, Initial 1.0     PROCALCITONIN - Normal    Procalcitonin 0.21     TSH WITH FREE T4 REFLEX - Normal    TSH 1.26     ERYTHROCYTE SEDIMENTATION RATE AUTO - Normal    Erythrocyte Sedimentation Rate 29     ETHYL ALCOHOL LEVEL - Normal    Alcohol ethyl <0.01     URINE CULTURE     ED INTERVENTIONS     Medications   sodium chloride 0.9% BOLUS 500 mL (0 mLs Intravenous Stopped 12/20/24 2213)   cefTRIAXone (ROCEPHIN) 1 g vial to attach to  mL bag for ADULTS or NS 50 mL bag for PEDS (0 g Intravenous Stopped 12/20/24 2212)   naloxone (NARCAN) injection 0.4 mg (0.4 mg  Intravenous $Given 12/20/24 3425)     TOTAL CRITICAL CARE TIME (EXCLUDING PROCEDURES): Not applicable      DISCHARGE MEDICATIONS        Review of your medicines        UNREVIEWED medicines. Ask your doctor about these medicines        Dose / Directions   acetaminophen 500 MG tablet  Commonly known as: TYLENOL      Dose: 500 mg  Take 500 mg by mouth every 6 hours as needed for mild pain  Refills: 0     albuterol 108 (90 Base) MCG/ACT inhaler  Commonly known as: PROAIR HFA/PROVENTIL HFA/VENTOLIN HFA      Dose: 1 puff  Inhale 1 puff into the lungs every 4 hours as needed for shortness of breath / dyspnea  Refills: 0     alendronate 70 MG tablet  Commonly known as: FOSAMAX      Dose: 70 mg  [ALENDRONATE (FOSAMAX) 70 MG TABLET] Take 70 mg by mouth every 7 days. Takes on Mondays  Refills: 11     aspirin 81 MG EC tablet      1 tablet Orally Once a day  Refills: 0     azelastine 0.05 % ophthalmic solution  Commonly known as: OPTIVAR      Dose: 1 drop  Apply 1 drop to eye 2 times daily as needed  Refills: 0     calcium citrate 950 (200 Ca) MG tablet  Commonly known as: CITRACAL      Dose: 1 tablet  Take 1 tablet by mouth daily  Refills: 0     cetirizine 10 MG tablet  Commonly known as: zyrTEC      Dose: 10 mg  [CETIRIZINE (ZYRTEC) 10 MG TABLET] Take 10 mg by mouth daily.  Refills: 0     cholecalciferol 25 mcg (1000 units) capsule  Commonly known as: VITAMIN D3      Dose: 125 mcg  Take 125 mcg by mouth every morning Take 5000 units in the morning and 2000 units in the evening  Refills: 0     Crestor 10 MG tablet  Generic drug: rosuvastatin      Dose: 10 mg  Take 10 mg by mouth daily.  Refills: 0     docusate sodium 100 MG capsule  Commonly known as: COLACE      Dose: 1-3 capsule  Take 1-3 capsules by mouth daily as needed for constipation.  Refills: 0     DULoxetine 30 MG capsule  Commonly known as: CYMBALTA  Used for: Cervical radiculopathy      Dose: 30 mg  Take 1 capsule (30 mg) by mouth 2 times daily  Refills: 0      fluticasone-salmeterol 113-14 MCG/ACT inhaler  Commonly known as: AIRDUO RESPICLICK      Dose: 1 puff  Inhale 1 puff into the lungs 2 times daily.  Refills: 0     furosemide 20 MG tablet  Commonly known as: LASIX  Used for: Acute and chronic respiratory failure with hypoxia (H)      Dose: 40 mg  Take 2 tablets (40 mg) by mouth every morning  Refills: 0     HYDROcodone-acetaminophen 5-325 MG tablet  Commonly known as: NORCO  Used for: Cervical radiculopathy      Dose: 1 tablet  Take 1 tablet by mouth every 4 hours as needed for pain Max 6 tablets per day.  Quantity: 60 tablet  Refills: 0     Hydroxychloroquine Sulfate 100 MG Tabs      Dose: 100 mg  Take 100 mg by mouth 2 times daily  Refills: 0     Magnesium Oxide 250 MG Tabs      Dose: 500 mg  Take 500 mg by mouth daily  Refills: 0     metoprolol succinate ER 50 MG 24 hr tablet  Commonly known as: TOPROL XL      Dose: 50 mg  Take 50 mg by mouth daily  Refills: 0     modafinil 100 MG tablet  Commonly known as: PROVIGIL  Used for: Adult failure to thrive      Dose: 250 mg  Take 2.5 tablets (250 mg) by mouth daily Take two and half tablet (250 mg ) daily  Quantity: 90 tablet  Refills: 0     montelukast 10 MG tablet  Commonly known as: SINGULAIR      Dose: 10 mg  Take 10 mg by mouth every evening  Refills: 0     Naloxone HCl 5 MG/0.5ML Sosy      Dose: 5 mg  Inject 5 mg as directed as needed.  Refills: 0     nystatin 878230 UNIT/GM external powder  Commonly known as: MYCOSTATIN      Apply topically 2 times daily  Refills: 0     pregabalin 25 MG capsule  Commonly known as: LYRICA  Used for: Peripheral polyneuropathy      Dose: 25 mg  Take 1 capsule (25 mg) by mouth 2 times daily  Quantity: 30 capsule  Refills: 0     solifenacin 10 MG tablet  Commonly known as: VESICARE      Dose: 5-10 mg  Take 5-10 mg by mouth at bedtime  Refills: 0     terbinafine 1 % external cream  Commonly known as: lamISIL      Apply topically 2 times daily.  Refills: 0     tiZANidine 4 MG  tablet  Commonly known as: ZANAFLEX      Dose: 4 mg  Take 4 mg by mouth 3 times daily as needed for muscle spasms  Refills: 0            CONTINUE these medicines which have NOT CHANGED        Dose / Directions   CurcuPlex-95 500 MG Caps  Generic drug: Turmeric      Dose: 1 capsule  Take 1 capsule by mouth daily  Refills: 0     l-lysine HCl 500 MG Tabs tablet      Dose: 2 tablet  Take 2 tablets by mouth daily  Refills: 0     order for DME      Dose: 3 L  [OXYGEN-AIR DELIVERY SYSTEMS MISC] Use 3 L As Directed. 3 lpm with activities and as needed at night  Refills: 0            DISPOSITION: Admit to [facility]/ [facility] [place]    Medical Decision Making    At the time of my evaluation, I do not feel the patient s symptoms are caused by sepsis      I obtained additional history from these independent historians:  EMS  I reviewed these outside records:  [facility] practice clinic note 7/6/2024 where she was seen with slight lower extremity edema possible redness placed on antibiotic for possible cellulitis with ultrasound negative for DVT  I noted these abnormal vital signs / labs:  Intermittent Tachypnea    Monitor Strip Interpretation:  Sinus Rhythm  12-Lead ECG Interpretation:  Sinus rhythm  I independently reviewed the following diagnostic studies:  Chest x-ray: No infiltrate or other abnormality  I spoke to the following clinicians regard the patients care:  Hospitalist  My disposition decision is based on the following reasons:  Admit:        At the conclusion of the encounter I discussed the results of all of the tests and the disposition. The questions were answered. The patient or family acknowledged understanding and was agreeable with the care plan.            INFORMATION SOURCE AND LIMITATIONS    History/Exam limitations: N/A   Patient information was obtained from: Patient's RN, patient   Use of : N/A      REVIEW OF SYSTEMS:   All other systems reviewed and are negative except as noted above in  HPI.    PATIENT HISTORY     Past Medical History:   Diagnosis Date    Allergic rhinitis     Arthritis of back     CHF (congestive heart failure) (H)     Chronic kidney disease     De Quervain's disease (tenosynovitis)     Fibromyalgia, primary     GERD (gastroesophageal reflux disease)     Hematuria     High cholesterol     History of blood clots 09/01/1970    Hyperlipidemia     Hypertension     Low back pain     Osteoporosis     Pickwickian syndrome (H)     Plantar fasciitis     Proteinuria     Proteinuria     Sleep apnea     Uncomplicated asthma      Patient Active Problem List   Diagnosis    Fibromyalgia    Osteoporosis    Obesity    Osteoarthritis    NELLIE (obstructive sleep apnea)    Immunology Studies Nonspecific Abnormal Findings    Allergic Rhinitis    Anemia    Hematuria    Proteinuria    Renal Insufficiency    Synovitis and tenosynovitis    Pseudogout    Chondrocalcinosis    Pain During Urination (Dysuria)    Cervical radiculopathy    Benign essential hypertension    Hyperkalemia    GRACY (acute kidney injury) (H)    Blood loss anemia    Cubital tunnel syndrome, right    Chronic kidney disease, stage 2 (mild)    Hyperlipidemia, unspecified hyperlipidemia type    Other specified hypotension    Sleep apnea    CKD (chronic kidney disease) stage 3, GFR 30-59 ml/min (H)    Hypertension    Diastolic dysfunction    Altered mental status, unspecified altered mental status type    Acute weakness    Adult failure to thrive    Chronic heart failure with preserved ejection fraction (HFpEF) (H)    Elevated troponin    Moderate persistent asthma without complication    Cellulitis of left lower extremity    Fall at home, initial encounter    Urinary tract infection without hematuria, site unspecified    Diverticulitis    Sepsis (H)    CHF (congestive heart failure) (H)    Pulmonary hypertension (H)    Low back pain     Past Surgical History:   Procedure Laterality Date    CERVICAL FUSION N/A 4/27/2017    Procedure: ANTERIOR  CERVICAL DECOMPRESSION FUSION C5-7 BILATERAL;  Surgeon: Basim Barone MD;  Location: [facility];  Service:     CHOLECYSTECTOMY      open    COLONOSCOPY N/A 12/20/2019    Procedure: COLONOSCOPY WITH BIOPSIES;  Surgeon: Lucio Farr MD;  Location: [facility];  Service: Gastroenterology    EYE SURGERY      HAND SURGERY Right     HYSTEROSCOPY DIAGNOSTIC      JOINT REPLACEMENT      bilateral TKA    KNEE SURGERY      RELEASE CARPAL TUNNEL Bilateral        Allergies   Allergen Reactions    Latex Rash     Severe rash    Pregabalin Shortness Of Breath     Other Reaction(s): swelling and shortness of breath    Valsartan Unknown     Hyperkalemia    Ciprofloxacin Visual Disturbance, Hallucination and Confusion     Likely contributed visual hallucination and confusion    Colchicine Diarrhea    Dicloxacillin      Other Reaction(s): confusion, says she has tolerated augmentin without difficulty.     Gabapentin      Other Reaction(s): Sedation    Latex Unknown     Added based on information entered during case entry, please review and add reactions, type, and severity as needed    Other Drug Allergy (See Comments) Muscle Pain (Myalgia)     Tried lovastatin and simvastatin    Other Environmental Allergy Unknown     Coverlet bandaid , sones product; rash    Statins-Hmg-Coa Reductase Inhibitors [Statins] Muscle Pain (Myalgia)     Tried lovastatin and simvastatin    Sulfa Antibiotics Swelling     Facial swelling      Adhesive Tape Rash       OUTPATIENT MEDICATIONS     New Prescriptions    No medications on file      Vitals:    12/20/24 1840 12/20/24 1937 12/20/24 2157 12/20/24 2232   BP:  138/77 (!) 147/76 (!) 156/87   Pulse:  84 78 77   Resp:  27 25 (!) 31   Temp:  98.2  F (36.8  C)     TempSrc:  Oral     SpO2: 96% 100% 98% 97%   Weight:       Height:           Physical Exam   HEENT:    Head: Atraumatic.   Neck: Normal range of motion. Neck supple.   Cardiovascular: Normal rate, regular rhythm and normal  heart sounds.    Pulmonary/Chest: Normal effort  and breath sounds normal.   Abdominal: Soft. Bowel sounds are normal.   Musculoskeletal: Normal range of motion.   Neurological: Somnolent but arousable   oriented to person and place.  Poor historian about recent events but denied headache, chest pain, shortness of breath or initially abdominal pain.  Patient is seen to move all extremities without apparent focal deficit.  [place] Coma Scale (GCS) from [facility] [employer]  on 12/21/2024  RESULT SUMMARY:  14 points  Alexandro Coma Score  E(3) V(5) M(6)    [place] Coma Scale  INPUTS:  Best eye response --> 3 = To verbal command (+3)  Best verbal response --> 5 = Oriented (+5)  Best motor response --> 6 = Obeys commands (+6)   Skin: Skin is warm and dry.  No erythema/cellulitis of skin identified      DIAGNOSTICS    LABORATORY FINDINGS (REVIEWED AND INTERPRETED):  Labs Ordered and Resulted from Time of ED Arrival to Time of ED Departure   BASIC METABOLIC PANEL - Abnormal       Result Value    Sodium 143      Potassium 4.1      Chloride 103      Carbon Dioxide (CO2) 23      Anion Gap 17 (*)     Urea Nitrogen 60.9 (*)     Creatinine 2.13 (*)     GFR Estimate 24 (*)     Calcium 10.4      Glucose 145 (*)    HEPATIC FUNCTION PANEL - Abnormal    Protein Total 7.1      Albumin 4.0      Bilirubin Total 0.4      Alkaline Phosphatase 85      AST 98 (*)     ALT 42      Bilirubin Direct <0.20     TROPONIN T, HIGH SENSITIVITY - Abnormal    Troponin T, High Sensitivity 48 (*)    BLOOD GAS VENOUS - Abnormal    pH Venous 7.33      pCO2 Venous 53 (*)     pO2 Venous 52 (*)     Bicarbonate Venous 28      Base Excess/Deficit Venous 1.4      FIO2 24      Oxyhemoglobin Venous 82 (*)     O2 Sat, Venous 83.2 (*)    NT PROBNP INPATIENT - Abnormal    N terminal Pro BNP Inpatient 2,904 (*)    CRP INFLAMMATION - Abnormal    CRP Inflammation 73.10 (*)    ROUTINE UA WITH MICROSCOPIC REFLEX TO CULTURE - Abnormal    Color Urine Light Yellow      Appearance Urine  Clear      Glucose Urine Negative      Bilirubin Urine Negative      Ketones Urine Negative      Specific Gravity Urine 1.014      Blood Urine 0.2 mg/dL (*)     pH Urine 5.5      Protein Albumin Urine 50 (*)     Urobilinogen Urine <2.0      Nitrite Urine Negative      Leukocyte Esterase Urine 250 Marciano/uL (*)     Bacteria Urine Few (*)     RBC Urine 1      WBC Urine 17 (*)     Squamous Epithelials Urine <1      Hyaline Casts Urine 6 (*)    CBC WITH PLATELETS AND DIFFERENTIAL - Abnormal    WBC Count 11.0      RBC Count 4.02      Hemoglobin 12.7      Hematocrit 39.4      MCV 98      MCH 31.6      MCHC 32.2      RDW 12.6      Platelet Count 250      % Neutrophils 77      % Lymphocytes 10      % Monocytes 13      % Eosinophils 1      % Basophils 0      % Immature Granulocytes 1      NRBCs per 100 WBC 0      Absolute Neutrophils 8.4 (*)     Absolute Lymphocytes 1.0      Absolute Monocytes 1.4 (*)     Absolute Eosinophils 0.1      Absolute Basophils 0.0      Absolute Immature Granulocytes 0.1      Absolute NRBCs 0.0     TROPONIN T, HIGH SENSITIVITY - Abnormal    Troponin T, High Sensitivity 46 (*)    URINE DRUG SCREEN PANEL - Abnormal    Amphetamines Urine Screen Negative      Barbituates Urine Screen Negative      Benzodiazepine Urine Screen Negative      Cannabinoids Urine Screen Negative      Cocaine Urine Screen Negative      Fentanyl Qual Urine Screen Negative      Opiates Urine Screen Positive (*)     PCP Urine Screen Negative     LACTIC ACID WHOLE BLOOD WITH 1X REPEAT IN 2 HR WHEN >2 - Normal    Lactic Acid, Initial 1.0     PROCALCITONIN - Normal    Procalcitonin 0.21     TSH WITH FREE T4 REFLEX - Normal    TSH 1.26     ERYTHROCYTE SEDIMENTATION RATE AUTO - Normal    Erythrocyte Sedimentation Rate 29     ETHYL ALCOHOL LEVEL - Normal    Alcohol ethyl <0.01     URINE CULTURE         IMAGING (REVIEWED AND INTERPRETED):  CT Abdomen Pelvis w/o Contrast   Final Result   IMPRESSION:    1.  No definite etiology for symptoms.  Abundant formed stool throughout colon but no obstruction or inflammatory findings.   2.  Large capacity urinary bladder raises question of potential outlet obstruction or neurogenic state though there is no hydronephrosis.         CT Cervical Spine w/o Contrast   Final Result   IMPRESSION:   HEAD CT:   1.  No acute intracranial process.   2.  Layering debris in the right maxillary sinus. This may represent acute sinusitis in the appropriate clinical setting.      CERVICAL SPINE CT:   1.  No CT evidence for acute fracture or post traumatic subluxation.   2.  No significant change in instrumented anterior spinal fusion C5-C7 with ventral migration of the plate and screws.      CT Head w/o Contrast   Final Result   IMPRESSION:   HEAD CT:   1.  No acute intracranial process.   2.  Layering debris in the right maxillary sinus. This may represent acute sinusitis in the appropriate clinical setting.      CERVICAL SPINE CT:   1.  No CT evidence for acute fracture or post traumatic subluxation.   2.  No significant change in instrumented anterior spinal fusion C5-C7 with ventral migration of the plate and screws.      XR Chest Port 1 View   Final Result   IMPRESSION:       Shallow lung inflation which accentuates the heart and mediastinum. Mildly enlarged cardiac silhouette. Mild atheromatous calcifications in aortic arch.      Angular foci of atelectasis are present in the left base near the diaphragm and the inferior to the right hilum. The mid and lateral thirds of the lungs are clear. Diaphragm curvature is preserved. No visible pleural fluid. No pneumothorax.      Prior ACDF. There are deformities of both humeral heads with subchondral sclerosis and flattening representing advanced osteoarthrosis or possibly old AVN.      MR Brain w/o Contrast    (Results Pending)   MR Lumbar Spine w/o & w Contrast    (Results Pending)         ECG (REVIEWED AND INTERPRETED):   ECG:   Performed at: 01:16  HR:  86 bpm  Rhythm:  Sinus  Axis: -67  QRS duration: 104 ms  QTC: 459 ms  ST changes: No ST segment elevation or depression, no T wave inversion,No Q wave  Interpretation: Sinus rhythm  Compared to most recent ECG from: December 18, 2023 no acute change    I have reviewed the patient's ECG, with comments made as listed above. Please see scanned image for full interpretation.       I, Jonathan Samuels, am serving as a scribe to document services personally performed by Henri Keith DO based on my observation and the provider's statements to me. I, Henri Keith DO, attest that Jonathan Samuels is acting in a scribe capacity, has observed my performance of the services and has documented them in accordance with my direction.      Henri Keith D.O.  EMERGENCY MEDICINE   12/20/24  [facility] EMERGENCY DEPARTMENT  [address] [place] [id number]  [phone]  Dept: [phone]     Henri Keith DO  12/21/24 0125

## 2024-12-20 NOTE — ED TRIAGE NOTES
Pt arrives via EMS from home with c/o weakness for the past week. Pt had a fall out of her chair, slid down and fell to the ground. Did not hit head, no thinners.  When pt arrives, pt had an incontinent episode on blankets, urine noted to have a foul odor. VSS ex tachypneic and O2 sats in 80s on RA, is on 4L via NC      Triage Assessment (Adult)       Row Name 12/20/24 0877          Triage Assessment    Airway WDL WDL        Respiratory WDL    Respiratory WDL X;rhythm/pattern     Rhythm/Pattern, Respiratory tachypneic        Skin Circulation/Temperature WDL    Skin Circulation/Temperature WDL X        Cardiac WDL    Cardiac WDL WDL        Peripheral/Neurovascular WDL    Peripheral Neurovascular WDL X        Cognitive/Neuro/Behavioral WDL    Cognitive/Neuro/Behavioral WDL WDL

## 2024-12-20 NOTE — ED NOTES
Bed: JNED-09  Expected date:   Expected time:   Means of arrival: Ambulance  Comments:  Padmini GOEL

## 2024-12-21 ENCOUNTER — APPOINTMENT (OUTPATIENT)
Dept: OCCUPATIONAL THERAPY | Facility: HOSPITAL | Age: 74
DRG: 064 | End: 2024-12-21
Attending: INTERNAL MEDICINE
Payer: MEDICARE

## 2024-12-21 ENCOUNTER — APPOINTMENT (OUTPATIENT)
Dept: ULTRASOUND IMAGING | Facility: HOSPITAL | Age: 74
DRG: 064 | End: 2024-12-21
Attending: PSYCHIATRY & NEUROLOGY
Payer: MEDICARE

## 2024-12-21 ENCOUNTER — APPOINTMENT (OUTPATIENT)
Dept: MRI IMAGING | Facility: HOSPITAL | Age: 74
DRG: 064 | End: 2024-12-21
Attending: STUDENT IN AN ORGANIZED HEALTH CARE EDUCATION/TRAINING PROGRAM
Payer: MEDICARE

## 2024-12-21 LAB
ALBUMIN SERPL BCG-MCNC: 3.4 G/DL (ref 3.5–5.2)
ALP SERPL-CCNC: 75 U/L (ref 40–150)
ALT SERPL W P-5'-P-CCNC: 40 U/L (ref 0–50)
ANION GAP SERPL CALCULATED.3IONS-SCNC: 13 MMOL/L (ref 7–15)
AST SERPL W P-5'-P-CCNC: 88 U/L (ref 0–45)
BASOPHILS # BLD AUTO: 0 10E3/UL (ref 0–0.2)
BASOPHILS NFR BLD AUTO: 0 %
BILIRUB SERPL-MCNC: 0.3 MG/DL
BUN SERPL-MCNC: 50.4 MG/DL (ref 8–23)
CALCIUM SERPL-MCNC: 9.4 MG/DL (ref 8.8–10.4)
CHLORIDE SERPL-SCNC: 108 MMOL/L (ref 98–107)
CHOLEST SERPL-MCNC: 143 MG/DL
CREAT SERPL-MCNC: 1.67 MG/DL (ref 0.51–0.95)
EGFRCR SERPLBLD CKD-EPI 2021: 32 ML/MIN/1.73M2
EOSINOPHIL # BLD AUTO: 0.1 10E3/UL (ref 0–0.7)
EOSINOPHIL NFR BLD AUTO: 2 %
ERYTHROCYTE [DISTWIDTH] IN BLOOD BY AUTOMATED COUNT: 12.8 % (ref 10–15)
EST. AVERAGE GLUCOSE BLD GHB EST-MCNC: 126 MG/DL
GLUCOSE BLDC GLUCOMTR-MCNC: 114 MG/DL (ref 70–99)
GLUCOSE BLDC GLUCOMTR-MCNC: 119 MG/DL (ref 70–99)
GLUCOSE BLDC GLUCOMTR-MCNC: 155 MG/DL (ref 70–99)
GLUCOSE SERPL-MCNC: 119 MG/DL (ref 70–99)
HBA1C MFR BLD: 6 %
HCO3 SERPL-SCNC: 25 MMOL/L (ref 22–29)
HCT VFR BLD AUTO: 37.3 % (ref 35–47)
HDLC SERPL-MCNC: 56 MG/DL
HGB BLD-MCNC: 11.6 G/DL (ref 11.7–15.7)
IMM GRANULOCYTES # BLD: 0 10E3/UL
IMM GRANULOCYTES NFR BLD: 1 %
LDLC SERPL CALC-MCNC: 69 MG/DL
LYMPHOCYTES # BLD AUTO: 0.9 10E3/UL (ref 0.8–5.3)
LYMPHOCYTES NFR BLD AUTO: 13 %
MCH RBC QN AUTO: 31.1 PG (ref 26.5–33)
MCHC RBC AUTO-ENTMCNC: 31.1 G/DL (ref 31.5–36.5)
MCV RBC AUTO: 100 FL (ref 78–100)
MONOCYTES # BLD AUTO: 0.8 10E3/UL (ref 0–1.3)
MONOCYTES NFR BLD AUTO: 11 %
NEUTROPHILS # BLD AUTO: 5.5 10E3/UL (ref 1.6–8.3)
NEUTROPHILS NFR BLD AUTO: 74 %
NONHDLC SERPL-MCNC: 87 MG/DL
NRBC # BLD AUTO: 0 10E3/UL
NRBC BLD AUTO-RTO: 0 /100
PLATELET # BLD AUTO: 202 10E3/UL (ref 150–450)
POTASSIUM SERPL-SCNC: 4.1 MMOL/L (ref 3.4–5.3)
PROT SERPL-MCNC: 6.1 G/DL (ref 6.4–8.3)
RBC # BLD AUTO: 3.73 10E6/UL (ref 3.8–5.2)
SODIUM SERPL-SCNC: 146 MMOL/L (ref 135–145)
TRIGL SERPL-MCNC: 91 MG/DL
VIT B12 SERPL-MCNC: 447 PG/ML (ref 232–1245)
WBC # BLD AUTO: 7.5 10E3/UL (ref 4–11)

## 2024-12-21 PROCEDURE — 250N000011 HC RX IP 250 OP 636: Performed by: STUDENT IN AN ORGANIZED HEALTH CARE EDUCATION/TRAINING PROGRAM

## 2024-12-21 PROCEDURE — 97165 OT EVAL LOW COMPLEX 30 MIN: CPT | Mod: GO

## 2024-12-21 PROCEDURE — 85004 AUTOMATED DIFF WBC COUNT: CPT | Performed by: INTERNAL MEDICINE

## 2024-12-21 PROCEDURE — 258N000003 HC RX IP 258 OP 636: Performed by: INTERNAL MEDICINE

## 2024-12-21 PROCEDURE — 82310 ASSAY OF CALCIUM: CPT | Performed by: INTERNAL MEDICINE

## 2024-12-21 PROCEDURE — 70547 MR ANGIOGRAPHY NECK W/O DYE: CPT | Mod: MA

## 2024-12-21 PROCEDURE — 250N000013 HC RX MED GY IP 250 OP 250 PS 637: Performed by: INTERNAL MEDICINE

## 2024-12-21 PROCEDURE — 99223 1ST HOSP IP/OBS HIGH 75: CPT | Performed by: PSYCHIATRY & NEUROLOGY

## 2024-12-21 PROCEDURE — 250N000013 HC RX MED GY IP 250 OP 250 PS 637: Performed by: STUDENT IN AN ORGANIZED HEALTH CARE EDUCATION/TRAINING PROGRAM

## 2024-12-21 PROCEDURE — 255N000002 HC RX 255 OP 636: Performed by: EMERGENCY MEDICINE

## 2024-12-21 PROCEDURE — 85048 AUTOMATED LEUKOCYTE COUNT: CPT | Performed by: INTERNAL MEDICINE

## 2024-12-21 PROCEDURE — 250N000013 HC RX MED GY IP 250 OP 250 PS 637: Performed by: PSYCHIATRY & NEUROLOGY

## 2024-12-21 PROCEDURE — 97535 SELF CARE MNGMENT TRAINING: CPT | Mod: GO

## 2024-12-21 PROCEDURE — 82565 ASSAY OF CREATININE: CPT | Performed by: INTERNAL MEDICINE

## 2024-12-21 PROCEDURE — 82962 GLUCOSE BLOOD TEST: CPT

## 2024-12-21 PROCEDURE — 99232 SBSQ HOSP IP/OBS MODERATE 35: CPT | Performed by: STUDENT IN AN ORGANIZED HEALTH CARE EDUCATION/TRAINING PROGRAM

## 2024-12-21 PROCEDURE — 120N000001 HC R&B MED SURG/OB

## 2024-12-21 PROCEDURE — 999N000226 HC STATISTIC SLP IP EVAL DEFER

## 2024-12-21 PROCEDURE — 258N000003 HC RX IP 258 OP 636: Performed by: STUDENT IN AN ORGANIZED HEALTH CARE EDUCATION/TRAINING PROGRAM

## 2024-12-21 PROCEDURE — 93880 EXTRACRANIAL BILAT STUDY: CPT

## 2024-12-21 PROCEDURE — A9585 GADOBUTROL INJECTION: HCPCS | Performed by: EMERGENCY MEDICINE

## 2024-12-21 PROCEDURE — 36415 COLL VENOUS BLD VENIPUNCTURE: CPT | Performed by: INTERNAL MEDICINE

## 2024-12-21 PROCEDURE — 70544 MR ANGIOGRAPHY HEAD W/O DYE: CPT | Mod: MA

## 2024-12-21 PROCEDURE — 82607 VITAMIN B-12: CPT | Performed by: STUDENT IN AN ORGANIZED HEALTH CARE EDUCATION/TRAINING PROGRAM

## 2024-12-21 RX ORDER — AZELASTINE HYDROCHLORIDE 0.5 MG/ML
1 SOLUTION/ DROPS OPHTHALMIC 2 TIMES DAILY PRN
Status: DISCONTINUED | OUTPATIENT
Start: 2024-12-21 | End: 2024-12-24 | Stop reason: HOSPADM

## 2024-12-21 RX ORDER — LIDOCAINE 40 MG/G
CREAM TOPICAL
Status: DISCONTINUED | OUTPATIENT
Start: 2024-12-21 | End: 2024-12-24 | Stop reason: HOSPADM

## 2024-12-21 RX ORDER — HYDROXYCHLOROQUINE SULFATE 200 MG
100 TABLET ORAL 2 TIMES DAILY
Status: DISCONTINUED | OUTPATIENT
Start: 2024-12-21 | End: 2024-12-24 | Stop reason: HOSPADM

## 2024-12-21 RX ORDER — HYDROCODONE BITARTRATE AND ACETAMINOPHEN 5; 325 MG/1; MG/1
1 TABLET ORAL EVERY 4 HOURS PRN
Status: DISCONTINUED | OUTPATIENT
Start: 2024-12-21 | End: 2024-12-24 | Stop reason: HOSPADM

## 2024-12-21 RX ORDER — METOPROLOL SUCCINATE 50 MG/1
50 TABLET, EXTENDED RELEASE ORAL DAILY
Status: DISCONTINUED | OUTPATIENT
Start: 2024-12-21 | End: 2024-12-22

## 2024-12-21 RX ORDER — MAGNESIUM OXIDE 400 MG/1
400 TABLET ORAL DAILY
Status: DISCONTINUED | OUTPATIENT
Start: 2024-12-21 | End: 2024-12-24 | Stop reason: HOSPADM

## 2024-12-21 RX ORDER — DEXTROSE MONOHYDRATE 25 G/50ML
25-50 INJECTION, SOLUTION INTRAVENOUS
Status: DISCONTINUED | OUTPATIENT
Start: 2024-12-21 | End: 2024-12-24 | Stop reason: HOSPADM

## 2024-12-21 RX ORDER — ROSUVASTATIN CALCIUM 10 MG/1
10 TABLET, COATED ORAL DAILY
Status: DISCONTINUED | OUTPATIENT
Start: 2024-12-21 | End: 2024-12-24 | Stop reason: HOSPADM

## 2024-12-21 RX ORDER — FUROSEMIDE 20 MG/1
40 TABLET ORAL EVERY MORNING
Status: DISCONTINUED | OUTPATIENT
Start: 2024-12-21 | End: 2024-12-24 | Stop reason: HOSPADM

## 2024-12-21 RX ORDER — PRENATAL VIT 91/IRON/FOLIC/DHA 28-975-200
COMBINATION PACKAGE (EA) ORAL 2 TIMES DAILY
Status: DISCONTINUED | OUTPATIENT
Start: 2024-12-21 | End: 2024-12-24 | Stop reason: HOSPADM

## 2024-12-21 RX ORDER — MONTELUKAST SODIUM 10 MG/1
10 TABLET ORAL EVERY EVENING
Status: DISCONTINUED | OUTPATIENT
Start: 2024-12-21 | End: 2024-12-24 | Stop reason: HOSPADM

## 2024-12-21 RX ORDER — GADOBUTROL 604.72 MG/ML
10 INJECTION INTRAVENOUS ONCE
Status: COMPLETED | OUTPATIENT
Start: 2024-12-21 | End: 2024-12-21

## 2024-12-21 RX ORDER — FLUTICASONE PROPIONATE 50 MCG
1 SPRAY, SUSPENSION (ML) NASAL DAILY
Status: DISCONTINUED | OUTPATIENT
Start: 2024-12-21 | End: 2024-12-24 | Stop reason: HOSPADM

## 2024-12-21 RX ORDER — PREGABALIN 25 MG/1
25 CAPSULE ORAL 2 TIMES DAILY
Status: DISCONTINUED | OUTPATIENT
Start: 2024-12-21 | End: 2024-12-21

## 2024-12-21 RX ORDER — FLUTICASONE FUROATE AND VILANTEROL 100; 25 UG/1; UG/1
1 POWDER RESPIRATORY (INHALATION) DAILY
Status: DISCONTINUED | OUTPATIENT
Start: 2024-12-21 | End: 2024-12-24 | Stop reason: HOSPADM

## 2024-12-21 RX ORDER — ALBUTEROL SULFATE 90 UG/1
1 INHALANT RESPIRATORY (INHALATION) EVERY 4 HOURS PRN
Status: DISCONTINUED | OUTPATIENT
Start: 2024-12-21 | End: 2024-12-24 | Stop reason: HOSPADM

## 2024-12-21 RX ORDER — TOLTERODINE 2 MG/1
4 CAPSULE, EXTENDED RELEASE ORAL DAILY
Status: DISCONTINUED | OUTPATIENT
Start: 2024-12-21 | End: 2024-12-24 | Stop reason: HOSPADM

## 2024-12-21 RX ORDER — ACETAMINOPHEN 500 MG
500 TABLET ORAL EVERY 6 HOURS PRN
Status: DISCONTINUED | OUTPATIENT
Start: 2024-12-21 | End: 2024-12-24 | Stop reason: HOSPADM

## 2024-12-21 RX ORDER — CLOPIDOGREL BISULFATE 75 MG/1
75 TABLET ORAL DAILY
Status: DISCONTINUED | OUTPATIENT
Start: 2024-12-21 | End: 2024-12-24 | Stop reason: HOSPADM

## 2024-12-21 RX ORDER — NICOTINE POLACRILEX 4 MG
15-30 LOZENGE BUCCAL
Status: DISCONTINUED | OUTPATIENT
Start: 2024-12-21 | End: 2024-12-24 | Stop reason: HOSPADM

## 2024-12-21 RX ORDER — SODIUM CHLORIDE 9 MG/ML
INJECTION, SOLUTION INTRAVENOUS CONTINUOUS
Status: DISCONTINUED | OUTPATIENT
Start: 2024-12-21 | End: 2024-12-21

## 2024-12-21 RX ORDER — ASPIRIN 81 MG/1
81 TABLET, CHEWABLE ORAL DAILY
Status: DISCONTINUED | OUTPATIENT
Start: 2024-12-21 | End: 2024-12-24 | Stop reason: HOSPADM

## 2024-12-21 RX ORDER — CETIRIZINE HYDROCHLORIDE 10 MG/1
10 TABLET ORAL DAILY
Status: DISCONTINUED | OUTPATIENT
Start: 2024-12-21 | End: 2024-12-24 | Stop reason: HOSPADM

## 2024-12-21 RX ADMIN — HYDROCODONE BITARTRATE AND ACETAMINOPHEN 1 TABLET: 5; 325 TABLET ORAL at 22:03

## 2024-12-21 RX ADMIN — ASPIRIN 81 MG CHEWABLE TABLET 81 MG: 81 TABLET CHEWABLE at 11:35

## 2024-12-21 RX ADMIN — HYDROCODONE BITARTRATE AND ACETAMINOPHEN 1 TABLET: 5; 325 TABLET ORAL at 14:59

## 2024-12-21 RX ADMIN — SODIUM CHLORIDE: 9 INJECTION, SOLUTION INTRAVENOUS at 14:28

## 2024-12-21 RX ADMIN — SENNOSIDES AND DOCUSATE SODIUM 2 TABLET: 50; 8.6 TABLET ORAL at 15:00

## 2024-12-21 RX ADMIN — ROSUVASTATIN 10 MG: 10 TABLET, FILM COATED ORAL at 09:04

## 2024-12-21 RX ADMIN — TERBINAFINE HYDROCHLORIDE: 1 CREAM TOPICAL at 09:07

## 2024-12-21 RX ADMIN — SENNOSIDES AND DOCUSATE SODIUM 2 TABLET: 50; 8.6 TABLET ORAL at 22:55

## 2024-12-21 RX ADMIN — CETIRIZINE HYDROCHLORIDE 10 MG: 10 TABLET, FILM COATED ORAL at 10:52

## 2024-12-21 RX ADMIN — TOLTERODINE 4 MG: 2 CAPSULE, EXTENDED RELEASE ORAL at 09:05

## 2024-12-21 RX ADMIN — MAGNESIUM OXIDE TAB 400 MG (241.3 MG ELEMENTAL MG) 400 MG: 400 (241.3 MG) TAB at 09:04

## 2024-12-21 RX ADMIN — CEFTRIAXONE SODIUM 2 G: 2 INJECTION, POWDER, FOR SOLUTION INTRAMUSCULAR; INTRAVENOUS at 22:04

## 2024-12-21 RX ADMIN — GADOBUTROL 10 ML: 604.72 INJECTION INTRAVENOUS at 00:54

## 2024-12-21 RX ADMIN — HYDROXYCHLOROQUINE SULFATE 100 MG: 200 TABLET, FILM COATED ORAL at 09:05

## 2024-12-21 RX ADMIN — HYDROXYCHLOROQUINE SULFATE 100 MG: 200 TABLET, FILM COATED ORAL at 22:04

## 2024-12-21 RX ADMIN — MODAFINIL 250 MG: 100 TABLET ORAL at 09:05

## 2024-12-21 RX ADMIN — FUROSEMIDE 40 MG: 20 TABLET ORAL at 09:04

## 2024-12-21 RX ADMIN — FLUTICASONE FUROATE AND VILANTEROL TRIFENATATE 1 PUFF: 100; 25 POWDER RESPIRATORY (INHALATION) at 09:07

## 2024-12-21 RX ADMIN — CLOPIDOGREL BISULFATE 75 MG: 75 TABLET ORAL at 11:35

## 2024-12-21 RX ADMIN — MONTELUKAST 10 MG: 10 TABLET, FILM COATED ORAL at 22:03

## 2024-12-21 RX ADMIN — TERBINAFINE HYDROCHLORIDE: 1 CREAM TOPICAL at 22:05

## 2024-12-21 RX ADMIN — TIZANIDINE 4 MG: 4 TABLET ORAL at 15:00

## 2024-12-21 RX ADMIN — MICONAZOLE NITRATE ANTIFUNGAL POWDER: 2 POWDER TOPICAL at 22:04

## 2024-12-21 RX ADMIN — MICONAZOLE NITRATE ANTIFUNGAL POWDER: 2 POWDER TOPICAL at 09:09

## 2024-12-21 RX ADMIN — SODIUM CHLORIDE: 9 INJECTION, SOLUTION INTRAVENOUS at 02:10

## 2024-12-21 ASSESSMENT — ACTIVITIES OF DAILY LIVING (ADL)
ADLS_ACUITY_SCORE: 65
DEPENDENT_IADLS:: CLEANING;COOKING;LAUNDRY;SHOPPING;TRANSPORTATION
ADLS_ACUITY_SCORE: 65
ADLS_ACUITY_SCORE: 63
ADLS_ACUITY_SCORE: 65

## 2024-12-21 NOTE — CONSULTS
[facility]    Stroke Telephone Note    I was called by Jennifer Gauthier on 12/21/24 regarding patient Desirae Rey. The patient is a 74 year old female with hx of Pul HTN, CHF , CKD, HTN, HLD, NELLIE, asthma being admitted for UTI. She had a fall yesterday night and multiple falls over the pasts week. Ongoing encephalopathy now.     Vitals  BP: (!) 166/80   Pulse: 80   Resp: 23   Temp: 98.2  F (36.8  C)   Weight: 90.7 kg (200 lb)    Imaging Findings  MRI brain - R cerebellar stroke    Impression  Stroke  UTI  encephalopathy    Recommendations  MRA head and neck wo contrast.   ECHO, A1C, lipid panel  Permissive HTN , to treat if Sbp > 210 and Dbp > 110.   ASA 81 mg  Plavix 300 mg load and followed by 75 mg maintenance  Atorvastatin 40 mg  PT/OT/ST  UTI abx as per primary team.   Labs: TSH, vit b12, folate, magnesium level.  Correction of any metabolic and septic parameters as deemed appropriate per the primary team. Correction of these parameters will further improve patients' mentation and encephalopathy.        My recommendations are based on the information provided over the phone by Desirae Rey's in-person providers. They are not intended to replace the clinical judgment of her in-person providers. I was not requested to personally see or examine the patient at this time.     Nghia Peterson MD

## 2024-12-21 NOTE — PROGRESS NOTES
Stroke Education Note    The following information was reviewed with patient:    - Activation of emergency medical system (when to call 911)    - Individualized risk factors for stroke:      high blood pressure     high cholesterol     diabetes     overweight     CAD     diet     physical inactivity     sleep apnea    - Warning signs and symptoms of stroke:   B = Balance loss   E = Eyesight changes   F = Facial droop or numbness   A = Arm or leg weakness   S = Speech difficulty, slurred speech   T = Time to call 911 for help    Written stroke educational materials given:   - Learning about BE FAST: Stroke Warning Signs and Learning about Risk Factors for Stroke (Healthwise)   - Understanding Stroke: Key Resources After a Stroke (FOD #474756)    Learner's response to education:     need to change     JAYSON ORTIZ RN

## 2024-12-21 NOTE — MEDICATION SCRIBE - ADMISSION MEDICATION HISTORY
Medication Scribe Admission Medication History    Admission medication history is complete. The information provided in this note is only as accurate as the sources available at the time of the update.    Information Source(s): Family member and CareEverywhere/SureScripts via phone    Pertinent Information: Per patient's daughter Tiny, patient did not take any medication today and not sure when the last time patient took her medication but over herd that her  (daughter's ) stating that patient did receives her medications last night,  Tiny states that either herself or patient's spouse knows what patient is currently takes and believes that patient's med list on [facility] is up to date.  Per [facility] patient's med list, Docusate, Terbinafine, Fluticasone-Salmeterol and Naloxone injection appear with no fill history and Fosamax 70 mg is listed twice one with once a week and another one once daily with first meal.  All PTA med list on this record updated based on sure script  fill history and care everywhere [facility] record.      - pregabalin deleted due to allergy listed on patient's profile    Changes made to PTA medication list:  Added: Terbinafine, Rosuvastatin, Docusate, Fluticasone-Salmeterol, Naloxone (per sure script and care everywhere )  Deleted: Senna-Docusate, Valacyclovir, Omeprazole, Ondansetron (per care everywhere), pregabalin   Changed: None    Allergies reviewed with patient and updates made in EHR: yes    Medication History Completed By: Serena Galarza 12/20/2024 10:35 PM    PTA Med List   Medication Sig Last Dose/Taking    acetaminophen (TYLENOL) 500 MG tablet Take 500 mg by mouth every 6 hours as needed for mild pain Unknown    albuterol (PROAIR HFA;PROVENTIL HFA;VENTOLIN HFA) 90 mcg/actuation inhaler Inhale 1 puff into the lungs every 4 hours as needed for shortness of breath / dyspnea Unknown    alendronate (FOSAMAX) 70 MG tablet [ALENDRONATE (FOSAMAX) 70  MG TABLET] Take 70 mg by mouth every 7 days. Takes on Mondays Unknown    aspirin 81 MG EC tablet 1 tablet Orally Once a day Unknown    azelastine (OPTIVAR) 0.05 % ophthalmic solution Apply 1 drop to eye 2 times daily as needed Unknown    calcium citrate (CITRACAL) 950 (200 Ca) MG tablet Take 1 tablet by mouth daily Unknown    cetirizine (ZYRTEC) 10 MG tablet [CETIRIZINE (ZYRTEC) 10 MG TABLET] Take 10 mg by mouth daily.  Unknown    cholecalciferol (VITAMIN D3) 25 mcg (1000 units) capsule Take 125 mcg by mouth every morning Take 5000 units in the morning and 2000 units in the evening Unknown    CRESTOR 10 MG tablet Take 10 mg by mouth daily. Unknown    docusate sodium (COLACE) 100 MG capsule Take 1-3 capsules by mouth daily as needed for constipation. Unknown    DULoxetine (CYMBALTA) 30 MG capsule Take 1 capsule (30 mg) by mouth 2 times daily Unknown    fluticasone-salmeterol (AIRDUO RESPICLICK) 113-14 MCG/ACT inhaler Inhale 1 puff into the lungs 2 times daily. Unknown    furosemide (LASIX) 20 MG tablet Take 2 tablets (40 mg) by mouth every morning Unknown    HYDROcodone-acetaminophen (NORCO) 5-325 MG tablet Take 1 tablet by mouth every 4 hours as needed for pain Max 6 tablets per day. Unknown    Hydroxychloroquine Sulfate 100 MG TABS Take 100 mg by mouth 2 times daily Unknown    l-lysine HCl 500 MG TABS tablet Take 2 tablets by mouth daily Unknown    magnesium oxide 250 mg Tab Take 500 mg by mouth daily Unknown    metoprolol succinate ER (TOPROL XL) 50 MG 24 hr tablet Take 50 mg by mouth daily Unknown    modafinil (PROVIGIL) 100 MG tablet Take 2.5 tablets (250 mg) by mouth daily Take two and half tablet (250 mg ) daily Unknown    montelukast (SINGULAIR) 10 mg tablet Take 10 mg by mouth every evening Unknown    Naloxone HCl 5 MG/0.5ML SOSY Inject 5 mg as directed as needed. Unknown    nystatin (MYCOSTATIN) 074535 UNIT/GM external powder Apply topically 2 times daily Unknown    OXYGEN-AIR DELIVERY SYSTEMS [facility]  [OXYGEN-AIR DELIVERY SYSTEMS [facility]] Use 3 L As Directed. 3 lpm with activities and as needed at night Unknown    solifenacin (VESICARE) 10 MG tablet Take 5-10 mg by mouth at bedtime Unknown    terbinafine (LAMISIL) 1 % external cream Apply topically 2 times daily. Unknown    tiZANidine (ZANAFLEX) 4 MG tablet Take 4 mg by mouth 3 times daily as needed for muscle spasms Unknown    Turmeric (CURCUPLEX-95) 500 MG CAPS Take 1 capsule by mouth daily Unknown

## 2024-12-21 NOTE — PLAN OF CARE
Problem: Stroke, Ischemic (Includes Transient Ischemic Attack)  Goal: Optimal Cognitive Function  Outcome: Progressing     Problem: Stroke, Ischemic (Includes Transient Ischemic Attack)  Goal: Effective Oxygenation and Ventilation  Outcome: Progressing     Problem: Stroke, Ischemic (Includes Transient Ischemic Attack)  Goal: Safe and Effective Swallow  Outcome: Progressing     Problem: Stroke, Ischemic (Includes Transient Ischemic Attack)  Goal: Effective Urinary Elimination  Outcome: Progressing     Problem: UTI (Urinary Tract Infection)  Goal: Improved Infection Symptoms  Outcome: Progressing     Problem: Comorbidity Management  Goal: Maintenance of Heart Failure Symptom Control  Outcome: Progressing     Problem: Fatigue  Goal: Improved Activity Tolerance  Outcome: Progressing   Goal Outcome Evaluation:    Alert and oriented x4, able to make needs known. SBP in the 140s to 150s range. Asleep with 2L oxygen through nasal canula. Low back pain tolerable with repositioning and slight elevation of HOB. Drowsy but able to arouse to voice and follows command. NIHSS score of 8. Slight R sided facial droop when smiling/showing teeth, RUE drift and unable to lift both lower legs up. Endorsed chronic bilateral lower leg numbness/tingling for about 1 year now. Passed dysphagia screen. NPO except meds. Neurosurgery and neurology consult.   Rash noted under both breasts and abdominal folds. Powder applied. R and L upper back excoriation from fall at home- mepilex applied.  Rebollar patent and draining yellow urine. Received iv antibiotics for UTI. Urine culture pending. Assist of 2 with bed mobility.   Additional MRI tests pending due to contrast use with previous tests. Possible neurosurgery today after MRI   Normal sinus rhythm on cardiac monitor.     Patient updated with plan of care.

## 2024-12-21 NOTE — PROGRESS NOTES
"CPAP ordered for patient as she didn't bring her unit from home. Patient, however, declined hosp CPAP--stating that she'll have family bring in her device from home today. In the meantime, patient using 02. 12/21/24 0229   Mode: CPAP/ BiPAP/ AVAPS/ AVAPS AE   CPAP/BiPAP/ AVAPS/ AVAPS AE Mode (S)  CPAP  (Patient's device not here. Declined hosp CPAP. Plans to have family bring in her unit from on 12/21)       BP (!) 153/76   Pulse 80   Temp 98.2  F (36.8  C) (Oral)   Resp 28   Ht 1.397 m (4' 7\")   Wt 90.7 kg (200 lb)   SpO2 100%   BMI 46.48 kg/m       Brendon Jenkins, RRT  "

## 2024-12-21 NOTE — PLAN OF CARE
Goal Outcome Evaluation:      Plan of Care Reviewed With: patient          Outcome Evaluation: Final discharge plans to be determined pending medical progression and PT/OT recommendations.

## 2024-12-21 NOTE — PROGRESS NOTES
SLP orders received. Chart reviewed and discussed with care team.? Speech-Language Pathology Evaluations not indicated due to no reported or resolved deficits related to speech, language, or cognition. Patient was tolerating current diet of Regular and Thin with no s/s aspiration per RN. She is now NPO for possible surgery. No acute SLP needs identified.? Defer discharge recommendations to treatment team.? Will complete orders. Please re-consult if status changes.

## 2024-12-21 NOTE — PROGRESS NOTES
[facility]    Medicine Progress Note - Hospitalist Service    Date of Admission:  12/20/2024    Assessment & Plan   Desirae Rey is a 74 year old female With a medical history significant for CKD stage III, CHF, history of blood clots, hypertension, hyperlipidemia, chronic pain syndrome, obstructive sleep apnea, asthma and other medical comorbidities who is admitted with change in mental status thought to be multifactorial from medication sedation and possible acute on chronic renal failure.    #Acute Ischemic Cerebellar Stroke  - Patient presented with altered mental status.  - MRI Brain showing acute/subacute stroke in right cerebellum.  Suspect stroke to be lacunar in nature per TOAST criteria - related to vascular risk factors.   - Carotid US 12/21 with less than 50% stenosis bilaterally.  Plan  - Neurology consulted, appreciate recommendations  - Waiting for MRA Brain/Neck  - Echo TTE  - Cardiac Monitor  - Started aspirin 81mg daily  - Started Plavix 75mg daily  - Continue home rosuvastatin 10mg daily  - PT/OT    #Severe Lumbar Spinal Stenosis  - Patient with acute non-focal lower extremities weakness in ED.  - MRI L-spine showing severe stenosis at L2-3, L3-4, and L4-5.  - No evidence of cauda equina syndrome on MRI.  Plan  - Neurosurgery consulted  - Patient is not interested in surgical intervention at this time.  She prefers to continue with nonsurgical treatment.  Therefore, further imaging of the spine does not seem to be necessary from a neurosurgical standpoint.   - Follow-up with the [facility]     #Urinary Tract Infection  Ceftriaxone was initiated in the ER.  Plan:  Continue ceftriaxone.  Follow up on urine culture results to tailor antibiotic therapy.  Acute on Chronic Renal Failure:    #Acute Kidney Injury  #Suspected Obstructive Uropathy  #intermittent Urinary Incontinence  - Distended bladder noted on CT, possibly neurogenic in nature.  - No cauda equina  "syndrome on MRI L-spine.  Plan:  - Maintain Rebollar catheter and monitor urine output.  - Hold urology consultation for now unless symptoms worsen or change.    Congestive Heart Failure (CHF):  Stable condition.  Plan:  Hold aspirin.  Resume beta-blocker and statin therapy.  Monitor for signs of CHF exacerbation.    Hypertension:  Plan:  - Hold metoprolol.  - Continue Lasix temporarily unless signs of fluid overload occur.      Pulmonary Hypertension:  Plan:  Use pulse oximetry to maintain oxygen saturation above 94%.  Monitor for respiratory complications.              Diet: NPO for Medical/Clinical Reasons Except for: Meds    DVT Prophylaxis: Pneumatic Compression Devices and holding pending surgical evaluation.  Rebollar Catheter: PRESENT, indication: Acute retention or obstruction  Lines: None     Cardiac Monitoring: ACTIVE order. Indication: Stroke, acute (48 hours)  Code Status: Full Code      Clinically Significant Risk Factors Present on Admission         # Hypernatremia: Highest Na = 146 mmol/L in last 2 days, will monitor as appropriate  # Hyperchloremia: Highest Cl = 108 mmol/L in last 2 days, will monitor as appropriate          # Hypoalbuminemia: Lowest albumin = 3.4 g/dL at 12/21/2024  5:45 AM, will monitor as appropriate   # Drug Induced Platelet Defect: home medication list includes an antiplatelet medication   # Hypertension: Noted on problem list  # Chronic heart failure with preserved ejection fraction: heart failure noted on problem list and last echo with EF >50%          # Severe Obesity: Estimated body mass index is 46.48 kg/m  as calculated from the following:    Height as of this encounter: 1.397 m (4' 7\").    Weight as of this encounter: 90.7 kg (200 lb).       # Financial/Environmental Concerns:    # Asthma: noted on problem list        Social Drivers of Health     Received from [employer] [facility], [employer] [facility] [employer] [facility]    Social Connections    "       Disposition Plan     Medically Ready for Discharge: Anticipated in 2-4 Days             Hai Li MD  Hospitalist Service  [facility]  Securely message with [employer] (more info)  Text page via [employer] Paging/Directory   ______________________________________________________________________    Interval History   - Patient continues to have weakness in arms and legs    Physical Exam   Vital Signs: Temp: 98.2  F (36.8  C) Temp src: Oral BP: (!) 147/77 Pulse: 79   Resp: 26 SpO2: 94 % O2 Device: Oxymask Oxygen Delivery: 2 LPM  Weight: 200 lbs 0 oz    General: Alert and Oriented x3. Answers questions appropriately. Follows commands.  Eyes:EOMI. PERRL. Normal Conjunctivae.  HENT: Normocephalic. Atraumatic.  Resp: Breath sounds equal throughout. No crackles. No wheezing.  Cardio: Regular rate and rhythm. No murmurs. No friction rub.  Abd: Soft. Non-distended. Non-tender. Bowel sounds normal. No rebound tenderness.  MSK: No areas of ecchymosis or erythema.  Neuro:  - A/O x3  - CN 2-12 intact. Facial movement symmetric  - Strength 4/5 in Upper Extremities bilaterally and 3/5 in lower extremities bilaterally.  - FNF and HTS limited by strength but symmetric and grossly normal.  Skin:No rashes. No jaundice.       Medical Decision Making       45 MINUTES SPENT BY ME on the date of service doing chart review, history, exam, documentation & further activities per the note.  MANAGEMENT DISCUSSED with the following over the past 24 hours: RN, ROCIO   Tests ORDERED & REVIEWED in the past 24 hours:  - BMP  - CBC  - Magnesium  - Phosphorus  Medical complexity over the past 24 hours:  - Prescription DRUG MANAGEMENT performed      Data     I have personally reviewed the following data over the past 24 hrs:    7.5  \   11.6 (L)   / 202     146 (H) 108 (H) 50.4 (H) /  119 (H)   4.1 25 1.67 (H) \     ALT: 40 AST: 88 (H) AP: 75 TBILI: 0.3   ALB: 3.4 (L) TOT PROTEIN: 6.1 (L) LIPASE: N/A     Trop: 46 (H) BNP: N/A      TSH: N/A T4: N/A A1C: N/A     Procal: N/A CRP: N/A Lactic Acid: N/A         Imaging results reviewed over the past 24 hrs:   Recent Results (from the past 24 hours)   XR Chest Port 1 View    Narrative    EXAM: XR CHEST PORT 1 VIEW  LOCATION: [facility]  DATE: 12/20/2024    INDICATION: AMS  COMPARISON: CT pulmonary angiogram 4/13/2024      Impression    IMPRESSION:     Shallow lung inflation which accentuates the heart and mediastinum. Mildly enlarged cardiac silhouette. Mild atheromatous calcifications in aortic arch.    Angular foci of atelectasis are present in the left base near the diaphragm and the inferior to the right hilum. The mid and lateral thirds of the lungs are clear. Diaphragm curvature is preserved. No visible pleural fluid. No pneumothorax.    Prior ACDF. There are deformities of both humeral heads with subchondral sclerosis and flattening representing advanced osteoarthrosis or possibly old AVN.   CT Head w/o Contrast    Narrative    EXAM: CT HEAD W/O CONTRAST, CT CERVICAL SPINE W/O CONTRAST  LOCATION: [facility]  DATE: 12/20/2024    INDICATION: AMS  COMPARISON: None.  TECHNIQUE:   1) Routine CT Head without IV contrast. Multiplanar reformats. Dose reduction techniques were used.  2) Routine CT Cervical Spine without IV contrast. Multiplanar reformats. Dose reduction techniques were used.    FINDINGS:   HEAD CT:   INTRACRANIAL CONTENTS: No intracranial hemorrhage, extraaxial collection, or mass effect.  No CT evidence of acute infarct. Mild presumed chronic small vessel ischemic changes. Normal ventricles and sulci.     VISUALIZED ORBITS/SINUSES/MASTOIDS: No intraorbital abnormality. Layering debris in the right maxillary sinus. No middle ear or mastoid effusion.    BONES/SOFT TISSUES: No acute abnormality.    CERVICAL SPINE CT:   VERTEBRA: Left convex curvature centered in the mid cervical spine.. Normal vertebral body heights. No fracture or  posttraumatic subluxation. Interbody fusion devices at C5-C6 and C6-C7 with ventral fusion hardware. Hardware loosening with lucency   surrounding the fixation screws and ventral migration of the fusion hardware, residing 9 mm anterior to the vertebral bodies.    CANAL/FORAMINA: Mild right foraminal stenosis at C3-C4. Mild spinal canal stenosis C5-C6 with moderate left foraminal stenosis. Mild spinal canal stenosis C6-C7 with severe left and mild right foraminal stenoses. Severe left and mild right foraminal   stenoses at C7-T1.    PARASPINAL: No extraspinal abnormality. Visualized lung fields are clear.      Impression    IMPRESSION:  HEAD CT:  1.  No acute intracranial process.  2.  Layering debris in the right maxillary sinus. This may represent acute sinusitis in the appropriate clinical setting.    CERVICAL SPINE CT:  1.  No CT evidence for acute fracture or post traumatic subluxation.  2.  No significant change in instrumented anterior spinal fusion C5-C7 with ventral migration of the plate and screws.   CT Cervical Spine w/o Contrast    Narrative    EXAM: CT HEAD W/O CONTRAST, CT CERVICAL SPINE W/O CONTRAST  LOCATION: [facility]  DATE: 12/20/2024    INDICATION: AMS  COMPARISON: None.  TECHNIQUE:   1) Routine CT Head without IV contrast. Multiplanar reformats. Dose reduction techniques were used.  2) Routine CT Cervical Spine without IV contrast. Multiplanar reformats. Dose reduction techniques were used.    FINDINGS:   HEAD CT:   INTRACRANIAL CONTENTS: No intracranial hemorrhage, extraaxial collection, or mass effect.  No CT evidence of acute infarct. Mild presumed chronic small vessel ischemic changes. Normal ventricles and sulci.     VISUALIZED ORBITS/SINUSES/MASTOIDS: No intraorbital abnormality. Layering debris in the right maxillary sinus. No middle ear or mastoid effusion.    BONES/SOFT TISSUES: No acute abnormality.    CERVICAL SPINE CT:   VERTEBRA: Left convex curvature  centered in the mid cervical spine.. Normal vertebral body heights. No fracture or posttraumatic subluxation. Interbody fusion devices at C5-C6 and C6-C7 with ventral fusion hardware. Hardware loosening with lucency   surrounding the fixation screws and ventral migration of the fusion hardware, residing 9 mm anterior to the vertebral bodies.    CANAL/FORAMINA: Mild right foraminal stenosis at C3-C4. Mild spinal canal stenosis C5-C6 with moderate left foraminal stenosis. Mild spinal canal stenosis C6-C7 with severe left and mild right foraminal stenoses. Severe left and mild right foraminal   stenoses at C7-T1.    PARASPINAL: No extraspinal abnormality. Visualized lung fields are clear.      Impression    IMPRESSION:  HEAD CT:  1.  No acute intracranial process.  2.  Layering debris in the right maxillary sinus. This may represent acute sinusitis in the appropriate clinical setting.    CERVICAL SPINE CT:  1.  No CT evidence for acute fracture or post traumatic subluxation.  2.  No significant change in instrumented anterior spinal fusion C5-C7 with ventral migration of the plate and screws.   CT Abdomen Pelvis w/o Contrast    Narrative    EXAM: CT ABDOMEN PELVIS W/O CONTRAST  LOCATION: [facility]  DATE: 12/20/2024    INDICATION: lower abdominal pain  COMPARISON: 4/13/2024  TECHNIQUE: CT scan of the abdomen and pelvis was performed without IV contrast. Multiplanar reformats were obtained. Dose reduction techniques were used.  CONTRAST: None.    FINDINGS:   LOWER CHEST: Mild linear atelectasis or scar. Cardiomegaly.    HEPATOBILIARY: Cholecystectomy.    PANCREAS: Normal.    SPLEEN: Normal.    ADRENAL GLANDS: Normal.    KIDNEYS/BLADDER: No change with diminutive right kidney. No stone, hydronephrosis or suspicious lesion. Distended urinary bladder with estimated volume of 600 mL similar to previous exam does raise question of neurogenic bladder or outlet obstruction.    BOWEL: No obstruction or  inflammatory change. Resolution of the previous sigmoid inflammation. Moderate formed stool throughout the colon.    LYMPH NODES: Normal.    VASCULATURE: Moderate diffuse atherosclerotic calcifications.    PELVIC ORGANS: No pelvic mass or free fluid.    MUSCULOSKELETAL: Degenerative changes throughout lumbar spine.      Impression    IMPRESSION:   1.  No definite etiology for symptoms. Abundant formed stool throughout colon but no obstruction or inflammatory findings.  2.  Large capacity urinary bladder raises question of potential outlet obstruction or neurogenic state though there is no hydronephrosis.     MR Brain w/o & w Contrast    Narrative    EXAM: MR BRAIN W/O and W CONTRAST  LOCATION: [facility]  DATE: 12/21/2024    INDICATION: Altered mental status. Confusion.  COMPARISON: 12/20/2024.  CONTRAST: 10 mL Gadavist.  TECHNIQUE: Routine multiplanar multisequence head MRI without and with intravenous contrast.    FINDINGS:  INTRACRANIAL CONTENTS: Small subtle focus of diffusion restriction identified within the right cerebellum (image 41 series 7 and image 41 series 16) measuring 4 mm, concerning for acute vs subacute infarct. No associated hemorrhage identified. Along the   right anterolateral cerebral convexity there is a FLAIR hyperintense avidly enhancing extra-axial nodule measuring 1.0 x 0.6 cm in greatest axial dimensions and approximately 6 mm in greatest coronal thickness. This lesion demonstrates a broad-based   dural attachment and likely reflects an extra-axial meningioma, in the absence of known malignancy. There is no significant vasogenic edema or significant local mass effect. There is a small chronic infarct identified within the cortex of the right   superior frontal gyrus. There is no acute hemorrhage, abnormal extra-axial fluid collection, or midline shift. Scattered nonspecific T2/FLAIR hyperintensities within the cerebral white matter and judah, most consistent with  mild chronic microvascular   ischemic change. Normal ventricles and sulci. Normal position of the cerebellar tonsils. No additional areas of pathologic contrast enhancement.    SELLA: No abnormality accounting for technique.    OSSEOUS STRUCTURES/SOFT TISSUES: Normal marrow signal. The major intracranial vascular flow voids are maintained.     ORBITS: Prior bilateral cataract surgery. Visualized portions of the orbits are otherwise unremarkable.     SINUSES/MASTOIDS: Mild ethmoid air cell mucosal thickening. An air-fluid level is present involving the right maxillary sinus. No middle ear or mastoid effusion.       Impression    IMPRESSION:  1.  Small acute/subacute infarct within the right cerebellum measuring 0.4 cm. No associated hemorrhage.  2.  Right anterolateral cerebral convexity enhancing nodule measuring up to 1 cm likely reflects a meningioma (in the absence of known malignancy).  3.  Chronic senescent and presumed microvascular ischemic changes, as above.  4.  Right maxillary sinus air-fluid level. Correlation for acute sinusitis is recommended.              Dr. Quan Goodman discussed results with DR. PANDYA on 12/21/2024 at 1:21 AM CST.   MR Lumbar Spine w/o & w Contrast    Narrative    EXAM: MR LUMBAR SPINE W/O and W CONTRAST  LOCATION: [facility]  DATE: 12/21/2024    INDICATION: Altered mental status. Confusion. Urinary retention.  COMPARISON: None.  CONTRAST: 10 mL Gadavist.  TECHNIQUE: Routine Lumbar Spine MRI without and with IV contrast.    FINDINGS: Transitional lumbosacral anatomy. The S1 vertebral body is partially lumbarized.    There is dextroconvex curvature of the lumbar spine centered at L2-L3.     At L4 on L5 there is approximately 2 mm of anterolisthesis. At L5 on S1 there is approximately 4 mm of anterolisthesis.     No suspicious marrow edema identified. Mild Modic type II endplate changes noted posteriorly at L1-L2 surrounding a degenerative Schmorl's node,  asymmetric to the right. Degenerative Schmorl's nodes are noted at T9-T10, T11-T12, L1-L2, and L2-L3.     Vertebral body heights are maintained. Normal distal spinal cord. The conus terminates at L2-L3. Redundancy of the cauda equina nerve roots is likely related to spinal canal stenosis. Left renal cyst measuring up to 1.6 cm. Subcentimeter cysts are   identified involving the right kidney. There is fatty atrophy of the dorsal paraspinal musculature. Unremarkable bony pelvis. Increased signal within the central spinal canal at the levels of L3-L4 and L4-L5 on the post-contrast sequences is likely   artifactual due to high-grade canal stenosis/narrowing. There is no definite intramedullary/leptomeningeal enhancement.    T11-T12: There is a concentric disc bulge with associated facet arthropathy. There is a superimposed left lateral recess disc extrusion with cranial migration extending approximately 6 mm above the intervertebral disc space. Trace fluid is noted in the   facet joints. Ligamentum flavum thickening is present at this level. At this level there is severe central spinal canal stenosis with moderate right neural foraminal stenosis and severe left neural foraminal stenosis.    T12-L1: Small-volume fluid in the intervertebral disc space is favored to be degenerative. There is disc height loss and disc desiccation. Concentric disc bulge. Bilateral facet arthropathy. Mild canal stenosis. Severe right neural foraminal stenosis.   Mild left neural foraminal stenosis.     L1-L2: Disc desiccation. Disc height loss. Bilateral facet arthropathy. Concentric disc bulge. Mild canal stenosis. Minimal left neural foraminal stenosis. Severe right neural foraminal stenosis.    L2-L3: Disc desiccation. Disc height loss. Bilateral facet arthropathy. Ligamentum flavum thickening. Concentric disc-osteophyte complex/bulge. Severe central spinal canal stenosis. Severe left neural foraminal stenosis. Moderate right neural  foraminal   stenosis.     L3-L4: Disc desiccation. Mild disc height loss. Bilateral facet arthropathy. Fluid in the facet joints. Ligamentum flavum thickening. Concentric disc bulge. There is a small amount of post-contrast enhancement adjacent to the facet joints at this level.   Additionally, there is a small amount of enhancement adjacent to the spinous processes at this level. Small amount of fluid is noted involving the interspinous space at this level. The spinous processes appear to touch/contact at this level. Broad-based   disc bulge. Severe central spinal canal stenosis. Severe left neural foraminal stenosis. Mild right neural foraminal stenosis. There is compression of the exiting left L3 nerve root by the disc and overgrown facet. Correlation for a left L3 radiculopathy   is recommended.    L4-L5: Disc desiccation. Disc height loss. Bilateral facet arthropathy and ligamentum flavum thickening. Concentric disc bulge, more pronounced posteriorly. Fluid in the facet joints. There is contact of the spinous processes at this level. Small amount   of fluid is noted involving the interspinous space. Small amount of enhancement is noted surrounding the facet joints and along the interspinous space/spinous processes at this level. Severe/critical central spinal canal stenosis. Severe left neural   foraminal stenosis. Severe right neural foraminal stenosis. There is contact/compression of the exiting left L4 nerve root. There is contact/compression of exiting right L4 nerve root. Correlation for bilateral L4 radiculopathies is recommended.    L5-S1: Disc desiccation. Disc height loss. Uncovering of the disc. Mild concentric disc bulge. Bilateral facet arthropathy. No significant central spinal canal stenosis. Moderate left neural foraminal stenosis. Mild to moderate right neural foraminal   stenosis.      Impression    IMPRESSION:  1.  Advanced multilevel degenerative changes of the lumbar spine, as above, with  grade 1 anterolisthesis at L4 on L5 and L5 on S1.  2.  Transitional lumbosacral anatomy. The S1 vertebral body is partially lumbarized. If surgery is planned, exact localization is recommended.  3.  At L4-L5 there is severe/critical central spinal canal stenosis with severe bilateral neural foraminal stenosis. Correlation for cauda equina syndrome is recommended.  4.  At L3-L4 there is severe central spinal canal stenosis with severe left and mild right neural foraminal stenosis.  5.  At L2-L3 there is severe central spinal canal stenosis with severe left and moderate right neural foraminal stenosis.  6.  At L1-L2 there is mild central spinal canal stenosis with severe right neural foraminal stenosis.  7.  At L5-S1 there is moderate left and mild to moderate right neural foraminal stenosis.  8.  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  9.  Fluid in the bilateral facet joints is noted at L3-L4 and L4-L5 with mild adjacent elroy-facet enhancement. These findings are favored to reflect a degenerative facet synovitis, however, an infectious/inflammatory facet synovitis could have a similar   appearance.  10.  At T11-T12 there is a concentric disc bulge with a superimposed left lateral recess disc extrusion with cranial migration. At this level there is severe central spinal canal stenosis, severe left neural foraminal stenosis, and moderate right neural   foraminal stenosis.  11.  Dextroconvex curvature of lumbar spine centered at L2-L3.    Dr. Quan Goodman discussed results with Dr. Solis on 12/21/2024 at 1:21 AM CST.   MRA Angiogram Neck w/o Contrast    Narrative    EXAM: MRA NECK (CAROTIDS) W/O CONTRAST, MRA BRAIN ([place]) W/O CONTRAST  LOCATION: [facility]  DATE: 12/21/2024    INDICATION: Fall with new acute subacute cerebellar infarct, neurology requested MRA of head and neck  COMPARISON: None.  TECHNIQUE:   1) 3D time-of-flight head MRA without intravenous contrast.  2)  Neck MRA without IV contrast. Stenosis measurements made according to NASCET criteria unless otherwise specified.    FINDINGS:  HEAD MRA:   ANTERIOR CIRCULATION: No stenosis/occlusion, aneurysm, or high flow vascular malformation. Standard [place] anatomy.    POSTERIOR CIRCULATION: No stenosis/occlusion, aneurysm, or high flow vascular malformation. Balanced vertebral arteries supply a normal basilar artery.     NECK MRA:   RIGHT CAROTID: No measurable stenosis or dissection.    LEFT CAROTID: No measurable stenosis or dissection.    VERTEBRAL ARTERIES: No focal stenosis or dissection. Balanced vertebral arteries.    AORTIC ARCH: Classic aortic arch anatomy with no significant stenosis at the origin of the great vessels.      Impression    IMPRESSION:    HEAD MRA:   1.  Patent major intracranial arteries without large vessel occlusion, high-grade stenosis or aneurysm.    NECK MRA:  1.  Patent major cervical arteries without high-grade stenosis or dissection.   MRA Angiogram Head w/o Contrast    Narrative    EXAM: MRA NECK (CAROTIDS) W/O CONTRAST, MRA BRAIN ([place]) W/O CONTRAST  LOCATION: [facility]  DATE: 12/21/2024    INDICATION: Fall with new acute subacute cerebellar infarct, neurology requested MRA of head and neck  COMPARISON: None.  TECHNIQUE:   1) 3D time-of-flight head MRA without intravenous contrast.  2) Neck MRA without IV contrast. Stenosis measurements made according to NASCET criteria unless otherwise specified.    FINDINGS:  HEAD MRA:   ANTERIOR CIRCULATION: No stenosis/occlusion, aneurysm, or high flow vascular malformation. Standard [place] anatomy.    POSTERIOR CIRCULATION: No stenosis/occlusion, aneurysm, or high flow vascular malformation. Balanced vertebral arteries supply a normal basilar artery.     NECK MRA:   RIGHT CAROTID: No measurable stenosis or dissection.    LEFT CAROTID: No measurable stenosis or dissection.    VERTEBRAL ARTERIES:  No focal stenosis or dissection. Balanced vertebral arteries.    AORTIC ARCH: Classic aortic arch anatomy with no significant stenosis at the origin of the great vessels.      Impression    IMPRESSION:    HEAD MRA:   1.  Patent major intracranial arteries without large vessel occlusion, high-grade stenosis or aneurysm.    NECK MRA:  1.  Patent major cervical arteries without high-grade stenosis or dissection.   US Carotid Bilateral    Narrative    EXAM: US CAROTID BILATERAL  LOCATION: [facility]  DATE: 12/21/2024    INDICATION: Stroke  COMPARISON: MRA 12/21/2024.  TECHNIQUE: Duplex exam performed utilizing 2D gray-scale imaging, Doppler interrogation with color-flow and spectral waveform analysis. The percent diameter stenosis is determined using Updated Recommendations for Carotid Stenosis Interpretation Criteria   from [employer] [facility].    FINDINGS:    RIGHT: Mild plaque at the bifurcation. The peak systolic velocity in the ICA is less than 180 cm/sec, consistent with less than 50% stenosis. Normal velocities in the ECA. Antegrade flow within the vertebral artery.     LEFT: Mild plaque at the bifurcation. The peak systolic velocity in the ICA is less than 180 cm/sec, consistent with less than 50% stenosis. Elevated velocities in the ECA consistent with a stenosis. Antegrade flow within the vertebral artery.    VELOCITY CHART:  CCA   Right: 107 cm/s   Left: 112 cm/s  ICA   Right: 86 cm/s   Left: 100 cm/s  ECA   Right: 69 cm/s   Left: 224 cm/s  ICA/CCA PSV Ratio   Right: 1.2   Left: 1.9      Impression    IMPRESSION:  1.  Mild plaque formation, velocities consistent with less than 50% stenosis in the right internal carotid artery.  2.  Mild plaque formation, velocities consistent with less than 50% stenosis in the left internal carotid artery.  3.  Flow within the vertebral arteries is antegrade.  4.  Elevated velocity left external carotid artery suggestive of a focal stenosis.

## 2024-12-21 NOTE — CONSULTS
[facility]    Neurosurgery Consultation     Date of Admission:  12/20/2024  Date of Consult (When I saw the patient): 12/21/24    Assessment   Desirae Rey is a 74 year old female on aspirin with history of hypertension, CHF, CKD, C5-7 ACDF in 2017 with Dr. Barone.  Patient was previously evaluated by Dr. Shaw in clinic on 6/13/2024; patient was not interested in spine surgery at that time.    Patient was admitted on 12/20/2024 with altered mental status and 4 falls this week.    Patient reports history of chronic, unchanged low back pain and chronic paresthesias in BLE, symptoms have been present for more than 1 year.  She reports intermittent episodes of bladder incontinence/urgency x 1 month that mostly occurs with activity.  She reports leg weakness developed about 6 months ago and worsened over the past week.     Neurosurgery was consulted for severe stenosis at L2-3, L3-4, and L4-5. Neurology was consulted for small acute/subacute infarct within right cerebellum. Workup also revealed UTI.    Plan  -We discussed symptoms, imaging, and nonsurgical versus surgical options.  -Patient is not interested in surgical intervention at this time.  She prefers to continue with nonsurgical treatment.  Therefore, further imaging of the spine does not seem to be necessary from a neurosurgical standpoint.  -Continue pain control measures as needed, consider Pain Service consult  -Physical therapy  -Monitor neuro exam and symptoms  -Follow-up with the [facility]  -Neurosurgery will sign off, please call with any questions or concerns  -Appreciate assistance from specialties    I have discussed the following assessment and plan with Dr. Shaw.     Yeni Valerio, CNP  [facility] Neurosurgery  Tel [phone]  Pager [phone]    Code Status    Full Code    Reason for Consult   I was asked by Dr. Gauthier to evaluate this patient for:  Concern for cauda equina  syndrome    Primary Care Physician   Daysi Bryan    Chief Complaint   Chronic low back   Leg weakness  Intermittent bladder incontinence/urgency    History is obtained from the patient, electronic health record, emergency department physician, and patient's family    History of Present Illness   Desirae Rey is a 74 year old female on aspirin with history of hypertension, CHF, CKD, C5-7 ACDF in 2017 with Dr. Barone.  Patient was previously evaluated by Dr. Shaw in clinic on 6/13/2024; patient was not interested in spine surgery at that time.    Patient was admitted on 12/20/2024 with altered mental status and 4 falls this week.    Patient reports history of chronic, unchanged low back pain and chronic paresthesias in BLE, symptoms have been present for more than 1 year.  She reports intermittent episodes of bladder incontinence/urgency x 1 month that mostly occurs with activity.  She reports leg weakness developed about 6 months ago and worsened over the past week.  She denies any radicular leg pain.  Denies any bowel incontinence or saddle anesthesia.  She has been working with home physical therapy over the past 4 months.    Neurosurgery was consulted for severe stenosis at L2-3, L3-4, and L4-5. Neurology was consulted for small acute/subacute infarct within right cerebellum. Workup also revealed UTI.    Data   EXAM: MR LUMBAR SPINE W/O and W CONTRAST  LOCATION: [facility]  DATE: 12/21/2024                                                                  IMPRESSION:  1.  Advanced multilevel degenerative changes of the lumbar spine, as above, with grade 1 anterolisthesis L4 on L5 and L5 on S1.  2.  Transitional lumbosacral anatomy. The S1 vertebral body is partially lumbarized.  3.  At L4 on L5 there is severe/critical central spinal canal stenosis with severe bilateral neural foraminal stenosis. Correlation for cauda equina syndrome is recommended.  4.  At L3 on L4 there is  severe central spinal canal stenosis with severe left and mild right neural foraminal stenosis.  5.  At L2 on L3 there is severe central spinal canal stenosis with severe left and moderate right neural foraminal stenosis.  6.  At L1 on L2 there is mild central spinal canal stenosis with severe right neural foraminal stenosis.  7.  At L5 on S1 there is moderate left and mild to moderate right neural foraminal stenosis.  8.  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  9.  Fluid in the bilateral facet joints is noted at L3-L4 and L4-L5 with mild adjacent elroy-facet enhancement. These findings are favored to reflect a degenerative facet synovitis, however, infectious/inflammatory facet synovitis could have a similar   appearance.  10.  At T11-T12 there is a concentric disc bulge with superimposed left lateral recess disc extrusion with cranial migration. At this level there is severe central spinal canal stenosis, severe left neural foraminal stenosis, and moderate right neural   foraminal stenosis.      Physical Exam   Vital signs with ranges:   Temp:  [98.1  F (36.7  C)-98.3  F (36.8  C)] 98.2  F (36.8  C)  Pulse:  [76-89] 83  Resp:  [22-59] 22  BP: (128-166)/(57-87) 149/68  SpO2:  [87 %-100 %] 97 %  200 lbs 0 oz      NEUROLOGICAL EXAMINATION:   Mental status:  Awake, alert, oriented x 3, speech is fluent, following commands  Cranial nerves:  II-XII intact.   Motor:    Shoulder Abduction  Right:  5/5   Left:  5/5  Biceps                      Right:  5/5   Left:  5/5  Triceps                     Right:  5/5   Left:  5/5  Wrist Extensors        Right:  5/5   Left:  5/5  Wrist Flexors            Right:  5/5   Left:  5/5  Intrinsics                   Right:  5/5   Left:  5/5    Iliopsoas  (hip flexion)               Right: 4+/5  Left:  4+/5  Quadriceps  (knee extension)       Right:  5/5  Left:  5/5  Hamstrings  (knee flexion)            Right:  5/5  Left:  5/5  Gastroc Soleus  (PF)                          Right:  5/5   Left:  5/5  Tibialis Ant  (DF)                          Right:  5/5  Left:  5/5  EHL                          Right:  5/5  Left:  5/5    Sensation:  Intact to light touch, patient reports baseline neuropathy in BLE  Reflexes:   Negative Clonus.  Negative Malone's sign.     Tenderness to palpation of the lumbar spine.  Straight leg raise is negative bilaterally.     Past Medical History   I have reviewed this patient's medical history and updated it with pertinent information if needed.   Past Medical History:   Diagnosis Date    Allergic rhinitis     Arthritis of back     CHF (congestive heart failure) (H)     Chronic kidney disease     stage 3     De Quervain's disease (tenosynovitis)     Fibromyalgia, primary     GERD (gastroesophageal reflux disease)     Hematuria     chronic    High cholesterol     History of blood clots 09/01/1970    DVT, from birth control, h/o left calf    Hyperlipidemia     Hypertension     Low back pain     Osteoporosis     Pickwickian syndrome (H)     Plantar fasciitis     Proteinuria     Proteinuria     chronic    Sleep apnea     CPAP    Uncomplicated asthma        Past Surgical History   I have reviewed this patient's surgical history and updated it with pertinent information if needed.  Past Surgical History:   Procedure Laterality Date    CERVICAL FUSION N/A 4/27/2017    Procedure: ANTERIOR CERVICAL DECOMPRESSION FUSION C5-7 BILATERAL;  Surgeon: Basim Barone MD;  Location: [facility] [place];  Service:     CHOLECYSTECTOMY      open    COLONOSCOPY N/A 12/20/2019    Procedure: COLONOSCOPY WITH BIOPSIES;  Surgeon: Lucio Farr MD;  Location: [facility] [place];  Service: Gastroenterology    EYE SURGERY      HAND SURGERY Right     HYSTEROSCOPY DIAGNOSTIC      JOINT REPLACEMENT      bilateral TKA    KNEE SURGERY      RELEASE CARPAL TUNNEL Bilateral        Prior to Admission Medications   Prior to Admission Medications   Prescriptions Last Dose Informant Patient Reported?  Taking?   CRESTOR 10 MG tablet Unknown Care Giver Yes Yes   Sig: Take 10 mg by mouth daily.   DULoxetine (CYMBALTA) 30 MG capsule Unknown Care Giver No Yes   Sig: Take 1 capsule (30 mg) by mouth 2 times daily   HYDROcodone-acetaminophen (NORCO) 5-325 MG tablet Unknown Care Giver No Yes   Sig: Take 1 tablet by mouth every 4 hours as needed for pain Max 6 tablets per day.   Hydroxychloroquine Sulfate 100 MG TABS Unknown Spouse/Significant Other, Care Giver Yes Yes   Sig: Take 100 mg by mouth 2 times daily   Naloxone HCl 5 MG/0.5ML SOSY Unknown Care Giver Yes Yes   Sig: Inject 5 mg as directed as needed.   OXYGEN-AIR DELIVERY SYSTEMS MISC Unknown Spouse/Significant Other, Care Giver Yes Yes   Sig: [OXYGEN-AIR DELIVERY SYSTEMS MIS] Use 3 L As Directed. 3 lpm with activities and as needed at night   Turmeric (CURCUPLEX-95) 500 MG CAPS Unknown Spouse/Significant Other, Care Giver Yes Yes   Sig: Take 1 capsule by mouth daily   acetaminophen (TYLENOL) 500 MG tablet Unknown Spouse/Significant Other, Care Giver Yes Yes   Sig: Take 500 mg by mouth every 6 hours as needed for mild pain   albuterol (PROAIR HFA;PROVENTIL HFA;VENTOLIN HFA) 90 mcg/actuation inhaler Unknown Spouse/Significant Other, Care Giver Yes Yes   Sig: Inhale 1 puff into the lungs every 4 hours as needed for shortness of breath / dyspnea   alendronate (FOSAMAX) 70 MG tablet Unknown Spouse/Significant Other, Care Giver Yes Yes   Sig: [ALENDRONATE (FOSAMAX) 70 MG TABLET] Take 70 mg by mouth every 7 days. Takes on    aspirin 81 MG EC tablet Unknown Spouse/Significant Other, Care Giver Yes Yes   Si tablet Orally Once a day   azelastine (OPTIVAR) 0.05 % ophthalmic solution Unknown Spouse/Significant Other, Care Giver Yes Yes   Sig: Apply 1 drop to eye 2 times daily as needed   calcium citrate (CITRACAL) 950 (200 Ca) MG tablet Unknown Spouse/Significant Other, Care Giver Yes Yes   Sig: Take 1 tablet by mouth daily   cetirizine (ZYRTEC) 10 MG tablet  Unknown Spouse/Significant Other, Care Giver Yes Yes   Sig: [CETIRIZINE (ZYRTEC) 10 MG TABLET] Take 10 mg by mouth daily.    cholecalciferol (VITAMIN D3) 25 mcg (1000 units) capsule Unknown Spouse/Significant Other, Care Giver Yes Yes   Sig: Take 125 mcg by mouth every morning Take 5000 units in the morning and 2000 units in the evening   docusate sodium (COLACE) 100 MG capsule Unknown Care Giver Yes Yes   Sig: Take 1-3 capsules by mouth daily as needed for constipation.   fluticasone-salmeterol (AIRDUO RESPICLICK) 113-14 MCG/ACT inhaler Unknown Care Giver Yes Yes   Sig: Inhale 1 puff into the lungs 2 times daily.   furosemide (LASIX) 20 MG tablet Unknown Care Giver No Yes   Sig: Take 2 tablets (40 mg) by mouth every morning   l-lysine HCl 500 MG TABS tablet Unknown Spouse/Significant Other, Care Giver Yes Yes   Sig: Take 2 tablets by mouth daily   magnesium oxide 250 mg Tab Unknown Spouse/Significant Other, Care Giver Yes Yes   Sig: Take 500 mg by mouth daily   metoprolol succinate ER (TOPROL XL) 50 MG 24 hr tablet Unknown Spouse/Significant Other, Care Giver Yes Yes   Sig: Take 50 mg by mouth daily   modafinil (PROVIGIL) 100 MG tablet Unknown Care Giver No Yes   Sig: Take 2.5 tablets (250 mg) by mouth daily Take two and half tablet (250 mg ) daily   montelukast (SINGULAIR) 10 mg tablet Unknown Spouse/Significant Other, Care Giver Yes Yes   Sig: Take 10 mg by mouth every evening   nystatin (MYCOSTATIN) 834351 UNIT/GM external powder Unknown Spouse/Significant Other, Care Giver Yes Yes   Sig: Apply topically 2 times daily   solifenacin (VESICARE) 10 MG tablet Unknown Spouse/Significant Other, Care Giver Yes Yes   Sig: Take 5-10 mg by mouth at bedtime   terbinafine (LAMISIL) 1 % external cream Unknown Care Giver Yes Yes   Sig: Apply topically 2 times daily.   tiZANidine (ZANAFLEX) 4 MG tablet Unknown Spouse/Significant Other, Care Giver Yes Yes   Sig: Take 4 mg by mouth 3 times daily as needed for muscle spasms       Facility-Administered Medications: None     Allergies   Allergies   Allergen Reactions    Latex Rash     Severe rash    Pregabalin Shortness Of Breath     Other Reaction(s): swelling and shortness of breath    Valsartan Unknown     Hyperkalemia    Ciprofloxacin Visual Disturbance, Hallucination and Confusion     Likely contributed visual hallucination and confusion    Colchicine Diarrhea    Dicloxacillin      Other Reaction(s): confusion, says she has tolerated augmentin without difficulty.     Gabapentin      Other Reaction(s): Sedation    Latex Unknown     Added based on information entered during case entry, please review and add reactions, type, and severity as needed    Other Drug Allergy (See Comments) Muscle Pain (Myalgia)     Tried lovastatin and simvastatin    Other Environmental Allergy Unknown     Coverlet bandaid , MicroEval product; rash    Statins-Hmg-Coa Reductase Inhibitors [Statins] Muscle Pain (Myalgia)     Tried lovastatin and simvastatin    Sulfa Antibiotics Swelling     Facial swelling      Adhesive Tape Rash       Social History   I have reviewed this patient's social history and updated it with pertinent information if needed. Desirae AMADOR Candido  reports that she has never smoked. She has never used smokeless tobacco. She reports that she does not drink alcohol and does not use drugs.    Family History   I have reviewed this patient's family history and updated it with pertinent information if needed.   Family History   Problem Relation Age of Onset    Rheumatoid Arthritis Mother     Heart Disease Sister     Chronic Obstructive Pulmonary Disease Father        Review of Systems   10 point ROS negative other than symptoms noted in HPI.

## 2024-12-21 NOTE — H&P
[facility]    History and Physical - Hospitalist Service       Date of Admission:  12/20/2024    Assessment & Plan   Desirae Rey is a 74 year old female With a medical history significant for CKD stage III, CHF, history of blood clots, hypertension, hyperlipidemia, chronic pain syndrome, obstructive sleep apnea, asthma and other medical comorbidities who is admitted with change in mental status thought to be multifactorial from medication sedation and possible acute on chronic renal failure.    Patient Active Problem List   Diagnosis    Fibromyalgia    Osteoporosis    Obesity    Osteoarthritis    NELLIE (obstructive sleep apnea)    Immunology Studies Nonspecific Abnormal Findings    Allergic Rhinitis    Anemia    Hematuria    Proteinuria    Renal Insufficiency    Synovitis and tenosynovitis    Pseudogout    Chondrocalcinosis    Pain During Urination (Dysuria)    Cervical radiculopathy    Benign essential hypertension    Hyperkalemia    GRACY (acute kidney injury) (H)    Blood loss anemia    Cubital tunnel syndrome, right    Chronic kidney disease, stage 2 (mild)    Hyperlipidemia, unspecified hyperlipidemia type    Other specified hypotension    Sleep apnea    CKD (chronic kidney disease) stage 3, GFR 30-59 ml/min (H)    Hypertension    Diastolic dysfunction    Altered mental status, unspecified altered mental status type    Acute weakness    Adult failure to thrive    Chronic heart failure with preserved ejection fraction (HFpEF) (H)    Elevated troponin    Moderate persistent asthma without complication    Cellulitis of left lower extremity    Fall at home, initial encounter    Urinary tract infection without hematuria, site unspecified    Diverticulitis    Sepsis (H)    CHF (congestive heart failure) (H)    Pulmonary hypertension (H)    Low back pain            Change in Mental Status:    Likely multifactorial, with contributions from hypoxia, medication effects (LyricaJosefinanaflex,  hydrocodone), and a subacute cerebrovascular accident (CVA). There may also be a component of metabolic encephalopathy.  Plan:  Gradually monitor and reassess mental status.  Review and adjust medications contributing to sedation as appropriate.  Maintain adequate oxygenation and monitor for hypoxia.  Consider metabolic workup to rule out contributing factors.      Preoperative Evaluation:    The patient is morbidly obese (BMI 44.83) with a history of CHF, CKD, hypertension, moderate persistent asthma, and pseudogout, increasing the risk for perioperative complications.  Plan:  Proceed with surgery due to emergency status, with heightened caution.  Implement postoperative telemetry and capnometery monitoring.  Optimize pulmonary function with breathing treatments.  Resume crestor and metoprolol; hold aspirin and Plavix for now.  Consider spinal anesthesia if feasible to minimize respiratory complications.    Acute Cerebellar CVA:  No headaches or double vision noted; possible dysmetria and dysdiadochokinesia. Stroke neurology is consulted.  Plan:  Defer aspirin and Plavix due to potential surgery; stroke neurology is aware.  Follow the stroke pathway for acute management.  Check echocardiogram results for further assessment.    Gait Disturbance and Possible Acute on Chronic Disk Herniation:  History of falls and multilevel degenerative disease. Increased risk of cauda equina syndrome.  Possibly    Acute cerebellar CVA Plan:  Await MRI results for cervical and thoracic spine.  Continue consultation with neurosurgery for potential decompression in the morning.  Monitor for signs of neurological deterioration.    Urinary Tract Infection (UTI):    Ceftriaxone was initiated in the ER.  Plan:  Continue ceftriaxone.  Follow up on urine culture results to tailor antibiotic therapy.  Acute on Chronic Renal Failure:    Plan:  Ensure gentle hydration.  Avoid nephrotoxic medications to prevent further renal compromise.  Monitor  "renal function with daily labs.    Suspected Obstructive Uropathy:    Distended bladder noted on CT, possibly neurogenic in nature.  Plan:  Maintain Rebollar catheter and monitor urine output.  Hold urology consultation for now unless symptoms worsen or change.    Congestive Heart Failure (CHF):    Stable condition.  Plan:  Hold aspirin.  Resume beta-blocker and statin therapy.  Monitor for signs of CHF exacerbation.    Hypertension:    Plan:  Continue metoprolol.  Hold Lasix temporarily unless signs of fluid overload occur.    Chronic Pain Syndrome:    Plan:  Use opioids cautiously due to potential effects on mental status.  Consider alternative pain management strategies.    Pulmonary Hypertension:    Plan:  Use pulse oximetry to maintain oxygen saturation above 94%.  Monitor for respiratory complications.    Other Medical Problems:    Management of other chronic conditions remains unchanged.  Plan:  Continue routine monitoring and treatment as per existing care plans.  Each condition requires close monitoring and tailored interventions to address the complex interplay of the patient's acute and chronic health issues. Regular reassessment and interdisciplinary collaboration are essential to optimize outcomes.        Diet:  NPO   DVT Prophylaxis: Pneumatic Compression Devices  Rebollar Catheter: Not present  Lines: None     Cardiac Monitoring: None  Code Status:  Full code    Clinically Significant Risk Factors Present on Admission                 # Drug Induced Platelet Defect: home medication list includes an antiplatelet medication   # Hypertension: Noted on problem list  # Chronic heart failure with preserved ejection fraction: heart failure noted on problem list and last echo with EF >50%          # Severe Obesity: Estimated body mass index is 46.48 kg/m  as calculated from the following:    Height as of this encounter: 1.397 m (4' 7\").    Weight as of this encounter: 90.7 kg (200 lb).       # " Financial/Environmental Concerns:    # Asthma: noted on problem list        Disposition Plan     Medically Ready for Discharge: Anticipated in 2-4 Days           Jennifer Gauthier MD  Hospitalist Service  [facility]  Securely message with [employer] (more info)  Text page via [employer] Paging/Directory     ______________________________________________________________________    Chief Complaint   weakness        History of Present Illness   Desirae Rey is a 74 year old female With a medical history significant for CKD stage III, CHF, history of blood clots, hypertension, hyperlipidemia, chronic pain syndrome, obstructive sleep apnea, asthma and other medical comorbidities who is admitted with change in mental status thought to be multifactorial from medication sedation,  possible acute on chronic renal failure and UTI.    Per history., Over the past week, Ms. Barnes had experienced increasing weakness. She had a fall at home, sliding out of her chair and onto the ground, but did not hit her head. She has not been on blood thinners. Upon arrival at the ER, it was noted that she had an episode of urinary incontinence, with the urine having a foul odor. Her vital signs were stable except for being tachypneic, and her oxygen saturation was in the 80s on room air, which improved to 4 liters via nasal cannula.       ER Evaluation:    In the emergency department, Ms. Barnes was found to be tachypneic with low oxygen saturation levels that required supplemental oxygen. Her physical examination revealed weakness and altered mental status. The foul odor of her urine suggested a possible urinary tract infection, contributing to her condition. Initial laboratory tests and imaging were ordered to assess her renal function, electrolytes, and to check for any signs of infection or other acute issues.  Evaluation in the ER with MRI of the brain showed a cerebellar stroke as shown below.  She also had MRI of the  lumbosacral spine which showed advanced multilevel degenerative disease of the spine with severe stenosis in the L2-L5 region.    Case was discussed with neurosurgery who recommended doing MRI neck and thoracic spine.  His cerebellar stroke was also discussed with the stroke neurologist, recommendation was to load with aspirin and Plavix however to hold off if neurosurgery was planning to do surgical decompression in the morning.  Patient has been updated.  Her neurological examination revealed bilateral lower extremity weakness with a neurogenic bladder.  Neurosurgery is aware.    CT abdomen and pelvis shows results below  Narrative & Impression   EXAM: CT ABDOMEN PELVIS W/O CONTRAST  LOCATION: [facility]  DATE: 12/20/2024     INDICATION: lower abdominal pain  COMPARISON: 4/13/2024  TECHNIQUE: CT scan of the abdomen and pelvis was performed without IV contrast. Multiplanar reformats were obtained. Dose reduction techniques were used.  CONTRAST: None.     FINDINGS:   LOWER CHEST: Mild linear atelectasis or scar. Cardiomegaly.     HEPATOBILIARY: Cholecystectomy.     PANCREAS: Normal.     SPLEEN: Normal.     ADRENAL GLANDS: Normal.     KIDNEYS/BLADDER: No change with diminutive right kidney. No stone, hydronephrosis or suspicious lesion. Distended urinary bladder with estimated volume of 600 mL similar to previous exam does raise question of neurogenic bladder or outlet obstruction.     BOWEL: No obstruction or inflammatory change. Resolution of the previous sigmoid inflammation. Moderate formed stool throughout the colon.     LYMPH NODES: Normal.     VASCULATURE: Moderate diffuse atherosclerotic calcifications.     PELVIC ORGANS: No pelvic mass or free fluid.     MUSCULOSKELETAL: Degenerative changes throughout lumbar spine.                                                                      IMPRESSION:   1.  No definite etiology for symptoms. Abundant formed stool throughout colon but no  obstruction or inflammatory findings.  2.  Large capacity urinary bladder raises question of potential outlet obstruction or neurogenic state though there is no hydronephrosis.          ER Intervention:    The patient was placed on supplemental oxygen to improve her oxygen saturation. She was given fluids cautiously due to her CHF and CKD, and a broad-spectrum antibiotic was initiated to address the suspected urinary tract infection.  Neurology and neurosurgery were consulted regarding the acute/subacute cerebellar CVA and chronic back pain with radiculopathy and concern for possible cauda equina syndrome      Patient Active Problem List   Diagnosis    Fibromyalgia    Osteoporosis    Obesity    Osteoarthritis    NELLIE (obstructive sleep apnea)    Immunology Studies Nonspecific Abnormal Findings    Allergic Rhinitis    Anemia    Hematuria    Proteinuria    Renal Insufficiency    Synovitis and tenosynovitis    Pseudogout    Chondrocalcinosis    Pain During Urination (Dysuria)    Cervical radiculopathy    Benign essential hypertension    Hyperkalemia    GRACY (acute kidney injury) (H)    Blood loss anemia    Cubital tunnel syndrome, right    Chronic kidney disease, stage 2 (mild)    Hyperlipidemia, unspecified hyperlipidemia type    Other specified hypotension    Sleep apnea    CKD (chronic kidney disease) stage 3, GFR 30-59 ml/min (H)    Hypertension    Diastolic dysfunction    Altered mental status, unspecified altered mental status type    Acute weakness    Adult failure to thrive    Chronic heart failure with preserved ejection fraction (HFpEF) (H)    Elevated troponin    Moderate persistent asthma without complication    Cellulitis of left lower extremity    Fall at home, initial encounter    Urinary tract infection without hematuria, site unspecified    Diverticulitis    Sepsis (H)    CHF (congestive heart failure) (H)    Pulmonary hypertension (H)    Low back pain            Past Medical History    Past Medical  History:   Diagnosis Date    Allergic rhinitis     Arthritis of back     CHF (congestive heart failure) (H)     Chronic kidney disease     stage 3     De Quervain's disease (tenosynovitis)     Fibromyalgia, primary     GERD (gastroesophageal reflux disease)     Hematuria     chronic    High cholesterol     History of blood clots 09/01/1970    DVT, from birth control, h/o left calf    Hyperlipidemia     Hypertension     Low back pain     Osteoporosis     Pickwickian syndrome (H)     Plantar fasciitis     Proteinuria     Proteinuria     chronic    Sleep apnea     CPAP    Uncomplicated asthma        Past Surgical History   Past Surgical History:   Procedure Laterality Date    CERVICAL FUSION N/A 4/27/2017    Procedure: ANTERIOR CERVICAL DECOMPRESSION FUSION C5-7 BILATERAL;  Surgeon: Basim Barone MD;  Location: [facility] [place];  Service:     CHOLECYSTECTOMY      open    COLONOSCOPY N/A 12/20/2019    Procedure: COLONOSCOPY WITH BIOPSIES;  Surgeon: Lucio Farr MD;  Location: [facility] [place];  Service: Gastroenterology    EYE SURGERY      HAND SURGERY Right     HYSTEROSCOPY DIAGNOSTIC      JOINT REPLACEMENT      bilateral TKA    KNEE SURGERY      RELEASE CARPAL TUNNEL Bilateral        Prior to Admission Medications   Prior to Admission Medications   Prescriptions Last Dose Informant Patient Reported? Taking?   Bacillus Coagulans-Inulin (ALIGN PREBIOTIC-PROBIOTIC PO)  Spouse/Significant Other Yes No   Sig: Take 1 tablet by mouth daily   DULoxetine (CYMBALTA) 30 MG capsule   No No   Sig: Take 1 capsule (30 mg) by mouth 2 times daily   HYDROcodone-acetaminophen (NORCO) 5-325 MG tablet   No No   Sig: Take 1 tablet by mouth every 4 hours as needed for pain Max 6 tablets per day.   Hydroxychloroquine Sulfate 100 MG TABS  Spouse/Significant Other Yes No   Sig: Take 100 mg by mouth 2 times daily   OXYGEN-AIR DELIVERY SYSTEMS MISC  Spouse/Significant Other Yes No   Sig: [OXYGEN-AIR DELIVERY SYSTEMS MISC] Use  3 L As Directed. 3 lpm with activities and as needed at night   Turmeric (CURCUPLEX-95) 500 MG CAPS  Spouse/Significant Other Yes No   Sig: Take 1 capsule by mouth daily   acetaminophen (TYLENOL) 500 MG tablet  Spouse/Significant Other Yes No   Sig: Take 500 mg by mouth every 6 hours as needed for mild pain   albuterol (PROAIR HFA;PROVENTIL HFA;VENTOLIN HFA) 90 mcg/actuation inhaler  Spouse/Significant Other Yes No   Sig: Inhale 1 puff into the lungs every 4 hours as needed for shortness of breath / dyspnea   alendronate (FOSAMAX) 70 MG tablet  Spouse/Significant Other Yes No   Sig: [ALENDRONATE (FOSAMAX) 70 MG TABLET] Take 70 mg by mouth every 7 days. Takes on    aspirin 81 MG EC tablet  Spouse/Significant Other Yes No   Si tablet Orally Once a day   azelastine (OPTIVAR) 0.05 % ophthalmic solution  Spouse/Significant Other Yes No   Sig: Apply 1 drop to eye 2 times daily as needed   calcium citrate (CITRACAL) 950 (200 Ca) MG tablet  Spouse/Significant Other Yes No   Sig: Take 1 tablet by mouth daily   cetirizine (ZYRTEC) 10 MG tablet  Spouse/Significant Other Yes No   Sig: [CETIRIZINE (ZYRTEC) 10 MG TABLET] Take 10 mg by mouth daily.    cholecalciferol (VITAMIN D3) 25 mcg (1000 units) capsule  Spouse/Significant Other Yes No   Sig: Take 125 mcg by mouth every morning Take 5000 units in the morning and 2000 units in the evening   furosemide (LASIX) 20 MG tablet   No No   Sig: Take 2 tablets (40 mg) by mouth every morning   l-lysine HCl 500 MG TABS tablet  Spouse/Significant Other Yes No   Sig: Take 2 tablets by mouth daily   magnesium oxide 250 mg Tab  Spouse/Significant Other Yes No   Sig: Take 500 mg by mouth daily   metoprolol succinate ER (TOPROL XL) 50 MG 24 hr tablet  Spouse/Significant Other Yes No   Sig: Take 50 mg by mouth daily   modafinil (PROVIGIL) 100 MG tablet   No No   Sig: Take 2.5 tablets (250 mg) by mouth daily Take two and half tablet (250 mg ) daily   montelukast (SINGULAIR) 10 mg  tablet  Spouse/Significant Other Yes No   Sig: Take 10 mg by mouth every evening   nystatin (MYCOSTATIN) 848699 UNIT/GM external powder  Spouse/Significant Other Yes No   Sig: Apply topically 2 times daily   omeprazole (PRILOSEC) 20 MG DR capsule   Yes No   Sig: Take 40 mg by mouth daily   ondansetron (ZOFRAN) 4 MG tablet   Yes No   Sig: Take 4 mg by mouth every 6 hours as needed for nausea   pregabalin (LYRICA) 25 MG capsule   No No   Sig: Take 1 capsule (25 mg) by mouth 2 times daily   senna-docusate (SENOKOT-S/PERICOLACE) 8.6-50 MG tablet  Spouse/Significant Other No No   Sig: Take 1 tablet by mouth 2 times daily as needed for constipation   solifenacin (VESICARE) 10 MG tablet  Spouse/Significant Other Yes No   Sig: Take 5-10 mg by mouth at bedtime   tiZANidine (ZANAFLEX) 4 MG tablet  Spouse/Significant Other Yes No   Sig: Take 4 mg by mouth 3 times daily as needed for muscle spasms   valACYclovir (VALTREX) 500 MG tablet  Spouse/Significant Other Yes No   Sig: Take 500 mg by mouth 2 times daily as needed (flare)      Facility-Administered Medications: None        Review of Systems    The 10 point Review of Systems is negative other than noted in the HPI or here.     Social History   I have reviewed this patient's social history and updated it with pertinent information if needed.  Social History     Tobacco Use    Smoking status: Never    Smokeless tobacco: Never   Vaping Use    Vaping status: Never Used   Substance Use Topics    Alcohol use: No    Drug use: No         Family History   I have reviewed this patient's family history and updated it with pertinent information if needed.  Family History   Problem Relation Age of Onset    Rheumatoid Arthritis Mother     Heart Disease Sister     Chronic Obstructive Pulmonary Disease Father          Allergies   Allergies   Allergen Reactions    Latex Rash     Severe rash    Pregabalin Shortness Of Breath     Other Reaction(s): swelling and shortness of breath     Valsartan Unknown     Hyperkalemia    Ciprofloxacin Visual Disturbance, Hallucination and Confusion     Likely contributed visual hallucination and confusion    Colchicine Diarrhea    Dicloxacillin      Other Reaction(s): confusion, says she has tolerated augmentin without difficulty.     Gabapentin      Other Reaction(s): Sedation    Latex Unknown     Added based on information entered during case entry, please review and add reactions, type, and severity as needed    Other Drug Allergy (See Comments) Muscle Pain (Myalgia)     Tried lovastatin and simvastatin    Other Environmental Allergy Unknown     Coverlet bandaid , 3M product; rash    Statins-Hmg-Coa Reductase Inhibitors [Statins] Muscle Pain (Myalgia)     Tried lovastatin and simvastatin    Sulfa Antibiotics Swelling     Facial swelling      Adhesive Tape Rash        Physical Exam   Vital Signs: Temp: 98.2  F (36.8  C) Temp src: Oral BP: 138/77 Pulse: 84   Resp: 27 SpO2: 100 % O2 Device: Nasal cannula Oxygen Delivery: 2 LPM  Weight: 200 lbs 0 oz      General Aox2 appropriate affect, NAD, on R2L  obese  HEENT  dry MM, EOMI, PERRL  Chest decreased air entry at lung bases,   Adeq A/E b/l, No wheezing  Heart RRR, No M/R/G  Abd- Soft, NT, BS+  - Deferred,   Extremity- Moving all extremities, No digital clubbing,   No edema  Neuro- Aox2,  No facial droop, tongue midline, No peripheral vision loss  P3-/5 in b/l LE, T-N, reflexes 1+, plantar reflex upward,  gait not checked  Skin  Has no tattoo, No skin rash     Medical Decision Making       85 MINUTES SPENT BY ME on the date of service doing chart review, history, exam, documentation & further activities per the note.      ------------------ MEDICAL DECISION MAKING ------------------------------------------------------------------------------------------------------  MANAGEMENT DISCUSSED with the following over the past 24 hours: patient and car team       Data   ------------------------- PAST 24 HR DATA REVIEWED  -----------------------------------------------    I have personally reviewed the following data over the past 24 hrs:    11.0  \   12.7   / 250     143 103 60.9 (H) /  145 (H)   4.1 23 2.13 (H) \     ALT: 42 AST: 98 (H) AP: 85 TBILI: 0.4   ALB: 4.0 TOT PROTEIN: 7.1 LIPASE: N/A     Trop: 46 (H) BNP: 2,904 (H)     TSH: 1.26 T4: N/A A1C: N/A     Procal: 0.21 CRP: 73.10 (H) Lactic Acid: 1.0         Imaging results reviewed over the past 24 hrs:   Recent Results (from the past 24 hours)   XR Chest Port 1 View    Narrative    EXAM: XR CHEST PORT 1 VIEW  LOCATION: [facility]  DATE: 12/20/2024    INDICATION: AMS  COMPARISON: CT pulmonary angiogram 4/13/2024      Impression    IMPRESSION:     Shallow lung inflation which accentuates the heart and mediastinum. Mildly enlarged cardiac silhouette. Mild atheromatous calcifications in aortic arch.    Angular foci of atelectasis are present in the left base near the diaphragm and the inferior to the right hilum. The mid and lateral thirds of the lungs are clear. Diaphragm curvature is preserved. No visible pleural fluid. No pneumothorax.    Prior ACDF. There are deformities of both humeral heads with subchondral sclerosis and flattening representing advanced osteoarthrosis or possibly old AVN.   CT Head w/o Contrast    Narrative    EXAM: CT HEAD W/O CONTRAST, CT CERVICAL SPINE W/O CONTRAST  LOCATION: [facility]  DATE: 12/20/2024    INDICATION: AMS  COMPARISON: None.  TECHNIQUE:   1) Routine CT Head without IV contrast. Multiplanar reformats. Dose reduction techniques were used.  2) Routine CT Cervical Spine without IV contrast. Multiplanar reformats. Dose reduction techniques were used.    FINDINGS:   HEAD CT:   INTRACRANIAL CONTENTS: No intracranial hemorrhage, extraaxial collection, or mass effect.  No CT evidence of acute infarct. Mild presumed chronic small vessel ischemic changes. Normal ventricles and sulci.     VISUALIZED  ORBITS/SINUSES/MASTOIDS: No intraorbital abnormality. Layering debris in the right maxillary sinus. No middle ear or mastoid effusion.    BONES/SOFT TISSUES: No acute abnormality.    CERVICAL SPINE CT:   VERTEBRA: Left convex curvature centered in the mid cervical spine.. Normal vertebral body heights. No fracture or posttraumatic subluxation. Interbody fusion devices at C5-C6 and C6-C7 with ventral fusion hardware. Hardware loosening with lucency   surrounding the fixation screws and ventral migration of the fusion hardware, residing 9 mm anterior to the vertebral bodies.    CANAL/FORAMINA: Mild right foraminal stenosis at C3-C4. Mild spinal canal stenosis C5-C6 with moderate left foraminal stenosis. Mild spinal canal stenosis C6-C7 with severe left and mild right foraminal stenoses. Severe left and mild right foraminal   stenoses at C7-T1.    PARASPINAL: No extraspinal abnormality. Visualized lung fields are clear.      Impression    IMPRESSION:  HEAD CT:  1.  No acute intracranial process.  2.  Layering debris in the right maxillary sinus. This may represent acute sinusitis in the appropriate clinical setting.    CERVICAL SPINE CT:  1.  No CT evidence for acute fracture or post traumatic subluxation.  2.  No significant change in instrumented anterior spinal fusion C5-C7 with ventral migration of the plate and screws.   CT Cervical Spine w/o Contrast    Narrative    EXAM: CT HEAD W/O CONTRAST, CT CERVICAL SPINE W/O CONTRAST  LOCATION: [facility]  DATE: 12/20/2024    INDICATION: AMS  COMPARISON: None.  TECHNIQUE:   1) Routine CT Head without IV contrast. Multiplanar reformats. Dose reduction techniques were used.  2) Routine CT Cervical Spine without IV contrast. Multiplanar reformats. Dose reduction techniques were used.    FINDINGS:   HEAD CT:   INTRACRANIAL CONTENTS: No intracranial hemorrhage, extraaxial collection, or mass effect.  No CT evidence of acute infarct. Mild presumed  chronic small vessel ischemic changes. Normal ventricles and sulci.     VISUALIZED ORBITS/SINUSES/MASTOIDS: No intraorbital abnormality. Layering debris in the right maxillary sinus. No middle ear or mastoid effusion.    BONES/SOFT TISSUES: No acute abnormality.    CERVICAL SPINE CT:   VERTEBRA: Left convex curvature centered in the mid cervical spine.. Normal vertebral body heights. No fracture or posttraumatic subluxation. Interbody fusion devices at C5-C6 and C6-C7 with ventral fusion hardware. Hardware loosening with lucency   surrounding the fixation screws and ventral migration of the fusion hardware, residing 9 mm anterior to the vertebral bodies.    CANAL/FORAMINA: Mild right foraminal stenosis at C3-C4. Mild spinal canal stenosis C5-C6 with moderate left foraminal stenosis. Mild spinal canal stenosis C6-C7 with severe left and mild right foraminal stenoses. Severe left and mild right foraminal   stenoses at C7-T1.    PARASPINAL: No extraspinal abnormality. Visualized lung fields are clear.      Impression    IMPRESSION:  HEAD CT:  1.  No acute intracranial process.  2.  Layering debris in the right maxillary sinus. This may represent acute sinusitis in the appropriate clinical setting.    CERVICAL SPINE CT:  1.  No CT evidence for acute fracture or post traumatic subluxation.  2.  No significant change in instrumented anterior spinal fusion C5-C7 with ventral migration of the plate and screws.   CT Abdomen Pelvis w/o Contrast    Narrative    EXAM: CT ABDOMEN PELVIS W/O CONTRAST  LOCATION: [facility]  DATE: 12/20/2024    INDICATION: lower abdominal pain  COMPARISON: 4/13/2024  TECHNIQUE: CT scan of the abdomen and pelvis was performed without IV contrast. Multiplanar reformats were obtained. Dose reduction techniques were used.  CONTRAST: None.    FINDINGS:   LOWER CHEST: Mild linear atelectasis or scar. Cardiomegaly.    HEPATOBILIARY: Cholecystectomy.    PANCREAS: Normal.    SPLEEN:  Normal.    ADRENAL GLANDS: Normal.    KIDNEYS/BLADDER: No change with diminutive right kidney. No stone, hydronephrosis or suspicious lesion. Distended urinary bladder with estimated volume of 600 mL similar to previous exam does raise question of neurogenic bladder or outlet obstruction.    BOWEL: No obstruction or inflammatory change. Resolution of the previous sigmoid inflammation. Moderate formed stool throughout the colon.    LYMPH NODES: Normal.    VASCULATURE: Moderate diffuse atherosclerotic calcifications.    PELVIC ORGANS: No pelvic mass or free fluid.    MUSCULOSKELETAL: Degenerative changes throughout lumbar spine.      Impression    IMPRESSION:   1.  No definite etiology for symptoms. Abundant formed stool throughout colon but no obstruction or inflammatory findings.  2.  Large capacity urinary bladder raises question of potential outlet obstruction or neurogenic state though there is no hydronephrosis.

## 2024-12-21 NOTE — ED NOTES
"EMERGENCY DEPARTMENT SIGN OUT NOTE        ED COURSE AND MEDICAL DECISION MAKING  Patient was signed out to me by Dr Henri Keith at 10:00 PM  1:23 AM I spoke with [facility] regarding the patient's imaging  1:29 AM I updated the patient with MRI results and spoke with Dr. Gauthier, Hospitalist  1:53 AM I spoke with Dr. Peterson with Stroke Neurology who will meet with the patient and recommend obtaining a head and neck MRA and starting aspirin and plavix if she will not go to surgery tonight   2:08 AM I spoke with Jan Valerio PA-C with Neurosurgery will look at the patient's MRI, speak with her attending, and call back  2:38 AM I spoke with Neurosurgery who recommends keeping the patient NPO in case of surgery tomorrow. They recommend MRA of C-spine and lumbar spine and holding the plavix and aspirin.  2:39 AM I updated Dr. Gauthier with the plan from Neurosurgery  2:48 AM I updated the patient with the plan from Neurosurgery   3:07 AM I updated Dr. Gauthier with the plan from Neurosurgery    In brief, Desirae Rey is a 74 year old female who initially presented with generalized weakness     \"Per EMS, the patient has been generally weak for the past week. Today, she was sitting in a chair and slid down the chair onto the ground. She did not hit her head and is not on blood thinners. She lives in an independent home with her  who called EMS today.     Upon ED arrival, the patient urinated on herself and her RN noted that her urine had a foul odor. She is not currently on antibiotics.\"    At time of sign out, disposition was pending MRI results.  MRI returned showing a small right-sided subacute/acute cerebellar infarct.  I will page  Neurology to discuss this and possible aspirin or Plavix.  Additionally radiology called back about abnormality stroke but also lumbar spine which showed a significant disc herniation with central canal stenosis at L4-5.  This is significantly worse than in April when she " had an MRI.  Patient also be discussed with neurosurgery.  Patient may require surgery for cauda equina though she notes she has  had a herniated disc in the low back that has been problematic for years.  Patient however was incontinent of urine and then retaining urine after that with 800 mL.  Patient discussed with stroke neurology and they would like an MRI of the brain and neck when able to though she did receive contrast ready and this may be delayed.  They would recommend loading patient with aspirin and Plavix unless neurosurgery is planning operative treatment of the back in which case this could be possibly delayed if neurosurgery is planning operative treatment.  Patient then discussed with neurosurgery who will look at MRI and discuss case.  On call back neurosurgery recommended n.p.o. though at this time they did not recommend emergent procedure for the lumbar spinal disc herniation.  Patient is still moving her lower extremities though does report some weakness in bilateral extremities on leg raise.  Given the urinary retention and neurogenic bladder they are concerned about cauda equina but given that prior MRI showed a significant herniation this could be gradual worsening and they will plan to see patient in morning with further discussion of possible treatment.  Patient will remain n.p.o. per surgical protocol just in case.  Additionally they requested MRI of the cervical and thoracic spine which will be noncontrasted and can be performed tonight.  Patient updated on all these results as well as I discussed these results with the hospitalist and recommendations.  Additionally given the possibility of surgery in the morning patient was not given aspirin or Plavix and continue to plan for admission to hospitalist service.      Critical Care     Performed by: Dr Tirso Solis  Authorized by: Dr Tirso Solis  Total critical care time: 45 minutes  Critical care was necessary to treat or prevent imminent or  life-threatening deterioration of the following conditions: Cauda equina, right cerebellar CVA.  Critical care was time spent personally by me on the following activities: development of treatment plan with patient or surrogate, discussions with consultants, examination of patient, evaluation of patient's response to treatment, obtaining history from patient or surrogate, ordering and performing treatments and interventions, ordering and review of laboratory studies, ordering and review of radiographic studies, re-evaluation of patient's condition and monitoring for potential decompensation.  Critical care time was exclusive of separately billable procedures and treating other patients.     FINAL IMPRESSION    1. Encephalopathy, unspecified type    2. Urinary retention    3. Spinal stenosis of lumbar region, unspecified whether neurogenic claudication present    4. Cerebrovascular accident (CVA) involving cerebellum (H)    5. Fall at home, initial encounter      ED MEDS  Medications   cefTRIAXone (ROCEPHIN) 2 g vial to attach to  ml bag for ADULTS or NS 50 ml bag for PEDS (has no administration in time range)   lidocaine 1 % 0.1-1 mL (has no administration in time range)   lidocaine (LMX4) cream (has no administration in time range)   sodium chloride (PF) 0.9% PF flush 3 mL (3 mLs Intracatheter $Given 12/21/24 0210)   sodium chloride (PF) 0.9% PF flush 3 mL (has no administration in time range)   senna-docusate (SENOKOT-S/PERICOLACE) 8.6-50 MG per tablet 1 tablet (has no administration in time range)     Or   senna-docusate (SENOKOT-S/PERICOLACE) 8.6-50 MG per tablet 2 tablet (has no administration in time range)   calcium carbonate (TUMS) chewable tablet 1,000 mg (has no administration in time range)   ondansetron (ZOFRAN ODT) ODT tab 4 mg (has no administration in time range)     Or   ondansetron (ZOFRAN) injection 4 mg (has no administration in time range)   fluticasone (FLONASE) 50 MCG/ACT spray 1 spray  (has no administration in time range)   sodium chloride 0.9 % infusion ( Intravenous Rate/Dose Verify 12/21/24 0422)   lidocaine 1 % 0.1-1 mL (has no administration in time range)   lidocaine (LMX4) cream (has no administration in time range)   sodium chloride (PF) 0.9% PF flush 3 mL (3 mLs Intracatheter Not Given 12/21/24 0423)   sodium chloride (PF) 0.9% PF flush 3 mL (has no administration in time range)   miconazole (MICATIN) 2 % powder (has no administration in time range)   glucose gel 15-30 g (has no administration in time range)     Or   dextrose 50 % injection 25-50 mL (has no administration in time range)     Or   glucagon injection 1 mg (has no administration in time range)   acetaminophen (TYLENOL) tablet 500 mg (has no administration in time range)   albuterol (PROVENTIL HFA/VENTOLIN HFA) inhaler (has no administration in time range)   azelastine (OPTIVAR) ophthalmic solution 1 drop (has no administration in time range)   cetirizine (zyrTEC) tablet 10 mg (has no administration in time range)   rosuvastatin (CRESTOR) tablet 10 mg (has no administration in time range)   furosemide (LASIX) tablet 40 mg (has no administration in time range)   HYDROcodone-acetaminophen (NORCO) 5-325 MG per tablet 1 tablet (has no administration in time range)   hydroxychloroquine (PLAQUENIL) half-tab 100 mg (has no administration in time range)   magnesium oxide (MAG-OX) tablet 400 mg (has no administration in time range)   metoprolol succinate ER (TOPROL XL) 24 hr tablet 50 mg (has no administration in time range)   modafinil (PROVIGIL) half-tab 250 mg (has no administration in time range)   miconazole (MICATIN) 2 % powder (has no administration in time range)   pregabalin (LYRICA) capsule 25 mg (has no administration in time range)   tolterodine ER (DETROL LA) 24 hr capsule 4 mg (has no administration in time range)   tiZANidine (ZANAFLEX) tablet 4 mg (has no administration in time range)   fluticasone-vilanterol (BREO  ELLIPTA) 100-25 MCG/ACT inhaler 1 puff (has no administration in time range)   terbinafine (lamISIL) 1 % cream (has no administration in time range)   montelukast (SINGULAIR) tablet 10 mg (has no administration in time range)   sodium chloride 0.9% BOLUS 500 mL (0 mLs Intravenous Stopped 12/20/24 2213)   cefTRIAXone (ROCEPHIN) 1 g vial to attach to  mL bag for ADULTS or NS 50 mL bag for PEDS (0 g Intravenous Stopped 12/20/24 2212)   naloxone (NARCAN) injection 0.4 mg (0.4 mg Intravenous $Given 12/20/24 2233)   gadobutrol (GADAVIST) injection 10 mL (10 mLs Intravenous $Given 12/21/24 0054)     LAB  Labs Ordered and Resulted from Time of ED Arrival to Time of ED Departure   BASIC METABOLIC PANEL - Abnormal       Result Value    Sodium 143      Potassium 4.1      Chloride 103      Carbon Dioxide (CO2) 23      Anion Gap 17 (*)     Urea Nitrogen 60.9 (*)     Creatinine 2.13 (*)     GFR Estimate 24 (*)     Calcium 10.4      Glucose 145 (*)    HEPATIC FUNCTION PANEL - Abnormal    Protein Total 7.1      Albumin 4.0      Bilirubin Total 0.4      Alkaline Phosphatase 85      AST 98 (*)     ALT 42      Bilirubin Direct <0.20     TROPONIN T, HIGH SENSITIVITY - Abnormal    Troponin T, High Sensitivity 48 (*)    BLOOD GAS VENOUS - Abnormal    pH Venous 7.33      pCO2 Venous 53 (*)     pO2 Venous 52 (*)     Bicarbonate Venous 28      Base Excess/Deficit Venous 1.4      FIO2 24      Oxyhemoglobin Venous 82 (*)     O2 Sat, Venous 83.2 (*)    NT PROBNP INPATIENT - Abnormal    N terminal Pro BNP Inpatient 2,904 (*)    CRP INFLAMMATION - Abnormal    CRP Inflammation 73.10 (*)    ROUTINE UA WITH MICROSCOPIC REFLEX TO CULTURE - Abnormal    Color Urine Light Yellow      Appearance Urine Clear      Glucose Urine Negative      Bilirubin Urine Negative      Ketones Urine Negative      Specific Gravity Urine 1.014      Blood Urine 0.2 mg/dL (*)     pH Urine 5.5      Protein Albumin Urine 50 (*)     Urobilinogen Urine <2.0      Nitrite  Urine Negative      Leukocyte Esterase Urine 250 Marciano/uL (*)     Bacteria Urine Few (*)     RBC Urine 1      WBC Urine 17 (*)     Squamous Epithelials Urine <1      Hyaline Casts Urine 6 (*)    CBC WITH PLATELETS AND DIFFERENTIAL - Abnormal    WBC Count 11.0      RBC Count 4.02      Hemoglobin 12.7      Hematocrit 39.4      MCV 98      MCH 31.6      MCHC 32.2      RDW 12.6      Platelet Count 250      % Neutrophils 77      % Lymphocytes 10      % Monocytes 13      % Eosinophils 1      % Basophils 0      % Immature Granulocytes 1      NRBCs per 100 WBC 0      Absolute Neutrophils 8.4 (*)     Absolute Lymphocytes 1.0      Absolute Monocytes 1.4 (*)     Absolute Eosinophils 0.1      Absolute Basophils 0.0      Absolute Immature Granulocytes 0.1      Absolute NRBCs 0.0     TROPONIN T, HIGH SENSITIVITY - Abnormal    Troponin T, High Sensitivity 46 (*)    URINE DRUG SCREEN PANEL - Abnormal    Amphetamines Urine Screen Negative      Barbituates Urine Screen Negative      Benzodiazepine Urine Screen Negative      Cannabinoids Urine Screen Negative      Cocaine Urine Screen Negative      Fentanyl Qual Urine Screen Negative      Opiates Urine Screen Positive (*)     PCP Urine Screen Negative     HEMOGLOBIN A1C - Abnormal    Estimated Average Glucose 126 (*)     Hemoglobin A1C 6.0 (*)    GLUCOSE BY METER - Abnormal    GLUCOSE BY METER POCT 119 (*)    LACTIC ACID WHOLE BLOOD WITH 1X REPEAT IN 2 HR WHEN >2 - Normal    Lactic Acid, Initial 1.0     PROCALCITONIN - Normal    Procalcitonin 0.21     TSH WITH FREE T4 REFLEX - Normal    TSH 1.26     ERYTHROCYTE SEDIMENTATION RATE AUTO - Normal    Erythrocyte Sedimentation Rate 29     ETHYL ALCOHOL LEVEL - Normal    Alcohol ethyl <0.01     LIPID PROFILE - Normal    Cholesterol 143      Triglycerides 91      Direct Measure HDL 56      LDL Cholesterol Calculated 69      Non HDL Cholesterol 87     COMPREHENSIVE METABOLIC PANEL   GLUCOSE MONITOR NURSING POCT   GLUCOSE MONITOR NURSING POCT    GLUCOSE MONITOR NURSING POCT   GLUCOSE MONITOR NURSING POCT   GLUCOSE MONITOR NURSING POCT   URINE CULTURE     RADIOLOGY    MR Lumbar Spine w/o & w Contrast   Final Result   IMPRESSION:   1.  Advanced multilevel degenerative changes of the lumbar spine, as above, with grade 1 anterolisthesis at L4 on L5 and L5 on S1.   2.  Transitional lumbosacral anatomy. The S1 vertebral body is partially lumbarized. If surgery is planned, exact localization is recommended.   3.  At L4-L5 there is severe/critical central spinal canal stenosis with severe bilateral neural foraminal stenosis. Correlation for cauda equina syndrome is recommended.   4.  At L3-L4 there is severe central spinal canal stenosis with severe left and mild right neural foraminal stenosis.   5.  At L2-L3 there is severe central spinal canal stenosis with severe left and moderate right neural foraminal stenosis.   6.  At L1-L2 there is mild central spinal canal stenosis with severe right neural foraminal stenosis.   7.  At L5-S1 there is moderate left and mild to moderate right neural foraminal stenosis.   8.  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   9.  Fluid in the bilateral facet joints is noted at L3-L4 and L4-L5 with mild adjacent elroy-facet enhancement. These findings are favored to reflect a degenerative facet synovitis, however, an infectious/inflammatory facet synovitis could have a similar    appearance.   10.  At T11-T12 there is a concentric disc bulge with a superimposed left lateral recess disc extrusion with cranial migration. At this level there is severe central spinal canal stenosis, severe left neural foraminal stenosis, and moderate right neural    foraminal stenosis.   11.  Dextroconvex curvature of lumbar spine centered at L2-L3.      Dr. Quan Goodman discussed results with Dr. Solis on 12/21/2024 at 1:21 AM CST.      MR Brain w/o & w Contrast   Final Result   IMPRESSION:   1.  Small acute/subacute infarct within the right  cerebellum measuring 0.4 cm. No associated hemorrhage.   2.  Right anterolateral cerebral convexity enhancing nodule measuring up to 1 cm likely reflects a meningioma (in the absence of known malignancy).   3.  Chronic senescent and presumed microvascular ischemic changes, as above.   4.  Right maxillary sinus air-fluid level. Correlation for acute sinusitis is recommended.                     Dr. Quan Goodman discussed results with DR. PANDYA on 12/21/2024 at 1:21 AM CST.      CT Abdomen Pelvis w/o Contrast   Final Result   IMPRESSION:    1.  No definite etiology for symptoms. Abundant formed stool throughout colon but no obstruction or inflammatory findings.   2.  Large capacity urinary bladder raises question of potential outlet obstruction or neurogenic state though there is no hydronephrosis.         CT Cervical Spine w/o Contrast   Final Result   IMPRESSION:   HEAD CT:   1.  No acute intracranial process.   2.  Layering debris in the right maxillary sinus. This may represent acute sinusitis in the appropriate clinical setting.      CERVICAL SPINE CT:   1.  No CT evidence for acute fracture or post traumatic subluxation.   2.  No significant change in instrumented anterior spinal fusion C5-C7 with ventral migration of the plate and screws.      CT Head w/o Contrast   Final Result   IMPRESSION:   HEAD CT:   1.  No acute intracranial process.   2.  Layering debris in the right maxillary sinus. This may represent acute sinusitis in the appropriate clinical setting.      CERVICAL SPINE CT:   1.  No CT evidence for acute fracture or post traumatic subluxation.   2.  No significant change in instrumented anterior spinal fusion C5-C7 with ventral migration of the plate and screws.      XR Chest Port 1 View   Final Result   IMPRESSION:       Shallow lung inflation which accentuates the heart and mediastinum. Mildly enlarged cardiac silhouette. Mild atheromatous calcifications in aortic arch.      Angular foci of  atelectasis are present in the left base near the diaphragm and the inferior to the right hilum. The mid and lateral thirds of the lungs are clear. Diaphragm curvature is preserved. No visible pleural fluid. No pneumothorax.      Prior ACDF. There are deformities of both humeral heads with subchondral sclerosis and flattening representing advanced osteoarthrosis or possibly old AVN.      Cervical spine MRI w/o contrast    (Results Pending)   Thoracic spine MRI w/o contrast    (Results Pending)   MRA Angiogram Head w/o Contrast    (Results Pending)   MRA Angiogram Neck w/o & w Contrast    (Results Pending)     DISCHARGE MEDS  New Prescriptions    No medications on file     Govind Solis MD  [facility] EMERGENCY DEPARTMENT  [facility] [facility] [id number] [phone]         Govind Solis MD  12/21/24 7351

## 2024-12-21 NOTE — CONSULTS
"Care Management Initial Consult    General Information  Assessment completed with: Patient,    Type of CM/SW Visit: Initial Assessment    Primary Care Provider verified and updated as needed: Yes   Readmission within the last 30 days: no previous admission in last 30 days      Reason for Consult: discharge planning  Advance Care Planning: Advance Care Planning Reviewed: present on chart          Communication Assessment  Patient's communication style: spoken language (English or Bilingual)             Cognitive  Cognitive/Neuro/Behavioral: .WDL except, level of consciousness, arousability  Level of Consciousness: alert  Arousal Level: arouses to voice  Orientation: oriented x 4  Mood/Behavior: calm, cooperative  Best Language: 0 - No aphasia  Speech: clear    Living Environment:   People in home: spouse     Current living Arrangements: house (\"split level\")      Able to return to prior arrangements: yes       Family/Social Support:  Care provided by: self, spouse/significant other  Provides care for: no one  Marital Status:   Support system: , Children          Description of Support System: Supportive, Involved    Support Assessment: Patient communicates needs well met    Current Resources:   Patient receiving home care services: No (\"was open to home care, ended last week\")        Community Resources: None  Equipment currently used at home: cane, straight, grab bar, toilet, grab bar, tub/shower, hospital bed, shower chair, walker, rolling, walker, standard, other (see comments) (\"walk-in shower, CPAP\")  Supplies currently used at home: Incontinence Supplies    Employment/Financial:  Employment Status: retired        Financial Concerns: none   Referral to Financial Worker: No       Does the patient's insurance plan have a 3 day qualifying hospital stay waiver?  Yes     Which insurance plan 3 day waiver is available? [employer]    Will the waiver be used for post-acute placement? Undetermined at this " time    Lifestyle & Psychosocial Needs:  Social Drivers of Health     Food Insecurity: Not on file   Depression: Not at risk (6/13/2024)    PHQ-2     PHQ-2 Score: 0   Housing Stability: Not on file   Tobacco Use: Low Risk  (12/20/2024)    Patient History     Smoking Tobacco Use: Never     Smokeless Tobacco Use: Never     Passive Exposure: Not on file   Financial Resource Strain: Not on file   Alcohol Use: Not on file   Transportation Needs: Not on file   Physical Activity: Not on file   Interpersonal Safety: Not on file   Stress: Not on file   Social Connections: Unknown (3/9/2023)    [facility]    Social Connections     Frequency of Communication with Friends and Family: Not on file   Health Literacy: Not on file       Functional Status:  Prior to admission patient needed assistance:   Dependent ADLs:: Ambulation-walker  Dependent IADLs:: Cleaning, Cooking, Laundry, Shopping, Transportation       Mental Health Status:  Mental Health Status: Past Concern  Mental Health Management: Medication    Chemical Dependency Status:  Chemical Dependency Status: No Current Concerns             Values/Beliefs:  Spiritual, Cultural Beliefs, Mormon Practices, Values that affect care: no               Discussed  Partnership in Safe Discharge Planning  document with patient/family: No    Additional Information:  CM consult received for discharge planning.  Met with patient at bedside to introduce CM role and perform initial assessment.  Demographics verified and updated as needed.  Desirae lives in a split level house with her spouse [employer].  Ambulates with a walker and is independent with ADLs.  Has necessary DME at home.  Spouse assists with IADLs, although he has his own health issues per patient.  Was receiving home care services through [facility], but this ended last week.  Patient would like to use this agency again for home  care at discharge.  Requests not to use [facility] home care.  Inquired if patient would consider going to TCU at discharge if that is recommended.  Patient reports she has been to TCU in the past and would prefer to go home with home care if able.  PT/OT recommendations pending.  Family can transport.      Next Steps: CM to follow for medical progression of care, discharge recommendations, and final discharge plan.      Seb Campos CRNI

## 2024-12-21 NOTE — CONSULTS
NEUROLOGY CONSULTATION NOTE     Desirae Rey,  1950, MRN [id number] Date: 2024     [facility]   Code status:  Full Code   PCP: Daysi Bryan, [phone]      ASSESSMENT & PLAN   Diagnosis code: Stroke     Stroke  Gait difficulty  Lumbar spinal stenosis    MRI brain shows acute/subacute stroke in the right cerebellum  Carotid ultrasound  MRI brain shows possible meningioma.  Recommend neurosurgery consultation  MRI L-spine shows severe spinal stenosis.  This could be causing the leg weakness and gait difficulty.  Recommend neurosurgery consultation  Echocardiogram/cardiac monitoring  Suspect stroke to be lacunar in nature related to vascular risk factors.  Recommend aggressive control of vascular is factors.  Lipid panel and statin  Aspirin Plavix for 3 weeks followed by aspirin only  Blood pressure can be normalized  PT/OT       Chief Complaint   Patient presents with    Generalized Weakness        HISTORY OF PRESENT ILLNESS     We have been requested by Dr. Gauthier to evaluate Desirae Rey who is a 74 year old  female for evaluation of stroke.  This is a 74-year-old female with history of chronic kidney disease stage III, CHF, history of blood clots, hypertension, hyperlipidemia, chronic pain syndrome, obstructive sleep apnea, asthma who presented with some changes in mental status.  This was thought to be related to chronic renal failure and UTI.  MRI was done in the emergency room which showed an acute stroke for which neurology's been consulted.  She reports that she did have worsening weakness and some falls at home.  Does complain of numbness in her feet for the last 2 years.  Also reports some back pain/balance issues more chronically.  No acute neurological symptoms to correlate with the stroke.  No headaches.  No chest pains palpitations.     PAST MEDICAL & SURGICAL HISTORY     Medical History  Past Medical History:   Diagnosis Date    Allergic rhinitis     Arthritis of back      CHF (congestive heart failure) (H)     Chronic kidney disease     stage 3     De Quervain's disease (tenosynovitis)     Fibromyalgia, primary     GERD (gastroesophageal reflux disease)     Hematuria     chronic    High cholesterol     History of blood clots 09/01/1970    DVT, from birth control, h/o left calf    Hyperlipidemia     Hypertension     Low back pain     Osteoporosis     Pickwickian syndrome (H)     Plantar fasciitis     Proteinuria     Proteinuria     chronic    Sleep apnea     CPAP    Uncomplicated asthma      Surgical History  Past Surgical History:   Procedure Laterality Date    CERVICAL FUSION N/A 4/27/2017    Procedure: ANTERIOR CERVICAL DECOMPRESSION FUSION C5-7 BILATERAL;  Surgeon: Basim Barone MD;  Location: [facility];  Service:     CHOLECYSTECTOMY      open    COLONOSCOPY N/A 12/20/2019    Procedure: COLONOSCOPY WITH BIOPSIES;  Surgeon: Lucio Farr MD;  Location: [facility];  Service: Gastroenterology    EYE SURGERY      HAND SURGERY Right     HYSTEROSCOPY DIAGNOSTIC      JOINT REPLACEMENT      bilateral TKA    KNEE SURGERY      RELEASE CARPAL TUNNEL Bilateral         SOCIAL HISTORY     Social History     Tobacco Use    Smoking status: Never    Smokeless tobacco: Never   Vaping Use    Vaping status: Never Used   Substance Use Topics    Alcohol use: No    Drug use: No        FAMILY HISTORY     Reviewed, and family history includes Chronic Obstructive Pulmonary Disease in her father; Heart Disease in her sister; Rheumatoid Arthritis in her mother.     ALLERGIES     Allergies   Allergen Reactions    Latex Rash     Severe rash    Pregabalin Shortness Of Breath     Other Reaction(s): swelling and shortness of breath    Valsartan Unknown     Hyperkalemia    Ciprofloxacin Visual Disturbance, Hallucination and Confusion     Likely contributed visual hallucination and confusion    Colchicine Diarrhea    Dicloxacillin      Other Reaction(s): confusion, says she has  tolerated augmentin without difficulty.     Gabapentin      Other Reaction(s): Sedation    Latex Unknown     Added based on information entered during case entry, please review and add reactions, type, and severity as needed    Other Drug Allergy (See Comments) Muscle Pain (Myalgia)     Tried lovastatin and simvastatin    Other Environmental Allergy Unknown     Coverlet bandaid , 3M product; rash    Statins-Hmg-Coa Reductase Inhibitors [Statins] Muscle Pain (Myalgia)     Tried lovastatin and simvastatin    Sulfa Antibiotics Swelling     Facial swelling      Adhesive Tape Rash        REVIEW OF SYSTEMS     12 system ROS was done other than the HPI this was negative.  Pertinent positives noted in the HPI.     HOME & HOSPITAL MEDICATIONS     Prior to Admission Medications  (Not in a hospital admission)      Hospital Medications  Current Facility-Administered Medications   Medication Dose Route Frequency Provider Last Rate Last Admin    cefTRIAXone (ROCEPHIN) 2 g vial to attach to  ml bag for ADULTS or NS 50 ml bag for PEDS  2 g Intravenous Q24H Hai Li MD        cetirizine (zyrTEC) tablet 10 mg  10 mg Oral Daily Jennifer Gauthier MD   10 mg at 12/21/24 1052    fluticasone (FLONASE) 50 MCG/ACT spray 1 spray  1 spray Both Nostrils Daily Hai Li MD        fluticasone-vilanterol (BREO ELLIPTA) 100-25 MCG/ACT inhaler 1 puff  1 puff Inhalation Daily Jennifer Gauthier MD   1 puff at 12/21/24 0907    furosemide (LASIX) tablet 40 mg  40 mg Oral QAM Jennifer Gauthier MD   40 mg at 12/21/24 0904    hydroxychloroquine (PLAQUENIL) half-tab 100 mg  100 mg Oral BID Jennifer Gauthier MD   100 mg at 12/21/24 0905    magnesium oxide (MAG-OX) tablet 400 mg  400 mg Oral Daily Jennifer Gauthier MD   400 mg at 12/21/24 0904    [Held by provider] metoprolol succinate ER (TOPROL XL) 24 hr tablet 50 mg  50 mg Oral Daily Jennifer Gauthier MD        miconazole (MICATIN) 2 % powder   Topical BID Hai Li MD   Given at  "12/21/24 0909    modafinil (PROVIGIL) half-tab 250 mg  250 mg Oral Daily Jennifer Gauthier MD   250 mg at 12/21/24 0905    montelukast (SINGULAIR) tablet 10 mg  10 mg Oral QPM Jennifer Gauthier MD        rosuvastatin (CRESTOR) tablet 10 mg  10 mg Oral Daily Jennfier Gauthier MD   10 mg at 12/21/24 0904    sodium chloride (PF) 0.9% PF flush 3 mL  3 mL Intracatheter Q8H Hai Li MD        sodium chloride (PF) 0.9% PF flush 3 mL  3 mL Intracatheter Q8H Hai Li MD   3 mL at 12/21/24 0907    terbinafine (lamISIL) 1 % cream   Topical BID Jennifer Gauthier MD   Given at 12/21/24 0907    tolterodine ER (DETROL LA) 24 hr capsule 4 mg  4 mg Oral Daily Jennifer Gauthier MD   4 mg at 12/21/24 0905        PHYSICAL EXAM     Vital signs  Temp:  [98.1  F (36.7  C)-98.3  F (36.8  C)] 98.2  F (36.8  C)  Pulse:  [76-89] 83  Resp:  [22-59] 22  BP: (128-166)/(57-87) 149/68  SpO2:  [87 %-100 %] 97 %    PHYSICAL EXAMINATION  VITALS: BP (!) 149/68 (BP Location: Left arm)   Pulse 83   Temp 98.2  F (36.8  C) (Oral)   Resp 22   Ht 1.397 m (4' 7\")   Wt 90.7 kg (200 lb)   SpO2 97%   BMI 46.48 kg/m    GENERAL -Health appearing, No apparent distress  EYES- No scleral icterus, no eyelid droop, Pupils - see Neuro section  HEENT - Normocephalic, atraumatic, Hearing grossly intact; Oral mucosa moist and pink in color. External Ears and nose intact.   Neck - supple   PULM - Good spontaneous respiratory effort  CV-extremities warm to touch  MSK- Gait - see Neuro section; Strength and tone- see Neuro section; Range of motion grossly intact.  PSYCH -cooperative    Neurological  Mental status - Patient is awake and oriented to self, place and time. Attention span is normal. Language is fluent and follows commands appropriately.   Cranial nerves - CN II-XII intact. Pupils are reactive and symmetric; EOMI, VFIT, NLF symmetric  Motor - There is no pronator drift. Motor exam shows 5/5 strength in all extremities except some proximal leg " weakness.  Tone - Tone is symmetric bilaterally in upper and lower extremities.  Reflexes - Reflexes are symmetric/decreased/absent.  Sensation - Sensory exam is grossly intact to light touch, pain.  Coordination - Finger to nose is without dysmetria.  Unable to do heel-to-shin.  Gait and station --unable to safely ambulate.  Formal gait testing cannot be done due to safety concerns from ongoing medical issues.       DIAGNOSTIC STUDIES     Pertinent Radiology   MRI brain  IMPRESSION:  1.  Small acute/subacute infarct within the right cerebellum measuring 0.4 cm. No associated hemorrhage.  2.  Right anterolateral cerebral convexity enhancing nodule measuring up to 1 cm likely reflects a meningioma (in the absence of known malignancy).  3.  Chronic senescent and presumed microvascular ischemic changes, as above.  4.  Right maxillary sinus air-fluid level. Correlation for acute sinusitis is recommended.     MRI L spine  IMPRESSION:  1.  Advanced multilevel degenerative changes of the lumbar spine, as above, with grade 1 anterolisthesis at L4 on L5 and L5 on S1.  2.  Transitional lumbosacral anatomy. The S1 vertebral body is partially lumbarized. If surgery is planned, exact localization is recommended.  3.  At L4-L5 there is severe/critical central spinal canal stenosis with severe bilateral neural foraminal stenosis. Correlation for cauda equina syndrome is recommended.  4.  At L3-L4 there is severe central spinal canal stenosis with severe left and mild right neural foraminal stenosis.  5.  At L2-L3 there is severe central spinal canal stenosis with severe left and moderate right neural foraminal stenosis.  6.  At L1-L2 there is mild central spinal canal stenosis with severe right neural foraminal stenosis.  7.  At L5-S1 there is moderate left and mild to moderate right neural foraminal stenosis.  8.  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  9.  Fluid in the bilateral facet joints is noted at L3-L4 and L4-L5  with mild adjacent elroy-facet enhancement. These findings are favored to reflect a degenerative facet synovitis, however, an infectious/inflammatory facet synovitis could have a similar   appearance.  10.  At T11-T12 there is a concentric disc bulge with a superimposed left lateral recess disc extrusion with cranial migration. At this level there is severe central spinal canal stenosis, severe left neural foraminal stenosis, and moderate right neural   foraminal stenosis.  11.  Dextroconvex curvature of lumbar spine centered at L2-L3.    Recent Results (from the past 24 hours)   Extra Green Top (Lithium Heparin) Tube    Collection Time: 12/20/24  5:19 PM   Result Value Ref Range    Hold Specimen JIC    Extra Purple Top Tube    Collection Time: 12/20/24  5:19 PM   Result Value Ref Range    Hold Specimen JIC    Extra Green Top (Lithium Heparin) ON ICE    Collection Time: 12/20/24  5:19 PM   Result Value Ref Range    Hold Specimen JIC    Basic metabolic panel    Collection Time: 12/20/24  5:19 PM   Result Value Ref Range    Sodium 143 135 - 145 mmol/L    Potassium 4.1 3.4 - 5.3 mmol/L    Chloride 103 98 - 107 mmol/L    Carbon Dioxide (CO2) 23 22 - 29 mmol/L    Anion Gap 17 (H) 7 - 15 mmol/L    Urea Nitrogen 60.9 (H) 8.0 - 23.0 mg/dL    Creatinine 2.13 (H) 0.51 - 0.95 mg/dL    GFR Estimate 24 (L) >60 mL/min/1.73m2    Calcium 10.4 8.8 - 10.4 mg/dL    Glucose 145 (H) 70 - 99 mg/dL   Hepatic function panel    Collection Time: 12/20/24  5:19 PM   Result Value Ref Range    Protein Total 7.1 6.4 - 8.3 g/dL    Albumin 4.0 3.5 - 5.2 g/dL    Bilirubin Total 0.4 <=1.2 mg/dL    Alkaline Phosphatase 85 40 - 150 U/L    AST 98 (H) 0 - 45 U/L    ALT 42 0 - 50 U/L    Bilirubin Direct <0.20 0.00 - 0.30 mg/dL   Lactic acid whole blood with 1x repeat in 2 hr when >2    Collection Time: 12/20/24  5:19 PM   Result Value Ref Range    Lactic Acid, Initial 1.0 0.7 - 2.0 mmol/L   Procalcitonin    Collection Time: 12/20/24  5:19 PM   Result  Value Ref Range    Procalcitonin 0.21 <0.50 ng/mL   Troponin T, High Sensitivity    Collection Time: 12/20/24  5:19 PM   Result Value Ref Range    Troponin T, High Sensitivity 48 (H) <=14 ng/L   TSH with free T4 reflex    Collection Time: 12/20/24  5:19 PM   Result Value Ref Range    TSH 1.26 0.30 - 4.20 uIU/mL   Nt probnp inpatient (BNP)    Collection Time: 12/20/24  5:19 PM   Result Value Ref Range    N terminal Pro BNP Inpatient 2,904 (H) 0 - 900 pg/mL   CRP inflammation    Collection Time: 12/20/24  5:19 PM   Result Value Ref Range    CRP Inflammation 73.10 (H) <5.00 mg/L   Ethyl Alcohol Level    Collection Time: 12/20/24  5:19 PM   Result Value Ref Range    Alcohol ethyl <0.01 <=0.01 g/dL   CBC with platelets and differential    Collection Time: 12/20/24  5:19 PM   Result Value Ref Range    WBC Count 11.0 4.0 - 11.0 10e3/uL    RBC Count 4.02 3.80 - 5.20 10e6/uL    Hemoglobin 12.7 11.7 - 15.7 g/dL    Hematocrit 39.4 35.0 - 47.0 %    MCV 98 78 - 100 fL    MCH 31.6 26.5 - 33.0 pg    MCHC 32.2 31.5 - 36.5 g/dL    RDW 12.6 10.0 - 15.0 %    Platelet Count 250 150 - 450 10e3/uL    % Neutrophils 77 %    % Lymphocytes 10 %    % Monocytes 13 %    % Eosinophils 1 %    % Basophils 0 %    % Immature Granulocytes 1 %    NRBCs per 100 WBC 0 <1 /100    Absolute Neutrophils 8.4 (H) 1.6 - 8.3 10e3/uL    Absolute Lymphocytes 1.0 0.8 - 5.3 10e3/uL    Absolute Monocytes 1.4 (H) 0.0 - 1.3 10e3/uL    Absolute Eosinophils 0.1 0.0 - 0.7 10e3/uL    Absolute Basophils 0.0 0.0 - 0.2 10e3/uL    Absolute Immature Granulocytes 0.1 <=0.4 10e3/uL    Absolute NRBCs 0.0 10e3/uL   Hemoglobin A1c    Collection Time: 12/20/24  5:19 PM   Result Value Ref Range    Estimated Average Glucose 126 (H) <117 mg/dL    Hemoglobin A1C 6.0 (H) <5.7 %   Blood gas venous    Collection Time: 12/20/24  6:21 PM   Result Value Ref Range    pH Venous 7.33 7.32 - 7.43    pCO2 Venous 53 (H) 40 - 50 mm Hg    pO2 Venous 52 (H) 25 - 47 mm Hg    Bicarbonate Venous 28 21 -  28 mmol/L    Base Excess/Deficit Venous 1.4 -3.0 - 3.0 mmol/L    FIO2 24     Oxyhemoglobin Venous 82 (H) 70 - 75 %    O2 Sat, Venous 83.2 (H) 70.0 - 75.0 %   Erythrocyte sedimentation rate auto    Collection Time: 12/20/24  6:21 PM   Result Value Ref Range    Erythrocyte Sedimentation Rate 29 0 - 30 mm/hr   Troponin T, High Sensitivity    Collection Time: 12/20/24  7:39 PM   Result Value Ref Range    Troponin T, High Sensitivity 46 (H) <=14 ng/L   Lipid panel reflex to direct LDL: Non-fasting    Collection Time: 12/20/24  7:39 PM   Result Value Ref Range    Cholesterol 143 <200 mg/dL    Triglycerides 91 <150 mg/dL    Direct Measure HDL 56 >=50 mg/dL    LDL Cholesterol Calculated 69 <100 mg/dL    Non HDL Cholesterol 87 <130 mg/dL   UA with Microscopic reflex to Culture    Collection Time: 12/20/24  8:19 PM    Specimen: Urine, Catheter   Result Value Ref Range    Color Urine Light Yellow Colorless, Straw, Light Yellow, Yellow    Appearance Urine Clear Clear    Glucose Urine Negative Negative mg/dL    Bilirubin Urine Negative Negative    Ketones Urine Negative Negative mg/dL    Specific Gravity Urine 1.014 1.001 - 1.030    Blood Urine 0.2 mg/dL (A) Negative    pH Urine 5.5 5.0 - 7.0    Protein Albumin Urine 50 (A) Negative mg/dL    Urobilinogen Urine <2.0 <2.0 mg/dL    Nitrite Urine Negative Negative    Leukocyte Esterase Urine 250 Marciano/uL (A) Negative    Bacteria Urine Few (A) None Seen /HPF    RBC Urine 1 <=2 /HPF    WBC Urine 17 (H) <=5 /HPF    Squamous Epithelials Urine <1 <=1 /HPF    Hyaline Casts Urine 6 (H) <=2 /LPF   Urine Drug Screen Panel    Collection Time: 12/20/24  8:19 PM   Result Value Ref Range    Amphetamines Urine Screen Negative Screen Negative    Barbituates Urine Screen Negative Screen Negative    Benzodiazepine Urine Screen Negative Screen Negative    Cannabinoids Urine Screen Negative Screen Negative    Cocaine Urine Screen Negative Screen Negative    Fentanyl Qual Urine Screen Negative Screen  Negative    Opiates Urine Screen Positive (A) Screen Negative    PCP Urine Screen Negative Screen Negative   Glucose by meter    Collection Time: 12/21/24  5:01 AM   Result Value Ref Range    GLUCOSE BY METER POCT 119 (H) 70 - 99 mg/dL   Comprehensive metabolic panel    Collection Time: 12/21/24  5:45 AM   Result Value Ref Range    Sodium 146 (H) 135 - 145 mmol/L    Potassium 4.1 3.4 - 5.3 mmol/L    Carbon Dioxide (CO2) 25 22 - 29 mmol/L    Anion Gap 13 7 - 15 mmol/L    Urea Nitrogen 50.4 (H) 8.0 - 23.0 mg/dL    Creatinine 1.67 (H) 0.51 - 0.95 mg/dL    GFR Estimate 32 (L) >60 mL/min/1.73m2    Calcium 9.4 8.8 - 10.4 mg/dL    Chloride 108 (H) 98 - 107 mmol/L    Glucose 119 (H) 70 - 99 mg/dL    Alkaline Phosphatase 75 40 - 150 U/L    AST 88 (H) 0 - 45 U/L    ALT 40 0 - 50 U/L    Protein Total 6.1 (L) 6.4 - 8.3 g/dL    Albumin 3.4 (L) 3.5 - 5.2 g/dL    Bilirubin Total 0.3 <=1.2 mg/dL   CBC with platelets and differential    Collection Time: 12/21/24  5:45 AM   Result Value Ref Range    WBC Count 7.5 4.0 - 11.0 10e3/uL    RBC Count 3.73 (L) 3.80 - 5.20 10e6/uL    Hemoglobin 11.6 (L) 11.7 - 15.7 g/dL    Hematocrit 37.3 35.0 - 47.0 %     78 - 100 fL    MCH 31.1 26.5 - 33.0 pg    MCHC 31.1 (L) 31.5 - 36.5 g/dL    RDW 12.8 10.0 - 15.0 %    Platelet Count 202 150 - 450 10e3/uL    % Neutrophils 74 %    % Lymphocytes 13 %    % Monocytes 11 %    % Eosinophils 2 %    % Basophils 0 %    % Immature Granulocytes 1 %    NRBCs per 100 WBC 0 <1 /100    Absolute Neutrophils 5.5 1.6 - 8.3 10e3/uL    Absolute Lymphocytes 0.9 0.8 - 5.3 10e3/uL    Absolute Monocytes 0.8 0.0 - 1.3 10e3/uL    Absolute Eosinophils 0.1 0.0 - 0.7 10e3/uL    Absolute Basophils 0.0 0.0 - 0.2 10e3/uL    Absolute Immature Granulocytes 0.0 <=0.4 10e3/uL    Absolute NRBCs 0.0 10e3/uL       Total time spent for face to face visit, reviewing labs/imaging studies, counseling and coordination of care was: Over 80 min More than 50% of this time was spent on  counseling and coordination of care.    Counseling patient.  Coordination of care with the nursing staff.  Patient new to me.  Reviewing chart.  High risk.    Preston Marroquin MD  Neurologist  [facility]  Tel:- [phone]

## 2024-12-21 NOTE — PROGRESS NOTES
[facility] Neurosurgery  Telephone Note    Contacted by Dr. Solis at [facility] ED.     74F on aspirin with history of HTN, CHF, CKD. Presented with altered mental status and falls x 3 this week. NSGY was consulted for severe stenosis at L2-3, L3-4, and L4-5. Neurology was consulted for small acute/subacute infarct within right cerebellum. Work up also revealed UTI.     Exam per ED:  Awake   Encephalopathy  Oriented to self and place  Patient has history of chronic back pain but denies any back pain today.   Denies radicular pain, numbness, saddle anesthesia.   Patient had 1 episode of urinary incontinence today, then urinary retention 800ml, robledo catheter was placed.   Generalized weakness  Difficulty lifting legs off the bed   ED planning to check rectal tone     Imaging:  EXAM: MR LUMBAR SPINE W/O and W CONTRAST  LOCATION: [facility]  DATE: 12/21/2024                                                                  IMPRESSION:  1.  Advanced multilevel degenerative changes of the lumbar spine, as above, with grade 1 anterolisthesis L4 on L5 and L5 on S1.  2.  Transitional lumbosacral anatomy. The S1 vertebral body is partially lumbarized.  3.  At L4 on L5 there is severe/critical central spinal canal stenosis with severe bilateral neural foraminal stenosis. Correlation for cauda equina syndrome is recommended.  4.  At L3 on L4 there is severe central spinal canal stenosis with severe left and mild right neural foraminal stenosis.  5.  At L2 on L3 there is severe central spinal canal stenosis with severe left and moderate right neural foraminal stenosis.  6.  At L1 on L2 there is mild central spinal canal stenosis with severe right neural foraminal stenosis.  7.  At L5 on S1 there is moderate left and mild to moderate right neural foraminal stenosis.  8.  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  9.  Fluid in the bilateral facet joints is noted at L3-L4 and  L4-L5 with mild adjacent elroy-facet enhancement. These findings are favored to reflect a degenerative facet synovitis, however, infectious/inflammatory facet synovitis could have a similar   appearance.  10.  At T11-T12 there is a concentric disc bulge with superimposed left lateral recess disc extrusion with cranial migration. At this level there is severe central spinal canal stenosis, severe left neural foraminal stenosis, and moderate right neural   foraminal stenosis.    Plan:   -Admission through Hospitalist   -Neurology following for stroke management  -Cervical and thoracic spine MRI   -Hold any blood thinners, aspirin, NSAIDS until possible surgical plan is confirmed   -NPO   -Monitor neuro exam   -Formal NSGY consult to follow     Discussed with Dr. Colin Valerio, [facility]  Tel [phone]  Pager [phone]

## 2024-12-22 ENCOUNTER — APPOINTMENT (OUTPATIENT)
Dept: PHYSICAL THERAPY | Facility: HOSPITAL | Age: 74
DRG: 064 | End: 2024-12-22
Attending: STUDENT IN AN ORGANIZED HEALTH CARE EDUCATION/TRAINING PROGRAM
Payer: MEDICARE

## 2024-12-22 ENCOUNTER — APPOINTMENT (OUTPATIENT)
Dept: CARDIOLOGY | Facility: HOSPITAL | Age: 74
DRG: 064 | End: 2024-12-22
Attending: STUDENT IN AN ORGANIZED HEALTH CARE EDUCATION/TRAINING PROGRAM
Payer: MEDICARE

## 2024-12-22 PROBLEM — M48.061 STENOSIS, SPINAL, LUMBAR: Status: ACTIVE | Noted: 2024-12-22

## 2024-12-22 PROBLEM — J96.11 CHRONIC RESPIRATORY FAILURE WITH HYPOXIA (H): Status: ACTIVE | Noted: 2024-12-22

## 2024-12-22 PROBLEM — G93.41 ACUTE METABOLIC ENCEPHALOPATHY: Status: ACTIVE | Noted: 2024-12-22

## 2024-12-22 PROBLEM — I63.9 ACUTE ISCHEMIC STROKE (H): Status: ACTIVE | Noted: 2024-12-22

## 2024-12-22 PROBLEM — E66.2 OBESITY HYPOVENTILATION SYNDROME (H): Status: ACTIVE | Noted: 2024-12-22

## 2024-12-22 PROBLEM — R33.8 ACUTE URINARY RETENTION: Status: ACTIVE | Noted: 2024-12-22

## 2024-12-22 LAB
ANION GAP SERPL CALCULATED.3IONS-SCNC: 11 MMOL/L (ref 7–15)
BACTERIA UR CULT: ABNORMAL
BUN SERPL-MCNC: 49.4 MG/DL (ref 8–23)
CALCIUM SERPL-MCNC: 9.3 MG/DL (ref 8.8–10.4)
CHLORIDE SERPL-SCNC: 106 MMOL/L (ref 98–107)
CREAT SERPL-MCNC: 1.57 MG/DL (ref 0.51–0.95)
EGFRCR SERPLBLD CKD-EPI 2021: 34 ML/MIN/1.73M2
ERYTHROCYTE [DISTWIDTH] IN BLOOD BY AUTOMATED COUNT: 12.8 % (ref 10–15)
GLUCOSE BLDC GLUCOMTR-MCNC: 101 MG/DL (ref 70–99)
GLUCOSE SERPL-MCNC: 107 MG/DL (ref 70–99)
HCO3 SERPL-SCNC: 25 MMOL/L (ref 22–29)
HCT VFR BLD AUTO: 35.1 % (ref 35–47)
HGB BLD-MCNC: 11.1 G/DL (ref 11.7–15.7)
LVEF ECHO: NORMAL
MAGNESIUM SERPL-MCNC: 2.1 MG/DL (ref 1.7–2.3)
MCH RBC QN AUTO: 32 PG (ref 26.5–33)
MCHC RBC AUTO-ENTMCNC: 31.6 G/DL (ref 31.5–36.5)
MCV RBC AUTO: 101 FL (ref 78–100)
PHOSPHATE SERPL-MCNC: 3.2 MG/DL (ref 2.5–4.5)
PLATELET # BLD AUTO: 211 10E3/UL (ref 150–450)
POTASSIUM SERPL-SCNC: 3.8 MMOL/L (ref 3.4–5.3)
RBC # BLD AUTO: 3.47 10E6/UL (ref 3.8–5.2)
SODIUM SERPL-SCNC: 142 MMOL/L (ref 135–145)
WBC # BLD AUTO: 7.4 10E3/UL (ref 4–11)

## 2024-12-22 PROCEDURE — 250N000011 HC RX IP 250 OP 636: Performed by: STUDENT IN AN ORGANIZED HEALTH CARE EDUCATION/TRAINING PROGRAM

## 2024-12-22 PROCEDURE — 93010 ELECTROCARDIOGRAM REPORT: CPT | Mod: HIP | Performed by: INTERNAL MEDICINE

## 2024-12-22 PROCEDURE — 93005 ELECTROCARDIOGRAM TRACING: CPT | Performed by: STUDENT IN AN ORGANIZED HEALTH CARE EDUCATION/TRAINING PROGRAM

## 2024-12-22 PROCEDURE — 99232 SBSQ HOSP IP/OBS MODERATE 35: CPT | Performed by: PSYCHIATRY & NEUROLOGY

## 2024-12-22 PROCEDURE — 36415 COLL VENOUS BLD VENIPUNCTURE: CPT | Performed by: STUDENT IN AN ORGANIZED HEALTH CARE EDUCATION/TRAINING PROGRAM

## 2024-12-22 PROCEDURE — 97530 THERAPEUTIC ACTIVITIES: CPT | Mod: GP

## 2024-12-22 PROCEDURE — 999N000208 ECHOCARDIOGRAM COMPLETE

## 2024-12-22 PROCEDURE — 85048 AUTOMATED LEUKOCYTE COUNT: CPT | Performed by: STUDENT IN AN ORGANIZED HEALTH CARE EDUCATION/TRAINING PROGRAM

## 2024-12-22 PROCEDURE — 84100 ASSAY OF PHOSPHORUS: CPT | Performed by: STUDENT IN AN ORGANIZED HEALTH CARE EDUCATION/TRAINING PROGRAM

## 2024-12-22 PROCEDURE — 93306 TTE W/DOPPLER COMPLETE: CPT | Mod: 26 | Performed by: INTERNAL MEDICINE

## 2024-12-22 PROCEDURE — 250N000013 HC RX MED GY IP 250 OP 250 PS 637: Performed by: INTERNAL MEDICINE

## 2024-12-22 PROCEDURE — 93005 ELECTROCARDIOGRAM TRACING: CPT

## 2024-12-22 PROCEDURE — 250N000013 HC RX MED GY IP 250 OP 250 PS 637: Performed by: STUDENT IN AN ORGANIZED HEALTH CARE EDUCATION/TRAINING PROGRAM

## 2024-12-22 PROCEDURE — 99232 SBSQ HOSP IP/OBS MODERATE 35: CPT | Performed by: STUDENT IN AN ORGANIZED HEALTH CARE EDUCATION/TRAINING PROGRAM

## 2024-12-22 PROCEDURE — 85018 HEMOGLOBIN: CPT | Performed by: STUDENT IN AN ORGANIZED HEALTH CARE EDUCATION/TRAINING PROGRAM

## 2024-12-22 PROCEDURE — 250N000009 HC RX 250: Performed by: STUDENT IN AN ORGANIZED HEALTH CARE EDUCATION/TRAINING PROGRAM

## 2024-12-22 PROCEDURE — 120N000001 HC R&B MED SURG/OB

## 2024-12-22 PROCEDURE — 85027 COMPLETE CBC AUTOMATED: CPT | Performed by: STUDENT IN AN ORGANIZED HEALTH CARE EDUCATION/TRAINING PROGRAM

## 2024-12-22 PROCEDURE — 80048 BASIC METABOLIC PNL TOTAL CA: CPT | Performed by: STUDENT IN AN ORGANIZED HEALTH CARE EDUCATION/TRAINING PROGRAM

## 2024-12-22 PROCEDURE — 250N000013 HC RX MED GY IP 250 OP 250 PS 637: Performed by: PSYCHIATRY & NEUROLOGY

## 2024-12-22 PROCEDURE — 97161 PT EVAL LOW COMPLEX 20 MIN: CPT | Mod: GP

## 2024-12-22 PROCEDURE — 82747 ASSAY OF FOLIC ACID RBC: CPT | Performed by: STUDENT IN AN ORGANIZED HEALTH CARE EDUCATION/TRAINING PROGRAM

## 2024-12-22 PROCEDURE — 83735 ASSAY OF MAGNESIUM: CPT | Performed by: STUDENT IN AN ORGANIZED HEALTH CARE EDUCATION/TRAINING PROGRAM

## 2024-12-22 RX ORDER — METOPROLOL TARTRATE 1 MG/ML
2.5 INJECTION, SOLUTION INTRAVENOUS EVERY 4 HOURS PRN
Status: DISCONTINUED | OUTPATIENT
Start: 2024-12-22 | End: 2024-12-24 | Stop reason: HOSPADM

## 2024-12-22 RX ORDER — METOPROLOL SUCCINATE 50 MG/1
50 TABLET, EXTENDED RELEASE ORAL DAILY
Status: DISCONTINUED | OUTPATIENT
Start: 2024-12-22 | End: 2024-12-24 | Stop reason: HOSPADM

## 2024-12-22 RX ORDER — IPRATROPIUM BROMIDE AND ALBUTEROL SULFATE 2.5; .5 MG/3ML; MG/3ML
3 SOLUTION RESPIRATORY (INHALATION) EVERY 4 HOURS PRN
Status: DISCONTINUED | OUTPATIENT
Start: 2024-12-22 | End: 2024-12-24 | Stop reason: HOSPADM

## 2024-12-22 RX ADMIN — ASPIRIN 81 MG CHEWABLE TABLET 81 MG: 81 TABLET CHEWABLE at 10:44

## 2024-12-22 RX ADMIN — HYDROCODONE BITARTRATE AND ACETAMINOPHEN 1 TABLET: 5; 325 TABLET ORAL at 10:48

## 2024-12-22 RX ADMIN — MICONAZOLE NITRATE ANTIFUNGAL POWDER: 2 POWDER TOPICAL at 21:02

## 2024-12-22 RX ADMIN — HYDROXYCHLOROQUINE SULFATE 100 MG: 200 TABLET, FILM COATED ORAL at 10:50

## 2024-12-22 RX ADMIN — CETIRIZINE HYDROCHLORIDE 10 MG: 10 TABLET, FILM COATED ORAL at 10:45

## 2024-12-22 RX ADMIN — MICONAZOLE NITRATE ANTIFUNGAL POWDER: 2 POWDER TOPICAL at 10:54

## 2024-12-22 RX ADMIN — FUROSEMIDE 40 MG: 20 TABLET ORAL at 10:47

## 2024-12-22 RX ADMIN — MAGNESIUM OXIDE TAB 400 MG (241.3 MG ELEMENTAL MG) 400 MG: 400 (241.3 MG) TAB at 10:49

## 2024-12-22 RX ADMIN — CEFTRIAXONE SODIUM 2 G: 2 INJECTION, POWDER, FOR SOLUTION INTRAMUSCULAR; INTRAVENOUS at 20:58

## 2024-12-22 RX ADMIN — MONTELUKAST 10 MG: 10 TABLET, FILM COATED ORAL at 21:01

## 2024-12-22 RX ADMIN — HYDROXYCHLOROQUINE SULFATE 100 MG: 200 TABLET, FILM COATED ORAL at 21:01

## 2024-12-22 RX ADMIN — CLOPIDOGREL BISULFATE 75 MG: 75 TABLET ORAL at 10:47

## 2024-12-22 RX ADMIN — METOPROLOL TARTRATE 2.5 MG: 5 INJECTION INTRAVENOUS at 17:35

## 2024-12-22 RX ADMIN — ROSUVASTATIN 10 MG: 10 TABLET, FILM COATED ORAL at 10:46

## 2024-12-22 RX ADMIN — METOPROLOL SUCCINATE 50 MG: 50 TABLET, EXTENDED RELEASE ORAL at 17:34

## 2024-12-22 RX ADMIN — FLUTICASONE FUROATE AND VILANTEROL TRIFENATATE 1 PUFF: 100; 25 POWDER RESPIRATORY (INHALATION) at 10:45

## 2024-12-22 RX ADMIN — MODAFINIL 250 MG: 100 TABLET ORAL at 10:43

## 2024-12-22 RX ADMIN — TERBINAFINE HYDROCHLORIDE: 1 CREAM TOPICAL at 10:54

## 2024-12-22 RX ADMIN — TOLTERODINE 4 MG: 2 CAPSULE, EXTENDED RELEASE ORAL at 10:49

## 2024-12-22 ASSESSMENT — ACTIVITIES OF DAILY LIVING (ADL)
TOILETING_ISSUES: YES
ADLS_ACUITY_SCORE: 65
ADLS_ACUITY_SCORE: 66
ADLS_ACUITY_SCORE: 63
ADLS_ACUITY_SCORE: 65
ADLS_ACUITY_SCORE: 67
DOING_ERRANDS_INDEPENDENTLY_DIFFICULTY: YES
ADLS_ACUITY_SCORE: 67
HEARING_DIFFICULTY_OR_DEAF: NO
ADLS_ACUITY_SCORE: 66
TOILETING_ASSISTANCE: TOILETING DIFFICULTY, ASSISTANCE 1 PERSON;TOILETING DIFFICULTY, REQUIRES EQUIPMENT
ADLS_ACUITY_SCORE: 65
DRESSING/BATHING: BATHING DIFFICULTY, ASSISTANCE 1 PERSON;DRESSING DIFFICULTY, ASSISTANCE 1 PERSON
ADLS_ACUITY_SCORE: 66
ADLS_ACUITY_SCORE: 67
WEAR_GLASSES_OR_BLIND: NO
ADLS_ACUITY_SCORE: 65
WALKING_OR_CLIMBING_STAIRS: AMBULATION DIFFICULTY, REQUIRES EQUIPMENT;AMBULATION DIFFICULTY, ASSISTANCE 1 PERSON;STAIR CLIMBING DIFFICULTY, REQUIRES EQUIPMENT;STAIR CLIMBING DIFFICULTY, ASSISTANCE 1 PERSON;TRANSFERRING DIFFICULTY, REQUIRES EQUIPMENT;TRANSFERRING DIFFICULTY, ASSISTANCE 1 PERSON
TOILETING: 1-->ASSISTANCE (EQUIPMENT/PERSON) NEEDED (NOT DEVELOPMENTALLY APPROPRIATE)
ADLS_ACUITY_SCORE: 65
NUMBER_OF_TIMES_PATIENT_HAS_FALLEN_WITHIN_LAST_SIX_MONTHS: 4
ADLS_ACUITY_SCORE: 67
CONCENTRATING,_REMEMBERING_OR_MAKING_DECISIONS_DIFFICULTY: NO
FALL_HISTORY_WITHIN_LAST_SIX_MONTHS: YES
WALKING_OR_CLIMBING_STAIRS_DIFFICULTY: YES
ADLS_ACUITY_SCORE: 65
ADLS_ACUITY_SCORE: 66
ADLS_ACUITY_SCORE: 65
DIFFICULTY_COMMUNICATING: NO
ADLS_ACUITY_SCORE: 67
DIFFICULTY_EATING/SWALLOWING: NO
TOILETING: 1-->ASSISTANCE (EQUIPMENT/PERSON) NEEDED
ADLS_ACUITY_SCORE: 66
ADLS_ACUITY_SCORE: 65
CHANGE_IN_FUNCTIONAL_STATUS_SINCE_ONSET_OF_CURRENT_ILLNESS/INJURY: YES
ADLS_ACUITY_SCORE: 66
ADLS_ACUITY_SCORE: 65
DRESSING/BATHING_DIFFICULTY: YES

## 2024-12-22 NOTE — PROGRESS NOTES
12/22/24 0850   Appointment Info   Signing Clinician's Name / Credentials (PT) Monse Viramontes DPT   Living Environment   People in Home spouse;other (see comments)  (pt told PT spouse is doing well, contrary to what was told to OT yesterday.)   Current Living Arrangements house   Home Accessibility other (see comments);stairs to enter home;stairs within home  (split level)   Number of Stairs, Main Entrance 2   Stair Railings, Main Entrance none   Number of Stairs, Within Home, Primary seven   Stair Railings, Within Home, Primary other (see comments)  (rail on one side to basement and both sides up to main level)   Living Environment Comments difficulty with stairs getting up , needing leg    Self-Care   Usual Activity Tolerance moderate   Current Activity Tolerance fair   Equipment Currently Used at Home cane, straight;grab bar, toilet;grab bar, tub/shower;hospital bed;shower chair;walker, rolling   Fall history within last six months yes   Number of times patient has fallen within last six months 4  (in last week)   Activity/Exercise/Self-Care Comment Able to do most ADL like brush teeth, shower except back, needs assist to put hair up and sometimes needs help with shoes.   General Information   Onset of Illness/Injury or Date of Surgery 12/20/24   Referring Physician Hai Li MD   Patient/Family Therapy Goals Statement (PT) go home, not TCU   Pertinent History of Current Problem (include personal factors and/or comorbidities that impact the POC) Desirae Rey is a 74 year old female With a medical history significant for CKD stage III, CHF, history of blood clots, hypertension, hyperlipidemia, chronic pain syndrome, obstructive sleep apnea, asthma and other medical comorbidities who is admitted with change in mental status thought to be multifactorial from medication sedation and possible acute on chronic renal failure.   Existing Precautions/Restrictions fall   Cognition   Affect/Mental  Status (Cognition) WFL   Pain Assessment   Patient Currently in Pain   (low back - chronic)   Range of Motion (ROM)   Range of Motion ROM is WFL   ROM Comment BLEs   Strength (Manual Muscle Testing)   Strength (Manual Muscle Testing) Deficits observed during functional mobility   Bed Mobility   Bed Mobility supine-sit   Supine-Sit [place] (Bed Mobility) minimum assist (75% patient effort);moderate assist (50% patient effort)   Assistive Device (Bed Mobility) draw sheet;bed rails   Transfers   Transfers sit-stand transfer;bed-chair transfer   Bed-Chair Transfer   Assistive Device (Bed-Chair Transfers) walker, front-wheeled   Bed-Chair [place] (Transfers) minimum assist (75% patient effort)   Sit-Stand Transfer   Sit-Stand [place] (Transfers) contact guard;minimum assist (75% patient effort)   Assistive Device (Sit-Stand Transfers) walker, front-wheeled   Gait/Stairs (Locomotion)   Comment, (Gait/Stairs) initially just wanting to get to chair, fatigues   Clinical Impression   Criteria for Skilled Therapeutic Intervention Yes, treatment indicated   PT Diagnosis (PT) impaired functional mobility, gait abnormality   Influenced by the following impairments decreased strength, decreased endurance   Functional limitations due to impairments gait, transfers, bed mob, stairs   Clinical Presentation (PT Evaluation Complexity) stable   Clinical Presentation Rationale pt presents as medically diagnosed   Clinical Decision Making (Complexity) low complexity   Planned Therapy Interventions (PT) balance training;bed mobility training;gait training;home exercise program;neuromuscular re-education;patient/family education;strengthening;transfer training;stair training   Risk & Benefits of therapy have been explained evaluation/treatment results reviewed;patient   PT Total Evaluation Time   PT Eval, Low Complexity Minutes (49596) 10   Physical Therapy Goals   PT Frequency 6x/week   PT Predicted Duration/Target Date for  Goal Attainment 12/29/24   PT Goals Bed Mobility;Transfers;Gait;Stairs   PT: Bed Mobility Supervision/stand-by assist;Supine to/from sit   PT: Transfers Supervision/stand-by assist;Sit to/from stand;Bed to/from chair;Assistive device   PT: Gait Supervision/stand-by assist;Assistive device;Rolling walker;50 feet   PT: Stairs Minimal assist;7 stairs;Rail on both sides   Interventions   Interventions Quick Adds Therapeutic Activity   Therapeutic Activity   Therapeutic Activities: dynamic activities to improve functional performance Minutes (96354) 25   Symptoms Noted During/After Treatment Increased pain;Fatigue   Treatment Detail/Skilled Intervention Static sitting EOB x 10 minutes with SBA. sit <> stand x 2 from chair CGA-Brandon with FWW, cues for hand placement, pulls to stand with walker. Static standing x 1 minute x 2 reps with FWW SBA. Amb x 10' with FWW CGA-Brandon. Limited by fatigue, on 3L O2 per pt's normal at home. chair to commode CGA with FWW, unsteady at times, needing inc v/c for technique and safety. On commode following PT, call lighti n hand, RN and NA notified of pt's current position   PT Discharge Planning   PT Plan amb w/ FWW, bring O2, sit <> stand, bed mob   PT Discharge Recommendation (DC Rec) Transitional Care Facility   PT Rationale for DC Rec may be able to d/c to home after hospitalization, currently needing TCU for poor mobility (min-modA x 1 for bed mobility, sit <> stand CGA-Brandon, EOB > chair Brandon with FWW)   PT Brief overview of current status CGA-Brandon x 1 sit <> stand, EOB > chair, min-modA x 1 supine > sit   PT Equipment Needed at Discharge walker, rolling   Physical Therapy Time and Intention   Timed Code Treatment Minutes 25   Total Session Time (sum of timed and untimed services) 35

## 2024-12-22 NOTE — PROGRESS NOTES
[facility]    Medicine Progress Note - Hospitalist Service    Date of Admission:  12/20/2024    Assessment & Plan   Desirae Rey is a 74 year old female With a medical history significant for CKD stage III, CHF, history of blood clots, hypertension, hyperlipidemia, chronic pain syndrome, obstructive sleep apnea, asthma and other medical comorbidities who is admitted with change in mental status thought to be multifactorial from medication sedation and possible acute on chronic renal failure.    #Acute Ischemic Cerebellar Stroke  - Patient presented with altered mental status.  - MRI Brain showing acute/subacute stroke in right cerebellum.  - MRA Brain without any occlusion/stenosis of major brain arteries.  - Carotid US 12/21 with less than 50% stenosis bilaterally.  - Echo TTE with Bubble on 12/21 - no shunt on bubble study.  - Suspect stroke to be lacunar in nature per TOAST criteria - related to vascular risk factors.   Plan  - Neurology consulted, appreciate recommendations  - Cardiac Monitor - showing new atrial fibrillation 12/22 - see below  - Started aspirin 81mg daily  - Started Plavix 75mg daily  - Continue home rosuvastatin 10mg daily  - PT/OT - recommending TCU - patient wants home health care.  - 30 day holter on DC    #New Onset Non-Valvular Atrial Fibrillation with RVR  - Cardiac monitor showing what appears to be Atrial Fibrillation 12/22 PM.  - TSH normal 1.26 this admission. Electrolytes have been in-range.  - Echo this admission showing only mild mitral stenosis but with left atrial dilation.  - Already taking Metoprolol XR 50mg daily for HF/HTN  Plan  - Checking EKG to confirm rhythm vs other SVT  - Continue home metoprolol XR 50mg daily  - Metoprolol 2.5mg IV prn for HR above 120.  - Will discuss anticoagulation plan with Neurology      #Severe Lumbar Spinal Stenosis  #History of cervical fusion surgery in 2017   - Patient with acute non-focal lower extremities weakness  in ED.  - MRI L-spine showing severe stenosis at L2-3, L3-4, and L4-5.  - No evidence of cauda equina syndrome on MRI.  Plan  - Neurosurgery consulted  - Patient is not interested in surgical intervention at this time.  She prefers to continue with nonsurgical treatment.  Therefore, further imaging of the spine does not seem to be necessary from a neurosurgical standpoint.   - Follow-up with the [facility]     #Acute Uncomplicated Urinary Tract Infection  #Acute Urinary Retention  #Intermittent Urinary Incontinence  - UA in ED showing Leuk Esterase and WBCs.  - Distended bladder noted on CT, possibly neurogenic in nature. Robledo catheter placed on admission  - No cauda equina syndrome on MRI L-spine.  - Patient uses Solifenacin at home for incontinence.  Plan:  - Continue ceftriaxone (started 12/21)  - Follow up on urine culture results  - Plan to remove robledo catheter and trial void on 12/23  - Hold urology consultation for now unless symptoms worsen or change.  - Continue home Solifenacin equivalent (Tolterodine)    #Acute Kidney Injury  - Baseline creatinine appears to be ~1.1  - Creatinine on admission 12/20 was 2.13  - Etiology thought to be Obstructive Uropathy (having acute urinary retention) vs Sepsis from UTI  Plan  - Follow daily BMP until improvement  - Hold home ACE/ARB and Diuretics      #Chronic HFpEF  #Pulmonary Hypertension  - Echo 2023 showing EF 60-65% with normal diastolic dysfunction. RV systolic function decreased with signs of pulmonary hypertension  - Home medications include Lasix 40mg daily, metoprolol 50mg daily,   Plan:  - Resume beta-blocker and statin therapy.  - Monitor for signs of CHF exacerbation.    #Chronic Hypoxic Respiratory Failure  #Obstructive Sleep Apnea  #Obesity Hypoventilation Syndrome  #Underlying Asthma  - Patient normally uses 3L oxygen at home. Home BIPAP at night.  - Home medications include montelukast 10mg nightly, fluticasone-salmeterol inhaler  "BID.  Plan  - Continue home medications  - Duonebs q4h prn for wheezing.  - Continue CPAP at night while hospitalized        Diet: Regular Diet Adult    DVT Prophylaxis: Pneumatic Compression Devices  Rebollar Catheter: PRESENT, indication: Acute retention or obstruction  Lines: None     Cardiac Monitoring: ACTIVE order. Indication: Stroke, acute (48 hours)  Code Status: Full Code      Clinically Significant Risk Factors         # Hypernatremia: Highest Na = 146 mmol/L in last 2 days, will monitor as appropriate  # Hyperchloremia: Highest Cl = 108 mmol/L in last 2 days, will monitor as appropriate          # Hypoalbuminemia: Lowest albumin = 3.4 g/dL at 12/21/2024  5:45 AM, will monitor as appropriate     # Hypertension: Noted on problem list  # Chronic heart failure with preserved ejection fraction: heart failure noted on problem list and last echo with EF >50%           # Severe Obesity: Estimated body mass index is 47.91 kg/m  as calculated from the following:    Height as of this encounter: 1.397 m (4' 7\").    Weight as of this encounter: 93.5 kg (206 lb 2.1 oz)., PRESENT ON ADMISSION     # Financial/Environmental Concerns: none  # Asthma: noted on problem list        Social Drivers of Health     Received from [employer], [employer]    Social Connections          Disposition Plan     Medically Ready for Discharge: Anticipated Tomorrow             Hai Li MD  Hospitalist Service  [facility] [place]  Securely message with [employer] (more info)  Text page via [employer] Paging/Directory   ______________________________________________________________________    Interval History   - Continues to have weakness in arms and legs bilaterally  - Was able to get out of bed into chair with PT today.  - Denies shortness of breath or coughing.    Physical Exam   Vital Signs: Temp: 98.1  F (36.7  C) Temp src: Oral BP: 131/68 Pulse: 88   Resp: " 20 SpO2: 96 % O2 Device: Nasal cannula Oxygen Delivery: 2 LPM  Weight: 206 lbs 2.08 oz    General: Alert and Oriented x3. Answers questions appropriately. Follows commands.  Eyes:EOMI. PERRL. Normal Conjunctivae.  HENT: Normocephalic. Atraumatic.  Resp: Breath sounds equal throughout. No crackles. No wheezing.  Cardio: Regular rate and rhythm. No murmurs. No friction rub.  Abd: Soft. Non-distended. Non-tender. Bowel sounds normal. No rebound tenderness.  MSK: No areas of ecchymosis or erythema.  Neuro:  - A/O x3  - CN 2-12 intact. Facial movement symmetric  - Strength 4/5 in Upper Extremities bilaterally and 3/5 in lower extremities bilaterally.  - FNF and HTS limited by strength but symmetric and grossly normal.  Skin:No rashes. No jaundice.     Medical Decision Making       45 MINUTES SPENT BY ME on the date of service doing chart review, history, exam, documentation & further activities per the note.  MANAGEMENT DISCUSSED with the following over the past 24 hours: CM, RN   Tests ORDERED & REVIEWED in the past 24 hours:  - BMP  - CBC  - Magnesium  - Phosphorus  Medical complexity over the past 24 hours:  - Prescription DRUG MANAGEMENT performed      Data     I have personally reviewed the following data over the past 24 hrs:    7.4  \   11.1 (L)   / 211     142 106 49.4 (H) /  101 (H)   3.8 25 1.57 (H) \       Imaging results reviewed over the past 24 hrs:   Recent Results (from the past 24 hours)   MRA Angiogram Neck w/o Contrast    Narrative    EXAM: MRA NECK (CAROTIDS) W/O CONTRAST, MRA BRAIN ([place]) W/O CONTRAST  LOCATION: [facility]  DATE: 12/21/2024    INDICATION: Fall with new acute subacute cerebellar infarct, neurology requested MRA of head and neck  COMPARISON: None.  TECHNIQUE:   1) 3D time-of-flight head MRA without intravenous contrast.  2) Neck MRA without IV contrast. Stenosis measurements made according to NASCET criteria unless otherwise  specified.    FINDINGS:  HEAD MRA:   ANTERIOR CIRCULATION: No stenosis/occlusion, aneurysm, or high flow vascular malformation. Standard [place] anatomy.    POSTERIOR CIRCULATION: No stenosis/occlusion, aneurysm, or high flow vascular malformation. Balanced vertebral arteries supply a normal basilar artery.     NECK MRA:   RIGHT CAROTID: No measurable stenosis or dissection.    LEFT CAROTID: No measurable stenosis or dissection.    VERTEBRAL ARTERIES: No focal stenosis or dissection. Balanced vertebral arteries.    AORTIC ARCH: Classic aortic arch anatomy with no significant stenosis at the origin of the great vessels.      Impression    IMPRESSION:    HEAD MRA:   1.  Patent major intracranial arteries without large vessel occlusion, high-grade stenosis or aneurysm.    NECK MRA:  1.  Patent major cervical arteries without high-grade stenosis or dissection.   MRA Angiogram Head w/o Contrast    Narrative    EXAM: MRA NECK (CAROTIDS) W/O CONTRAST, MRA BRAIN ([place]) W/O CONTRAST  LOCATION: [facility]  DATE: 12/21/2024    INDICATION: Fall with new acute subacute cerebellar infarct, neurology requested MRA of head and neck  COMPARISON: None.  TECHNIQUE:   1) 3D time-of-flight head MRA without intravenous contrast.  2) Neck MRA without IV contrast. Stenosis measurements made according to NASCET criteria unless otherwise specified.    FINDINGS:  HEAD MRA:   ANTERIOR CIRCULATION: No stenosis/occlusion, aneurysm, or high flow vascular malformation. Standard [place] anatomy.    POSTERIOR CIRCULATION: No stenosis/occlusion, aneurysm, or high flow vascular malformation. Balanced vertebral arteries supply a normal basilar artery.     NECK MRA:   RIGHT CAROTID: No measurable stenosis or dissection.    LEFT CAROTID: No measurable stenosis or dissection.    VERTEBRAL ARTERIES: No focal stenosis or dissection. Balanced vertebral arteries.    AORTIC ARCH: Classic aortic arch  anatomy with no significant stenosis at the origin of the great vessels.      Impression    IMPRESSION:    HEAD MRA:   1.  Patent major intracranial arteries without large vessel occlusion, high-grade stenosis or aneurysm.    NECK MRA:  1.  Patent major cervical arteries without high-grade stenosis or dissection.   US Carotid Bilateral    Narrative    EXAM: US CAROTID BILATERAL  LOCATION: [facility]  DATE: 12/21/2024    INDICATION: Stroke  COMPARISON: MRA 12/21/2024.  TECHNIQUE: Duplex exam performed utilizing 2D gray-scale imaging, Doppler interrogation with color-flow and spectral waveform analysis. The percent diameter stenosis is determined using Updated Recommendations for Carotid Stenosis Interpretation Criteria   from [employer] [facility].    FINDINGS:    RIGHT: Mild plaque at the bifurcation. The peak systolic velocity in the ICA is less than 180 cm/sec, consistent with less than 50% stenosis. Normal velocities in the ECA. Antegrade flow within the vertebral artery.     LEFT: Mild plaque at the bifurcation. The peak systolic velocity in the ICA is less than 180 cm/sec, consistent with less than 50% stenosis. Elevated velocities in the ECA consistent with a stenosis. Antegrade flow within the vertebral artery.    VELOCITY CHART:  CCA   Right: 107 cm/s   Left: 112 cm/s  ICA   Right: 86 cm/s   Left: 100 cm/s  ECA   Right: 69 cm/s   Left: 224 cm/s  ICA/CCA PSV Ratio   Right: 1.2   Left: 1.9      Impression    IMPRESSION:  1.  Mild plaque formation, velocities consistent with less than 50% stenosis in the right internal carotid artery.  2.  Mild plaque formation, velocities consistent with less than 50% stenosis in the left internal carotid artery.  3.  Flow within the vertebral arteries is antegrade.  4.  Elevated velocity left external carotid artery suggestive of a focal stenosis.

## 2024-12-22 NOTE — PROGRESS NOTES
NEUROLOGY PROGRESS NOTE     Desirae Rey,  1950, MRN [id number] Date: 2024     [facility]   Code status:  Full Code   PCP: Daysi Bryan, [phone]      ASSESSMENT & PLAN   Diagnosis code: Stroke     Stroke  Gait difficulty  Lumbar spinal stenosis    MRI brain shows acute/subacute stroke in the right cerebellum  Carotid ultrasound with less than 50% bilateral stenosis  MRI brain shows possible meningioma.  Recommend neurosurgery consultation  MRI L-spine shows severe spinal stenosis.  This could be causing the leg weakness and gait difficulty.  Recommend neurosurgery consultation  Echocardiogram/cardiac monitoring-pending.  Recommend 30-day outpatient cardiac monitoring.  Suspect stroke to be lacunar in nature related to vascular risk factors.  Recommend aggressive control of vascular is factors.  Lipid panel and statin.  LDL 69 on 10 mg of rosuvastatin.  Aspirin Plavix for 3 weeks followed by aspirin only  Blood pressure can be normalized  PT/OT    Dr. Ureña to follow tomorrow.       Chief Complaint   Patient presents with    Generalized Weakness        HISTORY OF PRESENT ILLNESS     We have been requested by Dr. Gauthier to evaluate Desirae Rey who is a 74 year old  female for evaluation of stroke.  This is a 74-year-old female with history of chronic kidney disease stage III, CHF, history of blood clots, hypertension, hyperlipidemia, chronic pain syndrome, obstructive sleep apnea, asthma who presented with some changes in mental status.  This was thought to be related to chronic renal failure and UTI.  MRI was done in the emergency room which showed an acute stroke for which neurology's been consulted.  She reports that she did have worsening weakness and some falls at home.  Does complain of numbness in her feet for the last 2 years.  Also reports some back pain/balance issues more chronically.  No acute neurological symptoms to correlate with the stroke.  No headaches.  No  chest pains palpitations.'    12/22  No further neurological symptoms.  Echocardiogram pending.     PAST MEDICAL & SURGICAL HISTORY     Medical History  Past Medical History:   Diagnosis Date    Allergic rhinitis     Arthritis of back     CHF (congestive heart failure) (H)     Chronic kidney disease     stage 3     De Quervain's disease (tenosynovitis)     Fibromyalgia, primary     GERD (gastroesophageal reflux disease)     Hematuria     chronic    High cholesterol     History of blood clots 09/01/1970    DVT, from birth control, h/o left calf    Hyperlipidemia     Hypertension     Low back pain     Osteoporosis     Pickwickian syndrome (H)     Plantar fasciitis     Proteinuria     Proteinuria     chronic    Sleep apnea     CPAP    Uncomplicated asthma      Surgical History  Past Surgical History:   Procedure Laterality Date    CERVICAL FUSION N/A 4/27/2017    Procedure: ANTERIOR CERVICAL DECOMPRESSION FUSION C5-7 BILATERAL;  Surgeon: Basim Barone MD;  Location: [facility];  Service:     CHOLECYSTECTOMY      open    COLONOSCOPY N/A 12/20/2019    Procedure: COLONOSCOPY WITH BIOPSIES;  Surgeon: Lucio Farr MD;  Location: [facility];  Service: Gastroenterology    EYE SURGERY      HAND SURGERY Right     HYSTEROSCOPY DIAGNOSTIC      JOINT REPLACEMENT      bilateral TKA    KNEE SURGERY      RELEASE CARPAL TUNNEL Bilateral         SOCIAL HISTORY     Social History     Tobacco Use    Smoking status: Never    Smokeless tobacco: Never   Vaping Use    Vaping status: Never Used   Substance Use Topics    Alcohol use: No    Drug use: No        FAMILY HISTORY     Reviewed, and family history includes Chronic Obstructive Pulmonary Disease in her father; Heart Disease in her sister; Rheumatoid Arthritis in her mother.     ALLERGIES     Allergies   Allergen Reactions    Latex Rash     Severe rash    Pregabalin Shortness Of Breath     Other Reaction(s): swelling and shortness of breath    Valsartan  Unknown     Hyperkalemia    Ciprofloxacin Visual Disturbance, Hallucination and Confusion     Likely contributed visual hallucination and confusion    Colchicine Diarrhea    Dicloxacillin      Other Reaction(s): confusion, says she has tolerated augmentin without difficulty.     Gabapentin      Other Reaction(s): Sedation    Latex Unknown     Added based on information entered during case entry, please review and add reactions, type, and severity as needed    Other Drug Allergy (See Comments) Muscle Pain (Myalgia)     Tried lovastatin and simvastatin    Other Environmental Allergy Unknown     Coverlet bandaid , 3M product; rash    Statins-Hmg-Coa Reductase Inhibitors [Statins] Muscle Pain (Myalgia)     Tried lovastatin and simvastatin    Sulfa Antibiotics Swelling     Facial swelling      Adhesive Tape Rash        REVIEW OF SYSTEMS     12 system ROS was done other than the HPI this was negative.  Pertinent positives noted in the HPI.     HOME & HOSPITAL MEDICATIONS     Prior to Admission Medications  Medications Prior to Admission   Medication Sig Dispense Refill Last Dose/Taking    acetaminophen (TYLENOL) 500 MG tablet Take 500 mg by mouth every 6 hours as needed for mild pain   Unknown    albuterol (PROAIR HFA;PROVENTIL HFA;VENTOLIN HFA) 90 mcg/actuation inhaler Inhale 1 puff into the lungs every 4 hours as needed for shortness of breath / dyspnea   Unknown    alendronate (FOSAMAX) 70 MG tablet [ALENDRONATE (FOSAMAX) 70 MG TABLET] Take 70 mg by mouth every 7 days. Takes on Mondays 11 Unknown    aspirin 81 MG EC tablet 1 tablet Orally Once a day   Unknown    azelastine (OPTIVAR) 0.05 % ophthalmic solution Apply 1 drop to eye 2 times daily as needed   Unknown    calcium citrate (CITRACAL) 950 (200 Ca) MG tablet Take 1 tablet by mouth daily   Unknown    cetirizine (ZYRTEC) 10 MG tablet [CETIRIZINE (ZYRTEC) 10 MG TABLET] Take 10 mg by mouth daily.    Unknown    cholecalciferol (VITAMIN D3) 25 mcg (1000 units) capsule  Take 125 mcg by mouth every morning Take 5000 units in the morning and 2000 units in the evening   Unknown    CRESTOR 10 MG tablet Take 10 mg by mouth daily.   Unknown    docusate sodium (COLACE) 100 MG capsule Take 1-3 capsules by mouth daily as needed for constipation.   Unknown    DULoxetine (CYMBALTA) 30 MG capsule Take 1 capsule (30 mg) by mouth 2 times daily   Unknown    fluticasone-salmeterol (AIRDUO RESPICLICK) 113-14 MCG/ACT inhaler Inhale 1 puff into the lungs 2 times daily.   Unknown    furosemide (LASIX) 20 MG tablet Take 2 tablets (40 mg) by mouth every morning   Unknown    HYDROcodone-acetaminophen (NORCO) 5-325 MG tablet Take 1 tablet by mouth every 4 hours as needed for pain Max 6 tablets per day. 60 tablet 0 Unknown    Hydroxychloroquine Sulfate 100 MG TABS Take 100 mg by mouth 2 times daily   Unknown    l-lysine HCl 500 MG TABS tablet Take 2 tablets by mouth daily   Unknown    magnesium oxide 250 mg Tab Take 500 mg by mouth daily   Unknown    metoprolol succinate ER (TOPROL XL) 50 MG 24 hr tablet Take 50 mg by mouth daily   Unknown    modafinil (PROVIGIL) 100 MG tablet Take 2.5 tablets (250 mg) by mouth daily Take two and half tablet (250 mg ) daily 90 tablet 0 Unknown    montelukast (SINGULAIR) 10 mg tablet Take 10 mg by mouth every evening   Unknown    Naloxone HCl 5 MG/0.5ML SOSY Inject 5 mg as directed as needed.   Unknown    nystatin (MYCOSTATIN) 600438 UNIT/GM external powder Apply topically 2 times daily   Unknown    OXYGEN-AIR DELIVERY SYSTEMS MISC [OXYGEN-AIR DELIVERY SYSTEMS MISC] Use 3 L As Directed. 3 lpm with activities and as needed at night   Unknown    solifenacin (VESICARE) 10 MG tablet Take 5-10 mg by mouth at bedtime   Unknown    terbinafine (LAMISIL) 1 % external cream Apply topically 2 times daily.   Unknown    tiZANidine (ZANAFLEX) 4 MG tablet Take 4 mg by mouth 3 times daily as needed for muscle spasms   Unknown    Turmeric (CURCUPLEX-95) 500 MG CAPS Take 1 capsule by mouth  daily   Unknown       Hospital Medications  Current Facility-Administered Medications   Medication Dose Route Frequency Provider Last Rate Last Admin    aspirin (ASA) chewable tablet 81 mg  81 mg Oral Daily Preston Marroquin MD   81 mg at 12/22/24 1044    cefTRIAXone (ROCEPHIN) 2 g vial to attach to  ml bag for ADULTS or NS 50 ml bag for PEDS  2 g Intravenous Q24H Hai Li MD   2 g at 12/21/24 2204    cetirizine (zyrTEC) tablet 10 mg  10 mg Oral Daily Jennifer Gauthier MD   10 mg at 12/22/24 1045    clopidogrel (PLAVIX) tablet 75 mg  75 mg Oral Daily Preston Marroquin MD   75 mg at 12/22/24 1047    fluticasone (FLONASE) 50 MCG/ACT spray 1 spray  1 spray Both Nostrils Daily Hai Li MD        fluticasone-vilanterol (BREO ELLIPTA) 100-25 MCG/ACT inhaler 1 puff  1 puff Inhalation Daily Jennifer Gauthier MD   1 puff at 12/22/24 1045    furosemide (LASIX) tablet 40 mg  40 mg Oral QAM Jennifer Gauthier MD   40 mg at 12/22/24 1047    hydroxychloroquine (PLAQUENIL) half-tab 100 mg  100 mg Oral BID Jennifer Gauthier MD   100 mg at 12/22/24 1050    magnesium oxide (MAG-OX) tablet 400 mg  400 mg Oral Daily Jennifer Gauthier MD   400 mg at 12/22/24 1049    [Held by provider] metoprolol succinate ER (TOPROL XL) 24 hr tablet 50 mg  50 mg Oral Daily Jennifer Gauthier MD        miconazole (MICATIN) 2 % powder   Topical BID Hai Li MD   Given at 12/22/24 1054    modafinil (PROVIGIL) half-tab 250 mg  250 mg Oral Daily Jennifer Gauthier MD   250 mg at 12/22/24 1043    montelukast (SINGULAIR) tablet 10 mg  10 mg Oral QPM Jennifer Gauthier MD   10 mg at 12/21/24 2203    rosuvastatin (CRESTOR) tablet 10 mg  10 mg Oral Daily Jennifer Gauthier MD   10 mg at 12/22/24 1046    sodium chloride (PF) 0.9% PF flush 3 mL  3 mL Intracatheter Q8H Hai Li MD   3 mL at 12/22/24 0454    sodium chloride (PF) 0.9% PF flush 3 mL  3 mL Intracatheter Q8H Hai Li MD   3 mL at 12/22/24 1055    terbinafine (lamISIL) 1 % cream   " Topical BID Jennifer Gauthier MD   Given at 12/22/24 1054    tolterodine ER (DETROL LA) 24 hr capsule 4 mg  4 mg Oral Daily Jennifer Gauthier MD   4 mg at 12/22/24 1049        PHYSICAL EXAM     Vital signs  Temp:  [97.6  F (36.4  C)-98.6  F (37  C)] 97.6  F (36.4  C)  Pulse:  [] 88  Resp:  [16-24] 16  BP: (123-139)/(52-69) 139/69  SpO2:  [92 %-98 %] 97 %    PHYSICAL EXAMINATION  VITALS: /69 (BP Location: Right leg)   Pulse 88   Temp 97.6  F (36.4  C) (Oral)   Resp 16   Ht 1.397 m (4' 7\")   Wt 93.5 kg (206 lb 2.1 oz)   SpO2 97%   BMI 47.91 kg/m    GENERAL -Health appearing, No apparent distress  EYES- No scleral icterus, no eyelid droop, Pupils - see Neuro section  HEENT - Normocephalic, atraumatic, Hearing grossly intact; Oral mucosa moist and pink in color. External Ears and nose intact.   Neck - supple   PULM - Good spontaneous respiratory effort  CV-extremities warm to touch  MSK- Gait - see Neuro section; Strength and tone- see Neuro section; Range of motion grossly intact.  PSYCH -cooperative    Neurological  Mental status - Patient is awake and oriented to self, place and time. Attention span is normal. Language is fluent and follows commands appropriately.   Cranial nerves - CN II-XII intact. Pupils are reactive and symmetric; EOMI, VFIT, NLF symmetric  Motor - There is no pronator drift. Motor exam shows 5/5 strength in all extremities except some proximal leg weakness.  Tone - Tone is symmetric bilaterally in upper and lower extremities.  Reflexes - Reflexes are symmetric/decreased/absent.  Sensation - Sensory exam is grossly intact to light touch, pain.  Coordination - Finger to nose is without dysmetria.  Unable to do heel-to-shin.  Gait and station --unable to safely ambulate.  Formal gait testing cannot be done due to safety concerns from ongoing medical issues.  Exam stable.  No focal findings.  The weakness in the leg seems to have improved today.     DIAGNOSTIC STUDIES     Pertinent " Radiology   MRI brain  IMPRESSION:  1.  Small acute/subacute infarct within the right cerebellum measuring 0.4 cm. No associated hemorrhage.  2.  Right anterolateral cerebral convexity enhancing nodule measuring up to 1 cm likely reflects a meningioma (in the absence of known malignancy).  3.  Chronic senescent and presumed microvascular ischemic changes, as above.  4.  Right maxillary sinus air-fluid level. Correlation for acute sinusitis is recommended.     MRI L spine  IMPRESSION:  1.  Advanced multilevel degenerative changes of the lumbar spine, as above, with grade 1 anterolisthesis at L4 on L5 and L5 on S1.  2.  Transitional lumbosacral anatomy. The S1 vertebral body is partially lumbarized. If surgery is planned, exact localization is recommended.  3.  At L4-L5 there is severe/critical central spinal canal stenosis with severe bilateral neural foraminal stenosis. Correlation for cauda equina syndrome is recommended.  4.  At L3-L4 there is severe central spinal canal stenosis with severe left and mild right neural foraminal stenosis.  5.  At L2-L3 there is severe central spinal canal stenosis with severe left and moderate right neural foraminal stenosis.  6.  At L1-L2 there is mild central spinal canal stenosis with severe right neural foraminal stenosis.  7.  At L5-S1 there is moderate left and mild to moderate right neural foraminal stenosis.  8.  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  9.  Fluid in the bilateral facet joints is noted at L3-L4 and L4-L5 with mild adjacent elroy-facet enhancement. These findings are favored to reflect a degenerative facet synovitis, however, an infectious/inflammatory facet synovitis could have a similar   appearance.  10.  At T11-T12 there is a concentric disc bulge with a superimposed left lateral recess disc extrusion with cranial migration. At this level there is severe central spinal canal stenosis, severe left neural foraminal stenosis, and moderate right neural    foraminal stenosis.  11.  Dextroconvex curvature of lumbar spine centered at L2-L3.    Carotid US  IMPRESSION:  1.  Mild plaque formation, velocities consistent with less than 50% stenosis in the right internal carotid artery.  2.  Mild plaque formation, velocities consistent with less than 50% stenosis in the left internal carotid artery.  3.  Flow within the vertebral arteries is antegrade.  4.  Elevated velocity left external carotid artery suggestive of a focal stenosis.    Recent Results (from the past 24 hours)   Glucose by meter    Collection Time: 12/21/24  5:37 PM   Result Value Ref Range    GLUCOSE BY METER POCT 114 (H) 70 - 99 mg/dL   Glucose by meter    Collection Time: 12/21/24  9:06 PM   Result Value Ref Range    GLUCOSE BY METER POCT 155 (H) 70 - 99 mg/dL   Basic metabolic panel    Collection Time: 12/22/24  7:12 AM   Result Value Ref Range    Sodium 142 135 - 145 mmol/L    Potassium 3.8 3.4 - 5.3 mmol/L    Chloride 106 98 - 107 mmol/L    Carbon Dioxide (CO2) 25 22 - 29 mmol/L    Anion Gap 11 7 - 15 mmol/L    Urea Nitrogen 49.4 (H) 8.0 - 23.0 mg/dL    Creatinine 1.57 (H) 0.51 - 0.95 mg/dL    GFR Estimate 34 (L) >60 mL/min/1.73m2    Calcium 9.3 8.8 - 10.4 mg/dL    Glucose 107 (H) 70 - 99 mg/dL   CBC with platelets    Collection Time: 12/22/24  7:12 AM   Result Value Ref Range    WBC Count 7.4 4.0 - 11.0 10e3/uL    RBC Count 3.47 (L) 3.80 - 5.20 10e6/uL    Hemoglobin 11.1 (L) 11.7 - 15.7 g/dL    Hematocrit 35.1 35.0 - 47.0 %     (H) 78 - 100 fL    MCH 32.0 26.5 - 33.0 pg    MCHC 31.6 31.5 - 36.5 g/dL    RDW 12.8 10.0 - 15.0 %    Platelet Count 211 150 - 450 10e3/uL   Magnesium    Collection Time: 12/22/24  7:12 AM   Result Value Ref Range    Magnesium 2.1 1.7 - 2.3 mg/dL   Phosphorus    Collection Time: 12/22/24  7:12 AM   Result Value Ref Range    Phosphorus 3.2 2.5 - 4.5 mg/dL   Glucose by meter    Collection Time: 12/22/24  8:16 AM   Result Value Ref Range    GLUCOSE BY METER POCT 101 (H) 70  - 99 mg/dL       Total time spent for face to face visit, reviewing labs/imaging studies, counseling and coordination of care was: Over 35 min More than 50% of this time was spent on counseling and coordination of care.    Counseling patient.  Reviewing chart.  Reviewing testing.    Preston Marroquin MD  Neurologist  [facility]  Tel:- [phone]

## 2024-12-22 NOTE — PLAN OF CARE
Goal Outcome Evaluation:      Plan of Care Reviewed With: patient    Overall Patient Progress: improvingOverall Patient Progress: improving         Endorses chronic low back pain. PRN Norco given. NIH 1 for sensation. Tele NSR. Sugars 114 and 155. Assist x1-2 and walker to bedside commode. Pt constipated, PRN Senna given. Manually removed hard stool. IVF stopped this shift, receiving IVF. SCDs and bed alarm on.

## 2024-12-22 NOTE — PROGRESS NOTES
12/21/24 9305   Appointment Info   Signing Clinician's Name / Credentials (OT) Melinda Talley OTR/L   Living Environment   People in Home spouse;other (see comments)  (but pt. indicates he is not well and needs family support with pt. in hospital)   Current Living Arrangements house   Home Accessibility other (see comments);stairs to enter home;stairs within home  (split level)   Number of Stairs, Main Entrance 2   Stair Railings, Main Entrance none   Number of Stairs, Within Home, Primary seven   Stair Railings, Within Home, Primary other (see comments)  (rail on one side to basement and both sides up to main level)   Living Environment Comments difficulty with stairs getting up , needing leg    Self-Care   Usual Activity Tolerance moderate   Equipment Currently Used at Home shower chair  (walk in shower with grab bars)   Fall history within last six months yes   Number of times patient has fallen within last six months 4  (in last week)   Activity/Exercise/Self-Care Comment Able to do most ADL like brush teeth, shower except back, needs assist to put hair up and sometimes needs help with shoes.   Instrumental Activities of Daily Living (IADL)   Previous Responsibilities laundry;meal prep;medication management;housekeeping  (shared IADL's,  does shopping, driving,)   General Information   Onset of Illness/Injury or Date of Surgery 12/20/24   Referring Physician Hai Li MD   Additional Occupational Profile Info/Pertinent History of Current Problem Desirae Rey is a 74 year old female on aspirin with history of hypertension, CHF, CKD, C5-7 ACDF in 2017 with Dr. Barone.  Patient was previously evaluated by Dr. Shaw in clinic on 6/13/2024; patient was not interested in spine surgery at that time.     Patient was admitted on 12/20/2024 with altered mental status and 4 falls this week.     Patient reports history of chronic, unchanged low back pain and chronic paresthesias in BLE, symptoms  "have been present for more than 1 year.  She reports intermittent episodes of bladder incontinence/urgency x 1 month that mostly occurs with activity.  She reports leg weakness developed about 6 months ago and worsened over the past week.      Neurosurgery was consulted for severe stenosis at L2-3, L3-4, and L4-5. Neurology was consulted for small acute/subacute infarct within right cerebellum. Workup also revealed UTI.   Existing Precautions/Restrictions fall   Cognitive Status Examination   Orientation Status place;time;person   Visual Perception   Visual Impairment/Limitations corrective lenses for reading   Pain Assessment   Patient Currently in Pain No  (calming down alot, \"have pain all over my body\")   Range of Motion Comprehensive   General Range of Motion upper extremity range of motion deficits identified   General Upper Extremity Assessment (Range of Motion)   Upper Extremity: Range of Motion shoulder, left: UE ROM;shoulder, right: UE ROM;other (see comments)  (70-80 degrees flexion, distally WNL)   Coordination   Upper Extremity Coordination No deficits were identified  (able to use phone)   Bed Mobility   Bed Mobility supine-sit   Supine-Sit Archer (Bed Mobility) verbal cues;maximum assist (25% patient effort)   Assistive Device (Bed Mobility) bed rails;draw sheet   Transfers   Transfers sit-stand transfer   Sit-Stand Transfer   Sit-Stand Archer (Transfers) moderate assist (50% patient effort)   Assistive Device (Sit-Stand Transfers) walker, front-wheeled   Balance   Balance Assessment standing static balance;standing dynamic balance   Standing Balance: Static minimal assist;moderate assist   Standing Balance: Dynamic minimal assist;moderate assist   Position/Device Used, Standing Balance walker, front-wheeled   Activities of Daily Living   BADL Assessment/Intervention lower body dressing;toileting   Lower Body Dressing Assessment/Training   Position (Lower Body Dressing) edge of bed sitting "   Assistive Devices (Lower Body Dressing) other (see comments)  (states uses sock aide or  helps)   Crane Level (Lower Body Dressing) dependent (less than 25% patient effort);socks   Toileting   Position (Toileting) supported standing;unsupported sitting   Assistive Devices (Toileting) commode chair   Crane Level (Toileting) dependent (less than 25% patient effort);other (see comments)  (assist of 2 for safety)   Clinical Impression   Criteria for Skilled Therapeutic Interventions Met (OT) Yes, treatment indicated   OT Diagnosis decreased ADL's due to fall, UTI, CVA, stenosis lumbar back   Influenced by the following impairments pain, weakness   OT Problem List-Impairments impacting ADL activity tolerance impaired;balance;mobility;strength;pain;range of motion (ROM)   Assessment of Occupational Performance 3-5 Performance Deficits   Identified Performance Deficits bed mobility, transfers, toileting, L/E dressing   Planned Therapy Interventions (OT) ADL retraining;balance training;bed mobility training;ROM;strengthening;transfer training;progressive activity/exercise   Clinical Decision Making Complexity (OT) problem focused assessment/low complexity   Risk & Benefits of therapy have been explained evaluation/treatment results reviewed;patient;participants voiced agreement with care plan   OT Total Evaluation Time   OT Eval, Low Complexity Minutes (19915) 20   OT Goals   Therapy Frequency (OT) 5 times/week   OT Predicted Duration/Target Date for Goal Attainment 12/28/24   OT Goals Transfers;Toilet Transfer/Toileting;Lower Body Dressing   OT: Lower Body Dressing Minimal assist   OT: Transfer Minimal assist   OT: Toilet Transfer/Toileting Minimal assist   Interventions   Interventions Quick Adds Self-Care/Home Management   Self-Care/Home Management   Self-Care/Home Mgmt/ADL, Compensatory, Meal Prep Minutes (59731) 12   Symptoms Noted During/After Treatment (Meal Preparation/Planning Training)  increased pain;fatigue   Treatment Detail/Skilled Intervention Pt. agreeable to further functional transfers in room after initial eval. Transfer bed to chair and commode with min-mod assist of 2/cues for safety with hand placement and walker safety. Improved confidence and less assist with continued transfers. Pt. left with aide at end of session.   OT Discharge Planning   OT Plan bed mobility, transfers, toileitng, L/E dressing, standing, exercises   OT Discharge Recommendation (DC Rec) (S)  Transitional Care Facility   OT Rationale for DC Rec Pt. presently requiring assist of 1-2 for functional mobility and ADL's,  not able to help   OT Brief overview of current status max assist bed mobility, min-mod assist of 2 transfers   Total Session Time   Timed Code Treatment Minutes 12   Total Session Time (sum of timed and untimed services) 32

## 2024-12-22 NOTE — PLAN OF CARE
Problem: Adult Inpatient Plan of Care  Goal: Optimal Comfort and Wellbeing  Outcome: Progressing  Intervention: Monitor Pain and Promote Comfort  Recent Flowsheet Documentation  Taken 12/22/2024 0145 by Mary Rivera RN  Pain Management Interventions:   emotional support   environmental changes   pillow support provided   repositioned   rest   therapeutic presence     Problem: Stroke, Ischemic (Includes Transient Ischemic Attack)  Goal: Optimal Cognitive Function  Outcome: Progressing     Problem: Stroke, Ischemic (Includes Transient Ischemic Attack)  Goal: Effective Oxygenation and Ventilation  Outcome: Progressing  Intervention: Optimize Oxygenation and Ventilation  Recent Flowsheet Documentation  Taken 12/22/2024 0350 by Mary Rivera, RN  Head of Bed (HOB) Positioning: HOB at 20-30 degrees  Taken 12/22/2024 0145 by Mary Rivera RN  Head of Bed (HOB) Positioning: HOB at 20-30 degrees   Goal Outcome Evaluation:    Patient alert, oriented x 4.denied chest pain, headache, nausea/vomiting. NIH score 1. Pleasant and cooperative with cares. Rebollar patent and draining okay will continue to monitor the patient.

## 2024-12-22 NOTE — PHARMACY-CONSULT NOTE
Pharmacy Consult to evaluate for medication related stroke core measures    Desirae AMADOR Candido, 74 year old female admitted for stroke on 12/20/2024.    Thrombolytic was not given because of Clinical contraindications    VTE Prophylaxis SCDs /PCDs placed on 12/21/24, as appropriate prior to end of hospital day 2.    Antithrombotic: aspirin and clopidogrel started on 12/21/24, as appropriate by end of hospital day 2. Continue antithrombotic therapy on discharge to meet quality measures, unless contraindicated.    Anticoagulation if history of A-fib/flutter: Patient does not have history of A-fib/flutter - anticoagulation not required for medication related stroke core measures.     LDL Cholesterol Calculated   Date Value Ref Range Status   12/20/2024 69 <100 mg/dL Final     LDL Cholesterol Direct   Date Value Ref Range Status   04/21/2017 150 (H) <=129 mg/dl Final       Patient's home statin, Crestor (rosuvastatin) restarted; continue statin on discharge to meet quality measures, unless contraindicated.     Recommendations: None at this time    Thank you for the consult.    LEAH CANNON RPH 12/22/2024 12:17 PM

## 2024-12-22 NOTE — PROGRESS NOTES
Patient declined CPAP and prefers oxymask as  is not able to bring to the facility.    Theodore Good, RT

## 2024-12-23 ENCOUNTER — APPOINTMENT (OUTPATIENT)
Dept: OCCUPATIONAL THERAPY | Facility: HOSPITAL | Age: 74
DRG: 064 | End: 2024-12-23
Payer: MEDICARE

## 2024-12-23 ENCOUNTER — APPOINTMENT (OUTPATIENT)
Dept: RADIOLOGY | Facility: HOSPITAL | Age: 74
DRG: 064 | End: 2024-12-23
Attending: PSYCHIATRY & NEUROLOGY
Payer: MEDICARE

## 2024-12-23 ENCOUNTER — APPOINTMENT (OUTPATIENT)
Dept: PHYSICAL THERAPY | Facility: HOSPITAL | Age: 74
DRG: 064 | End: 2024-12-23
Payer: MEDICARE

## 2024-12-23 LAB
ANION GAP SERPL CALCULATED.3IONS-SCNC: 9 MMOL/L (ref 7–15)
ATRIAL RATE - MUSE: 133 BPM
BUN SERPL-MCNC: 48.3 MG/DL (ref 8–23)
CALCIUM SERPL-MCNC: 9.2 MG/DL (ref 8.8–10.4)
CHLORIDE SERPL-SCNC: 104 MMOL/L (ref 98–107)
CREAT SERPL-MCNC: 1.57 MG/DL (ref 0.51–0.95)
DIASTOLIC BLOOD PRESSURE - MUSE: NORMAL MMHG
EGFRCR SERPLBLD CKD-EPI 2021: 34 ML/MIN/1.73M2
ERYTHROCYTE [DISTWIDTH] IN BLOOD BY AUTOMATED COUNT: 12.6 % (ref 10–15)
GLUCOSE BLDC GLUCOMTR-MCNC: 127 MG/DL (ref 70–99)
GLUCOSE BLDC GLUCOMTR-MCNC: 132 MG/DL (ref 70–99)
GLUCOSE BLDC GLUCOMTR-MCNC: 144 MG/DL (ref 70–99)
GLUCOSE BLDC GLUCOMTR-MCNC: 236 MG/DL (ref 70–99)
GLUCOSE SERPL-MCNC: 119 MG/DL (ref 70–99)
HCO3 SERPL-SCNC: 27 MMOL/L (ref 22–29)
HCT VFR BLD AUTO: 35.3 % (ref 35–47)
HGB BLD-MCNC: 11.2 G/DL (ref 11.7–15.7)
INTERPRETATION ECG - MUSE: NORMAL
MAGNESIUM SERPL-MCNC: 1.9 MG/DL (ref 1.7–2.3)
MCH RBC QN AUTO: 31.8 PG (ref 26.5–33)
MCHC RBC AUTO-ENTMCNC: 31.7 G/DL (ref 31.5–36.5)
MCV RBC AUTO: 100 FL (ref 78–100)
P AXIS - MUSE: NORMAL DEGREES
PHOSPHATE SERPL-MCNC: 3.1 MG/DL (ref 2.5–4.5)
PLATELET # BLD AUTO: 220 10E3/UL (ref 150–450)
POTASSIUM SERPL-SCNC: 4.1 MMOL/L (ref 3.4–5.3)
PR INTERVAL - MUSE: NORMAL MS
QRS DURATION - MUSE: 98 MS
QT - MUSE: 330 MS
QTC - MUSE: 472 MS
R AXIS - MUSE: -66 DEGREES
RBC # BLD AUTO: 3.52 10E6/UL (ref 3.8–5.2)
SODIUM SERPL-SCNC: 140 MMOL/L (ref 135–145)
SYSTOLIC BLOOD PRESSURE - MUSE: NORMAL MMHG
T AXIS - MUSE: 83 DEGREES
VENTRICULAR RATE- MUSE: 123 BPM
WBC # BLD AUTO: 6.5 10E3/UL (ref 4–11)

## 2024-12-23 PROCEDURE — 83735 ASSAY OF MAGNESIUM: CPT | Performed by: STUDENT IN AN ORGANIZED HEALTH CARE EDUCATION/TRAINING PROGRAM

## 2024-12-23 PROCEDURE — 250N000013 HC RX MED GY IP 250 OP 250 PS 637: Performed by: STUDENT IN AN ORGANIZED HEALTH CARE EDUCATION/TRAINING PROGRAM

## 2024-12-23 PROCEDURE — 97110 THERAPEUTIC EXERCISES: CPT | Mod: GP

## 2024-12-23 PROCEDURE — 999N000157 HC STATISTIC RCP TIME EA 10 MIN

## 2024-12-23 PROCEDURE — 97530 THERAPEUTIC ACTIVITIES: CPT | Mod: GP

## 2024-12-23 PROCEDURE — 85014 HEMATOCRIT: CPT | Performed by: STUDENT IN AN ORGANIZED HEALTH CARE EDUCATION/TRAINING PROGRAM

## 2024-12-23 PROCEDURE — 120N000001 HC R&B MED SURG/OB

## 2024-12-23 PROCEDURE — 250N000013 HC RX MED GY IP 250 OP 250 PS 637: Performed by: PSYCHIATRY & NEUROLOGY

## 2024-12-23 PROCEDURE — 99232 SBSQ HOSP IP/OBS MODERATE 35: CPT | Performed by: STUDENT IN AN ORGANIZED HEALTH CARE EDUCATION/TRAINING PROGRAM

## 2024-12-23 PROCEDURE — 250N000011 HC RX IP 250 OP 636: Performed by: STUDENT IN AN ORGANIZED HEALTH CARE EDUCATION/TRAINING PROGRAM

## 2024-12-23 PROCEDURE — 97535 SELF CARE MNGMENT TRAINING: CPT | Mod: GO

## 2024-12-23 PROCEDURE — 80048 BASIC METABOLIC PNL TOTAL CA: CPT | Performed by: STUDENT IN AN ORGANIZED HEALTH CARE EDUCATION/TRAINING PROGRAM

## 2024-12-23 PROCEDURE — 72220 X-RAY EXAM SACRUM TAILBONE: CPT

## 2024-12-23 PROCEDURE — 97110 THERAPEUTIC EXERCISES: CPT | Mod: GO

## 2024-12-23 PROCEDURE — 36415 COLL VENOUS BLD VENIPUNCTURE: CPT | Performed by: STUDENT IN AN ORGANIZED HEALTH CARE EDUCATION/TRAINING PROGRAM

## 2024-12-23 PROCEDURE — 250N000013 HC RX MED GY IP 250 OP 250 PS 637: Performed by: INTERNAL MEDICINE

## 2024-12-23 PROCEDURE — 99232 SBSQ HOSP IP/OBS MODERATE 35: CPT | Performed by: PSYCHIATRY & NEUROLOGY

## 2024-12-23 PROCEDURE — 84100 ASSAY OF PHOSPHORUS: CPT | Performed by: STUDENT IN AN ORGANIZED HEALTH CARE EDUCATION/TRAINING PROGRAM

## 2024-12-23 PROCEDURE — 97116 GAIT TRAINING THERAPY: CPT | Mod: GP

## 2024-12-23 RX ORDER — POLYETHYLENE GLYCOL 3350 17 G/17G
17 POWDER, FOR SOLUTION ORAL DAILY PRN
Status: DISCONTINUED | OUTPATIENT
Start: 2024-12-23 | End: 2024-12-24 | Stop reason: HOSPADM

## 2024-12-23 RX ADMIN — MICONAZOLE NITRATE ANTIFUNGAL POWDER: 2 POWDER TOPICAL at 09:55

## 2024-12-23 RX ADMIN — HYDROXYCHLOROQUINE SULFATE 100 MG: 200 TABLET, FILM COATED ORAL at 09:50

## 2024-12-23 RX ADMIN — POLYETHYLENE GLYCOL 3350 17 G: 17 POWDER, FOR SOLUTION ORAL at 18:17

## 2024-12-23 RX ADMIN — HYDROXYCHLOROQUINE SULFATE 100 MG: 200 TABLET, FILM COATED ORAL at 21:03

## 2024-12-23 RX ADMIN — CETIRIZINE HYDROCHLORIDE 10 MG: 10 TABLET, FILM COATED ORAL at 09:48

## 2024-12-23 RX ADMIN — TOLTERODINE 4 MG: 2 CAPSULE, EXTENDED RELEASE ORAL at 09:50

## 2024-12-23 RX ADMIN — CEFTRIAXONE SODIUM 2 G: 2 INJECTION, POWDER, FOR SOLUTION INTRAMUSCULAR; INTRAVENOUS at 21:03

## 2024-12-23 RX ADMIN — MONTELUKAST 10 MG: 10 TABLET, FILM COATED ORAL at 21:03

## 2024-12-23 RX ADMIN — CLOPIDOGREL BISULFATE 75 MG: 75 TABLET ORAL at 09:49

## 2024-12-23 RX ADMIN — MODAFINIL 250 MG: 100 TABLET ORAL at 10:15

## 2024-12-23 RX ADMIN — MICONAZOLE NITRATE ANTIFUNGAL POWDER: 2 POWDER TOPICAL at 21:03

## 2024-12-23 RX ADMIN — ROSUVASTATIN 10 MG: 10 TABLET, FILM COATED ORAL at 09:48

## 2024-12-23 RX ADMIN — HYDROCODONE BITARTRATE AND ACETAMINOPHEN 1 TABLET: 5; 325 TABLET ORAL at 18:44

## 2024-12-23 RX ADMIN — TERBINAFINE HYDROCHLORIDE: 1 CREAM TOPICAL at 21:04

## 2024-12-23 RX ADMIN — FUROSEMIDE 40 MG: 20 TABLET ORAL at 09:48

## 2024-12-23 RX ADMIN — SENNOSIDES AND DOCUSATE SODIUM 1 TABLET: 50; 8.6 TABLET ORAL at 10:15

## 2024-12-23 RX ADMIN — ASPIRIN 81 MG CHEWABLE TABLET 81 MG: 81 TABLET CHEWABLE at 09:49

## 2024-12-23 RX ADMIN — METOPROLOL SUCCINATE 50 MG: 50 TABLET, EXTENDED RELEASE ORAL at 09:48

## 2024-12-23 RX ADMIN — FLUTICASONE FUROATE AND VILANTEROL TRIFENATATE 1 PUFF: 100; 25 POWDER RESPIRATORY (INHALATION) at 09:54

## 2024-12-23 RX ADMIN — MAGNESIUM OXIDE TAB 400 MG (241.3 MG ELEMENTAL MG) 400 MG: 400 (241.3 MG) TAB at 10:15

## 2024-12-23 RX ADMIN — TERBINAFINE HYDROCHLORIDE: 1 CREAM TOPICAL at 10:16

## 2024-12-23 ASSESSMENT — ACTIVITIES OF DAILY LIVING (ADL)
ADLS_ACUITY_SCORE: 62
ADLS_ACUITY_SCORE: 60
ADLS_ACUITY_SCORE: 62
ADLS_ACUITY_SCORE: 61
ADLS_ACUITY_SCORE: 63
ADLS_ACUITY_SCORE: 62
ADLS_ACUITY_SCORE: 61
ADLS_ACUITY_SCORE: 62
ADLS_ACUITY_SCORE: 62
ADLS_ACUITY_SCORE: 61
ADLS_ACUITY_SCORE: 62
ADLS_ACUITY_SCORE: 60
ADLS_ACUITY_SCORE: 60
ADLS_ACUITY_SCORE: 62
ADLS_ACUITY_SCORE: 61
ADLS_ACUITY_SCORE: 63
ADLS_ACUITY_SCORE: 63
ADLS_ACUITY_SCORE: 61
ADLS_ACUITY_SCORE: 60

## 2024-12-23 NOTE — PLAN OF CARE
Problem: UTI (Urinary Tract Infection)  Goal: Improved Infection Symptoms  Outcome: Progressing     Problem: Comorbidity Management  Goal: Blood Pressure in Desired Range  Outcome: Progressing   Goal Outcome Evaluation:  No significant events overnight. Rebollar removed per order at 0600. Desirae A&Ox4. 3L NC, did not want to wear cpap overnight. Controlled afib on telemetry. Assist of 1 gb/walker to bathroom, very slow moving and needed assistance steadying herself multiple times. Slow moving, weakness and limited movement in BUE and BLE. No neuro changes overnight. Denied pain. Appeared to sleep well.

## 2024-12-23 NOTE — PLAN OF CARE
Goal Outcome Evaluation:         Pt. Eating well today. Drinking adequate to large amounts of water and juices. NIH 1. Does note numbness in her feet. /toes. Transferring with assist of 1. Spirits are good. Family at her bedside today. Has gone into A-Fib this evening with RVR. IV Metoprolol 2.5 mg given with rate now in the 90's. 50 mg given po.

## 2024-12-23 NOTE — PROGRESS NOTES
[facility]  [place]    Desirae Rey MRN# [id number]   Age: 74 year old YOB: 1950               Assessment and Plan:      Encephalopathy--resolved  Incidental right cerebellar infarct (tiny)  Imbalance  Lumbar stenosis     Has diminished reflexes and some sensory loss in feet, may have neuropathy or multiple radiculopathies due to the spinal stenosis affecting her balance.     Still with tailbone pain after her fall, will check x-ray    Regarding stroke prevention:  MRA showed no significant stenosis  LDL 69  Echo shows no obvious embolic source  Aspirin Plavix for 3 weeks followed by aspirin only  Blood pressure can be normalized  PT/OT    OK to start moving toward discharge from my perspective.           Chief Complaint/HPI:     12/23/24:  her confusion and general weakness has resolved.  Still with balance difficulties and still with tailbone pain after her fall.      12/21/24:  We have been requested by Dr. Gauthier to evaluate Desirae Rey who is a 74 year old  female for evaluation of stroke.  This is a 74-year-old female with history of chronic kidney disease stage III, CHF, history of blood clots, hypertension, hyperlipidemia, chronic pain syndrome, obstructive sleep apnea, asthma who presented with some changes in mental status.  This was thought to be related to chronic renal failure and UTI.  MRI was done in the emergency room which showed an acute stroke for which neurology's been consulted.  She reports that she did have worsening weakness and some falls at home.  Does complain of numbness in her feet for the last 2 years.  Also reports some back pain/balance issues more chronically.  No acute neurological symptoms to correlate with the stroke.  No headaches.  No chest pains palpitations.'     12/22  No further neurological symptoms.  Echocardiogram pending.                  Medications:     Current Facility-Administered Medications:     acetaminophen (TYLENOL) tablet  500 mg, 500 mg, Oral, Q6H PRN, Jennifer Gauthier MD    albuterol (PROVENTIL HFA/VENTOLIN HFA) inhaler, 1 puff, Inhalation, Q4H PRN, Jennifer Gauthier MD    aspirin (ASA) chewable tablet 81 mg, 81 mg, Oral, Daily, Preston Marroquin MD, 81 mg at 12/23/24 0949    azelastine (OPTIVAR) ophthalmic solution 1 drop, 1 drop, Ophthalmic, BID PRN, Jennifer Gauthier MD    calcium carbonate (TUMS) chewable tablet 1,000 mg, 1,000 mg, Oral, 4x Daily PRN, Hai Li MD    cefTRIAXone (ROCEPHIN) 2 g vial to attach to  ml bag for ADULTS or NS 50 ml bag for PEDS, 2 g, Intravenous, Q24H, Hai Li MD, 2 g at 12/22/24 2058    cetirizine (zyrTEC) tablet 10 mg, 10 mg, Oral, Daily, Jennifer Gauthier MD, 10 mg at 12/23/24 0948    clopidogrel (PLAVIX) tablet 75 mg, 75 mg, Oral, Daily, Preston Marroquin MD, 75 mg at 12/23/24 0949    glucose gel 15-30 g, 15-30 g, Oral, Q15 Min PRN **OR** dextrose 50 % injection 25-50 mL, 25-50 mL, Intravenous, Q15 Min PRN **OR** glucagon injection 1 mg, 1 mg, Subcutaneous, Q15 Min PRN, Hai Li MD    fluticasone (FLONASE) 50 MCG/ACT spray 1 spray, 1 spray, Both Nostrils, Daily, Hai Li MD    fluticasone-vilanterol (BREO ELLIPTA) 100-25 MCG/ACT inhaler 1 puff, 1 puff, Inhalation, Daily, Jennifer Gauthier MD, 1 puff at 12/23/24 0954    furosemide (LASIX) tablet 40 mg, 40 mg, Oral, QAM, Jennifer Gauthier MD, 40 mg at 12/23/24 0948    HYDROcodone-acetaminophen (NORCO) 5-325 MG per tablet 1 tablet, 1 tablet, Oral, Q4H PRN, Jennifer Gauthier MD, 1 tablet at 12/22/24 1048    hydroxychloroquine (PLAQUENIL) half-tab 100 mg, 100 mg, Oral, BID, Jennifer Gauthier MD, 100 mg at 12/23/24 0950    ipratropium - albuterol 0.5 mg/2.5 mg/3 mL (DUONEB) neb solution 3 mL, 3 mL, Nebulization, Q4H PRN, Hai Li MD    lidocaine (LMX4) cream, , Topical, Q1H PRN, Hai Li MD    lidocaine 1 % 0.1-1 mL, 0.1-1 mL, Other, Q1H PRN, Hai Li MD    magnesium oxide (MAG-OX) tablet 400 mg, 400  mg, Oral, Daily, Jennifer Gauthier MD, 400 mg at 12/23/24 1015    melatonin tablet 3 mg, 3 mg, Oral, At Bedtime PRN, Hai Li MD    metoprolol (LOPRESSOR) injection 2.5 mg, 2.5 mg, Intravenous, Q4H PRN, Hai Li MD, 2.5 mg at 12/22/24 1735    metoprolol succinate ER (TOPROL XL) 24 hr tablet 50 mg, 50 mg, Oral, Daily, Hai Li MD, 50 mg at 12/23/24 0948    miconazole (MICATIN) 2 % powder, , Topical, BID, Hai Li MD, Given at 12/23/24 0955    modafinil (PROVIGIL) half-tab 250 mg, 250 mg, Oral, Daily, Jennifer Gauthier MD, 250 mg at 12/23/24 1015    montelukast (SINGULAIR) tablet 10 mg, 10 mg, Oral, QPM, Jennifer Gauthier MD, 10 mg at 12/22/24 2101    ondansetron (ZOFRAN ODT) ODT tab 4 mg, 4 mg, Oral, Q6H PRN **OR** ondansetron (ZOFRAN) injection 4 mg, 4 mg, Intravenous, Q6H PRN, Hai Li MD    polyethylene glycol (MIRALAX) Packet 17 g, 17 g, Oral, Daily PRN, Hai Li MD    rosuvastatin (CRESTOR) tablet 10 mg, 10 mg, Oral, Daily, Jennifer Gauthier MD, 10 mg at 12/23/24 0948    senna-docusate (SENOKOT-S/PERICOLACE) 8.6-50 MG per tablet 1 tablet, 1 tablet, Oral, BID PRN, 1 tablet at 12/23/24 1015 **OR** senna-docusate (SENOKOT-S/PERICOLACE) 8.6-50 MG per tablet 2 tablet, 2 tablet, Oral, BID PRN, Hai Li MD, 2 tablet at 12/21/24 2255    sodium chloride (PF) 0.9% PF flush 3 mL, 3 mL, Intracatheter, Q8H, Hai Li MD, 3 mL at 12/23/24 1154    sodium chloride (PF) 0.9% PF flush 3 mL, 3 mL, Intracatheter, q1 min prn, Hai Li MD    sodium chloride (PF) 0.9% PF flush 3 mL, 3 mL, Intracatheter, Q8H, Hai Li MD, 3 mL at 12/23/24 1016    sodium chloride (PF) 0.9% PF flush 3 mL, 3 mL, Intracatheter, q1 min prn, Hai Li MD    terbinafine (lamISIL) 1 % cream, , Topical, BID, Jennifer Gauthier MD, Given at 12/23/24 1016    tiZANidine (ZANAFLEX) tablet 4 mg, 4 mg, Oral, TID PRN, Jennifer Gauthier MD, 4 mg at 12/21/24 1500    tolterodine ER  "(DETROL LA) 24 hr capsule 4 mg, 4 mg, Oral, Daily, Jennifer Gauthier MD, 4 mg at 12/23/24 0950              Physical Exam:      Vitals: /66 (BP Location: Right arm)   Pulse 95   Temp 97.9  F (36.6  C) (Oral)   Resp 18   Ht 1.397 m (4' 7\")   Wt 93.5 kg (206 lb 2.1 oz)   SpO2 95%   BMI 47.91 kg/m    BMI= Body mass index is 47.91 kg/m .     Patient is alert and in no acute distress. Neck was supple, no carotid bruits, thyromegaly, lymphadenopathy or JVD noted.   Neurological Exam:   Mental status: Patient is alert and oriented x 3. Speech is clear and fluent    CN II: Visual fields are full to confrontation.  PERRLA.   CN III, IV, VI: EOMI.    CN VII: Face is symmetric with normal eye closure and smile.   CN VII: Hearing is normal to conversation   CN IX, X: Palate elevates symmetrically. Phonation is normal.    CN XI: Head turning and shoulder shrug are intact   CN XII: Tongue is midline with normal movements and no atrophy or fasciculations.   Motor: Muscle bulk and tone are normal. No pronator drift. Strength is 5/5 bilaterally. No fasciculations noted.   Reflexes: Reflexes are symmetric, diminished throughout. Plantar responses are flexor.   Sensory: Light touch, pinprick, subjectively diminished in feet and lower legs    Coordination: There is no dysmetria on finger-to-nose testing.    Gait: needing walker and assist      Ivan Ureña MD       More than 35 minutes spent reviewing records and results, evaluating patient, discussing with pt and family and on documentation    "

## 2024-12-23 NOTE — PLAN OF CARE
Problem: Stroke, Ischemic (Includes Transient Ischemic Attack)  Goal: Improved Communication Skills  Intervention: Optimize Communication Skills  Recent Flowsheet Documentation  Taken 12/23/2024 0900 by Trudy Orellana, RN  Communication Enhancement Strategies: extra time allowed for response  Goal: Optimal Functional Ability  Intervention: Optimize Functional Ability  Recent Flowsheet Documentation  Taken 12/23/2024 0900 by Trudy Orellana, RN  Activity Management: up in chair   Goal Outcome Evaluation:       Patient is alert and oriented x4. Up to bathroom with walker, gait belt and stand-by assist.  Sat up in chair for meals.  Good appetite.  Denies pain.  Voiding and had 1 small bowel movement.  PRN senna gave x1.

## 2024-12-23 NOTE — PROGRESS NOTES
[facility]    Medicine Progress Note - Hospitalist Service    Date of Admission:  12/20/2024    Assessment & Plan   Desirae Rey is a 74 year old female With a medical history significant for CKD stage III, CHF, history of blood clots, hypertension, hyperlipidemia, chronic pain syndrome, obstructive sleep apnea, asthma and other medical comorbidities who is admitted with change in mental status thought to be multifactorial from medication sedation and possible acute on chronic renal failure.    #Acute Ischemic Cerebellar Stroke  - Patient presented with altered mental status.  - MRI Brain showing acute/subacute stroke in right cerebellum.  - MRA Brain without any occlusion/stenosis of major brain arteries.  - Carotid US 12/21 with less than 50% stenosis bilaterally.  - Echo TTE with Bubble on 12/21 - no shunt on bubble study.  - Suspect stroke to be lacunar in nature per TOAST criteria - related to vascular risk factors.   Plan  - Neurology consulted, appreciate recommendations  - Cardiac Monitor - showing new atrial fibrillation 12/22 - see below  - Started aspirin 81mg daily  - Started Plavix 75mg daily  - Continue home rosuvastatin 10mg daily  - PT/OT - recommending TCU - patient wants home health care.  - 30 day holter on DC    #New Onset Non-Valvular Atrial Fibrillation with RVR  - Cardiac monitor showing what appears to be Atrial Fibrillation 12/22 PM. EKG 12/22 PM confirmed rhythm.  - TSH normal 1.26 this admission. Electrolytes have been in-range.  - Echo this admission showing only mild mitral stenosis but with left atrial dilation.  - Already taking Metoprolol XR 50mg daily for HF/HTN  Plan  - Continue home metoprolol XR 50mg daily  - Metoprolol 2.5mg IV prn for HR above 120.  - Will discuss overall anticoagulation plan with Neurology      #Severe Lumbar Spinal Stenosis  #History of cervical fusion surgery in 2017   - Patient with acute non-focal lower extremities weakness in  ED.  - MRI L-spine showing severe stenosis at L2-3, L3-4, and L4-5.  - No evidence of cauda equina syndrome on MRI.  Plan  - Neurosurgery consulted  - Patient is not interested in surgical intervention at this time.  She prefers to continue with nonsurgical treatment.  Therefore, further imaging of the spine does not seem to be necessary from a neurosurgical standpoint.   - Follow-up with the [facility]     #Acute Uncomplicated Urinary Tract Infection  #Acute Urinary Retention  #Intermittent Urinary Incontinence  - UA in ED showing Leuk Esterase and WBCs.  - Distended bladder noted on CT, possibly neurogenic in nature. Robledo catheter placed on admission  - No cauda equina syndrome on MRI L-spine.  - Patient uses Solifenacin at home for incontinence.  Plan:  - Continue ceftriaxone (started 12/21)  - Follow up on urine culture results  - Plan to remove robledo catheter and trial void on 12/23  - Hold urology consultation for now unless symptoms worsen or change.  - Continue home Solifenacin equivalent (Tolterodine)    #Acute Kidney Injury  - Baseline creatinine appears to be ~1.1  - Creatinine on admission 12/20 was 2.13  - Etiology thought to be Obstructive Uropathy (having acute urinary retention) vs Sepsis from UTI  Plan  - Follow daily BMP until improvement  - Hold home ACE/ARB and Diuretics      #Chronic HFpEF  #Pulmonary Hypertension  - Echo 2023 showing EF 60-65% with normal diastolic dysfunction. RV systolic function decreased with signs of pulmonary hypertension  - Home medications include Lasix 40mg daily, metoprolol 50mg daily,   Plan:  - Resume beta-blocker and statin therapy.  - Monitor for signs of CHF exacerbation.    #Chronic Hypoxic Respiratory Failure  #Obstructive Sleep Apnea  #Obesity Hypoventilation Syndrome  #Underlying Asthma  - Patient normally uses 3L oxygen at home. Home BIPAP at night.  - Home medications include montelukast 10mg nightly, fluticasone-salmeterol inhaler  "BID.  Plan  - Continue home medications  - Duonebs q4h prn for wheezing.  - Continue CPAP at night while hospitalized          Diet: Regular Diet Adult    DVT Prophylaxis: Pneumatic Compression Devices and discussing anticoagulation plan with stroke and afib.  Rebollar Catheter: Not present  Lines: None     Cardiac Monitoring: ACTIVE order. Indication: Stroke, acute (48 hours)  Code Status: Full Code      Clinically Significant Risk Factors               # Hypoalbuminemia: Lowest albumin = 3.4 g/dL at 12/21/2024  5:45 AM, will monitor as appropriate     # Hypertension: Noted on problem list  # Chronic heart failure with preserved ejection fraction: heart failure noted on problem list and last echo with EF >50%           # Severe Obesity: Estimated body mass index is 47.91 kg/m  as calculated from the following:    Height as of this encounter: 1.397 m (4' 7\").    Weight as of this encounter: 93.5 kg (206 lb 2.1 oz)., PRESENT ON ADMISSION     # Financial/Environmental Concerns: none  # Asthma: noted on problem list        Social Drivers of Health    Food Insecurity: Unknown (12/22/2024)    Food Insecurity     Within the past 12 months, did you worry that your food would run out before you got money to buy more?: Patient unable to answer     Within the past 12 months, did the food you bought just not last and you didn t have money to get more?: Patient unable to answer   Housing Stability: Unknown (12/22/2024)    Housing Stability     Do you have housing? : Patient unable to answer     Are you worried about losing your housing?: Patient unable to answer   Financial Resource Strain: Unknown (12/22/2024)    Financial Resource Strain     Within the past 12 months, have you or your family members you live with been unable to get utilities (heat, electricity) when it was really needed?: Patient unable to answer   Transportation Needs: Unknown (12/22/2024)    Transportation Needs     Within the past 12 months, has lack of " transportation kept you from medical appointments, getting your medicines, non-medical meetings or appointments, work, or from getting things that you need?: Patient unable to answer    [facility]    Social Connections          Disposition Plan     Medically Ready for Discharge: Anticipated in 2-4 Days             Hai Li MD  Hospitalist Service  [facility]  Securely message with [employer] (more info)  Text page via [employer] Paging/Directory   ______________________________________________________________________    Interval History   - Patient still in Atrial Fibrillation on cardiac monitor this morning  - She denies any dizziness.  - Still having generalized weakness.    Physical Exam   Vital Signs: Temp: 98.4  F (36.9  C) Temp src: Oral BP: (!) 171/86 Pulse: 102   Resp: 18 SpO2: 97 % O2 Device: Nasal cannula Oxygen Delivery: 3 LPM  Weight: 206 lbs 2.08 oz    General: Alert and Oriented x3. Answers questions appropriately. Follows commands.  Eyes:EOMI. PERRL. Normal Conjunctivae.  HENT: Normocephalic. Atraumatic.  Resp: Breath sounds equal throughout. No crackles. No wheezing.  Cardio: Regular rate and rhythm. No murmurs. No friction rub.  Abd: Soft. Non-distended. Non-tender. Bowel sounds normal. No rebound tenderness.  MSK: No areas of ecchymosis or erythema.  Neuro:  - A/O x3  - CN 2-12 intact. Facial movement symmetric  - Strength 4/5 in Upper Extremities bilaterally and 3/5 in lower extremities bilaterally.  - FNF and HTS limited by strength but symmetric and grossly normal.  Skin:No rashes. No jaundice.     Medical Decision Making       45 MINUTES SPENT BY ME on the date of service doing chart review, history, exam, documentation & further activities per the note.  MANAGEMENT DISCUSSED with the following over the past 24 hours: RN, CM   Tests ORDERED & REVIEWED in the past 24 hours:  -  BMP  - CBC  - Magnesium  - Phosphorus  Medical complexity over the past 24 hours:  - Prescription DRUG MANAGEMENT performed      Data     I have personally reviewed the following data over the past 24 hrs:    6.5  \   11.2 (L)   / 220     140 104 48.3 (H) /  236 (H)   4.1 27 1.57 (H) \       Imaging results reviewed over the past 24 hrs:   Recent Results (from the past 24 hours)   Echocardiogram Complete   Result Value    LVEF  60-65%    Narrative    [id number]  [facility]  [id number]  [id number] [address]     Name: BELL THOMAS  MRN: [id number]  : 1950  Study Date: 2024 01:46 PM  Age: 74 yrs  Gender: Female  Patient Location: [facility]  Reason For Study: CVA  Ordering Physician: ROSEMARY ZAMORANO  Performed By: AD     BSA: 1.8 m2  Height: 55 in  Weight: 206 lb  HR: 91  ______________________________________________________________________________  Procedure  Echocardiogram with two-dimensional, color and spectral Doppler. Bubble  Echocardiogram with two-dimensional, color and spectral Doppler.  ______________________________________________________________________________  Interpretation Summary     Left ventricular size, wall motion and function are normal. The ejection  fraction is 60-65%.  There is mild concentric left ventricular hypertrophy.  Grade II or moderate diastolic dysfunction.  No regional wall motion abnormalities noted.  Normal right ventricle size and systolic function.  The left atrium is moderately dilated.  Mild valvular aortic stenosis.  Calcified and thickened mitral valve leaflets, mild mitral valve stenosis with  mean gradient 5 mmHg at ventricular rate 90 BPM.  Ascending Aorta dilatation is present 3.9 cm.     When compared to previous study on 2023, mitral valve disorder is mildly  worse.     ______________________________________________________________________________  I      WMSI = 1.00     % Normal = 100     X  - Cannot   0 -                      (2) - Mildly 2 -          Segments  Size  Interpret    Hyperkinetic 1 - Normal  Hypokinetic  Hypokinetic  1-2     small                                                     7 -          3-5      moderate  3 - Akinetic 4 -          5 -         6 - Akinetic Dyskinetic   6-14    large               Dyskinetic   Aneurysmal  w/scar       w/scar       15-16   diffuse     Left Ventricle  Left ventricular size, wall motion and function are normal. The ejection  fraction is 60-65%. There is mild concentric left ventricular hypertrophy.  Grade II or moderate diastolic dysfunction. No regional wall motion  abnormalities noted.     Right Ventricle  Normal right ventricle size and systolic function.     Atria  The left atrium is moderately dilated. The right atrium is mildly dilated.  There is no atrial shunt seen.     Mitral Valve  There is mild to moderate mitral annular calcification. Calcified mitral  apparatus. There is mild (1+) mitral regurgitation. There is mild mitral  stenosis. The mean mitral valve gradient is 3.7 mmHg.     Tricuspid Valve  Tricuspid valve leaflets appear normal. There is no evidence of tricuspid  stenosis or clinically significant tricuspid regurgitation.     Aortic Valve  Mild aortic valve calcification is present. There is trace aortic  regurgitation. Mild valvular aortic stenosis.     Pulmonic Valve  The pulmonic valve is not well seen, but is grossly normal.     Vessels  The aorta root is normal. Ascending Aorta dilatation is present. IVC diameter  <2.1 cm collapsing >50% with sniff suggests a normal RA pressure of 3 mmHg.     Pericardium  There is no pericardial effusion.     ______________________________________________________________________________  MMode/2D Measurements & Calculations  IVSd: 1.2 cm  LVIDd: 3.7 cm  LVIDs: 2.6 cm  LVPWd: 1.1 cm  FS: 29.0 %  LV mass(C)d: 132.8 grams  LV mass(C)dI: 74.8 grams/m2  Ao root diam: 3.3 cm  LA dimension: 3.8 cm      asc Aorta Diam: 3.9 cm  LA/Ao: 1.2  LVOT diam: 2.0 cm  LVOT area: 3.1 cm2  Ao root diam index Ht(cm/m): 2.4  Ao root diam index BSA (cm/m2): 1.9  Asc Ao diam index BSA (cm/m2): 2.2  Asc Ao diam index Ht(cm/m): 2.8  EF Biplane: 59.2 %  RWT: 0.59  TAPSE: 2.6 cm     Time Measurements  MM HR: 91.0 BPM     Doppler Measurements & Calculations  MV E max ruddy: 107.0 cm/sec  MV A max ruddy: 122.0 cm/sec  MV E/A: 0.88  MV max P.1 mmHg  MV mean PG: 3.7 mmHg  MV V2 VTI: 36.9 cm  MVA(VTI): 2.0 cm2     MV dec slope: 411.0 cm/sec2  MV dec time: 0.26 sec  Ao V2 max: 194.7 cm/sec  Ao max PG: 15.0 mmHg  Ao V2 mean: 148.0 cm/sec  Ao mean PG: 10.0 mmHg  Ao V2 VTI: 34.9 cm  RITA(I,D): 2.1 cm2  RITA(V,D): 2.4 cm2  LV V1 max P.6 mmHg  LV V1 max: 147.0 cm/sec  LV V1 VTI: 23.8 cm  SV(LVOT): 74.8 ml  SI(LVOT): 42.1 ml/m2  AV Ruddy Ratio (DI): 0.76  RITA Index (cm2/m2): 1.2  E/E' av.5  Lateral E/e': 12.0  Medial E/e': 12.9  RV S Ruddy: 20.0 cm/sec     ______________________________________________________________________________  Report approved by: Kristine Knight MD on 2024 03:10 PM

## 2024-12-24 ENCOUNTER — APPOINTMENT (OUTPATIENT)
Dept: OCCUPATIONAL THERAPY | Facility: HOSPITAL | Age: 74
DRG: 064 | End: 2024-12-24
Payer: MEDICARE

## 2024-12-24 VITALS
RESPIRATION RATE: 20 BRPM | BODY MASS INDEX: 47.7 KG/M2 | HEIGHT: 55 IN | HEART RATE: 90 BPM | SYSTOLIC BLOOD PRESSURE: 147 MMHG | OXYGEN SATURATION: 94 % | TEMPERATURE: 97.8 F | WEIGHT: 206.13 LBS | DIASTOLIC BLOOD PRESSURE: 97 MMHG

## 2024-12-24 LAB
ANION GAP SERPL CALCULATED.3IONS-SCNC: 9 MMOL/L (ref 7–15)
BUN SERPL-MCNC: 40.8 MG/DL (ref 8–23)
CALCIUM SERPL-MCNC: 9.3 MG/DL (ref 8.8–10.4)
CHLORIDE SERPL-SCNC: 105 MMOL/L (ref 98–107)
CREAT SERPL-MCNC: 1.4 MG/DL (ref 0.51–0.95)
EGFRCR SERPLBLD CKD-EPI 2021: 40 ML/MIN/1.73M2
ERYTHROCYTE [DISTWIDTH] IN BLOOD BY AUTOMATED COUNT: 12.4 % (ref 10–15)
FOLATE RBC-MCNC: 543 NG/ML
GLUCOSE BLDC GLUCOMTR-MCNC: 92 MG/DL (ref 70–99)
GLUCOSE BLDC GLUCOMTR-MCNC: 98 MG/DL (ref 70–99)
GLUCOSE SERPL-MCNC: 125 MG/DL (ref 70–99)
HCO3 SERPL-SCNC: 27 MMOL/L (ref 22–29)
HCT VFR BLD AUTO: 34.4 % (ref 35–47)
HGB BLD-MCNC: 11.1 G/DL (ref 11.7–15.7)
MAGNESIUM SERPL-MCNC: 1.9 MG/DL (ref 1.7–2.3)
MCH RBC QN AUTO: 32.2 PG (ref 26.5–33)
MCHC RBC AUTO-ENTMCNC: 32.3 G/DL (ref 31.5–36.5)
MCV RBC AUTO: 100 FL (ref 78–100)
PHOSPHATE SERPL-MCNC: 2.9 MG/DL (ref 2.5–4.5)
PLATELET # BLD AUTO: 221 10E3/UL (ref 150–450)
POTASSIUM SERPL-SCNC: 4.2 MMOL/L (ref 3.4–5.3)
RBC # BLD AUTO: 3.45 10E6/UL (ref 3.8–5.2)
SODIUM SERPL-SCNC: 141 MMOL/L (ref 135–145)
WBC # BLD AUTO: 4.9 10E3/UL (ref 4–11)

## 2024-12-24 PROCEDURE — 84100 ASSAY OF PHOSPHORUS: CPT | Performed by: STUDENT IN AN ORGANIZED HEALTH CARE EDUCATION/TRAINING PROGRAM

## 2024-12-24 PROCEDURE — 999N000226 HC STATISTIC SLP IP EVAL DEFER

## 2024-12-24 PROCEDURE — 250N000013 HC RX MED GY IP 250 OP 250 PS 637: Performed by: STUDENT IN AN ORGANIZED HEALTH CARE EDUCATION/TRAINING PROGRAM

## 2024-12-24 PROCEDURE — 250N000013 HC RX MED GY IP 250 OP 250 PS 637: Performed by: PSYCHIATRY & NEUROLOGY

## 2024-12-24 PROCEDURE — 97535 SELF CARE MNGMENT TRAINING: CPT | Mod: GO

## 2024-12-24 PROCEDURE — 86334 IMMUNOFIX E-PHORESIS SERUM: CPT | Performed by: PATHOLOGY

## 2024-12-24 PROCEDURE — 99239 HOSP IP/OBS DSCHRG MGMT >30: CPT | Performed by: STUDENT IN AN ORGANIZED HEALTH CARE EDUCATION/TRAINING PROGRAM

## 2024-12-24 PROCEDURE — 999N000157 HC STATISTIC RCP TIME EA 10 MIN

## 2024-12-24 PROCEDURE — 84425 ASSAY OF VITAMIN B-1: CPT | Performed by: PSYCHIATRY & NEUROLOGY

## 2024-12-24 PROCEDURE — 36415 COLL VENOUS BLD VENIPUNCTURE: CPT | Performed by: STUDENT IN AN ORGANIZED HEALTH CARE EDUCATION/TRAINING PROGRAM

## 2024-12-24 PROCEDURE — 84207 ASSAY OF VITAMIN B-6: CPT | Performed by: PSYCHIATRY & NEUROLOGY

## 2024-12-24 PROCEDURE — 250N000013 HC RX MED GY IP 250 OP 250 PS 637: Performed by: INTERNAL MEDICINE

## 2024-12-24 PROCEDURE — 80048 BASIC METABOLIC PNL TOTAL CA: CPT | Performed by: STUDENT IN AN ORGANIZED HEALTH CARE EDUCATION/TRAINING PROGRAM

## 2024-12-24 PROCEDURE — 83735 ASSAY OF MAGNESIUM: CPT | Performed by: STUDENT IN AN ORGANIZED HEALTH CARE EDUCATION/TRAINING PROGRAM

## 2024-12-24 PROCEDURE — 99232 SBSQ HOSP IP/OBS MODERATE 35: CPT | Performed by: PSYCHIATRY & NEUROLOGY

## 2024-12-24 PROCEDURE — 85014 HEMATOCRIT: CPT | Performed by: STUDENT IN AN ORGANIZED HEALTH CARE EDUCATION/TRAINING PROGRAM

## 2024-12-24 PROCEDURE — 36415 COLL VENOUS BLD VENIPUNCTURE: CPT | Performed by: PSYCHIATRY & NEUROLOGY

## 2024-12-24 RX ORDER — CEFDINIR 300 MG/1
300 CAPSULE ORAL EVERY 12 HOURS
Qty: 4 CAPSULE | Refills: 0 | Status: SHIPPED | OUTPATIENT
Start: 2024-12-24 | End: 2024-12-24

## 2024-12-24 RX ORDER — POLYETHYLENE GLYCOL 3350 17 G/17G
17 POWDER, FOR SOLUTION ORAL DAILY
Qty: 510 G | Refills: 0 | Status: SHIPPED | OUTPATIENT
Start: 2024-12-24 | End: 2024-12-24

## 2024-12-24 RX ORDER — CEFDINIR 300 MG/1
300 CAPSULE ORAL EVERY 12 HOURS SCHEDULED
Status: DISCONTINUED | OUTPATIENT
Start: 2024-12-24 | End: 2024-12-24 | Stop reason: HOSPADM

## 2024-12-24 RX ORDER — CLOPIDOGREL BISULFATE 75 MG/1
75 TABLET ORAL DAILY
Qty: 21 TABLET | Refills: 0 | Status: SHIPPED | OUTPATIENT
Start: 2024-12-25 | End: 2024-12-24

## 2024-12-24 RX ORDER — CLOPIDOGREL BISULFATE 75 MG/1
75 TABLET ORAL DAILY
Qty: 21 TABLET | Refills: 0 | Status: SHIPPED | OUTPATIENT
Start: 2024-12-25

## 2024-12-24 RX ORDER — CEFDINIR 300 MG/1
300 CAPSULE ORAL EVERY 12 HOURS
Qty: 4 CAPSULE | Refills: 0 | Status: SHIPPED | OUTPATIENT
Start: 2024-12-24

## 2024-12-24 RX ORDER — POLYETHYLENE GLYCOL 3350 17 G/17G
17 POWDER, FOR SOLUTION ORAL DAILY
Qty: 510 G | Refills: 0 | Status: SHIPPED | OUTPATIENT
Start: 2024-12-24

## 2024-12-24 RX ADMIN — MICONAZOLE NITRATE ANTIFUNGAL POWDER: 2 POWDER TOPICAL at 08:48

## 2024-12-24 RX ADMIN — CETIRIZINE HYDROCHLORIDE 10 MG: 10 TABLET, FILM COATED ORAL at 08:44

## 2024-12-24 RX ADMIN — ASPIRIN 81 MG CHEWABLE TABLET 81 MG: 81 TABLET CHEWABLE at 08:43

## 2024-12-24 RX ADMIN — TERBINAFINE HYDROCHLORIDE: 1 CREAM TOPICAL at 08:48

## 2024-12-24 RX ADMIN — TOLTERODINE 4 MG: 2 CAPSULE, EXTENDED RELEASE ORAL at 08:44

## 2024-12-24 RX ADMIN — MAGNESIUM OXIDE TAB 400 MG (241.3 MG ELEMENTAL MG) 400 MG: 400 (241.3 MG) TAB at 08:43

## 2024-12-24 RX ADMIN — CLOPIDOGREL BISULFATE 75 MG: 75 TABLET ORAL at 08:43

## 2024-12-24 RX ADMIN — SENNOSIDES AND DOCUSATE SODIUM 2 TABLET: 50; 8.6 TABLET ORAL at 08:55

## 2024-12-24 RX ADMIN — CEFDINIR 300 MG: 300 CAPSULE ORAL at 12:54

## 2024-12-24 RX ADMIN — FUROSEMIDE 40 MG: 20 TABLET ORAL at 08:43

## 2024-12-24 RX ADMIN — MODAFINIL 250 MG: 100 TABLET ORAL at 08:43

## 2024-12-24 RX ADMIN — ROSUVASTATIN 10 MG: 10 TABLET, FILM COATED ORAL at 08:43

## 2024-12-24 RX ADMIN — HYDROXYCHLOROQUINE SULFATE 100 MG: 200 TABLET, FILM COATED ORAL at 08:44

## 2024-12-24 RX ADMIN — POLYETHYLENE GLYCOL 3350 17 G: 17 POWDER, FOR SOLUTION ORAL at 08:55

## 2024-12-24 RX ADMIN — FLUTICASONE FUROATE AND VILANTEROL TRIFENATATE 1 PUFF: 100; 25 POWDER RESPIRATORY (INHALATION) at 08:44

## 2024-12-24 RX ADMIN — METOPROLOL SUCCINATE 50 MG: 50 TABLET, EXTENDED RELEASE ORAL at 08:43

## 2024-12-24 ASSESSMENT — ACTIVITIES OF DAILY LIVING (ADL)
ADLS_ACUITY_SCORE: 63
ADLS_ACUITY_SCORE: 61
ADLS_ACUITY_SCORE: 61
ADLS_ACUITY_SCORE: 67
ADLS_ACUITY_SCORE: 63
ADLS_ACUITY_SCORE: 61
ADLS_ACUITY_SCORE: 63
ADLS_ACUITY_SCORE: 67
ADLS_ACUITY_SCORE: 63
ADLS_ACUITY_SCORE: 67
ADLS_ACUITY_SCORE: 63

## 2024-12-24 NOTE — PLAN OF CARE
"  Problem: Stroke, Ischemic (Includes Transient Ischemic Attack)  Goal: Optimal Coping  Outcome: Progressing  Goal: Effective Bowel Elimination  Outcome: Progressing  Goal: Optimal Cerebral Tissue Perfusion  Outcome: Progressing  Goal: Optimal Cognitive Function  Outcome: Progressing  Goal: Improved Communication Skills  Outcome: Progressing  Intervention: Optimize Communication Skills  Recent Flowsheet Documentation  Taken 12/24/2024 1300 by Trudy Orellana, RN  Communication Enhancement Strategies: call light answered in person  Taken 12/24/2024 0900 by Trudy Orellana RN  Communication Enhancement Strategies: call light answered in person  Goal: Optimal Functional Ability  Outcome: Progressing  Goal: Optimal Nutrition Intake  Outcome: Progressing  Goal: Effective Oxygenation and Ventilation  Outcome: Progressing  Goal: Improved Sensorimotor Function  Outcome: Progressing  Goal: Safe and Effective Swallow  Outcome: Progressing  Goal: Effective Urinary Elimination  Outcome: Progressing   Goal Outcome Evaluation:       Patient is alert and oriented x4.  Up to bathroom with assist of 1, walker and gait belt.  Denies pain. States some numbness & tingling in bilateral feet.  Appetite good.  Showered today.  A-fib on tele monitor.          Pt  coming to pick patient up about 4:30pm.  Instructed patient to have spouse bring home O2 tank.  Patient states she does not need O2  for the ride home, \"I'll be ok for a short time without oxygen.  I do it at home all the time\".           "

## 2024-12-24 NOTE — PLAN OF CARE
Problem: UTI (Urinary Tract Infection)  Goal: Improved Infection Symptoms  Outcome: Progressing     Problem: Comorbidity Management  Goal: Maintenance of Heart Failure Symptom Control  Outcome: Progressing   Goal Outcome Evaluation:  No events overnight for Desirae. A&OX4. 3L/CPAP overnight. Assist of 1 gb/walker. Denied pain. Afib on telemetry.

## 2024-12-24 NOTE — PLAN OF CARE
Speech Language Pathology: Orders received. Chart reviewed and met with patient/family.? Patient is NPO awaiting blood draw/test. She is referred due to difficulty swallowing solids. She reports this has been going on for a while and is not new. She avoids some rice and certain breads as it feels like these stick in her throat (points to just above her sternal notch). This may be esophageal in nature. Discussed option for video swallow study and/or esophagram referral as an OP given this is not new or related to this hospitalization and she has anticipated discharge. She wants to think about it. Will defer consult orders, patient can pursue OP swallow study with primary provider if she wishes.? Will complete orders.

## 2024-12-24 NOTE — PLAN OF CARE
Problem: Adult Inpatient Plan of Care  Goal: Optimal Comfort and Wellbeing  Intervention: Monitor Pain and Promote Comfort  Recent Flowsheet Documentation  Taken 12/23/2024 1800 by Teresita Pagan RN  Pain Management Interventions: emotional support     Problem: Stroke, Ischemic (Includes Transient Ischemic Attack)  Goal: Optimal Cognitive Function  Intervention: Optimize Cognitive Function  Recent Flowsheet Documentation  Taken 12/23/2024 2131 by Teresita Pagan, RN  Sensory Stimulation Regulation: television on  Reorientation Measures: clock in view  Taken 12/23/2024 1800 by Teresita Pagan RN  Sensory Stimulation Regulation: television on   Goal Outcome Evaluation:       Pt alert and oriented without speech deficit. Pt states she has generalized weakness, numbness in bilat LE knees to feet, and hands. This seems to be a chronic problem. Pt c/o pain in back and prickles in hands and feet. Denies one-sided weakness or other stroke symptoms. Pt ambulates to bathroom with gait belt and walker and assist of 1 staff. Received Norco x 1 for back pain with releif. Talking to friends on phone much of shift.

## 2024-12-24 NOTE — PROGRESS NOTES
"Discharge paperwork and teaching went over with pt. She is reporting that son in law has picked up her medication from the pharmacy already. Pt sating at 94 on room air but dips with activity. When asked if  could bring O2 tank she declines and reports she doesn't need it for the ride,  \"I do it at home all the time\".  Wheelchaired out with  providing transport.  "

## 2024-12-24 NOTE — PLAN OF CARE
Occupational Therapy Discharge Summary    Reason for therapy discharge:    Discharged to home with home therapy.    Progress towards therapy goal(s). See goals on Care Plan in [facility] electronic health record for goal details.  Goals partially met.  Barriers to achieving goals:   discharge from facility.    Therapy recommendation(s):    Continued therapy is recommended.  Rationale/Recommendations:  To assess ADL/IADL's and safety needs at home.

## 2024-12-24 NOTE — PLAN OF CARE
Occupational Therapy Discharge Summary    Reason for therapy discharge:    Discharged to home with home therapy.    Progress towards therapy goal(s). See goals on Care Plan in [facility] electronic health record for goal details.  Goals partially met.  Barriers to achieving goals:   discharge from facility.    Therapy recommendation(s):    Continued therapy is recommended.  Rationale/Recommendations:  to maximize ADL independence.

## 2024-12-24 NOTE — PROGRESS NOTES
[facility]  [place]    Desirae Rey MRN# [id number]   Age: 74 year old YOB: 1950               Assessment and Plan:      Encephalopathy--resolved  Incidental right cerebellar infarct (tiny)  Imbalance  Lumbar stenosis     Has diminished reflexes and sensory loss in feet, may have neuropathy or multiple radiculopathies due to the spinal stenosis affecting her balance.   She is not enthusiastic about EMG testing in the Neuro clinic.    -B12 and TSH are fine  -will check B6, B1 and immunfixation    Still with tailbone pain after her fall, will check x-ray    Regarding stroke prevention:  MRA showed no significant stenosis  LDL 69  Echo shows no obvious embolic source  Aspirin Plavix for 3 weeks followed by aspirin only  Blood pressure can be normalized  Afib on EKG -- I discussed recommendation for anticoagulation with apixaban with pt, she is having multiple falls at home despite home PT, so she is reluctant to do anticoagulation due to risk of hemorrhage with her falling.   PT/OT    Today she complained of difficulty swallowing (more with solid foods than thin liquids).  Will ask SLP for another eval.     OK to discharge at any time from my perspective          Chief Complaint/HPI:     12/24/24: no new symptoms overnight, needing walker for balance    12/23/24:  her confusion and general weakness has resolved.  Still with balance difficulties and still with tailbone pain after her fall.      12/21/24:  We have been requested by Dr. Gauthier to evaluate Dseirae Rey who is a 74 year old  female for evaluation of stroke.  This is a 74-year-old female with history of chronic kidney disease stage III, CHF, history of blood clots, hypertension, hyperlipidemia, chronic pain syndrome, obstructive sleep apnea, asthma who presented with some changes in mental status.  This was thought to be related to chronic renal failure and UTI.  MRI was done in the emergency room which showed an acute  stroke for which neurology's been consulted.  She reports that she did have worsening weakness and some falls at home.  Does complain of numbness in her feet for the last 2 years.  Also reports some back pain/balance issues more chronically.  No acute neurological symptoms to correlate with the stroke.  No headaches.  No chest pains palpitations.'     12/22  No further neurological symptoms.  Echocardiogram pending.                  Medications:     Current Facility-Administered Medications:     acetaminophen (TYLENOL) tablet 500 mg, 500 mg, Oral, Q6H PRN, Jennifer Gauthier MD    albuterol (PROVENTIL HFA/VENTOLIN HFA) inhaler, 1 puff, Inhalation, Q4H PRN, Jennifer Gauthier MD    aspirin (ASA) chewable tablet 81 mg, 81 mg, Oral, Daily, Preston Marroquin MD, 81 mg at 12/24/24 0843    azelastine (OPTIVAR) ophthalmic solution 1 drop, 1 drop, Ophthalmic, BID PRN, Jennifer Gauthier MD    calcium carbonate (TUMS) chewable tablet 1,000 mg, 1,000 mg, Oral, 4x Daily PRN, Hai Li MD    cefTRIAXone (ROCEPHIN) 2 g vial to attach to  ml bag for ADULTS or NS 50 ml bag for PEDS, 2 g, Intravenous, Q24H, Hai Li MD, 2 g at 12/23/24 2103    cetirizine (zyrTEC) tablet 10 mg, 10 mg, Oral, Daily, Jennifer Gauthier MD, 10 mg at 12/24/24 0844    clopidogrel (PLAVIX) tablet 75 mg, 75 mg, Oral, Daily, Preston Marroquin MD, 75 mg at 12/24/24 0843    glucose gel 15-30 g, 15-30 g, Oral, Q15 Min PRN **OR** dextrose 50 % injection 25-50 mL, 25-50 mL, Intravenous, Q15 Min PRN **OR** glucagon injection 1 mg, 1 mg, Subcutaneous, Q15 Min PRN, Hai Li MD    fluticasone (FLONASE) 50 MCG/ACT spray 1 spray, 1 spray, Both Nostrils, Daily, Hai Li MD    fluticasone-vilanterol (BREO ELLIPTA) 100-25 MCG/ACT inhaler 1 puff, 1 puff, Inhalation, Daily, Jennifer Gauthier MD, 1 puff at 12/24/24 0844    furosemide (LASIX) tablet 40 mg, 40 mg, Oral, QAM, Jennifer Gauthier MD, 40 mg at 12/24/24 0843    HYDROcodone-acetaminophen  (NORCO) 5-325 MG per tablet 1 tablet, 1 tablet, Oral, Q4H PRN, Jennifer Gauthier MD, 1 tablet at 12/23/24 1844    hydroxychloroquine (PLAQUENIL) half-tab 100 mg, 100 mg, Oral, BID, Jennifer Gauthier MD, 100 mg at 12/24/24 0844    ipratropium - albuterol 0.5 mg/2.5 mg/3 mL (DUONEB) neb solution 3 mL, 3 mL, Nebulization, Q4H PRN, Hai Li MD    lidocaine (LMX4) cream, , Topical, Q1H PRN, Hai Li MD    lidocaine 1 % 0.1-1 mL, 0.1-1 mL, Other, Q1H PRN, Hai Li MD    magnesium oxide (MAG-OX) tablet 400 mg, 400 mg, Oral, Daily, Jennifer Gauthier MD, 400 mg at 12/24/24 0843    melatonin tablet 3 mg, 3 mg, Oral, At Bedtime PRN, Hai Li MD    metoprolol (LOPRESSOR) injection 2.5 mg, 2.5 mg, Intravenous, Q4H PRN, Hai Li MD, 2.5 mg at 12/22/24 1735    metoprolol succinate ER (TOPROL XL) 24 hr tablet 50 mg, 50 mg, Oral, Daily, Hai Li MD, 50 mg at 12/24/24 0843    miconazole (MICATIN) 2 % powder, , Topical, BID, Hai Li MD, Given at 12/24/24 0848    modafinil (PROVIGIL) half-tab 250 mg, 250 mg, Oral, Daily, Jennifer Gauthier MD, 250 mg at 12/24/24 0843    montelukast (SINGULAIR) tablet 10 mg, 10 mg, Oral, QPM, Jennifer Gauthier MD, 10 mg at 12/23/24 2103    ondansetron (ZOFRAN ODT) ODT tab 4 mg, 4 mg, Oral, Q6H PRN **OR** ondansetron (ZOFRAN) injection 4 mg, 4 mg, Intravenous, Q6H PRN, aHi Li MD    polyethylene glycol (MIRALAX) Packet 17 g, 17 g, Oral, Daily PRN, Hai Li MD, 17 g at 12/24/24 0855    rosuvastatin (CRESTOR) tablet 10 mg, 10 mg, Oral, Daily, Jennifer Gauthier MD, 10 mg at 12/24/24 0843    senna-docusate (SENOKOT-S/PERICOLACE) 8.6-50 MG per tablet 1 tablet, 1 tablet, Oral, BID PRN, 1 tablet at 12/23/24 1015 **OR** senna-docusate (SENOKOT-S/PERICOLACE) 8.6-50 MG per tablet 2 tablet, 2 tablet, Oral, BID PRN, Hai Li MD, 2 tablet at 12/24/24 0855    sodium chloride (PF) 0.9% PF flush 3 mL, 3 mL, Intracatheter, Q8H, Guillermo  "MD Hai, 3 mL at 12/23/24 2103    sodium chloride (PF) 0.9% PF flush 3 mL, 3 mL, Intracatheter, q1 min prn, Hai Li MD    sodium chloride (PF) 0.9% PF flush 3 mL, 3 mL, Intracatheter, Q8H, Hai Li MD, 3 mL at 12/24/24 0847    sodium chloride (PF) 0.9% PF flush 3 mL, 3 mL, Intracatheter, q1 min prn, Hai Li MD    terbinafine (lamISIL) 1 % cream, , Topical, BID, Jennifer Gauthier MD, Given at 12/24/24 0848    tiZANidine (ZANAFLEX) tablet 4 mg, 4 mg, Oral, TID PRN, Jennifer Gauthier MD, 4 mg at 12/21/24 1500    tolterodine ER (DETROL LA) 24 hr capsule 4 mg, 4 mg, Oral, Daily, Jennifer Gauthier MD, 4 mg at 12/24/24 0844              Physical Exam:      Vitals: BP (!) 158/88 (BP Location: Right arm)   Pulse 86   Temp 97.6  F (36.4  C) (Oral)   Resp 20   Ht 1.397 m (4' 7\")   Wt 93.5 kg (206 lb 2.1 oz)   SpO2 98%   BMI 47.91 kg/m    BMI= Body mass index is 47.91 kg/m .     Patient is alert and in no acute distress. Neck was supple, no carotid bruits, thyromegaly, lymphadenopathy or JVD noted.   Neurological Exam:   Mental status: Patient is alert and oriented x 3. Speech is clear and fluent    CN II: Visual fields are full to confrontation.  PERRLA.   CN III, IV, VI: EOMI.    CN VII: Face is symmetric with normal eye closure and smile.   CN VII: Hearing is normal to conversation   CN IX, X: Palate elevates symmetrically. Phonation is normal.    CN XI: Head turning and shoulder shrug are intact   CN XII: Tongue is midline with normal movements and no atrophy or fasciculations.   Motor: Muscle bulk and tone are normal. No pronator drift. Strength is 5/5 bilaterally. No fasciculations noted.   Reflexes: Reflexes are symmetric, diminished throughout. Plantar responses are flexor.   Sensory: pinprick, subjectively diminished in feet and lower legs   Vibration sensation absent at toes   Coordination: There is no dysmetria on finger-to-nose testing.    Gait: needing walker and assist      Ivan " COCO Ureña MD       More than 35 minutes spent reviewing records and results, evaluating patient, discussing with pt and family and on documentation

## 2024-12-26 LAB — PROT PATTERN SERPL IFE-IMP: NORMAL

## 2024-12-26 PROCEDURE — 86334 IMMUNOFIX E-PHORESIS SERUM: CPT | Mod: 26 | Performed by: PATHOLOGY

## 2024-12-26 NOTE — PLAN OF CARE
Physical Therapy Discharge Summary    Reason for therapy discharge:    Discharged to home with home therapy.    Progress towards therapy goal(s). See goals on Care Plan in [facility] electronic health record for goal details.  Goals not met.  Barriers to achieving goals:   discharge from facility.    Therapy recommendation(s):    Continued therapy is recommended.  Rationale/Recommendations:  to improve functional mobility.

## 2024-12-28 LAB
PYRIDOXAL PHOS SERPL-SCNC: 15.2 NMOL/L
VIT B1 PYROPHOSHATE BLD-SCNC: 121 NMOL/L

## 2024-12-30 ENCOUNTER — TRANSFERRED RECORDS (OUTPATIENT)
Dept: HEALTH INFORMATION MANAGEMENT | Facility: CLINIC | Age: 74
End: 2024-12-30
Payer: MEDICARE

## 2025-01-09 ENCOUNTER — MEDICAL CORRESPONDENCE (OUTPATIENT)
Dept: HEALTH INFORMATION MANAGEMENT | Facility: CLINIC | Age: 75
End: 2025-01-09
Payer: MEDICARE

## 2025-01-09 ENCOUNTER — TRANSCRIBE ORDERS (OUTPATIENT)
Dept: OTHER | Age: 75
End: 2025-01-09

## 2025-01-09 DIAGNOSIS — I63.81 LACUNAR STROKE (H): Primary | ICD-10-CM

## 2025-02-19 ENCOUNTER — HOSPITAL ENCOUNTER (EMERGENCY)
Facility: HOSPITAL | Age: 75
Discharge: HOME | End: 2025-02-19
Attending: EMERGENCY MEDICINE | Admitting: EMERGENCY MEDICINE
Payer: MEDICARE

## 2025-02-19 VITALS
HEIGHT: 55 IN | DIASTOLIC BLOOD PRESSURE: 73 MMHG | RESPIRATION RATE: 20 BRPM | BODY MASS INDEX: 45.85 KG/M2 | WEIGHT: 198.1 LBS | HEART RATE: 89 BPM | OXYGEN SATURATION: 99 % | SYSTOLIC BLOOD PRESSURE: 156 MMHG | TEMPERATURE: 97 F

## 2025-02-19 DIAGNOSIS — R41.0 DELIRIUM: ICD-10-CM

## 2025-02-19 DIAGNOSIS — N39.0 ACUTE LOWER UTI: ICD-10-CM

## 2025-02-19 LAB
ALBUMIN SERPL BCG-MCNC: 3.8 G/DL (ref 3.5–5.2)
ALBUMIN UR-MCNC: 30 MG/DL
ALP SERPL-CCNC: 85 U/L (ref 40–150)
ALT SERPL W P-5'-P-CCNC: 17 U/L (ref 0–50)
ANION GAP SERPL CALCULATED.3IONS-SCNC: 9 MMOL/L (ref 7–15)
APPEARANCE UR: ABNORMAL
AST SERPL W P-5'-P-CCNC: 34 U/L (ref 0–45)
BACTERIA #/AREA URNS HPF: ABNORMAL /HPF
BASOPHILS # BLD AUTO: 0 10E3/UL (ref 0–0.2)
BASOPHILS NFR BLD AUTO: 1 %
BILIRUB SERPL-MCNC: 0.3 MG/DL
BILIRUB UR QL STRIP: NEGATIVE
BUN SERPL-MCNC: 49.7 MG/DL (ref 8–23)
CALCIUM SERPL-MCNC: 10.6 MG/DL (ref 8.8–10.4)
CHLORIDE SERPL-SCNC: 102 MMOL/L (ref 98–107)
COLOR UR AUTO: YELLOW
CREAT SERPL-MCNC: 1.56 MG/DL (ref 0.51–0.95)
EGFRCR SERPLBLD CKD-EPI 2021: 35 ML/MIN/1.73M2
EOSINOPHIL # BLD AUTO: 0.4 10E3/UL (ref 0–0.7)
EOSINOPHIL NFR BLD AUTO: 6 %
ERYTHROCYTE [DISTWIDTH] IN BLOOD BY AUTOMATED COUNT: 12.8 % (ref 10–15)
GLUCOSE SERPL-MCNC: 103 MG/DL (ref 70–99)
GLUCOSE UR STRIP-MCNC: NEGATIVE MG/DL
HCO3 SERPL-SCNC: 28 MMOL/L (ref 22–29)
HCT VFR BLD AUTO: 36.8 % (ref 35–47)
HGB BLD-MCNC: 11.9 G/DL (ref 11.7–15.7)
HGB UR QL STRIP: ABNORMAL
HYALINE CASTS: 6 /LPF
IMM GRANULOCYTES # BLD: 0 10E3/UL
IMM GRANULOCYTES NFR BLD: 0 %
KETONES UR STRIP-MCNC: NEGATIVE MG/DL
LACTATE SERPL-SCNC: 1 MMOL/L (ref 0.7–2)
LEUKOCYTE ESTERASE UR QL STRIP: ABNORMAL
LYMPHOCYTES # BLD AUTO: 1.4 10E3/UL (ref 0.8–5.3)
LYMPHOCYTES NFR BLD AUTO: 21 %
MAGNESIUM SERPL-MCNC: 2.7 MG/DL (ref 1.7–2.3)
MCH RBC QN AUTO: 31.9 PG (ref 26.5–33)
MCHC RBC AUTO-ENTMCNC: 32.3 G/DL (ref 31.5–36.5)
MCV RBC AUTO: 99 FL (ref 78–100)
MONOCYTES # BLD AUTO: 0.8 10E3/UL (ref 0–1.3)
MONOCYTES NFR BLD AUTO: 11 %
MUCOUS THREADS #/AREA URNS LPF: PRESENT /LPF
NEUTROPHILS # BLD AUTO: 4.3 10E3/UL (ref 1.6–8.3)
NEUTROPHILS NFR BLD AUTO: 62 %
NITRATE UR QL: POSITIVE
NRBC # BLD AUTO: 0 10E3/UL
NRBC BLD AUTO-RTO: 0 /100
PH UR STRIP: 5.5 [PH] (ref 5–7)
PLATELET # BLD AUTO: 247 10E3/UL (ref 150–450)
POTASSIUM SERPL-SCNC: 5.3 MMOL/L (ref 3.4–5.3)
PROT SERPL-MCNC: 6.9 G/DL (ref 6.4–8.3)
RBC # BLD AUTO: 3.73 10E6/UL (ref 3.8–5.2)
RBC URINE: 6 /HPF
SODIUM SERPL-SCNC: 139 MMOL/L (ref 135–145)
SP GR UR STRIP: 1.01 (ref 1–1.03)
SQUAMOUS EPITHELIAL: 4 /HPF
UROBILINOGEN UR STRIP-MCNC: <2 MG/DL
WBC # BLD AUTO: 7 10E3/UL (ref 4–11)
WBC CLUMPS #/AREA URNS HPF: PRESENT /HPF
WBC URINE: >182 /HPF

## 2025-02-19 PROCEDURE — 87186 SC STD MICRODIL/AGAR DIL: CPT | Performed by: STUDENT IN AN ORGANIZED HEALTH CARE EDUCATION/TRAINING PROGRAM

## 2025-02-19 PROCEDURE — 96366 THER/PROPH/DIAG IV INF ADDON: CPT

## 2025-02-19 PROCEDURE — 99285 EMERGENCY DEPT VISIT HI MDM: CPT | Mod: 25

## 2025-02-19 PROCEDURE — 82040 ASSAY OF SERUM ALBUMIN: CPT | Performed by: STUDENT IN AN ORGANIZED HEALTH CARE EDUCATION/TRAINING PROGRAM

## 2025-02-19 PROCEDURE — 250N000011 HC RX IP 250 OP 636: Performed by: STUDENT IN AN ORGANIZED HEALTH CARE EDUCATION/TRAINING PROGRAM

## 2025-02-19 PROCEDURE — 83735 ASSAY OF MAGNESIUM: CPT | Performed by: STUDENT IN AN ORGANIZED HEALTH CARE EDUCATION/TRAINING PROGRAM

## 2025-02-19 PROCEDURE — 96365 THER/PROPH/DIAG IV INF INIT: CPT | Mod: 59

## 2025-02-19 PROCEDURE — 255N000002 HC RX 255 OP 636: Performed by: STUDENT IN AN ORGANIZED HEALTH CARE EDUCATION/TRAINING PROGRAM

## 2025-02-19 PROCEDURE — A9585 GADOBUTROL INJECTION: HCPCS | Performed by: STUDENT IN AN ORGANIZED HEALTH CARE EDUCATION/TRAINING PROGRAM

## 2025-02-19 PROCEDURE — 85025 COMPLETE CBC W/AUTO DIFF WBC: CPT | Performed by: STUDENT IN AN ORGANIZED HEALTH CARE EDUCATION/TRAINING PROGRAM

## 2025-02-19 PROCEDURE — 36415 COLL VENOUS BLD VENIPUNCTURE: CPT | Performed by: STUDENT IN AN ORGANIZED HEALTH CARE EDUCATION/TRAINING PROGRAM

## 2025-02-19 PROCEDURE — 81001 URINALYSIS AUTO W/SCOPE: CPT | Performed by: STUDENT IN AN ORGANIZED HEALTH CARE EDUCATION/TRAINING PROGRAM

## 2025-02-19 PROCEDURE — 83605 ASSAY OF LACTIC ACID: CPT | Performed by: STUDENT IN AN ORGANIZED HEALTH CARE EDUCATION/TRAINING PROGRAM

## 2025-02-19 RX ORDER — DIPHENHYDRAMINE HCL 12.5 MG/5ML
0.5 SOLUTION ORAL ONCE
Status: DISCONTINUED | OUTPATIENT
Start: 2025-02-19 | End: 2025-02-19

## 2025-02-19 RX ORDER — CEFTRIAXONE 1 G/1
1 INJECTION, POWDER, FOR SOLUTION INTRAMUSCULAR; INTRAVENOUS ONCE
Status: COMPLETED | OUTPATIENT
Start: 2025-02-19 | End: 2025-02-19

## 2025-02-19 RX ORDER — IOPAMIDOL 755 MG/ML
75 INJECTION, SOLUTION INTRAVASCULAR ONCE
Status: COMPLETED | OUTPATIENT
Start: 2025-02-19 | End: 2025-02-19

## 2025-02-19 RX ORDER — GADOBUTROL 604.72 MG/ML
9 INJECTION INTRAVENOUS ONCE
Status: COMPLETED | OUTPATIENT
Start: 2025-02-19 | End: 2025-02-19

## 2025-02-19 RX ORDER — CEFDINIR 300 MG/1
300 CAPSULE ORAL 2 TIMES DAILY
Qty: 14 CAPSULE | Refills: 0 | Status: SHIPPED | OUTPATIENT
Start: 2025-02-19 | End: 2025-02-26

## 2025-02-19 RX ADMIN — GADOBUTROL 9 ML: 604.72 INJECTION INTRAVENOUS at 20:21

## 2025-02-19 RX ADMIN — IOPAMIDOL 75 ML: 755 INJECTION, SOLUTION INTRAVENOUS at 19:31

## 2025-02-19 RX ADMIN — CEFTRIAXONE SODIUM 1 G: 1 INJECTION, POWDER, FOR SOLUTION INTRAMUSCULAR; INTRAVENOUS at 17:43

## 2025-02-19 ASSESSMENT — ACTIVITIES OF DAILY LIVING (ADL)
ADLS_ACUITY_SCORE: 58

## 2025-02-19 ASSESSMENT — COLUMBIA-SUICIDE SEVERITY RATING SCALE - C-SSRS
2. HAVE YOU ACTUALLY HAD ANY THOUGHTS OF KILLING YOURSELF IN THE PAST MONTH?: NO
1. IN THE PAST MONTH, HAVE YOU WISHED YOU WERE DEAD OR WISHED YOU COULD GO TO SLEEP AND NOT WAKE UP?: NO
6. HAVE YOU EVER DONE ANYTHING, STARTED TO DO ANYTHING, OR PREPARED TO DO ANYTHING TO END YOUR LIFE?: NO

## 2025-02-19 NOTE — ED PROVIDER NOTES
I, Jennifer Andrade have reviewed the documentation, personally taken the patient's history, performed an exam and agree with the physical finds, diagnosis and management plan.    HPI:  75 y/o f hx of chronic pain, ckd, chf, anemia, copd.  Here in dec found to have uti but also cerebellar cva.  Rx dapt but she thinks she stopped the plavix.   Here today c/o confusion and urinary sx plus L flank pain. Similar sx to dec.   Describes visual hallucinations - seeing grandkids run around house. No ha, arm/leg weakness, able to ambulate, speech unchanged.     Physical Exam: Present elderly female in no acute distress.  Though she is alert and oriented x 3 she describes having the hallucinations visual earlier tonight.  None of these are present at this time.  She has no respiratory distress, lungs are decreased but clear.  Regular rate rhythm.  Abdomen is obese but soft and nontender on my examination.  Patient is moving all extremities, appears to have 5 out of 5 strength, no focal deficits.  Speech is clear without aphasia or dysarthria    MDM: Overall she describes more delirium type symptomatology and with her urinary symptoms favor UTI, pyelonephritis and infectious pathology.  However had similar presentation previously with cerebellar CVA.  I do not appreciate any focal neurodeficit on examination but certainly will obtain repeat imaging to evaluate for same.  Other electrolyte abnormality, infectious pathology on the differential.    ED Course/workup: Laboratory workup notable for baseline renal insufficiency.  Nitrite positive urine.  Covered with Rocephin based on previous culture.  CT abdomen pelvis with air in the bladder.  She was not instrumented so this is consistent with infection.  MRI brain unremarkable.  Home with oral antibiotics and referred for home health.      ED Course as of 02/19/25 2143   Wed Feb 19, 2025   1757 dec urine culture reviewed pansensitive E. coli, will cover with Rocephin here   1802  Creatinine(!): 1.56  baseline   1937 CT Abdomen Pelvis w Contrast  CT abdomen pelvis independently interpreted by myself with air within the bladder.  She was not instrumented for urinalysis today suggestive of infection       Final Diagnosis:     ICD-10-CM    1. Acute lower UTI  N39.0 Home Care Referral      2. Delirium  R41.0 Home Care Referral              I personally saw the patient and performed a substantive portion of the visit including all aspects of the medical decision making.  I personally made/approved the management plan and take responsibility for the patient management.     MD Raymond Marie Zabrina N, MD  02/19/25 2145

## 2025-02-19 NOTE — ED PROVIDER NOTES
"Emergency Department Encounter   NAME: Desirae Rey ; AGE: 74 year old female ; YOB: 1950 ; MRN: [id number] ; PCP: Daysi Bryan   ED PROVIDER: Gail Marley PA-C    Evaluation Date & Time:   2/19/2025  4:08 PM    CHIEF COMPLAINT:  Urinary Problem        Impression and Plan   FINAL IMPRESSION:    ICD-10-CM    1. Acute lower UTI  N39.0 Home Care Referral      2. Delirium  R41.0 Home Care Referral          ED Course and Medical Decision Making  Desirae is a 74 year old female with PMH of fibromyalgia and chronic pain syndrome, osteoporosis, CKD 3, CHF, anemia, and COPD presenting to the emergency department with her  for evaluation of dysuria for the past 2 days or so.  Patient is incontinent of urine at baseline. She states there has been some blood in her urine, none in her stool. No vaginal bleeding. No bowel incontinence. She also endorses intermittent periods of confusion.  Her  states she has been mixing up dates of her appointments and has been seeing \"small people\" that are her grandchildren around the house.  Her  denies any slurred speech or facial asymmetry.  No new gait unsteadiness. He states this is how she has acted previously when she has had a urinary tract infection.  Patient denies any fevers or chills.  No recent falls.    Per chart review, patient was recently admitted here at [facility] from 12/20-12/24 for acute UTI.  She also had new onset A-fib RVR as well as and MRI brain showed acute/subacute stroke in the right cerebellum. Patient is high risk for falls and therefore plan to continue 3 weeks of DAPT followed by ASA.    Vitals reviewed and unremarkable.  She is afebrile. On exam she is resting comfortably, nontoxic-appearing. Differential diagnosis/emergent conditions considered and evaluated for includes but not limited to UTI, pyelonephritis, kidney stone, hypoglycemia, electrolyte abnormality, arrhythmia, stroke.  Her abdomen is soft and " nondistended, no areas of tenderness.  She does have left-sided CVA tenderness, no right-sided CVA tenderness. UA found Nitrite and leukocyte esterase positive, turbid appearing urine with presence of blood indicating urinary tract infection.  Urine has reflexed to culture.  Per chart review, previous urine culture grew e.coli that is pan-sensitive. I have ordered her a dose of IV ceftriaxone for antibiotic coverage here while she awaits workup.    CBC is without evidence of leukocytosis.  Lactic acid is within normal range.  Patient does not meet SIRS criteria and I do not suspect sepsis.  CMP finds creatinine of 1.56 and GFR of 35 today.  This appears fairly consistent with her baseline, no evidence of new GRACY.  No significant electrolyte abnormalities.  Glucose 103.  She has slight hypermagnesemia of 2.7, unclear of the cause of this.  CT finds air present in the urinary bladder, likely due to urinary tract infection.  No other acute findings. It is very likely that nitrite positive UTI is causing patient's delirium today.  However, last time patient was evaluated in the ER for similar symptoms and delirium she was found to have a an acute UTI as well as acute cerebellar stroke.  Will plan to obtain MRI brain to further evaluate delirium and to rule out stroke.    My shift is ending, Dr. Andrade will follow up regarding MRI brain.  If this is unremarkable I suspect patient will be able to discharge home with trial of outpatient antibiotics given there are no signs of severe sepsis today and she has stable vitals.        Supplemental history:  Obtained supplemental history:Supplemental history obtained?: Documented in chart and Family Member/Significant Other  Reviewed external records: External records reviewed?: Documented in chart and Inpatient Record: Hospital admission from 12/20/2024  Patient information was obtained from: patient, patient's   Use of Intrepreter: N/A     Complicating Factors:  Care  impacted by chronic illness:Cerebrovascular Disease and Chronic Kidney Disease  Care significantly affected by social determinants of health:N/A    Work Up:  Did you consider but not order tests?: Work up considered but not performed and documented in chart, if applicable  Did you interpret images independently?: Independent interpretation of ECG and images noted in documentation, when applicable.    External Consults:  Consultation discussion with other provider: Dr. Andrade  Admission considered. Patient was signed out to the oncoming physician, disposition pending.  I considered escalation of care and admitting the patient but ultimately did not given reassuring exam and workup.        ED COURSE:  1612 I met and introduced myself to the patient. I gathered initial history and performed my physical exam. We discussed plan for initial workup.   1751 I have staffed the patient with Dr. Andrade, ED MD, who has evaluated the patient and agrees with all aspects of today's care.   8:39 PM My shift is ending, Dr. Andrade to follow up on MR brain    At the conclusion of the encounter I discussed the results of all the tests and the disposition. The questions were answered. The patient or family acknowledged understanding and was agreeable with the care plan.      MEDICATIONS GIVEN IN THE EMERGENCY DEPARTMENT:  Medications   cefTRIAXone (ROCEPHIN) 1 g vial to attach to  mL bag for ADULTS or NS 50 mL bag for PEDS (1 g Intravenous $New Bag 2/19/25 1743)   iopamidol (ISOVUE-370) solution 75 mL (75 mLs Intravenous $Given 2/19/25 1931)   gadobutrol (GADAVIST) injection 9 mL (9 mLs Intravenous $Given 2/19/25 2021)         NEW PRESCRIPTIONS STARTED AT TODAY'S ED VISIT:  New Prescriptions    CEFDINIR (OMNICEF) 300 MG CAPSULE    Take 1 capsule (300 mg) by mouth 2 times daily for 7 days.         ALYSSA Merrill is a 74 year old female with PMH of fibromyalgia and chronic pain syndrome, osteoporosis, CKD 3, CHF, anemia, and COPD  "presenting to the emergency department with her  for evaluation of dysuria for the past 2 days or so.  Patient is incontinent of urine at baseline. She states there has been some blood in her urine, none in her stool. No vaginal bleeding. No bowel incontinence. She also endorses intermittent periods of confusion.  Her  states she has been mixing up dates of her appointments and has been seeing \"small people\" that are her grandchildren around the house.  Her  denies any slurred speech or facial asymmetry.  No new gait unsteadiness. He states this is how she has acted previously when she has had a urinary tract infection.  Patient denies any fevers or chills.  No recent falls.    Per chart review, patient was recently admitted here at [facility] from 12/20-12/24 for acute UTI.  She also had new onset A-fib RVR as well as and MRI brain showed acute/subacute stroke in the right cerebellum. Patient is high risk for falls and therefore plan to continue 3 weeks of DAPT followed by ASA.        Physical Exam     First Vitals:  Patient Vitals for the past 24 hrs:   BP Temp Temp src Pulse Resp SpO2 Height Weight   02/19/25 1546 128/74 97.6  F (36.4  C) Oral 85 20 94 % 1.397 m (4' 7\") 89.9 kg (198 lb 1.6 oz)         PHYSICAL EXAM    General Appearance:  Alert, cooperative, no distress, appears stated age  HENT: Normocephalic without obvious deformity, atraumatic. Mucous membranes moist   Eyes: Conjunctiva clear, Lids normal. No discharge.   Respiratory: No distress. Lungs clear to ausculation bilaterally. No wheezes, rhonchi or stridor  Cardiovascular: Regular rate  GI: Abdomen soft, nontender  : Left-sided CVA tenderness present, no right-sided CVA tenderness  Musculoskeletal: Moving all extremities. No gross deformities  Integument: Warm, dry, no rashes or lesions  Neurologic: Alert and orientated x3. No focal deficits.  Psych: Normal mood and affect        Results     LAB:  All pertinent labs reviewed " and interpreted  Labs Ordered and Resulted from Time of ED Arrival to Time of ED Departure   ROUTINE UA WITH MICROSCOPIC REFLEX TO CULTURE - Abnormal       Result Value    Color Urine Yellow      Appearance Urine Turbid (*)     Glucose Urine Negative      Bilirubin Urine Negative      Ketones Urine Negative      Specific Gravity Urine 1.011      Blood Urine 0.06 mg/dL (*)     pH Urine 5.5      Protein Albumin Urine 30 (*)     Urobilinogen Urine <2.0      Nitrite Urine Positive (*)     Leukocyte Esterase Urine 500 Marciano/uL (*)     Bacteria Urine Many (*)     WBC Clumps Urine Present (*)     Mucus Urine Present (*)     RBC Urine 6 (*)     WBC Urine >182 (*)     Squamous Epithelials Urine 4 (*)     Hyaline Casts Urine 6 (*)    COMPREHENSIVE METABOLIC PANEL - Abnormal    Sodium 139      Potassium 5.3      Carbon Dioxide (CO2) 28      Anion Gap 9      Urea Nitrogen 49.7 (*)     Creatinine 1.56 (*)     GFR Estimate 35 (*)     Calcium 10.6 (*)     Chloride 102      Glucose 103 (*)     Alkaline Phosphatase 85      AST 34      ALT 17      Protein Total 6.9      Albumin 3.8      Bilirubin Total 0.3     MAGNESIUM - Abnormal    Magnesium 2.7 (*)    CBC WITH PLATELETS AND DIFFERENTIAL - Abnormal    WBC Count 7.0      RBC Count 3.73 (*)     Hemoglobin 11.9      Hematocrit 36.8      MCV 99      MCH 31.9      MCHC 32.3      RDW 12.8      Platelet Count 247      % Neutrophils 62      % Lymphocytes 21      % Monocytes 11      % Eosinophils 6      % Basophils 1      % Immature Granulocytes 0      NRBCs per 100 WBC 0      Absolute Neutrophils 4.3      Absolute Lymphocytes 1.4      Absolute Monocytes 0.8      Absolute Eosinophils 0.4      Absolute Basophils 0.0      Absolute Immature Granulocytes 0.0      Absolute NRBCs 0.0     LACTIC ACID WHOLE BLOOD WITH 1X REPEAT IN 2 HR WHEN >2 - Normal    Lactic Acid, Initial 1.0     URINE CULTURE       RADIOLOGY:  CT Abdomen Pelvis w Contrast   Final Result   IMPRESSION:    1.  Air is present within  urinary bladder presumably from recent catheterization though there remains a moderate volume of urine within the bladder. There is no inflammatory bladder wall thickening to suggest colovesicular fistula.      MR Brain w/o & w Contrast    (Results Pending)         ECG:  N/A      PROCEDURES:  N/A       Gail Marley PA-C   Emergency Medicine   [facility] EMERGENCY DEPARTMENT       Gail Marley PA-C  02/19/25 2040

## 2025-02-19 NOTE — ED TRIAGE NOTES
Patient reports that she has increased urine incontinence, urinary burning and perios of confusion that started 2 days ago

## 2025-02-20 LAB — BACTERIA UR CULT: ABNORMAL

## 2025-02-20 NOTE — DISCHARGE INSTRUCTIONS
You were seen in the emergency department today for confusion and urinary symptoms.  You do have a urinary tract infection today.  You are given a dose of IV antibiotic here in the emergency department, this should cover you for the evening.  I have sent a prescription for an antibiotic called Omnicef to the  ze in [place].  You can pick this up tomorrow morning start taking this twice daily for 7 days to treat UTI.  Your urine sample was sent for culture, if this grows a bug that is not susceptible to the antibiotic you are on you will receive a phone call to change the antibiotics.    Call your primary care office tomorrow to schedule follow up in clinic within the next few days for recheck.  Return to the emergency department for any severe worsened pain, high fevers, if you start to become much more confused, or if you pass out

## 2025-02-24 ENCOUNTER — PATIENT OUTREACH (OUTPATIENT)
Dept: CARE COORDINATION | Facility: CLINIC | Age: 75
End: 2025-02-24
Payer: MEDICARE

## 2025-02-26 ENCOUNTER — PATIENT OUTREACH (OUTPATIENT)
Dept: CARE COORDINATION | Facility: CLINIC | Age: 75
End: 2025-02-26
Payer: MEDICARE

## 2025-03-06 ENCOUNTER — TRANSFERRED RECORDS (OUTPATIENT)
Dept: HEALTH INFORMATION MANAGEMENT | Facility: CLINIC | Age: 75
End: 2025-03-06
Payer: MEDICARE

## 2025-03-21 ENCOUNTER — HOSPITAL ENCOUNTER (INPATIENT)
Facility: HOSPITAL | Age: 75
LOS: 4 days | Discharge: HOME | End: 2025-03-26
Attending: EMERGENCY MEDICINE | Admitting: INTERNAL MEDICINE
Payer: MEDICARE

## 2025-03-21 DIAGNOSIS — J96.11 CHRONIC RESPIRATORY FAILURE WITH HYPOXIA (H): ICD-10-CM

## 2025-03-21 DIAGNOSIS — R09.02 HYPOXEMIA: Primary | ICD-10-CM

## 2025-03-21 DIAGNOSIS — N30.00 ACUTE CYSTITIS WITHOUT HEMATURIA: ICD-10-CM

## 2025-03-21 DIAGNOSIS — J81.0 ACUTE PULMONARY EDEMA (H): ICD-10-CM

## 2025-03-21 DIAGNOSIS — E66.2 OBESITY HYPOVENTILATION SYNDROME (H): ICD-10-CM

## 2025-03-21 DIAGNOSIS — R06.09 DYSPNEA ON EXERTION: ICD-10-CM

## 2025-03-21 DIAGNOSIS — I27.20 PULMONARY HYPERTENSION (H): ICD-10-CM

## 2025-03-21 DIAGNOSIS — Z91.89 AT MODERATE RISK FOR ALTERATION IN SKIN INTEGRITY: ICD-10-CM

## 2025-03-21 DIAGNOSIS — I50.32 CHRONIC DIASTOLIC CONGESTIVE HEART FAILURE (H): ICD-10-CM

## 2025-03-21 LAB — LACTATE SERPL-SCNC: 0.8 MMOL/L (ref 0.7–2)

## 2025-03-21 PROCEDURE — 93005 ELECTROCARDIOGRAM TRACING: CPT | Performed by: EMERGENCY MEDICINE

## 2025-03-21 PROCEDURE — 36415 COLL VENOUS BLD VENIPUNCTURE: CPT | Performed by: EMERGENCY MEDICINE

## 2025-03-21 PROCEDURE — 83605 ASSAY OF LACTIC ACID: CPT | Performed by: EMERGENCY MEDICINE

## 2025-03-21 PROCEDURE — 84484 ASSAY OF TROPONIN QUANT: CPT | Performed by: EMERGENCY MEDICINE

## 2025-03-21 PROCEDURE — 99285 EMERGENCY DEPT VISIT HI MDM: CPT | Mod: 25

## 2025-03-21 ASSESSMENT — COLUMBIA-SUICIDE SEVERITY RATING SCALE - C-SSRS
6. HAVE YOU EVER DONE ANYTHING, STARTED TO DO ANYTHING, OR PREPARED TO DO ANYTHING TO END YOUR LIFE?: NO
1. IN THE PAST MONTH, HAVE YOU WISHED YOU WERE DEAD OR WISHED YOU COULD GO TO SLEEP AND NOT WAKE UP?: NO
2. HAVE YOU ACTUALLY HAD ANY THOUGHTS OF KILLING YOURSELF IN THE PAST MONTH?: NO

## 2025-03-21 ASSESSMENT — ENCOUNTER SYMPTOMS
SHORTNESS OF BREATH: 1
FATIGUE: 1

## 2025-03-22 ENCOUNTER — APPOINTMENT (OUTPATIENT)
Dept: CT IMAGING | Facility: HOSPITAL | Age: 75
End: 2025-03-22
Attending: EMERGENCY MEDICINE
Payer: MEDICARE

## 2025-03-22 ENCOUNTER — APPOINTMENT (OUTPATIENT)
Dept: OCCUPATIONAL THERAPY | Facility: HOSPITAL | Age: 75
End: 2025-03-22
Attending: INTERNAL MEDICINE
Payer: MEDICARE

## 2025-03-22 ENCOUNTER — APPOINTMENT (OUTPATIENT)
Dept: CARDIOLOGY | Facility: HOSPITAL | Age: 75
End: 2025-03-22
Attending: INTERNAL MEDICINE
Payer: MEDICARE

## 2025-03-22 ENCOUNTER — APPOINTMENT (OUTPATIENT)
Dept: ULTRASOUND IMAGING | Facility: HOSPITAL | Age: 75
End: 2025-03-22
Attending: INTERNAL MEDICINE
Payer: MEDICARE

## 2025-03-22 PROBLEM — J81.0 ACUTE PULMONARY EDEMA (H): Status: ACTIVE | Noted: 2025-03-22

## 2025-03-22 PROBLEM — R09.02 HYPOXEMIA: Status: ACTIVE | Noted: 2025-03-22

## 2025-03-22 PROBLEM — R06.09 DYSPNEA ON EXERTION: Status: ACTIVE | Noted: 2025-03-22

## 2025-03-22 PROBLEM — N30.00 ACUTE CYSTITIS WITHOUT HEMATURIA: Status: ACTIVE | Noted: 2025-03-22

## 2025-03-22 LAB
ALBUMIN SERPL BCG-MCNC: 3.7 G/DL (ref 3.5–5.2)
ALP SERPL-CCNC: 75 U/L (ref 40–150)
ALT SERPL W P-5'-P-CCNC: 18 U/L (ref 0–50)
ANION GAP SERPL CALCULATED.3IONS-SCNC: 9 MMOL/L (ref 7–15)
AST SERPL W P-5'-P-CCNC: 27 U/L (ref 0–45)
BASOPHILS # BLD AUTO: 0.1 10E3/UL (ref 0–0.2)
BASOPHILS NFR BLD AUTO: 1 %
BILIRUB SERPL-MCNC: 0.4 MG/DL
BUN SERPL-MCNC: 40.2 MG/DL (ref 8–23)
CA-I BLD-MCNC: 4.7 MG/DL (ref 4.4–5.2)
CALCIUM SERPL-MCNC: 9.2 MG/DL (ref 8.8–10.4)
CHLORIDE SERPL-SCNC: 103 MMOL/L (ref 98–107)
CREAT SERPL-MCNC: 1.44 MG/DL (ref 0.51–0.95)
EGFRCR SERPLBLD CKD-EPI 2021: 38 ML/MIN/1.73M2
EOSINOPHIL # BLD AUTO: 0.4 10E3/UL (ref 0–0.7)
EOSINOPHIL NFR BLD AUTO: 6 %
ERYTHROCYTE [DISTWIDTH] IN BLOOD BY AUTOMATED COUNT: 12.7 % (ref 10–15)
GLUCOSE SERPL-MCNC: 97 MG/DL (ref 70–99)
HCO3 SERPL-SCNC: 27 MMOL/L (ref 22–29)
HCT VFR BLD AUTO: 38.1 % (ref 35–47)
HGB BLD-MCNC: 11.9 G/DL (ref 11.7–15.7)
IMM GRANULOCYTES # BLD: 0 10E3/UL
IMM GRANULOCYTES NFR BLD: 0 %
LVEF ECHO: NORMAL
LYMPHOCYTES # BLD AUTO: 2 10E3/UL (ref 0.8–5.3)
LYMPHOCYTES NFR BLD AUTO: 31 %
MAGNESIUM SERPL-MCNC: 1.9 MG/DL (ref 1.7–2.3)
MAGNESIUM SERPL-MCNC: 1.9 MG/DL (ref 1.7–2.3)
MCH RBC QN AUTO: 31.2 PG (ref 26.5–33)
MCHC RBC AUTO-ENTMCNC: 31.2 G/DL (ref 31.5–36.5)
MCV RBC AUTO: 100 FL (ref 78–100)
MONOCYTES # BLD AUTO: 0.9 10E3/UL (ref 0–1.3)
MONOCYTES NFR BLD AUTO: 14 %
NEUTROPHILS # BLD AUTO: 3.1 10E3/UL (ref 1.6–8.3)
NEUTROPHILS NFR BLD AUTO: 48 %
NRBC # BLD AUTO: 0 10E3/UL
NRBC BLD AUTO-RTO: 0 /100
PLATELET # BLD AUTO: 231 10E3/UL (ref 150–450)
POTASSIUM SERPL-SCNC: 4.3 MMOL/L (ref 3.4–5.3)
PROT SERPL-MCNC: 6.4 G/DL (ref 6.4–8.3)
RBC # BLD AUTO: 3.82 10E6/UL (ref 3.8–5.2)
SODIUM SERPL-SCNC: 139 MMOL/L (ref 135–145)
T3 SERPL-MCNC: 92 NG/DL (ref 85–202)
T4 FREE SERPL-MCNC: 1.1 NG/DL (ref 0.9–1.7)
TROPONIN T SERPL HS-MCNC: 26 NG/L
TROPONIN T SERPL HS-MCNC: 27 NG/L
TSH SERPL DL<=0.005 MIU/L-ACNC: 4.99 UIU/ML (ref 0.3–4.2)
WBC # BLD AUTO: 6.5 10E3/UL (ref 4–11)

## 2025-03-22 PROCEDURE — 93325 DOPPLER ECHO COLOR FLOW MAPG: CPT | Mod: 26 | Performed by: INTERNAL MEDICINE

## 2025-03-22 PROCEDURE — 250N000011 HC RX IP 250 OP 636: Performed by: INTERNAL MEDICINE

## 2025-03-22 PROCEDURE — 94660 CPAP INITIATION&MGMT: CPT

## 2025-03-22 PROCEDURE — 97166 OT EVAL MOD COMPLEX 45 MIN: CPT | Mod: GO

## 2025-03-22 PROCEDURE — 999N000157 HC STATISTIC RCP TIME EA 10 MIN

## 2025-03-22 PROCEDURE — 87186 SC STD MICRODIL/AGAR DIL: CPT | Performed by: EMERGENCY MEDICINE

## 2025-03-22 PROCEDURE — 5A09357 ASSISTANCE WITH RESPIRATORY VENTILATION, LESS THAN 24 CONSECUTIVE HOURS, CONTINUOUS POSITIVE AIRWAY PRESSURE: ICD-10-PCS | Performed by: INTERNAL MEDICINE

## 2025-03-22 PROCEDURE — 84439 ASSAY OF FREE THYROXINE: CPT | Performed by: INTERNAL MEDICINE

## 2025-03-22 PROCEDURE — 82330 ASSAY OF CALCIUM: CPT | Performed by: INTERNAL MEDICINE

## 2025-03-22 PROCEDURE — 36415 COLL VENOUS BLD VENIPUNCTURE: CPT | Performed by: EMERGENCY MEDICINE

## 2025-03-22 PROCEDURE — 250N000011 HC RX IP 250 OP 636: Performed by: EMERGENCY MEDICINE

## 2025-03-22 PROCEDURE — 36415 COLL VENOUS BLD VENIPUNCTURE: CPT | Performed by: INTERNAL MEDICINE

## 2025-03-22 PROCEDURE — 84480 ASSAY TRIIODOTHYRONINE (T3): CPT | Performed by: INTERNAL MEDICINE

## 2025-03-22 PROCEDURE — 255N000002 HC RX 255 OP 636: Performed by: INTERNAL MEDICINE

## 2025-03-22 PROCEDURE — 93308 TTE F-UP OR LMTD: CPT | Mod: 26 | Performed by: INTERNAL MEDICINE

## 2025-03-22 PROCEDURE — 97535 SELF CARE MNGMENT TRAINING: CPT | Mod: GO

## 2025-03-22 PROCEDURE — 71275 CT ANGIOGRAPHY CHEST: CPT

## 2025-03-22 PROCEDURE — 250N000013 HC RX MED GY IP 250 OP 250 PS 637: Performed by: INTERNAL MEDICINE

## 2025-03-22 PROCEDURE — 80053 COMPREHEN METABOLIC PANEL: CPT | Performed by: INTERNAL MEDICINE

## 2025-03-22 PROCEDURE — 99223 1ST HOSP IP/OBS HIGH 75: CPT | Mod: AI | Performed by: INTERNAL MEDICINE

## 2025-03-22 PROCEDURE — 999N000208 ECHOCARDIOGRAM LIMITED

## 2025-03-22 PROCEDURE — 93970 EXTREMITY STUDY: CPT

## 2025-03-22 PROCEDURE — 84443 ASSAY THYROID STIM HORMONE: CPT | Performed by: INTERNAL MEDICINE

## 2025-03-22 PROCEDURE — 83735 ASSAY OF MAGNESIUM: CPT | Performed by: INTERNAL MEDICINE

## 2025-03-22 PROCEDURE — 84484 ASSAY OF TROPONIN QUANT: CPT | Performed by: EMERGENCY MEDICINE

## 2025-03-22 PROCEDURE — 99222 1ST HOSP IP/OBS MODERATE 55: CPT | Performed by: INTERNAL MEDICINE

## 2025-03-22 PROCEDURE — 120N000004 HC R&B MS OVERFLOW

## 2025-03-22 PROCEDURE — 93321 DOPPLER ECHO F-UP/LMTD STD: CPT | Mod: 26 | Performed by: INTERNAL MEDICINE

## 2025-03-22 PROCEDURE — 85025 COMPLETE CBC W/AUTO DIFF WBC: CPT | Performed by: INTERNAL MEDICINE

## 2025-03-22 RX ORDER — FUROSEMIDE 20 MG/1
20 TABLET ORAL EVERY EVENING
COMMUNITY

## 2025-03-22 RX ORDER — HEPARIN SODIUM 5000 [USP'U]/.5ML
5000 INJECTION, SOLUTION INTRAVENOUS; SUBCUTANEOUS EVERY 8 HOURS
Status: DISCONTINUED | OUTPATIENT
Start: 2025-03-22 | End: 2025-03-26 | Stop reason: HOSPADM

## 2025-03-22 RX ORDER — ACETAMINOPHEN 325 MG/1
650 TABLET ORAL ONCE
Status: COMPLETED | OUTPATIENT
Start: 2025-03-22 | End: 2025-03-22

## 2025-03-22 RX ORDER — IOPAMIDOL 755 MG/ML
70 INJECTION, SOLUTION INTRAVASCULAR ONCE
Status: COMPLETED | OUTPATIENT
Start: 2025-03-22 | End: 2025-03-22

## 2025-03-22 RX ORDER — ACETAMINOPHEN 325 MG/10.15ML
650 LIQUID ORAL EVERY 4 HOURS PRN
Status: DISCONTINUED | OUTPATIENT
Start: 2025-03-22 | End: 2025-03-24 | Stop reason: ALTCHOICE

## 2025-03-22 RX ORDER — CEFTRIAXONE 1 G/1
1 INJECTION, POWDER, FOR SOLUTION INTRAMUSCULAR; INTRAVENOUS EVERY 24 HOURS
Status: DISCONTINUED | OUTPATIENT
Start: 2025-03-22 | End: 2025-03-26 | Stop reason: HOSPADM

## 2025-03-22 RX ORDER — METOPROLOL SUCCINATE 25 MG/1
75 TABLET, EXTENDED RELEASE ORAL DAILY
Status: ON HOLD | COMMUNITY
Start: 2025-02-07 | End: 2025-03-26

## 2025-03-22 RX ORDER — LIDOCAINE 40 MG/G
CREAM TOPICAL
Status: DISCONTINUED | OUTPATIENT
Start: 2025-03-22 | End: 2025-03-26 | Stop reason: HOSPADM

## 2025-03-22 RX ORDER — CALCIUM CARBONATE 500 MG/1
1000 TABLET, CHEWABLE ORAL 4 TIMES DAILY PRN
Status: DISCONTINUED | OUTPATIENT
Start: 2025-03-22 | End: 2025-03-26 | Stop reason: HOSPADM

## 2025-03-22 RX ORDER — FUROSEMIDE 10 MG/ML
40 INJECTION INTRAMUSCULAR; INTRAVENOUS EVERY 12 HOURS
Status: DISCONTINUED | OUTPATIENT
Start: 2025-03-22 | End: 2025-03-22

## 2025-03-22 RX ORDER — ONDANSETRON 2 MG/ML
4 INJECTION INTRAMUSCULAR; INTRAVENOUS EVERY 6 HOURS PRN
Status: DISCONTINUED | OUTPATIENT
Start: 2025-03-22 | End: 2025-03-26 | Stop reason: HOSPADM

## 2025-03-22 RX ORDER — FUROSEMIDE 10 MG/ML
40 INJECTION INTRAMUSCULAR; INTRAVENOUS
Status: DISCONTINUED | OUTPATIENT
Start: 2025-03-22 | End: 2025-03-23

## 2025-03-22 RX ORDER — DULOXETIN HYDROCHLORIDE 30 MG/1
60 CAPSULE, DELAYED RELEASE ORAL EVERY MORNING
COMMUNITY

## 2025-03-22 RX ORDER — DULOXETIN HYDROCHLORIDE 30 MG/1
30 CAPSULE, DELAYED RELEASE ORAL EVERY EVENING
COMMUNITY

## 2025-03-22 RX ORDER — DULOXETIN HYDROCHLORIDE 30 MG/1
30 CAPSULE, DELAYED RELEASE ORAL EVERY EVENING
Status: DISCONTINUED | OUTPATIENT
Start: 2025-03-22 | End: 2025-03-26 | Stop reason: HOSPADM

## 2025-03-22 RX ORDER — IPRATROPIUM BROMIDE AND ALBUTEROL SULFATE 2.5; .5 MG/3ML; MG/3ML
3 SOLUTION RESPIRATORY (INHALATION) EVERY 4 HOURS PRN
Status: DISCONTINUED | OUTPATIENT
Start: 2025-03-22 | End: 2025-03-26 | Stop reason: HOSPADM

## 2025-03-22 RX ORDER — FUROSEMIDE 20 MG/1
40 TABLET ORAL EVERY MORNING
COMMUNITY

## 2025-03-22 RX ORDER — DOCUSATE SODIUM 100 MG/1
100 CAPSULE, LIQUID FILLED ORAL 2 TIMES DAILY
Status: DISCONTINUED | OUTPATIENT
Start: 2025-03-22 | End: 2025-03-26 | Stop reason: HOSPADM

## 2025-03-22 RX ORDER — FUROSEMIDE 10 MG/ML
60 INJECTION INTRAMUSCULAR; INTRAVENOUS ONCE
Status: COMPLETED | OUTPATIENT
Start: 2025-03-22 | End: 2025-03-22

## 2025-03-22 RX ORDER — ONDANSETRON 4 MG/1
4 TABLET, ORALLY DISINTEGRATING ORAL EVERY 6 HOURS PRN
Status: DISCONTINUED | OUTPATIENT
Start: 2025-03-22 | End: 2025-03-26 | Stop reason: HOSPADM

## 2025-03-22 RX ORDER — ASPIRIN 81 MG/1
81 TABLET ORAL DAILY
Status: DISCONTINUED | OUTPATIENT
Start: 2025-03-22 | End: 2025-03-26 | Stop reason: HOSPADM

## 2025-03-22 RX ORDER — CEFTRIAXONE 1 G/1
1 INJECTION, POWDER, FOR SOLUTION INTRAMUSCULAR; INTRAVENOUS ONCE
Status: DISCONTINUED | OUTPATIENT
Start: 2025-03-22 | End: 2025-03-22

## 2025-03-22 RX ORDER — DULOXETIN HYDROCHLORIDE 60 MG/1
60 CAPSULE, DELAYED RELEASE ORAL DAILY
Status: DISCONTINUED | OUTPATIENT
Start: 2025-03-23 | End: 2025-03-26 | Stop reason: HOSPADM

## 2025-03-22 RX ORDER — ROSUVASTATIN CALCIUM 10 MG/1
10 TABLET, COATED ORAL DAILY
Status: DISCONTINUED | OUTPATIENT
Start: 2025-03-22 | End: 2025-03-26 | Stop reason: HOSPADM

## 2025-03-22 RX ORDER — HYDRALAZINE HYDROCHLORIDE 20 MG/ML
5 INJECTION INTRAMUSCULAR; INTRAVENOUS EVERY 4 HOURS PRN
Status: DISCONTINUED | OUTPATIENT
Start: 2025-03-22 | End: 2025-03-22

## 2025-03-22 RX ADMIN — HEPARIN SODIUM 5000 UNITS: 5000 INJECTION, SOLUTION INTRAVENOUS; SUBCUTANEOUS at 20:06

## 2025-03-22 RX ADMIN — DOCUSATE SODIUM 100 MG: 100 CAPSULE, LIQUID FILLED ORAL at 08:39

## 2025-03-22 RX ADMIN — ACETAMINOPHEN 650 MG: 325 TABLET ORAL at 02:54

## 2025-03-22 RX ADMIN — DULOXETINE HYDROCHLORIDE 30 MG: 30 CAPSULE, DELAYED RELEASE ORAL at 20:05

## 2025-03-22 RX ADMIN — ROSUVASTATIN 10 MG: 10 TABLET, FILM COATED ORAL at 20:06

## 2025-03-22 RX ADMIN — FUROSEMIDE 40 MG: 10 INJECTION, SOLUTION INTRAMUSCULAR; INTRAVENOUS at 16:40

## 2025-03-22 RX ADMIN — ASPIRIN 81 MG: 81 TABLET, COATED ORAL at 20:05

## 2025-03-22 RX ADMIN — DOCUSATE SODIUM 100 MG: 100 CAPSULE, LIQUID FILLED ORAL at 20:06

## 2025-03-22 RX ADMIN — FUROSEMIDE 40 MG: 10 INJECTION, SOLUTION INTRAMUSCULAR; INTRAVENOUS at 08:39

## 2025-03-22 RX ADMIN — IOPAMIDOL 70 ML: 755 INJECTION, SOLUTION INTRAVENOUS at 00:58

## 2025-03-22 RX ADMIN — FUROSEMIDE 60 MG: 10 INJECTION, SOLUTION INTRAMUSCULAR; INTRAVENOUS at 02:15

## 2025-03-22 RX ADMIN — METOPROLOL SUCCINATE 75 MG: 25 TABLET, EXTENDED RELEASE ORAL at 20:06

## 2025-03-22 RX ADMIN — PERFLUTREN 2 ML: 6.52 INJECTION, SUSPENSION INTRAVENOUS at 09:00

## 2025-03-22 RX ADMIN — CEFTRIAXONE SODIUM 1 G: 1 INJECTION, POWDER, FOR SOLUTION INTRAMUSCULAR; INTRAVENOUS at 05:39

## 2025-03-22 ASSESSMENT — ACTIVITIES OF DAILY LIVING (ADL)
ADLS_ACUITY_SCORE: 62
ADLS_ACUITY_SCORE: 66
ADLS_ACUITY_SCORE: 64
ADLS_ACUITY_SCORE: 66
ADLS_ACUITY_SCORE: 64
ADLS_ACUITY_SCORE: 64
ADLS_ACUITY_SCORE: 66
ADLS_ACUITY_SCORE: 58
ADLS_ACUITY_SCORE: 64
ADLS_ACUITY_SCORE: 66
ADLS_ACUITY_SCORE: 66
ADLS_ACUITY_SCORE: 64
ADLS_ACUITY_SCORE: 58
ADLS_ACUITY_SCORE: 64
PREVIOUS_RESPONSIBILITIES: MEAL PREP;HOUSEKEEPING;LAUNDRY;MEDICATION MANAGEMENT;FINANCES
ADLS_ACUITY_SCORE: 58
ADLS_ACUITY_SCORE: 58
ADLS_ACUITY_SCORE: 66
ADLS_ACUITY_SCORE: 64

## 2025-03-22 NOTE — PLAN OF CARE
Problem: Adult Inpatient Plan of Care  Goal: Optimal Comfort and Wellbeing  Outcome: Progressing  Intervention: Monitor Pain and Promote Comfort  Recent Flowsheet Documentation  Taken 3/22/2025 1630 by Gail Barnes RN  Pain Management Interventions:   declines   distraction   emotional support   Goal Outcome Evaluation:      Plan of Care Reviewed With: patient      Heart Failure Care Map  GOALS TO BE MET BEFORE DISCHARGE:    1. Decrease congestion and/or edema with diuretic therapy to achieve near optimal volume status.     Dyspnea improved: No, further care required to meet this goal. Please explain Pt continues to have SOB with any activity.   Edema improved: No, further care required to meet this goal. Please explain Pt continues to have bilateral lower extremity edema.        Last 24 hour I/O:   Intake/Output Summary (Last 24 hours) at 3/22/2025 1847  Last data filed at 3/22/2025 1448  Gross per 24 hour   Intake 790 ml   Output 1000 ml   Net -210 ml           Net I/O and Weights since admission:   02/20 2300 - 03/22 2259  In: 790 [P.O.:790]  Out: 1000 [Urine:1000]  Net: -210     Vitals:    03/21/25 2330 03/22/25 0407   Weight: 96.6 kg (213 lb) 89.2 kg (196 lb 9.6 oz)       2.  O2 sats > 90% on room air, or at prior home O2 therapy level.      Able to wean O2 this shift to keep sats above 90%?: No, further care required to meet this goal. Please explain Pt continues to fell extremely SOB with any activity.   Does patient use Home O2? Yes-3 L.          Current oxygenation status:   SpO2: 98 %     O2 Device: Nasal cannula, Oxygen Delivery: 3 LPM    3.  Tolerates ambulation and mobility near baseline.     Ambulation: No, further care required to meet this goal. Please explain Pt only ambulating to the bathroom.   Times patient ambulated with staff this shift: 1    Please review the Heart Failure Care Map for additional HF goal outcomes.    Gail Barnes RN  3/22/2025

## 2025-03-22 NOTE — CONSULTS
[facility]   [address], [place] [address]   [email or web address]   OFFICE: [phone]     CARDIOLOGY INPATIENT CONSULT NOTE     Impression and Plan     Assessment:  Acute on chronic heart failure preserved LVEF  Morbid obesity with BMI 45.7, with obesity hypoventilation syndrome  Acute on chronic respiratory failure with hypoxia  CKD stage IIIb    Plan:  Continue diuresis with IV furosemide  Monitor strict I/O and daily standing weights  Echo is pending  Will follow.    Primary Cardiologist: not previously established.    History of Present Illness      Ms. Desirae Rey is a 74 year old female with obesity, chronic respiratory failure on home O2, HFpEF, prior CVA, CKD, who was admitted for acute decompensated heart failure.     Ms. Rey has been experiencing progressive weakness, fatigue, leg swelling and EPPS for past week. BNP is elevated. Seems to be responding to diuresis so far.    Other than noted above, Ms. Rey denies any chest pain/pressure/tightness, shortness of breath at rest or with exertion, light headedness/dizziness, pre-syncope, syncope, lower extremity swelling, palpitations, paroxysmal nocturnal dyspnea (PND), or orthopnea.    Review of Systems:  Further review of systems is otherwise negative/noncontributory (based on review of medical record (admission H&P) and 13 point review of systems reviewed. Pertinent positives noted).    Cardiac Diagnostics     ECG: Personally reviewed and interpreted: sinus rhythm with first degree AV block and left axis deviation    Most recent:  Echocardiogram (results reviewed):   TTE 12/22/24  Left ventricular size, wall motion and function are normal. The ejection fraction is 60-65%.  There is mild concentric left ventricular hypertrophy.  Grade II or moderate diastolic dysfunction.  No regional wall motion abnormalities noted.  Normal right ventricle size and systolic function.  The left atrium is  moderately dilated.  Mild valvular aortic stenosis.  Calcified and thickened mitral valve leaflets, mild mitral valve stenosis with mean gradient 5 mmHg at ventricular rate 90 BPM.  Ascending Aorta dilatation is present 3.9 cm.     When compared to previous study on 12-6-2023, mitral valve disorder is mildly worse.        Medical History  Surgical History Family History Social History   Past Medical History:   Diagnosis Date    Allergic rhinitis     Arthritis of back     CHF (congestive heart failure) (H)     Chronic kidney disease     stage 3     De Quervain's disease (tenosynovitis)     Fibromyalgia, primary     GERD (gastroesophageal reflux disease)     Hematuria     chronic    High cholesterol     History of blood clots 09/01/1970    DVT, from birth control, h/o left calf    Hyperlipidemia     Hypertension     Low back pain     Osteoporosis     Pickwickian syndrome (H)     Plantar fasciitis     Proteinuria     Proteinuria     chronic    Sleep apnea     CPAP    Uncomplicated asthma      Past Surgical History:   Procedure Laterality Date    CERVICAL FUSION N/A 4/27/2017    Procedure: ANTERIOR CERVICAL DECOMPRESSION FUSION C5-7 BILATERAL;  Surgeon: Basim Barone MD;  Location: [facility];  Service:     CHOLECYSTECTOMY      open    COLONOSCOPY N/A 12/20/2019    Procedure: COLONOSCOPY WITH BIOPSIES;  Surgeon: Lucio Farr MD;  Location: [facility];  Service: Gastroenterology    EYE SURGERY      HAND SURGERY Right     HYSTEROSCOPY DIAGNOSTIC      JOINT REPLACEMENT      bilateral TKA    KNEE SURGERY      RELEASE CARPAL TUNNEL Bilateral      Family History   Problem Relation Age of Onset    Rheumatoid Arthritis Mother     Heart Disease Sister     Chronic Obstructive Pulmonary Disease Father            Social History     Socioeconomic History    Marital status:      Spouse name: Not on file    Number of children: Not on file    Years of education: Not on file    Highest education  level: Not on file   Occupational History    Not on file   Tobacco Use    Smoking status: Never    Smokeless tobacco: Never   Vaping Use    Vaping status: Never Used   Substance and Sexual Activity    Alcohol use: No    Drug use: No    Sexual activity: Not on file   Other Topics Concern    Not on file   Social History Narrative    Not on file     Social Drivers of Health     Financial Resource Strain: Unknown (12/22/2024)    Financial Resource Strain     Within the past 12 months, have you or your family members you live with been unable to get utilities (heat, electricity) when it was really needed?: Patient unable to answer   Food Insecurity: Unknown (12/22/2024)    Food Insecurity     Within the past 12 months, did you worry that your food would run out before you got money to buy more?: Patient unable to answer     Within the past 12 months, did the food you bought just not last and you didn t have money to get more?: Patient unable to answer   Transportation Needs: Unknown (12/22/2024)    Transportation Needs     Within the past 12 months, has lack of transportation kept you from medical appointments, getting your medicines, non-medical meetings or appointments, work, or from getting things that you need?: Patient unable to answer   Physical Activity: Not on file   Stress: Not on file   Social Connections: Unknown (3/9/2023)    [facility]    Social Connections     Frequency of Communication with Friends and Family: Not on file   Interpersonal Safety: Low Risk  (12/22/2024)    Interpersonal Safety     Do you feel physically and emotionally safe where you currently live?: Yes     Within the past 12 months, have you been hit, slapped, kicked or otherwise physically hurt by someone?: No     Within the past 12 months, have you been humiliated or emotionally abused in other ways by your partner or ex-partner?: No   Housing  "Stability: Unknown (12/22/2024)    Housing Stability     Do you have housing? : Patient unable to answer     Are you worried about losing your housing?: Patient unable to answer             Physical Examination   VITALS: BP (!) 154/72 (BP Location: Left arm)   Pulse 58   Temp 97.5  F (36.4  C) (Oral)   Resp 23   Ht 1.397 m (4' 7\")   Wt 89.2 kg (196 lb 9.6 oz)   SpO2 99%   BMI 45.69 kg/m    BMI: Body mass index is 45.69 kg/m .  Wt Readings from Last 3 Encounters:   03/22/25 89.2 kg (196 lb 9.6 oz)   02/21/25 89 kg (196 lb 4.8 oz)   02/19/25 89.9 kg (198 lb 1.6 oz)       Intake/Output Summary (Last 24 hours) at 3/22/2025 1046  Last data filed at 3/22/2025 0744  Gross per 24 hour   Intake 550 ml   Output 600 ml   Net -50 ml       General: pleasant female. Morbidly obese. No acute distress.   Lungs: clear to auscultation anterior fields  COR: regular rate and rhythm, No murmurs, rubs, or gallops  Abd: nondistended, soft  Extrem: 1+ BLE edema         Non-cardiac Imaging Studies Reviewed      CT chest IMPRESSION:   1.  No visualized pulmonary embolus.   2.  No evidence of active pulmonary disease.   3.  Partial visualization of fusion hardware in the lower cervical spine. Irregular lucency is present within the osseous structures about the fusion hardware that appears new since 07/28/2017. This is nonspecific, but could relate to hardware loosening   and/or other ongoing abnormality. If the patient has symptoms in this region, a CT scan of the neck would be useful for further evaluation.        Lab Results Reviewed    Chemistry/lipid CBC Cardiac Enzymes/BNP/TSH/INR   Recent Labs   Lab Test 12/20/24 1939   CHOL 143   HDL 56   LDL 69   TRIG 91     Recent Labs   Lab Test 12/20/24  1939 09/30/24  1631 08/11/23  1646   LDL 69 127* 81     Recent Labs   Lab Test 03/22/25  0520      POTASSIUM 4.3   CHLORIDE 103   CO2 27   GLC 97   BUN 40.2*   CR 1.44*   GFRESTIMATED 38*   LYNN 9.2     Recent Labs   Lab Test " "03/22/25  0520 02/19/25  1737 12/24/24  0657   CR 1.44* 1.56* 1.40*     Recent Labs   Lab Test 12/20/24  1719   A1C 6.0*          Recent Labs   Lab Test 03/22/25  0520   WBC 6.5   HGB 11.9   HCT 38.1           Recent Labs   Lab Test 03/22/25  0520 02/19/25  1737 12/24/24  0657   HGB 11.9 11.9 11.1*    No results for input(s): \"TROPONINI\" in the last 40537 hours.  Recent Labs   Lab Test 12/20/24  1719 04/13/24  1831 10/30/22  2355 02/16/18  1606   BNP  --   --   --  137*   NTBNPI 2,904* 5,303* 2,039*  --      Recent Labs   Lab Test 03/22/25  0520   TSH 4.99*     No results for input(s): \"INR\" in the last 53101 hours.        Current Inpatient Scheduled Medications   Scheduled Meds:  Current Facility-Administered Medications   Medication Dose Route Frequency Provider Last Rate Last Admin    cefTRIAXone (ROCEPHIN) 1 g vial to attach to  mL bag for ADULTS or NS 50 mL bag for PEDS  1 g Intravenous Q24H Jennifer Gauthier MD   1 g at 03/22/25 0539    docusate sodium (COLACE) capsule 100 mg  100 mg Oral BID Jennifer Gauthier MD   100 mg at 03/22/25 0839    furosemide (LASIX) injection 40 mg  40 mg Intravenous Q12H Jennifer Gauthier MD   40 mg at 03/22/25 0839    sodium chloride (PF) 0.9% PF flush 3 mL  3 mL Intracatheter Q8H [facility] Jennifer Gauthier MD         Continuous Infusions:  Current Facility-Administered Medications   Medication Dose Route Frequency Provider Last Rate Last Admin       No current outpatient medications on file.          Medications Prior to Admission   Prior to Admission medications    Medication Sig Start Date End Date Taking? Authorizing Provider   acetaminophen (TYLENOL) 500 MG tablet Take 500 mg by mouth every 6 hours as needed for mild pain   Yes Reported, Patient   albuterol (PROAIR HFA;PROVENTIL HFA;VENTOLIN HFA) 90 mcg/actuation inhaler Inhale 1 puff into the lungs every 4 hours as needed for shortness of breath / dyspnea 3/13/19  Yes Provider, Historical   alendronate (FOSAMAX) 70 MG " tablet [ALENDRONATE (FOSAMAX) 70 MG TABLET] Take 70 mg by mouth every 7 days. Takes on Mondays 7/12/17  Yes Provider, Historical   aspirin 81 MG EC tablet 1 tablet Orally Once a day   Yes Reported, Patient   azelastine (OPTIVAR) 0.05 % ophthalmic solution Apply 1 drop to eye 2 times daily as needed   Yes Reported, Patient   calcium citrate (CITRACAL) 950 (200 Ca) MG tablet Take 1 tablet by mouth daily   Yes Unknown, Entered By History   cetirizine (ZYRTEC) 10 MG tablet [CETIRIZINE (ZYRTEC) 10 MG TABLET] Take 10 mg by mouth daily.  1/8/14  Yes Provider, Historical   cholecalciferol (VITAMIN D3) 25 mcg (1000 units) capsule Take 1 capsule by mouth daily.   Yes Provider, Historical   CRESTOR 10 MG tablet Take 10 mg by mouth daily.   Yes Reported, Patient   docusate sodium (COLACE) 100 MG capsule Take 1-3 capsules by mouth daily as needed for constipation.   Yes Reported, Patient   DULoxetine (CYMBALTA) 30 MG capsule Take 60 mg by mouth daily.   Yes Unknown, Entered By History   DULoxetine (CYMBALTA) 30 MG capsule Take 30 mg by mouth every evening.   Yes Unknown, Entered By History   fluticasone-salmeterol (AIRDUO RESPICLICK) 113-14 MCG/ACT inhaler Inhale 1 puff into the lungs as needed (shortness of breath).   Yes Reported, Patient   furosemide (LASIX) 20 MG tablet Take 40 mg by mouth daily.   Yes Unknown, Entered By History   furosemide (LASIX) 20 MG tablet Take 20 mg by mouth every evening.   Yes Unknown, Entered By History   HYDROcodone-acetaminophen (NORCO) 5-325 MG tablet Take 1 tablet by mouth every 4 hours as needed for pain Max 6 tablets per day. 4/24/24  Yes Ely Daily APRN CNP   l-lysine HCl 500 MG TABS tablet Take 500 mg by mouth daily.   Yes Unknown, Entered By History   magnesium oxide 250 mg Tab Take 500 mg by mouth daily 12/18/13  Yes Provider, Historical   metoprolol succinate ER (TOPROL XL) 25 MG 24 hr tablet Take 75 mg by mouth daily. 2/7/25  Yes Unknown, Entered By History   modafinil  "(PROVIGIL) 100 MG tablet Take 2.5 tablets (250 mg) by mouth daily Take two and half tablet (250 mg ) daily 4/24/24  Yes Ely Daily APRN CNP   montelukast (SINGULAIR) 10 mg tablet Take 10 mg by mouth every evening 1/28/14  Yes Jordan Moreno MD   Naloxone HCl 5 MG/0.5ML SOSY Inject 5 mg as directed as needed.   Yes Reported, Patient   tiZANidine (ZANAFLEX) 4 MG tablet Take 4 mg by mouth 3 times daily as needed for muscle spasms   Yes Reported, Patient   Turmeric (CURCUPLEX-95) 500 MG CAPS Take 1 capsule by mouth daily   Yes Reported, Patient   OXYGEN-AIR DELIVERY SYSTEMS MISC [OXYGEN-AIR DELIVERY SYSTEMS MISC] Use 3 L As Directed. 3 lpm with activities and as needed at night 5/8/19   Provider, Historical              Clinically Significant Risk Factors Present on Admission                 # Drug Induced Platelet Defect: home medication list includes an antiplatelet medication   # Hypertension: Noted on problem list  # Acute heart failure with preserved ejection fraction: heart failure noted on problem list, last echo with EF >50%, and receiving IV diuretics          # Severe Obesity: Estimated body mass index is 45.69 kg/m  as calculated from the following:    Height as of this encounter: 1.397 m (4' 7\").    Weight as of this encounter: 89.2 kg (196 lb 9.6 oz).       # Financial/Environmental Concerns:    # Asthma: noted on problem list   # chronic hypoxemic respiratory failure on home oxygen  # NELLIE  # CKD stage IIIb  # history of CVA  # obesity hypoventilation syndrome        "

## 2025-03-22 NOTE — ED TRIAGE NOTES
Patient referred from [facility] due to IV infiltration. Patient was at [facility] due to unresolved UTI and stated she was also short of breath. Per lab work at [facility], dimer was elevated. PE run attempted at [facility] but IV infiltrated. Patient is a hard stick and resources were not available at [facility]. Patient transported here by EMS. No treatment given en route.     Triage Assessment (Adult)       Row Name 03/21/25 5127          Triage Assessment    Airway WDL WDL        Respiratory WDL    Respiratory WDL X;rhythm/pattern     Rhythm/Pattern, Respiratory shortness of breath;dyspnea upon exertion        Skin Circulation/Temperature WDL    Skin Circulation/Temperature WDL WDL        Cardiac WDL    Cardiac WDL WDL        Peripheral/Neurovascular WDL    Peripheral Neurovascular WDL WDL        Cognitive/Neuro/Behavioral WDL    Cognitive/Neuro/Behavioral WDL WDL        [place] Coma Scale    Best Eye Response 4-->(E4) spontaneous     Best Motor Response 6-->(M6) obeys commands     Best Verbal Response 5-->(V5) oriented     [place] Coma Scale Score 15

## 2025-03-22 NOTE — PLAN OF CARE
Problem: Adult Inpatient Plan of Care  Goal: Plan of Care Review  Description: The Plan of Care Review/Shift note should be completed every shift.  The Outcome Evaluation is a brief statement about your assessment that the patient is improving, declining, or no change.  This information will be displayed automatically on your shiftnote.  Outcome: Progressing  Goal: Optimal Comfort and Wellbeing  Outcome: Progressing  Intervention: Monitor Pain and Promote Comfort  Recent Flowsheet Documentation  Taken 3/22/2025 0407 by Emi Young RN  Pain Management Interventions:   emotional support   distraction   pain management plan reviewed with patient/caregiver     Problem: Delirium  Goal: Improved Behavioral Control  Outcome: Progressing  Intervention: Minimize Safety Risk  Recent Flowsheet Documentation  Taken 3/22/2025 0429 by Emi Young RN  Enhanced Safety Measures:   patient/family teach back on injury risk   review medications for side effects with activity  Goal: Improved Attention and Thought Clarity  Outcome: Progressing  Goal: Improved Sleep  Outcome: Progressing   Goal Outcome Evaluation:         Vitals:    03/22/25 0000 03/22/25 0030 03/22/25 0105 03/22/25 0407   BP: (!) 152/89 (!) 174/83 (!) 129/95 (!) 165/79   BP Location:    Left arm   Pulse: 62 61 60 54   Resp: 29 26 26 23   Temp:    97.4  F (36.3  C)   TempSrc:    Oral   SpO2: (!) 89% 99% 100% 95%   Weight:    89.2 kg (196 lb 9.6 oz)   Height:              Patient arrived on unit and oriented to room. Alert and oriented; satting well on 2L oxygen at this time. Tele SB 1AVB; HR 47-55 mostly; patient reports bradycardia is new. Patient walked to the bathroom with assist x1; reports incontinence at home; requested purewick when in bed/sleeping. IV rocephin on board. Here with fluid overload.      Heart Failure Care Map  GOALS TO BE MET BEFORE DISCHARGE:    1. Decrease congestion and/or edema with diuretic therapy to achieve near optimal volume  status.     Dyspnea improved: Yes, satisfactory for discharge.   Edema improved: Yes, satisfactory for discharge.        Last 24 hour I/O:   Intake/Output Summary (Last 24 hours) at 3/22/2025 0646  Last data filed at 3/22/2025 0429  Gross per 24 hour   Intake 550 ml   Output 200 ml   Net 350 ml           Net I/O and Weights since admission:   02/20 0700 - 03/22 0659  In: 550 [P.O.:550]  Out: 200 [Urine:200]  Net: 350     Vitals:    03/21/25 2330 03/22/25 0407   Weight: 96.6 kg (213 lb) 89.2 kg (196 lb 9.6 oz)       2.  O2 sats > 90% on room air, or at prior home O2 therapy level.      Able to wean O2 this shift to keep sats above 90%?: Yes, satisfactory for discharge.   Does patient use Home O2? Yes-  3L          Current oxygenation status:   SpO2: 95 %     O2 Device: Nasal cannula, Oxygen Delivery: 2 LPM    3.  Tolerates ambulation and mobility near baseline.     Ambulation: Yes, satisfactory for discharge.   Times patient ambulated with staff this shift: 2    Please review the Heart Failure Care Map for additional HF goal outcomes.    Emi Bazan, RN  3/22/2025

## 2025-03-22 NOTE — ED PROVIDER NOTES
NAME: Desirae Rey  AGE: 74 year old female  YOB: 1950  MRN: [id number]  EVALUATION DATE & TIME: 3/21/2025 11:28 PM    PCP: Daysi Bryan    ED PROVIDER: Govind Solis M.D.      Chief Complaint   Patient presents with    Shortness of Breath     FINAL IMPRESSION:  1. Dyspnea on exertion    2. Acute pulmonary edema (H)    3. Acute cystitis without hematuria    4. Hypoxemia      MEDICAL DECISION MAKIN:32 PM I met with the patient, obtained history, performed an initial exam, and discussed options and plan for diagnostics and treatment here in the ED.   Patient was clinically assessed and consented to treatment. After assessment, medical decision making and workup were discussed with the patient. The patient was agreeable to plan for testing, workup, and treatment.  Pertinent Labs & Imaging studies reviewed. (See chart for details)  1:40 AM I updated the patient on her labs and imaging result. Discussed admitting patient overnight which she is agreeable to.  2:03 AM I Spoke with Dr. Gauthier, Hospitalist. We further discussed the patient's case, reviewed ED work-up so far, and they accepted the patient for admit.         Medical Decision Making  Obtained supplemental history:Supplemental history obtained?: Documented in chart  Reviewed external records: External records reviewed?: Documented in chart  Care impacted by chronic illness:Chronic Kidney Disease and Heart Disease  Care significantly affected by social determinants of health:Access to Medical Care  Did you consider but not order tests?: In addition to work-up documented, I considered the following work up:   Did you interpret images independently?: Independent interpretation of ECG and images noted in documentation, when applicable.  Consultation discussion with other provider:Did you involve another provider (consultant, , pharmacy, etc.)?: I discussed the care with another health care provider, see documentation for  details.  Admit.  CT Pulmonary Angiogram:The patient had an abnormal d-dimer.    Desirae Rey is a 74 year old female who presents with shortness of breath, UTI.   Differential diagnosis includes but not limited to CHF exacerbation, pulmonary embolism, hypoxemia, acute coronary syndrome, urinary tract infection.  Patient is 74-year-old female sent from urgent care.  Initial workup there started for recurrent UTI symptoms.  Urine they are did show positive findings of urine infection and she recently had completed a course of Omnicef.  On tablet patient had complained about some shortness of breath and did have some slight hypoxia there requiring oxygen.  On arrival here patient was on 2 L nasal cannula and saturating normally with some increased work of breathing when laying flat but otherwise was feeling better when she sat up on the oxygen.  She does describe dyspnea on exertion for the last week and a half and her chart lists both paroxysmal atrial fibrillation and CHF but she reports no prior history of heart disease however a recent mini stroke that patient is on Plavix for.  Patient's BNP was elevated at urgent care but also D-dimer.  They were unable to get a CTA and patient sent to ER for ultrasound IV and CTA.  Ultrasound IV was placed and repeat troponin and EKG were done.  EKG did not show any signs of acute ST elevation MI.  Repeat Trope was 27 we will plan to repeat this  as it is equivocal.  Lactic acid was also checked which was negative for any signs of infectious process.  Patient's labs already from urgent care all reviewed and patient sent for CTA.  CTA did not show any pulmonary embolism but did show some prominent pulmonary vasculature.  Patient did have some slight crackles at the bases but otherwise appeared stable with no respiratory distress.  Still requiring oxygen I would recommend patient admission for diuresis.  She will also be started on Rocephin after urine culture obtained and plan  for diuresis with Lasix 60 mg started in the ER and patient discussed with hospitalist for admission.    0 minutes of critical care time    MEDICATIONS GIVEN IN THE EMERGENCY:  Medications   lidocaine 1 % 0.1-1 mL (has no administration in time range)   lidocaine (LMX4) cream (has no administration in time range)   sodium chloride (PF) 0.9% PF flush 3 mL (has no administration in time range)   sodium chloride (PF) 0.9% PF flush 3 mL (has no administration in time range)   calcium carbonate (TUMS) chewable tablet 1,000 mg (has no administration in time range)   docusate sodium (COLACE) capsule 100 mg (has no administration in time range)   ondansetron (ZOFRAN ODT) ODT tab 4 mg (has no administration in time range)     Or   ondansetron (ZOFRAN) injection 4 mg (has no administration in time range)   cefTRIAXone (ROCEPHIN) 1 g vial to attach to  mL bag for ADULTS or NS 50 mL bag for PEDS (has no administration in time range)   acetaminophen (TYLENOL) tablet 650 mg (has no administration in time range)   furosemide (LASIX) injection 40 mg (has no administration in time range)   iopamidol (ISOVUE-370) solution 70 mL (70 mLs Intravenous $Given 3/22/25 0058)   furosemide (LASIX) injection 60 mg (60 mg Intravenous $Given 3/22/25 0215)       NEW PRESCRIPTIONS STARTED AT TODAY'S ER VISIT:  New Prescriptions    No medications on file          =================================================================    HPI    Patient information was obtained from: The patient and EMS    Use of : N/A       Desirae Rey is a 74 year old female with a past medical history of asthma, CHF, CKD, who presents with shortness of breath.  Patient reports continued shortness of breath but does feel better on the oxygen that was placed on before she left the urgent care.  She is concerned about the blood clot but also does not recall being told she ever had history of congestive heart failure though it is listed in her chart.   Patient reports she did have a mild or mini stroke recently and is on Plavix for that.  Patient does report history of chronic kidney problems and hypertension as well.  No chest pain, no lightheadedness, no nausea vomiting presently.    Per chart review,    03/21/2025-The patient was seen at [facility] for complaints of fatigue, increased leg swelling, and shortness of breath for the past week. EKG showed normal sinus rhythm. Troponin was within normal limits. She does have an elevated BNP, but does not appear to be terribly fluid overloaded and has no orthopnea. WBC does not have an elevated white count. D-dimer obtained and was elevated. Plan to get a CT of the chest but had complications to get vascular access. Plan to transfer to [facility] per patient request for further evaluation.    The patient reports she has been more fatigue and short of breath for the past week. She went to urgent care earlier today to get a urinalysis and was subsequently found to have an elevated D-dimer. They were unable to get a CT scan of her chest, thus she was sent here to get one. The patient endorses increase leg edema. She has a history of CHF. No complaints of any other symptoms.    REVIEW OF SYSTEMS   Review of Systems   Constitutional:  Positive for fatigue.   Respiratory:  Positive for shortness of breath.    Cardiovascular:  Positive for leg swelling.   All other systems reviewed and are negative.       PAST MEDICAL HISTORY:  Past Medical History:   Diagnosis Date    Allergic rhinitis     Arthritis of back     CHF (congestive heart failure) (H)     Chronic kidney disease     stage 3     De Quervain's disease (tenosynovitis)     Fibromyalgia, primary     GERD (gastroesophageal reflux disease)     Hematuria     chronic    High cholesterol     History of blood clots 09/01/1970    DVT, from birth control, h/o left calf    Hyperlipidemia     Hypertension     Low back pain     Osteoporosis     Pickwickian syndrome (H)      Plantar fasciitis     Proteinuria     Proteinuria     chronic    Sleep apnea     CPAP    Uncomplicated asthma        PAST SURGICAL HISTORY:  Past Surgical History:   Procedure Laterality Date    CERVICAL FUSION N/A 4/27/2017    Procedure: ANTERIOR CERVICAL DECOMPRESSION FUSION C5-7 BILATERAL;  Surgeon: Basim Barone MD;  Location: [facility];  Service:     CHOLECYSTECTOMY      open    COLONOSCOPY N/A 12/20/2019    Procedure: COLONOSCOPY WITH BIOPSIES;  Surgeon: Lucio Farr MD;  Location: [facility];  Service: Gastroenterology    EYE SURGERY      HAND SURGERY Right     HYSTEROSCOPY DIAGNOSTIC      JOINT REPLACEMENT      bilateral TKA    KNEE SURGERY      RELEASE CARPAL TUNNEL Bilateral        CURRENT MEDICATIONS:      Current Facility-Administered Medications:     acetaminophen (TYLENOL) tablet 650 mg, 650 mg, Oral, Once, Jennifer Gauthier MD    calcium carbonate (TUMS) chewable tablet 1,000 mg, 1,000 mg, Oral, 4x Daily PRN, Jennifer Gauthier MD    [START ON 3/23/2025] cefTRIAXone (ROCEPHIN) 1 g vial to attach to  mL bag for ADULTS or NS 50 mL bag for PEDS, 1 g, Intravenous, Q24H, Jennifer Gauthier MD    docusate sodium (COLACE) capsule 100 mg, 100 mg, Oral, BID, Jennifer Gauthier MD    furosemide (LASIX) injection 40 mg, 40 mg, Intravenous, Q12H, Jennifer Gauthier MD    lidocaine (LMX4) cream, , Topical, Q1H PRN, Jennifer Gauthier MD    lidocaine 1 % 0.1-1 mL, 0.1-1 mL, Other, Q1H PRN, Jennifer Gauthier MD    ondansetron (ZOFRAN ODT) ODT tab 4 mg, 4 mg, Oral, Q6H PRN **OR** ondansetron (ZOFRAN) injection 4 mg, 4 mg, Intravenous, Q6H PRN, Jennifer Gauthier MD    sodium chloride (PF) 0.9% PF flush 3 mL, 3 mL, Intracatheter, Q8H RADHA, Jennifer Gauthier MD    sodium chloride (PF) 0.9% PF flush 3 mL, 3 mL, Intracatheter, q1 min prn, Jennifer Gauthier MD    Current Outpatient Medications:     acetaminophen (TYLENOL) 500 MG tablet, Take 500 mg by mouth every 6 hours as needed for mild pain, Disp: , Rfl:      albuterol (PROAIR HFA;PROVENTIL HFA;VENTOLIN HFA) 90 mcg/actuation inhaler, Inhale 1 puff into the lungs every 4 hours as needed for shortness of breath / dyspnea, Disp: , Rfl:     alendronate (FOSAMAX) 70 MG tablet, [ALENDRONATE (FOSAMAX) 70 MG TABLET] Take 70 mg by mouth every 7 days. Takes on Mondays, Disp: , Rfl: 11    aspirin 81 MG EC tablet, 1 tablet Orally Once a day, Disp: , Rfl:     azelastine (OPTIVAR) 0.05 % ophthalmic solution, Apply 1 drop to eye 2 times daily as needed, Disp: , Rfl:     calcium citrate (CITRACAL) 950 (200 Ca) MG tablet, Take 1 tablet by mouth daily, Disp: , Rfl:     cefdinir (OMNICEF) 300 MG capsule, Take 1 capsule (300 mg) by mouth every 12 hours., Disp: 4 capsule, Rfl: 0    cetirizine (ZYRTEC) 10 MG tablet, [CETIRIZINE (ZYRTEC) 10 MG TABLET] Take 10 mg by mouth daily. , Disp: , Rfl:     cholecalciferol (VITAMIN D3) 25 mcg (1000 units) capsule, Take 125 mcg by mouth every morning Take 5000 units in the morning and 2000 units in the evening, Disp: , Rfl:     clopidogrel (PLAVIX) 75 MG tablet, Take 1 tablet (75 mg) by mouth daily., Disp: 21 tablet, Rfl: 0    CRESTOR 10 MG tablet, Take 10 mg by mouth daily., Disp: , Rfl:     docusate sodium (COLACE) 100 MG capsule, Take 1-3 capsules by mouth daily as needed for constipation., Disp: , Rfl:     DULoxetine (CYMBALTA) 30 MG capsule, Take 1 capsule (30 mg) by mouth 2 times daily, Disp: , Rfl:     fluticasone-salmeterol (AIRDUO RESPICLICK) 113-14 MCG/ACT inhaler, Inhale 1 puff into the lungs 2 times daily., Disp: , Rfl:     furosemide (LASIX) 20 MG tablet, Take 2 tablets (40 mg) by mouth every morning, Disp: , Rfl:     HYDROcodone-acetaminophen (NORCO) 5-325 MG tablet, Take 1 tablet by mouth every 4 hours as needed for pain Max 6 tablets per day., Disp: 60 tablet, Rfl: 0    Hydroxychloroquine Sulfate 100 MG TABS, Take 100 mg by mouth 2 times daily, Disp: , Rfl:     l-lysine HCl 500 MG TABS tablet, Take 2 tablets by mouth daily, Disp: ,  Rfl:     magnesium oxide 250 mg Tab, Take 500 mg by mouth daily, Disp: , Rfl:     metoprolol succinate ER (TOPROL XL) 50 MG 24 hr tablet, Take 50 mg by mouth daily, Disp: , Rfl:     modafinil (PROVIGIL) 100 MG tablet, Take 2.5 tablets (250 mg) by mouth daily Take two and half tablet (250 mg ) daily, Disp: 90 tablet, Rfl: 0    montelukast (SINGULAIR) 10 mg tablet, Take 10 mg by mouth every evening, Disp: , Rfl:     Naloxone HCl 5 MG/0.5ML SOSY, Inject 5 mg as directed as needed., Disp: , Rfl:     nystatin (MYCOSTATIN) 577828 UNIT/GM external powder, Apply topically 2 times daily, Disp: , Rfl:     OXYGEN-AIR DELIVERY SYSTEMS MISC, [OXYGEN-AIR DELIVERY SYSTEMS MISC] Use 3 L As Directed. 3 lpm with activities and as needed at night, Disp: , Rfl:     polyethylene glycol (MIRALAX) 17 GM/Dose powder, Take 17 g by mouth daily., Disp: 510 g, Rfl: 0    solifenacin (VESICARE) 10 MG tablet, Take 5-10 mg by mouth at bedtime, Disp: , Rfl:     terbinafine (LAMISIL) 1 % external cream, Apply topically 2 times daily., Disp: , Rfl:     tiZANidine (ZANAFLEX) 4 MG tablet, Take 4 mg by mouth 3 times daily as needed for muscle spasms, Disp: , Rfl:     Turmeric (CURCUPLEX-95) 500 MG CAPS, Take 1 capsule by mouth daily, Disp: , Rfl:     ALLERGIES:  Allergies   Allergen Reactions    Latex Rash     Severe rash    Pregabalin Shortness Of Breath     Other Reaction(s): swelling and shortness of breath    Valsartan Unknown     Hyperkalemia    Ciprofloxacin Visual Disturbance, Hallucination and Confusion     Likely contributed visual hallucination and confusion    Colchicine Diarrhea    Dicloxacillin      Other Reaction(s): confusion, says she has tolerated augmentin without difficulty.     Gabapentin      Other Reaction(s): Sedation    Latex Unknown     Added based on information entered during case entry, please review and add reactions, type, and severity as needed    Other Drug Allergy (See Comments) Muscle Pain (Myalgia)     Tried lovastatin  and simvastatin    Other Environmental Allergy Unknown     Coverlet bandaid , 3M product; rash    Statins-Hmg-Coa Reductase Inhibitors [Statins] Muscle Pain (Myalgia)     Tried lovastatin and simvastatin    Sulfa Antibiotics Swelling     Facial swelling      Adhesive Tape Rash       FAMILY HISTORY:  Family History   Problem Relation Age of Onset    Rheumatoid Arthritis Mother     Heart Disease Sister     Chronic Obstructive Pulmonary Disease Father        SOCIAL HISTORY:   Social History     Socioeconomic History    Marital status:    Tobacco Use    Smoking status: Never    Smokeless tobacco: Never   Vaping Use    Vaping status: Never Used   Substance and Sexual Activity    Alcohol use: No    Drug use: No     Social Drivers of Health     Financial Resource Strain: Unknown (12/22/2024)    Financial Resource Strain     Within the past 12 months, have you or your family members you live with been unable to get utilities (heat, electricity) when it was really needed?: Patient unable to answer   Food Insecurity: Unknown (12/22/2024)    Food Insecurity     Within the past 12 months, did you worry that your food would run out before you got money to buy more?: Patient unable to answer     Within the past 12 months, did the food you bought just not last and you didn t have money to get more?: Patient unable to answer   Transportation Needs: Unknown (12/22/2024)    Transportation Needs     Within the past 12 months, has lack of transportation kept you from medical appointments, getting your medicines, non-medical meetings or appointments, work, or from getting things that you need?: Patient unable to answer    [facility]    Social Connections   Interpersonal Safety: Low Risk  (12/22/2024)    Interpersonal Safety     Do you feel physically and emotionally safe where you currently live?: Yes     Within the past 12 months, have  "you been hit, slapped, kicked or otherwise physically hurt by someone?: No     Within the past 12 months, have you been humiliated or emotionally abused in other ways by your partner or ex-partner?: No   Housing Stability: Unknown (12/22/2024)    Housing Stability     Do you have housing? : Patient unable to answer     Are you worried about losing your housing?: Patient unable to answer       PHYSICAL EXAM:    Vitals: BP (!) 129/95   Pulse 60   Temp 97.4  F (36.3  C) (Oral)   Resp 26   Ht 1.397 m (4' 7\")   Wt 96.6 kg (213 lb)   SpO2 100%   BMI 49.51 kg/m     Physical Exam  Vitals and nursing note reviewed.   Constitutional:       General: She is not in acute distress.     Appearance: She is well-developed. She is obese. She is not ill-appearing, toxic-appearing or diaphoretic.   HENT:      Head: Normocephalic.   Neck:      Vascular: No JVD.   Cardiovascular:      Rate and Rhythm: Normal rate and regular rhythm.      Heart sounds: Normal heart sounds.   Pulmonary:      Effort: Pulmonary effort is normal. No tachypnea, accessory muscle usage or respiratory distress.      Breath sounds: Examination of the right-lower field reveals decreased breath sounds and rales. Examination of the left-lower field reveals decreased breath sounds and rales. Decreased breath sounds and rales present.   Chest:      Chest wall: No tenderness.   Abdominal:      Tenderness: There is no abdominal tenderness.   Musculoskeletal:      Right lower leg: No tenderness. Edema (Bilateral lower extremity edema) present.      Left lower leg: No tenderness. Edema present.   Skin:     General: Skin is warm and dry.      Coloration: Skin is not cyanotic.   Neurological:      General: No focal deficit present.      Mental Status: She is alert.   Psychiatric:         Behavior: Behavior normal.        LAB:  All pertinent labs reviewed and interpreted.  Labs Ordered and Resulted from Time of ED Arrival to Time of ED Departure   TROPONIN T, HIGH " SENSITIVITY - Abnormal       Result Value    Troponin T, High Sensitivity 27 (*)    TROPONIN T, HIGH SENSITIVITY - Abnormal    Troponin T, High Sensitivity 26 (*)    LACTIC ACID WHOLE BLOOD WITH 1X REPEAT IN 2 HR WHEN >2 - Normal    Lactic Acid, Initial 0.8     MAGNESIUM - Normal    Magnesium 1.9     COMPREHENSIVE METABOLIC PANEL   IONIZED CALCIUM   URINE CULTURE       RADIOLOGY:  CT Chest Pulmonary Embolism w Contrast   Final Result   IMPRESSION:    1.  No visualized pulmonary embolus.   2.  No evidence of active pulmonary disease.   3.  Partial visualization of fusion hardware in the lower cervical spine. Irregular lucency is present within the osseous structures about the fusion hardware that appears new since 07/28/2017. This is nonspecific, but could relate to hardware loosening    and/or other ongoing abnormality. If the patient has symptoms in this region, a CT scan of the neck would be useful for further evaluation.            Echocardiogram Complete    (Results Pending)     EKG:   Performed at: 03/22/2025 at 01:08  Impression: Sinus rhythm with first-degree AV block, no signs of acute ST elevation ischemia, no irregular rhythm such as atrial fibrillation.  Rate: 60 bpm  Rhythm: Sinus rhythm with 1st degree A-V block  QRS Interval: 102 ms  QTc Interval: 434 ms  Comparison: Comparison prior EKG from December 22, 2024 with sinus rhythm that replaced to previous findings of suspected atrial fibrillation.  I have independently reviewed and interpreted the EKG(s) documented above.     PROCEDURES:   Procedures       I, Scottie Dacosta, am serving as a scribe to document services personally performed by Dr. Govind Soils  based on my observation and the provider's statements to me. I, Govind Solis MD attest that Scottie Dacosta is acting in a scribe capacity, has observed my performance of the services and has documented them in accordance with my direction.      Govind Solis M.D.  Emergency Medicine  [facility]  [facility] Emergency Department       Govind Solis MD  03/22/25 [id number]

## 2025-03-22 NOTE — H&P
[facility]    History and Physical - Hospitalist Service       Date of Admission:  3/21/2025    Assessment & Plan      Desirae Rey is a 74 year old female with a medical history significant for multiple medical comorbidities including a history of chronic hypoxemic respiratory failure on 3 L of oxygen, severe obstructive sleep apnea on BiPAP at night, CKD stage III, history of CVA, obesity, hypoventilation syndrome, chronic back pain, recent UTI who is being admitted with acute on chronic CHF with decompensation.  Patient reports her baseline weight is 200 pounds.  Her baseline weight is 213 pounds         Patient Active Problem List   Diagnosis    Fibromyalgia    Osteoporosis    Obesity    Osteoarthritis    NELLIE (obstructive sleep apnea)    Allergic rhinitis    Anemia    Hematuria    Proteinuria    Renal Insufficiency    Synovitis and tenosynovitis    Pseudogout    Chondrocalcinosis    Pain During Urination (Dysuria)    Cervical radiculopathy    Benign essential hypertension    GRACY (acute kidney injury)    Blood loss anemia    Cubital tunnel syndrome, right    Chronic kidney disease, stage 2 (mild)    Hyperlipidemia, unspecified hyperlipidemia type    Other specified hypotension    Sleep apnea    CKD (chronic kidney disease) stage 3, GFR 30-59 ml/min (H)    Hypertension    Diastolic dysfunction    Altered mental status, unspecified altered mental status type    Acute weakness    Adult failure to thrive    Chronic heart failure with preserved ejection fraction (HFpEF) (H)    Elevated troponin    Moderate persistent asthma without complication    Cellulitis of left lower extremity    Fall at home, initial encounter    UTI (urinary tract infection), uncomplicated    Diverticulitis    Sepsis (H)    CHF (congestive heart failure) (H)    Pulmonary hypertension (H)    Low back pain    Acute Cerebellar ischemic stroke (H)    Acute metabolic encephalopathy    Acute urinary retention    Chronic  respiratory failure with hypoxia (H)    Obesity hypoventilation syndrome (H)    Stenosis, spinal, lumbar    Hypoxemia    Acute pulmonary edema (H)    Dyspnea on exertion    Acute cystitis without hematuria        Acute on Chronic Congestive Heart Failure (CHF) with Preserved Ejection Fraction (EF):  Recent EF from December 2024 was 60-65%.  Administered Lasix 60 mg IV x1, followed by Lasix 40 mg IV every 12 hours for two doses.  Check daily weights and fluid   restriction to manage fluid status.  Cardiology has been consulted for further evaluation and management.  ASA, statin, BB, plavix when medications are reconciled.     Obstructive Sleep Apnea:  Continue BIPAP therapy at night as per the patient's routine.  RT consutled    Hypertension:  Currently managing with Lasix 40 mg IV every 12 hours.  Awaiting medication reconciliation to resume beta-blocker therapy.  BB when meds reconciled    Urinary Tract Infection (UTI):  Urine culture from March 19th positive for E. coli.  Continue ceftriaxone as per sensitivity results.    Acute Renal Failure/Chronic Kidney Disease Stage III:  Avoid nephrotoxic medications to prevent further renal impairment.    Right leg pain  History of thrombophlebitis in 2023  Recent Ultrasound done at [facility]. This needs follow p  Provide pain control and supportive care measures.      Chronic Pain Syndrome:  Manage pain with appropriate analgesics as needed.      Chronic Back Pain:  Administer Tylenol as needed for pain relief.    Possible Cognitive Impairment:  Recommend a cognitive evaluation to assess the extent and nature of cognitive issues.    Moderate Persistent Asthma:  Use Duoneb treatments as needed to relieve symptoms.  Optimize lung care and ensure adherence to asthma management plan.    Pulmonary Hypertension:  Continue oxygen therapy at 3L/min to maintain adequate oxygenation.    Hyperlipidemia:  Continue statin therapy as part of the home medication regimen.    Home  "Medications:  Continue all other home medications as previously prescribed.  Ensure close monitoring of the patient's clinical status and adjust treatment plans as necessary based on response and any new developments.    Diet: Cardiac diet  DVT Prophylaxis: Pneumatic Compression Devices  Rebollar Catheter: Not present  Lines: None     Cardiac Monitoring: None  Code Status:  Full cde    Clinically Significant Risk Factors Present on Admission                 # Drug Induced Platelet Defect: home medication list includes an antiplatelet medication   # Hypertension: Noted on problem list  # Chronic heart failure with preserved ejection fraction: heart failure noted on problem list and last echo with EF >50%          # Severe Obesity: Estimated body mass index is 49.51 kg/m  as calculated from the following:    Height as of this encounter: 1.397 m (4' 7\").    Weight as of this encounter: 96.6 kg (213 lb).       # Financial/Environmental Concerns:    # Asthma: noted on problem list        Disposition Plan     Medically Ready for Discharge: Anticipated in 2-4 Days           Jennifer Gauthier MD  Hospitalist Service  [facility]  Securely message with [employer] (more info)  Text page via [employer] Paging/Directory     ______________________________________________________________________    Chief Complaint   Shortness of breath        History of Present Illness   Desirae Rey is a 74-year-old female with a significant medical history including chronic hypoxemic respiratory failure requiring 3L of oxygen, severe obstructive sleep apnea on BiPAP at night, chronic kidney disease (CKD) stage III, a history of cerebrovascular accident (CVA), obesity, hypoventilation syndrome, chronic back pain, and a recent urinary tract infection (UTI). She presented to the emergency department with acute on chronic congestive heart failure (CHF) decompensation.    Per history, the patient initially sought care at [facility] for symptoms of fatigue, increased leg swelling, and shortness of breath persisting over the past week. During her evaluation, an EKG showed normal sinus rhythm and troponin levels were within normal limits. However, she had an elevated B-type natriuretic peptide (BNP) and D-dimer. A CT angiography of the chest was planned due to the elevated D-dimer, but obtaining vascular access was complicated by IV infiltration, as the patient is a difficult stick and resources at the urgency room were limited. As a result, she was transported to [facility] by EMS for further evaluation, as per her request, without any treatment administered en route.    In the emergency department, the patient reported ongoing shortness of breath, although she felt some relief with the oxygen administered earlier. She was concerned about a potential blood clot but did not recall being diagnosed with CHF, despite it being documented in her chart. She also mentioned a recent mild stroke for which she is taking Plavix. She denied experiencing chest pain, lightheadedness, nausea, or vomiting at that time.  Patient was noted to be a poor historian.  She also mentioned much later that she has been on BiPAP and is on 3 L of oxygen.    Preliminary labs done in the ER showed a BNP of 2210, BMP showed a BUN of 50, creatinine 1.57 from a baseline of 1.07 and a GFR of 35 from a baseline of 56, dimer was 1.18.  CBC showed a white count of 6.8, hemoglobin 12.6, MCV of 102, platelet 275.  UA done in the ER showed moderate bacteria and epithelial cells.  Urine culture from 219 was positive for E. coli    A CTA chest ruled out pulmonary embolism, and an ultrasound showed no deep venous thrombosis in the right lower extremity but did reveal superficial thrombophlebitis in the right mid to lower thigh.      ED intervention included giving patient ceftriaxone and Lasix 60 mg IV x 1.   Patient reports her baseline weight patient reports her baseline weight  is 200 pounds.  She is currently at 213 pounds.        TTE- 12/24  Left ventricular size, wall motion and function are normal. The ejection  fraction is 60-65%.  There is mild concentric left ventricular hypertrophy.  Grade II or moderate diastolic dysfunction.  No regional wall motion abnormalities noted.  Normal right ventricle size and systolic function.  The left atrium is moderately dilated.  Mild valvular aortic stenosis.  Calcified and thickened mitral valve leaflets, mild mitral valve stenosis with  mean gradient 5 mmHg at ventricular rate 90 BPM.  Ascending Aorta dilatation is present 3.9 cm.     When compared to previous study on 12-6-2023, mitral valve disorder is mildly  worse.           ED intervention:  MEDICATIONS GIVEN IN THE EMERGENCY:  Medications   lidocaine 1 % 0.1-1 mL (has no administration in time range)   lidocaine (LMX4) cream (has no administration in time range)   sodium chloride (PF) 0.9% PF flush 3 mL (has no administration in time range)   sodium chloride (PF) 0.9% PF flush 3 mL (has no administration in time range)   calcium carbonate (TUMS) chewable tablet 1,000 mg (has no administration in time range)   docusate sodium (COLACE) capsule 100 mg (has no administration in time range)   ondansetron (ZOFRAN ODT) ODT tab 4 mg (has no administration in time range)     Or   ondansetron (ZOFRAN) injection 4 mg (has no administration in time range)   cefTRIAXone (ROCEPHIN) 1 g vial to attach to  mL bag for ADULTS or NS 50 mL bag for PEDS (has no administration in time range)   acetaminophen (TYLENOL) tablet 650 mg (has no administration in time range)   furosemide (LASIX) injection 40 mg (has no administration in time range)   iopamidol (ISOVUE-370) solution 70 mL (70 mLs Intravenous $Given 3/22/25 0058)   furosemide (LASIX) injection 60 mg (60 mg Intravenous $Given 3/22/25 0215)     CTA:  IMPRESSION:   1.  No visualized pulmonary embolus.  2.  No evidence of active pulmonary disease.  3.   Partial visualization of fusion hardware in the lower cervical spine. Irregular lucency is present within the osseous structures about the fusion hardware that appears new since 07/28/2017. This is nonspecific, but could relate to hardware loosening   and/or other ongoing abnormality. If the patient has symptoms in this region, a CT scan of the neck would be useful for further evaluation.            Past Medical History    Past Medical History:   Diagnosis Date    Allergic rhinitis     Arthritis of back     CHF (congestive heart failure) (H)     Chronic kidney disease     stage 3     De Quervain's disease (tenosynovitis)     Fibromyalgia, primary     GERD (gastroesophageal reflux disease)     Hematuria     chronic    High cholesterol     History of blood clots 09/01/1970    DVT, from birth control, h/o left calf    Hyperlipidemia     Hypertension     Low back pain     Osteoporosis     Pickwickian syndrome (H)     Plantar fasciitis     Proteinuria     Proteinuria     chronic    Sleep apnea     CPAP    Uncomplicated asthma        Past Surgical History   Past Surgical History:   Procedure Laterality Date    CERVICAL FUSION N/A 4/27/2017    Procedure: ANTERIOR CERVICAL DECOMPRESSION FUSION C5-7 BILATERAL;  Surgeon: Basim Barone MD;  Location: [facility] [place];  Service:     CHOLECYSTECTOMY      open    COLONOSCOPY N/A 12/20/2019    Procedure: COLONOSCOPY WITH BIOPSIES;  Surgeon: Lucio Farr MD;  Location: [facility] [place];  Service: Gastroenterology    EYE SURGERY      HAND SURGERY Right     HYSTEROSCOPY DIAGNOSTIC      JOINT REPLACEMENT      bilateral TKA    KNEE SURGERY      RELEASE CARPAL TUNNEL Bilateral        Prior to Admission Medications   Prior to Admission Medications   Prescriptions Last Dose Informant Patient Reported? Taking?   CRESTOR 10 MG tablet  Care Giver Yes No   Sig: Take 10 mg by mouth daily.   DULoxetine (CYMBALTA) 30 MG capsule  Care Giver No No   Sig: Take 1 capsule (30 mg)  by mouth 2 times daily   HYDROcodone-acetaminophen (NORCO) 5-325 MG tablet  Care Giver No No   Sig: Take 1 tablet by mouth every 4 hours as needed for pain Max 6 tablets per day.   Hydroxychloroquine Sulfate 100 MG TABS  Spouse/Significant Other, Care Giver Yes No   Sig: Take 100 mg by mouth 2 times daily   Naloxone HCl 5 MG/0.5ML SOSY  Care Giver Yes No   Sig: Inject 5 mg as directed as needed.   OXYGEN-AIR DELIVERY SYSTEMS MISC  Spouse/Significant Other, Care Giver Yes No   Sig: [OXYGEN-AIR DELIVERY SYSTEMS [facility]] Use 3 L As Directed. 3 lpm with activities and as needed at night   Turmeric (CURCUPLEX-95) 500 MG CAPS  Spouse/Significant Other, Care Giver Yes No   Sig: Take 1 capsule by mouth daily   acetaminophen (TYLENOL) 500 MG tablet  Spouse/Significant Other, Care Giver Yes No   Sig: Take 500 mg by mouth every 6 hours as needed for mild pain   albuterol (PROAIR HFA;PROVENTIL HFA;VENTOLIN HFA) 90 mcg/actuation inhaler  Spouse/Significant Other, Care Giver Yes No   Sig: Inhale 1 puff into the lungs every 4 hours as needed for shortness of breath / dyspnea   alendronate (FOSAMAX) 70 MG tablet  Spouse/Significant Other, Care Giver Yes No   Sig: [ALENDRONATE (FOSAMAX) 70 MG TABLET] Take 70 mg by mouth every 7 days. Takes on    aspirin 81 MG EC tablet  Spouse/Significant Other, Care Giver Yes No   Si tablet Orally Once a day   azelastine (OPTIVAR) 0.05 % ophthalmic solution  Spouse/Significant Other, Care Giver Yes No   Sig: Apply 1 drop to eye 2 times daily as needed   calcium citrate (CITRACAL) 950 (200 Ca) MG tablet  Spouse/Significant Other, Care Giver Yes No   Sig: Take 1 tablet by mouth daily   cefdinir (OMNICEF) 300 MG capsule   No No   Sig: Take 1 capsule (300 mg) by mouth every 12 hours.   cetirizine (ZYRTEC) 10 MG tablet  Spouse/Significant Other, Care Giver Yes No   Sig: [CETIRIZINE (ZYRTEC) 10 MG TABLET] Take 10 mg by mouth daily.    cholecalciferol (VITAMIN D3) 25 mcg (1000 units) capsule   Spouse/Significant Other, Care Giver Yes No   Sig: Take 125 mcg by mouth every morning Take 5000 units in the morning and 2000 units in the evening   clopidogrel (PLAVIX) 75 MG tablet   No No   Sig: Take 1 tablet (75 mg) by mouth daily.   docusate sodium (COLACE) 100 MG capsule  Care Giver Yes No   Sig: Take 1-3 capsules by mouth daily as needed for constipation.   fluticasone-salmeterol (AIRDUO RESPICLICK) 113-14 MCG/ACT inhaler  Care Giver Yes No   Sig: Inhale 1 puff into the lungs 2 times daily.   furosemide (LASIX) 20 MG tablet  Care Giver No No   Sig: Take 2 tablets (40 mg) by mouth every morning   l-lysine HCl 500 MG TABS tablet  Spouse/Significant Other, Care Giver Yes No   Sig: Take 2 tablets by mouth daily   magnesium oxide 250 mg Tab  Spouse/Significant Other, Care Giver Yes No   Sig: Take 500 mg by mouth daily   metoprolol succinate ER (TOPROL XL) 50 MG 24 hr tablet  Spouse/Significant Other, Care Giver Yes No   Sig: Take 50 mg by mouth daily   modafinil (PROVIGIL) 100 MG tablet  Care Giver No No   Sig: Take 2.5 tablets (250 mg) by mouth daily Take two and half tablet (250 mg ) daily   montelukast (SINGULAIR) 10 mg tablet  Spouse/Significant Other, Care Giver Yes No   Sig: Take 10 mg by mouth every evening   nystatin (MYCOSTATIN) 319067 UNIT/GM external powder  Spouse/Significant Other, Care Giver Yes No   Sig: Apply topically 2 times daily   polyethylene glycol (MIRALAX) 17 GM/Dose powder   No No   Sig: Take 17 g by mouth daily.   solifenacin (VESICARE) 10 MG tablet  Spouse/Significant Other, Care Giver Yes No   Sig: Take 5-10 mg by mouth at bedtime   terbinafine (LAMISIL) 1 % external cream  Care Giver Yes No   Sig: Apply topically 2 times daily.   tiZANidine (ZANAFLEX) 4 MG tablet  Spouse/Significant Other, Care Giver Yes No   Sig: Take 4 mg by mouth 3 times daily as needed for muscle spasms      Facility-Administered Medications: None        Review of Systems    The 10 point Review of Systems is  negative other than noted in the HPI or here.     Social History   I have reviewed this patient's social history and updated it with pertinent information if needed.  Social History     Tobacco Use    Smoking status: Never    Smokeless tobacco: Never   Vaping Use    Vaping status: Never Used   Substance Use Topics    Alcohol use: No    Drug use: No         Family History   I have reviewed this patient's family history and updated it with pertinent information if needed.  Family History   Problem Relation Age of Onset    Rheumatoid Arthritis Mother     Heart Disease Sister     Chronic Obstructive Pulmonary Disease Father          Allergies   Allergies   Allergen Reactions    Latex Rash     Severe rash    Pregabalin Shortness Of Breath     Other Reaction(s): swelling and shortness of breath    Valsartan Unknown     Hyperkalemia    Ciprofloxacin Visual Disturbance, Hallucination and Confusion     Likely contributed visual hallucination and confusion    Colchicine Diarrhea    Dicloxacillin      Other Reaction(s): confusion, says she has tolerated augmentin without difficulty.     Gabapentin      Other Reaction(s): Sedation    Latex Unknown     Added based on information entered during case entry, please review and add reactions, type, and severity as needed    Other Drug Allergy (See Comments) Muscle Pain (Myalgia)     Tried lovastatin and simvastatin    Other Environmental Allergy Unknown     Coverlet bandaid , [email or web address] product; rash    Statins-Hmg-Coa Reductase Inhibitors [Statins] Muscle Pain (Myalgia)     Tried lovastatin and simvastatin    Sulfa Antibiotics Swelling     Facial swelling      Adhesive Tape Rash        Physical Exam   Vital Signs: Temp: 97.4  F (36.3  C) Temp src: Oral BP: (!) 174/83 Pulse: 61   Resp: 26 SpO2: 99 % O2 Device: Nasal cannula Oxygen Delivery: 2 LPM  Weight: 213 lbs 0 oz      General Aox3, appropriate affect, NAD, on 3L, obese  HEENT  MMM, EOMI, PERRL, thick neck  Chest decreased A/E b/l, NO  wheezing  Heart irreg RR, No M/R/G  Abd- Soft, NT, BS+  - Deferred,   Extremity- Moving all extremities, No digital clubbing,  2+ edema  Neuro- Aox3,   gait not checked  Skin  Has no tattoo, No skin rash   Thickened toe nails  Mild right leg erythema??    Medical Decision Making       85 MINUTES SPENT BY ME on the date of service doing chart review, history, exam, documentation & further activities per the note.      ------------------ MEDICAL DECISION MAKING ------------------------------------------------------------------------------------------------------  MANAGEMENT DISCUSSED with the following over the past 24 hours: Patient and care team       Data   ------------------------- PAST 24 HR DATA REVIEWED -----------------------------------------------    I have personally reviewed the following data over the past 24 hrs:    Trop: 26 (H) BNP: N/A     Procal: N/A CRP: N/A Lactic Acid: 0.8         Imaging results reviewed over the past 24 hrs:   Recent Results (from the past 24 hours)   CTA Chest Redo Sternotomy Planning    Narrative    For Patients: As a result of the 21st Century Cures Act, medical imaging exams and procedure reports are released immediately into your electronic medical record. You may view this report before your referring provider. If you have questions, please contact your health care provider.    EXAM: CTA CHEST  LOCATION: [facility] [place]  DATE: 3/21/2025    INDICATION: shortness of breath, elevated d dimer  COMPARISON: None.  TECHNIQUE: CT chest pulmonary angiogram during arterial phase injection of IV contrast. Multiplanar reformats and MIP reconstructions were performed. Dose reduction techniques were used.   CONTRAST: IOPAMIDOL 300 MG/ML  ML BOTTLE: 75mL    FINDINGS:  ANGIOGRAM CHEST: Nondiagnostic evaluation for pulmonary embolism given the excessive image noise from patient positioning body habitus, as well as suboptimal pulmonary arterial enhancement. Pulmonary  arteries are mildly dilated centrally. Thoracic aorta normal in caliber. No aortic dissection.    HEART: Cardiac chambers unremarkable. Prominent mitral annular calcification. No pericardial effusion. Moderate coronary artery calcification.    MEDIASTINUM: No adenopathy or mass.    LUNGS AND PLEURA: No pulmonary mass, consolidation, or suspicious pulmonary nodule. No pleural effusion or pneumothorax.    LIMITED UPPER ABDOMEN: Negative.    MUSCULOSKELETAL: Severe degenerative change in the glenohumeral joints bilaterally. Severe multilevel degenerative disc disease in the thoracic spine.    Impression    1.  Nondiagnostic evaluation for pulmonary embolism. No definite acute abnormality in the chest.    CT Chest Pulmonary Embolism w Contrast    Narrative    EXAM: CT CHEST WITH CONTRAST - PULMONARY EMBOLISM PROTOCOL   LOCATION: [facility]  DATE/TIME: 03/22/2025 12:57 AM CDT    INDICATION: Shortness of breath for one week. Elevated d-dimer.  COMPARISON:  07/28/2017 - CT cervical spine.  04/5/2017 - CT chest.    TECHNIQUE: CT angiogram chest during arterial phase injection IV contrast. 2D and 3D MIP reconstructions were performed by the CT technologist. Dose reduction techniques were used.   CONTRAST: 70 mL Isovue 370.    FINDINGS:    ANGIOGRAM CHEST: The central pulmonary arteries are mildly large in caliber. No visualized pulmonary embolus. Atherosclerotic calcification in the thoracic aorta.    LUNGS AND PLEURA: Unremarkable.    MEDIASTINUM/AXILLAE: Unremarkable.    CORONARY ARTERY CALCIFICATION: Present.    MUSCULOSKELETAL: Partial visualization of fusion hardware in the lower cervical spine. Irregular lucency within the osseous structures about the fusion hardware that appears new since the prior study dated 07/28/2017.    UPPER ABDOMEN: Prior cholecystectomy.      Impression    IMPRESSION:   1.  No visualized pulmonary embolus.  2.  No evidence of active pulmonary disease.  3.  Partial  visualization of fusion hardware in the lower cervical spine. Irregular lucency is present within the osseous structures about the fusion hardware that appears new since 07/28/2017. This is nonspecific, but could relate to hardware loosening   and/or other ongoing abnormality. If the patient has symptoms in this region, a CT scan of the neck would be useful for further evaluation.

## 2025-03-22 NOTE — PROGRESS NOTES
03/22/25 1005   Appointment Info   Signing Clinician's Name / Credentials (OT) Linda Reddy OT   Living Environment   People in Home spouse   Current Living Arrangements house   Home Accessibility stairs to enter home;stairs within home   Number of Stairs, Main Entrance 1   Stair Railings, Main Entrance railings safe and in good condition   Number of Stairs, Within Home, Primary greater than 10 stairs   Stair Railings, Within Home, Primary railings safe and in good condition   Transportation Anticipated family or friend will provide   Living Environment Comments pt was independent w/her ADLs/mobility w/a RW   Self-Care   Usual Activity Tolerance moderate   Current Activity Tolerance fair   Equipment Currently Used at Home walker, rolling;shower chair;grab bar, toilet;grab bar, tub/shower   Fall history within last six months yes   Number of times patient has fallen within last six months 12   Instrumental Activities of Daily Living (IADL)   Previous Responsibilities meal prep;housekeeping;laundry;medication management;finances  ( does all other IADLs)   General Information   Onset of Illness/Injury or Date of Surgery 03/21/25   Referring Physician Dr Cerda   Patient/Family Therapy Goal Statement (OT) go home   Additional Occupational Profile Info/Pertinent History of Current Problem Desirae Rey is a 74 year old female with a medical history significant for multiple medical comorbidities including a history of chronic hypoxemic respiratory failure on 3 L of oxygen, severe obstructive sleep apnea on BiPAP at night, CKD stage III, history of CVA, obesity, hypoventilation syndrome, chronic back pain, recent UTI who is being admitted with acute on chronic CHF with decompensation.   Existing Precautions/Restrictions fall   Left Upper Extremity (Weight-bearing Status) full weight-bearing (FWB)   Right Upper Extremity (Weight-bearing Status) full weight-bearing (FWB)   Left Lower Extremity (Weight-bearing  Status) full weight-bearing (FWB)   Right Lower Extremity (Weight-bearing Status) full weight-bearing (FWB)   Cognitive Status Examination   Orientation Status orientation to person, place and time   Follows Commands follows multi-step commands;over 90% accuracy   Visual Perception   Visual Impairment/Limitations corrective lenses for reading   Sensory   Sensory Quick Adds sensation intact   Pain Assessment   Patient Currently in Pain Yes, see Vital Sign flowsheet  (throughout body)   Posture   Posture forward head position   Range of Motion Comprehensive   General Range of Motion no range of motion deficits identified   Strength Comprehensive (MMT)   General Manual Muscle Testing (MMT) Assessment no strength deficits identified   Muscle Tone Assessment   Muscle Tone Quick Adds No deficits were identified   Coordination   Upper Extremity Coordination No deficits were identified   Bed Mobility   Bed Mobility supine-sit;sit-supine   Supine-Sit [place] (Bed Mobility) contact guard   Sit-Supine [place] (Bed Mobility) minimum assist (75% patient effort)   Transfers   Transfers sit-stand transfer   Sit-Stand Transfer   Sit-Stand [place] (Transfers) minimum assist (75% patient effort)   Balance   Balance Assessment standing dynamic balance   Standing Balance: Dynamic minimal assist   Activities of Daily Living   BADL Assessment/Intervention lower body dressing   Lower Body Dressing Assessment/Training   [place] Level (Lower Body Dressing) minimum assist (75% patient effort)   Clinical Impression   Criteria for Skilled Therapeutic Interventions Met (OT) Yes, treatment indicated   OT Diagnosis cystitis w/hematuria   Influenced by the following impairments fatigue, decreased ADLs/balance   OT Problem List-Impairments impacting ADL activity tolerance impaired;balance   Assessment of Occupational Performance 3-5 Performance Deficits   Identified Performance Deficits fatigue, decreased ADLs/balance   Planned  Therapy Interventions (OT) ADL retraining   Clinical Decision Making Complexity (OT) detailed assessment/moderate complexity   Risk & Benefits of therapy have been explained evaluation/treatment results reviewed;care plan/treatment goals reviewed;risks/benefits reviewed;participants voiced agreement with care plan   OT Total Evaluation Time   OT Eval, Moderate Complexity Minutes (55658) 10   OT Goals   Therapy Frequency (OT) 5 times/week   OT Predicted Duration/Target Date for Goal Attainment 03/28/25   OT Goals Hygiene/Grooming;Lower Body Dressing;Toilet Transfer/Toileting   OT: Hygiene/Grooming modified independent   OT: Lower Body Dressing Modified independent   OT: Toilet Transfer/Toileting Modified independent   Interventions   Interventions Quick Adds Self-Care/Home Management   Self-Care/Home Management   Self-Care/Home Mgmt/ADL, Compensatory, Meal Prep Minutes (42824) 10   Symptoms Noted During/After Treatment (Meal Preparation/Planning Training) fatigue   Treatment Detail/Skilled Intervention transfer Min A of 1-pt was able to take 4 steps w/RW prior to fatigue, G/H Min a to complete all tasks properly, LB dress Max A due to decreased LE ROM   OT Discharge Planning   OT Plan Transfers-not walking far, G/H, LB dress   OT Discharge Recommendation (DC Rec) Transitional Care Facility   OT Rationale for DC Rec pt has a decline in her ADLs/mobility requiring a TCU to increase skills   OT Brief overview of current status Min A w/transfers, Mod a w/ADLs

## 2025-03-22 NOTE — ED NOTES
Expected Patient Referral to ED  10:54 PM    Referring Clinic/Provider:  [facility]    Reason for referral/Clinical facts:  75 yo F c dyspnea and fatigue presenting to emergency room and workup so far has been negative with normal EKG, normal troponin, unremarkable labs except for chronic kidney disease which is stable.  She is not on oxygen and vital signs are stable.  D-dimer was elevated and they are unable to get larger access for CTA.  Patient being sent for further workup and disposition.    Recommendations provided:  Send to ED for further evaluation    Caller was informed that this institution does possess the capabilities and/or resources to provide for patient and should be transferred to our facility.    Discussed that if direct admit is sought and any hurdles are encountered, this ED would be happy to see the patient and evaluate.    Informed caller that recommendations provided are recommendations based only on the facts provided and that they responsible to accept or reject the advice, or to seek a formal in person consultation as needed and that this ED will see/treat patient should they arrive.      Govind Solis MD  [facility] EMERGENCY DEPARTMENT  [address] [place] [id number] [phone]       Govind Solis MD  03/21/25 4573

## 2025-03-22 NOTE — PHARMACY-ADMISSION MEDICATION HISTORY
Pharmacy Intern Admission Medication History    Admission medication history is complete. The information provided in this note is only as accurate as the sources available at the time of the update.    Information Source(s): Patient and CareEverywhere/SureScripts via in-person    Changes made to PTA medication list:  Added: None  Deleted: cefdinir, clopidogrel, hydroxychloroquine, magnesium, solifenacin  Changed:   Duloxetine from 30mg BID to 60mg QAM + 30mg QPM  Furosemide from 40mg QDAY to 40mg QAM + 20mg QPM    Allergies reviewed with patient and updates made in EHR: yes    Medication History Completed By: Gerhard TEMPLETON 3/22/2025 9:05 AM    PTA Med List   Medication Sig Last Dose/Taking    acetaminophen (TYLENOL) 500 MG tablet Take 500 mg by mouth every 6 hours as needed for mild pain Taking As Needed    albuterol (PROAIR HFA;PROVENTIL HFA;VENTOLIN HFA) 90 mcg/actuation inhaler Inhale 1 puff into the lungs every 4 hours as needed for shortness of breath / dyspnea Taking As Needed    alendronate (FOSAMAX) 70 MG tablet [ALENDRONATE (FOSAMAX) 70 MG TABLET] Take 70 mg by mouth every 7 days. Takes on Mondays Past Week Morning    aspirin 81 MG EC tablet 1 tablet Orally Once a day 3/21/2025 Morning    azelastine (OPTIVAR) 0.05 % ophthalmic solution Apply 1 drop to eye 2 times daily as needed Taking As Needed    calcium citrate (CITRACAL) 950 (200 Ca) MG tablet Take 1 tablet by mouth daily 3/21/2025 Morning    cetirizine (ZYRTEC) 10 MG tablet [CETIRIZINE (ZYRTEC) 10 MG TABLET] Take 10 mg by mouth daily.  Taking    cholecalciferol (VITAMIN D3) 25 mcg (1000 units) capsule Take 1 capsule by mouth daily. 3/21/2025 Morning    CRESTOR 10 MG tablet Take 10 mg by mouth daily. 3/21/2025 Morning    docusate sodium (COLACE) 100 MG capsule Take 1-3 capsules by mouth daily as needed for constipation. Taking As Needed    DULoxetine (CYMBALTA) 30 MG capsule Take 60 mg by mouth daily. 3/21/2025 Morning    DULoxetine (CYMBALTA) 30 MG  capsule Take 30 mg by mouth every evening. 3/21/2025 Evening    fluticasone-salmeterol (AIRDUO RESPICLICK) 113-14 MCG/ACT inhaler Inhale 1 puff into the lungs as needed (shortness of breath). Taking As Needed    furosemide (LASIX) 20 MG tablet Take 40 mg by mouth daily. 3/21/2025 Morning    furosemide (LASIX) 20 MG tablet Take 20 mg by mouth every evening. 3/21/2025 Evening    HYDROcodone-acetaminophen (NORCO) 5-325 MG tablet Take 1 tablet by mouth every 4 hours as needed for pain Max 6 tablets per day. Taking As Needed    l-lysine HCl 500 MG TABS tablet Take 500 mg by mouth daily. 3/21/2025 Morning    magnesium oxide 250 mg Tab Take 500 mg by mouth daily Past Month    metoprolol succinate ER (TOPROL XL) 25 MG 24 hr tablet Take 75 mg by mouth daily. Taking    modafinil (PROVIGIL) 100 MG tablet Take 2.5 tablets (250 mg) by mouth daily Take two and half tablet (250 mg ) daily 3/21/2025 Morning    montelukast (SINGULAIR) 10 mg tablet Take 10 mg by mouth every evening 3/21/2025 Evening    Naloxone HCl 5 MG/0.5ML SOSY Inject 5 mg as directed as needed. Taking As Needed    tiZANidine (ZANAFLEX) 4 MG tablet Take 4 mg by mouth 3 times daily as needed for muscle spasms Taking As Needed    Turmeric (CURCUPLEX-95) 500 MG CAPS Take 1 capsule by mouth daily 3/21/2025 Morning

## 2025-03-22 NOTE — PROGRESS NOTES
Discussed the use of her noc BiPAP. Her  will be bringing in hers today. RT will follow up.    Mati Cartagena, RT

## 2025-03-22 NOTE — PLAN OF CARE
"  Problem: Adult Inpatient Plan of Care  Goal: Plan of Care Review  Description: The Plan of Care Review/Shift note should be completed every shift.  The Outcome Evaluation is a brief statement about your assessment that the patient is improving, declining, or no change.  This information will be displayed automatically on your shiftnote.  Outcome: Progressing  Goal: Patient-Specific Goal (Individualized)  Description: You can add care plan individualizations to a care plan. Examples of Individualization might be:  \"Parent requests to be called daily at 9am for status\", \"I have a hard time hearing out of my right ear\", or \"Do not touch me to wake me up as it startlesme\".  Outcome: Progressing  Goal: Absence of Hospital-Acquired Illness or Injury  Outcome: Progressing  Intervention: Prevent Infection  Recent Flowsheet Documentation  Taken 3/22/2025 1245 by Donna Sofia RN  Infection Prevention:   hand hygiene promoted   rest/sleep promoted  Taken 3/22/2025 0825 by Donna Sofia RN  Infection Prevention:   hand hygiene promoted   rest/sleep promoted  Goal: Optimal Comfort and Wellbeing  Outcome: Progressing  Goal: Readiness for Transition of Care  Outcome: Progressing     Problem: Delirium  Goal: Optimal Coping  Outcome: Progressing  Goal: Improved Behavioral Control  Outcome: Progressing  Goal: Improved Attention and Thought Clarity  Outcome: Progressing  Goal: Improved Sleep  Outcome: Progressing     Problem: Skin Injury Risk Increased  Goal: Skin Health and Integrity  Outcome: Progressing  Intervention: Plan: Nurse Driven Intervention: Moisture Management  Recent Flowsheet Documentation  Taken 3/22/2025 1245 by Donna Sofia RN  Moisture Interventions: Encourage regular toileting  Taken 3/22/2025 0825 by Donna Sofia RN  Moisture Interventions: Encourage regular toileting  Intervention: Plan: Nurse Driven Intervention: Friction and Shear  Recent Flowsheet Documentation  Taken 3/22/2025 1245 by " Donna Sofia, RN  Friction/Shear Interventions: HOB 30 degrees or less  Taken 3/22/2025 0825 by Donna Sofia RN  Friction/Shear Interventions: HOB 30 degrees or less   Goal Outcome Evaluation:  VSS. Pt denies any  SOB and chest pain .  Oxygen saturation maintained well with 2L nasal canula. Pt has been ambulated to bathroom with assist 1.

## 2025-03-22 NOTE — PROVIDER NOTIFICATION
Clarifying with provider about cardiology consult which was called and completed by ED HUC; then was reordered and then discontinued.   Dr. Gauthier clarified she did not speak with cardiology nor did she need to.     Also notified patient reports bradycardia is new for her; HR 50's mostly; did drop to 47 briefly; vitals order states notify HR less than 50; consider changing parameters.   No response to this message.     Emi Bazan RN

## 2025-03-22 NOTE — PROGRESS NOTES
POST MIDNIGHT ADMISSION :         Patient admitted for acute on chronic diastolic CHF, UTI  Cardiology following  Echo reviewed  On iv lasix, iv ceftriaxone  Check venous ultrasound of legs for b/l swelling of legs  Will continue to follow

## 2025-03-22 NOTE — ED NOTES
Bed: JNED-02  Expected date: 3/21/25  Expected time: 11:11 PM  Means of arrival: Ambulance  Comments:  74 F FIDELIA [place] 626

## 2025-03-23 ENCOUNTER — APPOINTMENT (OUTPATIENT)
Dept: OCCUPATIONAL THERAPY | Facility: HOSPITAL | Age: 75
End: 2025-03-23
Payer: MEDICARE

## 2025-03-23 ENCOUNTER — APPOINTMENT (OUTPATIENT)
Dept: PHYSICAL THERAPY | Facility: HOSPITAL | Age: 75
End: 2025-03-23
Attending: INTERNAL MEDICINE
Payer: MEDICARE

## 2025-03-23 LAB
ANION GAP SERPL CALCULATED.3IONS-SCNC: 11 MMOL/L (ref 7–15)
BUN SERPL-MCNC: 40.3 MG/DL (ref 8–23)
CALCIUM SERPL-MCNC: 9.5 MG/DL (ref 8.8–10.4)
CHLORIDE SERPL-SCNC: 101 MMOL/L (ref 98–107)
CREAT SERPL-MCNC: 1.54 MG/DL (ref 0.51–0.95)
EGFRCR SERPLBLD CKD-EPI 2021: 35 ML/MIN/1.73M2
GLUCOSE SERPL-MCNC: 136 MG/DL (ref 70–99)
HCO3 SERPL-SCNC: 29 MMOL/L (ref 22–29)
HOLD SPECIMEN: NORMAL
MAGNESIUM SERPL-MCNC: 1.8 MG/DL (ref 1.7–2.3)
NT-PROBNP SERPL-MCNC: 2409 PG/ML (ref 0–900)
POTASSIUM SERPL-SCNC: 4.2 MMOL/L (ref 3.4–5.3)
SODIUM SERPL-SCNC: 141 MMOL/L (ref 135–145)

## 2025-03-23 PROCEDURE — 94660 CPAP INITIATION&MGMT: CPT

## 2025-03-23 PROCEDURE — 99233 SBSQ HOSP IP/OBS HIGH 50: CPT | Performed by: INTERNAL MEDICINE

## 2025-03-23 PROCEDURE — 250N000011 HC RX IP 250 OP 636: Performed by: INTERNAL MEDICINE

## 2025-03-23 PROCEDURE — 80048 BASIC METABOLIC PNL TOTAL CA: CPT | Performed by: INTERNAL MEDICINE

## 2025-03-23 PROCEDURE — 97116 GAIT TRAINING THERAPY: CPT | Mod: GP

## 2025-03-23 PROCEDURE — 97110 THERAPEUTIC EXERCISES: CPT | Mod: GP

## 2025-03-23 PROCEDURE — 97535 SELF CARE MNGMENT TRAINING: CPT | Mod: GO

## 2025-03-23 PROCEDURE — 250N000013 HC RX MED GY IP 250 OP 250 PS 637: Performed by: INTERNAL MEDICINE

## 2025-03-23 PROCEDURE — 99232 SBSQ HOSP IP/OBS MODERATE 35: CPT | Performed by: INTERNAL MEDICINE

## 2025-03-23 PROCEDURE — 120N000004 HC R&B MS OVERFLOW

## 2025-03-23 PROCEDURE — 97161 PT EVAL LOW COMPLEX 20 MIN: CPT | Mod: GP

## 2025-03-23 PROCEDURE — 83735 ASSAY OF MAGNESIUM: CPT | Performed by: INTERNAL MEDICINE

## 2025-03-23 PROCEDURE — 36415 COLL VENOUS BLD VENIPUNCTURE: CPT | Performed by: INTERNAL MEDICINE

## 2025-03-23 PROCEDURE — 999N000157 HC STATISTIC RCP TIME EA 10 MIN

## 2025-03-23 PROCEDURE — 83880 ASSAY OF NATRIURETIC PEPTIDE: CPT | Performed by: INTERNAL MEDICINE

## 2025-03-23 RX ORDER — TOLNAFTATE 1 G/100G
POWDER TOPICAL 2 TIMES DAILY
Status: DISCONTINUED | OUTPATIENT
Start: 2025-03-23 | End: 2025-03-26 | Stop reason: HOSPADM

## 2025-03-23 RX ORDER — NALOXONE HYDROCHLORIDE 0.4 MG/ML
0.2 INJECTION, SOLUTION INTRAMUSCULAR; INTRAVENOUS; SUBCUTANEOUS
Status: DISCONTINUED | OUTPATIENT
Start: 2025-03-23 | End: 2025-03-26 | Stop reason: HOSPADM

## 2025-03-23 RX ORDER — NALOXONE HYDROCHLORIDE 0.4 MG/ML
0.4 INJECTION, SOLUTION INTRAMUSCULAR; INTRAVENOUS; SUBCUTANEOUS
Status: DISCONTINUED | OUTPATIENT
Start: 2025-03-23 | End: 2025-03-26 | Stop reason: HOSPADM

## 2025-03-23 RX ORDER — HYDROCODONE BITARTRATE AND ACETAMINOPHEN 5; 325 MG/1; MG/1
1 TABLET ORAL EVERY 6 HOURS PRN
Status: DISCONTINUED | OUTPATIENT
Start: 2025-03-23 | End: 2025-03-26 | Stop reason: HOSPADM

## 2025-03-23 RX ORDER — FUROSEMIDE 40 MG/1
40 TABLET ORAL DAILY
Status: DISCONTINUED | OUTPATIENT
Start: 2025-03-24 | End: 2025-03-25

## 2025-03-23 RX ORDER — FUROSEMIDE 20 MG/1
20 TABLET ORAL
Status: DISCONTINUED | OUTPATIENT
Start: 2025-03-23 | End: 2025-03-26 | Stop reason: HOSPADM

## 2025-03-23 RX ADMIN — HEPARIN SODIUM 5000 UNITS: 5000 INJECTION, SOLUTION INTRAVENOUS; SUBCUTANEOUS at 12:12

## 2025-03-23 RX ADMIN — HEPARIN SODIUM 5000 UNITS: 5000 INJECTION, SOLUTION INTRAVENOUS; SUBCUTANEOUS at 04:02

## 2025-03-23 RX ADMIN — HYDROCODONE BITARTRATE AND ACETAMINOPHEN 1 TABLET: 5; 325 TABLET ORAL at 17:48

## 2025-03-23 RX ADMIN — DOCUSATE SODIUM 100 MG: 100 CAPSULE, LIQUID FILLED ORAL at 08:20

## 2025-03-23 RX ADMIN — ROSUVASTATIN 10 MG: 10 TABLET, FILM COATED ORAL at 08:19

## 2025-03-23 RX ADMIN — ASPIRIN 81 MG: 81 TABLET, COATED ORAL at 08:19

## 2025-03-23 RX ADMIN — DOCUSATE SODIUM 100 MG: 100 CAPSULE, LIQUID FILLED ORAL at 20:17

## 2025-03-23 RX ADMIN — DULOXETINE HYDROCHLORIDE 60 MG: 60 CAPSULE, DELAYED RELEASE PELLETS ORAL at 08:19

## 2025-03-23 RX ADMIN — ACETAMINOPHEN 650 MG: 325 SOLUTION ORAL at 08:18

## 2025-03-23 RX ADMIN — RUGBY TOLNAFTATE 1% POWDER: 1 POWDER TOPICAL at 20:17

## 2025-03-23 RX ADMIN — RUGBY TOLNAFTATE 1% POWDER: 1 POWDER TOPICAL at 01:03

## 2025-03-23 RX ADMIN — CEFTRIAXONE SODIUM 1 G: 1 INJECTION, POWDER, FOR SOLUTION INTRAMUSCULAR; INTRAVENOUS at 03:58

## 2025-03-23 RX ADMIN — METOPROLOL SUCCINATE 75 MG: 25 TABLET, EXTENDED RELEASE ORAL at 08:19

## 2025-03-23 RX ADMIN — FUROSEMIDE 40 MG: 10 INJECTION, SOLUTION INTRAMUSCULAR; INTRAVENOUS at 08:18

## 2025-03-23 RX ADMIN — ACETAMINOPHEN 650 MG: 325 SOLUTION ORAL at 20:18

## 2025-03-23 RX ADMIN — DULOXETINE HYDROCHLORIDE 30 MG: 30 CAPSULE, DELAYED RELEASE ORAL at 20:17

## 2025-03-23 RX ADMIN — HEPARIN SODIUM 5000 UNITS: 5000 INJECTION, SOLUTION INTRAVENOUS; SUBCUTANEOUS at 20:18

## 2025-03-23 RX ADMIN — TIZANIDINE 4 MG: 4 TABLET ORAL at 20:18

## 2025-03-23 RX ADMIN — RUGBY TOLNAFTATE 1% POWDER: 1 POWDER TOPICAL at 08:20

## 2025-03-23 RX ADMIN — FUROSEMIDE 20 MG: 20 TABLET ORAL at 15:42

## 2025-03-23 ASSESSMENT — ACTIVITIES OF DAILY LIVING (ADL)
ADLS_ACUITY_SCORE: 62
DEPENDENT_IADLS:: CLEANING;COOKING;LAUNDRY;SHOPPING;TRANSPORTATION
ADLS_ACUITY_SCORE: 62

## 2025-03-23 NOTE — CONSULTS
Care Management Initial Consult    General Information  Assessment completed with: Patient, Patient  Type of CM/SW Visit: Initial Assessment    Primary Care Provider verified and updated as needed: Yes   Readmission within the last 30 days: no previous admission in last 30 days      Reason for Consult: discharge planning  Advance Care Planning: Advance Care Planning Reviewed: present on chart        Communication Assessment  Patient's communication style: spoken language (English or Bilingual)    Hearing Difficulty or Deaf: yes   Wear Glasses or Blind: yes    Cognitive  Cognitive/Neuro/Behavioral: WDL                      Living Environment:   People in home: spouse  Hugo  Current living Arrangements: house      Able to return to prior arrangements: yes    Family/Social Support:  Care provided by: self, spouse/significant other  Provides care for: no one  Marital Status:   Support system:   Hugo       Description of Support System: Supportive    Support Assessment: Adequate family and caregiver support    Current Resources:   Patient receiving home care services: No    Community Resources: None  Equipment currently used at home: walker, rolling  Supplies currently used at home: Incontinence Supplies    Employment/Financial:  Employment Status: retired        Financial Concerns: none   Referral to Financial Worker: No     Does the patient's insurance plan have a 3 day qualifying hospital stay waiver?  Yes     Which insurance plan 3 day waiver is available? [employer]    Will the waiver be used for post-acute placement? Undetermined at this time    Lifestyle & Psychosocial Needs:  Social Drivers of Health     Food Insecurity: Unknown (12/22/2024)    Food Insecurity     Within the past 12 months, did you worry that your food would run out before you got money to buy more?: Patient unable to answer     Within the past 12 months, did the food you bought just not last and you didn t have money to get more?:  Patient unable to answer   Depression: Not at risk (6/13/2024)    PHQ-2     PHQ-2 Score: 0   Housing Stability: Unknown (12/22/2024)    Housing Stability     Do you have housing? : Patient unable to answer     Are you worried about losing your housing?: Patient unable to answer   Tobacco Use: Low Risk  (3/21/2025)    [facility]    Patient History     Smoking Tobacco Use: Never     Smokeless Tobacco Use: Never     Passive Exposure: Not on file   Financial Resource Strain: Unknown (12/22/2024)    Financial Resource Strain     Within the past 12 months, have you or your family members you live with been unable to get utilities (heat, electricity) when it was really needed?: Patient unable to answer   Alcohol Use: Not on file   Transportation Needs: Unknown (12/22/2024)    Transportation Needs     Within the past 12 months, has lack of transportation kept you from medical appointments, getting your medicines, non-medical meetings or appointments, work, or from getting things that you need?: Patient unable to answer   Physical Activity: Not on file   Interpersonal Safety: Low Risk  (12/22/2024)    Interpersonal Safety     Do you feel physically and emotionally safe where you currently live?: Yes     Within the past 12 months, have you been hit, slapped, kicked or otherwise physically hurt by someone?: No     Within the past 12 months, have you been humiliated or emotionally abused in other ways by your partner or ex-partner?: No   Stress: Not on file   Social Connections: Unknown (3/9/2023)    [facility] [address]    Social Connections     Frequency of Communication with Friends and Family: Not on file   Health Literacy: Not on file     Functional Status:  Prior to admission patient needed assistance:   Dependent ADLs:: Ambulation-walker  Dependent IADLs:: Cleaning, Cooking, Laundry,  Shopping, Transportation     Discussed  Partnership in Safe Discharge Planning  document with patient/family: No    Additional Information:  Writer met with patient at bedside to review role of care management services, discuss goals of care and assess need for any possible services at discharge. Patient alert, answering questions appropriately and engaged in the conversation.  Address, phone number and PCP confirmed. Patient has a HCD on file. Lives with spouse in a house. Patient reported need assistance with ADLS and dependent with IADLs. Has walker. No community resources. Anticipate to return home with home care. Has home oxygen 3L with [facility]. Anticipate spouse to transport.     Discussed with patient PT/OT discharge rec for Transitional care. Patient declined. She had two bad experiences with TCUs. She prefer to go home with home care.     Writer provided a list of local skilled nursing facilities - [facility] (which includes the medicare.gov website) for patient and family to review.     She will look through the TCU list and update once she talk with her .    Next Steps: RNCM to follow for medical progression, recommendations, and final discharge plan.     Kelsie Mtz RN

## 2025-03-23 NOTE — PROGRESS NOTES
"[facility] HEART CARE   [address]  [place] [address]   [email or web address]   OFFICE: [phone]     CARDIOLOGY FOLLOW-UP NOTE      Impression and Plan     Impression:   Acute on chronic heart failure preserved LVEF - appears euvolemic today. Echo yesterday did not suggest gross volume overload. Creatinine increased today compared to yesterday. On home supplemental O2.  Morbid obesity with BMI 45.7, with obesity hypoventilation syndrome  Acute on chronic respiratory failure with hypoxia  CKD stage IIIb    Plan:  Transition back to home furosemide (40 mg daily in AM and 20 mg in afternoon)  Will sign off and arrange for follow-up in cardiology clinic in 2-3 weeks.     Primary Cardiologist: not previously established. Saw Dr. oFrrest in 2017    Subjective     Sleeping comfortably. Breathing has improved. Feeling \"stiff\".    Cardiac Diagnostics   Telemetry (personally reviewed): sinus rhythm with PACs.    Echocardiogram (results reviewed):   Limited echo 3/22/25  Left ventricular size, wall motion and function are normal. The ejection  fraction is 60-65%.  Normal right ventricle size and systolic function.  The left atrium is moderately dilated.  Mitral apparatus appears calcified. No stenosis is present.  IVC diameter <2.1 cm collapsing >50% with sniff suggests a normal RA pressure  of 3 mmHg.  Compared to the prior study of 12/22/24, the current study does not suggest  mitral stenosis.      Physical Examination       BP (!) 146/75 (BP Location: Left arm)   Pulse 57   Temp 97.9  F (36.6  C) (Oral)   Resp 22   Ht 1.397 m (4' 7\")   Wt 86.9 kg (191 lb 9.6 oz)   SpO2 100%   BMI 44.53 kg/m            Intake/Output Summary (Last 24 hours) at 3/23/2025 0843  Last data filed at 3/23/2025 0600  Gross per 24 hour   Intake 480 ml   Output 800 ml   Net -320 ml       General: pleasant female. Morbidly obese body habitus. No acute distress.   Neck: No JVD  Lungs: clear to " auscultation  COR: regular rate and rhythm, No murmurs, rubs, or gallops  Abd: nondistended, soft  Extrem: No edema         Imaging      LE venous ultrasound 3/22  1.  No deep venous thrombosis in the bilateral lower extremities.     Lab Results   Lab Results   Component Value Date    CHOL 143 12/20/2024    HDL 56 12/20/2024    TRIG 91 12/20/2024    BUN 40.3 (H) 03/23/2025     03/23/2025    CO2 29 03/23/2025       Lab Results   Component Value Date    WBC 6.5 03/22/2025    HGB 11.9 03/22/2025    HCT 38.1 03/22/2025     03/22/2025     03/22/2025       Lab Results   Component Value Date     (H) 02/16/2018    TSH 4.99 (H) 03/22/2025               Current Inpatient Scheduled Medications   Scheduled Meds:  Current Facility-Administered Medications   Medication Dose Route Frequency Provider Last Rate Last Admin    aspirin EC tablet 81 mg  81 mg Oral Daily Kennedy Cerda MD   81 mg at 03/23/25 0819    cefTRIAXone (ROCEPHIN) 1 g vial to attach to  mL bag for ADULTS or NS 50 mL bag for PEDS  1 g Intravenous Q24H Jennifer Gauthier MD   1 g at 03/23/25 0358    docusate sodium (COLACE) capsule 100 mg  100 mg Oral BID Jennifer Gauthier MD   100 mg at 03/23/25 0820    DULoxetine (CYMBALTA) DR capsule 30 mg  30 mg Oral QPM Kennedy Cerda MD   30 mg at 03/22/25 2005    DULoxetine (CYMBALTA) DR capsule 60 mg  60 mg Oral Daily Kennedy Cerda MD   60 mg at 03/23/25 0819    furosemide (LASIX) injection 40 mg  40 mg Intravenous BID Wally Pop MD   40 mg at 03/23/25 0818    heparin ANTICOAGULANT injection 5,000 Units  5,000 Units Subcutaneous Q8H Kennedy Cerda MD   5,000 Units at 03/23/25 0402    metoprolol succinate ER (TOPROL XL) 24 hr tablet 75 mg  75 mg Oral Daily Kennedy Cerda MD   75 mg at 03/23/25 0819    rosuvastatin (CRESTOR) tablet 10 mg  10 mg Oral Daily Kennedy Cerda MD   10 mg at 03/23/25 0819    sodium chloride (PF) 0.9% PF flush 3 mL  3 mL Intracatheter Q8H Jennifer Fish MD    3 mL at 03/23/25 0831    tolnaftate (TINACTIN) 1 % powder   Topical BID Jennifer Gauthier MD   Given at 03/23/25 0820     Continuous Infusions:  Current Facility-Administered Medications   Medication Dose Route Frequency Provider Last Rate Last Admin            Medications Prior to Admission   Prior to Admission medications    Medication Sig Start Date End Date Taking? Authorizing Provider   acetaminophen (TYLENOL) 500 MG tablet Take 500 mg by mouth every 6 hours as needed for mild pain   Yes Reported, Patient   albuterol (PROAIR HFA;PROVENTIL HFA;VENTOLIN HFA) 90 mcg/actuation inhaler Inhale 1 puff into the lungs every 4 hours as needed for shortness of breath / dyspnea 3/13/19  Yes Provider, Historical   alendronate (FOSAMAX) 70 MG tablet [ALENDRONATE (FOSAMAX) 70 MG TABLET] Take 70 mg by mouth every 7 days. Takes on Mondays 7/12/17  Yes Provider, Historical   aspirin 81 MG EC tablet 1 tablet Orally Once a day   Yes Reported, Patient   azelastine (OPTIVAR) 0.05 % ophthalmic solution Apply 1 drop to eye 2 times daily as needed   Yes Reported, Patient   calcium citrate (CITRACAL) 950 (200 Ca) MG tablet Take 1 tablet by mouth daily   Yes Unknown, Entered By History   cetirizine (ZYRTEC) 10 MG tablet [CETIRIZINE (ZYRTEC) 10 MG TABLET] Take 10 mg by mouth daily.  1/8/14  Yes Provider, Historical   cholecalciferol (VITAMIN D3) 25 mcg (1000 units) capsule Take 1 capsule by mouth daily.   Yes Provider, Historical   CRESTOR 10 MG tablet Take 10 mg by mouth daily.   Yes Reported, Patient   docusate sodium (COLACE) 100 MG capsule Take 1-3 capsules by mouth daily as needed for constipation.   Yes Reported, Patient   DULoxetine (CYMBALTA) 30 MG capsule Take 60 mg by mouth daily.   Yes Unknown, Entered By History   DULoxetine (CYMBALTA) 30 MG capsule Take 30 mg by mouth every evening.   Yes Unknown, Entered By History   fluticasone-salmeterol (AIRDUO RESPICLICK) 113-14 MCG/ACT inhaler Inhale 1 puff into the lungs as needed  (shortness of breath).   Yes Reported, Patient   furosemide (LASIX) 20 MG tablet Take 40 mg by mouth daily.   Yes Unknown, Entered By History   furosemide (LASIX) 20 MG tablet Take 20 mg by mouth every evening.   Yes Unknown, Entered By History   HYDROcodone-acetaminophen (NORCO) 5-325 MG tablet Take 1 tablet by mouth every 4 hours as needed for pain Max 6 tablets per day. 4/24/24  Yes Ely Daily APRN CNP   l-lysine HCl 500 MG TABS tablet Take 500 mg by mouth daily.   Yes Unknown, Entered By History   magnesium oxide 250 mg Tab Take 500 mg by mouth daily 12/18/13  Yes Provider, Historical   metoprolol succinate ER (TOPROL XL) 25 MG 24 hr tablet Take 75 mg by mouth daily. 2/7/25  Yes Unknown, Entered By History   modafinil (PROVIGIL) 100 MG tablet Take 2.5 tablets (250 mg) by mouth daily Take two and half tablet (250 mg ) daily 4/24/24  Yes E[place] Daily APRN CNP   montelukast (SINGULAIR) 10 mg tablet Take 10 mg by mouth every evening 1/28/14  Yes Jordan Moreno MD   Naloxone HCl 5 MG/0.5ML SOSY Inject 5 mg as directed as needed.   Yes Reported, Patient   tiZANidine (ZANAFLEX) 4 MG tablet Take 4 mg by mouth 3 times daily as needed for muscle spasms   Yes Reported, Patient   Turmeric (CURCUPLEX-95) 500 MG CAPS Take 1 capsule by mouth daily   Yes Reported, Patient   OXYGEN-AIR DELIVERY SYSTEMS MISC [OXYGEN-AIR DELIVERY SYSTEMS MISC] Use 3 L As Directed. 3 lpm with activities and as needed at night 5/8/19   Provider, Historical

## 2025-03-23 NOTE — PLAN OF CARE
Problem: Skin Injury Risk Increased  Goal: Skin Health and Integrity  Outcome: Progressing  Intervention: Plan: Nurse Driven Intervention: Moisture Management  Recent Flowsheet Documentation  Taken 3/23/2025 1200 by Ruddy Garnica RN  Moisture Interventions:   Encourage regular toileting   Perineal cleanser  Taken 3/23/2025 0800 by Ruddy Garnica RN  Moisture Interventions:   Encourage regular toileting   Perineal cleanser  Intervention: Plan: Nurse Driven Intervention: Friction and Shear  Recent Flowsheet Documentation  Taken 3/23/2025 1200 by Ruddy Garnica RN  Friction/Shear Interventions: HOB 30 degrees or less  Taken 3/23/2025 0800 by Ruddy Garnica RN  Friction/Shear Interventions: HOB 30 degrees or less  Intervention: Optimize Skin Protection  Recent Flowsheet Documentation  Taken 3/23/2025 1200 by Ruddy Garnica RN  Activity Management:   activity adjusted per tolerance   activity encouraged  Taken 3/23/2025 0800 by Ruddy Garnica RN  Activity Management:   activity adjusted per tolerance   activity encouraged  Head of Bed (HOB) Positioning: HOB at 20-30 degrees     Problem: Heart Failure  Goal: Effective Oxygenation and Ventilation  3/23/2025 1344 by Ruddy Garnica RN  Outcome: Progressing  3/23/2025 1344 by Ruddy Garnica RN  Outcome: Progressing  Intervention: Promote Airway Secretion Clearance  Recent Flowsheet Documentation  Taken 3/23/2025 1200 by Ruddy Garnica RN  Activity Management:   activity adjusted per tolerance   activity encouraged  Taken 3/23/2025 0800 by Ruddy Garnica RN  Activity Management:   activity adjusted per tolerance   activity encouraged  Intervention: Optimize Oxygenation and Ventilation  Recent Flowsheet Documentation  Taken 3/23/2025 0800 by Ruddy Garnica RN  Head of Bed (HOB) Positioning: HOB at 20-30 degrees     Problem: Infection  Goal: Absence of Infection Signs and Symptoms  Outcome: Progressing   Goal Outcome Evaluation:       Pt AxOx4 on 3L NC. Reports  neck, back, and arm pain, PRN Tylenol given, not effective per pt report, PRN [place] and Tizanadine ordered. Norco given x 1. Denies SOB at rest. LS diminished. SR w/ 1st degree AVB on tele. Primafit in place. Afebrile. BM x 1. BUE and BLE weakness, worked with OT, up with 1 to commode. Lasix switched to PO.

## 2025-03-23 NOTE — PROGRESS NOTES
[facility]    Medicine Progress Note - Hospitalist Service    Date of Admission:  3/21/2025    Assessment & Plan       Desirae Rey is a 74 year old female with a medical history significant for multiple medical comorbidities including a history of chronic hypoxemic respiratory failure on 3 L of oxygen, severe obstructive sleep apnea on BiPAP at night, CKD stage III, history of CVA, obesity, hypoventilation syndrome, chronic back pain, recent UTI who is being admitted with acute on chronic CHF with decompensation.  Patient reports her baseline weight is 200 pounds.  Her baseline weight is 213 pounds        3/23 :     Sob and swelling of legs improving  On 3 liters of oxygen - ( on 3 liters of oxygen at home )  Cardiology following  Echo reviewed  iv lasix - changed to oral, iv ceftriaxone for possible cellulitis and UTI, follow urine cultures  venous ultrasound of legs for b/l swelling of legs - negative for DVT    multiple reddened dry areas of skin, WOC consult    Seems medically ready  Discharge to TCU once placement found      A/p :        Acute on Chronic Congestive Heart Failure (CHF) with Preserved Ejection Fraction (EF):  Recent EF from December 2024 was 60-65%.  Administered Lasix 60 mg IV x1, followed by Lasix 40 mg IV every 12 hours for two doses.  Check daily weights and fluid   restriction to manage fluid status.  Cardiology has been consulted for further evaluation and management.  ASA, statin, BB- when medications are reconciled.        Obstructive Sleep Apnea:  Continue BIPAP therapy at night as per the patient's routine.  RT consutled     Hypertension:  Currently managing with Lasix 40 mg IV every 12 hours.  Awaiting medication reconciliation to resume beta-blocker therapy.  BB when meds reconciled     Urinary Tract Infection (UTI):  Urine culture from March 19th positive for E. coli.  Continue ceftriaxone as per sensitivity results.     Acute Renal Failure/Chronic Kidney  Medications requested from pharmacy at this time     Victorina Nguyen RN  01/30/18 7483 "Disease Stage III:  Avoid nephrotoxic medications to prevent further renal impairment.     Right leg pain  History of thrombophlebitis in 2023  Recent Ultrasound done at [facility]. This needs follow p  Provide pain control and supportive care measures.        Chronic Pain Syndrome:  Manage pain with appropriate analgesics as needed.        Chronic Back Pain:  Administer Tylenol as needed for pain relief.     Possible Cognitive Impairment:  Recommend a cognitive evaluation to assess the extent and nature of cognitive issues.     Moderate Persistent Asthma:  Use Duoneb treatments as needed to relieve symptoms.  Optimize lung care and ensure adherence to asthma management plan.     Pulmonary Hypertension:  Continue oxygen therapy at 3L/min to maintain adequate oxygenation.     Hyperlipidemia:  Continue statin therapy as part of the home medication regimen.     Home Medications:  Continue all other home medications as previously prescribed.  Ensure close monitoring of the patient's clinical status and adjust treatment plans as necessary based on response and any new developments.              Diet: Combination Diet 2 gm NA Diet; No Caffeine Diet (and additional linked orders)  Fluid restriction 2000 ML FLUID (and additional linked orders)    DVT Prophylaxis: Heparin SQ  Rebollar Catheter: Not present  Lines: None     Cardiac Monitoring: ACTIVE order. Indication: Acute decompensated heart failure (48 hours)  Code Status: Full Code      Clinically Significant Risk Factors                   # Hypertension: Noted on problem list  # Acute heart failure with preserved ejection fraction: heart failure noted on problem list, last echo with EF >50%, and receiving IV diuretics           # Severe Obesity: Estimated body mass index is 44.53 kg/m  as calculated from the following:    Height as of this encounter: 1.397 m (4' 7\").    Weight as of this encounter: 86.9 kg (191 lb 9.6 oz)., PRESENT ON ADMISSION     # Financial/Environmental " Concerns: none  # Asthma: noted on problem list        Social Drivers of Health    Food Insecurity: Unknown (12/22/2024)    Food Insecurity     Within the past 12 months, did you worry that your food would run out before you got money to buy more?: Patient unable to answer     Within the past 12 months, did the food you bought just not last and you didn t have money to get more?: Patient unable to answer   Housing Stability: Unknown (12/22/2024)    Housing Stability     Do you have housing? : Patient unable to answer     Are you worried about losing your housing?: Patient unable to answer   Financial Resource Strain: Unknown (12/22/2024)    Financial Resource Strain     Within the past 12 months, have you or your family members you live with been unable to get utilities (heat, electricity) when it was really needed?: Patient unable to answer   Transportation Needs: Unknown (12/22/2024)    Transportation Needs     Within the past 12 months, has lack of transportation kept you from medical appointments, getting your medicines, non-medical meetings or appointments, work, or from getting things that you need?: Patient unable to answer    Received from [facility], [facility]    Social Connections          Disposition Plan     Medically Ready for Discharge: Anticipated Tomorrow - 2 days             Kennedy Cerda MD  Hospitalist Service  [facility]  Securely message with [employer] (more info)  Text page via [employer] Paging/Directory   ______________________________________________________________________        Physical Exam   Vital Signs: Temp: 97.4  F (36.3  C) Temp src: Oral BP: 122/58 Pulse: 59   Resp: 20 SpO2: 97 % O2 Device: Nasal cannula Oxygen Delivery: 3 LPM  Weight: 191 lbs 9.6 oz      General Aox3, appropriate affect, NAD, on 3L, obese  HEENT  MMM, EOMI, PERRL, thick neck  Chest decreased A/E b/l, NO wheezing  Heart irreg RR,  No M/R/G  Abd- Soft, NT, BS+  - Deferred,   Extremity- Moving all extremities, No digital clubbing,2+ edema  Neuro- Aox3,   gait not checked  Skin  Has no tattoo, No skin rash   Thickened toe nails  Mild right leg erythema??    Medical Decision Making       60 MINUTES SPENT BY ME on the date of service doing chart review, history, exam, documentation & further activities per the note.  MANAGEMENT DISCUSSED with the following over the past 24 hours: rn, patient       Data     I have personally reviewed the following data over the past 24 hrs:    N/A  \   N/A   / N/A     141 101 40.3 (H) /  136 (H)   4.2 29 1.54 (H) \     Trop: N/A BNP: 2,409 (H)

## 2025-03-23 NOTE — PROGRESS NOTES
"   03/23/25 0947   Appointment Info   Signing Clinician's Name / Credentials (PT) Krista Kuhn PT   Rehab Comments (PT) Patient in bed .Left up in chair ,   Living Environment   People in Home spouse   Current Living Arrangements house   Home Accessibility no concerns   Number of Stairs, Main Entrance 2   Stair Railings, Main Entrance railings on both sides of stairs   Transportation Anticipated family or friend will provide   Living Environment Comments Patient can stay o main floor.   Self-Care   Usual Activity Tolerance moderate   Current Activity Tolerance fair   Equipment Currently Used at Home other (see comments)  (4WW)   Fall history within last six months yes   Number of times patient has fallen within last six months 12  (\"legs give out\" per pt)   Activity/Exercise/Self-Care Comment patient has a hospital bed ,was having HHA and home PT after last admit .Independent with mobility household distance and ADL.   General Information   Onset of Illness/Injury or Date of Surgery 03/21/25   Referring Physician Kennedy Cerda   Patient/Family Therapy Goals Statement (PT) wants to go home vs TCU   Pertinent History of Current Problem (include personal factors and/or comorbidities that impact the POC) Admitted with CHF   Existing Precautions/Restrictions fall;oxygen therapy device and L/min  (3 l)   Weight-Bearing Status - LLE weight-bearing as tolerated   Weight-Bearing Status - RLE weight-bearing as tolerated   Cognition   Affect/Mental Status (Cognition) WFL   Orientation Status (Cognition) oriented x 4   Follows Commands (Cognition) WFL   Cognitive Status Comments Lethargic  initially but improved .   Pain Assessment   Patient Currently in Pain No   Range of Motion (ROM)   Range of Motion ROM is WFL   Strength (Manual Muscle Testing)   Strength (Manual Muscle Testing) Deficits observed during functional mobility   Bed Mobility   Rolling Left [place] (Bed Mobility) minimum assist (75% patient effort) "   Rolling Right [place] (Bed Mobility) minimum assist (75% patient effort)   Supine-Sit [place] (Bed Mobility) supervision   Bed Mobility Limitations decreased ability to use arms for pushing/pulling   Impairments Contributing to Impaired Bed Mobility decreased strength   Assistive Device (Bed Mobility) bed rails   Comment, (Bed Mobility) Needed extra time and effort to get OOB   Gait/Stairs (Locomotion)   [place] Level (Gait) contact guard   Assistive Device (Gait) walker, 4-wheeled   Distance in Feet (Gait) stood   Maintains Weight-bearing Status (Gait) able to maintain   Clinical Impression   Criteria for Skilled Therapeutic Intervention Yes, treatment indicated   PT Diagnosis (PT) impaired functional mobility   Influenced by the following impairments weakness,   Functional limitations due to impairments bed mobility ,transfers,gait ,balance,strength   Clinical Presentation (PT Evaluation Complexity) stable   Clinical Decision Making (Complexity) low complexity   Planned Therapy Interventions (PT) balance training;bed mobility training;gait training;stair training;strengthening;progressive activity/exercise   Risk & Benefits of therapy have been explained evaluation/treatment results reviewed;patient;participants included   PT Total Evaluation Time   PT Eval, Low Complexity Minutes (17600) 12   Physical Therapy Goals   PT Frequency 6x/week   PT Predicted Duration/Target Date for Goal Attainment 03/30/25   PT Goals Bed Mobility;Transfers;Gait;Stairs   PT: Bed Mobility Modified independent;Supine to/from sit   PT: Transfers Supervision/stand-by assist;Sit to/from stand;Assistive device   PT: Gait Supervision/stand-by assist;50 feet;Rolling walker  (4WW)   PT: Stairs Minimal assist;2 stairs;Rail on both sides   Therapeutic Procedure/Exercise   Ther. Procedure: strength, endurance, ROM, flexibillity Minutes (72098) 8   Treatment Detail/Skilled Intervention Initiated seated anita L/E ex xx 20 reps,In  recline pos SLR and QS ,GS x10 reps.Patient rollled in bed with min assist with use of bed rail to dion depends   Gait Training   Gait Training Minutes (98029) 10   Symptoms Noted During/After Treatment (Gait Training) fatigue;shortness of breath   Treatment Detail/Skilled Intervention ambulated out on hallway with 4WW on ) 2 at 3 l -O2 sats 98 % at rest,92 % with activity.Needed cues to slow down.Sit -stand with 4WW,min /CGA -cues for safety/hands placement.   Distance in Feet 20 feet   [place] Level (Gait Training) minimum assist (75% patient effort)   Physical Assistance Level (Gait Training) verbal cues;1 person assist   Weight Bearing (Gait Training) weight-bearing as tolerated   Assistive Device (Gait Training) other (see comments)  (4WW)   Gait Analysis Deviations decreased step length;decreased stride length;decreased toe-to-floor clearance   Impairments (Gait Analysis/Training) strength decreased;balance impaired   PT Discharge Planning   PT Plan amb with 4WW -has her own with W/C to follow   PT Discharge Recommendation (DC Rec) Transitional Care Facility   PT Rationale for DC Rec Needs min assist /hands on for mobility due to high risk for falls,Spouse unable to assist much.She prefers to return home.   PT Brief overview of current status Mod I bed mobility ,min /CGA sit -stand   PT Total Distance Amb During Session (feet) 20   PT Equipment Needed at Discharge other (see comments)  (4WW)

## 2025-03-23 NOTE — PLAN OF CARE
Heart Failure Care Map  GOALS TO BE MET BEFORE DISCHARGE:    1. Decrease congestion and/or edema with diuretic therapy to achieve near optimal volume status.     Dyspnea improved: No, further care required to meet this goal. Please explain continues with O2 use   Edema improved: No, further care required to meet this goal. Please explain BLE edema         Last 24 hour I/O:   Intake/Output Summary (Last 24 hours) at 3/23/2025 0648  Last data filed at 3/23/2025 0600  Gross per 24 hour   Intake 480 ml   Output 1200 ml   Net -720 ml           Net I/O and Weights since admission:   02/21 0700 - 03/23 0659  In: 1030 [P.O.:1030]  Out: 1400 [Urine:1400]  Net: -370     Vitals:    03/21/25 2330 03/22/25 0407 03/23/25 0630   Weight: 96.6 kg (213 lb) 89.2 kg (196 lb 9.6 oz) 86.9 kg (191 lb 9.6 oz)       2.  O2 sats > 90% on room air, or at prior home O2 therapy level.      Able to wean O2 this shift to keep sats above 90%?: No, further care required to meet this goal. Please explain PT SOB with activity   Does patient use Home O2? Yes-  2-3          Current oxygenation status:   SpO2: 92 %     O2 Device: BiPAP/CPAP, Oxygen Delivery: 3 LPM    3.  Tolerates ambulation and mobility near baseline.     Ambulation: No, further care required to meet this goal. Please explain Pt remains weak and assists x2 staff.    Times patient ambulated with staff this shift: 0, pt sleeping overnight    Please review the Heart Failure Care Map for additional HF goal outcomes.    Agustín Bryson RN  3/23/2025       Goal Outcome Evaluation:    Pt calm and pleasant overnight. Denies pain. CPAP on overnight now switched back to 3 LNC sating >90%.  400 ml output on BSC.   SR w/ 1AVB on tele monitor.  Bed alarm and call light within reach.

## 2025-03-24 ENCOUNTER — APPOINTMENT (OUTPATIENT)
Dept: OCCUPATIONAL THERAPY | Facility: HOSPITAL | Age: 75
End: 2025-03-24
Payer: MEDICARE

## 2025-03-24 ENCOUNTER — APPOINTMENT (OUTPATIENT)
Dept: PHYSICAL THERAPY | Facility: HOSPITAL | Age: 75
End: 2025-03-24
Payer: MEDICARE

## 2025-03-24 ENCOUNTER — TELEPHONE (OUTPATIENT)
Dept: CARDIOLOGY | Facility: CLINIC | Age: 75
End: 2025-03-24
Payer: MEDICARE

## 2025-03-24 DIAGNOSIS — I50.9 ACUTE DECOMPENSATED HEART FAILURE (H): Primary | ICD-10-CM

## 2025-03-24 LAB
BACTERIA UR CULT: ABNORMAL
BACTERIA UR CULT: ABNORMAL
CREAT SERPL-MCNC: 2.22 MG/DL (ref 0.51–0.95)
EGFRCR SERPLBLD CKD-EPI 2021: 23 ML/MIN/1.73M2
HOLD SPECIMEN: NORMAL
MAGNESIUM SERPL-MCNC: 2 MG/DL (ref 1.7–2.3)
POTASSIUM SERPL-SCNC: 4.9 MMOL/L (ref 3.4–5.3)

## 2025-03-24 PROCEDURE — 94660 CPAP INITIATION&MGMT: CPT

## 2025-03-24 PROCEDURE — 99232 SBSQ HOSP IP/OBS MODERATE 35: CPT | Performed by: INTERNAL MEDICINE

## 2025-03-24 PROCEDURE — 84132 ASSAY OF SERUM POTASSIUM: CPT | Performed by: INTERNAL MEDICINE

## 2025-03-24 PROCEDURE — 83735 ASSAY OF MAGNESIUM: CPT | Performed by: INTERNAL MEDICINE

## 2025-03-24 PROCEDURE — 999N000157 HC STATISTIC RCP TIME EA 10 MIN

## 2025-03-24 PROCEDURE — 250N000011 HC RX IP 250 OP 636: Performed by: INTERNAL MEDICINE

## 2025-03-24 PROCEDURE — 250N000013 HC RX MED GY IP 250 OP 250 PS 637: Performed by: INTERNAL MEDICINE

## 2025-03-24 PROCEDURE — 97116 GAIT TRAINING THERAPY: CPT | Mod: GP

## 2025-03-24 PROCEDURE — 97110 THERAPEUTIC EXERCISES: CPT | Mod: GP

## 2025-03-24 PROCEDURE — G0463 HOSPITAL OUTPT CLINIC VISIT: HCPCS

## 2025-03-24 PROCEDURE — 120N000004 HC R&B MS OVERFLOW

## 2025-03-24 PROCEDURE — 97535 SELF CARE MNGMENT TRAINING: CPT | Mod: GO

## 2025-03-24 PROCEDURE — 82565 ASSAY OF CREATININE: CPT | Performed by: INTERNAL MEDICINE

## 2025-03-24 PROCEDURE — 36415 COLL VENOUS BLD VENIPUNCTURE: CPT | Performed by: INTERNAL MEDICINE

## 2025-03-24 RX ORDER — PANTOPRAZOLE SODIUM 40 MG/1
40 TABLET, DELAYED RELEASE ORAL
Status: DISCONTINUED | OUTPATIENT
Start: 2025-03-25 | End: 2025-03-26 | Stop reason: HOSPADM

## 2025-03-24 RX ORDER — ACETAMINOPHEN 325 MG/1
650 TABLET ORAL EVERY 4 HOURS PRN
Status: DISCONTINUED | OUTPATIENT
Start: 2025-03-24 | End: 2025-03-26 | Stop reason: HOSPADM

## 2025-03-24 RX ADMIN — DOCUSATE SODIUM 100 MG: 100 CAPSULE, LIQUID FILLED ORAL at 20:05

## 2025-03-24 RX ADMIN — CEFTRIAXONE SODIUM 1 G: 1 INJECTION, POWDER, FOR SOLUTION INTRAMUSCULAR; INTRAVENOUS at 04:08

## 2025-03-24 RX ADMIN — HYDROCODONE BITARTRATE AND ACETAMINOPHEN 1 TABLET: 5; 325 TABLET ORAL at 20:48

## 2025-03-24 RX ADMIN — RUGBY TOLNAFTATE 1% POWDER: 1 POWDER TOPICAL at 20:05

## 2025-03-24 RX ADMIN — DOCUSATE SODIUM 100 MG: 100 CAPSULE, LIQUID FILLED ORAL at 08:18

## 2025-03-24 RX ADMIN — DULOXETINE HYDROCHLORIDE 60 MG: 60 CAPSULE, DELAYED RELEASE PELLETS ORAL at 08:16

## 2025-03-24 RX ADMIN — FUROSEMIDE 40 MG: 40 TABLET ORAL at 08:17

## 2025-03-24 RX ADMIN — METOPROLOL SUCCINATE 75 MG: 25 TABLET, EXTENDED RELEASE ORAL at 08:16

## 2025-03-24 RX ADMIN — TIZANIDINE 4 MG: 4 TABLET ORAL at 08:21

## 2025-03-24 RX ADMIN — DULOXETINE HYDROCHLORIDE 30 MG: 30 CAPSULE, DELAYED RELEASE ORAL at 20:05

## 2025-03-24 RX ADMIN — HYDROCODONE BITARTRATE AND ACETAMINOPHEN 1 TABLET: 5; 325 TABLET ORAL at 14:34

## 2025-03-24 RX ADMIN — ROSUVASTATIN 10 MG: 10 TABLET, FILM COATED ORAL at 08:17

## 2025-03-24 RX ADMIN — RUGBY TOLNAFTATE 1% POWDER: 1 POWDER TOPICAL at 08:18

## 2025-03-24 RX ADMIN — HEPARIN SODIUM 5000 UNITS: 5000 INJECTION, SOLUTION INTRAVENOUS; SUBCUTANEOUS at 14:28

## 2025-03-24 RX ADMIN — ASPIRIN 81 MG: 81 TABLET, COATED ORAL at 08:18

## 2025-03-24 RX ADMIN — HEPARIN SODIUM 5000 UNITS: 5000 INJECTION, SOLUTION INTRAVENOUS; SUBCUTANEOUS at 20:05

## 2025-03-24 RX ADMIN — HEPARIN SODIUM 5000 UNITS: 5000 INJECTION, SOLUTION INTRAVENOUS; SUBCUTANEOUS at 04:08

## 2025-03-24 RX ADMIN — Medication 40 MG: at 06:37

## 2025-03-24 ASSESSMENT — ACTIVITIES OF DAILY LIVING (ADL)
ADLS_ACUITY_SCORE: 62

## 2025-03-24 NOTE — PROGRESS NOTES
Care Management Follow Up    Length of Stay (days): 2    Expected Discharge Date: 03/25/2025     Concerns to be Addressed: discharge planning     Patient plan of care discussed at interdisciplinary rounds: Yes    Anticipated Discharge Disposition: Home with Home Care  Anticipated Discharge Services: Home Care  Anticipated Discharge DME:      Patient/family educated on Medicare website which has current facility and service quality ratings: yes  Education Provided on the Discharge Plan: Yes  Patient/Family in Agreement with the Plan: yes    Referrals Placed by CM/SW: Homecare  Private pay costs discussed: Not applicable    Discussed  Partnership in Safe Discharge Planning  document with patient/family: No     Handoff Completed: Yes, [facility] PCP: Internal handoff referral completed    Additional Information:  SW met and spoke with Pt regarding plans for discharge. Pt declined TCU placement and would like to discharge home with resumption of home care services. Pt reported she has home care through [facility] for PT/OT services. Services confirmed with [facility].     SW added a reminder to the Treatment Team Sticky Note for home care orders at discharge.     Next Steps: CM will continue to follow Pt's medical progression for Care Team recommendations and ensure home care orders are completed on date of discharge.      ADONIS Gray

## 2025-03-24 NOTE — PLAN OF CARE
Heart Failure Care Map  GOALS TO BE MET BEFORE DISCHARGE:    1. Decrease congestion and/or edema with diuretic therapy to achieve near optimal volume status.     Dyspnea improved: No, further care required to meet this goal. Please explain remains on 3LNC   Edema improved: No, further care required to meet this goal. Please explain BLE edema +2        Last 24 hour I/O:   Intake/Output Summary (Last 24 hours) at 3/24/2025 0651  Last data filed at 3/24/2025 0600  Gross per 24 hour   Intake 600 ml   Output 1000 ml   Net -400 ml           Net I/O and Weights since admission:   02/22 0700 - 03/24 0659  In: 1630 [P.O.:1630]  Out: 2400 [Urine:2400]  Net: -770     Vitals:    03/21/25 2330 03/22/25 0407 03/23/25 0630 03/24/25 0630   Weight: 96.6 kg (213 lb) 89.2 kg (196 lb 9.6 oz) 86.9 kg (191 lb 9.6 oz) 87.2 kg (192 lb 4.8 oz)       2.  O2 sats > 90% on room air, or at prior home O2 therapy level.      Able to wean O2 this shift to keep sats above 90%?: No, further care required to meet this goal. Please explain Pt wore CPAP  overnight, now switched to 3 LNC sating> 92%   Does patient use Home O2? Yes-   2-3 L          Current oxygenation status:   SpO2: 98 %     O2 Device: BiPAP/CPAP, Oxygen Delivery: 3 LPM    3.  Tolerates ambulation and mobility near baseline.     Ambulation: No, further care required to meet this goal. Please explain Pt remains dyspnea with activity and weak in her legs   Times patient ambulated with staff this shift: 1 to BSC    Please review the Heart Failure Care Map for additional HF goal outcomes.    Agustín Brsyon RN  3/24/2025      Goal Outcome Evaluation: Pt calm and pleasant overnight.  Tylenol and tizanidine given for leg and shoulder pain with relief, pt sleeping in between cares.  350 ml output in BSC.  CPAP on overnight, now switched to 3 LNC sating >92%.

## 2025-03-24 NOTE — CONSULTS
[facility]  WO Nurse Inpatient Assessment     Consulted for: b/l legs    Summary: 3/24 No wounds located on patients legs.  The skin is well moisturized, heels are intact, patient did say she has issues with fungus on and between toes.  Recommended Vashe soak for that.  WOC will sign off, please re-consult if anything changes.    WOC nurse follow-up plan: signing off    Patient History (according to provider note(s):      Desirae Rey is a 74 year old female with a medical history significant for multiple medical comorbidities including a history of chronic hypoxemic respiratory failure on 3 L of oxygen, severe obstructive sleep apnea on BiPAP at night, CKD stage III, history of CVA, obesity, hypoventilation syndrome, chronic back pain, recent UTI who is being admitted with acute on chronic CHF with decompensation.     Assessment:      Areas visualized during today's visit: Lower extremities     Skin Injury Location: BLE        Last photo: 3/24  Skin injury due to:  no injury found  Skin history and plan of care:   patient denies issues with legs, except for foot soreness and some fungal problems on toes.  Affected area:      Skin assessment: Intact     Measurements (length x width x depth, in cm) nothing to measure     Color: normal and consistent with surrounding tissue and with some very minor  hemosiderin staining to both legs     Temperature  normal      Drainage: none .      Color: none      Odor: none  Pain: denies , none  Pain interventions prior to dressing change: N/A  Treatment goal: Maintain (prevention of deterioration)  STATUS: initial assessment  Supplies ordered: supplies stored on unit       Treatment Plan:     Both feet: BID and PRN  Snake Vashe moistened gauze between toes on both feet for 10 minutes.  Make sure to completely pat dry before putting socks and shoes or anything on feet     Orders: Written    RECOMMEND PRIMARY TEAM ORDER: None, at this time  Education  provided: plan of care  Discussed plan of care with: Patient  Notify WO if wound(s) deteriorate.  Nursing to notify the Provider(s) and re-consult the WO Nurse if new skin concern.    DATA:     Current support surface: Standard  Standard gel mattress (Isoflex)  Containment of urine/stool: Incontinence Protocol  BMI: Body mass index is 44.69 kg/m .   Active diet order: Orders Placed This Encounter      Combination Diet 2 gm NA Diet; No Caffeine Diet     Output: I/O last 3 completed shifts:  In: 600 [P.O.:600]  Out: 1000 [Urine:1000]     Labs:   Recent Labs   Lab 03/22/25  0520   ALBUMIN 3.7   HGB 11.9   WBC 6.5     Pressure injury risk assessment:   Sensory Perception: 4-->no impairment  Moisture: 3-->occasionally moist  Activity: 3-->walks occasionally  Mobility: 2-->very limited  Nutrition: 3-->adequate  Friction and Shear: 2-->potential problem  Isaiah Score: 17    PATTIE MichaelsN RN CWOCN  Pager no longer in use, please contact through [employer] group: [facility]

## 2025-03-24 NOTE — DISCHARGE INSTRUCTIONS
WOC DISCHARGE INSTRUCTIONS:  Both feet: BID and PRN  Snake Vashe moistened gauze between toes on both feet for 10 minutes.  Make sure to completely pat dry before putting socks and shoes or anything on feet

## 2025-03-24 NOTE — PLAN OF CARE
Problem: Adult Inpatient Plan of Care  Goal: Plan of Care Review  Description: The Plan of Care Review/Shift note should be completed every shift.  The Outcome Evaluation is a brief statement about your assessment that the patient is improving, declining, or no change.  This information will be displayed automatically on your shiftnote.  A & O x 4. VSS. Report pain/spasm only when moving-Zanaflex given. Lungs sound clear/diminished at the bases-on oxygen 3L/NC. Fair appetite. Tele SR with 1st deg AVB. Patient transfer with assist to chair. Skin (redden and open sore)underneath breast /panus area cleansed/dried and powder applied. Monitor

## 2025-03-25 ENCOUNTER — APPOINTMENT (OUTPATIENT)
Dept: OCCUPATIONAL THERAPY | Facility: HOSPITAL | Age: 75
End: 2025-03-25
Payer: MEDICARE

## 2025-03-25 LAB
ANION GAP SERPL CALCULATED.3IONS-SCNC: 5 MMOL/L (ref 7–15)
BASOPHILS # BLD AUTO: 0 10E3/UL (ref 0–0.2)
BASOPHILS NFR BLD AUTO: 1 %
BUN SERPL-MCNC: 58 MG/DL (ref 8–23)
CALCIUM SERPL-MCNC: 9.7 MG/DL (ref 8.8–10.4)
CHLORIDE SERPL-SCNC: 100 MMOL/L (ref 98–107)
CREAT SERPL-MCNC: 1.97 MG/DL (ref 0.51–0.95)
EGFRCR SERPLBLD CKD-EPI 2021: 26 ML/MIN/1.73M2
EOSINOPHIL # BLD AUTO: 0.4 10E3/UL (ref 0–0.7)
EOSINOPHIL NFR BLD AUTO: 6 %
ERYTHROCYTE [DISTWIDTH] IN BLOOD BY AUTOMATED COUNT: 12.3 % (ref 10–15)
GLUCOSE SERPL-MCNC: 130 MG/DL (ref 70–99)
HCO3 SERPL-SCNC: 32 MMOL/L (ref 22–29)
HCT VFR BLD AUTO: 33.7 % (ref 35–47)
HGB BLD-MCNC: 10.7 G/DL (ref 11.7–15.7)
HOLD SPECIMEN: NORMAL
IMM GRANULOCYTES # BLD: 0 10E3/UL
IMM GRANULOCYTES NFR BLD: 0 %
LYMPHOCYTES # BLD AUTO: 1.8 10E3/UL (ref 0.8–5.3)
LYMPHOCYTES NFR BLD AUTO: 29 %
MAGNESIUM SERPL-MCNC: 2.1 MG/DL (ref 1.7–2.3)
MCH RBC QN AUTO: 31.8 PG (ref 26.5–33)
MCHC RBC AUTO-ENTMCNC: 31.8 G/DL (ref 31.5–36.5)
MCV RBC AUTO: 100 FL (ref 78–100)
MONOCYTES # BLD AUTO: 0.8 10E3/UL (ref 0–1.3)
MONOCYTES NFR BLD AUTO: 13 %
NEUTROPHILS # BLD AUTO: 3.1 10E3/UL (ref 1.6–8.3)
NEUTROPHILS NFR BLD AUTO: 51 %
NRBC # BLD AUTO: 0 10E3/UL
NRBC BLD AUTO-RTO: 0 /100
PLATELET # BLD AUTO: 250 10E3/UL (ref 150–450)
PLATELET # BLD AUTO: 251 10E3/UL (ref 150–450)
POTASSIUM SERPL-SCNC: 4.2 MMOL/L (ref 3.4–5.3)
RBC # BLD AUTO: 3.36 10E6/UL (ref 3.8–5.2)
SODIUM SERPL-SCNC: 137 MMOL/L (ref 135–145)
WBC # BLD AUTO: 6.1 10E3/UL (ref 4–11)

## 2025-03-25 PROCEDURE — 250N000011 HC RX IP 250 OP 636: Performed by: INTERNAL MEDICINE

## 2025-03-25 PROCEDURE — 97535 SELF CARE MNGMENT TRAINING: CPT | Mod: GO

## 2025-03-25 PROCEDURE — 85004 AUTOMATED DIFF WBC COUNT: CPT | Performed by: INTERNAL MEDICINE

## 2025-03-25 PROCEDURE — 999N000157 HC STATISTIC RCP TIME EA 10 MIN

## 2025-03-25 PROCEDURE — 250N000013 HC RX MED GY IP 250 OP 250 PS 637: Performed by: INTERNAL MEDICINE

## 2025-03-25 PROCEDURE — 85025 COMPLETE CBC W/AUTO DIFF WBC: CPT | Performed by: INTERNAL MEDICINE

## 2025-03-25 PROCEDURE — 85014 HEMATOCRIT: CPT | Performed by: INTERNAL MEDICINE

## 2025-03-25 PROCEDURE — 84295 ASSAY OF SERUM SODIUM: CPT | Performed by: INTERNAL MEDICINE

## 2025-03-25 PROCEDURE — 82310 ASSAY OF CALCIUM: CPT | Performed by: INTERNAL MEDICINE

## 2025-03-25 PROCEDURE — 120N000004 HC R&B MS OVERFLOW

## 2025-03-25 PROCEDURE — 83735 ASSAY OF MAGNESIUM: CPT | Performed by: INTERNAL MEDICINE

## 2025-03-25 PROCEDURE — 99232 SBSQ HOSP IP/OBS MODERATE 35: CPT | Performed by: INTERNAL MEDICINE

## 2025-03-25 PROCEDURE — 36415 COLL VENOUS BLD VENIPUNCTURE: CPT | Performed by: INTERNAL MEDICINE

## 2025-03-25 RX ORDER — LORATADINE ORAL 5 MG/5ML
5 SOLUTION ORAL DAILY
Status: DISCONTINUED | OUTPATIENT
Start: 2025-03-25 | End: 2025-03-26 | Stop reason: HOSPADM

## 2025-03-25 RX ORDER — METOPROLOL SUCCINATE 50 MG/1
50 TABLET, EXTENDED RELEASE ORAL DAILY
Status: DISCONTINUED | OUTPATIENT
Start: 2025-03-26 | End: 2025-03-26 | Stop reason: HOSPADM

## 2025-03-25 RX ORDER — FUROSEMIDE 40 MG/1
40 TABLET ORAL DAILY
Status: DISCONTINUED | OUTPATIENT
Start: 2025-03-25 | End: 2025-03-26 | Stop reason: HOSPADM

## 2025-03-25 RX ADMIN — METOPROLOL SUCCINATE 75 MG: 25 TABLET, EXTENDED RELEASE ORAL at 09:49

## 2025-03-25 RX ADMIN — DULOXETINE HYDROCHLORIDE 60 MG: 60 CAPSULE, DELAYED RELEASE PELLETS ORAL at 09:48

## 2025-03-25 RX ADMIN — HEPARIN SODIUM 5000 UNITS: 5000 INJECTION, SOLUTION INTRAVENOUS; SUBCUTANEOUS at 20:03

## 2025-03-25 RX ADMIN — HEPARIN SODIUM 5000 UNITS: 5000 INJECTION, SOLUTION INTRAVENOUS; SUBCUTANEOUS at 12:37

## 2025-03-25 RX ADMIN — DULOXETINE HYDROCHLORIDE 30 MG: 30 CAPSULE, DELAYED RELEASE ORAL at 20:02

## 2025-03-25 RX ADMIN — RUGBY TOLNAFTATE 1% POWDER: 1 POWDER TOPICAL at 20:06

## 2025-03-25 RX ADMIN — ASPIRIN 81 MG: 81 TABLET, COATED ORAL at 09:48

## 2025-03-25 RX ADMIN — DOCUSATE SODIUM 100 MG: 100 CAPSULE, LIQUID FILLED ORAL at 20:02

## 2025-03-25 RX ADMIN — HYDROCODONE BITARTRATE AND ACETAMINOPHEN 1 TABLET: 5; 325 TABLET ORAL at 04:34

## 2025-03-25 RX ADMIN — HYDROCODONE BITARTRATE AND ACETAMINOPHEN 1 TABLET: 5; 325 TABLET ORAL at 12:37

## 2025-03-25 RX ADMIN — PANTOPRAZOLE SODIUM 40 MG: 40 TABLET, DELAYED RELEASE ORAL at 06:51

## 2025-03-25 RX ADMIN — RUGBY TOLNAFTATE 1% POWDER: 1 POWDER TOPICAL at 09:49

## 2025-03-25 RX ADMIN — HEPARIN SODIUM 5000 UNITS: 5000 INJECTION, SOLUTION INTRAVENOUS; SUBCUTANEOUS at 04:34

## 2025-03-25 RX ADMIN — ROSUVASTATIN 10 MG: 10 TABLET, FILM COATED ORAL at 09:48

## 2025-03-25 RX ADMIN — CEFTRIAXONE SODIUM 1 G: 1 INJECTION, POWDER, FOR SOLUTION INTRAMUSCULAR; INTRAVENOUS at 04:34

## 2025-03-25 RX ADMIN — FUROSEMIDE 40 MG: 40 TABLET ORAL at 15:33

## 2025-03-25 RX ADMIN — DOCUSATE SODIUM 100 MG: 100 CAPSULE, LIQUID FILLED ORAL at 09:48

## 2025-03-25 RX ADMIN — HYDROCODONE BITARTRATE AND ACETAMINOPHEN 1 TABLET: 5; 325 TABLET ORAL at 22:37

## 2025-03-25 ASSESSMENT — ACTIVITIES OF DAILY LIVING (ADL)
ADLS_ACUITY_SCORE: 62
ADLS_ACUITY_SCORE: 62
ADLS_ACUITY_SCORE: 65
ADLS_ACUITY_SCORE: 63
ADLS_ACUITY_SCORE: 62
ADLS_ACUITY_SCORE: 65
ADLS_ACUITY_SCORE: 62
ADLS_ACUITY_SCORE: 63
ADLS_ACUITY_SCORE: 62
ADLS_ACUITY_SCORE: 65
ADLS_ACUITY_SCORE: 65
ADLS_ACUITY_SCORE: 62
ADLS_ACUITY_SCORE: 63
ADLS_ACUITY_SCORE: 62
ADLS_ACUITY_SCORE: 65
ADLS_ACUITY_SCORE: 62
ADLS_ACUITY_SCORE: 62
ADLS_ACUITY_SCORE: 63
ADLS_ACUITY_SCORE: 62

## 2025-03-25 NOTE — PROGRESS NOTES
[facility]    Medicine Progress Note - Hospitalist Service    Date of Admission:  3/21/2025    Assessment & Plan       Desirae Rey is a 74 year old female with a medical history significant for multiple medical comorbidities including a history of chronic hypoxemic respiratory failure on 3 L of oxygen, severe obstructive sleep apnea on BiPAP at night, CKD stage III, history of CVA, obesity, hypoventilation syndrome, chronic back pain, recent UTI who is being admitted with acute on chronic CHF with decompensation.  Patient reports her baseline weight is 200 pounds.  Her baseline weight is 213 pounds        3/23 :     Sob and swelling of legs improving  On 3 liters of oxygen - ( on 3 liters of oxygen at home )  Cardiology following  Echo reviewed  iv lasix - changed to oral, iv ceftriaxone for possible cellulitis and UTI, follow urine cultures  venous ultrasound of legs for b/l swelling of legs - negative for DVT    multiple reddened dry areas of skin, WOC consult          3/24 :     Sob and swelling of legs improving  On 3 liters of oxygen - ( on 3 liters of oxygen at home )  Cardiology following  Echo reviewed  S/p iv lasix, iv ceftriaxone for possible cellulitis and UTI, follow urine cultures  venous ultrasound of legs for b/l swelling of legs - negative for DVT    BP had been low today  Creatinine trending up  Holding lasix today    multiple reddened dry areas of skin, WOC consult      Not medically ready for discharge at this time.  Having soft BP issues, rising creatinine, holding lasix  Discharge to TCU once placement found        A/p :        Acute on Chronic Congestive Heart Failure (CHF) with Preserved Ejection Fraction (EF):  Recent EF from December 2024 was 60-65%.  Administered Lasix 60 mg IV x1, followed by Lasix 40 mg IV every 12 hours for two doses.  Check daily weights and fluid   restriction to manage fluid status.  Cardiology has been consulted for further evaluation and  management.  ASA, statin, BB- when medications are reconciled.        Obstructive Sleep Apnea:  Continue BIPAP therapy at night as per the patient's routine.  RT consutled     Hypertension:  Currently managing with Lasix 40 mg IV every 12 hours.  Awaiting medication reconciliation to resume beta-blocker therapy.  BB when meds reconciled     Urinary Tract Infection (UTI):  Urine culture from March 19th positive for E. coli.  Continue ceftriaxone as per sensitivity results.     Acute Renal Failure/Chronic Kidney Disease Stage III:  Avoid nephrotoxic medications to prevent further renal impairment.     Right leg pain  History of thrombophlebitis in 2023  Recent Ultrasound done at [facility]. This needs follow p  Provide pain control and supportive care measures.        Chronic Pain Syndrome:  Manage pain with appropriate analgesics as needed.        Chronic Back Pain:  Administer Tylenol as needed for pain relief.     Possible Cognitive Impairment:  Recommend a cognitive evaluation to assess the extent and nature of cognitive issues.     Moderate Persistent Asthma:  Use Duoneb treatments as needed to relieve symptoms.  Optimize lung care and ensure adherence to asthma management plan.     Pulmonary Hypertension:  Continue oxygen therapy at 3L/min to maintain adequate oxygenation.     Hyperlipidemia:  Continue statin therapy as part of the home medication regimen.     Home Medications:  Continue all other home medications as previously prescribed.  Ensure close monitoring of the patient's clinical status and adjust treatment plans as necessary based on response and any new developments.              Diet: Combination Diet 2 gm NA Diet; No Caffeine Diet (and additional linked orders)  Fluid restriction 2000 ML FLUID (and additional linked orders)    DVT Prophylaxis: Heparin SQ  Rebollar Catheter: Not present  Lines: None     Cardiac Monitoring: ACTIVE order. Indication: Acute decompensated heart failure (48 hours)  Code Status:  "Full Code      Clinically Significant Risk Factors                  # Acute Kidney Injury, unspecified: based on a >150% or 0.3 mg/dL increase in last creatinine compared to past 90 day average, will monitor renal function  # Hypertension: Noted on problem list  # Acute heart failure with preserved ejection fraction: heart failure noted on problem list, last echo with EF >50%, and receiving IV diuretics           # Severe Obesity: Estimated body mass index is 44.69 kg/m  as calculated from the following:    Height as of this encounter: 1.397 m (4' 7\").    Weight as of this encounter: 87.2 kg (192 lb 4.8 oz)., PRESENT ON ADMISSION       # Financial/Environmental Concerns: none  # Asthma: noted on problem list        Social Drivers of Health    Food Insecurity: Unknown (12/22/2024)    Food Insecurity     Within the past 12 months, did you worry that your food would run out before you got money to buy more?: Patient unable to answer     Within the past 12 months, did the food you bought just not last and you didn t have money to get more?: Patient unable to answer   Housing Stability: Unknown (12/22/2024)    Housing Stability     Do you have housing? : Patient unable to answer     Are you worried about losing your housing?: Patient unable to answer   Financial Resource Strain: Unknown (12/22/2024)    Financial Resource Strain     Within the past 12 months, have you or your family members you live with been unable to get utilities (heat, electricity) when it was really needed?: Patient unable to answer   Transportation Needs: Unknown (12/22/2024)    Transportation Needs     Within the past 12 months, has lack of transportation kept you from medical appointments, getting your medicines, non-medical meetings or appointments, work, or from getting things that you need?: Patient unable to answer    [facility]    Social Connections "          Disposition Plan     Medically Ready for Discharge: Anticipated Tomorrow - 2 days             Kennedy Cerda MD  Hospitalist Service  [facility]  Securely message with [employer] (more info)  Text page via [employer] Paging/Directory   ______________________________________________________________________        Physical Exam   Vital Signs: Temp: 97.5  F (36.4  C) Temp src: Oral BP: 110/59 Pulse: 60   Resp: 18 SpO2: 100 % O2 Device: Nasal cannula Oxygen Delivery: 3 LPM  Weight: 192 lbs 4.8 oz      General Aox3, appropriate affect, NAD, on 3L, obese  HEENT  MMM, EOMI, PERRL, thick neck  Chest decreased A/E b/l, NO wheezing  Heart irreg RR, No M/R/G  Abd- Soft, NT, BS+  - Deferred,   Extremity- Moving all extremities, No digital clubbing,2+ edema  Neuro- Aox3,   gait not checked  Skin  Has no tattoo, No skin rash   Thickened toe nails  Mild right leg erythema??    Medical Decision Making       60 MINUTES SPENT BY ME on the date of service doing chart review, history, exam, documentation & further activities per the note.  MANAGEMENT DISCUSSED with the following over the past 24 hours: rn, patient       Data     I have personally reviewed the following data over the past 24 hrs:    N/A  \   N/A   / N/A     N/A N/A N/A /  N/A   4.9 N/A 2.22 (H) \

## 2025-03-25 NOTE — PROGRESS NOTES
[facility]    Medicine Progress Note - Hospitalist Service    Date of Admission:  3/21/2025    Assessment & Plan       Desirae Rey is a 74 year old female with a medical history significant for multiple medical comorbidities including a history of chronic hypoxemic respiratory failure on 3 L of oxygen, severe obstructive sleep apnea on BiPAP at night, CKD stage III, history of CVA, obesity, hypoventilation syndrome, chronic back pain, recent UTI who is being admitted with acute on chronic CHF with decompensation.  Patient reports her baseline weight is 200 pounds.  Her baseline weight is 213 pounds        3/23 :     Sob and swelling of legs improving  On 3 liters of oxygen - ( on 3 liters of oxygen at home )  Cardiology following  Echo reviewed  iv lasix - changed to oral, iv ceftriaxone for possible cellulitis and UTI, follow urine cultures  venous ultrasound of legs for b/l swelling of legs - negative for DVT    multiple reddened dry areas of skin, WOC consult          3/24 :     Sob and swelling of legs improving  On 3 liters of oxygen - ( on 3 liters of oxygen at home )  Cardiology following  Echo reviewed  S/p iv lasix, iv ceftriaxone for possible cellulitis and UTI, follow urine cultures  venous ultrasound of legs for b/l swelling of legs - negative for DVT    BP had been low today  Creatinine trending up  Holding lasix today    multiple reddened dry areas of skin, WOC consult        3/25 :     Sob and swelling of legs improving  On 2 liters of oxygen - ( on 3 liters of oxygen at home )  Cardiology following  Echo reviewed  S/p iv lasix, iv ceftriaxone for possible cellulitis and UTI, follow urine cultures  venous ultrasound of legs for b/l swelling of legs - negative for DVT    BP stable, soft   Creatinine elevated - 2.22--1.97  Resume lasix at 40 mg daily  Reduce metoprolol to 50 mg from 75 mg daily    multiple reddened dry areas of skin, WOC consult      Not medically ready for  discharge at this time.  Having soft BP issues, rising creatinine, resumed lasix  Discharge to TCU once placement found      A/p :        Acute on Chronic Congestive Heart Failure (CHF) with Preserved Ejection Fraction (EF):  Recent EF from December 2024 was 60-65%.  Administered Lasix 60 mg IV x1, followed by Lasix 40 mg IV every 12 hours for two doses.  Check daily weights and fluid   restriction to manage fluid status.  Cardiology has been consulted for further evaluation and management.  ASA, statin, BB- when medications are reconciled.        Obstructive Sleep Apnea:  Continue BIPAP therapy at night as per the patient's routine.  RT consutled     Hypertension:  Currently managing with Lasix 40 mg IV every 12 hours.  Awaiting medication reconciliation to resume beta-blocker therapy.  BB when meds reconciled     Urinary Tract Infection (UTI):  Urine culture from March 19th positive for E. coli.  Continue ceftriaxone as per sensitivity results.     Acute Renal Failure/Chronic Kidney Disease Stage III:  Avoid nephrotoxic medications to prevent further renal impairment.     Right leg pain  History of thrombophlebitis in 2023  Recent Ultrasound done at [facility]. This needs follow p  Provide pain control and supportive care measures.        Chronic Pain Syndrome:  Manage pain with appropriate analgesics as needed.        Chronic Back Pain:  Administer Tylenol as needed for pain relief.     Possible Cognitive Impairment:  Recommend a cognitive evaluation to assess the extent and nature of cognitive issues.     Moderate Persistent Asthma:  Use Duoneb treatments as needed to relieve symptoms.  Optimize lung care and ensure adherence to asthma management plan.     Pulmonary Hypertension:  Continue oxygen therapy at 3L/min to maintain adequate oxygenation.     Hyperlipidemia:  Continue statin therapy as part of the home medication regimen.     Home Medications:  Continue all other home medications as previously  "prescribed.  Ensure close monitoring of the patient's clinical status and adjust treatment plans as necessary based on response and any new developments.              Diet: Combination Diet 2 gm NA Diet; No Caffeine Diet (and additional linked orders)  Fluid restriction 2000 ML FLUID (and additional linked orders)    DVT Prophylaxis: Heparin SQ  Rebollar Catheter: Not present  Lines: None     Cardiac Monitoring: ACTIVE order. Indication: Acute decompensated heart failure (48 hours)  Code Status: Full Code      Clinically Significant Risk Factors                   # Hypertension: Noted on problem list  # Acute heart failure with preserved ejection fraction: heart failure noted on problem list, last echo with EF >50%, and receiving IV diuretics           # Severe Obesity: Estimated body mass index is 45.93 kg/m  as calculated from the following:    Height as of this encounter: 1.397 m (4' 7\").    Weight as of this encounter: 89.6 kg (197 lb 9.6 oz)., PRESENT ON ADMISSION       # Financial/Environmental Concerns: none  # Asthma: noted on problem list        Social Drivers of Health    Food Insecurity: Unknown (12/22/2024)    Food Insecurity     Within the past 12 months, did you worry that your food would run out before you got money to buy more?: Patient unable to answer     Within the past 12 months, did the food you bought just not last and you didn t have money to get more?: Patient unable to answer   Housing Stability: Unknown (12/22/2024)    Housing Stability     Do you have housing? : Patient unable to answer     Are you worried about losing your housing?: Patient unable to answer   Financial Resource Strain: Unknown (12/22/2024)    Financial Resource Strain     Within the past 12 months, have you or your family members you live with been unable to get utilities (heat, electricity) when it was really needed?: Patient unable to answer   Transportation Needs: Unknown (12/22/2024)    Transportation Needs     Within " the past 12 months, has lack of transportation kept you from medical appointments, getting your medicines, non-medical meetings or appointments, work, or from getting things that you need?: Patient unable to answer    [facility]    Social Connections          Disposition Plan     Medically Ready for Discharge: Anticipated Tomorrow - 2 days             Kennedy Cerda MD  Hospitalist Service  [facility]  Securely message with [employer] (more info)  Text page via [employer] Paging/Directory   ______________________________________________________________________        Physical Exam   Vital Signs: Temp: 97.3  F (36.3  C) Temp src: Oral BP: 104/59 Pulse: 57   Resp: 18 SpO2: 97 % O2 Device: Nasal cannula Oxygen Delivery: 2 LPM  Weight: 197 lbs 9.6 oz      General Aox3, appropriate affect, NAD, on 3L, obese  HEENT  MMM, EOMI, PERRL, thick neck  Chest decreased A/E b/l, NO wheezing  Heart irreg RR, No M/R/G  Abd- Soft, NT, BS+  - Deferred,   Extremity- Moving all extremities, No digital clubbing,2+ edema  Neuro- Aox3,   gait not checked  Skin  Has no tattoo, No skin rash   Thickened toe nails  Mild right leg erythema??    Medical Decision Making       60 MINUTES SPENT BY ME on the date of service doing chart review, history, exam, documentation & further activities per the note.  MANAGEMENT DISCUSSED with the following over the past 24 hours: rn, patient       Data     I have personally reviewed the following data over the past 24 hrs:    6.1  \   10.7 (L)   / 251; 250     137 100 58.0 (H) /  130 (H)   4.2 32 (H) 1.97 (H) \

## 2025-03-25 NOTE — PLAN OF CARE
Problem: Heart Failure  Goal: Stable Heart Rate and Rhythm  Outcome: Progressing     Problem: Heart Failure  Goal: Effective Oxygenation and Ventilation  Outcome: Progressing  Intervention: Promote Airway Secretion Clearance  Recent Flowsheet Documentation  Taken 3/24/2025 2310 by Latanya Villafana RN  Activity Management:   activity adjusted per tolerance   activity encouraged  Taken 3/24/2025 2150 by Latanya Villafana RN  Activity Management:   ambulated to bathroom   back to bed  Taken 3/24/2025 2005 by Latanya Villafana RN  Activity Management:   activity adjusted per tolerance   activity encouraged  Intervention: Optimize Oxygenation and Ventilation  Recent Flowsheet Documentation  Taken 3/24/2025 2310 by Latanya Villafana RN  Head of Bed (HOB) Positioning: HOB at 20-30 degrees  Taken 3/24/2025 2150 by Latanya Villafana RN  Head of Bed (HOB) Positioning: HOB at 20-30 degrees   Goal Outcome Evaluation:       SB in 40's-50's with 1st degree AVB. On 3L O2 baseline. Pt.'s home CPAP worn overnight with 5L bled in. Pt reporting upper and lower extremity pain relieved with norco. Slept well overnight. Assist of 1 with walker.

## 2025-03-25 NOTE — PLAN OF CARE
Heart Failure Care Map  GOALS TO BE MET BEFORE DISCHARGE:    1. Decrease congestion and/or edema with diuretic therapy to achieve near optimal volume status.     Dyspnea improved: No, further care required to meet this goal. Please explain SOB with exertion, oxygen.   Edema improved: No, further care required to meet this goal. Please explain BLE edema improving.        Last 24 hour I/O:   Intake/Output Summary (Last 24 hours) at 3/25/2025 1504  Last data filed at 3/25/2025 1250  Gross per 24 hour   Intake 1820 ml   Output 1200 ml   Net 620 ml           Net I/O and Weights since admission:   02/23 2300 - 03/25 2259  In: 3850 [P.O.:3850]  Out: 3600 [Urine:3600]  Net: 250     Vitals:    03/21/25 2330 03/22/25 0407 03/23/25 0630 03/24/25 0630   Weight: 96.6 kg (213 lb) 89.2 kg (196 lb 9.6 oz) 86.9 kg (191 lb 9.6 oz) 87.2 kg (192 lb 4.8 oz)    03/25/25 0415   Weight: 89.6 kg (197 lb 9.6 oz)       2.  O2 sats > 90% on room air, or at prior home O2 therapy level.      Able to wean O2 this shift to keep sats above 90%?: No, further care required to meet this goal. Please explain On oxygen 2 L/NC while awake. BIPAP at night.   Does patient use Home O2? Yes-  3 L          Current oxygenation status:   SpO2: 96 %     O2 Device: Nasal cannula, Oxygen Delivery: 2 LPM    3.  Tolerates ambulation and mobility near baseline.     Ambulation: Yes, satisfactory for discharge.   Times patient ambulated with staff this shift: 2    Please review the Heart Failure Care Map for additional HF goal outcomes.    Karsten Wu RN  3/25/2025    Problem: Adult Inpatient Plan of Care  Goal: Plan of Care Review  Description: The Plan of Care Review/Shift note should be completed every shift.  The Outcome Evaluation is a brief statement about your assessment that the patient is improving, declining, or no change.  This information will be displayed automatically on your shiftnote.  Outcome: Progressing   BLE edema is improving. Report SOB with  exertion. On oxygen 2 L/NC while awake and BIPAP at night. Asked for loratadine for nasal allergy. Reports pain on legs and groin when voiding. Gave pain medication. On Rocephin for UTI. Monitor.

## 2025-03-26 VITALS
HEIGHT: 55 IN | RESPIRATION RATE: 18 BRPM | BODY MASS INDEX: 45.78 KG/M2 | HEART RATE: 58 BPM | SYSTOLIC BLOOD PRESSURE: 148 MMHG | DIASTOLIC BLOOD PRESSURE: 63 MMHG | TEMPERATURE: 97.7 F | OXYGEN SATURATION: 96 % | WEIGHT: 197.8 LBS

## 2025-03-26 LAB
CREAT SERPL-MCNC: 1.61 MG/DL (ref 0.51–0.95)
EGFRCR SERPLBLD CKD-EPI 2021: 33 ML/MIN/1.73M2
HOLD SPECIMEN: NORMAL
MAGNESIUM SERPL-MCNC: 2.1 MG/DL (ref 1.7–2.3)
POTASSIUM SERPL-SCNC: 4.3 MMOL/L (ref 3.4–5.3)

## 2025-03-26 PROCEDURE — 84132 ASSAY OF SERUM POTASSIUM: CPT | Performed by: INTERNAL MEDICINE

## 2025-03-26 PROCEDURE — 250N000011 HC RX IP 250 OP 636: Performed by: INTERNAL MEDICINE

## 2025-03-26 PROCEDURE — 250N000013 HC RX MED GY IP 250 OP 250 PS 637: Performed by: INTERNAL MEDICINE

## 2025-03-26 PROCEDURE — 82565 ASSAY OF CREATININE: CPT | Performed by: INTERNAL MEDICINE

## 2025-03-26 PROCEDURE — 83735 ASSAY OF MAGNESIUM: CPT | Performed by: INTERNAL MEDICINE

## 2025-03-26 PROCEDURE — 36415 COLL VENOUS BLD VENIPUNCTURE: CPT | Performed by: INTERNAL MEDICINE

## 2025-03-26 RX ORDER — CEFDINIR 300 MG/1
300 CAPSULE ORAL 2 TIMES DAILY
Qty: 4 CAPSULE | Refills: 0 | Status: SHIPPED | OUTPATIENT
Start: 2025-03-26 | End: 2025-03-29

## 2025-03-26 RX ORDER — PANTOPRAZOLE SODIUM 40 MG/1
40 TABLET, DELAYED RELEASE ORAL
Qty: 30 TABLET | Refills: 1 | Status: SHIPPED | OUTPATIENT
Start: 2025-03-27 | End: 2025-03-29

## 2025-03-26 RX ORDER — METOPROLOL SUCCINATE 50 MG/1
50 TABLET, EXTENDED RELEASE ORAL DAILY
Qty: 30 TABLET | Refills: 1 | Status: SHIPPED | OUTPATIENT
Start: 2025-03-26

## 2025-03-26 RX ADMIN — FUROSEMIDE 40 MG: 40 TABLET ORAL at 08:06

## 2025-03-26 RX ADMIN — DULOXETINE HYDROCHLORIDE 60 MG: 60 CAPSULE, DELAYED RELEASE PELLETS ORAL at 08:05

## 2025-03-26 RX ADMIN — ROSUVASTATIN 10 MG: 10 TABLET, FILM COATED ORAL at 08:05

## 2025-03-26 RX ADMIN — RUGBY TOLNAFTATE 1% POWDER: 1 POWDER TOPICAL at 08:08

## 2025-03-26 RX ADMIN — TIZANIDINE 4 MG: 4 TABLET ORAL at 08:08

## 2025-03-26 RX ADMIN — CEFTRIAXONE SODIUM 1 G: 1 INJECTION, POWDER, FOR SOLUTION INTRAMUSCULAR; INTRAVENOUS at 04:15

## 2025-03-26 RX ADMIN — DOCUSATE SODIUM 100 MG: 100 CAPSULE, LIQUID FILLED ORAL at 08:06

## 2025-03-26 RX ADMIN — PANTOPRAZOLE SODIUM 40 MG: 40 TABLET, DELAYED RELEASE ORAL at 06:44

## 2025-03-26 RX ADMIN — ONDANSETRON 4 MG: 2 INJECTION, SOLUTION INTRAMUSCULAR; INTRAVENOUS at 11:58

## 2025-03-26 RX ADMIN — HEPARIN SODIUM 5000 UNITS: 5000 INJECTION, SOLUTION INTRAVENOUS; SUBCUTANEOUS at 04:15

## 2025-03-26 RX ADMIN — METOPROLOL SUCCINATE 50 MG: 50 TABLET, EXTENDED RELEASE ORAL at 08:04

## 2025-03-26 RX ADMIN — ASPIRIN 81 MG: 81 TABLET, COATED ORAL at 08:06

## 2025-03-26 RX ADMIN — HEPARIN SODIUM 5000 UNITS: 5000 INJECTION, SOLUTION INTRAVENOUS; SUBCUTANEOUS at 13:43

## 2025-03-26 ASSESSMENT — ACTIVITIES OF DAILY LIVING (ADL)
ADLS_ACUITY_SCORE: 65

## 2025-03-26 NOTE — PROGRESS NOTES
Patient decided to go home today and her  will come get her per . Home Care being set up bu . Patient belongings packed up and getting ready to go home.Still need to go over discharge paper work with the family who are not here yet.

## 2025-03-26 NOTE — PLAN OF CARE
Problem: Skin Injury Risk Increased  Goal: Skin Health and Integrity  Intervention: Optimize Skin Protection  Recent Flowsheet Documentation  Taken 3/25/2025 2330 by Latanya Villafana, RN  Activity Management:   activity adjusted per tolerance   activity encouraged  Head of Bed (HOB) Positioning: HOB at 20-30 degrees  Taken 3/25/2025 2002 by Latanya Villafana, RN  Activity Management:   activity adjusted per tolerance   activity encouraged  Head of Bed (HOB) Positioning: HOB at 20-30 degrees     Problem: Heart Failure  Goal: Stable Heart Rate and Rhythm  Outcome: Progressing   Goal Outcome Evaluation:       SB to SR w/ 1st degree AVB. On 2L O2, CPAP w/ 3L O2 bled in during night. Pt reporting generalized body aches, prn norco given x1. K and Mg protocol ran, recheck in AM.

## 2025-03-26 NOTE — PLAN OF CARE
Goal Outcome Evaluation:                      Problem: Adult Inpatient Plan of Care  Goal: Plan of Care Review  Description: The Plan of Care Review/Shift note should be completed every shift.  The Outcome Evaluation is a brief statement about your assessment that the patient is improving, declining, or no change.  This information will be displayed automatically on your shiftnote.  Outcome: Progressing   Patient alert and oriented x 4. She had medication Tizanidine for discomfort in her arms and feet which helped her. Patient walked to the bathroom with a rolling walker and gait belt with one assist. Oxygen on at all times at 3 liters per N/C. She has home Oxygen. Patient does not want to be discharged today. She said she has aches and does not feel ready to go home. The plan is for the patient to have home care when she goes home. SB to NSR with 1st degree AVB. Call light in reach. Chair alarm on .  to visit with the patient about her discharge and not wanting to go today..

## 2025-03-26 NOTE — PROGRESS NOTES
Physical Therapy Discharge Summary    Reason for therapy discharge:    Discharged to home with home therapy.    Progress towards therapy goal(s). See goals on Care Plan in [facility] electronic health record for goal details.  Goals partially met.  Barriers to achieving goals:   discharge from facility.    Therapy recommendation(s):    Further recommended therapy is related to documented deficits, and is necessary to maximize functional independence in order for patient to return to prior level of function.      Марина Campbell, CHANELLE 3/26/2025

## 2025-03-26 NOTE — PROGRESS NOTES
"SPIRITUAL HEALTH SERVICES ([facility])  SPIRITUAL ASSESSMENT Progress Note  [facility]. 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 I visited while she waited for her  to pick her up and take her home. Desirae is very concerned about her discharge. She still has more questions than answers she feels  and given that she has been in the hospital 5 times since the beginning of the year, she's worried that this discharge is premature.  She's also worried that she and her  will not be able to take care of one another given that they both have health problems.     They have four Children who  are busy with lives of their own so they will not be able to care for Desirae and her  in the way they would like. Desirae is glad to be receiving some home care.     Desirae is a Confucianist who believes in God and was wearing a T-shirt that read :  \"If he brings you to it, he will bring your through it.\"  T-shirts like these give her inspiration and are a gift from her .      She is not affiliated with a local Yarsanism but is very open to prayer so we prayed together incorporating the themes of the visit as requested by Desirae.      PLAN: Given her imminent discharge no further spiritual care needs at this time.      Swapna Lei, Ph.D., [facility]      Securely Message with [employer] Q or e[employer]enSa Pager.    Non-urgent needs:  Phone  to leave a message  "

## 2025-03-26 NOTE — PLAN OF CARE
Occupational Therapy Discharge Summary    Reason for therapy discharge:    Discharged to home with home therapy.    Progress towards therapy goal(s). See goals on Care Plan in [facility] electronic health record for goal details.  Goals not met.  Barriers to achieving goals:   discharge from facility.    Therapy recommendation(s):    Continued therapy is recommended.  Rationale/Recommendations:  OT recommended TCU but pt declined and is going home w/home therapy.

## 2025-03-26 NOTE — CONSULTS
Care Management Discharge Note    Discharge Date: 03/26/2025       Discharge Disposition: Home with Home Care    Discharge Services: Home Care    Discharge DME:      Discharge Transportation: family or friend will provide    Private pay costs discussed: Not applicable    Does the patient's insurance plan have a 3 day qualifying hospital stay waiver?  Yes     Which insurance plan 3 day waiver is available? [employer]    Will the waiver be used for post-acute placement? No    PAS Confirmation Code: N/A  Patient/family educated on Medicare website which has current facility and service quality ratings: yes    Education Provided on the Discharge Plan: Yes  Persons Notified of Discharge Plans: Patient  Patient/Family in Agreement with the Plan: yes    Handoff Referral Completed: Yes, [facility] PCP: Internal handoff referral completed    Additional Information:  Plan was for Pt to discharge home with resumption of home care services. Pt declined TCU placement which was recommended initially by OT. Per MD, Pt does not feel that she is medically ready to discharge today.    1:50 PM  SVEN met and spoke with Pt regarding her concerns with discharge. Pt reported that she was not feeling well still as she vomited around lunchtime today. SW discussed the plans to have home care support when she returns home and asked if she would rather discharge to a TCU since she does not feel ready to return home. Pt declined TCU and explained that she would like more time in the hospital. SVEN explained that she is medically ready and discharge orders have been entered. SW continued to explain that she has the option to appeal her discharge and would be given a notice that includes the phone number to appeal. Pt reported understanding and stated interest in appealing her discharge. SVEN notified the [facility] who plans to provide Pt with the IMM and inform Pt to call to start the appeal process. Nursing and MD updated.     2:30 PM  SVEN updated that Pt is now  agreeable with discharging today. SW met with Pt who confirmed she is agreeable with discharge and will have her  pick her up this afternoon. SW reported that CM will update [facility] about her discharge and will send resumption of care orders. Pt thanked CM and denied any further questions regarding plans for discharge this afternoon. Home care orders sent and agency notified.    CM will sign off. Please contact CM if any additional needs arise prior to discharge.      ADONIS Gray

## 2025-03-27 LAB
ATRIAL RATE - MUSE: 60 BPM
DIASTOLIC BLOOD PRESSURE - MUSE: 95 MMHG
INTERPRETATION ECG - MUSE: NORMAL
P AXIS - MUSE: 84 DEGREES
PR INTERVAL - MUSE: 232 MS
QRS DURATION - MUSE: 102 MS
QT - MUSE: 434 MS
QTC - MUSE: 434 MS
R AXIS - MUSE: -54 DEGREES
SYSTOLIC BLOOD PRESSURE - MUSE: 129 MMHG
T AXIS - MUSE: 62 DEGREES
VENTRICULAR RATE- MUSE: 60 BPM

## 2025-03-28 ENCOUNTER — HOSPITAL ENCOUNTER (INPATIENT)
Facility: CLINIC | Age: 75
LOS: 3 days | Discharge: HOME HEALTH | DRG: 552 | End: 2025-04-01
Attending: EMERGENCY MEDICINE | Admitting: INTERNAL MEDICINE
Payer: MEDICARE

## 2025-03-28 DIAGNOSIS — J96.11 CHRONIC RESPIRATORY FAILURE WITH HYPOXIA (H): Primary | ICD-10-CM

## 2025-03-28 DIAGNOSIS — B37.2 CANDIDIASIS OF SKIN: ICD-10-CM

## 2025-03-28 DIAGNOSIS — R19.7 DIARRHEA OF PRESUMED INFECTIOUS ORIGIN: ICD-10-CM

## 2025-03-28 DIAGNOSIS — R53.1 WEAKNESS: ICD-10-CM

## 2025-03-28 DIAGNOSIS — B00.9 HERPES SIMPLEX VIRUS INFECTION: ICD-10-CM

## 2025-03-28 DIAGNOSIS — M48.061 SPINAL STENOSIS OF LUMBAR REGION, UNSPECIFIED WHETHER NEUROGENIC CLAUDICATION PRESENT: ICD-10-CM

## 2025-03-28 LAB
ALBUMIN SERPL BCG-MCNC: 3.7 G/DL (ref 3.5–5.2)
ALP SERPL-CCNC: 113 U/L (ref 40–150)
ALT SERPL W P-5'-P-CCNC: ABNORMAL U/L
ANION GAP SERPL CALCULATED.3IONS-SCNC: 13 MMOL/L (ref 7–15)
AST SERPL W P-5'-P-CCNC: ABNORMAL U/L
BASE EXCESS BLDV CALC-SCNC: 0.3 MMOL/L (ref -3–3)
BASOPHILS # BLD AUTO: 0 10E3/UL (ref 0–0.2)
BASOPHILS NFR BLD AUTO: 0 %
BILIRUB SERPL-MCNC: 0.3 MG/DL
BUN SERPL-MCNC: 35.9 MG/DL (ref 8–23)
CALCIUM SERPL-MCNC: 8.7 MG/DL (ref 8.8–10.4)
CHLORIDE SERPL-SCNC: 101 MMOL/L (ref 98–107)
CREAT SERPL-MCNC: 1.09 MG/DL (ref 0.51–0.95)
EGFRCR SERPLBLD CKD-EPI 2021: 53 ML/MIN/1.73M2
EOSINOPHIL # BLD AUTO: 0.1 10E3/UL (ref 0–0.7)
EOSINOPHIL NFR BLD AUTO: 2 %
ERYTHROCYTE [DISTWIDTH] IN BLOOD BY AUTOMATED COUNT: 12.5 % (ref 10–15)
GLUCOSE SERPL-MCNC: 114 MG/DL (ref 70–99)
HCO3 BLDV-SCNC: 28 MMOL/L (ref 21–28)
HCO3 SERPL-SCNC: 21 MMOL/L (ref 22–29)
HCT VFR BLD AUTO: 36.2 % (ref 35–47)
HGB BLD-MCNC: 11.7 G/DL (ref 11.7–15.7)
IMM GRANULOCYTES # BLD: 0.1 10E3/UL
IMM GRANULOCYTES NFR BLD: 1 %
LACTATE SERPL-SCNC: 1.5 MMOL/L (ref 0.7–2)
LIPASE SERPL-CCNC: 7 U/L (ref 13–60)
LYMPHOCYTES # BLD AUTO: 0.9 10E3/UL (ref 0.8–5.3)
LYMPHOCYTES NFR BLD AUTO: 10 %
MCH RBC QN AUTO: 31.3 PG (ref 26.5–33)
MCHC RBC AUTO-ENTMCNC: 32.3 G/DL (ref 31.5–36.5)
MCV RBC AUTO: 97 FL (ref 78–100)
MONOCYTES # BLD AUTO: 0.7 10E3/UL (ref 0–1.3)
MONOCYTES NFR BLD AUTO: 8 %
NEUTROPHILS # BLD AUTO: 6.5 10E3/UL (ref 1.6–8.3)
NEUTROPHILS NFR BLD AUTO: 79 %
NRBC # BLD AUTO: 0 10E3/UL
NRBC BLD AUTO-RTO: 0 /100
NT-PROBNP SERPL-MCNC: 5343 PG/ML (ref 0–900)
O2/TOTAL GAS SETTING VFR VENT: 3 %
OXYHGB MFR BLDV: 41 % (ref 70–75)
PCO2 BLDV: 55 MM HG (ref 40–50)
PH BLDV: 7.31 [PH] (ref 7.32–7.43)
PLATELET # BLD AUTO: 238 10E3/UL (ref 150–450)
PO2 BLDV: 27 MM HG (ref 25–47)
POTASSIUM SERPL-SCNC: 5.4 MMOL/L (ref 3.4–5.3)
PROT SERPL-MCNC: 6.9 G/DL (ref 6.4–8.3)
RBC # BLD AUTO: 3.74 10E6/UL (ref 3.8–5.2)
SAO2 % BLDV: 41.3 % (ref 70–75)
SODIUM SERPL-SCNC: 135 MMOL/L (ref 135–145)
WBC # BLD AUTO: 8.2 10E3/UL (ref 4–11)

## 2025-03-28 PROCEDURE — G0378 HOSPITAL OBSERVATION PER HR: HCPCS

## 2025-03-28 PROCEDURE — 258N000003 HC RX IP 258 OP 636: Performed by: EMERGENCY MEDICINE

## 2025-03-28 PROCEDURE — 83880 ASSAY OF NATRIURETIC PEPTIDE: CPT | Performed by: EMERGENCY MEDICINE

## 2025-03-28 PROCEDURE — 87507 IADNA-DNA/RNA PROBE TQ 12-25: CPT | Performed by: EMERGENCY MEDICINE

## 2025-03-28 PROCEDURE — 99285 EMERGENCY DEPT VISIT HI MDM: CPT | Mod: 25

## 2025-03-28 PROCEDURE — 99222 1ST HOSP IP/OBS MODERATE 55: CPT | Mod: AI | Performed by: INTERNAL MEDICINE

## 2025-03-28 PROCEDURE — 83605 ASSAY OF LACTIC ACID: CPT | Performed by: EMERGENCY MEDICINE

## 2025-03-28 PROCEDURE — 84155 ASSAY OF PROTEIN SERUM: CPT | Performed by: EMERGENCY MEDICINE

## 2025-03-28 PROCEDURE — 85014 HEMATOCRIT: CPT | Performed by: EMERGENCY MEDICINE

## 2025-03-28 PROCEDURE — 82805 BLOOD GASES W/O2 SATURATION: CPT | Performed by: EMERGENCY MEDICINE

## 2025-03-28 PROCEDURE — 83690 ASSAY OF LIPASE: CPT | Performed by: EMERGENCY MEDICINE

## 2025-03-28 PROCEDURE — 96360 HYDRATION IV INFUSION INIT: CPT | Mod: 59

## 2025-03-28 PROCEDURE — 36415 COLL VENOUS BLD VENIPUNCTURE: CPT | Performed by: EMERGENCY MEDICINE

## 2025-03-28 PROCEDURE — 84075 ASSAY ALKALINE PHOSPHATASE: CPT | Performed by: EMERGENCY MEDICINE

## 2025-03-28 PROCEDURE — 85025 COMPLETE CBC W/AUTO DIFF WBC: CPT | Performed by: EMERGENCY MEDICINE

## 2025-03-28 PROCEDURE — 87493 C DIFF AMPLIFIED PROBE: CPT | Performed by: EMERGENCY MEDICINE

## 2025-03-28 PROCEDURE — 96361 HYDRATE IV INFUSION ADD-ON: CPT

## 2025-03-28 RX ORDER — AMOXICILLIN 250 MG
2 CAPSULE ORAL 2 TIMES DAILY PRN
Status: DISCONTINUED | OUTPATIENT
Start: 2025-03-28 | End: 2025-04-01 | Stop reason: HOSPADM

## 2025-03-28 RX ORDER — PROCHLORPERAZINE MALEATE 5 MG/1
5 TABLET ORAL EVERY 6 HOURS PRN
Status: DISCONTINUED | OUTPATIENT
Start: 2025-03-28 | End: 2025-04-01 | Stop reason: HOSPADM

## 2025-03-28 RX ORDER — ONDANSETRON 4 MG/1
4 TABLET, ORALLY DISINTEGRATING ORAL EVERY 6 HOURS PRN
Status: DISCONTINUED | OUTPATIENT
Start: 2025-03-28 | End: 2025-04-01 | Stop reason: HOSPADM

## 2025-03-28 RX ORDER — ENOXAPARIN SODIUM 100 MG/ML
40 INJECTION SUBCUTANEOUS EVERY 12 HOURS
Status: DISCONTINUED | OUTPATIENT
Start: 2025-03-29 | End: 2025-04-01 | Stop reason: HOSPADM

## 2025-03-28 RX ORDER — SODIUM CHLORIDE 9 MG/ML
INJECTION, SOLUTION INTRAVENOUS CONTINUOUS
Status: DISCONTINUED | OUTPATIENT
Start: 2025-03-29 | End: 2025-03-31

## 2025-03-28 RX ORDER — AMOXICILLIN 250 MG
1 CAPSULE ORAL 2 TIMES DAILY PRN
Status: DISCONTINUED | OUTPATIENT
Start: 2025-03-28 | End: 2025-04-01 | Stop reason: HOSPADM

## 2025-03-28 RX ORDER — ONDANSETRON 2 MG/ML
4 INJECTION INTRAMUSCULAR; INTRAVENOUS EVERY 6 HOURS PRN
Status: DISCONTINUED | OUTPATIENT
Start: 2025-03-28 | End: 2025-04-01 | Stop reason: HOSPADM

## 2025-03-28 RX ADMIN — SODIUM CHLORIDE, SODIUM LACTATE, POTASSIUM CHLORIDE, AND CALCIUM CHLORIDE 1000 ML: .6; .31; .03; .02 INJECTION, SOLUTION INTRAVENOUS at 20:59

## 2025-03-28 ASSESSMENT — ACTIVITIES OF DAILY LIVING (ADL)
ADLS_ACUITY_SCORE: 60
ADLS_ACUITY_SCORE: 58

## 2025-03-28 ASSESSMENT — COLUMBIA-SUICIDE SEVERITY RATING SCALE - C-SSRS
1. IN THE PAST MONTH, HAVE YOU WISHED YOU WERE DEAD OR WISHED YOU COULD GO TO SLEEP AND NOT WAKE UP?: NO
6. HAVE YOU EVER DONE ANYTHING, STARTED TO DO ANYTHING, OR PREPARED TO DO ANYTHING TO END YOUR LIFE?: NO
2. HAVE YOU ACTUALLY HAD ANY THOUGHTS OF KILLING YOURSELF IN THE PAST MONTH?: NO

## 2025-03-29 PROBLEM — J96.11 CHRONIC RESPIRATORY FAILURE WITH HYPOXIA (H): Status: ACTIVE | Noted: 2024-12-22

## 2025-03-29 LAB
ADV 40+41 DNA STL QL NAA+NON-PROBE: NEGATIVE
ALBUMIN UR-MCNC: 100 MG/DL
ANION GAP SERPL CALCULATED.3IONS-SCNC: 11 MMOL/L (ref 7–15)
APPEARANCE UR: ABNORMAL
ASTRO TYP 1-8 RNA STL QL NAA+NON-PROBE: NEGATIVE
BILIRUB UR QL STRIP: NEGATIVE
BUN SERPL-MCNC: 40.9 MG/DL (ref 8–23)
C CAYETANENSIS DNA STL QL NAA+NON-PROBE: NEGATIVE
C DIFF TOX B STL QL: NEGATIVE
CALCIUM SERPL-MCNC: 8.2 MG/DL (ref 8.8–10.4)
CAMPYLOBACTER DNA SPEC NAA+PROBE: NEGATIVE
CHLORIDE SERPL-SCNC: 105 MMOL/L (ref 98–107)
COLOR UR AUTO: YELLOW
CREAT SERPL-MCNC: 1.36 MG/DL (ref 0.51–0.95)
CRYPTOSP DNA STL QL NAA+NON-PROBE: NEGATIVE
E COLI O157 DNA STL QL NAA+NON-PROBE: NORMAL
E HISTOLYT DNA STL QL NAA+NON-PROBE: NEGATIVE
EAEC ASTA GENE ISLT QL NAA+PROBE: NEGATIVE
EC STX1+STX2 GENES STL QL NAA+NON-PROBE: NEGATIVE
EGFRCR SERPLBLD CKD-EPI 2021: 41 ML/MIN/1.73M2
EPEC EAE GENE STL QL NAA+NON-PROBE: NEGATIVE
ETEC LTA+ST1A+ST1B TOX ST NAA+NON-PROBE: NEGATIVE
G LAMBLIA DNA STL QL NAA+NON-PROBE: NEGATIVE
GLUCOSE SERPL-MCNC: 119 MG/DL (ref 70–99)
GLUCOSE UR STRIP-MCNC: NEGATIVE MG/DL
HCO3 SERPL-SCNC: 23 MMOL/L (ref 22–29)
HGB UR QL STRIP: NEGATIVE
HOLD SPECIMEN: NORMAL
KETONES UR STRIP-MCNC: NEGATIVE MG/DL
LEUKOCYTE ESTERASE UR QL STRIP: ABNORMAL
MUCOUS THREADS #/AREA URNS LPF: PRESENT /LPF
NITRATE UR QL: NEGATIVE
NOROVIRUS GI+II RNA STL QL NAA+NON-PROBE: NEGATIVE
P SHIGELLOIDES DNA STL QL NAA+NON-PROBE: NEGATIVE
PH UR STRIP: 5.5 [PH] (ref 5–7)
POTASSIUM SERPL-SCNC: 3.6 MMOL/L (ref 3.4–5.3)
RBC URINE: 5 /HPF
RHEUMATOID FACT SERPL-ACNC: 16 IU/ML
RVA RNA STL QL NAA+NON-PROBE: NEGATIVE
SALMONELLA SP RPOD STL QL NAA+PROBE: NEGATIVE
SAPO I+II+IV+V RNA STL QL NAA+NON-PROBE: NEGATIVE
SHIGELLA SP+EIEC IPAH ST NAA+NON-PROBE: NEGATIVE
SODIUM SERPL-SCNC: 139 MMOL/L (ref 135–145)
SP GR UR STRIP: 1.01 (ref 1–1.03)
SQUAMOUS EPITHELIAL: 6 /HPF
UROBILINOGEN UR STRIP-MCNC: NORMAL MG/DL
V CHOLERAE DNA SPEC QL NAA+PROBE: NEGATIVE
VIBRIO DNA SPEC NAA+PROBE: NEGATIVE
WBC URINE: 6 /HPF
Y ENTEROCOL DNA STL QL NAA+PROBE: NEGATIVE

## 2025-03-29 PROCEDURE — 99232 SBSQ HOSP IP/OBS MODERATE 35: CPT | Performed by: EMERGENCY MEDICINE

## 2025-03-29 PROCEDURE — 258N000003 HC RX IP 258 OP 636: Performed by: INTERNAL MEDICINE

## 2025-03-29 PROCEDURE — 5A09357 ASSISTANCE WITH RESPIRATORY VENTILATION, LESS THAN 24 CONSECUTIVE HOURS, CONTINUOUS POSITIVE AIRWAY PRESSURE: ICD-10-PCS | Performed by: EMERGENCY MEDICINE

## 2025-03-29 PROCEDURE — 250N000013 HC RX MED GY IP 250 OP 250 PS 637: Performed by: EMERGENCY MEDICINE

## 2025-03-29 PROCEDURE — 86431 RHEUMATOID FACTOR QUANT: CPT | Performed by: EMERGENCY MEDICINE

## 2025-03-29 PROCEDURE — 258N000003 HC RX IP 258 OP 636: Performed by: EMERGENCY MEDICINE

## 2025-03-29 PROCEDURE — 96361 HYDRATE IV INFUSION ADD-ON: CPT

## 2025-03-29 PROCEDURE — 36415 COLL VENOUS BLD VENIPUNCTURE: CPT | Performed by: EMERGENCY MEDICINE

## 2025-03-29 PROCEDURE — 250N000011 HC RX IP 250 OP 636: Performed by: INTERNAL MEDICINE

## 2025-03-29 PROCEDURE — G0378 HOSPITAL OBSERVATION PER HR: HCPCS

## 2025-03-29 PROCEDURE — 86038 ANTINUCLEAR ANTIBODIES: CPT | Performed by: EMERGENCY MEDICINE

## 2025-03-29 PROCEDURE — 120N000001 HC R&B MED SURG/OB

## 2025-03-29 PROCEDURE — 36415 COLL VENOUS BLD VENIPUNCTURE: CPT | Performed by: INTERNAL MEDICINE

## 2025-03-29 PROCEDURE — 81001 URINALYSIS AUTO W/SCOPE: CPT | Performed by: EMERGENCY MEDICINE

## 2025-03-29 PROCEDURE — 87086 URINE CULTURE/COLONY COUNT: CPT | Performed by: EMERGENCY MEDICINE

## 2025-03-29 PROCEDURE — 80048 BASIC METABOLIC PNL TOTAL CA: CPT | Performed by: INTERNAL MEDICINE

## 2025-03-29 PROCEDURE — 96372 THER/PROPH/DIAG INJ SC/IM: CPT | Performed by: INTERNAL MEDICINE

## 2025-03-29 RX ORDER — DULOXETIN HYDROCHLORIDE 60 MG/1
60 CAPSULE, DELAYED RELEASE ORAL EVERY MORNING
Status: DISCONTINUED | OUTPATIENT
Start: 2025-03-30 | End: 2025-04-01 | Stop reason: HOSPADM

## 2025-03-29 RX ORDER — VITAMIN B COMPLEX
25 TABLET ORAL DAILY
Status: DISCONTINUED | OUTPATIENT
Start: 2025-03-29 | End: 2025-04-01 | Stop reason: HOSPADM

## 2025-03-29 RX ORDER — LOPERAMIDE HYDROCHLORIDE 2 MG/1
4 CAPSULE ORAL ONCE
Status: COMPLETED | OUTPATIENT
Start: 2025-03-29 | End: 2025-03-29

## 2025-03-29 RX ORDER — LOPERAMIDE HYDROCHLORIDE 2 MG/1
2 CAPSULE ORAL 4 TIMES DAILY PRN
Status: DISCONTINUED | OUTPATIENT
Start: 2025-03-29 | End: 2025-04-01 | Stop reason: HOSPADM

## 2025-03-29 RX ORDER — AZELASTINE HYDROCHLORIDE 0.5 MG/ML
1 SOLUTION/ DROPS OPHTHALMIC DAILY
Status: DISCONTINUED | OUTPATIENT
Start: 2025-03-30 | End: 2025-04-01 | Stop reason: HOSPADM

## 2025-03-29 RX ORDER — ROSUVASTATIN CALCIUM 10 MG/1
10 TABLET, COATED ORAL DAILY
Status: DISCONTINUED | OUTPATIENT
Start: 2025-03-29 | End: 2025-04-01 | Stop reason: HOSPADM

## 2025-03-29 RX ORDER — DULOXETIN HYDROCHLORIDE 30 MG/1
30 CAPSULE, DELAYED RELEASE ORAL EVERY EVENING
Status: DISCONTINUED | OUTPATIENT
Start: 2025-03-29 | End: 2025-04-01 | Stop reason: HOSPADM

## 2025-03-29 RX ORDER — MODAFINIL 100 MG/1
250 TABLET ORAL EVERY MORNING
COMMUNITY

## 2025-03-29 RX ORDER — ASPIRIN 81 MG/1
81 TABLET ORAL DAILY
Status: DISCONTINUED | OUTPATIENT
Start: 2025-03-29 | End: 2025-04-01 | Stop reason: HOSPADM

## 2025-03-29 RX ORDER — PANTOPRAZOLE SODIUM 40 MG/1
40 TABLET, DELAYED RELEASE ORAL
Status: DISCONTINUED | OUTPATIENT
Start: 2025-03-30 | End: 2025-04-01 | Stop reason: HOSPADM

## 2025-03-29 RX ORDER — MONTELUKAST SODIUM 10 MG/1
10 TABLET ORAL EVERY EVENING
Status: DISCONTINUED | OUTPATIENT
Start: 2025-03-29 | End: 2025-04-01 | Stop reason: HOSPADM

## 2025-03-29 RX ORDER — MAGNESIUM OXIDE 400 MG/1
400 TABLET ORAL DAILY
Status: DISCONTINUED | OUTPATIENT
Start: 2025-03-29 | End: 2025-04-01 | Stop reason: HOSPADM

## 2025-03-29 RX ORDER — METOPROLOL SUCCINATE 50 MG/1
50 TABLET, EXTENDED RELEASE ORAL DAILY
Status: DISCONTINUED | OUTPATIENT
Start: 2025-03-29 | End: 2025-04-01 | Stop reason: HOSPADM

## 2025-03-29 RX ADMIN — DULOXETINE HYDROCHLORIDE 30 MG: 30 CAPSULE, DELAYED RELEASE ORAL at 20:18

## 2025-03-29 RX ADMIN — ENOXAPARIN SODIUM 40 MG: 40 INJECTION SUBCUTANEOUS at 18:22

## 2025-03-29 RX ADMIN — SODIUM CHLORIDE, PRESERVATIVE FREE: 5 INJECTION INTRAVENOUS at 00:54

## 2025-03-29 RX ADMIN — ROSUVASTATIN CALCIUM 10 MG: 10 TABLET, FILM COATED ORAL at 18:21

## 2025-03-29 RX ADMIN — ENOXAPARIN SODIUM 40 MG: 40 INJECTION SUBCUTANEOUS at 00:54

## 2025-03-29 RX ADMIN — METOPROLOL SUCCINATE 50 MG: 25 TABLET, EXTENDED RELEASE ORAL at 18:22

## 2025-03-29 RX ADMIN — MONTELUKAST 10 MG: 10 TABLET, FILM COATED ORAL at 20:18

## 2025-03-29 RX ADMIN — SODIUM CHLORIDE, PRESERVATIVE FREE: 5 INJECTION INTRAVENOUS at 16:52

## 2025-03-29 RX ADMIN — Medication 400 MG: at 18:23

## 2025-03-29 RX ADMIN — ASPIRIN 81 MG: 81 TABLET, COATED ORAL at 18:23

## 2025-03-29 RX ADMIN — Medication 25 MCG: at 18:23

## 2025-03-29 ASSESSMENT — ACTIVITIES OF DAILY LIVING (ADL)
ADLS_ACUITY_SCORE: 60
DEPENDENT_IADLS:: CLEANING;COOKING;SHOPPING;LAUNDRY;MEAL PREPARATION;MEDICATION MANAGEMENT;TRANSPORTATION;INCONTINENCE
ADLS_ACUITY_SCORE: 65
ADLS_ACUITY_SCORE: 68
ADLS_ACUITY_SCORE: 60
ADLS_ACUITY_SCORE: 62
ADLS_ACUITY_SCORE: 62
ADLS_ACUITY_SCORE: 65
ADLS_ACUITY_SCORE: 60
ADLS_ACUITY_SCORE: 62
ADLS_ACUITY_SCORE: 68
ADLS_ACUITY_SCORE: 62
ADLS_ACUITY_SCORE: 65
ADLS_ACUITY_SCORE: 62
ADLS_ACUITY_SCORE: 60
ADLS_ACUITY_SCORE: 62
ADLS_ACUITY_SCORE: 60
ADLS_ACUITY_SCORE: 62
ADLS_ACUITY_SCORE: 60

## 2025-03-29 NOTE — PHARMACY-ADMISSION MEDICATION HISTORY
"Pharmacy Intern Admission Medication History    Admission medication history is complete. The information provided in this note is only as accurate as the sources available at the time of the update.    Information Source(s): Patient and CareEverywhere/SureScripts via in-person    Pertinent Information:   Patient takes care of own medication.   Patient reports using a cream for her feet and legs. Reports the cream for her foot can also be used for athletes foot and reported using a cream for her cellulitis.   She reports that she has a rash but unsure what she uses and would like if something can be prescribed for while she can use while in hospital.   Patient reports taking Nexium at home but her prior list reported Protonix. She reported she was given Protonix while she was at [facility] during her previous admission.   She also reported [facility] wrapped her toes in something for about 10 minutes for her foot fungus. She said she would like to continue this if possible.   Patient reports not wanting any tylenol in liquid form.     Changes made to PTA medication list:  Added:   Nexium   Deleted:   Cefdinir   Naloxone   Protonix   Changed:   Azelastine SIG was changed from apply one drop to eye BID PRN to apply one drop to both eyes BID PRN.  Duloxetine 30 mg SIG for 60 mg every day was changed to 60 mg qam for a total of 90 mg daily including her evening dose.   Furosemide 20 mg SIG for 40 mg every day was changed to 40 mg qam for a total of 60 mg daily including her evening dose.   Modafinil SIG was changed from \"take 2.5 tab every day. Take two and a half tabs every day\" to take 250 mg qam.     Allergies reviewed with patient and updates made in EHR: yes    PTA Med List   Medication Sig Note Last Dose/Taking    acetaminophen (TYLENOL) 500 MG tablet Take 500 mg by mouth every 6 hours as needed for mild pain  Past Week    albuterol (PROAIR HFA;PROVENTIL HFA;VENTOLIN HFA) 90 mcg/actuation inhaler Inhale 1 puff into " the lungs every 4 hours as needed for shortness of breath / dyspnea 3/29/2025: Used when its humid out, typical use is in the summer Unknown    alendronate (FOSAMAX) 70 MG tablet [ALENDRONATE (FOSAMAX) 70 MG TABLET] Take 70 mg by mouth every 7 days. Takes on Mondays  3/17/2025    aspirin 81 MG EC tablet 1 tablet Orally Once a day  Past Week    azelastine (OPTIVAR) 0.05 % ophthalmic solution Place 1 drop into both eyes 2 times daily as needed.  Past Week    calcium citrate (CITRACAL) 950 (200 Ca) MG tablet Take 1 tablet by mouth daily  Past Week    cetirizine (ZYRTEC) 10 MG tablet [CETIRIZINE (ZYRTEC) 10 MG TABLET] Take 10 mg by mouth daily.   Past Week    cholecalciferol (VITAMIN D3) 25 mcg (1000 units) capsule Take 1 capsule by mouth daily.  Past Week    CRESTOR 10 MG tablet Take 10 mg by mouth daily.  Past Week    docusate sodium (COLACE) 100 MG capsule Take 1-3 capsules by mouth daily as needed for constipation.  Past Week    DULoxetine (CYMBALTA) 30 MG capsule Take 60 mg by mouth every morning.  Past Week    DULoxetine (CYMBALTA) 30 MG capsule Take 30 mg by mouth every evening.  Past Week    esomeprazole (NEXIUM) 20 MG DR capsule Take 40 mg by mouth every morning (before breakfast). Take 30-60 minutes before eating.  Unknown    fluticasone-salmeterol (AIRDUO RESPICLICK) 113-14 MCG/ACT inhaler Inhale 1 puff into the lungs as needed (shortness of breath). 3/29/2025: Uses in the summer when humid out Taking As Needed    furosemide (LASIX) 20 MG tablet Take 40 mg by mouth every morning.  Past Week    furosemide (LASIX) 20 MG tablet Take 20 mg by mouth every evening.  Past Week    HYDROcodone-acetaminophen (NORCO) 5-325 MG tablet Take 1 tablet by mouth every 4 hours as needed for pain Max 6 tablets per day.  Past Week    l-lysine HCl 500 MG TABS tablet Take 500 mg by mouth daily.  Past Week    magnesium oxide 250 mg Tab Take 500 mg by mouth daily  Past Week    metoprolol succinate ER (TOPROL XL) 50 MG 24 hr tablet  Take 1 tablet (50 mg) by mouth daily.  Past Week    modafinil (PROVIGIL) 100 MG tablet Take 250 mg by mouth every morning.  Past Week    montelukast (SINGULAIR) 10 mg tablet Take 10 mg by mouth every evening  Past Week    tiZANidine (ZANAFLEX) 4 MG tablet Take 4 mg by mouth 3 times daily as needed for muscle spasms  Past Week    Turmeric (CURCUPLEX-95) 500 MG CAPS Take 1 capsule by mouth daily  Past Week       Medications Available during hospital stay: NONE    Medication History Completed By: Anayeli Fontanez  3/29/2025 10:23 AM

## 2025-03-29 NOTE — ED PROVIDER NOTES
EMERGENCY DEPARTMENT ENCOUNTER      NAME: Desirae Rey  AGE: 74 year old female  YOB: 1950  MRN: [id number]  EVALUATION DATE & TIME: 3/28/2025  7:01 PM    PCP: Daysi Bryan    ED PROVIDER: Latanya Arnold M.D.      Chief Complaint   Patient presents with    Diarrhea       FINAL IMPRESSION:  1. Diarrhea of presumed infectious origin    2. Weakness        ED COURSE & MEDICAL DECISION MAKIN. Diarrhea and weakness.  Recent antibiotic use and admission, suspect c diff vs enteric pathogen causing diarrhea. No ab pain.  Too weak to discharge home, TCU recommended last admission 2 days ago.  Labs checked, neg lactic acid, BNP slightly up at 5343, weight recorded at 207lbs today however no increased o2 requirement.  Abdomen benign. Denies pain.  Will require admission for generalized weakness.  Enteric panel and C diff pending.        8:17 PM I met with the patient to gather history and to perform my initial exam. I discussed the plan for care while in the Emergency Department.     Pertinent Labs & Imaging studies reviewed. (See chart for details).    Medical Decision Making  I obtained history from EMS, I reviewed the EMR Inpatient Record: 3/26/25, Care impacted by see chart review below, and I discussed the care with another health care provider: Dr. Reyes hospitalist  Admit.    MIPS (CTPE, Dental pain, Rebollar, Sinusitis, Asthma/COPD, Head Trauma): Not Applicable    SEPSIS: None          At the conclusion of the encounter I discussed the results of all of the tests and the disposition. The questions were answered. The patient or family acknowledged understanding and was agreeable with the care plan.      CRITICAL CARE:  N/A    HPI    Patient information was obtained from: patient.    Use of : N/A.       Desirae Rey is a 74 year old female who presents for generalized weakness and diarrhea starting today. Patient was recently admitted to [facility] for acute on chronic  "congestive heart failure decompensation discharged 3/26/24 to home although TCU recommended. She is a poor historian, states she has had diarrhea \"once\" today but \"felt like 3 times\", denies any blood in stool. Denies abdominal pain. Is taking cefdinir for UTI (today is last day per record review).    Per Chart Review: H&P from 3/22/2025 reviewed, history of chronic hypoxemic respiratory failure requiring 3 L of oxygen severe NELLIE on BiPAP at night, CKD 3, CVA, obesity, hypoventilation syndrome, chronic back pain, recent UTI.      REVIEW OF SYSTEMS  All other systems negative unless noted in HPI.    PAST MEDICAL HISTORY:  Past Medical History:   Diagnosis Date    Allergic rhinitis     Arthritis of back     CHF (congestive heart failure) (H)     Chronic kidney disease     stage 3     De Quervain's disease (tenosynovitis)     Fibromyalgia, primary     GERD (gastroesophageal reflux disease)     Hematuria     chronic    High cholesterol     History of blood clots 09/01/1970    DVT, from birth control, h/o left calf    Hyperlipidemia     Hypertension     Low back pain     Osteoporosis     Pickwickian syndrome (H)     Plantar fasciitis     Proteinuria     Proteinuria     chronic    Sleep apnea     CPAP    Uncomplicated asthma        PAST SURGICAL HISTORY:  Past Surgical History:   Procedure Laterality Date    CERVICAL FUSION N/A 4/27/2017    Procedure: ANTERIOR CERVICAL DECOMPRESSION FUSION C5-7 BILATERAL;  Surgeon: Basim Barone MD;  Location: [facility];  Service:     CHOLECYSTECTOMY      open    COLONOSCOPY N/A 12/20/2019    Procedure: COLONOSCOPY WITH BIOPSIES;  Surgeon: Lucio Farr MD;  Location: [facility];  Service: Gastroenterology    EYE SURGERY      HAND SURGERY Right     HYSTEROSCOPY DIAGNOSTIC      JOINT REPLACEMENT      bilateral TKA    KNEE SURGERY      RELEASE CARPAL TUNNEL Bilateral          CURRENT MEDICATIONS:    No current facility-administered medications for this " encounter.     Current Outpatient Medications   Medication Sig Dispense Refill    acetaminophen (TYLENOL) 500 MG tablet Take 500 mg by mouth every 6 hours as needed for mild pain      albuterol (PROAIR HFA;PROVENTIL HFA;VENTOLIN HFA) 90 mcg/actuation inhaler Inhale 1 puff into the lungs every 4 hours as needed for shortness of breath / dyspnea      alendronate (FOSAMAX) 70 MG tablet [ALENDRONATE (FOSAMAX) 70 MG TABLET] Take 70 mg by mouth every 7 days. Takes on Mondays 11    aspirin 81 MG EC tablet 1 tablet Orally Once a day      azelastine (OPTIVAR) 0.05 % ophthalmic solution Apply 1 drop to eye 2 times daily as needed      calcium citrate (CITRACAL) 950 (200 Ca) MG tablet Take 1 tablet by mouth daily      cefdinir (OMNICEF) 300 MG capsule Take 1 capsule (300 mg) by mouth 2 times daily for 2 days. 4 capsule 0    cetirizine (ZYRTEC) 10 MG tablet [CETIRIZINE (ZYRTEC) 10 MG TABLET] Take 10 mg by mouth daily.       cholecalciferol (VITAMIN D3) 25 mcg (1000 units) capsule Take 1 capsule by mouth daily.      CRESTOR 10 MG tablet Take 10 mg by mouth daily.      docusate sodium (COLACE) 100 MG capsule Take 1-3 capsules by mouth daily as needed for constipation.      DULoxetine (CYMBALTA) 30 MG capsule Take 60 mg by mouth daily.      DULoxetine (CYMBALTA) 30 MG capsule Take 30 mg by mouth every evening.      fluticasone-salmeterol (AIRDUO RESPICLICK) 113-14 MCG/ACT inhaler Inhale 1 puff into the lungs as needed (shortness of breath).      furosemide (LASIX) 20 MG tablet Take 40 mg by mouth daily.      furosemide (LASIX) 20 MG tablet Take 20 mg by mouth every evening.      HYDROcodone-acetaminophen (NORCO) 5-325 MG tablet Take 1 tablet by mouth every 4 hours as needed for pain Max 6 tablets per day. 60 tablet 0    l-lysine HCl 500 MG TABS tablet Take 500 mg by mouth daily.      magnesium oxide 250 mg Tab Take 500 mg by mouth daily      metoprolol succinate ER (TOPROL XL) 50 MG 24 hr tablet Take 1 tablet (50 mg) by mouth  daily. 30 tablet 1    modafinil (PROVIGIL) 100 MG tablet Take 2.5 tablets (250 mg) by mouth daily Take two and half tablet (250 mg ) daily 90 tablet 0    montelukast (SINGULAIR) 10 mg tablet Take 10 mg by mouth every evening      Naloxone HCl 5 MG/0.5ML SOSY Inject 5 mg as directed as needed.      OXYGEN-AIR DELIVERY SYSTEMS MISC [OXYGEN-AIR DELIVERY SYSTEMS MISC] Use 3 L As Directed. 3 lpm with activities and as needed at night      pantoprazole (PROTONIX) 40 MG EC tablet Take 1 tablet (40 mg) by mouth every morning (before breakfast). 30 tablet 1    tiZANidine (ZANAFLEX) 4 MG tablet Take 4 mg by mouth 3 times daily as needed for muscle spasms      Turmeric (CURCUPLEX-95) 500 MG CAPS Take 1 capsule by mouth daily           ALLERGIES:  Allergies   Allergen Reactions    Latex Rash     Severe rash    Pregabalin Shortness Of Breath     Other Reaction(s): swelling and shortness of breath    Valsartan Unknown     Hyperkalemia    Ciprofloxacin Visual Disturbance, Hallucination and Confusion     Likely contributed visual hallucination and confusion    Colchicine Diarrhea    Dicloxacillin      Other Reaction(s): confusion, says she has tolerated augmentin without difficulty.     Gabapentin      Other Reaction(s): Sedation    Latex Unknown     Added based on information entered during case entry, please review and add reactions, type, and severity as needed    Other Drug Allergy (See Comments) Muscle Pain (Myalgia)     Tried lovastatin and simvastatin    Other Environmental Allergy Unknown     Coverlet bandaid , [employer] product; rash    Statins-Hmg-Coa Reductase Inhibitors [Statins] Muscle Pain (Myalgia)     Tried lovastatin and simvastatin    Sulfa Antibiotics Swelling     Facial swelling      Adhesive Tape Rash       FAMILY HISTORY:  Family History   Problem Relation Age of Onset    Rheumatoid Arthritis Mother     Heart Disease Sister     Chronic Obstructive Pulmonary Disease Father        SOCIAL HISTORY:  Social History      Socioeconomic History    Marital status:    Tobacco Use    Smoking status: Never    Smokeless tobacco: Never   Vaping Use    Vaping status: Never Used   Substance and Sexual Activity    Alcohol use: No    Drug use: No     Social Drivers of Health     Financial Resource Strain: Unknown (12/22/2024)    Financial Resource Strain     Within the past 12 months, have you or your family members you live with been unable to get utilities (heat, electricity) when it was really needed?: Patient unable to answer   Food Insecurity: Unknown (12/22/2024)    Food Insecurity     Within the past 12 months, did you worry that your food would run out before you got money to buy more?: Patient unable to answer     Within the past 12 months, did the food you bought just not last and you didn t have money to get more?: Patient unable to answer   Transportation Needs: Unknown (12/22/2024)    Transportation Needs     Within the past 12 months, has lack of transportation kept you from medical appointments, getting your medicines, non-medical meetings or appointments, work, or from getting things that you need?: Patient unable to answer    Received from [facility], [facility]    Social Connections   Interpersonal Safety: Low Risk  (12/22/2024)    Interpersonal Safety     Do you feel physically and emotionally safe where you currently live?: Yes     Within the past 12 months, have you been hit, slapped, kicked or otherwise physically hurt by someone?: No     Within the past 12 months, have you been humiliated or emotionally abused in other ways by your partner or ex-partner?: No   Housing Stability: Unknown (12/22/2024)    Housing Stability     Do you have housing? : Patient unable to answer     Are you worried about losing your housing?: Patient unable to answer       VITALS:  Patient Vitals for the past 24 hrs:   BP Temp Temp src Pulse Resp SpO2 Height Weight  "  03/28/25 2200 -- -- -- 63 -- 100 % -- --   03/28/25 2100 (!) 164/80 -- -- -- -- -- -- --   03/28/25 1923 (!) 175/84 98.4  F (36.9  C) Oral 70 16 99 % 1.499 m (4' 11\") 94.1 kg (207 lb 6.4 oz)       PHYSICAL EXAM    VITAL SIGNS: BP (!) 164/80   Pulse 63   Temp 98.4  F (36.9  C) (Oral)   Resp 16   Ht 1.499 m (4' 11\")   Wt 94.1 kg (207 lb 6.4 oz)   SpO2 100%   BMI 41.89 kg/m    Physical Exam  Vitals and nursing note reviewed.   Constitutional:       Appearance: She is obese.      Comments: Awake but appears tired. Chronically ill appearing   HENT:      Head: Normocephalic and atraumatic.      Mouth/Throat:      Mouth: Mucous membranes are dry.   Eyes:      General: No scleral icterus.        Right eye: No discharge.         Left eye: No discharge.   Cardiovascular:      Rate and Rhythm: Normal rate and regular rhythm.   Pulmonary:      Effort: Pulmonary effort is normal.      Comments: 3L NC in place  Abdominal:      General: There is no distension.      Palpations: Abdomen is soft.      Tenderness: There is no abdominal tenderness.   Musculoskeletal:         General: No swelling or deformity.      Cervical back: Neck supple. No rigidity.   Skin:     General: Skin is warm and dry.      Capillary Refill: Capillary refill takes less than 2 seconds.   Neurological:      Comments: Following commands, no slurred speech or facial droop. Poor medical history         LABS  Labs Ordered and Resulted from Time of ED Arrival to Time of ED Departure   COMPREHENSIVE METABOLIC PANEL - Abnormal       Result Value    Sodium 135      Potassium 5.4 (*)     Carbon Dioxide (CO2) 21 (*)     Anion Gap 13      Urea Nitrogen 35.9 (*)     Creatinine 1.09 (*)     GFR Estimate 53 (*)     Calcium 8.7 (*)     Chloride 101      Glucose 114 (*)     Alkaline Phosphatase 113      AST        ALT        Protein Total 6.9      Albumin 3.7      Bilirubin Total 0.3     LIPASE - Abnormal    Lipase 7 (*)    BLOOD GAS VENOUS - Abnormal    pH Venous " 7.31 (*)     pCO2 Venous 55 (*)     pO2 Venous 27      Bicarbonate Venous 28      Base Excess/Deficit Venous 0.3      FIO2 3      Oxyhemoglobin Venous 41 (*)     O2 Sat, Venous 41.3 (*)    NT PROBNP INPATIENT - Abnormal    N terminal Pro BNP Inpatient 5,343 (*)    CBC WITH PLATELETS AND DIFFERENTIAL - Abnormal    WBC Count 8.2      RBC Count 3.74 (*)     Hemoglobin 11.7      Hematocrit 36.2      MCV 97      MCH 31.3      MCHC 32.3      RDW 12.5      Platelet Count 238      % Neutrophils 79      % Lymphocytes 10      % Monocytes 8      % Eosinophils 2      % Basophils 0      % Immature Granulocytes 1      NRBCs per 100 WBC 0      Absolute Neutrophils 6.5      Absolute Lymphocytes 0.9      Absolute Monocytes 0.7      Absolute Eosinophils 0.1      Absolute Basophils 0.0      Absolute Immature Granulocytes 0.1      Absolute NRBCs 0.0     LACTIC ACID WHOLE BLOOD - Normal    Lactic Acid 1.5     C. DIFFICILE TOXIN B PCR WITH REFLEX TO C. DIFFICILE EIA   ENTERIC BACTERIA AND VIRUS PANEL BY PCR         RADIOLOGY  No orders to display      NA      EKG:    NA       PROCEDURES:  N/A      MEDICATIONS GIVEN IN THE EMERGENCY:  Medications   lactated ringers BOLUS 1,000 mL (0 mLs Intravenous Stopped 3/28/25 4263)       NEW PRESCRIPTIONS STARTED AT TODAY'S ER VISIT  New Prescriptions    No medications on file            Latanya Arnold MD  Emergency Medicine  [facility] EMERGENCY ROOM  [address] [place] [id number] [phone]  Dept: [phone]             Latanya Arnold MD  03/28/25 [id number]

## 2025-03-29 NOTE — PROGRESS NOTES
"[facility] ED Handoff Report    ED Chief Complaint: diarrhea, increased weakness    ED Diagnosis:  (J96.11) Chronic respiratory failure with hypoxia (H)  (primary encounter diagnosis)  Comment: 3L O2 at baseline, BiPAP at night      (R19.7) Diarrhea of presumed infectious origin  Comment: C diff and enteric panel negative       PMH:    Past Medical History:   Diagnosis Date    Allergic rhinitis     Arthritis of back     CHF (congestive heart failure) (H)     Chronic kidney disease     stage 3     De Quervain's disease (tenosynovitis)     Fibromyalgia, primary     GERD (gastroesophageal reflux disease)     Hematuria     chronic    High cholesterol     History of blood clots 09/01/1970    DVT, from birth control, h/o left calf    Hyperlipidemia     Hypertension     Low back pain     Osteoporosis     Pickwickian syndrome (H)     Plantar fasciitis     Proteinuria     Proteinuria     chronic    Sleep apnea     CPAP    Uncomplicated asthma         Code Status:  Full Code     Falls Risk: Yes Band: Applied    Current Living Situation/Residence: lives with a significant other     Elimination Status: Continent: Yes (continent at baseline but did have one episode of stool incontinence this morning)    Activity Level: 1 assist with walker    Patient's Preferred Language:  English     Needed: No    Vital Signs:  /60 (BP Location: Left arm)   Pulse 60   Temp 98.4  F (36.9  C) (Oral)   Resp 20   Ht 1.499 m (4' 11\")   Wt 94.1 kg (207 lb 6.4 oz)   SpO2 100%   BMI 41.89 kg/m       Cardiac Rhythm: N/A    Pain Score: 0/10    Is the Patient Confused:  No    Last Food or Drink: 03/29/25 at 1745    Assessment and Plan of Care:  A/Ox4, lethargic this morning but more alert this afternoon. On 3L O2 at baseline, satting in 90s while awake, uses BiPAP at night. Denies pain. NS infusing at 75 mL/hr.     Tests Performed: Done: Labs    Treatments Provided:  IV fluids, Imodium PRN (pt has not wanted " today)    Family Dynamics/Concerns: Yes -  helps care for her but pt is weak and reports falling frequently at home. Care management involved.    Belongings Checklist Done and Signed by Patient: N/A    Belongings Sent with Patient: Yes    Boarding medications sent with patient: none      RN: Marlene Jenkins RN  3/29/2025 6:22 PM

## 2025-03-29 NOTE — PROGRESS NOTES
Writer was asked to help patient to the bathroom. Patient was pivoted to the commode and when she sat on the commode it pinched her leg. When patient was done on the commode RN helped her back to bed and noticed blood on commode and bed. Writer had the patients assigned RN come into the room and check patients back side. It was noticed that patient now has a small laceration on right leg from where the commode pinched her.

## 2025-03-29 NOTE — CONSULTS
Care Management Initial Consult      General Information  Assessment completed with: Patient,    Type of CM/SW Visit: Initial Assessment  Primary Care Provider verified and updated as needed: Yes   Readmission within the last 30 days: previous discharge plan unsuccessful   Return Category: Exacerbation of disease  Reason for Consult: discharge planning  Advance Care Planning: Advance Care Planning Reviewed: present on chart  Located in EHR     Communication Assessment  Patient's communication style: spoken language (English or Bilingual)        Cognitive  Cognitive/Neuro/Behavioral: WDL                      Living Environment:   People in home: spouse  Hugo  Current living Arrangements: house      Able to return to prior arrangements: yes     Family/Social Support:  Care provided by: self, spouse/significant other, homecare agency  Provides care for: no one, unable/limited ability to care for self  Marital Status:   Support system: , Children  Hugo       Description of Support System: Supportive, Involved (As needed/able)    Support Assessment: Lacks necessary supervision and assistance, Lacks adequate physical care, Caregiver experiencing high stress    Current Resources:   Patient receiving home care services: Yes  Skilled Home Care Services: Physical Therapy, Occupational Therapy  Community Resources: Home Care  Equipment currently used at home: walker, rolling, shower chair, grab bar, toilet, grab bar, tub/shower, hospital bed, wheelchair, manual, cane, straight, other (see comments) (Ramp access to the home)  Supplies currently used at home: Incontinence Supplies    Employment/Financial:  Employment Status: retired     Financial Concerns: none   Referral to Financial Worker: No     Does the patient's insurance plan have a 3 day qualifying hospital stay waiver?  Yes     Which insurance plan 3 day waiver is available? [employer]    Will the waiver be used for post-acute placement? Yes    Lifestyle  & Psychosocial Needs:  Social Drivers of Health     Food Insecurity: Unknown (12/22/2024)    Food Insecurity     Within the past 12 months, did you worry that your food would run out before you got money to buy more?: Patient unable to answer     Within the past 12 months, did the food you bought just not last and you didn t have money to get more?: Patient unable to answer   Depression: Not at risk (6/13/2024)    PHQ-2     PHQ-2 Score: 0   Housing Stability: Unknown (12/22/2024)    Housing Stability     Do you have housing? : Patient unable to answer     Are you worried about losing your housing?: Patient unable to answer   Tobacco Use: Low Risk  (3/21/2025)    Received from [employer] [facility]    Patient History     Smoking Tobacco Use: Never     Smokeless Tobacco Use: Never     Passive Exposure: Not on file   Financial Resource Strain: Unknown (12/22/2024)    Financial Resource Strain     Within the past 12 months, have you or your family members you live with been unable to get utilities (heat, electricity) when it was really needed?: Patient unable to answer   Alcohol Use: Not on file   Transportation Needs: Unknown (12/22/2024)    Transportation Needs     Within the past 12 months, has lack of transportation kept you from medical appointments, getting your medicines, non-medical meetings or appointments, work, or from getting things that you need?: Patient unable to answer   Physical Activity: Not on file   Interpersonal Safety: Low Risk  (12/22/2024)    Interpersonal Safety     Do you feel physically and emotionally safe where you currently live?: Yes     Within the past 12 months, have you been hit, slapped, kicked or otherwise physically hurt by someone?: No     Within the past 12 months, have you been humiliated or emotionally abused in other ways by your partner or ex-partner?: No   Stress: Not on file   Social Connections: Unknown (3/9/2023)    Received from [employer]  "& [facility]    Social Connections     Frequency of Communication with Friends and Family: Not on file   Health Literacy: Not on file     Functional Status:  Prior to admission patient needed assistance:   Dependent ADLs:: Ambulation-walker, Bathing, Dressing, Grooming, Incontinence, Transfers  Dependent IADLs:: Cleaning, Cooking, Shopping, Laundry, Meal Preparation, Medication Management, Transportation, Incontinence  Assessment of Functional Status: Not at  functional baseline    Mental Health Status:  Mental Health Status: No Current Concerns       Chemical Dependency Status:  Chemical Dependency Status: No Current Concerns           Values/Beliefs:  Spiritual, Cultural Beliefs, Yarsani Practices, Values that affect care: no             Discussed  Partnership in Safe Discharge Planning  document with patient/family: Yes    Additional Information:  Writer met with pt to introduce Care Management(CM), obtain an initial assessment, provide LISA info, and discuss discharge planning. Pt resides in a house where her  is struggling to assist with multiple I/ADL, given pt's current condition. [facility] arranged for home care support-notified of admission. Pt had been opposed to TCU historically. Now, pt is open to TCU placement if indicated. Writer has ensured therapy consults are ordered. No other concerns identified at this time.    3/28 per NASIR Morales.-\"74 year old female with medical history significant for chronic respiratory failure with hypoxia 3 L of oxygen at baseline, severe NELLIE on BiPAP at night, CKD stage III, CVA, obesity, obesity hypoventilation syndrome, recurrent UTI, chronic back pain, hypercholesterolemia, chronic diastolic heart failure, and uncomplicated asthma.  She was brought to the ED by EMS for severe weakness and diarrhea.  Patient reports that she had 1 episode of diarrhea.  However,  " "reports that patient slept on the couch all night yesterday and they could not get her out of the car today.  Fire crew had to lift her into the ambulance.  He reports that patient has visual hallucinations at baseline.  States that patient has been very weak and has had recurrent falls over the past 1 year and has been using a walker.  He denies any vomiting or fever at home.  Denies any diaphoresis at home.\"    Next Steps:   PT & OT Consults pending. CM to follow-up on therapy consults and assist with service coordination needs as indicated. Family to transport if pt is capable of maneuvering a personal vehicle.    Kwaku Cantrell RN      "

## 2025-03-29 NOTE — H&P
[facility]    History and Physical - Hospitalist Service       Date of Admission:  3/28/2025    Assessment & Plan      Desirae Rey is a 74 year old female admitted on 3/28/2025. She medical history significant for chronic respiratory failure with hypoxia 3 L of oxygen at baseline, severe NELLIE on BiPAP at night, CKD stage III, CVA, obesity, obesity hypoventilation syndrome, recurrent UTI, chronic back pain, hypercholesterolemia, chronic diastolic heart failure, and uncomplicated asthma.  She was brought to the ED by EMS for severe weakness and diarrhea.     # Hyperkalemia: Likely due to volume depletion.  She received IV fluid bolus in the ED.  Monitor potassium levels  Hold ACE or ARB    # Severe diarrhea: Patient with profuse diarrhea.  Enteric studies pending  C. difficile studies pending  Gentle hydration    # NELLIE on BiPAP  # Chronic respiratory failure with hypoxia:  She is saturating well on 3 L of oxygen  BiPAP at bedtime  Supplemental oxygen, titrate to SpO2 greater than 92%  Continue breathing treatments    # Severe weakness  PT and OT  Case management consult  Patient's  is very strongly opposed to nursing home placement    # Hyperlipidemia  # History of CVA  # Hypertension  # HFpEF  Continue aspirin, Lasix, Toprol, and Crestor  Low-sodium diet  2000 cc daily fluid restriction  Daily standing weight    Note: Home medications have not been restarted.  Pending pharmacy review.0     Observation Goals: -diagnostic tests and consults completed and resulted, -vital signs normal or at patient baseline, -tolerating oral intake to maintain hydration, -infection is improving, -returns to baseline functional status, -safe disposition plan has been identified, Nurse to notify provider when observation goals have been met and patient is ready for discharge.  Diet: Regular Diet Adult    DVT Prophylaxis: Enoxaparin (Lovenox) SQ  Rebollar Catheter: Not present  Lines: None     Cardiac  "Monitoring: None  Code Status: Full Code      Clinically Significant Risk Factors Present on Admission        # Hyperkalemia: Highest K = 5.4 mmol/L in last 2 days, will monitor as appropriate          # Drug Induced Platelet Defect: home medication list includes an antiplatelet medication   # Hypertension: Noted on problem list  # Chronic heart failure with preserved ejection fraction: heart failure noted on problem list and last echo with EF >50%          # Severe Obesity: Estimated body mass index is 41.89 kg/m  as calculated from the following:    Height as of this encounter: 1.499 m (4' 11\").    Weight as of this encounter: 94.1 kg (207 lb 6.4 oz).       # Financial/Environmental Concerns:    # Asthma: noted on problem list        Disposition Plan     Medically Ready for Discharge: Anticipated in 2-4 Days           Mati Reyes MD  Hospitalist Service  [facility]  Securely message with [employer] (more info)  Text page via [employer] Paging/Directory     ______________________________________________________________________    Chief Complaint   Weakness, and diarrhea.     History is obtained from the patient and patient's spouse    History of Present Illness   Desirae Rey is a 74 year old female with medical history significant for chronic respiratory failure with hypoxia 3 L of oxygen at baseline, severe NELLIE on BiPAP at night, CKD stage III, CVA, obesity, obesity hypoventilation syndrome, recurrent UTI, chronic back pain, hypercholesterolemia, chronic diastolic heart failure, and uncomplicated asthma.  She was brought to the ED by EMS for severe weakness and diarrhea.  Patient reports that she had 1 episode of diarrhea.  However,  reports that patient slept on the couch all night yesterday and they could not get her out of the car today.  Fire crew had to lift her into the ambulance.  He reports that patient has visual hallucinations at baseline.  States that patient has been very " weak and has had recurrent falls over the past 1 year and has been using a walker.  He denies any vomiting or fever at home.  Denies any diaphoresis at home.          Past Medical History    Past Medical History:   Diagnosis Date    Allergic rhinitis     Arthritis of back     CHF (congestive heart failure) (H)     Chronic kidney disease     stage 3     De Quervain's disease (tenosynovitis)     Fibromyalgia, primary     GERD (gastroesophageal reflux disease)     Hematuria     chronic    High cholesterol     History of blood clots 09/01/1970    DVT, from birth control, h/o left calf    Hyperlipidemia     Hypertension     Low back pain     Osteoporosis     Pickwickian syndrome (H)     Plantar fasciitis     Proteinuria     Proteinuria     chronic    Sleep apnea     CPAP    Uncomplicated asthma        Past Surgical History   Past Surgical History:   Procedure Laterality Date    CERVICAL FUSION N/A 4/27/2017    Procedure: ANTERIOR CERVICAL DECOMPRESSION FUSION C5-7 BILATERAL;  Surgeon: Basim Barone MD;  Location: [facility];  Service:     CHOLECYSTECTOMY      open    COLONOSCOPY N/A 12/20/2019    Procedure: COLONOSCOPY WITH BIOPSIES;  Surgeon: Lucio Farr MD;  Location: [facility];  Service: Gastroenterology    EYE SURGERY      HAND SURGERY Right     HYSTEROSCOPY DIAGNOSTIC      JOINT REPLACEMENT      bilateral TKA    KNEE SURGERY      RELEASE CARPAL TUNNEL Bilateral        Prior to Admission Medications   Prior to Admission Medications   Prescriptions Last Dose Informant Patient Reported? Taking?   CRESTOR 10 MG tablet  Care Giver Yes No   Sig: Take 10 mg by mouth daily.   DULoxetine (CYMBALTA) 30 MG capsule   Yes No   Sig: Take 60 mg by mouth daily.   DULoxetine (CYMBALTA) 30 MG capsule   Yes No   Sig: Take 30 mg by mouth every evening.   HYDROcodone-acetaminophen (NORCO) 5-325 MG tablet  Care Giver No No   Sig: Take 1 tablet by mouth every 4 hours as needed for pain Max 6 tablets per  day.   Naloxone HCl 5 MG/0.5ML SOSY  Care Giver Yes No   Sig: Inject 5 mg as directed as needed.   OXYGEN-AIR DELIVERY SYSTEMS [facility]  Spouse/Significant Other, Care Giver Yes No   Sig: [OXYGEN-AIR DELIVERY SYSTEMS [facility]] Use 3 L As Directed. 3 lpm with activities and as needed at night   Turmeric (CURCUPLEX-95) 500 MG CAPS  Spouse/Significant Other, Care Giver Yes No   Sig: Take 1 capsule by mouth daily   acetaminophen (TYLENOL) 500 MG tablet  Spouse/Significant Other, Care Giver Yes No   Sig: Take 500 mg by mouth every 6 hours as needed for mild pain   albuterol (PROAIR HFA;PROVENTIL HFA;VENTOLIN HFA) 90 mcg/actuation inhaler  Spouse/Significant Other, Care Giver Yes No   Sig: Inhale 1 puff into the lungs every 4 hours as needed for shortness of breath / dyspnea   alendronate (FOSAMAX) 70 MG tablet  Spouse/Significant Other, Care Giver Yes No   Sig: [ALENDRONATE (FOSAMAX) 70 MG TABLET] Take 70 mg by mouth every 7 days. Takes on    aspirin 81 MG EC tablet  Spouse/Significant Other, Care Giver Yes No   Si tablet Orally Once a day   azelastine (OPTIVAR) 0.05 % ophthalmic solution  Spouse/Significant Other, Care Giver Yes No   Sig: Apply 1 drop to eye 2 times daily as needed   calcium citrate (CITRACAL) 950 (200 Ca) MG tablet  Spouse/Significant Other, Care Giver Yes No   Sig: Take 1 tablet by mouth daily   cefdinir (OMNICEF) 300 MG capsule   No No   Sig: Take 1 capsule (300 mg) by mouth 2 times daily for 2 days.   cetirizine (ZYRTEC) 10 MG tablet  Spouse/Significant Other, Care Giver Yes No   Sig: [CETIRIZINE (ZYRTEC) 10 MG TABLET] Take 10 mg by mouth daily.    cholecalciferol (VITAMIN D3) 25 mcg (1000 units) capsule  Spouse/Significant Other, Care Giver Yes No   Sig: Take 1 capsule by mouth daily.   docusate sodium (COLACE) 100 MG capsule  Care Giver Yes No   Sig: Take 1-3 capsules by mouth daily as needed for constipation.   fluticasone-salmeterol (AIRDUO RESPICLICK) 113-14 MCG/ACT inhaler  Care Giver Yes  No   Sig: Inhale 1 puff into the lungs as needed (shortness of breath).   furosemide (LASIX) 20 MG tablet   Yes No   Sig: Take 40 mg by mouth daily.   furosemide (LASIX) 20 MG tablet   Yes No   Sig: Take 20 mg by mouth every evening.   l-lysine HCl 500 MG TABS tablet  Spouse/Significant Other, Care Giver Yes No   Sig: Take 500 mg by mouth daily.   magnesium oxide 250 mg Tab  Spouse/Significant Other, Care Giver Yes No   Sig: Take 500 mg by mouth daily   metoprolol succinate ER (TOPROL XL) 50 MG 24 hr tablet   No No   Sig: Take 1 tablet (50 mg) by mouth daily.   modafinil (PROVIGIL) 100 MG tablet  Care Giver No No   Sig: Take 2.5 tablets (250 mg) by mouth daily Take two and half tablet (250 mg ) daily   montelukast (SINGULAIR) 10 mg tablet  Spouse/Significant Other, Care Giver Yes No   Sig: Take 10 mg by mouth every evening   pantoprazole (PROTONIX) 40 MG EC tablet   No No   Sig: Take 1 tablet (40 mg) by mouth every morning (before breakfast).   tiZANidine (ZANAFLEX) 4 MG tablet  Spouse/Significant Other, Care Giver Yes No   Sig: Take 4 mg by mouth 3 times daily as needed for muscle spasms      Facility-Administered Medications: None        Review of Systems    Unable to obtain detailed ROS due to patient's cognitive status.     Physical Exam   Vital Signs: Temp: 98.4  F (36.9  C) Temp src: Oral BP: (!) 164/80 Pulse: 63   Resp: 16 SpO2: 100 % O2 Device: Nasal cannula Oxygen Delivery: 3 LPM  Weight: 207 lbs 6.4 oz    General: Lethargic.  Awakes to tactile stimuli.  Knows her name, age, and that she is in the hospital.  .  HEENT: NCAT, pupils are equal and round, anicteric, oral mucosa is moist.  Neck: Supple  Cardiac: RRR.  No murmur. No rub or gallop.  Respiratory: CTAB, no crackles, wheezes, rales, or rhonchi  Abdomen: Soft, NT, ND, + bowel sounds  Extremity: No LE edema, DP pulses palpable.   Neuro: AAO x3, encephalopathic.  Moves all extremities spontaneously.  Psych: Calm and cooperative.       Medical Decision  Making       >55 MINUTES SPENT BY ME on the date of service doing chart review, history, exam, documentation & further activities per the note.      Data     I have personally reviewed the following data over the past 24 hrs:    8.2  \   11.7   / 238     135 101 35.9 (H) /  114 (H)   5.4 (H) 21 (L) 1.09 (H) \     ALT: N/A AST: N/A AP: 113 TBILI: 0.3   ALB: 3.7 TOT PROTEIN: 6.9 LIPASE: 7 (L)     Trop: N/A BNP: 5,343 (H)     Procal: N/A CRP: N/A Lactic Acid: 1.5         Imaging results reviewed over the past 24 hrs:   No results found for this or any previous visit (from the past 24 hours).

## 2025-03-29 NOTE — ED TRIAGE NOTES
Patient brought in by EMS, from home, independent at baseline to walker, recently discharged from [facility] for UTI and sepsis, this morning she awoke with diarrhea and not able to ambulate secondary to weakness. On oral antibiotics. Given oral Zofran en route by EMS. On 3L 02 at baseline. Broken areas of skin under folds under breasts and lower abdomen.  Annita care provided for incontinence, changed sheets.      Triage Assessment (Adult)       Row Name 03/28/25 1914          Triage Assessment    Airway WDL WDL        Respiratory WDL    Respiratory WDL WDL        Skin Circulation/Temperature WDL    Skin Circulation/Temperature WDL WDL        Cardiac WDL    Cardiac WDL WDL        Peripheral/Neurovascular WDL    Peripheral Neurovascular WDL WDL        Cognitive/Neuro/Behavioral WDL    Cognitive/Neuro/Behavioral WDL WDL

## 2025-03-29 NOTE — DISCHARGE INSTRUCTIONS
Home care services have been arrange for you    Home Care Agency: [facility] Phone:  [phone]    Services:  Nursing, Physical therapy, Occupational therapy    Instructions: Provider will call you to schedule the homecare visits.

## 2025-03-29 NOTE — PROGRESS NOTES
[facility] MEDICINE PROGRESS NOTE      Summary: 74 year old female into [facility] on 3/29/2025 after   Arriving for diarrhea.  She was recently discharged from [facility]  After being treated for an e coli UTI. She was discharged on cefdinir.    PMHx includes recurrent UTI, NELLIE, recurrent falls due to lumbar stenosis,  Chronic oxygen dependence    Diagnostics on arrival to the ER showed    On my interview in the ER she just had a diarrheal stool.  (Her second since  Arrival).  Original testing was negative for c diff or enteric pathogens. She has no abdominal symptoms.    3/29/2025:  she wants to know why she falls; lunch is at bedside, complains  Of burning on urination.         Problem list:     Diarrhea: testing negative for c diff and enteric; no abdominal  Cramps; imodium as needed   UTI, complicated; history of: urinalysis pending; she had a positive  E coli micro on 3/22/2025  Chronic oxygen dependence due to severe NELLIE, HFpEF: hold  Diuretics today due to her GRACY  NELLIE, severe: history of: normally uses bipap at night; plan to continue  Falls, recurrent due to severe degenerative lumbar disease: MRI lumbar from 12/24 reviewed; she had a NS  Appointment 2/21 when nonoperative recommendations were  Made due to her lack of interest in surgery; PT evaluation  7.  CKD3B, acute on chronic renal failure;  add a small fluid bolus today due   To her diarrhea   7.  Dvt prevention: scd  8.  Disposition:  Medically Ready for Discharge: Anticipated in 2-4 Days     oN Salas MD  [facility] Medicine  [facility]  Phone: #[phone]  Securely message with the Vocera Web Console (learn more here)  Text page via [employer] Paging/Directory     Interval History/Subjective: I want to know why I am falling     Physical Exam/Objective:  Temp:  [97.7  F (36.5  C)-98.4  F (36.9  C)] 97.7  F (36.5  C)  Pulse:  [52-70] 52  Resp:  [16-20] 20  BP:  (120-175)/(57-84) 125/60  SpO2:  [96 %-100 %] 98 %  Body mass index is 41.89 kg/m .    GENERAL:  Alert, no distress    HEAD:  Normocephalic, without obvious abnormality, atraumatic   EYES:  PERRL, conjunctiva/corneas clear, no scleral icterus, EOM's intact   NOSE: Nares normal, septum midline, mucosa normal, no drainage   THROAT: Lips, mucosa, and tongue normal; teeth and gums normal, mouth moist   NECK: Supple, symmetrical, trachea midline   BACK:   Symmetric, no curvature, ROM normal   LUNGS:   Clear to auscultation bilaterally, no rales, rhonchi, or wheezing, symmetric chest rise on inhalation, respirations unlabored   CHEST WALL:  No tenderness or deformity   HEART:  Regular rate and rhythm, S1 and S2 normal, no murmur, rub, or gallop    ABDOMEN:   Soft, non-tender, bowel sounds active all four quadrants, no masses, no organomegaly, no rebound or guarding   EXTREMITIES: Extremities normal, atraumatic, no cyanosis or edema    SKIN: Dry to touch, no exanthems in the visualized areas   NEURO: Alert, oriented x3, moves all four extremities freely   PSYCH: Cooperative, behavior is appropriate      Medications:   Personally Reviewed.  Medications   Current Facility-Administered Medications   Medication Dose Route Frequency Provider Last Rate Last Admin    sodium chloride 0.9 % infusion   Intravenous Continuous Mati Reyes  mL/hr at 03/29/25 1020 Rate Verify at 03/29/25 1020     Current Facility-Administered Medications   Medication Dose Route Frequency Provider Last Rate Last Admin    aspirin EC tablet 81 mg  81 mg Oral Daily No Salas MD        [START ON 3/30/2025] azelastine (OPTIVAR) ophthalmic solution 1 drop  1 drop Both Eyes Daily No Salas MD        DULoxetine (CYMBALTA) DR capsule 30 mg  30 mg Oral QPM No Salas MD        [START ON 3/30/2025] DULoxetine (CYMBALTA) DR capsule 60 mg  60 mg Oral QAM No Salas MD        enoxaparin ANTICOAGULANT (LOVENOX) injection 40  mg  40 mg Subcutaneous Q12H Mati Reyes MD   40 mg at 03/29/25 0054    loperamide (IMODIUM) capsule 4 mg  4 mg Oral Once No Salas MD        magnesium oxide (MAG-OX) tablet 400 mg  400 mg Oral Daily No Salas MD        metoprolol succinate ER (TOPROL XL) 24 hr tablet 50 mg  50 mg Oral Daily No Salas MD        [START ON 3/30/2025] modafinil (PROVIGIL) half-tab 250 mg  250 mg Oral QAM No Salas MD        montelukast (SINGULAIR) tablet 10 mg  10 mg Oral QPM No Salas MD        [START ON 3/30/2025] pantoprazole (PROTONIX) EC tablet 40 mg  40 mg Oral QAM AC No Salas MD        rosuvastatin (CRESTOR) tablet 10 mg  10 mg Oral Daily No Salas MD        Vitamin D3 (CHOLECALCIFEROL) tablet 25 mcg  25 mcg Oral Daily No Salas MD

## 2025-03-29 NOTE — ED NOTES
Bed: WWED-04  Expected date:   Expected time:   Means of arrival:   Comments:  Thor, 74F, N/V/D, will need decon...

## 2025-03-30 ENCOUNTER — APPOINTMENT (OUTPATIENT)
Dept: OCCUPATIONAL THERAPY | Facility: CLINIC | Age: 75
DRG: 552 | End: 2025-03-30
Attending: INTERNAL MEDICINE
Payer: MEDICARE

## 2025-03-30 ENCOUNTER — APPOINTMENT (OUTPATIENT)
Dept: PHYSICAL THERAPY | Facility: CLINIC | Age: 75
DRG: 552 | End: 2025-03-30
Attending: INTERNAL MEDICINE
Payer: MEDICARE

## 2025-03-30 LAB
ANION GAP SERPL CALCULATED.3IONS-SCNC: 9 MMOL/L (ref 7–15)
BACTERIA UR CULT: NORMAL
BUN SERPL-MCNC: 43.3 MG/DL (ref 8–23)
CALCIUM SERPL-MCNC: 8.5 MG/DL (ref 8.8–10.4)
CHLORIDE SERPL-SCNC: 106 MMOL/L (ref 98–107)
CREAT SERPL-MCNC: 1.5 MG/DL (ref 0.51–0.95)
EGFRCR SERPLBLD CKD-EPI 2021: 36 ML/MIN/1.73M2
GLUCOSE SERPL-MCNC: 103 MG/DL (ref 70–99)
HCO3 SERPL-SCNC: 23 MMOL/L (ref 22–29)
HOLD SPECIMEN: NORMAL
POTASSIUM SERPL-SCNC: 3.8 MMOL/L (ref 3.4–5.3)
SODIUM SERPL-SCNC: 138 MMOL/L (ref 135–145)

## 2025-03-30 PROCEDURE — 250N000013 HC RX MED GY IP 250 OP 250 PS 637: Performed by: INTERNAL MEDICINE

## 2025-03-30 PROCEDURE — 80048 BASIC METABOLIC PNL TOTAL CA: CPT | Performed by: EMERGENCY MEDICINE

## 2025-03-30 PROCEDURE — 97535 SELF CARE MNGMENT TRAINING: CPT | Mod: GO

## 2025-03-30 PROCEDURE — 999N000157 HC STATISTIC RCP TIME EA 10 MIN

## 2025-03-30 PROCEDURE — 120N000001 HC R&B MED SURG/OB

## 2025-03-30 PROCEDURE — 36415 COLL VENOUS BLD VENIPUNCTURE: CPT | Performed by: EMERGENCY MEDICINE

## 2025-03-30 PROCEDURE — 258N000003 HC RX IP 258 OP 636: Performed by: EMERGENCY MEDICINE

## 2025-03-30 PROCEDURE — 250N000009 HC RX 250: Performed by: EMERGENCY MEDICINE

## 2025-03-30 PROCEDURE — 97161 PT EVAL LOW COMPLEX 20 MIN: CPT | Mod: GP

## 2025-03-30 PROCEDURE — 97116 GAIT TRAINING THERAPY: CPT | Mod: GP

## 2025-03-30 PROCEDURE — 250N000013 HC RX MED GY IP 250 OP 250 PS 637: Performed by: EMERGENCY MEDICINE

## 2025-03-30 PROCEDURE — 97166 OT EVAL MOD COMPLEX 45 MIN: CPT | Mod: GO

## 2025-03-30 PROCEDURE — 97530 THERAPEUTIC ACTIVITIES: CPT | Mod: GP

## 2025-03-30 PROCEDURE — 94660 CPAP INITIATION&MGMT: CPT

## 2025-03-30 PROCEDURE — 99232 SBSQ HOSP IP/OBS MODERATE 35: CPT | Performed by: EMERGENCY MEDICINE

## 2025-03-30 PROCEDURE — 250N000011 HC RX IP 250 OP 636: Performed by: EMERGENCY MEDICINE

## 2025-03-30 PROCEDURE — 250N000011 HC RX IP 250 OP 636: Performed by: INTERNAL MEDICINE

## 2025-03-30 RX ORDER — HYDROCODONE BITARTRATE AND ACETAMINOPHEN 5; 325 MG/1; MG/1
1 TABLET ORAL EVERY 4 HOURS PRN
Status: DISCONTINUED | OUTPATIENT
Start: 2025-03-30 | End: 2025-04-01 | Stop reason: HOSPADM

## 2025-03-30 RX ORDER — NALOXONE HYDROCHLORIDE 0.4 MG/ML
0.2 INJECTION, SOLUTION INTRAMUSCULAR; INTRAVENOUS; SUBCUTANEOUS
Status: DISCONTINUED | OUTPATIENT
Start: 2025-03-30 | End: 2025-04-01 | Stop reason: HOSPADM

## 2025-03-30 RX ORDER — CEFTRIAXONE 2 G/1
2 INJECTION, POWDER, FOR SOLUTION INTRAMUSCULAR; INTRAVENOUS EVERY 24 HOURS
Status: DISCONTINUED | OUTPATIENT
Start: 2025-03-30 | End: 2025-03-31

## 2025-03-30 RX ORDER — NALOXONE HYDROCHLORIDE 0.4 MG/ML
0.4 INJECTION, SOLUTION INTRAMUSCULAR; INTRAVENOUS; SUBCUTANEOUS
Status: DISCONTINUED | OUTPATIENT
Start: 2025-03-30 | End: 2025-04-01 | Stop reason: HOSPADM

## 2025-03-30 RX ORDER — FLUCONAZOLE 100 MG/1
200 TABLET ORAL ONCE
Status: COMPLETED | OUTPATIENT
Start: 2025-03-30 | End: 2025-03-30

## 2025-03-30 RX ADMIN — CEFTRIAXONE SODIUM 2 G: 2 INJECTION, POWDER, FOR SOLUTION INTRAMUSCULAR; INTRAVENOUS at 09:47

## 2025-03-30 RX ADMIN — MODAFINIL 250 MG: 100 TABLET ORAL at 09:18

## 2025-03-30 RX ADMIN — HYDROCODONE BITARTRATE AND ACETAMINOPHEN 1 TABLET: 5; 325 TABLET ORAL at 00:54

## 2025-03-30 RX ADMIN — SODIUM CHLORIDE, PRESERVATIVE FREE: 5 INJECTION INTRAVENOUS at 00:54

## 2025-03-30 RX ADMIN — METOPROLOL SUCCINATE 50 MG: 25 TABLET, EXTENDED RELEASE ORAL at 08:24

## 2025-03-30 RX ADMIN — AZELASTINE HYDROCHLORIDE 1 DROP: 0.5 SOLUTION/ DROPS INTRAOCULAR at 08:28

## 2025-03-30 RX ADMIN — MICONAZOLE NITRATE: 2 POWDER TOPICAL at 08:27

## 2025-03-30 RX ADMIN — SODIUM CHLORIDE, PRESERVATIVE FREE: 5 INJECTION INTRAVENOUS at 21:51

## 2025-03-30 RX ADMIN — Medication 400 MG: at 08:24

## 2025-03-30 RX ADMIN — ENOXAPARIN SODIUM 40 MG: 40 INJECTION SUBCUTANEOUS at 06:31

## 2025-03-30 RX ADMIN — PANTOPRAZOLE SODIUM 40 MG: 40 TABLET, DELAYED RELEASE ORAL at 06:30

## 2025-03-30 RX ADMIN — DULOXETINE HYDROCHLORIDE 30 MG: 30 CAPSULE, DELAYED RELEASE ORAL at 20:35

## 2025-03-30 RX ADMIN — Medication 25 MCG: at 08:23

## 2025-03-30 RX ADMIN — ASPIRIN 81 MG: 81 TABLET, COATED ORAL at 08:24

## 2025-03-30 RX ADMIN — DULOXETINE HYDROCHLORIDE 60 MG: 60 CAPSULE, DELAYED RELEASE ORAL at 08:24

## 2025-03-30 RX ADMIN — ROSUVASTATIN CALCIUM 10 MG: 10 TABLET, FILM COATED ORAL at 08:24

## 2025-03-30 RX ADMIN — MONTELUKAST 10 MG: 10 TABLET, FILM COATED ORAL at 20:35

## 2025-03-30 RX ADMIN — ENOXAPARIN SODIUM 40 MG: 40 INJECTION SUBCUTANEOUS at 18:52

## 2025-03-30 RX ADMIN — MICONAZOLE NITRATE: 2 POWDER TOPICAL at 20:35

## 2025-03-30 RX ADMIN — SODIUM CHLORIDE, PRESERVATIVE FREE: 5 INJECTION INTRAVENOUS at 15:25

## 2025-03-30 RX ADMIN — FLUCONAZOLE 200 MG: 100 TABLET ORAL at 14:57

## 2025-03-30 ASSESSMENT — ACTIVITIES OF DAILY LIVING (ADL)
ADLS_ACUITY_SCORE: 72
ADLS_ACUITY_SCORE: 69
ADLS_ACUITY_SCORE: 72
ADLS_ACUITY_SCORE: 72
ADLS_ACUITY_SCORE: 69
ADLS_ACUITY_SCORE: 72
ADLS_ACUITY_SCORE: 72
ADLS_ACUITY_SCORE: 69
ADLS_ACUITY_SCORE: 65
ADLS_ACUITY_SCORE: 72
ADLS_ACUITY_SCORE: 72
ADLS_ACUITY_SCORE: 69
ADLS_ACUITY_SCORE: 72
PREVIOUS_RESPONSIBILITIES: MEAL PREP;HOUSEKEEPING;LAUNDRY;MEDICATION MANAGEMENT;SHOPPING
ADLS_ACUITY_SCORE: 71
ADLS_ACUITY_SCORE: 65
ADLS_ACUITY_SCORE: 72
ADLS_ACUITY_SCORE: 65
ADLS_ACUITY_SCORE: 69
ADLS_ACUITY_SCORE: 72

## 2025-03-30 NOTE — PROGRESS NOTES
03/30/25 0806   Appointment Info   Signing Clinician's Name / Credentials (PT) Belkis Browne, PT, DPT   Living Environment   People in Home spouse   Current Living Arrangements house   Home Accessibility other (see comments);stairs within home  (ramp entry into home, then split level)   Number of Stairs, Main Entrance 7   Stair Railings, Main Entrance none;railing on left side (ascending)   Number of Stairs, Within Home, Primary seven   Stair Railings, Within Home, Primary railing on left side (ascending)   Transportation Anticipated family or friend will provide   Living Environment Comments able to stay on upper level of split home   Self-Care   Usual Activity Tolerance moderate   Current Activity Tolerance fair   Equipment Currently Used at Home walker, rolling;shower chair;grab bar, toilet;grab bar, tub/shower;hospital bed;wheelchair, manual;cane, straight   Fall history within last six months yes   Number of times patient has fallen within last six months 15  (reports 4 or 5 in last month)   Activity/Exercise/Self-Care Comment reports mod IND with her 4WW in her home, pt reports IND with dressing, reports spouse assist with combing hair   General Information   Onset of Illness/Injury or Date of Surgery 03/28/25   Referring Physician Mati Reyes MD   Patient/Family Therapy Goals Statement (PT) to return home   Pertinent History of Current Problem (include personal factors and/or comorbidities that impact the POC) 73 y/o admitted with severe weakness and diarrhea. PMHx: chronic respiratory failure with hypoxia 3 L of oxygen at baseline, severe NELLIE on BiPAP at night, CKD stage III, CVA, obesity, obesity hypoventilation syndrome, recurrent UTI, chronic back pain, hypercholesterolemia, chronic diastolic heart failure, and uncomplicated asthma. Recurrent falls. Recent discharge from [facility] after being treated for e coli UTI.   Existing Precautions/Restrictions fall;oxygen therapy device and  "L/min  (3L baseline O2)   General Observations resting in bed, NAD. on 3L   Cognition   Affect/Mental Status (Cognition) WFL   Orientation Status (Cognition) oriented x 4   Pain Assessment   Patient Currently in Pain No  (\"hard to tell\" at rest)   Posture    Posture Forward head position;Protracted shoulders   Range of Motion (ROM)   ROM Comment limited right great toe ext, otherwise appears grossly WFL B LEs, limited shoulder flex/abd, left approx 20deg, right side approx 30-40deg   Strength (Manual Muscle Testing)   Strength Comments generalized weakness, able to lift LEs against gravity   Bed Mobility   Comment, (Bed Mobility) SBA for blankets and equipment, to left side of bed, elevated HOB and railing-pt reports has a hospital bed at home   Transfers   Comment, (Transfers) min A sit to stand with FWW   Gait/Stairs (Locomotion)   [place] (Gait) minimum assist (75% patient effort)   Assistive Device (Gait) walker, front-wheeled   Distance in Feet (Gait) 5ft   Comment, (Gait/Stairs) shuffling pattern, increased trunk lateral deviation, decreased heel toe   Balance   Balance Comments impaired dynamic standing balance-requires min A and UE support   Sensory Examination   Sensory Perception Comments pt reports numbness all over body, also reports numbness B feet   Clinical Impression   Criteria for Skilled Therapeutic Intervention Yes, treatment indicated   PT Diagnosis (PT) impaired gait   Influenced by the following impairments impaired strength, impaired activity tolerance, impaired balance, generalized weakness   Functional limitations due to impairments impaired IND with functional moblity   Clinical Presentation (PT Evaluation Complexity) stable   Clinical Presentation Rationale clinical judgement   Clinical Decision Making (Complexity) low complexity   Planned Therapy Interventions (PT) balance training;bed mobility training;gait training;home exercise program;home program guidelines;risk factor " education;progressive activity/exercise;transfer training;strengthening;stair training;patient/family education   Risk & Benefits of therapy have been explained evaluation/treatment results reviewed;patient   PT Total Evaluation Time   PT Eval, Low Complexity Minutes (68432) 10   Physical Therapy Goals   PT Frequency 6x/week   PT Predicted Duration/Target Date for Goal Attainment 04/06/25   PT Goals Bed Mobility;Transfers;Gait;Stairs   PT: Bed Mobility Modified independent;Supine to/from sit   PT: Transfers Supervision/stand-by assist;Sit to/from stand;Bed to/from chair;Assistive device   PT: Gait Supervision/stand-by assist;150 feet;Rolling walker   PT: Stairs Minimal assist;4 stairs;Rail on left   Interventions   Interventions Quick Adds Gait Training;Therapeutic Activity   Therapeutic Activity   Therapeutic Activities: dynamic activities to improve functional performance Minutes (63465) 9   Symptoms Noted During/After Treatment None   Treatment Detail/Skilled Intervention cues for safety with hand placement, scooting fwd and foot placement with sit to stand with EOB with FWW. Discussed recs for chair for all meals and increase activity with nursing staff. discussed recs for home PT at d/c-pt in agreement. Pt left in recliner with needs in reach and chair alarm on.   Gait Training   Gait Training Minutes (13042) 15   Symptoms Noted During/After Treatment (Gait Training) fatigue   Treatment Detail/Skilled Intervention Amb with FWW with min A, improving balance with increased distance. short shuffling steps. SPO2: 97% post amb on 3L   Distance in Feet 65   PT Discharge Planning   PT Plan technique with standing, amb with walker, bring short 4WW to the room, bring portable O2, stairs   PT Discharge Recommendation (DC Rec) home with assist;home with home care physical therapy   PT Rationale for DC Rec Pt currently A x 1 with FWW today. Anticipate will be able to return home with spouse assist at d/c. Pt in agreement.  Will continue to work with pt to optimize function prior to d/c. Recommend home PT.   PT Brief overview of current status min A with amb with FWW   PT Total Distance Amb During Session (feet) 70

## 2025-03-30 NOTE — PROGRESS NOTES
03/30/25 1446   Appointment Info   Signing Clinician's Name / Credentials (OT) Uma Yanes OTR/L   Living Environment   People in Home spouse   Current Living Arrangements house  (split entry)   Transportation Anticipated family or friend will provide   Living Environment Comments WIS with GB and shower chair.  RTS with vanity on the R side   Self-Care   Usual Activity Tolerance moderate   Current Activity Tolerance fair   Instrumental Activities of Daily Living (IADL)   Previous Responsibilities meal prep;housekeeping;laundry;medication management;shopping   IADL Comments  does the drivign.   General Information   Onset of Illness/Injury or Date of Surgery 03/28/25   Referring Physician Dr. No Salas   Patient/Family Therapy Goal Statement (OT) To return home.   Additional Occupational Profile Info/Pertinent History of Current Problem Adm with diarrhea and weakness. PMH: chronic respiratory failure with hypoxia 3 L of oxygen at baseline, severe NELLIE on BiPAP at night, CKD stage III, CVA, obesity, obesity hypoventilation syndrome, recurrent UTI, chronic back pain, hypercholesterolemia, chronic diastolic heart failure, and uncomplicated asthma.   Cognitive Status Examination   Orientation Status orientation to person, place and time   Follows Commands WNL   Visual Perception   Visual Impairment/Limitations corrective lenses for reading   Sensory   Sensory Comments numbness in hands and feet -  chronic   Range of Motion Comprehensive   General Range of Motion upper extremity range of motion deficits identified   General Upper Extremity Assessment (Range of Motion)   Upper Extremity: Range of Motion shoulder, left: UE ROM;shoulder, right: UE ROM   Comment: Upper Extremity ROM 0-80 degrees flex AROM   Coordination   Fine Motor Coordination Able to oppose thumb to fingertips for bilat hands.   Bed Mobility   Bed Mobility supine-sit;sit-supine   Supine-Sit [place] (Bed Mobility) moderate assist  (50% patient effort)   Sit-Supine [place] (Bed Mobility) moderate assist (50% patient effort)   Assistive Device (Bed Mobility) bed rails   Comment (Bed Mobility) Pt has a hospital bed at home.   Transfers   Transfers bed-chair transfer;sit-stand transfer;toilet transfer   Transfer Skill: Bed to Chair/Chair to Bed   Bed-Chair [place] (Transfers) supervision;verbal cues   Assistive Device (Bed-Chair Transfers) rolling walker   Sit-Stand Transfer   Sit-Stand [place] (Transfers) supervision;verbal cues   Assistive Device (Sit-Stand Transfers) walker, front-wheeled   Toilet Transfer   Type (Toilet Transfer) sit-stand;stand-sit   [place] Level (Toilet Transfer) contact guard   Assistive Device (Toilet Transfer) walker, front-wheeled   Toilet Transfer Comments Use Std ht toilet.   Balance   Balance Assessment standing dynamic balance   Standing Balance: Dynamic supervision;verbal cues   Position/Device Used, Standing Balance walker, front-wheeled   Activities of Daily Living   BADL Assessment/Intervention lower body dressing;grooming;toileting   Lower Body Dressing Assessment/Training   [place] Level (Lower Body Dressing) minimum assist (75% patient effort)   Position (Lower Body Dressing) supported sitting   Assistive Devices (Lower Body Dressing) reacher;sock-aid   Comment, (Lower Body Dressing) has AE at  home.   takes her socks off.  Pt uses sock aid at home to don socks.   Grooming Assessment/Training   Position (Grooming) supported standing   [place] Level (Grooming) minimum assist (75% patient effort)   Comment, (Grooming) limite AROM in her bilat U/E does not allow her to reach the soap dispenser   Toileting   Position (Toileting) supported sitting;supported standing   Comment, (Toileting) Unable to do her own elroy cares.  Uses toilet tongs at home.  Did not bring to hospital.   [place] Level (Toileting) maximum assist (25% patient effort)   Clinical Impression   Criteria  for Skilled Therapeutic Interventions Met (OT) Yes, treatment indicated   OT Diagnosis decreased indep with ADLs and trsfs due to respir failure.   Influenced by the following impairments fatigue, limited AROM in bilat U/Es   OT Problem List-Impairments impacting ADL problems related to;activity tolerance impaired;balance;mobility;range of motion (ROM);strength   Assessment of Occupational Performance 3-5 Performance Deficits   Identified Performance Deficits dressing, toileting, bathing, G/H and trsfs   Planned Therapy Interventions (OT) ADL retraining   Clinical Decision Making Complexity (OT) detailed assessment/moderate complexity   Risk & Benefits of therapy have been explained evaluation/treatment results reviewed;participants included;patient   OT Total Evaluation Time   OT Eval, Moderate Complexity Minutes (52103) 15   OT Goals   Therapy Frequency (OT) 5 times/week   OT Predicted Duration/Target Date for Goal Attainment 04/04/25   OT Goals Hygiene/Grooming;Lower Body Dressing;Toilet Transfer/Toileting   OT: Hygiene/Grooming modified independent   OT: Lower Body Dressing Modified independent   OT: Toilet Transfer/Toileting Modified independent   Interventions   Interventions Quick Adds Self-Care/Home Management   Self-Care/Home Management   Self-Care/Home Mgmt/ADL, Compensatory, Meal Prep Minutes (58188) 30   Symptoms Noted During/After Treatment (Meal Preparation/Planning Training) fatigue   Treatment Detail/Skilled Intervention Pt resting in her chair when therapist arrived.  Pt on 3L NC O2 which is her baseline at home.  Initial eval completed.  Worked on room trsfs using FWW.  Reminders for safe hand placement.  Trsf from chair to BR toilet, BR sink, bed and back to chair.  All with SBA to min A of one using FWW.  Min A needed for  std ht toilet.  Encourage use of GB on the R side of the toilet.  Worked on G/H cares and toileting.  Pt was not able to reach her bottom from the front or back for elroy cares.   Dep on therapist to assist.  At home, Pt reports she uses toilet tongs but did not   bring them to the hospital to use.  Pt did  ask therapist about how to clean her toilet tongs.  Standing at the sink, Pt completed hand washing with min A of one due to limited AROM in her bilat shoulders (unable to reach supplies).  Mild SOB noted and fatigue after bathroom cares completed.  Returned to [facility] to work on bed mobility.  Pt needed  mod A of one to move from sidelying to sit.  Has hosptial bed at home but does not elevate the head when getting out of bed.  Will need further work re: bed mobility and trsf.  At end of session, Pt was sitting in her chair with chair alarm on and call light in reach.  RN in the room when therapist left.   OT Discharge Planning   OT Plan Bed trsf and mobility, dressing with AE, trsfs with FWW, toileting with std ht toilet.  Monitor O2   OT Discharge Recommendation (DC Rec) (S)  home with home care occupational therapy   OT Rationale for DC Rec Pt has AE at home and assist from .  Will benefit from OT in her own environment to increase indep with ADLs and trsfs.   OT Brief overview of current status SBA to min A with trsfs and ADLs.  Dep for elroy cares without her toilet tongs.  Mod A with bed mobility.  Has hospital bed at home.  On O2 at home at 3LPM   OT Total Distance Amb During Session (feet) 24   Total Session Time   Timed Code Treatment Minutes 30   Total Session Time (sum of timed and untimed services) 45

## 2025-03-30 NOTE — PLAN OF CARE
Problem: Adult Inpatient Plan of Care  Goal: Absence of Hospital-Acquired Illness or Injury  Intervention: Prevent Skin Injury  Recent Flowsheet Documentation  Taken 3/30/2025 0054 by Amena Colmenares RN  Body Position: log-rolled     Problem: Adult Inpatient Plan of Care  Goal: Optimal Comfort and Wellbeing  Outcome: Progressing  Intervention: Monitor Pain and Promote Comfort  Recent Flowsheet Documentation  Taken 3/30/2025 0054 by Amena Colmenares RN  Pain Management Interventions: medication (see MAR)   Goal Outcome Evaluation:       Pt is alert and oriented x4. C/o back pain 9/10 . PRN Norco given with some effectPIV x2 were leaking, removed. Replaced by ED Nurse. Rash/yeast infection noted to under breasts, abdominal fold and groin. Scattered bruising noted. Skin intact at sacrum. On O2 at 3LPM via NC on baseline during the day and BIPAP use at NOC. VSS. IVF NS 0.9% infusing at 75 ml/hr. Ambulates to the bathroom with A1, GB and walker. Can be incontinent especially during night time.

## 2025-03-30 NOTE — PLAN OF CARE
"  Problem: Adult Inpatient Plan of Care  Goal: Plan of Care Review  Description: The Plan of Care Review/Shift note should be completed every shift.  The Outcome Evaluation is a brief statement about your assessment that the patient is improving, declining, or no change.  This information will be displayed automatically on your shiftnote.  Outcome: Progressing     Problem: Adult Inpatient Plan of Care  Goal: Patient-Specific Goal (Individualized)  Description: You can add care plan individualizations to a care plan. Examples of Individualization might be:  \"Parent requests to be called daily at 9am for status\", \"I have a hard time hearing out of my right ear\", or \"Do not touch me to wake me up as it startlesme\".  Outcome: Progressing   Goal Outcome Evaluation:       VSS on  3L. Up in chair. Reg diet tolerating well. A1GBW to bathroom. NS at 75ml/hr. Discharge pending                 "

## 2025-03-30 NOTE — PROGRESS NOTES
[facility] MEDICINE PROGRESS NOTE      Summary: 74 year old female into [facility] on 3/29/2025 after   Arriving for diarrhea.  She was recently discharged from [facility]  After being treated for an e coli UTI. She was discharged on cefdinir.    PMHx includes recurrent UTI, NELLIE, recurrent falls due to lumbar stenosis,  Chronic oxygen dependence    Diagnostics on arrival to the ER showed    On my interview in the ER she just had a diarrheal stool.  (Her second since  Arrival).  Original testing was negative for c diff or enteric pathogens. She has no abdominal symptoms.    3/29/2025:  she wants to know why she falls; lunch is at bedside, complains  Of burning on urination.   3/30/2025:  diarrhea stopped; has significant candida under breasts and in    Groin likely contributing to her dysuria; add oral diflucan to powder   Which is insufficient        Problem list:     Diarrhea: improved   UTI, complicated; history of: culture pending; rocephin   Chronic oxygen dependence due to severe NELLIE, HFpEF: resume  Diuretics tomorrow   NELLIE, severe: history of: normally uses bipap at night; plan to continue  Falls, recurrent due to severe degenerative lumbar disease: MRI lumbar from 12/24 reviewed; she had a NS  Appointment 2/21 when nonoperative recommendations were  Made due to her lack of interest in surgery; PT evaluation  7.  CKD3B, acute on chronic renal failure;  stable   7.  Dvt prevention: scd  8.  Disposition:  Medically Ready for Discharge: Anticipated in 2-4 Days     No Salas MD  [facility]  [facility]  Phone: #[phone]  Securely message with the Vocera Web Console (learn more here)  Text page via [employer] Paging/Directory     Interval History/Subjective: I have a rash under my breasts     Physical Exam/Objective:  Temp:  [96.9  F (36.1  C)-98.4  F (36.9  C)] 98  F (36.7  C)  Pulse:  [53-65] 62  Resp:  [16-20] 20  BP:  (117-140)/(58-73) 137/70  SpO2:  [92 %-100 %] 97 %  Body mass index is 40.46 kg/m .    GENERAL:  Alert, no distress    HEAD:  Normocephalic, without obvious abnormality, atraumatic   EYES:  PERRL, conjunctiva/corneas clear, no scleral icterus, EOM's intact   NOSE: Nares normal, septum midline, mucosa normal, no drainage   THROAT: Lips, mucosa, and tongue normal; teeth and gums normal, mouth moist   NECK: Supple, symmetrical, trachea midline   BACK:   Symmetric, no curvature, ROM normal   LUNGS:   Clear to auscultation bilaterally, no rales, rhonchi, or wheezing, symmetric chest rise on inhalation, respirations unlabored   CHEST WALL:  No tenderness or deformity   HEART:  Regular rate and rhythm, S1 and S2 normal, no murmur, rub, or gallop    ABDOMEN:   Soft, non-tender, bowel sounds active all four quadrants, no masses, no organomegaly, no rebound or guarding   EXTREMITIES: Extremities normal, atraumatic, no cyanosis or edema    SKIN: +++ candida under breasts and in groin   NEURO: Alert, oriented x3, moves all four extremities freely   PSYCH: Cooperative, behavior is appropriate      Medications:   Personally Reviewed.  Medications   Current Facility-Administered Medications   Medication Dose Route Frequency Provider Last Rate Last Admin    sodium chloride 0.9 % infusion   Intravenous Continuous No Salas MD 75 mL/hr at 03/30/25 0503 Restarted at 03/30/25 0503     Current Facility-Administered Medications   Medication Dose Route Frequency Provider Last Rate Last Admin    aspirin EC tablet 81 mg  81 mg Oral Daily No Salas MD   81 mg at 03/30/25 0824    azelastine (OPTIVAR) ophthalmic solution 1 drop  1 drop Both Eyes Daily No Salas MD   1 drop at 03/30/25 0828    cefTRIAXone (ROCEPHIN) 2 g vial to attach to  ml bag for ADULTS or NS 50 ml bag for PEDS  2 g Intravenous Q24H No Salas MD   2 g at 03/30/25 0947    DULoxetine (CYMBALTA) DR capsule 30 mg  30 mg Oral QPM Maritza  No AMADOR MD   30 mg at 03/29/25 2018    DULoxetine (CYMBALTA) DR capsule 60 mg  60 mg Oral No Lynn MD   60 mg at 03/30/25 0824    enoxaparin ANTICOAGULANT (LOVENOX) injection 40 mg  40 mg Subcutaneous Q12H Mati Reyes MD   40 mg at 03/30/25 0631    fluconazole (DIFLUCAN) tablet 200 mg  200 mg Oral Once No Salas MD        magnesium oxide (MAG-OX) tablet 400 mg  400 mg Oral Daily No Salas MD   400 mg at 03/30/25 0824    metoprolol succinate ER (TOPROL XL) 24 hr tablet 50 mg  50 mg Oral Daily No Salas MD   50 mg at 03/30/25 0824    miconazole (MICATIN) 2 % powder   Topical BID No Salas MD   Given at 03/30/25 0827    modafinil (PROVIGIL) half-tab 250 mg  250 mg Oral No Lynn MD   250 mg at 03/30/25 0918    montelukast (SINGULAIR) tablet 10 mg  10 mg Oral QPM No Salas MD   10 mg at 03/29/25 2018    pantoprazole (PROTONIX) EC tablet 40 mg  40 mg Oral ABHISHEK AC No Salas MD   40 mg at 03/30/25 0630    rosuvastatin (CRESTOR) tablet 10 mg  10 mg Oral Daily No Salas MD   10 mg at 03/30/25 0824    Vitamin D3 (CHOLECALCIFEROL) tablet 25 mcg  25 mcg Oral Daily No Salas MD   25 mcg at 03/30/25 0823

## 2025-03-30 NOTE — PLAN OF CARE
"Goal Outcome Evaluation:    Pt is an ED transfer. Pt is A/Ox4. However, pt stated that she had been having been \"alternate mental  status\" and was not a reliable source for information. Pt stated that she 15 falls. Pt declined to have weight taken.   Pt had been sitting in recliner. Pt refused to have a full skin assessment during, pt stated that she would been \"going to bed later and would allow a skin assessment at that time.\" Pt is to have BiPAP for the NOC.                          "

## 2025-03-31 ENCOUNTER — APPOINTMENT (OUTPATIENT)
Dept: PHYSICAL THERAPY | Facility: CLINIC | Age: 75
DRG: 552 | End: 2025-03-31
Payer: MEDICARE

## 2025-03-31 LAB
ANA SER QL IF: NEGATIVE
ANION GAP SERPL CALCULATED.3IONS-SCNC: 7 MMOL/L (ref 7–15)
BUN SERPL-MCNC: 35.6 MG/DL (ref 8–23)
CALCIUM SERPL-MCNC: 9.1 MG/DL (ref 8.8–10.4)
CHLORIDE SERPL-SCNC: 108 MMOL/L (ref 98–107)
CREAT SERPL-MCNC: 1.17 MG/DL (ref 0.51–0.95)
EGFRCR SERPLBLD CKD-EPI 2021: 49 ML/MIN/1.73M2
GLUCOSE SERPL-MCNC: 106 MG/DL (ref 70–99)
HCO3 SERPL-SCNC: 25 MMOL/L (ref 22–29)
PLATELET # BLD AUTO: 231 10E3/UL (ref 150–450)
POTASSIUM SERPL-SCNC: 4.6 MMOL/L (ref 3.4–5.3)
SODIUM SERPL-SCNC: 140 MMOL/L (ref 135–145)

## 2025-03-31 PROCEDURE — 84132 ASSAY OF SERUM POTASSIUM: CPT | Performed by: EMERGENCY MEDICINE

## 2025-03-31 PROCEDURE — 80048 BASIC METABOLIC PNL TOTAL CA: CPT | Performed by: EMERGENCY MEDICINE

## 2025-03-31 PROCEDURE — 99232 SBSQ HOSP IP/OBS MODERATE 35: CPT | Performed by: EMERGENCY MEDICINE

## 2025-03-31 PROCEDURE — 250N000013 HC RX MED GY IP 250 OP 250 PS 637: Performed by: EMERGENCY MEDICINE

## 2025-03-31 PROCEDURE — 250N000011 HC RX IP 250 OP 636: Performed by: INTERNAL MEDICINE

## 2025-03-31 PROCEDURE — 97116 GAIT TRAINING THERAPY: CPT | Mod: GP

## 2025-03-31 PROCEDURE — 120N000001 HC R&B MED SURG/OB

## 2025-03-31 PROCEDURE — 36415 COLL VENOUS BLD VENIPUNCTURE: CPT | Performed by: EMERGENCY MEDICINE

## 2025-03-31 PROCEDURE — 85049 AUTOMATED PLATELET COUNT: CPT | Performed by: INTERNAL MEDICINE

## 2025-03-31 PROCEDURE — 250N000013 HC RX MED GY IP 250 OP 250 PS 637: Performed by: INTERNAL MEDICINE

## 2025-03-31 PROCEDURE — 250N000011 HC RX IP 250 OP 636: Performed by: EMERGENCY MEDICINE

## 2025-03-31 RX ORDER — VALACYCLOVIR HYDROCHLORIDE 1 G/1
1000 TABLET, FILM COATED ORAL 2 TIMES DAILY
Status: DISCONTINUED | OUTPATIENT
Start: 2025-03-31 | End: 2025-04-01 | Stop reason: HOSPADM

## 2025-03-31 RX ADMIN — VALACYCLOVIR 1000 MG: 1 TABLET, FILM COATED ORAL at 19:48

## 2025-03-31 RX ADMIN — AZELASTINE HYDROCHLORIDE 1 DROP: 0.5 SOLUTION/ DROPS INTRAOCULAR at 07:47

## 2025-03-31 RX ADMIN — DULOXETINE HYDROCHLORIDE 60 MG: 60 CAPSULE, DELAYED RELEASE ORAL at 07:45

## 2025-03-31 RX ADMIN — Medication 25 MCG: at 07:47

## 2025-03-31 RX ADMIN — CEFTRIAXONE SODIUM 2 G: 2 INJECTION, POWDER, FOR SOLUTION INTRAMUSCULAR; INTRAVENOUS at 07:49

## 2025-03-31 RX ADMIN — MODAFINIL 250 MG: 100 TABLET ORAL at 10:28

## 2025-03-31 RX ADMIN — ROSUVASTATIN CALCIUM 10 MG: 10 TABLET, FILM COATED ORAL at 07:47

## 2025-03-31 RX ADMIN — PANTOPRAZOLE SODIUM 40 MG: 40 TABLET, DELAYED RELEASE ORAL at 07:47

## 2025-03-31 RX ADMIN — MICONAZOLE NITRATE: 2 POWDER TOPICAL at 22:03

## 2025-03-31 RX ADMIN — DULOXETINE HYDROCHLORIDE 30 MG: 30 CAPSULE, DELAYED RELEASE ORAL at 19:49

## 2025-03-31 RX ADMIN — MICONAZOLE NITRATE: 2 POWDER TOPICAL at 07:49

## 2025-03-31 RX ADMIN — Medication 400 MG: at 07:44

## 2025-03-31 RX ADMIN — ENOXAPARIN SODIUM 40 MG: 40 INJECTION SUBCUTANEOUS at 07:47

## 2025-03-31 RX ADMIN — MONTELUKAST 10 MG: 10 TABLET, FILM COATED ORAL at 19:49

## 2025-03-31 RX ADMIN — HYDROCODONE BITARTRATE AND ACETAMINOPHEN 1 TABLET: 5; 325 TABLET ORAL at 07:44

## 2025-03-31 RX ADMIN — ENOXAPARIN SODIUM 40 MG: 40 INJECTION SUBCUTANEOUS at 19:48

## 2025-03-31 RX ADMIN — ASPIRIN 81 MG: 81 TABLET, COATED ORAL at 07:44

## 2025-03-31 RX ADMIN — METOPROLOL SUCCINATE 50 MG: 25 TABLET, EXTENDED RELEASE ORAL at 07:44

## 2025-03-31 ASSESSMENT — ACTIVITIES OF DAILY LIVING (ADL)
ADLS_ACUITY_SCORE: 69
ADLS_ACUITY_SCORE: 76
ADLS_ACUITY_SCORE: 72
ADLS_ACUITY_SCORE: 69
ADLS_ACUITY_SCORE: 72
ADLS_ACUITY_SCORE: 69
ADLS_ACUITY_SCORE: 69
ADLS_ACUITY_SCORE: 72
ADLS_ACUITY_SCORE: 69
ADLS_ACUITY_SCORE: 72
ADLS_ACUITY_SCORE: 76
ADLS_ACUITY_SCORE: 69
ADLS_ACUITY_SCORE: 72

## 2025-03-31 NOTE — PLAN OF CARE
Problem: UTI (Urinary Tract Infection)  Goal: Improved Infection Symptoms  Outcome: Progressing   Goal Outcome Evaluation:       Afebrile, receiving iv antibiotics. Alert and oriented x 4. Ambulating in room with assist of one, walker and gait belt. Pleasant and cooperative.

## 2025-03-31 NOTE — PLAN OF CARE
"  Problem: Delirium  Goal: Improved Behavioral Control  Intervention: Minimize Safety Risk  Recent Flowsheet Documentation  Taken 3/31/2025 0000 by Gerri Bernard RN  Enhanced Safety Measures: room near unit station   Goal Outcome Evaluation:       Pt A/O x4. On 3 L NC(base line).afebrile. VSS. /65 (BP Location: Left arm)   Pulse 60   Temp 97.5  F (36.4  C) (Oral)   Resp 18   Ht 1.499 m (4' 11\")   Wt 90.9 kg (200 lb 4.8 oz)   SpO2 100%   BMI 40.46 kg/m          Pt denied chest pain. Dizziness, palpitation. SOB at rest but exertional. Voided. Had BM. Tolerated diet. Gets up and ambulate in the room with a ssitt of 1. Tolerated activity. Call light with in reach.            "

## 2025-03-31 NOTE — PROGRESS NOTES
[facility] MEDICINE PROGRESS NOTE      Summary: 74 year old female into [facility] on 3/29/2025 after   Arriving for diarrhea.  She was recently discharged from [facility]  After being treated for an e coli UTI. She was discharged on cefdinir.    PMHx includes recurrent UTI, NELLIE, recurrent falls due to lumbar stenosis,  Chronic oxygen dependence    Diagnostics on arrival to the ER showed an GRACY. Stool testing was  Negative.  Imaging     On my interview in the ER she just had a diarrheal stool.  (Her second since  Arrival).  Original testing was negative for c diff or enteric pathogens. She has no abdominal symptoms.    3/29/2025:  she wants to know why she falls; lunch is at bedside, complains  Of burning on urination.   3/30/2025:  diarrhea stopped; has significant candida under breasts and in    Groin likely contributing to her dysuria; add oral diflucan to powder   Which is insufficient  3/31/2025:  stable overnight; less burning with urination;    Urine culture negative; stop iv antibiotics; plan for   Discharge tomorrow         Problem list:     Diarrhea: improved   UTI, complicated; history of: no acute infection   Candidiasis: under breasts, bilateral inguinal areas; improved  Today after diflucan yesterday; will plan to repeat  The dose in 1 week  Chronic oxygen dependence due to severe NELLIE, HFpEF: resume diuretics tomorrow   NELLIE, severe: history of: normally uses bipap at night; plan to continue  Falls, recurrent due to severe degenerative lumbar disease: MRI lumbar from 12/24 reviewed; she had a NS  Appointment 2/21 when nonoperative recommendations were  Made due to her lack of interest in surgery;  7.  Neuropathy: due to lumbar stenosis: she is frustrated over   Tingling in the feet; has a neurology appt in the next week;   Refusing gabapentin, lyrica;   8.  CKD3B, acute on chronic renal failure;  improved  9.  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  Dvt prevention: scd  10.  Disposition:  Medically Ready for Discharge: tomorrow     No Salas MD  [facility]  Phone: #[phone]  Securely message with the Vocera Web Console (learn more here)  Text page via [employer] Paging/Directory     Interval History/Subjective: am I really going home tomorrow?     Physical Exam/Objective:  Temp:  [96.9  F (36.1  C)-98.1  F (36.7  C)] 97.2  F (36.2  C)  Pulse:  [58-67] 58  Resp:  [18-20] 18  BP: (121-144)/(64-95) 121/67  SpO2:  [98 %-100 %] 100 %  Body mass index is 41.36 kg/m .    GENERAL:  Alert, no distress    HEAD:  Normocephalic, without obvious abnormality, atraumatic   EYES:  PERRL, conjunctiva/corneas clear, no scleral icterus, EOM's intact   NOSE: Nares normal, septum midline, mucosa normal, no drainage   THROAT: Small ulcer midline upper lip    NECK: Supple, symmetrical, trachea midline   BACK:   Symmetric, no curvature, ROM normal   LUNGS:   Clear to auscultation bilaterally, no rales, rhonchi, or wheezing, symmetric chest rise on inhalation, respirations unlabored   CHEST WALL:  No tenderness or deformity   HEART:  Regular rate and rhythm, S1 and S2 normal, no murmur, rub, or gallop    ABDOMEN:   Soft, non-tender, bowel sounds active all four quadrants, no masses, no organomegaly, no rebound or guarding   EXTREMITIES: 1+ pitting edema bilaterally; no redness or open sores   SKIN: +++ candida under breasts and in groin   NEURO: Alert, oriented x3, moves all four extremities freely   PSYCH: Cooperative, behavior is appropriate      Medications:   Personally Reviewed.  Medications   Current Facility-Administered Medications   Medication Dose Route Frequency Provider Last Rate Last Admin    sodium chloride 0.9 % infusion   Intravenous Continuous No Salas MD 75 mL/hr at 03/30/25 2300 Rate Verify at 03/30/25 2300     Current Facility-Administered Medications   Medication Dose Route Frequency  Provider Last Rate Last Admin    aspirin EC tablet 81 mg  81 mg Oral Daily No Salas MD   81 mg at 03/31/25 0744    azelastine (OPTIVAR) ophthalmic solution 1 drop  1 drop Both Eyes Daily No Salas MD   1 drop at 03/31/25 0747    DULoxetine (CYMBALTA) DR capsule 30 mg  30 mg Oral QPM No Salas MD   30 mg at 03/30/25 2035    DULoxetine (CYMBALTA) DR capsule 60 mg  60 mg Oral QA No Salas MD   60 mg at 03/31/25 0745    enoxaparin ANTICOAGULANT (LOVENOX) injection 40 mg  40 mg Subcutaneous Q12H Mati Reyes MD   40 mg at 03/31/25 0747    magnesium oxide (MAG-OX) tablet 400 mg  400 mg Oral Daily No Salas MD   400 mg at 03/31/25 0744    metoprolol succinate ER (TOPROL XL) 24 hr tablet 50 mg  50 mg Oral Daily No Salas MD   50 mg at 03/31/25 0744    miconazole (MICATIN) 2 % powder   Topical BID No Salas MD   Given at 03/31/25 0749    modafinil (PROVIGIL) half-tab 250 mg  250 mg Oral No Lynn MD   250 mg at 03/31/25 1028    montelukast (SINGULAIR) tablet 10 mg  10 mg Oral QPM No Salas MD   10 mg at 03/30/25 2035    pantoprazole (PROTONIX) EC tablet 40 mg  40 mg Oral QAM AC No Salas MD   40 mg at 03/31/25 0747    rosuvastatin (CRESTOR) tablet 10 mg  10 mg Oral Daily No Salas MD   10 mg at 03/31/25 0747    valACYclovir (VALTREX) tablet 1,000 mg  1,000 mg Oral BID No Salas MD        Vitamin D3 (CHOLECALCIFEROL) tablet 25 mcg  25 mcg Oral Daily No Salas MD   25 mcg at 03/31/25 0747

## 2025-03-31 NOTE — PLAN OF CARE
Desirae is A & O x4, able to make needs known. On 2L at baseline. LE edema 2+, jack, tingly. Had headache in early morning, Norco effective. IV Rocephin given. Panus rash much improved. New Valcyclovir ordered for new cold sore. Anticipating discharging home tomorrow. VSS.

## 2025-04-01 VITALS
WEIGHT: 207.9 LBS | HEART RATE: 72 BPM | SYSTOLIC BLOOD PRESSURE: 169 MMHG | BODY MASS INDEX: 41.91 KG/M2 | OXYGEN SATURATION: 91 % | DIASTOLIC BLOOD PRESSURE: 79 MMHG | TEMPERATURE: 98.3 F | HEIGHT: 59 IN | RESPIRATION RATE: 18 BRPM

## 2025-04-01 LAB
ANION GAP SERPL CALCULATED.3IONS-SCNC: 7 MMOL/L (ref 7–15)
BUN SERPL-MCNC: 31 MG/DL (ref 8–23)
CALCIUM SERPL-MCNC: 9.6 MG/DL (ref 8.8–10.4)
CHLORIDE SERPL-SCNC: 108 MMOL/L (ref 98–107)
CREAT SERPL-MCNC: 1.08 MG/DL (ref 0.51–0.95)
EGFRCR SERPLBLD CKD-EPI 2021: 54 ML/MIN/1.73M2
GLUCOSE SERPL-MCNC: 95 MG/DL (ref 70–99)
HCO3 SERPL-SCNC: 26 MMOL/L (ref 22–29)
POTASSIUM SERPL-SCNC: 5 MMOL/L (ref 3.4–5.3)
SODIUM SERPL-SCNC: 141 MMOL/L (ref 135–145)

## 2025-04-01 PROCEDURE — 94660 CPAP INITIATION&MGMT: CPT

## 2025-04-01 PROCEDURE — 250N000013 HC RX MED GY IP 250 OP 250 PS 637: Performed by: EMERGENCY MEDICINE

## 2025-04-01 PROCEDURE — 250N000011 HC RX IP 250 OP 636: Performed by: INTERNAL MEDICINE

## 2025-04-01 PROCEDURE — 80048 BASIC METABOLIC PNL TOTAL CA: CPT | Performed by: EMERGENCY MEDICINE

## 2025-04-01 PROCEDURE — 99238 HOSP IP/OBS DSCHRG MGMT 30/<: CPT | Performed by: EMERGENCY MEDICINE

## 2025-04-01 PROCEDURE — 250N000013 HC RX MED GY IP 250 OP 250 PS 637: Performed by: INTERNAL MEDICINE

## 2025-04-01 PROCEDURE — 36415 COLL VENOUS BLD VENIPUNCTURE: CPT | Performed by: EMERGENCY MEDICINE

## 2025-04-01 PROCEDURE — 999N000157 HC STATISTIC RCP TIME EA 10 MIN

## 2025-04-01 RX ORDER — FUROSEMIDE 40 MG/1
40 TABLET ORAL EVERY MORNING
Status: DISCONTINUED | OUTPATIENT
Start: 2025-04-01 | End: 2025-04-01 | Stop reason: HOSPADM

## 2025-04-01 RX ORDER — FUROSEMIDE 20 MG/1
20 TABLET ORAL EVERY EVENING
Status: DISCONTINUED | OUTPATIENT
Start: 2025-04-01 | End: 2025-04-01 | Stop reason: HOSPADM

## 2025-04-01 RX ORDER — VALACYCLOVIR HYDROCHLORIDE 1 G/1
1000 TABLET, FILM COATED ORAL 2 TIMES DAILY
Qty: 10 TABLET | Refills: 0 | Status: SHIPPED | OUTPATIENT
Start: 2025-04-01

## 2025-04-01 RX ORDER — FLUCONAZOLE 200 MG/1
200 TABLET ORAL ONCE
Qty: 1 TABLET | Refills: 0 | Status: SHIPPED | OUTPATIENT
Start: 2025-04-07 | End: 2025-04-07

## 2025-04-01 RX ADMIN — ROSUVASTATIN CALCIUM 10 MG: 10 TABLET, FILM COATED ORAL at 09:45

## 2025-04-01 RX ADMIN — ASPIRIN 81 MG: 81 TABLET, COATED ORAL at 09:46

## 2025-04-01 RX ADMIN — HYDROCODONE BITARTRATE AND ACETAMINOPHEN 1 TABLET: 5; 325 TABLET ORAL at 00:08

## 2025-04-01 RX ADMIN — Medication 400 MG: at 09:46

## 2025-04-01 RX ADMIN — FUROSEMIDE 40 MG: 40 TABLET ORAL at 09:46

## 2025-04-01 RX ADMIN — VALACYCLOVIR 1000 MG: 1 TABLET, FILM COATED ORAL at 09:46

## 2025-04-01 RX ADMIN — MICONAZOLE NITRATE: 2 POWDER TOPICAL at 09:47

## 2025-04-01 RX ADMIN — PANTOPRAZOLE SODIUM 40 MG: 40 TABLET, DELAYED RELEASE ORAL at 06:39

## 2025-04-01 RX ADMIN — Medication 25 MCG: at 09:46

## 2025-04-01 RX ADMIN — DULOXETINE HYDROCHLORIDE 60 MG: 60 CAPSULE, DELAYED RELEASE ORAL at 09:46

## 2025-04-01 RX ADMIN — MODAFINIL 250 MG: 100 TABLET ORAL at 10:26

## 2025-04-01 RX ADMIN — AZELASTINE HYDROCHLORIDE 1 DROP: 0.5 SOLUTION/ DROPS INTRAOCULAR at 09:46

## 2025-04-01 RX ADMIN — ENOXAPARIN SODIUM 40 MG: 40 INJECTION SUBCUTANEOUS at 06:39

## 2025-04-01 RX ADMIN — METOPROLOL SUCCINATE 50 MG: 25 TABLET, EXTENDED RELEASE ORAL at 09:46

## 2025-04-01 ASSESSMENT — ACTIVITIES OF DAILY LIVING (ADL)
ADLS_ACUITY_SCORE: 69
ADLS_ACUITY_SCORE: 69
ADLS_ACUITY_SCORE: 67
ADLS_ACUITY_SCORE: 69

## 2025-04-01 NOTE — PLAN OF CARE
Problem: Adult Inpatient Plan of Care  Goal: Optimal Comfort and Wellbeing  Outcome: Progressing  Intervention: Provide Person-Centered Care    Problem: UTI (Urinary Tract Infection)  Goal: Improved Infection Symptoms  Outcome: Progressing      Goal Outcome Evaluation:  Patient alert and oriented x4, able to make needs known, denies pain, ADL provided as needed, activity encouraged, discharge pending.

## 2025-04-01 NOTE — PROGRESS NOTES
Documentation of Face-to-Face and Certification for Home Health Services     Patient: Desirae Rey   YOB: 1950  MR Number: [id number]  Today's Date: 4/1/2025    I certify that patient: Desirae Rey is under my care and that I, or a nurse practitioner or physician's assistant working with me, had a face-to-face encounter that meets the physician face-to-face encounter requirements with this patient on: 4/1/2025.    This encounter with the patient was in whole, or in part, for the following medical condition, which is the primary reason for home health care: lumbar stenosis with neuropathy.    I certify that, based on my findings, the following services are medically necessary home health services: Nursing, Occupational Therapy, and Physical Therapy.    My clinical findings support the need for the above services because:  lumbar stenosis, neuropathy      Further, I certify that my clinical findings support that this patient is homebound (i.e. absences from home require considerable and taxing effort and are for medical reasons or [facility] services or infrequently or of short duration when for other reasons) because: Leaving home is medically contraindicated for the following reason(s): lumbar stenosis..    Based on the above findings. I certify that this patient is confined to the home and needs intermittent skilled nursing care, physical therapy and/or speech therapy.  The patient is under my care, and I have initiated the establishment of the plan of care.  This patient will be followed by a physician who will periodically review the plan of care.  Physician/Provider to provide follow up care: Daysi Bryan    Responsible Medicare certified [place] Physician: peter   Physician Signature: See electronic signature associated with these discharge orders.  Date: 4/1/2025

## 2025-04-01 NOTE — PROGRESS NOTES
Patient discharged with family/friend to transport to home. Patient cleared for discharge, on oxygen at time of discharge. Free of PIV access. Belongings remain with pt at discharge. AVS reviewed with pt, questions answered, education complete.

## 2025-04-01 NOTE — PROGRESS NOTES
Care Management Follow Up    Length of Stay (days): 3    Expected Discharge Date: 04/01/2025     Concerns to be Addressed: discharge planning  home with HC  Patient plan of care discussed at interdisciplinary rounds: Yes    Anticipated Discharge Disposition: Home, Home Care      Anticipated Discharge Services: Home Care  Anticipated Discharge DME: None    Patient/family educated on Medicare website which has current facility and service quality ratings: yes  Education Provided on the Discharge Plan: Yes  Patient/Family in Agreement with the Plan: yes    Referrals Placed by CM/SW: Homecare  Private pay costs discussed: Not applicable    Discussed  Partnership in Safe Discharge Planning  document with patient/family: No     Handoff Completed: No, handoff not indicated or clinically appropriate    Additional Information:    Chart reviewed.  PT and OT discharge recommendations reviewed.    Anticipate discharge home; resume homecare.  Family transport.    9:00 AM  [facility] - Jazzy confirms, can accept pt for SN, PT, OT.    Next Steps:   HC orders at discharge    CM will continue to follow.     Braydon Dacosta RN

## 2025-04-01 NOTE — DISCHARGE SUMMARY
[facility] MEDICINE  DISCHARGE SUMMARY     Primary Care Physician: Daysi Bryan  Admission Date: 3/28/2025   Discharge Provider: No Salas MD Discharge Date: 4/1/2025   Diet:   Active Diet and Nourishment Order   Procedures    Regular Diet Adult       Code Status: Full Code   Activity: DCACTIVITY: Activity as tolerated        Condition at Discharge: Stable     REASON FOR PRESENTATION(See Admission Note for Details)     Diarrhea     PRINCIPAL & ACTIVE DISCHARGE DIAGNOSES       Diarrhea  Candidiasis  CKD 3b  NELLIE, severe  CKD 3b  Herpes simplex   HTN, essential:  HFpEF, history of    PENDING LABS     Unresulted Labs Ordered in the Past 30 Days of this Admission       No orders found from 2/26/2025 to 3/29/2025.          DISPOSITION     Discharge home       SUMMARY OF HOSPITAL COURSE:      74 year old female into [facility] on 3/38/2025 after presenting from home with diarrhea.  Pt was recently discharged from [facility] due to a complicated UTI, she was treated for an e coli infection though not discharged on antibiotics.    PMHx includes complicated UTI, lumbar stenosis, chronic oxygen dependence, neuropathy due to underlying lumbar stenosis, NELLIE,   Hypertension, HFpEF,     Diagnostics on arrival to the ER showed a creatinine of 1.09, bnp of 5300, potassium of 5.4.  Her VBG was 7.31 with a pH of 55.    Her stool was negative for enteric pathogens of clostridium dificile.    Hospital course is marked by an initial assessment of her diarrhea which proved not to be a high volume watery issue.  She had no abdominal cramps.  CT of the abdomen/pelvis was negative for acute pathology.  After admission is became clear she had multiple issues. She wanted to know why she was falling. (Though she had a recent NS clinic appointment to discuss her underlying lumbar stenosis; she declined surgery).  She complained of burning with her urination.  The urinalysis was mildly active though  her culture was negative.  She had a significant amount of candida on her skin including areas under her breasts and bilateral inguinal areas.  I gave her diflucan oral due to the severity of her rash.  The following day the dysuria had improved.     She was able to transfer to the bathroom and across the room. While she is not the strongest person on her feet she was cleared for medical discharge. She has an appointment with the neurology team in the next week.  New prescriptions include diflucan and  Valtrex.  (Due to her new herpes simplex on her upper lip)      Discharge Medications with Med changes:     Current Discharge Medication List        START taking these medications    Details   fluconazole (DIFLUCAN) 200 MG tablet Take 1 tablet (200 mg) by mouth once for 1 dose.  Qty: 1 tablet, Refills: 0    Associated Diagnoses: Candidiasis of skin      valACYclovir (VALTREX) 1000 mg tablet Take 1 tablet (1,000 mg) by mouth 2 times daily.  Qty: 10 tablet, Refills: 0    Associated Diagnoses: Herpes simplex virus infection           CONTINUE these medications which have NOT CHANGED    Details   acetaminophen (TYLENOL) 500 MG tablet Take 500 mg by mouth every 6 hours as needed for mild pain      albuterol (PROAIR HFA;PROVENTIL HFA;VENTOLIN HFA) 90 mcg/actuation inhaler Inhale 1 puff into the lungs every 4 hours as needed for shortness of breath / dyspnea      alendronate (FOSAMAX) 70 MG tablet [ALENDRONATE (FOSAMAX) 70 MG TABLET] Take 70 mg by mouth every 7 days. Takes on Mondays  Refills: 11      aspirin 81 MG EC tablet 1 tablet Orally Once a day      azelastine (OPTIVAR) 0.05 % ophthalmic solution Place 1 drop into both eyes 2 times daily as needed.      calcium citrate (CITRACAL) 950 (200 Ca) MG tablet Take 1 tablet by mouth daily      cetirizine (ZYRTEC) 10 MG tablet [CETIRIZINE (ZYRTEC) 10 MG TABLET] Take 10 mg by mouth daily.       cholecalciferol (VITAMIN D3) 25 mcg (1000 units) capsule Take 1 capsule by mouth  daily.      CRESTOR 10 MG tablet Take 10 mg by mouth daily.      docusate sodium (COLACE) 100 MG capsule Take 1-3 capsules by mouth daily as needed for constipation.      !! DULoxetine (CYMBALTA) 30 MG capsule Take 60 mg by mouth every morning.      !! DULoxetine (CYMBALTA) 30 MG capsule Take 30 mg by mouth every evening.      esomeprazole (NEXIUM) 20 MG DR capsule Take 40 mg by mouth every morning (before breakfast). Take 30-60 minutes before eating.      fluticasone-salmeterol (AIRDUO RESPICLICK) 113-14 MCG/ACT inhaler Inhale 1 puff into the lungs as needed (shortness of breath).      !! furosemide (LASIX) 20 MG tablet Take 40 mg by mouth every morning.      !! furosemide (LASIX) 20 MG tablet Take 20 mg by mouth every evening.      HYDROcodone-acetaminophen (NORCO) 5-325 MG tablet Take 1 tablet by mouth every 4 hours as needed for pain Max 6 tablets per day.  Qty: 60 tablet, Refills: 0    Associated Diagnoses: Cervical radiculopathy      l-lysine HCl 500 MG TABS tablet Take 500 mg by mouth daily.      magnesium oxide 250 mg Tab Take 500 mg by mouth daily      metoprolol succinate ER (TOPROL XL) 50 MG 24 hr tablet Take 1 tablet (50 mg) by mouth daily.  Qty: 30 tablet, Refills: 1    Associated Diagnoses: Chronic diastolic congestive heart failure (H)      modafinil (PROVIGIL) 100 MG tablet Take 250 mg by mouth every morning.      montelukast (SINGULAIR) 10 mg tablet Take 10 mg by mouth every evening      tiZANidine (ZANAFLEX) 4 MG tablet Take 4 mg by mouth 3 times daily as needed for muscle spasms      Turmeric (CURCUPLEX-95) 500 MG CAPS Take 1 capsule by mouth daily      OXYGEN-AIR DELIVERY SYSTEMS MISC [OXYGEN-AIR DELIVERY SYSTEMS MISC] Use 3 L As Directed. 3 lpm with activities and as needed at night       !! - Potential duplicate medications found. Please discuss with provider.                Rationale for medication changes:              Consults       CARE MANAGEMENT / SOCIAL WORK IP CONSULT  PHYSICAL THERAPY  "ADULT IP CONSULT  OCCUPATIONAL THERAPY ADULT IP CONSULT  CARE MANAGEMENT / SOCIAL WORK IP CONSULT    Immunizations given this encounter     Most Recent Immunizations   Administered Date(s) Administered    COVID-19 12+ (Pfizer) 09/30/2024    COVID-19 Bivalent 12+ (Pfizer) 09/21/2022    COVID-19 Monovalent 18+ (Moderna) 04/06/2022    Flu 65+ (Fluad) 09/13/2024    Flu, Unspecified 09/26/2012    Influenza (intradermal) 09/21/2015    Influenza Vaccine 65+ (FLUAD) 11/01/2023    Pneumo Conj 13-V (2010&after) 05/10/2019    Pneumococcal 23 valent 06/25/2020    TDAP Vaccine (Adacel) 03/10/2014    Td,adult,historic,unspecified 05/17/2004           Anticoagulation Information      Recent INR results: No results for input(s): \"INR\" in the last 168 hours.  Warfarin doses (if applicable) or name of other anticoagulant:       SIGNIFICANT IMAGING FINDINGS     No results found for this visit on 03/28/25.    SIGNIFICANT LABORATORY FINDINGS     Most Recent 3 BMP's:  Recent Labs   Lab Test 04/01/25  0746 03/31/25  0602 03/30/25  0650    140 138   POTASSIUM 5.0 4.6 3.8   CHLORIDE 108* 108* 106   CO2 26 25 23   BUN 31.0* 35.6* 43.3*   CR 1.08* 1.17* 1.50*   ANIONGAP 7 7 9   LYNN 9.6 9.1 8.5*   GLC 95 106* 103*           Discharge Orders         Examination   Physical Exam   Temp:  [97.2  F (36.2  C)-98.5  F (36.9  C)] 98.3  F (36.8  C)  Pulse:  [58-72] 72  Resp:  [16-18] 18  BP: (121-169)/(67-79) 169/79  SpO2:  [91 %-100 %] 91 %  Wt Readings from Last 1 Encounters:   04/01/25 94.3 kg (207 lb 14.4 oz)       General Appearance: Alert, oriented; no distress  Respiratory: clear bilaterally  Cardiovascular: regular  GI: soft, NDNT, +BS  Skin: bilateral inframammary candida; improved   Neuro:  nonfocal       Please see EMR for more detailed significant labs, imaging, consultant notes etc.    INo MD, personally saw the patient today and spent less than or equal to 30 minutes discharging this patient.    No Salas MD  M " [facility]    CC:Daysi Bryan

## 2025-04-01 NOTE — PROGRESS NOTES
Care Management Discharge Note    Discharge Date: 04/01/2025       Discharge Disposition: Home, Home Care    Discharge Services: Home Care    Discharge DME: None    Discharge Transportation: family or friend will provide    Private pay costs discussed: Not applicable    Does the patient's insurance plan have a 3 day qualifying hospital stay waiver?  No    PAS Confirmation Code:  N/A  Patient/family educated on Medicare website which has current facility and service quality ratings: yes    Education Provided on the Discharge Plan: Yes AVS per bedside nurse   Persons Notified of Discharge Plans: patient, HC agency  Patient/Family in Agreement with the Plan: yes    Handoff Referral Completed:  Care Coordination referral sent per protocol.     Additional Information:    Pt discharging to home with homecare; family transport.    [facility] - accepted for SN, PT, OT.  Notified Jazzy at HC agency of pt's discharge today.  Discharge orders sent to HC agency via [employer].     No further care management intervention anticipated at this time.  Please re-consult if further needs arise.  Care management signing off.      Braydon Dacosta RN

## 2025-04-01 NOTE — PROGRESS NOTES
Occupational Therapy Discharge Summary    Reason for therapy discharge:    Discharged to home with home therapy.    Progress towards therapy goal(s). See goals on Care Plan in [facility] electronic health record for goal details.  Goals partially met.  Barriers to achieving goals:   discharge from facility.    Therapy recommendation(s):    Continued therapy is recommended.  Rationale/Recommendations:  To increase indep with ADLs and trsfs.

## 2025-04-01 NOTE — PLAN OF CARE
Physical Therapy Discharge Summary    Reason for therapy discharge:    Discharged to home with home therapy with family assist.    Progress towards therapy goal(s). See goals on Care Plan in [facility] electronic health record for goal details.  Goals partially met.  Barriers to achieving goals:   discharge from facility.    Therapy recommendation(s):    Continued therapy is recommended.  Rationale/Recommendations:  home PT recommended.

## 2025-04-01 NOTE — PLAN OF CARE
Problem: Adult Inpatient Plan of Care  Goal: Optimal Comfort and Wellbeing  Outcome: Progressing  Intervention: Monitor Pain and Promote Comfort  Recent Flowsheet Documentation  Taken 4/1/2025 0008 by Amena Colmenares, RN  Pain Management Interventions: medication (see MAR)  Intervention: Provide Person-Centered Care  Recent Flowsheet Documentation  Taken 4/1/2025 0008 by Amena Colmenares, RN  Trust Relationship/Rapport:   care explained   choices provided   Goal Outcome Evaluation:       Pt is alert and oriented x4. C/o pain to both arms. PRN Norco given. O2 at 3LPM via NC on baseline. CPAP use at NOC. Pt on SBA w walker. No BM this shift. VSS. Potentially discharge to home today.

## 2025-04-09 ENCOUNTER — HOSPITAL ENCOUNTER (OUTPATIENT)
Facility: CLINIC | Age: 75
Setting detail: OBSERVATION
End: 2025-04-09
Attending: EMERGENCY MEDICINE
Payer: MEDICARE

## 2025-04-09 DIAGNOSIS — J96.11 CHRONIC RESPIRATORY FAILURE WITH HYPOXIA (H): ICD-10-CM

## 2025-04-09 DIAGNOSIS — E66.2 OBESITY HYPOVENTILATION SYNDROME (H): ICD-10-CM

## 2025-04-09 DIAGNOSIS — R53.1 GENERALIZED WEAKNESS: ICD-10-CM

## 2025-04-09 DIAGNOSIS — I27.20 PULMONARY HYPERTENSION (H): ICD-10-CM

## 2025-04-09 DIAGNOSIS — M50.30 DDD (DEGENERATIVE DISC DISEASE), CERVICAL: ICD-10-CM

## 2025-04-09 DIAGNOSIS — K21.9 GASTROESOPHAGEAL REFLUX DISEASE WITHOUT ESOPHAGITIS: ICD-10-CM

## 2025-04-09 DIAGNOSIS — M48.062 SPINAL STENOSIS OF LUMBAR REGION WITH NEUROGENIC CLAUDICATION: Primary | ICD-10-CM

## 2025-04-09 PROBLEM — I50.32 CHRONIC HEART FAILURE WITH PRESERVED EJECTION FRACTION (HFPEF) (H): Status: ACTIVE | Noted: 2022-10-31

## 2025-04-09 PROBLEM — M48.061 STENOSIS, SPINAL, LUMBAR: Status: ACTIVE | Noted: 2024-12-22

## 2025-04-09 PROBLEM — I10 BENIGN ESSENTIAL HYPERTENSION: Status: ACTIVE | Noted: 2017-04-27
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
## 2025-04-09 LAB
ALBUMIN UR-MCNC: 10 MG/DL
ANION GAP SERPL CALCULATED.3IONS-SCNC: 13 MMOL/L (ref 7–15)
APPEARANCE UR: CLEAR
ATRIAL RATE - MUSE: 65 BPM
BACTERIA #/AREA URNS HPF: ABNORMAL /HPF
BASOPHILS # BLD AUTO: 0 10E3/UL (ref 0–0.2)
BASOPHILS NFR BLD AUTO: 1 %
BILIRUB UR QL STRIP: NEGATIVE
BUN SERPL-MCNC: 41.9 MG/DL (ref 8–23)
CALCIUM SERPL-MCNC: 10.4 MG/DL (ref 8.8–10.4)
CHLORIDE SERPL-SCNC: 98 MMOL/L (ref 98–107)
COLOR UR AUTO: COLORLESS
CREAT SERPL-MCNC: 1.59 MG/DL (ref 0.51–0.95)
DIASTOLIC BLOOD PRESSURE - MUSE: 72 MMHG
EGFRCR SERPLBLD CKD-EPI 2021: 34 ML/MIN/1.73M2
EOSINOPHIL # BLD AUTO: 0.3 10E3/UL (ref 0–0.7)
EOSINOPHIL NFR BLD AUTO: 4 %
ERYTHROCYTE [DISTWIDTH] IN BLOOD BY AUTOMATED COUNT: 12.9 % (ref 10–15)
GLUCOSE SERPL-MCNC: 95 MG/DL (ref 70–99)
GLUCOSE UR STRIP-MCNC: NEGATIVE MG/DL
HCO3 SERPL-SCNC: 28 MMOL/L (ref 22–29)
HCT VFR BLD AUTO: 36.7 % (ref 35–47)
HGB BLD-MCNC: 12 G/DL (ref 11.7–15.7)
HGB UR QL STRIP: NEGATIVE
IMM GRANULOCYTES # BLD: 0 10E3/UL
IMM GRANULOCYTES NFR BLD: 1 %
INTERPRETATION ECG - MUSE: NORMAL
KETONES UR STRIP-MCNC: NEGATIVE MG/DL
LEUKOCYTE ESTERASE UR QL STRIP: NEGATIVE
LYMPHOCYTES # BLD AUTO: 1.8 10E3/UL (ref 0.8–5.3)
LYMPHOCYTES NFR BLD AUTO: 24 %
MAGNESIUM SERPL-MCNC: 1.9 MG/DL (ref 1.7–2.3)
MCH RBC QN AUTO: 31.1 PG (ref 26.5–33)
MCHC RBC AUTO-ENTMCNC: 32.7 G/DL (ref 31.5–36.5)
MCV RBC AUTO: 95 FL (ref 78–100)
MONOCYTES # BLD AUTO: 0.8 10E3/UL (ref 0–1.3)
MONOCYTES NFR BLD AUTO: 10 %
MUCOUS THREADS #/AREA URNS LPF: PRESENT /LPF
NEUTROPHILS # BLD AUTO: 4.7 10E3/UL (ref 1.6–8.3)
NEUTROPHILS NFR BLD AUTO: 61 %
NITRATE UR QL: NEGATIVE
NRBC # BLD AUTO: 0 10E3/UL
NRBC BLD AUTO-RTO: 0 /100
P AXIS - MUSE: -32 DEGREES
PH UR STRIP: 6 [PH] (ref 5–7)
PLATELET # BLD AUTO: 384 10E3/UL (ref 150–450)
POTASSIUM SERPL-SCNC: 4.5 MMOL/L (ref 3.4–5.3)
PR INTERVAL - MUSE: 198 MS
QRS DURATION - MUSE: 100 MS
QT - MUSE: 398 MS
QTC - MUSE: 413 MS
R AXIS - MUSE: -73 DEGREES
RBC # BLD AUTO: 3.86 10E6/UL (ref 3.8–5.2)
RBC URINE: <1 /HPF
SODIUM SERPL-SCNC: 139 MMOL/L (ref 135–145)
SP GR UR STRIP: 1.01 (ref 1–1.03)
SQUAMOUS EPITHELIAL: 1 /HPF
SYSTOLIC BLOOD PRESSURE - MUSE: 149 MMHG
T AXIS - MUSE: 61 DEGREES
TROPONIN T SERPL HS-MCNC: 42 NG/L
TROPONIN T SERPL HS-MCNC: 47 NG/L
UROBILINOGEN UR STRIP-MCNC: NORMAL MG/DL
VENTRICULAR RATE- MUSE: 65 BPM
WBC # BLD AUTO: 7.7 10E3/UL (ref 4–11)
WBC URINE: 1 /HPF

## 2025-04-09 PROCEDURE — 250N000013 HC RX MED GY IP 250 OP 250 PS 637

## 2025-04-09 PROCEDURE — G0378 HOSPITAL OBSERVATION PER HR: HCPCS

## 2025-04-09 PROCEDURE — 93005 ELECTROCARDIOGRAM TRACING: CPT | Performed by: EMERGENCY MEDICINE

## 2025-04-09 PROCEDURE — 96372 THER/PROPH/DIAG INJ SC/IM: CPT | Mod: 59

## 2025-04-09 PROCEDURE — 250N000011 HC RX IP 250 OP 636

## 2025-04-09 PROCEDURE — 99223 1ST HOSP IP/OBS HIGH 75: CPT | Mod: FS

## 2025-04-09 PROCEDURE — 85025 COMPLETE CBC W/AUTO DIFF WBC: CPT | Performed by: EMERGENCY MEDICINE

## 2025-04-09 PROCEDURE — 83735 ASSAY OF MAGNESIUM: CPT | Performed by: EMERGENCY MEDICINE

## 2025-04-09 PROCEDURE — 36415 COLL VENOUS BLD VENIPUNCTURE: CPT | Performed by: EMERGENCY MEDICINE

## 2025-04-09 PROCEDURE — 96360 HYDRATION IV INFUSION INIT: CPT

## 2025-04-09 PROCEDURE — 99285 EMERGENCY DEPT VISIT HI MDM: CPT | Mod: 25

## 2025-04-09 PROCEDURE — 80048 BASIC METABOLIC PNL TOTAL CA: CPT | Performed by: EMERGENCY MEDICINE

## 2025-04-09 PROCEDURE — 81003 URINALYSIS AUTO W/O SCOPE: CPT | Performed by: EMERGENCY MEDICINE

## 2025-04-09 PROCEDURE — 99418 PROLNG IP/OBS E/M EA 15 MIN: CPT | Mod: FS

## 2025-04-09 PROCEDURE — 84484 ASSAY OF TROPONIN QUANT: CPT | Performed by: EMERGENCY MEDICINE

## 2025-04-09 PROCEDURE — 99207 PR APP CREDIT; MD BILLING SHARED VISIT: CPT | Mod: FS | Performed by: INTERNAL MEDICINE

## 2025-04-09 PROCEDURE — 258N000003 HC RX IP 258 OP 636: Performed by: EMERGENCY MEDICINE

## 2025-04-09 RX ORDER — METOPROLOL SUCCINATE 25 MG/1
50 TABLET, EXTENDED RELEASE ORAL DAILY
Status: DISCONTINUED | OUTPATIENT
Start: 2025-04-10 | End: 2025-04-11 | Stop reason: HOSPADM

## 2025-04-09 RX ORDER — NALOXONE HYDROCHLORIDE 0.4 MG/ML
0.2 INJECTION, SOLUTION INTRAMUSCULAR; INTRAVENOUS; SUBCUTANEOUS
Status: DISCONTINUED | OUTPATIENT
Start: 2025-04-09 | End: 2025-04-11 | Stop reason: HOSPADM

## 2025-04-09 RX ORDER — ONDANSETRON 4 MG/1
4 TABLET, ORALLY DISINTEGRATING ORAL EVERY 6 HOURS PRN
Status: DISCONTINUED | OUTPATIENT
Start: 2025-04-09 | End: 2025-04-11 | Stop reason: HOSPADM

## 2025-04-09 RX ORDER — ASPIRIN 81 MG/1
81 TABLET ORAL DAILY
Status: DISCONTINUED | OUTPATIENT
Start: 2025-04-10 | End: 2025-04-11 | Stop reason: HOSPADM

## 2025-04-09 RX ORDER — NALOXONE HYDROCHLORIDE 0.4 MG/ML
0.4 INJECTION, SOLUTION INTRAMUSCULAR; INTRAVENOUS; SUBCUTANEOUS
Status: DISCONTINUED | OUTPATIENT
Start: 2025-04-09 | End: 2025-04-11 | Stop reason: HOSPADM

## 2025-04-09 RX ORDER — SOLIFENACIN SUCCINATE 10 MG/1
10 TABLET, FILM COATED ORAL AT BEDTIME
COMMUNITY

## 2025-04-09 RX ORDER — DULOXETIN HYDROCHLORIDE 60 MG/1
60 CAPSULE, DELAYED RELEASE ORAL EVERY MORNING
Status: DISCONTINUED | OUTPATIENT
Start: 2025-04-10 | End: 2025-04-11 | Stop reason: HOSPADM

## 2025-04-09 RX ORDER — SOLIFENACIN SUCCINATE 5 MG/1
10 TABLET, FILM COATED ORAL AT BEDTIME
Status: DISCONTINUED | OUTPATIENT
Start: 2025-04-09 | End: 2025-04-11 | Stop reason: HOSPADM

## 2025-04-09 RX ORDER — VALACYCLOVIR HYDROCHLORIDE 1 G/1
1000 TABLET, FILM COATED ORAL 2 TIMES DAILY
Status: DISCONTINUED | OUTPATIENT
Start: 2025-04-09 | End: 2025-04-11 | Stop reason: HOSPADM

## 2025-04-09 RX ORDER — AZELASTINE HYDROCHLORIDE 0.5 MG/ML
1 SOLUTION/ DROPS OPHTHALMIC 2 TIMES DAILY PRN
Status: DISCONTINUED | OUTPATIENT
Start: 2025-04-09 | End: 2025-04-11 | Stop reason: HOSPADM

## 2025-04-09 RX ORDER — HYDROCODONE BITARTRATE AND ACETAMINOPHEN 5; 325 MG/1; MG/1
1 TABLET ORAL EVERY 4 HOURS PRN
Status: DISCONTINUED | OUTPATIENT
Start: 2025-04-09 | End: 2025-04-11 | Stop reason: HOSPADM

## 2025-04-09 RX ORDER — ALBUTEROL SULFATE 90 UG/1
1 INHALANT RESPIRATORY (INHALATION) EVERY 4 HOURS PRN
Status: DISCONTINUED | OUTPATIENT
Start: 2025-04-09 | End: 2025-04-11 | Stop reason: HOSPADM

## 2025-04-09 RX ORDER — AMOXICILLIN 250 MG
1 CAPSULE ORAL 2 TIMES DAILY PRN
Status: DISCONTINUED | OUTPATIENT
Start: 2025-04-09 | End: 2025-04-11 | Stop reason: HOSPADM

## 2025-04-09 RX ORDER — PROCHLORPERAZINE MALEATE 5 MG/1
5 TABLET ORAL EVERY 6 HOURS PRN
Status: DISCONTINUED | OUTPATIENT
Start: 2025-04-09 | End: 2025-04-11 | Stop reason: HOSPADM

## 2025-04-09 RX ORDER — ROSUVASTATIN CALCIUM 10 MG/1
10 TABLET, COATED ORAL AT BEDTIME
Status: DISCONTINUED | OUTPATIENT
Start: 2025-04-09 | End: 2025-04-11 | Stop reason: HOSPADM

## 2025-04-09 RX ORDER — POLYETHYLENE GLYCOL 3350 17 G/17G
1 POWDER, FOR SOLUTION ORAL DAILY PRN
COMMUNITY
End: 2025-05-01

## 2025-04-09 RX ORDER — FUROSEMIDE 40 MG/1
40 TABLET ORAL EVERY MORNING
Status: DISCONTINUED | OUTPATIENT
Start: 2025-04-10 | End: 2025-04-11 | Stop reason: HOSPADM

## 2025-04-09 RX ORDER — OXYCODONE HYDROCHLORIDE 5 MG/1
5 TABLET ORAL EVERY 4 HOURS PRN
Status: DISCONTINUED | OUTPATIENT
Start: 2025-04-09 | End: 2025-04-09

## 2025-04-09 RX ORDER — DULOXETIN HYDROCHLORIDE 30 MG/1
30 CAPSULE, DELAYED RELEASE ORAL EVERY EVENING
Status: DISCONTINUED | OUTPATIENT
Start: 2025-04-09 | End: 2025-04-11 | Stop reason: HOSPADM

## 2025-04-09 RX ORDER — THERA TABS 400 MCG
1 TAB ORAL DAILY
COMMUNITY
End: 2025-05-01

## 2025-04-09 RX ORDER — ENOXAPARIN SODIUM 100 MG/ML
40 INJECTION SUBCUTANEOUS EVERY 12 HOURS
Status: DISCONTINUED | OUTPATIENT
Start: 2025-04-09 | End: 2025-04-11 | Stop reason: HOSPADM

## 2025-04-09 RX ORDER — MODAFINIL 100 MG/1
200 TABLET ORAL EVERY MORNING
Status: DISCONTINUED | OUTPATIENT
Start: 2025-04-10 | End: 2025-04-11 | Stop reason: HOSPADM

## 2025-04-09 RX ORDER — CETIRIZINE HYDROCHLORIDE 10 MG/1
10 TABLET ORAL DAILY
Status: DISCONTINUED | OUTPATIENT
Start: 2025-04-10 | End: 2025-04-11 | Stop reason: HOSPADM

## 2025-04-09 RX ORDER — FUROSEMIDE 20 MG/1
20 TABLET ORAL EVERY EVENING
Status: DISCONTINUED | OUTPATIENT
Start: 2025-04-09 | End: 2025-04-11 | Stop reason: HOSPADM

## 2025-04-09 RX ORDER — ONDANSETRON 2 MG/ML
4 INJECTION INTRAMUSCULAR; INTRAVENOUS EVERY 6 HOURS PRN
Status: DISCONTINUED | OUTPATIENT
Start: 2025-04-09 | End: 2025-04-11 | Stop reason: HOSPADM

## 2025-04-09 RX ORDER — ACETAMINOPHEN 325 MG/1
650 TABLET ORAL EVERY 4 HOURS PRN
Status: DISCONTINUED | OUTPATIENT
Start: 2025-04-09 | End: 2025-04-11

## 2025-04-09 RX ORDER — ACETAMINOPHEN 650 MG/1
650 SUPPOSITORY RECTAL EVERY 4 HOURS PRN
Status: DISCONTINUED | OUTPATIENT
Start: 2025-04-09 | End: 2025-04-11

## 2025-04-09 RX ORDER — PANTOPRAZOLE SODIUM 40 MG/1
40 TABLET, DELAYED RELEASE ORAL DAILY PRN
Status: DISCONTINUED | OUTPATIENT
Start: 2025-04-09 | End: 2025-04-11 | Stop reason: HOSPADM

## 2025-04-09 RX ORDER — AMOXICILLIN 250 MG
2 CAPSULE ORAL 2 TIMES DAILY PRN
Status: DISCONTINUED | OUTPATIENT
Start: 2025-04-09 | End: 2025-04-11 | Stop reason: HOSPADM

## 2025-04-09 RX ORDER — MONTELUKAST SODIUM 10 MG/1
10 TABLET ORAL EVERY EVENING
Status: DISCONTINUED | OUTPATIENT
Start: 2025-04-09 | End: 2025-04-11 | Stop reason: HOSPADM

## 2025-04-09 RX ADMIN — ROSUVASTATIN CALCIUM 10 MG: 10 TABLET, FILM COATED ORAL at 23:30

## 2025-04-09 RX ADMIN — DULOXETINE HYDROCHLORIDE 30 MG: 30 CAPSULE, DELAYED RELEASE ORAL at 23:28

## 2025-04-09 RX ADMIN — ENOXAPARIN SODIUM 40 MG: 40 INJECTION SUBCUTANEOUS at 23:28

## 2025-04-09 RX ADMIN — SOLIFENACIN SUCCINATE 10 MG: 5 TABLET, FILM COATED ORAL at 23:28

## 2025-04-09 RX ADMIN — MONTELUKAST 10 MG: 10 TABLET, FILM COATED ORAL at 23:28

## 2025-04-09 RX ADMIN — SODIUM CHLORIDE 500 ML: 0.9 INJECTION, SOLUTION INTRAVENOUS at 19:36

## 2025-04-09 RX ADMIN — VALACYCLOVIR 1000 MG: 1 TABLET, FILM COATED ORAL at 23:30

## 2025-04-09 ASSESSMENT — COLUMBIA-SUICIDE SEVERITY RATING SCALE - C-SSRS
1. IN THE PAST MONTH, HAVE YOU WISHED YOU WERE DEAD OR WISHED YOU COULD GO TO SLEEP AND NOT WAKE UP?: NO
2. HAVE YOU ACTUALLY HAD ANY THOUGHTS OF KILLING YOURSELF IN THE PAST MONTH?: NO
6. HAVE YOU EVER DONE ANYTHING, STARTED TO DO ANYTHING, OR PREPARED TO DO ANYTHING TO END YOUR LIFE?: NO

## 2025-04-09 ASSESSMENT — ACTIVITIES OF DAILY LIVING (ADL)
ADLS_ACUITY_SCORE: 56
ADLS_ACUITY_SCORE: 64
ADLS_ACUITY_SCORE: 56
ADLS_ACUITY_SCORE: 64
ADLS_ACUITY_SCORE: 64
ADLS_ACUITY_SCORE: 56

## 2025-04-09 NOTE — ED TRIAGE NOTES
Patient presents to ED via EMS from home, patient baseline on 3L via NC.  Patient was having home and OT and PT today and they noticed that she was leaning to the L side (pt neurologically intact) and pt reported feeling weak and is having burning with urination, these sx started yesterday, also having burning pain in legs while standing, also had a fall today, denies injury, not on thinners, fell on carpet.  Esperanza Meyers RN.......4/9/2025 5:31 PM     Triage Assessment (Adult)       Row Name 04/09/25 1335          Triage Assessment    Airway WDL WDL        Respiratory WDL    Respiratory WDL WDL        Skin Circulation/Temperature WDL    Skin Circulation/Temperature WDL WDL        Cardiac WDL    Cardiac WDL WDL        Peripheral/Neurovascular WDL    Peripheral Neurovascular WDL WDL        Cognitive/Neuro/Behavioral WDL    Cognitive/Neuro/Behavioral WDL WDL

## 2025-04-09 NOTE — ED PROVIDER NOTES
EMERGENCY DEPARTMENT ENCOUNTER     NAME: Desirae Rey   AGE: 74 year old female   YOB: 1950   MRN: [id number]   EVALUATION DATE & TIME: 4/9/2025  5:17 PM   PCP: Daysi Bryan     Chief Complaint   Patient presents with    Generalized Weakness    Dysuria   :    FINAL IMPRESSION       1. Generalized weakness           ED COURSE & MEDICAL DECISION MAKING      Pertinent Labs & Imaging studies reviewed. (See chart for details)   74 year old female  presents to the Emergency Department for evaluation of generalized weakness and difficulty ambulating.  On chart review, patient was actually just discharged after she was admitted with a diarrheal illness, generalized weakness on 28 March.  Apparently she was getting physical and Occupational Therapy today and they noted that she is less ambulatory than normal.  She denies any focal neurologic deficits, she has no new incontinence or saddle anesthesia.  She is chronically on 3 L of oxygen and does not have any new respiratory symptoms. Initial Vitals Reviewed. Initial exam notable for obese patient who has normal vitals on her home oxygen.  I did do a workup for generalized weakness and her EKG and troponins on arrival and 2 hours later rule out for ACS, there are no signs of infection, I did recheck a urinalysis as she noted continued ongoing dysuria and this is negative.  I can actually see that this was also present at the time that she left the hospital.  She does have known lumbar stenosis and neuropathy which is probably contributory to her difficulty walking, but ultimately today she cannot even ambulate 1-2 steps with the assistance of a nurse and this seems different than the discharge summary from March 28.  Because of this, we will plan for readmission.  Case discussed with hospitalist Dr. Salas who accepted the patient for admission.           At the conclusion of the encounter I discussed the results of all of the tests and the  disposition. The questions were answered. The patient or family acknowledged understanding and was agreeable with the care plan.     0 minutes critical care time, see procedure note below for details if relevant    Medical Decision Making  I obtained history from EMS  I reviewed the EMR: Inpatient Record: admission 3/28/25  Care impacted by Chronic Lung Disease  I discussed the care with another health care provider: hospitalist  Admit.    MIPS (CTPE, Dental pain, Rebollar, Sinusitis, Asthma/COPD, Head Trauma): Not Applicable    SEPSIS: None                      MEDICATIONS GIVEN IN THE EMERGENCY:   Medications - No data to display   NEW PRESCRIPTIONS STARTED AT TODAY'S ER VISIT   New Prescriptions    No medications on file     ================================================================   HISTORY OF PRESENT ILLNESS       Patient information was obtained from: patient   Use of Intrepreter:   Desirae Rey is a 74 year old female with history of CHF, oxygen dependence who presents for evaluation of dysuria and generalized weakness.  Patient noticed that for the last day or 2 she has had some dysuria.  No fever, chills, flank pain, nausea or vomiting.  Today she had PT OT at home and they noted that when she tried to ambulate she was weaker than normal.  PT OT was concerned that maybe she was falling to the side, but she states that she does not have any unilateral weakness and she does not think there was any lateralization to her feeling weak.    ================================================================        PAST HISTORY     PAST MEDICAL HISTORY:   Past Medical History:   Diagnosis Date    Allergic rhinitis     Arthritis of back     CHF (congestive heart failure) (H)     Chronic kidney disease     stage 3     De Quervain's disease (tenosynovitis)     Fibromyalgia, primary     GERD (gastroesophageal reflux disease)     Hematuria     chronic    High cholesterol     History of blood clots 09/01/1970    DVT,  from birth control, h/o left calf    Hyperlipidemia     Hypertension     Low back pain     Osteoporosis     Pickwickian syndrome (H)     Plantar fasciitis     Proteinuria     Proteinuria     chronic    Sleep apnea     CPAP    Uncomplicated asthma       PAST SURGICAL HISTORY:   Past Surgical History:   Procedure Laterality Date    CERVICAL FUSION N/A 4/27/2017    Procedure: ANTERIOR CERVICAL DECOMPRESSION FUSION C5-7 BILATERAL;  Surgeon: Basim Barone MD;  Location: [facility] [place];  Service:     CHOLECYSTECTOMY      open    COLONOSCOPY N/A 12/20/2019    Procedure: COLONOSCOPY WITH BIOPSIES;  Surgeon: Lucio Farr MD;  Location: [facility] [place];  Service: Gastroenterology    EYE SURGERY      HAND SURGERY Right     HYSTEROSCOPY DIAGNOSTIC      JOINT REPLACEMENT      bilateral TKA    KNEE SURGERY      RELEASE CARPAL TUNNEL Bilateral       CURRENT MEDICATIONS:   acetaminophen (TYLENOL) 500 MG tablet  albuterol (PROAIR HFA;PROVENTIL HFA;VENTOLIN HFA) 90 mcg/actuation inhaler  alendronate (FOSAMAX) 70 MG tablet  aspirin 81 MG EC tablet  azelastine (OPTIVAR) 0.05 % ophthalmic solution  calcium citrate (CITRACAL) 950 (200 Ca) MG tablet  cetirizine (ZYRTEC) 10 MG tablet  cholecalciferol (VITAMIN D3) 25 mcg (1000 units) capsule  CRESTOR 10 MG tablet  docusate sodium (COLACE) 100 MG capsule  DULoxetine (CYMBALTA) 30 MG capsule  DULoxetine (CYMBALTA) 30 MG capsule  esomeprazole (NEXIUM) 20 MG DR capsule  fluticasone-salmeterol (AIRDUO RESPICLICK) 113-14 MCG/ACT inhaler  furosemide (LASIX) 20 MG tablet  furosemide (LASIX) 20 MG tablet  HYDROcodone-acetaminophen (NORCO) 5-325 MG tablet  l-lysine HCl 500 MG TABS tablet  magnesium oxide 250 mg Tab  metoprolol succinate ER (TOPROL XL) 50 MG 24 hr tablet  modafinil (PROVIGIL) 100 MG tablet  montelukast (SINGULAIR) 10 mg tablet  OXYGEN-AIR DELIVERY SYSTEMS MISC  tiZANidine (ZANAFLEX) 4 MG tablet  Turmeric (CURCUPLEX-95) 500 MG CAPS  valACYclovir (VALTREX) 1000 mg  tablet      ALLERGIES:   Allergies   Allergen Reactions    Latex Rash     Severe rash    Pregabalin Shortness Of Breath     Other Reaction(s): swelling and shortness of breath    Valsartan Unknown     Hyperkalemia    Ciprofloxacin Visual Disturbance, Hallucination and Confusion     Likely contributed visual hallucination and confusion    Colchicine Diarrhea    Dicloxacillin      Other Reaction(s): confusion, says she has tolerated augmentin without difficulty.     Gabapentin      Other Reaction(s): Sedation    Latex Unknown     Added based on information entered during case entry, please review and add reactions, type, and severity as needed    Other Drug Allergy (See Comments) Muscle Pain (Myalgia)     Tried lovastatin and simvastatin    Other Environmental Allergy Unknown     Coverlet bandaid , 3M product; rash    Statins-Hmg-Coa Reductase Inhibitors [Statins] Muscle Pain (Myalgia)     Tried lovastatin and simvastatin    Sulfa Antibiotics Swelling     Facial swelling      Adhesive Tape Rash      FAMILY HISTORY:   Family History   Problem Relation Age of Onset    Rheumatoid Arthritis Mother     Heart Disease Sister     Chronic Obstructive Pulmonary Disease Father       SOCIAL HISTORY:   Social History     Socioeconomic History    Marital status:    Tobacco Use    Smoking status: Never    Smokeless tobacco: Never   Vaping Use    Vaping status: Never Used   Substance and Sexual Activity    Alcohol use: No    Drug use: No     Social Drivers of Health     Financial Resource Strain: Low Risk  (3/29/2025)    Financial Resource Strain     Within the past 12 months, have you or your family members you live with been unable to get utilities (heat, electricity) when it was really needed?: No   Food Insecurity: Low Risk  (3/29/2025)    Food Insecurity     Within the past 12 months, did you worry that your food would run out before you got money to buy more?: No     Within the past 12 months, did the food you bought  "just not last and you didn t have money to get more?: No   Transportation Needs: Low Risk  (3/29/2025)    Transportation Needs     Within the past 12 months, has lack of transportation kept you from medical appointments, getting your medicines, non-medical meetings or appointments, work, or from getting things that you need?: No    [facility]    Social Connections   Interpersonal Safety: Low Risk  (3/29/2025)    Interpersonal Safety     Do you feel physically and emotionally safe where you currently live?: Yes     Within the past 12 months, have you been hit, slapped, kicked or otherwise physically hurt by someone?: No     Within the past 12 months, have you been humiliated or emotionally abused in other ways by your partner or ex-partner?: No   Housing Stability: Low Risk  (3/29/2025)    Housing Stability     Do you have housing? : Yes     Are you worried about losing your housing?: No        VITALS  Patient Vitals for the past 24 hrs:   BP Temp Temp src Pulse Resp SpO2 Height Weight   04/09/25 1720 (!) 149/72 97.9  F (36.6  C) Oral 69 16 96 % 1.397 m (4' 7\") 88.9 kg (196 lb)        ================================================================    PHYSICAL EXAM     VITAL SIGNS: BP (!) 149/72   Pulse 69   Temp 97.9  F (36.6  C) (Oral)   Resp 16   Ht 1.397 m (4' 7\")   Wt 88.9 kg (196 lb)   SpO2 (S) 96%   BMI 45.55 kg/m     Constitutional:  Awake, no acute distress, obese, on 3 L nasal cannula which is baseline  HENT:  Atraumatic, oropharynx without exudate or erythema, membranes moist  Lymph:  No adenopathy  Eyes: EOM intact, PERRL, no injection  Neck: Supple  Respiratory:  Clear to auscultation bilaterally, no wheezes or crackles   Cardiovascular:  Regular rate and rhythm, single S1 and S2   GI:  Soft, nontender, nondistended, no rebound or guarding   Musculoskeletal:  Moves all extremities, no lower extremity " edema, no deformities    Skin:  Warm, dry  Neurologic:  Alert and oriented x3, no focal deficits noted, follows commands in all extremities      ================================================================  LAB       All pertinent labs reviewed and interpreted.   Labs Ordered and Resulted from Time of ED Arrival to Time of ED Departure   BASIC METABOLIC PANEL - Abnormal       Result Value    Sodium 139      Potassium 4.5      Chloride 98      Carbon Dioxide (CO2) 28      Anion Gap 13      Urea Nitrogen 41.9 (*)     Creatinine 1.59 (*)     GFR Estimate 34 (*)     Calcium 10.4      Glucose 95     TROPONIN T, HIGH SENSITIVITY - Abnormal    Troponin T, High Sensitivity 42 (*)    ROUTINE UA WITH MICROSCOPIC REFLEX TO CULTURE - Abnormal    Color Urine Colorless      Appearance Urine Clear      Glucose Urine Negative      Bilirubin Urine Negative      Ketones Urine Negative      Specific Gravity Urine 1.007      Blood Urine Negative      pH Urine 6.0      Protein Albumin Urine 10 (*)     Urobilinogen Urine Normal      Nitrite Urine Negative      Leukocyte Esterase Urine Negative      Bacteria Urine Few (*)     Mucus Urine Present (*)     RBC Urine <1      WBC Urine 1      Squamous Epithelials Urine 1     TROPONIN T, HIGH SENSITIVITY - Abnormal    Troponin T, High Sensitivity 47 (*)    MAGNESIUM - Normal    Magnesium 1.9     CBC WITH PLATELETS AND DIFFERENTIAL    WBC Count 7.7      RBC Count 3.86      Hemoglobin 12.0      Hematocrit 36.7      MCV 95      MCH 31.1      MCHC 32.7      RDW 12.9      Platelet Count 384      % Neutrophils 61      % Lymphocytes 24      % Monocytes 10      % Eosinophils 4      % Basophils 1      % Immature Granulocytes 1      NRBCs per 100 WBC 0      Absolute Neutrophils 4.7      Absolute Lymphocytes 1.8      Absolute Monocytes 0.8      Absolute Eosinophils 0.3      Absolute Basophils 0.0      Absolute Immature Granulocytes 0.0      Absolute NRBCs 0.0           ===============================================================  RADIOLOGY       Reviewed all pertinent imaging. Please see official radiology report.   No orders to display         ================================================================  EKG     EKG reviewed interpreted by me shows sinus rhythm with rate of 65, left axis, QTc 413 with no acute ST or T wave changes since 22 March    I have independently reviewed and interpreted the EKG(s) documented above.     ================================================================  PROCEDURES           Bisi Alexander M.D.   Emergency Medicine   [facility] EMERGENCY ROOM  [address] [address]  [phone]  Dept: [phone]        Bisi Alexander MD  04/09/25 5051

## 2025-04-09 NOTE — ED NOTES
Introduced self to patient.  Whiteboard updated.  Pain assessed.  Plan of care and length of time discussed with patient.  Will continue to monitor.  Esperanza Meyers RN.......4/9/2025 5:55 PM

## 2025-04-10 ENCOUNTER — APPOINTMENT (OUTPATIENT)
Dept: PHYSICAL THERAPY | Facility: CLINIC | Age: 75
End: 2025-04-10
Attending: INTERNAL MEDICINE
Payer: MEDICARE

## 2025-04-10 ENCOUNTER — APPOINTMENT (OUTPATIENT)
Dept: RADIOLOGY | Facility: CLINIC | Age: 75
End: 2025-04-10
Attending: HOSPITALIST
Payer: MEDICARE

## 2025-04-10 ENCOUNTER — APPOINTMENT (OUTPATIENT)
Dept: OCCUPATIONAL THERAPY | Facility: CLINIC | Age: 75
End: 2025-04-10
Attending: INTERNAL MEDICINE
Payer: MEDICARE

## 2025-04-10 VITALS
HEIGHT: 55 IN | HEART RATE: 63 BPM | WEIGHT: 195.4 LBS | SYSTOLIC BLOOD PRESSURE: 111 MMHG | DIASTOLIC BLOOD PRESSURE: 59 MMHG | BODY MASS INDEX: 45.22 KG/M2 | OXYGEN SATURATION: 95 % | RESPIRATION RATE: 20 BRPM | TEMPERATURE: 97.6 F

## 2025-04-10 LAB
ANION GAP SERPL CALCULATED.3IONS-SCNC: 12 MMOL/L (ref 7–15)
BASE EXCESS BLDV CALC-SCNC: 7 MMOL/L (ref -3–3)
BUN SERPL-MCNC: 43.3 MG/DL (ref 8–23)
CALCIUM SERPL-MCNC: 9.8 MG/DL (ref 8.8–10.4)
CHLORIDE SERPL-SCNC: 100 MMOL/L (ref 98–107)
CREAT SERPL-MCNC: 1.67 MG/DL (ref 0.51–0.95)
EGFRCR SERPLBLD CKD-EPI 2021: 32 ML/MIN/1.73M2
ERYTHROCYTE [DISTWIDTH] IN BLOOD BY AUTOMATED COUNT: 12.8 % (ref 10–15)
GLUCOSE SERPL-MCNC: 123 MG/DL (ref 70–99)
HCO3 BLDV-SCNC: 35 MMOL/L (ref 21–28)
HCO3 SERPL-SCNC: 29 MMOL/L (ref 22–29)
HCT VFR BLD AUTO: 36.2 % (ref 35–47)
HGB BLD-MCNC: 11.7 G/DL (ref 11.7–15.7)
MCH RBC QN AUTO: 31.1 PG (ref 26.5–33)
MCHC RBC AUTO-ENTMCNC: 32.3 G/DL (ref 31.5–36.5)
MCV RBC AUTO: 96 FL (ref 78–100)
O2/TOTAL GAS SETTING VFR VENT: 32 %
OXYHGB MFR BLDV: 44 % (ref 70–75)
PCO2 BLDV: 69 MM HG (ref 40–50)
PH BLDV: 7.32 [PH] (ref 7.32–7.43)
PLATELET # BLD AUTO: 358 10E3/UL (ref 150–450)
PO2 BLDV: 29 MM HG (ref 25–47)
POTASSIUM SERPL-SCNC: 4.2 MMOL/L (ref 3.4–5.3)
RBC # BLD AUTO: 3.76 10E6/UL (ref 3.8–5.2)
SAO2 % BLDV: 44.3 % (ref 70–75)
SODIUM SERPL-SCNC: 141 MMOL/L (ref 135–145)
WBC # BLD AUTO: 8 10E3/UL (ref 4–11)

## 2025-04-10 PROCEDURE — 72040 X-RAY EXAM NECK SPINE 2-3 VW: CPT

## 2025-04-10 PROCEDURE — 250N000013 HC RX MED GY IP 250 OP 250 PS 637

## 2025-04-10 PROCEDURE — 999N000157 HC STATISTIC RCP TIME EA 10 MIN

## 2025-04-10 PROCEDURE — 258N000003 HC RX IP 258 OP 636: Performed by: HOSPITALIST

## 2025-04-10 PROCEDURE — G0378 HOSPITAL OBSERVATION PER HR: HCPCS

## 2025-04-10 PROCEDURE — 99232 SBSQ HOSP IP/OBS MODERATE 35: CPT | Performed by: HOSPITALIST

## 2025-04-10 PROCEDURE — 96372 THER/PROPH/DIAG INJ SC/IM: CPT

## 2025-04-10 PROCEDURE — 97161 PT EVAL LOW COMPLEX 20 MIN: CPT | Mod: GP

## 2025-04-10 PROCEDURE — 97535 SELF CARE MNGMENT TRAINING: CPT | Mod: GO

## 2025-04-10 PROCEDURE — 80048 BASIC METABOLIC PNL TOTAL CA: CPT

## 2025-04-10 PROCEDURE — 97530 THERAPEUTIC ACTIVITIES: CPT | Mod: GP

## 2025-04-10 PROCEDURE — 94660 CPAP INITIATION&MGMT: CPT

## 2025-04-10 PROCEDURE — 250N000011 HC RX IP 250 OP 636

## 2025-04-10 PROCEDURE — 99214 OFFICE O/P EST MOD 30 MIN: CPT | Performed by: PHYSICIAN ASSISTANT

## 2025-04-10 PROCEDURE — 85014 HEMATOCRIT: CPT

## 2025-04-10 PROCEDURE — 82805 BLOOD GASES W/O2 SATURATION: CPT | Performed by: HOSPITALIST

## 2025-04-10 PROCEDURE — 36415 COLL VENOUS BLD VENIPUNCTURE: CPT | Performed by: HOSPITALIST

## 2025-04-10 PROCEDURE — 36415 COLL VENOUS BLD VENIPUNCTURE: CPT

## 2025-04-10 PROCEDURE — 97166 OT EVAL MOD COMPLEX 45 MIN: CPT | Mod: GO

## 2025-04-10 RX ADMIN — SOLIFENACIN SUCCINATE 10 MG: 5 TABLET, FILM COATED ORAL at 21:02

## 2025-04-10 RX ADMIN — ENOXAPARIN SODIUM 40 MG: 40 INJECTION SUBCUTANEOUS at 21:02

## 2025-04-10 RX ADMIN — MONTELUKAST 10 MG: 10 TABLET, FILM COATED ORAL at 19:09

## 2025-04-10 RX ADMIN — VALACYCLOVIR 1000 MG: 1 TABLET, FILM COATED ORAL at 19:09

## 2025-04-10 RX ADMIN — HYDROCODONE BITARTRATE AND ACETAMINOPHEN 1 TABLET: 5; 325 TABLET ORAL at 16:24

## 2025-04-10 RX ADMIN — ROSUVASTATIN CALCIUM 10 MG: 10 TABLET, FILM COATED ORAL at 21:02

## 2025-04-10 RX ADMIN — DULOXETINE HYDROCHLORIDE 30 MG: 30 CAPSULE, DELAYED RELEASE ORAL at 19:09

## 2025-04-10 RX ADMIN — SODIUM CHLORIDE 500 ML: 0.9 INJECTION, SOLUTION INTRAVENOUS at 09:49

## 2025-04-10 RX ADMIN — HYDROCODONE BITARTRATE AND ACETAMINOPHEN 1 TABLET: 5; 325 TABLET ORAL at 22:41

## 2025-04-10 ASSESSMENT — ACTIVITIES OF DAILY LIVING (ADL)
ADLS_ACUITY_SCORE: 58
ADLS_ACUITY_SCORE: 62
ADLS_ACUITY_SCORE: 64
ADLS_ACUITY_SCORE: 58
ADLS_ACUITY_SCORE: 58
ADLS_ACUITY_SCORE: 62
ADLS_ACUITY_SCORE: 59
ADLS_ACUITY_SCORE: 64
ADLS_ACUITY_SCORE: 58
ADLS_ACUITY_SCORE: 64
ADLS_ACUITY_SCORE: 64
ADLS_ACUITY_SCORE: 58
ADLS_ACUITY_SCORE: 62
ADLS_ACUITY_SCORE: 58
ADLS_ACUITY_SCORE: 62
ADLS_ACUITY_SCORE: 58
ADLS_ACUITY_SCORE: 62

## 2025-04-10 NOTE — PLAN OF CARE
Problem: Adult Inpatient Plan of Care  Goal: Optimal Comfort and Wellbeing  Outcome: Progressing    Goal Outcome Evaluation:    Patient arrived to 2N @ 1345. Admission completed. Skin check completed. Assist of 1 with GBW. Ambulated tot he bathroom and up to the chair. Worked with OT. VSS. 3 L of oxygen use (baseline). No complaints of pain.     Problem: Fall Injury Risk  Goal: Absence of Fall and Fall-Related Injury  Intervention: Promote Injury-Free Environment  Recent Flowsheet Documentation  Taken 4/10/2025 1400 by Savita Garcia, RN  Safety Promotion/Fall Prevention:   activity supervised   assistive device/personal items within reach   clutter free environment maintained   mobility aid in reach   nonskid shoes/slippers when out of bed   room door open   room near nurse's station   room organization consistent   safety round/check completed   supervised activity

## 2025-04-10 NOTE — PROGRESS NOTES
"PRIMARY DIAGNOSIS: GENERALIZED WEAKNESS    OUTPATIENT/OBSERVATION GOALS TO BE MET BEFORE DISCHARGE  1. Orthostatic performed: N/A    2. Tolerating PO medications: Yes    3. Return to near baseline physical activity: No    4. Cleared for discharge by consultants (if involved): No    Discharge Planner Nurse   Safe discharge environment identified: Yes  Barriers to discharge: Yes       Entered by: Kathy Hartman RN 04/10/2025 3:54 AM     Please review provider order for any additional goals.   Nurse to notify provider when observation goals have been met and patient is ready for discharge.  /66 (BP Location: Right arm, Patient Position: Semi-Tijerina's, Cuff Size: Adult Regular)   Pulse 61   Temp 97.7  F (36.5  C) (Oral)   Resp 27   Ht 1.397 m (4' 7\")   Wt 88.9 kg (196 lb)   SpO2 99%   BMI 45.55 kg/m    Pt is a/ox4, can be forgetful. Pt denied any pain, n/v, and dizziness. Pt expressed upper extremities ROM. Per pt some days pt is able to move them better than others, left arm is more weak than right arm. Pt is able to self feed and take meds without complications. Pt is on Bipap for sleep 35-40%FIO2. PIV, SL.   "

## 2025-04-10 NOTE — H&P
[facility]    History and Physical - Hospitalist Service       Date of Admission:  4/9/2025    Assessment & Plan      Desirae Rey is a 74 year old female with PMHx significant for longstanding lumbar stenosis with associated neuropathy and chronic pain, chronic heart failure with preserved ejection fraction, hypertension, severe NELLIE on BiPAP at night, obesity hypoventilation syndrome, chronic respiratory failure with oxygen dependence 3L/NC at baseline, CKD stage 3, history of CVA, history of recurrent UTI who was admitted on 4/9/2025 for Neurosurgery evaluation and PT/OT evaluation of worsening low back pain and increased difficulty with mobility.    ED Course: Hypertensive with BP around 140s systolic, otherwise hemodynamically stable, afebrile. On baseline oxygen supplement of 3L/NC. Lab workup is remarkable for elevated creatinine 1.59, GFR 34, BUN 41.9.  CBC unremarkable.  UA not suggestive of acute infection.  Initial troponin T mildly elevated at 42, delta troponin mildly increased to 47. ECG shows sinus rhythm, no obvious acute ischemic changes and no significant change from prior ECG per my read. Given 500 mL IV NS bolus.      Generalized weakness  Severe lumbar stenosis with neuropathy  Presents with report of increased weakness/heaviness of bilateral lower extremities along with a fall at home. No LOC or head injury. No new/worsening back pain or pain in the joints. Lower extremity neuropathy is at baseline per patient. No saddle anesthesia or changes in bowel/bladder habits, so low suspicion for acute cauda equina syndrome at this time.  Most recent MR lumbar spine 12/2024 revealed severe multilevel stenosis. Patient has repeatedly refused surgical intervention for this.  - Neurosurgery consult   - Consider repeat imaging of lumbar spine  - Fall precautions  - PT/OT evaluation and treatment  -  consult for safe discharge planning  - AM labs: CBC, BMP    CKD stage 3  Cr 1.59  on arrival. Baseline Cr appears to be around 1.3-1.6, though most recent values around 1.0-1.1. S/p 500 mL IV NS bolus in ED.  - Avoid nephrotoxic agents  - Recheck Cr in AM    Chronic respiratory failure with hypoxia  Baseline 3L/NC oxygen. Currently at baseline in ED.  - Supplemental oxygen  - Pulse oximetry    Obstructive sleep apnea  Obesity hypoventilation syndrome  Severe obesity with BMI 45  - BiPAP while sleeping    Hypertension  Chronic heart failure with preserved EF  Blood pressure elevated to 140s systolic.  Most recent echo with EF 60-65% (as of 3/22/2025).  - Resume home Lasix and metoprolol with hold parameters  - Monitor blood pressure  - Monitor I&Os  - Daily weights    Elevated troponin T  Initial troponin T elevated to 42, delta troponin 57. Patient denies chest pain. ECG without obvious ischemic changes. Low suspicion for ACS at this time.  - Recheck troponin/ECG if patient develops new chest pain    History of recurrent UTI  Overactive bladder with urinary incontinence  Patient reports some burning with urination today. UA in ED not suggestive of acute infection at this time.  - Resume home Vesicare  - Monitor I&Os    Hyperlipidemia  History of CVA  - Resume home aspirin and Crestor    Uncomplicated asthma  - Resume home PRN albuterol inhaler  - Resume home Singulair    Depression  Fibromyalgia   - Resume home Cymbalta    GERD  - Resume home PRN esomeprazole or PPI equivalent    Recent herpes simplex virus infection  Noted on lip, prescribed course of valacyclovir on last admission.  - Resume home valacyclovir    Osteoporosis - Takes alendronate every Monday.       Observation Goals: -diagnostic tests and consults completed and resulted, -vital signs normal or at patient baseline, -tolerating oral intake to maintain hydration, -adequate pain control on oral analgesics, -returns to baseline functional status, -safe disposition plan has been identified, Nurse to notify provider when observation  "goals have been met and patient is ready for discharge.  Diet: Combination Diet Low Saturated Fat Na <2400mg Diet  DVT Prophylaxis: Enoxaparin (Lovenox) SQ  Rebollar Catheter: Not present  Lines: None     Cardiac Monitoring: None  Code Status: Full Code    Clinically Significant Risk Factors Present on Admission                 # Drug Induced Platelet Defect: home medication list includes an antiplatelet medication   # Hypertension: Noted on problem list  # Chronic heart failure with preserved ejection fraction: heart failure noted on problem list and last echo with EF >50%          # Severe Obesity: Estimated body mass index is 45.55 kg/m  as calculated from the following:    Height as of this encounter: 1.397 m (4' 7\").    Weight as of this encounter: 88.9 kg (196 lb).         # Financial/Environmental Concerns:    # Asthma: noted on problem list        Disposition Plan     Medically Ready for Discharge: Anticipated Tomorrow         The patient's care was discussed with the Attending Physician, Dr. Mati Reyes .    Mary Macedo PA-C  Hospitalist Service  [facility]  Securely message with [employer] (more info)  Text page via [employer] Paging/Directory     ______________________________________________________________________    Chief Complaint   Weakness     History is obtained from the patient    History of Present Illness   Desirae Rey is a 74 year old female with PMHx significant for longstanding lumbar stenosis with associated neuropathy and chronic pain, chronic heart failure with preserved ejection fraction, hypertension, severe NELLIE on BiPAP at night, obesity hypoventilation syndrome, chronic respiratory failure with oxygen dependence 3L/NC at baseline, CKD stage 3, history of CVA, history of recurrent UTI who presented to the ED for evaluation of weakness.    Patient reports that this morning after showering, she was walking out of the bathroom when she \"slid to the ground\".  She denies " "loss of consciousness or head injury.  She denies injuries elsewhere.  She noted that she just kind of crumpled to the ground.  She had to call for her  to help her get up.  Later in the morning, patient had PT and OT at home.  They were concerned that she was listing to the left with ambulation.  They suggested that she go to the ED for further evaluation.  In the ED, patient ambulated with the nurse but had difficulty, noting that her legs felt heavier than usual.  She states that felt like \"someone was yanking the veins out of both legs.\"  She denies any increased back pain from baseline.  She denies increased or new numbness from baseline.  She denies saddle anesthesia.  Occasional urinary incontinence at baseline, which has not worsened.  No urinary retention.  She does report some dysuria today.  Last bowel movement was this morning and was not incontinent.  Diarrhea noted on last admission has since resolved.  Otherwise denies chest pain, shortness of breath, nausea, vomiting, lightheadedness, dizziness, vision changes, headache.     Patient has home PT and OT twice weekly, as well as home RN once weekly.    Of note, patient was recently admitted to [facility] from 3/28-/1/2025 for management of diarrhea, candidiasis.  CT abdomen negative for acute pathology.  Stool testing negative for C. difficile or other enteric pathogens.  She had dysuria but urine culture was negative for acute infection.  She was noted to have Candida on the skin under the breast and abdomen and groin areas, so she was given a dose of Diflucan.  On discharge, patient was ambulating appropriately and was discharged home.     Patient states that she is scheduled to have a neurology appointment in middle of July.  She wonders if this could be moved up sooner because she is wondering why she has been following      Past Medical History    Past Medical History:   Diagnosis Date    Allergic rhinitis     Arthritis of back     " CHF (congestive heart failure) (H)     Chronic kidney disease     stage 3     De Quervain's disease (tenosynovitis)     Fibromyalgia, primary     GERD (gastroesophageal reflux disease)     Hematuria     chronic    High cholesterol     History of blood clots 09/01/1970    DVT, from birth control, h/o left calf    Hyperlipidemia     Hypertension     Low back pain     Osteoporosis     Pickwickian syndrome (H)     Plantar fasciitis     Proteinuria     Proteinuria     chronic    Sleep apnea     CPAP    Uncomplicated asthma        Past Surgical History   Past Surgical History:   Procedure Laterality Date    CERVICAL FUSION N/A 4/27/2017    Procedure: ANTERIOR CERVICAL DECOMPRESSION FUSION C5-7 BILATERAL;  Surgeon: Basim Barone MD;  Location: [facility];  Service:     CHOLECYSTECTOMY      open    COLONOSCOPY N/A 12/20/2019    Procedure: COLONOSCOPY WITH BIOPSIES;  Surgeon: Lucio Farr MD;  Location: [facility];  Service: Gastroenterology    EYE SURGERY      HAND SURGERY Right     HYSTEROSCOPY DIAGNOSTIC      JOINT REPLACEMENT      bilateral TKA    KNEE SURGERY      RELEASE CARPAL TUNNEL Bilateral        Prior to Admission Medications   Prior to Admission Medications   Prescriptions Last Dose Informant Patient Reported? Taking?   CRESTOR 10 MG tablet 4/8/2025 Bedtime Care Giver Yes Yes   Sig: Take 10 mg by mouth daily.   DULoxetine (CYMBALTA) 30 MG capsule 4/9/2025 Morning  Yes Yes   Sig: Take 60 mg by mouth every morning.   DULoxetine (CYMBALTA) 30 MG capsule 4/8/2025 Bedtime  Yes Yes   Sig: Take 30 mg by mouth every evening.   HYDROcodone-acetaminophen (NORCO) 5-325 MG tablet 4/9/2025 Noon Care Giver No Yes   Sig: Take 1 tablet by mouth every 4 hours as needed for pain Max 6 tablets per day.   OXYGEN-AIR DELIVERY SYSTEMS MISC  Spouse/Significant Other, Care Giver Yes No   Sig: [OXYGEN-AIR DELIVERY SYSTEMS MISC] Use 3 L As Directed. 3 lpm with activities and as needed at night   Turmeric  (CURCUPLEX-95) 500 MG CAPS Past Week Spouse/Significant Other, Care Giver Yes Yes   Sig: Take 1 capsule by mouth daily   acetaminophen (TYLENOL) 500 MG tablet Past Month Spouse/Significant Other, Care Giver Yes Yes   Sig: Take 500 mg by mouth every 6 hours as needed for mild pain   albuterol (PROAIR HFA;PROVENTIL HFA;VENTOLIN HFA) 90 mcg/actuation inhaler More than a month Spouse/Significant Other, Care Giver Yes Yes   Sig: Inhale 1 puff into the lungs every 4 hours as needed for shortness of breath / dyspnea   alendronate (FOSAMAX) 70 MG tablet 4/7/2025 Spouse/Significant Other, Care Giver Yes Yes   Sig: [ALENDRONATE (FOSAMAX) 70 MG TABLET] Take 70 mg by mouth every 7 days. Takes on Mondays   aspirin 81 MG EC tablet Past Month Spouse/Significant Other, Care Giver Yes Yes   Sig: Take 81 mg by mouth daily.   azelastine (OPTIVAR) 0.05 % ophthalmic solution 4/8/2025 Bedtime Spouse/Significant Other, Care Giver Yes Yes   Sig: Place 1 drop into both eyes 2 times daily as needed.   calcium citrate (CITRACAL) 950 (200 Ca) MG tablet 4/8/2025 Bedtime Spouse/Significant Other, Care Giver Yes Yes   Sig: Take 1 tablet by mouth daily   cetirizine (ZYRTEC) 10 MG tablet 4/8/2025 Morning Spouse/Significant Other, Care Giver Yes Yes   Sig: [CETIRIZINE (ZYRTEC) 10 MG TABLET] Take 10 mg by mouth daily.    cholecalciferol (VITAMIN D3) 25 mcg (1000 units) capsule 4/8/2025 Morning Spouse/Significant Other, Care Giver Yes Yes   Sig: Take 1 capsule by mouth daily.   docusate sodium (COLACE) 100 MG capsule 4/8/2025 Evening Care Giver Yes Yes   Sig: Take 100 mg by mouth 2 times daily as needed for constipation.   esomeprazole (NEXIUM) 20 MG DR capsule Past Week  Yes Yes   Sig: Take 20 mg by mouth daily as needed. Take 30-60 minutes before eating.   fluticasone-salmeterol (AIRDUO RESPICLICK) 113-14 MCG/ACT inhaler More than a month Care Giver Yes Yes   Sig: Inhale 1 puff into the lungs as needed (shortness of breath).   furosemide (LASIX) 20  MG tablet 4/9/2025 Morning  Yes Yes   Sig: Take 40 mg by mouth every morning.   furosemide (LASIX) 20 MG tablet 4/8/2025 Bedtime  Yes Yes   Sig: Take 20 mg by mouth every evening.   l-lysine HCl 500 MG TABS tablet 4/9/2025 Morning Spouse/Significant Other, Care Giver Yes Yes   Sig: Take 500 mg by mouth daily.   magnesium oxide 250 mg Tab 4/9/2025 Morning Spouse/Significant Other, Care Giver Yes Yes   Sig: Take 250 mg by mouth 2 times daily.   metoprolol succinate ER (TOPROL XL) 50 MG 24 hr tablet 4/9/2025 Morning  No Yes   Sig: Take 1 tablet (50 mg) by mouth daily.   Patient taking differently: Take 50 mg by mouth daily. May take an additional 25 mg PRN if SBP is 150 mmHg or higher.   modafinil (PROVIGIL) 100 MG tablet 4/9/2025 Morning  Yes Yes   Sig: Take 200 mg by mouth every morning.   montelukast (SINGULAIR) 10 mg tablet 4/8/2025 Bedtime Spouse/Significant Other, Care Giver Yes Yes   Sig: Take 10 mg by mouth every evening   multivitamin, therapeutic (THERA-VIT) TABS tablet Past Week Morning  Yes Yes   Sig: Take 1 tablet by mouth daily.   polyethylene glycol (MIRALAX) 17 GM/Dose powder Past Month  Yes Yes   Sig: Take 1 Capful by mouth daily as needed for constipation.   solifenacin (VESICARE) 10 MG tablet 4/8/2025 Bedtime  Yes Yes   Sig: Take 10 mg by mouth at bedtime.   valACYclovir (VALTREX) 1000 mg tablet 4/8/2025 Bedtime  No Yes   Sig: Take 1 tablet (1,000 mg) by mouth 2 times daily.      Facility-Administered Medications: None        Review of Systems    The 10 point Review of Systems is negative other than noted in the HPI or here.     Allergies   Allergies   Allergen Reactions    Latex Rash     Severe rash    Pregabalin Shortness Of Breath     Other Reaction(s): swelling and shortness of breath    Valsartan Unknown     Hyperkalemia    Ciprofloxacin Visual Disturbance, Hallucination and Confusion     Likely contributed visual hallucination and confusion    Colchicine Diarrhea    Dicloxacillin      Other  Reaction(s): confusion, says she has tolerated augmentin without difficulty.     Gabapentin      Other Reaction(s): Sedation    Latex Unknown     Added based on information entered during case entry, please review and add reactions, type, and severity as needed    Other Drug Allergy (See Comments) Muscle Pain (Myalgia)     Tried lovastatin and simvastatin    Other Environmental Allergy Unknown     Coverlet bandaid , 3M product; rash    Statins-Hmg-Coa Reductase Inhibitors [Statins] Muscle Pain (Myalgia)     Tried lovastatin and simvastatin    Sulfa Antibiotics Swelling     Facial swelling      Adhesive Tape Rash        Physical Exam   Vital Signs: Temp: 97.9  F (36.6  C) Temp src: Oral BP: (!) 146/77 Pulse: 64   Resp: 24 SpO2: 100 % O2 Device: Nasal cannula Oxygen Delivery: 3 LPM  Weight: 196 lbs 0 oz    General Appearance: Awake, alert, obese, in no acute distress.  Respiratory: Clear to auscultation bilaterally. Normal respiratory effort on baseline oxygen requirement of 3L/NC.  Cardiovascular: Regular rate and rhythm, no murmur. Extremities are warm and well-perfused.  GI: Soft, non-tender, non-distended. Normal bowel sounds.  Skin: No obvious rashes or lesions on observed skin.  Musculoskeletal: Able to move all extremities freely. No lower extremity edema.  Neurologic: Alert and oriented x 3. Strength and sensation are grossly equal and intact in bilateral lower extremities.  Psychiatric: Calm, pleasant, cooperative with exam.      Medical Decision Making       70 MINUTES SPENT BY ME on the date of service doing chart review, history, exam, documentation & further activities per the note.      Data     I have personally reviewed the following data over the past 24 hrs:    7.7  \   12.0   / 384     139 98 41.9 (H) /  95   4.5 28 1.59 (H) \     Trop: 47 (H) BNP: N/A       Imaging results reviewed over the past 24 hrs:   No results found for this or any previous visit (from the past 24 hours).

## 2025-04-10 NOTE — PROGRESS NOTES
[facility] Medicine PROGRESS NOTE      Identification/Summary:   74-year-old female brought to the emergency department after falling at home.  Has spinal stenosis, gets PT and OT at home.  PT was working with her and found her to be very weak and falling.  Patient says that she falls frequently, without loss of consciousness.    She has a medical history of chronic respiratory failure with hypoxia, uses oxygen continuously during the day and BiPAP at night.  She also has a history of hypertension, asthma, chronic kidney disease, congestive heart failure, fibromyalgia, reflux.    Evaluation here with unremarkable vital signs, labs notable for creatinine of 1.67 which is within the range of her most recent basic metabolic panels.      She was hospitalized at the end of March with diarrhea.  Also found to have candidiasis, herpes simplex on her upper lip.  She was discharged with a dose of Diflucan, 5 days of Valtrex.  She also complained of falling during that hospital stay and was referred to neurosurgery as an outpatient but does not have an appointment until the summer.    This morning she is feeling fine, says that she really would like to avoid surgery if at all possible but if it meant preventing paralysis she may consider it.  Neurosurgery consulted here, PT and OT consulted.  She really does not want to go to TCU.    Assessment and Plan:  General Weakness, frequent falls  Severe/critical central spinal canal stenosis at L4-5  Suspect this may be related to her spinal disease  Neurosurgery consulted  PT and OT chuckie's appreciated  Could be related to her pain medications    Chronic pain  As an outpatient she takes duloxetine, Norco  Wonder if Norco and oxygen could lead to CO2 retention  Continue home meds, check venous blood gas if somnolent    Chronic kidney disease  Creatinine has been a bit labile over the last couple of weeks but she seems to be at about her baseline now  Given some IV fluid and  Lasix was held this morning  Recommend recheck tomorrow if she is here otherwise within a week as an outpatient    Chronic respiratory failure with hypoxia  Asthma  Restrictive lung disease  Obstructive sleep apnea  Continue supplemental oxygen during the day  Check venous blood gas  BiPAP at night or while sleeping      Diet: Combination Diet Low Saturated Fat Na <2400mg Diet  Fluids: None  Pain meds: As above  Therapy: PT and OT  DVT Prophylaxis: Lovenox  Code Status: Full Code  Medically Ready for Discharge: Anticipated Tomorrow        Interval History/Subjective:  No chest pain, shortness of breath, nausea, vomiting, diarrhea, dysuria.  Says that she just falls sometimes.  Says that she is followed about 15 times over the last year.  She says typically she falls at home and just gets back up.  No loss of consciousness.    Physical Exam/Objective:  Gen: no acute distress, comfortable, alert, pleasant  ENT: no scleral icterus  Pulm: lungs are diminished but clear, breathing comfortably with oxygen via nasal cannula  CV: regular rate and rhythm, mild lower extremity edema  GI: abdomen is soft, non-tender, non-distended with active bowel sounds.  Neuro: Able to feel and move her legs normally while laying in bed  Derm: Warm, dry, not pale  Psych: appropriate affect    Medications:   Current Facility-Administered Medications   Medication Dose Route Frequency Provider Last Rate Last Admin    aspirin EC tablet 81 mg  81 mg Oral Daily Mary Macedo PA-C        cetirizine (zyrTEC) tablet 10 mg  10 mg Oral Daily Mary Macedo PA-C        DULoxetine (CYMBALTA) DR capsule 30 mg  30 mg Oral QPM Mary Macedo PA-C   30 mg at 04/09/25 2328    DULoxetine (CYMBALTA) DR capsule 60 mg  60 mg Oral QAM Mary Macedo PA-C        enoxaparin ANTICOAGULANT (LOVENOX) injection 40 mg  40 mg Subcutaneous Q12H Mary Macedo PA-C   40 mg at 04/09/25 2328    [Held by provider] furosemide (LASIX) tablet 20 mg  20 mg Oral QPM Mary Macedo PA-C  "       [Held by provider] furosemide (LASIX) tablet 40 mg  40 mg Oral QAM Mary Macedo PA-C        metoprolol succinate ER (TOPROL XL) 24 hr tablet 50 mg  50 mg Oral Daily Mary Macedo PA-C        modafinil (PROVIGIL) tablet 200 mg  200 mg Oral QAM Mary Macedo PA-C        montelukast (SINGULAIR) tablet 10 mg  10 mg Oral QPM Mary Macedo PA-MICHAEL   10 mg at 04/09/25 2328    rosuvastatin (CRESTOR) tablet 10 mg  10 mg Oral At Bedtime Mary Macedo PA-C   10 mg at 04/09/25 2330    solifenacin (VESICARE) tablet 10 mg  10 mg Oral At Bedtime Mary Macedo PA-MICHAEL   10 mg at 04/09/25 2328    valACYclovir (VALTREX) tablet 1,000 mg  1,000 mg Oral BID Mary Macedo PA-C   1,000 mg at 04/09/25 2330     Clinically Significant Risk Factors Present on Admission                 # Drug Induced Platelet Defect: home medication list includes an antiplatelet medication   # Hypertension: Noted on problem list  # Chronic heart failure with preserved ejection fraction: heart failure noted on problem list and last echo with EF >50%          # Severe Obesity: Estimated body mass index is 45.55 kg/m  as calculated from the following:    Height as of this encounter: 1.397 m (4' 7\").    Weight as of this encounter: 88.9 kg (196 lb).       # Financial/Environmental Concerns:    # Asthma: noted on problem list           David Rosario DO   Hospitalist  [facility]              "

## 2025-04-10 NOTE — PROGRESS NOTES
04/10/25 0942   Appointment Info   Signing Clinician's Name / Credentials (PT) LIZANDRO Loya   Student Supervision Direct supervision provided;Therapy services provided with the co-signing licensed therapist guiding and directing the services, and providing the skilled judgement and assessment throughout the session   Living Environment   People in Home spouse   Current Living Arrangements house   Home Accessibility stairs within home;other (see comments)  (ramp entry, then split level home)   Number of Stairs, Main Entrance none   Stair Railings, Main Entrance none  (has a bannister to grab on the R ascending for a couple steps)   Number of Stairs, Within Home, Primary seven   Stair Railings, Within Home, Primary railing on left side (ascending);other (see comments)  (has banister on the R side for a couple steps)   Self-Care   Usual Activity Tolerance moderate   Current Activity Tolerance fair   Equipment Currently Used at Home walker, rolling;shower chair;grab bar, toilet;grab bar, tub/shower;hospital bed;wheelchair, manual;cane, straight  (cane)   Fall history within last six months yes   Number of times patient has fallen within last six months 8  (15 in the last year)   Activity/Exercise/Self-Care Comment reports ind with dressing uses a 4WW around the house   General Information   Onset of Illness/Injury or Date of Surgery 04/09/25   Referring Physician Dr. Rosario   Patient/Family Therapy Goals Statement (PT) none stated   Pertinent History of Current Problem (include personal factors and/or comorbidities that impact the POC) 73 y/o admitted with generalized weakness. PMHx: chronic respiratory failure with hypoxia 3 L of oxygen at baseline, severe NELLIE on BiPAP at night, CKD stage III, CVA, obesity, obesity hypoventilation syndrome, recurrent UTI, chronic back pain, hypercholesterolemia, chronic diastolic heart failure, and uncomplicated asthma. Recurrent falls. Recent discharge from [facility]  Hospital after being treated for e coli UTI.   Existing Precautions/Restrictions fall;oxygen therapy device and L/min   Weight-Bearing Status - LLE full weight-bearing   Weight-Bearing Status - RLE full weight-bearing   Cognition   Affect/Mental Status (Cognition) WNL   Strength (Manual Muscle Testing)   Strength (Manual Muscle Testing) Deficits observed during functional mobility   Bed Mobility   Bed Mobility supine-sit;sit-supine   Rolling Right [place] (Bed Mobility) moderate assist (50% patient effort);1 person assist;verbal cues   Supine-Sit [place] (Bed Mobility) moderate assist (50% patient effort);1 person assist;verbal cues   Sit-Supine [place] (Bed Mobility) moderate assist (50% patient effort);verbal cues;1 person assist   Bed Mobility Limitations decreased ability to use arms for pushing/pulling;decreased ability to use legs for bridging/pushing;impaired ability to control trunk for mobility   Impairments Contributing to Impaired Bed Mobility decreased strength   Assistive Device (Bed Mobility) draw sheet;bed rails   Transfers   Transfers sit-stand transfer   Maintains Weight-bearing Status (Transfers) able to maintain   Sit-Stand Transfer   Sit-Stand [place] (Transfers) moderate assist (50% patient effort);2 person assist   Assistive Device (Sit-Stand Transfers) walker, front-wheeled   Gait/Stairs (Locomotion)   [place] Level (Gait) contact guard   Assistive Device (Gait) walker, front-wheeled   Distance in Feet (Gait) 5   Pattern (Gait) step-to   Deviations/Abnormal Patterns (Gait) leni decreased;gait speed decreased   Maintains Weight-bearing Status (Gait) able to maintain   Clinical Impression   Criteria for Skilled Therapeutic Intervention Yes, treatment indicated   PT Diagnosis (PT) imapired functional mobility   Influenced by the following impairments weakness, pain   Functional limitations due to impairments bed mobility, transfers, ambulation, stairs   Clinical  Presentation (PT Evaluation Complexity) stable   Clinical Presentation Rationale   (pt presents as medically diagnosed)   Clinical Decision Making (Complexity) low complexity   Planned Therapy Interventions (PT) balance training;bed mobility training;gait training;home exercise program;patient/family education;postural re-education;ROM (range of motion);stair training;strengthening;stretching;transfer training;progressive activity/exercise;risk factor education   Risk & Benefits of therapy have been explained care plan/treatment goals reviewed;patient   PT Total Evaluation Time   PT [place], Low Complexity Minutes (51846) 10   Physical Therapy Goals   PT Frequency Daily   PT Predicted Duration/Target Date for Goal Attainment 04/09/25   PT Goals Bed Mobility;Transfers;Gait;Stairs   PT: Bed Mobility Rolling;Bridging;Independent;Supine to/from sit   PT: Transfers Supervision/stand-by assist;Assistive device;Sit to/from stand   PT: Gait Supervision/stand-by assist;Assistive device;50 feet  (4WW)   PT: Stairs Supervision/stand-by assist;7 stairs;Rail on left   Therapeutic Activity   Therapeutic Activities: dynamic activities to improve functional performance Minutes (34098) 32   Symptoms Noted During/After Treatment Fatigue   Treatment Detail/Skilled Intervention Supine <> sit with mod assist x 1, cueing for safe hand and LE placement , sit<> stand with mod assist x2 onto step due to height of bed. Completed one step descending with mod assist x1, to reach floor, verbal cues for anterior trunk lean and hand placement. STS to/from toilet with FWW and mod assist x2 verbal cues for hand placement. Ascended one step with mod assist x2 and FWW. Sit <> supine with mod assist x1. Dependent transfer to head of bed via draw sheet. Call light left within reach.   Gait Training   Symptoms Noted During/After Treatment (Gait Training) fatigue   Treatment Detail/Skilled Intervention Pt on 3L of O2, ambulated 20' with FWW and CGA cued for  walker positioning, attention to direction and navigation.   Distance in Feet 20'   Weight Bearing (Gait Training) full weight-bearing   Assistive Device (Gait Training) rolling walker   Gait Analysis Deviations decreased leni;decreased step length;decreased toe-to-floor clearance   Impairments (Gait Analysis/Training) strength decreased   Pattern Analysis (Gait Training) swing-to gait   PT Discharge Planning   PT Plan Progress gait, transfers, trial stairs   PT Discharge Recommendation (DC Rec) Transitional Care Facility   PT Rationale for DC Rec Pt has fair family support, but needs assitance with all transfers, and needs to complete 7 stairs at home.   PT Brief overview of current status Pt currently needing mod-max assist x2 for transfers, needing CGA for gait.   PT Total Distance Amb During Session (feet) 20   PT Equipment Needed at Discharge walker, rolling   Physical Therapy Time and Intention   Timed Code Treatment Minutes 32   Total Session Time (sum of timed and untimed services) 42

## 2025-04-10 NOTE — MEDICATION SCRIBE - ADMISSION MEDICATION HISTORY
Medication Scribe Admission Medication History    Admission medication history is complete. The information provided in this note is only as accurate as the sources available at the time of the update.    Information Source(s): Patient and CareEverywhere/SureScripts via in-person    Pertinent Information: Patient took AM meds today PTA.     She has a recent fill for pantoprazole, but has been using esomeprazole at home and wanted to continue with that instead.     Last hydrocodone was taken this afternoon PTA.     Was given a one time fluconazole 200 mg tab on 4/1/2025. Started valacyclovir on Monday. Last dose was last night.     Changes made to PTA medication list:  Added:   Solifenacin 10 mg - was initially deleted on prior med rec 3/22/2025, but patient reported taking this one daily at bedtime (last fill 4/5/2025)  Multivitamin  Polyethylene glycol 17 g  Deleted:   Tizanidine 4 mg - patient ran out of this medication over one month ago. Will need new Rx if this is to be on the patient med list.   Changed:   Docusate 100 mg 1-3 caps daily PRN --> 1 BID PRN  Esomeprazole 20 mg 2 caps QAM AC --> 1 cap daily PRN  Metoprolol succinate 50 mg every day --> 50 mg every day + 25 mg PRN if SBP is 150 mmHg or higher (patient reported instructions)  Modafinil 100 mg 2.5 tabs (250 mg ) QAM --> 200 mg QAM (patient reported taking only 2 tabs QAM)  Magnesium 250 mg tabs 2 tabs daily --> 1 tab BID    Allergies reviewed with patient and updates made in EHR: yes    Medication History Completed By: Constantino Thomas 4/9/2025 9:12 PM    PTA Med List   Medication Sig Note Last Dose/Taking    acetaminophen (TYLENOL) 500 MG tablet Take 500 mg by mouth every 6 hours as needed for mild pain  Past Month    albuterol (PROAIR HFA;PROVENTIL HFA;VENTOLIN HFA) 90 mcg/actuation inhaler Inhale 1 puff into the lungs every 4 hours as needed for shortness of breath / dyspnea  More than a month    alendronate (FOSAMAX) 70 MG tablet [ALENDRONATE  (FOSAMAX) 70 MG TABLET] Take 70 mg by mouth every 7 days. Takes on Mondays 4/7/2025    aspirin 81 MG EC tablet Take 81 mg by mouth daily.  Past Month    azelastine (OPTIVAR) 0.05 % ophthalmic solution Place 1 drop into both eyes 2 times daily as needed.  4/8/2025 Bedtime    calcium citrate (CITRACAL) 950 (200 Ca) MG tablet Take 1 tablet by mouth daily  4/8/2025 Bedtime    cetirizine (ZYRTEC) 10 MG tablet [CETIRIZINE (ZYRTEC) 10 MG TABLET] Take 10 mg by mouth daily.   4/8/2025 Morning    cholecalciferol (VITAMIN D3) 25 mcg (1000 units) capsule Take 1 capsule by mouth daily.  4/8/2025 Morning    CRESTOR 10 MG tablet Take 10 mg by mouth daily.  4/8/2025 Bedtime    docusate sodium (COLACE) 100 MG capsule Take 100 mg by mouth 2 times daily as needed for constipation.  4/8/2025 Evening    DULoxetine (CYMBALTA) 30 MG capsule Take 60 mg by mouth every morning.  4/9/2025 Morning    DULoxetine (CYMBALTA) 30 MG capsule Take 30 mg by mouth every evening.  4/8/2025 Bedtime    esomeprazole (NEXIUM) 20 MG DR capsule Take 20 mg by mouth daily as needed. Take 30-60 minutes before eating.  Past Week    fluticasone-salmeterol (AIRDUO RESPICLICK) 113-14 MCG/ACT inhaler Inhale 1 puff into the lungs as needed (shortness of breath).  More than a month    furosemide (LASIX) 20 MG tablet Take 40 mg by mouth every morning.  4/9/2025 Morning    furosemide (LASIX) 20 MG tablet Take 20 mg by mouth every evening.  4/8/2025 Bedtime    HYDROcodone-acetaminophen (NORCO) 5-325 MG tablet Take 1 tablet by mouth every 4 hours as needed for pain Max 6 tablets per day.  4/9/2025 Noon    l-lysine HCl 500 MG TABS tablet Take 500 mg by mouth daily.  4/9/2025 Morning    magnesium oxide 250 mg Tab Take 250 mg by mouth 2 times daily.  4/9/2025 Morning    metoprolol succinate ER (TOPROL XL) 50 MG 24 hr tablet Take 1 tablet (50 mg) by mouth daily. (Patient taking differently: Take 50 mg by mouth daily. May take an additional 25 mg PRN if SBP is 150 mmHg or  higher.)  4/9/2025 Morning    modafinil (PROVIGIL) 100 MG tablet Take 200 mg by mouth every morning.  4/9/2025 Morning    montelukast (SINGULAIR) 10 mg tablet Take 10 mg by mouth every evening  4/8/2025 Bedtime    multivitamin, therapeutic (THERA-VIT) TABS tablet Take 1 tablet by mouth daily.  Past Week Morning    polyethylene glycol (MIRALAX) 17 GM/Dose powder Take 1 Capful by mouth daily as needed for constipation.  Past Month    solifenacin (VESICARE) 10 MG tablet Take 10 mg by mouth at bedtime.  4/8/2025 Bedtime    Turmeric (CURCUPLEX-95) 500 MG CAPS Take 1 capsule by mouth daily  Past Week    valACYclovir (VALTREX) 1000 mg tablet Take 1 tablet (1,000 mg) by mouth 2 times daily. 4/9/2025: Started 4/6/2025 4/8/2025 Bedtime

## 2025-04-10 NOTE — ED NOTES
Pt up for ambulation trial with walker and 3 liters O2. Pt only able to make it about 4 steps before she needed to return to the bed. Pt states her feet feel too heavy to go any further. Pt resting back in bed at this time and provider updated.

## 2025-04-10 NOTE — ED NOTES
Bed: [facility]-07  Expected date: 4/9/25  Expected time: 8:54 PM  Means of arrival:   Comments:  ED 19 for boarding

## 2025-04-10 NOTE — PROGRESS NOTES
PRIMARY DIAGNOSIS: GENERALIZED WEAKNESS    OUTPATIENT/OBSERVATION GOALS TO BE MET BEFORE DISCHARGE  1. Orthostatic performed: N/A    2. Tolerating PO medications: Yes    3. Return to near baseline physical activity: No    4. Cleared for discharge by consultants (if involved): No    Discharge Planner Nurse   Safe discharge environment identified: No  Barriers to discharge: Yes    Please review provider order for any additional goals.   Nurse to notify provider when observation goals have been met and patient is ready for discharge.

## 2025-04-10 NOTE — PROGRESS NOTES
04/10/25 1408   Appointment Info   Signing Clinician's Name / Credentials (OT) Petar Busby, OTR/L   Living Environment   People in Home spouse   Current Living Arrangements house   Transportation Anticipated family or friend will provide   Living Environment Comments Pt has FWW, 4WW, walkin shower w/ shower chair and grab bars, high toilet, reacher, leg , sock aid   Self-Care   Usual Activity Tolerance moderate   Current Activity Tolerance fair   Equipment Currently Used at Home walker, rolling;shower chair;grab bar, toilet;grab bar, tub/shower;hospital bed;wheelchair, manual;cane, straight;dressing device   Fall history within last six months yes   Number of times patient has fallen within last six months 8   Activity/Exercise/Self-Care Comment Pt typically IND w/ ADLs and gets assistance w/ all IADLs at baseline   General Information   Onset of Illness/Injury or Date of Surgery 04/09/25   Referring Physician David Rosario, DO   Patient/Family Therapy Goal Statement (OT) get stronger   Additional Occupational Profile Info/Pertinent History of Current Problem 74-year-old female brought to the emergency department after falling at home.  Has spinal stenosis, gets PT and OT at home.  PT was working with her and found her to be very weak and falling.  Patient says that she falls frequently, without loss of consciousness.     She has a medical history of chronic respiratory failure with hypoxia, uses oxygen continuously during the day and BiPAP at night.  She also has a history of hypertension, asthma, chronic kidney disease, congestive heart failure, fibromyalgia, reflux.   Existing Precautions/Restrictions fall;oxygen therapy device and L/min  (3L O2 at baseline)   Cognitive Status Examination   Orientation Status orientation to person, place and time   Affect/Mental Status (Cognitive) WNL   Visual Perception   Visual Impairment/Limitations corrective lenses for reading   Sensory   Sensory Comments numbness  in hands/feet at baseline   Pain Assessment   Patient Currently in Pain Yes, see Vital Sign flowsheet   Posture   Posture not impaired   Range of Motion Comprehensive   General Range of Motion upper extremity range of motion deficits identified   General Upper Extremity Assessment (Range of Motion)   Comment: Upper Extremity ROM limited ability w/ bilateal shoulder AROM above head   Strength Comprehensive (MMT)   Comment, General Manual Muscle Testing (MMT) Assessment generalized weakness   Bed Mobility   Bed Mobility scooting/bridging;supine-sit;sit-supine   Comment (Bed Mobility) Per clinical judgement, pt will need Mod-Max A   Transfers   Transfers bed-chair transfer;sit-stand transfer;toilet transfer;shower transfer   Transfer Comments CGA-Min A   Activities of Daily Living   BADL Assessment/Intervention lower body dressing;bathing   Bathing Assessment/Intervention   [place] Level (Bathing) minimum assist (75% patient effort)   Lower Body Dressing Assessment/Training   [place] Level (Lower Body Dressing) minimum assist (75% patient effort)   Assistive Devices (Lower Body Dressing) reacher;sock-aid   Clinical Impression   Criteria for Skilled Therapeutic Interventions Met (OT) Yes, treatment indicated   OT Diagnosis decreased ADLs   Influenced by the following impairments weakness, falls   OT Problem List-Impairments impacting ADL activity tolerance impaired;mobility;strength;pain   Assessment of Occupational Performance 3-5 Performance Deficits   Identified Performance Deficits dressing, bed mobility, toileting, bathing, all transfers   Planned Therapy Interventions (OT) ADL retraining;bed mobility training;transfer training;strengthening   Clinical Decision Making Complexity (OT) detailed assessment/moderate complexity   Risk & Benefits of therapy have been explained evaluation/treatment results reviewed;patient   OT Total Evaluation Time   OT Eval, Moderate Complexity Minutes (83526) 10   OT Goals    Therapy Frequency (OT) 5 times/week   OT Predicted Duration/Target Date for Goal Attainment 04/16/25   OT Goals Lower Body Dressing;Bed Mobility;Toilet Transfer/Toileting;Hygiene/Grooming   OT: Hygiene/Grooming modified independent;while standing  (w/ FWW)   OT: Lower Body Dressing Modified independent;using adaptive equipment  (reacher/sock aid)   OT: Bed Mobility Modified independent;supine to/from sitting   OT: Toilet Transfer/Toileting Modified independent;toilet transfer;cleaning and garment management   Interventions   Interventions Quick Adds Self-Care/Home Management   Self-Care/Home Management   Self-Care/Home Mgmt/ADL, Compensatory, Meal Prep Minutes (37941) 15   Symptoms Noted During/After Treatment (Meal Preparation/Planning Training) fatigue   Treatment Detail/Skilled Intervention Pt upright in recliner and on 3L O2 - agreeable to therapy. Pt given verbal/tactile cues for safe STS from chair - completed first attempt w/ Min A and second attempt w/ CGA. FWW height adjusted accordingly and pt amb. 5 ft to sit on EOB. STS from bed w/ CGA and amb. 15 ft to BR w/ FWW. Pt stood at sink for g/h tasks - completed oral cares and washed face while standing/leaning on counter. Pt amb. back to chair - O2 SATs at 95%. Alarm on and all needs w/in reach.   OT Discharge Planning   OT Plan bed mobility, LE dressing w/ AE, standing g/h, toilet/WIS transfer, UE ex   OT Discharge Recommendation (DC Rec) (S)  Transitional Care Facility   OT Rationale for DC Rec Pt weak and at risk for falls.  Recommending TCU.  Spouse home to assist as needed. Pt has all necessary DME.   OT Brief overview of current status CGA-min a for transfers and mobility, min assist for LE dressing, standing G/H at sink   OT Total Distance Amb During Session (feet) 25   Total Session Time   Timed Code Treatment Minutes 15   Total Session Time (sum of timed and untimed services) 25

## 2025-04-10 NOTE — PROGRESS NOTES
"[facility] ED Handoff Report    ED Chief Complaint: Generalized weakness     ED Diagnosis:  (M48.062) Spinal stenosis of lumbar region with neurogenic claudication  (primary encounter diagnosis)  Plan: Pain Management  Referral      (R53.1) Generalized weakness  Plan: Neurosurgery to see due to frequent falls and severe spinal stenosis       PMH:    Past Medical History:   Diagnosis Date    Allergic rhinitis     Arthritis of back     CHF (congestive heart failure) (H)     Chronic kidney disease     stage 3     De Quervain's disease (tenosynovitis)     Fibromyalgia, primary     GERD (gastroesophageal reflux disease)     Hematuria     chronic    High cholesterol     History of blood clots 09/01/1970    DVT, from birth control, h/o left calf    Hyperlipidemia     Hypertension     Low back pain     Osteoporosis     Pickwickian syndrome (H)     Plantar fasciitis     Proteinuria     Proteinuria     chronic    Sleep apnea     CPAP    Uncomplicated asthma         Code Status:  Full Code     Falls Risk: Yes Band: Applied    Current Living Situation/Residence: lives with a significant other and lives in a house     Elimination Status: Continent: External catheter at night due to mobility issues, wears briefs otherwise.     Activity Level: 2 assist    Patient's Preferred Language:  English     Needed: No    Vital Signs:  /62   Pulse 66   Temp 97.3  F (36.3  C) (Oral)   Resp (!) 35   Ht 1.397 m (4' 7\")   Wt 88.9 kg (196 lb)   SpO2 99%   BMI 45.55 kg/m       Cardiac Rhythm: SR w/ 1st degree AVB    Pain Score: 0/10 - denies pain while lying down    Is the Patient Confused:  No    Last Food or Drink: 04/10/25 at 1245    Assessment and Plan of Care:  A/O x 4, VSS on 3 L via NC (baseline). Denies pain currently. L PIV SL. Neurosurgery to see today. Repeat x-ray of cervical spine to check on hardware migration. Tolerating cardiac diet. Pt refusing medications while observation status due to " cost.     Tests Performed: Done: Labs and Imaging    Treatments Provided:  NSGY consult, IVF for elevated creatinine    Family Dynamics/Concerns: No    Belongings Checklist Done and Signed by Patient: N/A    Belongings Sent with Patient: Yes    Boarding medications sent with patient: Yes    Additional Information: N/A    RN: Diamante Ellison RN 4/10/2025 12:35 PM

## 2025-04-10 NOTE — CONSULTS
Neurosurgery Consult    HPI    Ms. Rey is a 74-year-old with a history of lumbar spinal stenosis, presented on 4/9/2025 with worsening low back pain and increased difficulty with mobility.  Last MRI is from December 2024.  Patient was seen by our service at that time, and was not interested in surgical intervention.    Today patient states she is having back pain and some tingling in her feet.  She states that she had a fall prior to admission, and has had fallen 15 times in the last year.  She is not sure if she would want any surgical intervention.  She does report she had a couple of back injections in the past which were helpful for about a year.    She states her symptoms currently feel similar to what they did back in December when she had her most recent MRI.    She denies any issues with bowel or bladder function.  She denies saddle anesthesia.      She does also report neck pain, feeling new she slept wrong last night.  She denies new radicular pain or new numbness or weakness in her upper extremities.  She does have some baseline weakness in her hands that she states has not changed.    Medical history  longstanding lumbar stenosis with associated neuropathy and chronic pain, chronic heart failure with preserved ejection fraction, hypertension, severe NELLIE on BiPAP at night, obesity hypoventilation syndrome, chronic respiratory failure with oxygen dependence 3L/NC at baseline, CKD stage 3, history of CVA, history of recurrent UTI     Has a history of C5-7 ACDF, with ventral migration of the plate and screws.        B/P: 124/62, T: 97.7, P: 62, R: 28       Exam    Alert, oriented, no acute distress, lying in bed  Bilateral lower extremities with 5-5 strength with hip flexion, knee extension, ankle dorsiflexion plantarflexion  Sensation intact bilaterally to soft touch  Reflexes absent patella and ankle  Negative ankle clonus  Negative straight leg raise bilaterally in the lower extremities  Mild lumbar  tenderness on palpation    Imaging    Lumbar MRI from December 2024 demonstrated    IMPRESSION:  1.  Advanced multilevel degenerative changes of the lumbar spine, as above, with grade 1 anterolisthesis at L4 on L5 and L5 on S1.  2.  Transitional lumbosacral anatomy. The S1 vertebral body is partially lumbarized. If surgery is planned, exact localization is recommended.  3.  At L4-L5 there is severe/critical central spinal canal stenosis with severe bilateral neural foraminal stenosis. Correlation for cauda equina syndrome is recommended.  4.  At L3-L4 there is severe central spinal canal stenosis with severe left and mild right neural foraminal stenosis.  5.  At L2-L3 there is severe central spinal canal stenosis with severe left and moderate right neural foraminal stenosis.  6.  At L1-L2 there is mild central spinal canal stenosis with severe right neural foraminal stenosis.  7.  At L5-S1 there is moderate left and mild to moderate right neural foraminal stenosis.  8.  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  9.  Fluid in the bilateral facet joints is noted at L3-L4 and L4-L5 with mild adjacent elroy-facet enhancement. These findings are favored to reflect a degenerative facet synovitis, however, an infectious/inflammatory facet synovitis could have a similar   appearance.  10.  At T11-T12 there is a concentric disc bulge with a superimposed left lateral recess disc extrusion with cranial migration. At this level there is severe central spinal canal stenosis, severe left neural foraminal stenosis, and moderate right neural   foraminal stenosis.  11.  Dextroconvex curvature of lumbar spine centered at L2-L3.    Assessment    Severe lumbar spinal stenosis  Worsening back pain and difficulty with mobility    Status post C5-7 ACDF with ventral migration of plate and screws  Neck pain    Plan:      An updated lumbar MRI is not needed at this time as the patient states her symptoms feel similar to when her last MRI  was done about 4 months ago, s and he is not ready to proceed with surgical intervention either.  She would like to try an epidural steroid injection again.  I will make a referral for this as an outpatient.    If she is able to improve her pain to the point where she can discharge, then we will follow-up with her as an outpatient.    If her symptoms are not improving, and she is unable to ambulate well, then during this admission we should repeat a lumbar MRI without contrast to ensure no change or worsening of her findings has occurred.      Will also order a cervical x-ray to ensure no change in the appearance of the cervical plate and screws which have migrated ventrally given that she is reporting some increased neck pain today.

## 2025-04-11 ENCOUNTER — APPOINTMENT (OUTPATIENT)
Dept: CT IMAGING | Facility: CLINIC | Age: 75
End: 2025-04-11
Attending: PHYSICIAN ASSISTANT
Payer: MEDICARE

## 2025-04-11 ENCOUNTER — APPOINTMENT (OUTPATIENT)
Dept: OCCUPATIONAL THERAPY | Facility: CLINIC | Age: 75
End: 2025-04-11
Payer: MEDICARE

## 2025-04-11 VITALS
HEART RATE: 66 BPM | WEIGHT: 196.7 LBS | TEMPERATURE: 96.9 F | SYSTOLIC BLOOD PRESSURE: 159 MMHG | HEIGHT: 55 IN | RESPIRATION RATE: 18 BRPM | DIASTOLIC BLOOD PRESSURE: 94 MMHG | BODY MASS INDEX: 45.52 KG/M2 | OXYGEN SATURATION: 94 %

## 2025-04-11 PROBLEM — K21.9 GASTROESOPHAGEAL REFLUX DISEASE WITHOUT ESOPHAGITIS: Status: ACTIVE | Noted: 2025-04-11

## 2025-04-11 PROBLEM — R29.6 RECURRENT FALLS: Status: ACTIVE | Noted: 2025-04-11

## 2025-04-11 PROBLEM — M50.30 DDD (DEGENERATIVE DISC DISEASE), CERVICAL: Status: ACTIVE | Noted: 2025-04-11

## 2025-04-11 PROCEDURE — 99221 1ST HOSP IP/OBS SF/LOW 40: CPT | Performed by: INTERNAL MEDICINE

## 2025-04-11 PROCEDURE — 250N000013 HC RX MED GY IP 250 OP 250 PS 637

## 2025-04-11 PROCEDURE — G0378 HOSPITAL OBSERVATION PER HR: HCPCS

## 2025-04-11 PROCEDURE — 250N000011 HC RX IP 250 OP 636

## 2025-04-11 PROCEDURE — 72125 CT NECK SPINE W/O DYE: CPT

## 2025-04-11 PROCEDURE — 250N000013 HC RX MED GY IP 250 OP 250 PS 637: Performed by: NURSE PRACTITIONER

## 2025-04-11 PROCEDURE — 99205 OFFICE O/P NEW HI 60 MIN: CPT | Performed by: NURSE PRACTITIONER

## 2025-04-11 PROCEDURE — 97535 SELF CARE MNGMENT TRAINING: CPT | Mod: GO

## 2025-04-11 PROCEDURE — 999N000157 HC STATISTIC RCP TIME EA 10 MIN

## 2025-04-11 PROCEDURE — 96372 THER/PROPH/DIAG INJ SC/IM: CPT

## 2025-04-11 PROCEDURE — 99239 HOSP IP/OBS DSCHRG MGMT >30: CPT | Performed by: FAMILY MEDICINE

## 2025-04-11 RX ORDER — ACETAMINOPHEN 325 MG/1
325 TABLET ORAL EVERY 4 HOURS
COMMUNITY
Start: 2025-04-11 | End: 2025-05-01

## 2025-04-11 RX ORDER — ACETAMINOPHEN 325 MG/1
325 TABLET ORAL EVERY 4 HOURS
Status: DISCONTINUED | OUTPATIENT
Start: 2025-04-11 | End: 2025-04-11 | Stop reason: HOSPADM

## 2025-04-11 RX ORDER — LIDOCAINE 50 MG/G
OINTMENT TOPICAL 4 TIMES DAILY
Status: DISCONTINUED | OUTPATIENT
Start: 2025-04-11 | End: 2025-04-11 | Stop reason: HOSPADM

## 2025-04-11 RX ADMIN — MODAFINIL 200 MG: 100 TABLET ORAL at 08:15

## 2025-04-11 RX ADMIN — DICLOFENAC SODIUM 2 G: 10 GEL TOPICAL at 14:41

## 2025-04-11 RX ADMIN — METOPROLOL SUCCINATE 50 MG: 25 TABLET, EXTENDED RELEASE ORAL at 08:15

## 2025-04-11 RX ADMIN — ACETAMINOPHEN 325 MG: 325 TABLET, FILM COATED ORAL at 18:46

## 2025-04-11 RX ADMIN — ASPIRIN 81 MG: 81 TABLET, COATED ORAL at 08:15

## 2025-04-11 RX ADMIN — VALACYCLOVIR 1000 MG: 1 TABLET, FILM COATED ORAL at 08:15

## 2025-04-11 RX ADMIN — DICLOFENAC SODIUM 2 G: 10 GEL TOPICAL at 18:47

## 2025-04-11 RX ADMIN — CETIRIZINE HYDROCHLORIDE 10 MG: 10 TABLET, FILM COATED ORAL at 08:16

## 2025-04-11 RX ADMIN — DULOXETINE HYDROCHLORIDE 60 MG: 60 CAPSULE, DELAYED RELEASE ORAL at 08:15

## 2025-04-11 RX ADMIN — ACETAMINOPHEN 325 MG: 325 TABLET, FILM COATED ORAL at 14:41

## 2025-04-11 RX ADMIN — ENOXAPARIN SODIUM 40 MG: 40 INJECTION SUBCUTANEOUS at 11:13

## 2025-04-11 RX ADMIN — HYDROCODONE BITARTRATE AND ACETAMINOPHEN 1 TABLET: 5; 325 TABLET ORAL at 08:25

## 2025-04-11 ASSESSMENT — ACTIVITIES OF DAILY LIVING (ADL)
ADLS_ACUITY_SCORE: 64

## 2025-04-11 NOTE — DISCHARGE SUMMARY
"[facility] MEDICINE  DISCHARGE SUMMARY     Primary Care Physician: Daysi Bryan  Admission Date: 4/9/2025   Discharge Provider: Alejandro Byrd MD Discharge Date: 4/11/2025    Diet:   Active Diet and Nourishment Order   Procedures    Combination Diet Low Saturated Fat Na <2400mg Diet    Diet     Code Status: Full Code   Activity: DCACTIVITY: Activity as tolerated  Ambulate with walker and assistance.  Fall precautions.  Should never ambulate by herself.  High fall risk.      Condition at Discharge: Stable     REASON FOR PRESENTATION(See Admission Note for Details)   Weak and falling    PRINCIPAL & ACTIVE DISCHARGE DIAGNOSES     Principal Problem:    Recurrent falls  Active Problems:    Stenosis, spinal, lumbar    Generalized weakness    DDD (degenerative disc disease), cervical    NELLIE (obstructive sleep apnea)    Benign essential hypertension    Hyperlipidemia, unspecified hyperlipidemia type    CKD (chronic kidney disease) stage 3, GFR 30-59 ml/min (H)    Chronic heart failure with preserved ejection fraction (HFpEF) (H)    Fall at home, initial encounter    Chronic respiratory failure with hypoxia (H)    Gastroesophageal reflux disease without esophagitis    Severe L4-5 central spinal canal stenosis  Cervical hardware migration  Severe obstructive sleep apnea  Obesity hypoventilation  Morbid obesity BMI 45  Chronic pain  Acute on chronic kidney disease stage IIIb      Clinically Significant Risk Factors Present on Admission                 # Drug Induced Platelet Defect: home medication list includes an antiplatelet medication   # Hypertension: Noted on problem list  # Chronic heart failure with preserved ejection fraction: heart failure noted on problem list and last echo with EF >50%          # Severe Obesity: Estimated body mass index is 45.72 kg/m  as calculated from the following:    Height as of this encounter: 1.397 m (4' 7\").    Weight as of this encounter: " 89.2 kg (196 lb 11.2 oz).         # Financial/Environmental Concerns: none  # Asthma: noted on problem list        PENDING LABS     Unresulted Labs Ordered in the Past 30 Days of this Admission       No orders found from 3/10/2025 to 4/10/2025.          PROCEDURES ( this hospitalization only)      None    RECOMMENDATIONS TO OUTPATIENT PROVIDER FOR F/U VISIT     Follow-up Appointments       Follow Up      1.  Follow-up with neurosurgery per their recommendations.  They would be able to either coordinate surgery at [facility] with their own provider or send referrals to [facility], [facility] or [facility].  Make certain patient has phone number.  2.  Follow-up with [place] [facility] group if wish to try to maximize obstructive sleep apnea overnight management.        Hospital Follow-up with Existing Primary Care Provider (PCP)          Schedule Primary Care visit within: 7 Days   Recommended labs and Imaging (to be ordered by Primary Care Provider): Recheck basic metabolic panel.                 DISPOSITION     Home with home care    SUMMARY OF HOSPITAL COURSE:      74-year-old female lives at home with her  and has a past medical history of chronic respiratory failure with hypoxia, chronic home oxygen and BiPAP at night, severe obstructive sleep apnea, morbid obesity, cervical and lumbar degenerative disc disease/spinal stenosis, status post lumbar fusion, HTN, asthma, CKD 3B, CHF, fibromyalgia and reflux.  End of March 2025 patient was hospitalized at [facility] with diarrhea.  Also noted to have candidiasis and upper lip herpes simplex.  Discharged with Diflucan and Valtrex.  Was complaining of falling and referred to neurosurgery.  Has met multiple [place] neurosurgery providers in the past.  4/9 brought to the emergency department after falling at home.  Due to her spinal stenosis, gets PT and OT at home.  PT was working with her and found her to be very weak and falling.  Patient says that she falls frequently,  without loss of consciousness. Evaluation in the ED with unremarkable vital signs, labs notable for creatinine of 1.67 which is within the range of her most recent basic metabolic panels.  Admitted.    1.  Spine.  The morning after admission states she is feeling fine, and would like to avoid surgery if at all possible but if it meant preventing paralysis she may consider it.  Neurosurgery consulted and ultimately obtained CT scan of her cervical spine which shows significant degeneration of her previous fusion and plate.  Has only 1 of 4 screws interosseous the others are at significant risk for erosion into her esophagus.  Neurosurgery is recommending addressing her cervical issues first at a higher level tertiary care center prior to approaching her lumbar issues which likely are contributing to her recurrent falls.  Patient could have the surgery done at [facility] with Dr. Shaw otherwise could also seek out care with neurosurgery teams at [facility], [facility] or [facility]. PT and OT consulted and recommended TCU.  Slums assessment was 21 out of 30.  Patient would prefer not to go to a TCU and her  was insistent that they would not go to a facility.  Therefore we will be discharging the patient home with home care services, PT OT RN and home health aide.  Discussed with the  and daughter at great length the critical importance of the patient not ambulating independently due to her significant fall risk.  Also made it clear that any fall that damaged her neck could result in permanent disability or death.    2.  Neurologic.  Slums assessment was performed and patient scored a 21 out of 30.  Recommend close partnership with her family if any medical decision making.    3.  Pulmonary.  Patient was noted to be hypercapnic during her stay.  Pulmonary service met with the patient and did offer to coordinate a trial of AVAPS for the patient which could help with some of her strength.  Patient was  concerned with her being hospitalized under observation status and declined further workup at this time.  Did provide phone numbers for pulmonary if she wishes to have this addressed in the future as an outpatient.  In the meantime continue daytime oxygen at 3 L with BiPAP at night.    4.  GI.  Having some reflux.  Recommend her PPI be a scheduled basis.    Otherwise medically stable.    Discharge Medications with Med changes:     Current Discharge Medication List        START taking these medications    Details   diclofenac (VOLTAREN) 1 % topical gel Apply 2 g topically 4 times daily. Apply to painful areas. Use supplied dosing card to measure dose.  Qty: 50 g, Refills: 0    Associated Diagnoses: DDD (degenerative disc disease), cervical           CONTINUE these medications which have CHANGED    Details   acetaminophen (TYLENOL) 325 MG tablet Take 1 tablet (325 mg) by mouth every 4 hours.    Associated Diagnoses: DDD (degenerative disc disease), cervical      esomeprazole (NEXIUM) 20 MG DR capsule Take 1 capsule (20 mg) by mouth every morning (before breakfast). Take 30-60 minutes before eating.  Qty: 30 capsule, Refills: 0    Associated Diagnoses: Gastroesophageal reflux disease without esophagitis           CONTINUE these medications which have NOT CHANGED    Details   albuterol (PROAIR HFA;PROVENTIL HFA;VENTOLIN HFA) 90 mcg/actuation inhaler Inhale 1 puff into the lungs every 4 hours as needed for shortness of breath / dyspnea      alendronate (FOSAMAX) 70 MG tablet [ALENDRONATE (FOSAMAX) 70 MG TABLET] Take 70 mg by mouth every 7 days. Takes on Mondays  Refills: 11      aspirin 81 MG EC tablet Take 81 mg by mouth daily.      azelastine (OPTIVAR) 0.05 % ophthalmic solution Place 1 drop into both eyes 2 times daily as needed.      calcium citrate (CITRACAL) 950 (200 Ca) MG tablet Take 1 tablet by mouth daily      cetirizine (ZYRTEC) 10 MG tablet [CETIRIZINE (ZYRTEC) 10 MG TABLET] Take 10 mg by mouth daily.        cholecalciferol (VITAMIN D3) 25 mcg (1000 units) capsule Take 1 capsule by mouth daily.      CRESTOR 10 MG tablet Take 10 mg by mouth daily.      docusate sodium (COLACE) 100 MG capsule Take 100 mg by mouth 2 times daily as needed for constipation.      !! DULoxetine (CYMBALTA) 30 MG capsule Take 60 mg by mouth every morning.      !! DULoxetine (CYMBALTA) 30 MG capsule Take 30 mg by mouth every evening.      fluticasone-salmeterol (AIRDUO RESPICLICK) 113-14 MCG/ACT inhaler Inhale 1 puff into the lungs as needed (shortness of breath).      !! furosemide (LASIX) 20 MG tablet Take 40 mg by mouth every morning.      !! furosemide (LASIX) 20 MG tablet Take 20 mg by mouth every evening.      HYDROcodone-acetaminophen (NORCO) 5-325 MG tablet Take 1 tablet by mouth every 4 hours as needed for pain Max 6 tablets per day.  Qty: 60 tablet, Refills: 0    Associated Diagnoses: Cervical radiculopathy      l-lysine HCl 500 MG TABS tablet Take 500 mg by mouth daily.      magnesium oxide 250 mg Tab Take 250 mg by mouth 2 times daily.      metoprolol succinate ER (TOPROL XL) 50 MG 24 hr tablet Take 1 tablet (50 mg) by mouth daily.  Qty: 30 tablet, Refills: 1    Associated Diagnoses: Chronic diastolic congestive heart failure (H)      modafinil (PROVIGIL) 100 MG tablet Take 200 mg by mouth every morning.      montelukast (SINGULAIR) 10 mg tablet Take 10 mg by mouth every evening      multivitamin, therapeutic (THERA-VIT) TABS tablet Take 1 tablet by mouth daily.      polyethylene glycol (MIRALAX) 17 GM/Dose powder Take 1 Capful by mouth daily as needed for constipation.      solifenacin (VESICARE) 10 MG tablet Take 10 mg by mouth at bedtime.      Turmeric (CURCUPLEX-95) 500 MG CAPS Take 1 capsule by mouth daily      valACYclovir (VALTREX) 1000 mg tablet Take 1 tablet (1,000 mg) by mouth 2 times daily.  Qty: 10 tablet, Refills: 0    Associated Diagnoses: Herpes simplex virus infection      OXYGEN-AIR DELIVERY SYSTEMS MISC [OXYGEN-AIR  "DELIVERY SYSTEMS [facility]] Use 3 L As Directed. 3 lpm with activities and as needed at night       !! - Potential duplicate medications found. Please discuss with provider.            Rationale for medication changes:      Voltaren gel and Tylenol for pain control.  PPI for reflux.      Consults     PHYSICAL THERAPY ADULT IP CONSULT  OCCUPATIONAL THERAPY ADULT IP CONSULT  CARE MANAGEMENT / SOCIAL WORK IP CONSULT  NEUROSURGERY IP CONSULT  CARE MANAGEMENT / SOCIAL WORK IP CONSULT  PULMONARY IP CONSULT  PHARMACY IP CONSULT  PAIN MANAGEMENT ADULT IP CONSULT      Immunizations given this encounter     Most Recent Immunizations   Administered Date(s) Administered    COVID-19 12+ (Pfizer) 09/30/2024    COVID-19 Bivalent 12+ (Pfizer) 09/21/2022    COVID-19 Monovalent 18+ (Moderna) 04/06/2022    Flu 65+ (Fluad) 09/13/2024    Flu, Unspecified 09/26/2012    Influenza (intradermal) 09/21/2015    Influenza Vaccine 65+ (FLUAD) 11/01/2023    Pneumo Conj 13-V (2010&after) 05/10/2019    Pneumococcal 23 valent 06/25/2020    TDAP Vaccine (Adacel) 03/10/2014    Td,adult,historic,unspecified 05/17/2004           Anticoagulation Information      Recent INR results: No results for input(s): \"INR\" in the last 168 hours.  Warfarin doses (if applicable) or name of other anticoagulant: Baby aspirin daily      SIGNIFICANT IMAGING FINDINGS     Results for orders placed or performed during the hospital encounter of 04/09/25   XR Cervical Spine 2/3 Views    Impression    IMPRESSION: Lateral alignment is normal. There is straightening of the normal cervical lordosis with new grade 1 anterolisthesis of C4 on C5. Craniocervical junction is intact. Lateral masses of C1 and C2 demonstrate normal relationship. Dens is   partially obscured by overlying skull base on the open-mouth view. Base of the dens is intact.    Patient is status post interbody and anterior instrumented fusion extending from C5 to C7. Anchoring screws and plate and screw assembly are " minimally seated within bone at the C6 and C7 levels with approximately 1 cm anterior migration of the plate from   the ventral margin of the vertebral bodies. Positioning in appearance of hardware appears very similar to the 2024 prior. There is diffuse sclerosis throughout the C5-C7 vertebral bodies as seen previously. Lower cervical prevertebral soft tissue   fullness, similar to prior.   CT Cervical Spine w/o Contrast    Impression    IMPRESSION: C5-C7 ACDF with anterior retraction of the plate and screw fixation hardware outside of the vertebral bodies, similar to prior imaging.         Recent Results (from the past 4320 hours)   Echocardiogram Limited   Result Value    LVEF  60-65%    Narrative    [id number]  [id number]  [id number]  [id number]^LUCIE^VAMSHI^[place] [address]     Name: BELL THOMAS  MRN: [id number]  : 1950  Study Date: 2025 08:41 AM  Age: 74 yrs  Gender: Female  Patient Location: [facility]  Reason For Study: CHF  Ordering Physician: VAMSHI FAULKNER  Performed By:      BSA: 1.7 m2  Height: 55 in  Weight: 196 lb  HR: 56  BP: 154/72 mmHg  ______________________________________________________________________________  Procedure  Limited Echocardiogram with two-dimensional, color and spectral Doppler.  Definity (NDC #07210-550) given intravenously.  ______________________________________________________________________________  Interpretation Summary     Left ventricular size, wall motion and function are normal. The ejection  fraction is 60-65%.  Normal right ventricle size and systolic function.  The left atrium is moderately dilated.  Mitral apparatus appears calcified. No stenosis is present.  IVC diameter <2.1 cm collapsing >50% with sniff suggests a normal RA pressure  of 3 mmHg.  Compared to the prior study of 24, the current study does not suggest  mitral  stenosis.  ______________________________________________________________________________  Left Ventricle  Left ventricular size, wall motion and function are normal. The ejection  fraction is 60-65%.     Right Ventricle  Normal right ventricle size and systolic function.     Atria  The left atrium is moderately dilated. Right atrial size is normal.     Mitral Valve  There is moderate mitral annular calcification. There is no mitral valve  stenosis.     Tricuspid Valve  Right ventricular systolic pressure could not be approximated due to  inadequate tricuspid regurgitation.     Aortic Valve  The aortic valve is trileaflet with aortic valve sclerosis. There is mild (1+)  aortic regurgitation. No aortic stenosis is present.     Vessels  IVC diameter <2.1 cm collapsing >50% with sniff suggests a normal RA pressure  of 3 mmHg.  ______________________________________________________________________________  MMode/2D Measurements & Calculations  asc Aorta Diam: 3.9 cm  Asc Ao diam index BSA (cm/m2): 2.2  Asc Ao diam index Ht(cm/m): 2.8  EF Biplane: 73.7 %     Doppler Measurements & Calculations  MV max P.2 mmHg  MV mean PG: 3.0 mmHg  MV V2 VTI: 47.4 cm  MV P1/2t max ronna: 128.0 cm/sec  MV P1/2t: 115.4 msec  MVA(P1/2t): 1.9 cm2  MV dec slope: 325.0 cm/sec2     ______________________________________________________________________________  Report approved by: Wally Pop MD on 2025 12:03 PM         Echocardiogram Complete   Result Value    LVEF  60-65%    Narrative    [id number]  [id number]  [id number]  [id number]^LONA^[place] [address]     Name: BELL THOMAS  MRN: [id number]  : 1950  Study Date: 2024 01:46 PM  Age: 74 yrs  Gender: Female  Patient Location: [facility]  Reason For Study: CVA  Ordering Physician: ROSEMARY ZAMORANO  Performed By: TISH     BSA: 1.8 m2  Height: 55 in  Weight: 206 lb  HR:  91  ______________________________________________________________________________  Procedure  Echocardiogram with two-dimensional, color and spectral Doppler. Bubble  Echocardiogram with two-dimensional, color and spectral Doppler.  ______________________________________________________________________________  Interpretation Summary     Left ventricular size, wall motion and function are normal. The ejection  fraction is 60-65%.  There is mild concentric left ventricular hypertrophy.  Grade II or moderate diastolic dysfunction.  No regional wall motion abnormalities noted.  Normal right ventricle size and systolic function.  The left atrium is moderately dilated.  Mild valvular aortic stenosis.  Calcified and thickened mitral valve leaflets, mild mitral valve stenosis with  mean gradient 5 mmHg at ventricular rate 90 BPM.  Ascending Aorta dilatation is present 3.9 cm.     When compared to previous study on 12-6-2023, mitral valve disorder is mildly  worse.     ______________________________________________________________________________  I      WMSI = 1.00     % Normal = 100     X - Cannot   0 -                      (2) - Mildly 2 -          Segments  Size  Interpret    Hyperkinetic 1 - Normal  Hypokinetic  Hypokinetic  1-2     small                                                     7 -          3-5      moderate  3 - Akinetic 4 -          5 -         6 - Akinetic Dyskinetic   6-14    large               Dyskinetic   Aneurysmal  w/scar       w/scar       15-16   diffuse     Left Ventricle  Left ventricular size, wall motion and function are normal. The ejection  fraction is 60-65%. There is mild concentric left ventricular hypertrophy.  Grade II or moderate diastolic dysfunction. No regional wall motion  abnormalities noted.     Right Ventricle  Normal right ventricle size and systolic function.     Atria  The left atrium is moderately dilated. The right atrium is mildly dilated.  There is no atrial shunt  seen.     Mitral Valve  There is mild to moderate mitral annular calcification. Calcified mitral  apparatus. There is mild (1+) mitral regurgitation. There is mild mitral  stenosis. The mean mitral valve gradient is 3.7 mmHg.     Tricuspid Valve  Tricuspid valve leaflets appear normal. There is no evidence of tricuspid  stenosis or clinically significant tricuspid regurgitation.     Aortic Valve  Mild aortic valve calcification is present. There is trace aortic  regurgitation. Mild valvular aortic stenosis.     Pulmonic Valve  The pulmonic valve is not well seen, but is grossly normal.     Vessels  The aorta root is normal. Ascending Aorta dilatation is present. IVC diameter  <2.1 cm collapsing >50% with sniff suggests a normal RA pressure of 3 mmHg.     Pericardium  There is no pericardial effusion.     ______________________________________________________________________________  MMode/2D Measurements & Calculations  IVSd: 1.2 cm  LVIDd: 3.7 cm  LVIDs: 2.6 cm  LVPWd: 1.1 cm  FS: 29.0 %  LV mass(C)d: 132.8 grams  LV mass(C)dI: 74.8 grams/m2  Ao root diam: 3.3 cm  LA dimension: 3.8 cm     asc Aorta Diam: 3.9 cm  LA/Ao: 1.2  LVOT diam: 2.0 cm  LVOT area: 3.1 cm2  Ao root diam index Ht(cm/m): 2.4  Ao root diam index BSA (cm/m2): 1.9  Asc Ao diam index BSA (cm/m2): 2.2  Asc Ao diam index Ht(cm/m): 2.8  EF Biplane: 59.2 %  RWT: 0.59  TAPSE: 2.6 cm     Time Measurements  MM HR: 91.0 BPM     Doppler Measurements & Calculations  MV E max ruddy: 107.0 cm/sec  MV A max ruddy: 122.0 cm/sec  MV E/A: 0.88  MV max P.1 mmHg  MV mean PG: 3.7 mmHg  MV V2 VTI: 36.9 cm  MVA(VTI): 2.0 cm2     MV dec slope: 411.0 cm/sec2  MV dec time: 0.26 sec  Ao V2 max: 194.7 cm/sec  Ao max PG: 15.0 mmHg  Ao V2 mean: 148.0 cm/sec  Ao mean PG: 10.0 mmHg  Ao V2 VTI: 34.9 cm  RITA(I,D): 2.1 cm2  RITA(V,D): 2.4 cm2  LV V1 max P.6 mmHg  LV V1 max: 147.0 cm/sec  LV V1 VTI: 23.8 cm  SV(LVOT): 74.8 ml  SI(LVOT): 42.1 ml/m2  AV Ruddy Ratio (DI): 0.76  RITA  Index (cm2/m2): 1.2  E/E' av.5  Lateral E/e': 12.0  Medial E/e': 12.9  RV S Ruddy: 20.0 cm/sec     ______________________________________________________________________________  Report approved by: Kristine Knight MD on 2024 03:10 PM             SIGNIFICANT LABORATORY FINDINGS     Most Recent 3 CBC's:  Recent Labs   Lab Test 04/10/25  0625  0601 25  2214   WBC 8.0 7.7  --  8.2   HGB 11.7 12.0  --  11.7   MCV 96 95  --  97    384 231 238     Most Recent 3 BMP's:  Recent Labs   Lab Test 04/10/25  0625  0746    139 141   POTASSIUM 4.2 4.5 5.0   CHLORIDE 100 98 108*   CO2 29 28 26   BUN 43.3* 41.9* 31.0*   CR 1.67* 1.59* 1.08*   ANIONGAP 12 13 7   LYNN 9.8 10.4 9.6   * 95 95     Most Recent 2 LFT's:  Recent Labs   Lab Test 25  0520 25  1737   AST  --  27 34   ALT  --  18 17   ALKPHOS 113 75 85   BILITOTAL 0.3 0.4 0.3     Most Recent 3 INR's:No lab results found.  Most Recent Hemoglobin A1c:  Recent Labs   Lab Test 24  1719   A1C 6.0*     Most Recent Urinalysis:  Recent Labs   Lab Test 25  1853   COLOR Colorless   APPEARANCE Clear   URINEGLC Negative   URINEBILI Negative   URINEKETONE Negative   SG 1.007   UBLD Negative   URINEPH 6.0   PROTEIN 10*   NITRITE Negative   LEUKEST Negative   RBCU <1   WBCU 1     Most Recent ESR & CRP:  Recent Labs   Lab Test 24  18224  1719   SED 29  --    CRPI  --  73.10*     Troponin T, High Sensitivity   Date Value Ref Range Status   2025 47 (H) <=14 ng/L Final     Comment:     Either a High Sensitivity Troponin T baseline (0 hours) value = 100 ng/L, or an increase in High Sensitivity Troponin T = 7 ng/L at 2 hours compared to 0 hours (2-0 hours), suggests myocardial injury, and urgent clinical attention is required.    If the 2-0 hours increase is <7 ng/L, a High Sensitivity Troponin T result above gender-specific reference ranges warrants further  evaluation.   Recommendations for further evaluation include correlation with clinical decision-making tool (e.g., HEART), a 3rd High Sensitivity Troponin T test 2 hours after the 2nd (a 20% change from baseline would represent concern), admission for observation, close PCC/cardiology follow-up, or urgent outpatient provocative testing.   04/09/2025 42 (H) <=14 ng/L Final     Comment:     Either a High Sensitivity Troponin T baseline (0 hours) value = 100 ng/L, or an increase in High Sensitivity Troponin T = 7 ng/L at 2 hours compared to 0 hours (2-0 hours), suggests myocardial injury, and urgent clinical attention is required.    If the 2-0 hours increase is <7 ng/L, a High Sensitivity Troponin T result above gender-specific reference ranges warrants further evaluation.   Recommendations for further evaluation include correlation with clinical decision-making tool (e.g., HEART), a 3rd High Sensitivity Troponin T test 2 hours after the 2nd (a 20% change from baseline would represent concern), admission for observation, close PCC/cardiology follow-up, or urgent outpatient provocative testing.   03/22/2025 26 (H) <=14 ng/L Final     Comment:     Either a High Sensitivity Troponin T baseline (0 hours) value = 100 ng/L, or an increase in High Sensitivity Troponin T = 7 ng/L at 2 hours compared to 0 hours (2-0 hours), suggests myocardial injury, and urgent clinical attention is required.    If the 2-0 hours increase is <7 ng/L, a High Sensitivity Troponin T result above gender-specific reference ranges warrants further evaluation.   Recommendations for further evaluation include correlation with clinical decision-making tool (e.g., HEART), a 3rd High Sensitivity Troponin T test 2 hours after the 2nd (a 20% change from baseline would represent concern), admission for observation, close PCC/cardiology follow-up, or urgent outpatient provocative testing.   03/21/2025 27 (H) <=14 ng/L Final     Comment:     Either a High  Sensitivity Troponin T baseline (0 hours) value = 100 ng/L, or an increase in High Sensitivity Troponin T = 7 ng/L at 2 hours compared to 0 hours (2-0 hours), suggests myocardial injury, and urgent clinical attention is required.    If the 2-0 hours increase is <7 ng/L, a High Sensitivity Troponin T result above gender-specific reference ranges warrants further evaluation.   Recommendations for further evaluation include correlation with clinical decision-making tool (e.g., HEART), a 3rd High Sensitivity Troponin T test 2 hours after the 2nd (a 20% change from baseline would represent concern), admission for observation, close PCC/cardiology follow-up, or urgent outpatient provocative testing.   12/20/2024 46 (H) <=14 ng/L Final     Comment:     Either a High Sensitivity Troponin T baseline (0 hours) value = 100 ng/L, or an increase in High Sensitivity Troponin T = 7 ng/L at 2 hours compared to 0 hours (2-0 hours), suggests myocardial injury, and urgent clinical attention is required.    If the 2-0 hours increase is <7 ng/L, a High Sensitivity Troponin T result above gender-specific reference ranges warrants further evaluation.   Recommendations for further evaluation include correlation with clinical decision-making tool (e.g., HEART), a 3rd High Sensitivity Troponin T test 2 hours after the 2nd (a 20% change from baseline would represent concern), admission for observation, close PCC/cardiology follow-up, or urgent outpatient provocative testing.   12/20/2024 48 (H) <=14 ng/L Final     Comment:     Either a High Sensitivity Troponin T baseline (0 hours) value = 100 ng/L, or an increase in High Sensitivity Troponin T = 7 ng/L at 2 hours compared to 0 hours (2-0 hours), suggests myocardial injury, and urgent clinical attention is required.    If the 2-0 hours increase is <7 ng/L, a High Sensitivity Troponin T result above gender-specific reference ranges warrants further evaluation.   Recommendations for further  evaluation include correlation with clinical decision-making tool (e.g., HEART), a 3rd High Sensitivity Troponin T test 2 hours after the 2nd (a 20% change from baseline would represent concern), admission for observation, close PCC/cardiology follow-up, or urgent outpatient provocative testing.   04/14/2024 64 (H) <=14 ng/L Final     Comment:     Either a High Sensitivity Troponin T baseline (0 hours) value = 100 ng/L, or an increase in High Sensitivity Troponin T = 7 ng/L at 2 hours compared to 0 hours (2-0 hours), suggests myocardial injury, and urgent clinical attention is required.    If the 2-0 hours increase is <7 ng/L, a High Sensitivity Troponin T result above gender-specific reference ranges warrants further evaluation.   Recommendations for further evaluation include correlation with clinical decision-making tool (e.g., HEART), a 3rd High Sensitivity Troponin T test 2 hours after the 2nd (a 20% change from baseline would represent concern), admission for observation, close PCC/cardiology follow-up, or urgent outpatient provocative testing.      7-Day Micro Results       No results found for the last 168 hours.              Discharge Orders        Pain Management  Referral      Primary Care - Care Coordination Referral      Home Care Referral      Reason for your hospital stay    Recurrent falls, severe cervical and lumbar spinal disease.  Severe obstructive sleep apnea.     Activity    Your activity upon discharge: activity as tolerated and patient needs to have someone with her at all times when ambulating.  Patient should not ambulate by herself.  Elevated fall risk.     When to contact your care team    Call [facility] if you have any of the following: increased swelling, increased pain, or worsening weakness.     Follow Up    1.  Follow-up with neurosurgery per their recommendations.  They would be able to either coordinate surgery at [facility] with their own provider or send  referrals to [facility], [facility] or [facility].  Make certain patient has phone number.  2.  Follow-up with [facility] if wish to try to maximize obstructive sleep apnea overnight management.     Discharge Instructions    In an emergency seek immediate medical attention.  If able though, given patient's complexity and issues with her spine and pulmonary disease, preferred hospitals would be [facility], [facility] or [facility].     BiPAP - Continue Home BiPAP    Continue Home BiPAP per home equipment settings.     Oxygen Adult/Peds    Oxygen Documentation  I certify that this patient, Desirae Rey has been under my care (or a nurse practitioner or physican's assistant working with me). This is the face-to-face encounter for oxygen medical necessity.      At the time of this encounter, I have reviewed the qualifying testing and have determined that supplemental oxygen is reasonable and necessary and is expected to improve the patient's condition in a home setting.         Patient has continued oxygen desaturation due to Chronic Respiratory Failure with Hypoxia J96.11.    If portability is ordered, is the patient mobile within the home? yes    Was this visit performed as a telehealth visit: No     Diet    Follow this diet upon discharge: Current Diet:Orders Placed This Encounter      Combination Diet Low Saturated Fat Na <2400mg Diet     Hospital Follow-up with Existing Primary Care Provider (PCP)            Examination   Physical Exam   Temp:  [96.9  F (36.1  C)-98  F (36.7  C)] 96.9  F (36.1  C)  Pulse:  [55-66] 66  Resp:  [18-24] 18  BP: (111-159)/(58-94) 159/94  SpO2:  [92 %-99 %] 94 %  Wt Readings from Last 4 Encounters:   04/11/25 89.2 kg (196 lb 11.2 oz)   04/01/25 94.3 kg (207 lb 14.4 oz)   03/26/25 89.7 kg (197 lb 12.8 oz)   02/21/25 89 kg (196 lb 4.8 oz)       Constitutional: awake, alert, cooperative, no apparent distress, and appears stated age and morbidly obese, some repeated questions  Eyes: Lids and  lashes normal, pupils equal, round and reactive to light, extra ocular muscles intact, sclera clear, conjunctiva normal  ENT: Normocephalic, without obvious abnormality, atraumatic, sinuses nontender on palpation, external ears without lesions, oral pharynx with moist mucous membranes, tonsils without erythema or exudates, gums normal and good dentition.  Respiratory: Very poor air movement throughout  Cardiovascular: Normal apical impulse, regular rate and rhythm, normal S1 and S2, no S3 or S4, and no murmur noted  GI: No scars, normal bowel sounds, soft, non-distended, non-tender, no masses palpated, no hepatosplenomegally  Skin: normal skin color, texture, turgor, no redness, warmth, or swelling, and no rashes  Musculoskeletal: Good muscular tone in all 4 extremities.   Neurologic: Cranial nerves II-XII are grossly intact. Sensory:  Sensory intact  Neuropsychiatric: General: normal, calm, and normal eye contact Level of consciousness: alert / normal Affect: normal Orientation: oriented to self, place, time and situation Memory and insight: impaired: Mild to moderate.  Did ask some repeated questions.      Please see EMR for more detailed significant labs, imaging, consultant notes etc.    IAlejandro MD, personally saw the patient today and spent greater than 30 minutes discharging this patient.    Alejandor Byrd MD  [facility]    CC:Daysi Bryan

## 2025-04-11 NOTE — CONSULTS
[facility] ACUTE PAIN SERVICE CONSULTATION   [facility], [facility], [facility], [place]     Date of Admission:  4/9/2025  Date of Consult (When I saw the patient): 04/11/25     Assessment/Plan:     Desirae Rey is a 74 year old female who was admitted on 4/9/2025.  Pain team was asked to see the patient for chronic pain. Admitted after falling at home. History of chronic pain on hydrocodone at home, weakness, frequent falls, central spinal stenosis at L4-L5, chronic kidney disease, chronic respiratory failure with hypoxia, reactive lung disease, obstructive sleep apnea, asthma.  Describes pain as 6-7/10 and aching in the neck, low back, knees, multiple joints.     PLAN:   1) Pain is consistent with chronic pain  In the setting of chronic narcotic use, chronic kidney disease, chronic respiratory failure with hypoxia, frequent falls, obstructive sleep apnea, asthma on supplemental oxygen as well as BiPAP at night.  Pulmonology consulted for hypercapnia.  Neurosurgery consulted for acute on chronic low back pain with increased difficulty with mobility.  MRI from December as well  CT cervical spine from this admission reviewed patient has a history of ACDF in 2017.  Patient reports chronic neck pain that extends into her bilateral upper extremities that remains unchanged over the past few years.  She also reports continued chronic back pain and bilateral lower extremity pain and weakness that is chronic for her.   Multimodal Medication Therapy  Topical: Lidocaine ointment alternating with Voltaren gel  NSAID'S: Estimated Creatinine Clearance: 42.2 mL/min (A) (based on SCr of 1.67 mg/dL (H)).   Steroids: None  Muscle Relaxants: None  Adjuvants: Acetaminophen 325 mg every 4 hours.  Reports taking Lyrica in the past for pain however caused sedation so she stopped taking this  Antidepressants/anxiolytics: Cymbalta 30 mg every evening, 60 mg every morning  Opioids: Norco 5-325 mg every 4 hours as needed  IV  Pain medication: None  Non-medication interventions: Ice, Heat, Rest, Distraction (TV, Music, Reading), and Meditation  Constipation Prophylaxis: Senna-docusate    -Opioid prescriber has been primary care provider - Daysi Bryan PA-C   -MN  pulled from system on 4/11/2025. This indicates fills for modafinil and Hydrocodone-Acetaminophen  3/20/2025 modafinil 100 mg #210 for 84 days  3/20/2025 modafinil 100 mg #15 for 6 days  3/19/2025 Norco 5-3 25 #150 for 30 days -same on 1/29/2025 12/6/2024 pregabalin 25 mg #90 for 90 days  Discharge Recommendations - We recommend prescribing the following at the time of discharge: Likely home medications, topicals     History of Present Illness (HPI):       Desirae Rey is a 74 year old female who presented after a fall.  Past medical history as above. The pain is reported to be chronic, located in the neck, low back, multiple joints. Current pain is rated at 6-7/10 and goal is 2-4/10.  The patient denies nausea, vomiting, constipation, diarrhea, chest pain, dizziness, fever, and chills.     Per MN  review, the patient does have an opioid tolerance. Opioid induced side effects noted and include: none      Reviewed medical record, labs, imaging, ED note, and care everywhere.     Past pain treatments have included: Acetaminophen, Lyrica, Norco, Cymbalta      Medical History   PAST MEDICAL HISTORY:   Past Medical History:   Diagnosis Date    Allergic rhinitis     Arthritis of back     CHF (congestive heart failure) (H)     Chronic kidney disease     stage 3     De Quervain's disease (tenosynovitis)     Fibromyalgia, primary     GERD (gastroesophageal reflux disease)     Hematuria     chronic    High cholesterol     History of blood clots 09/01/1970    DVT, from birth control, h/o left calf    Hyperlipidemia     Hypertension     Low back pain     Osteoporosis     Pickwickian syndrome (H)     Plantar fasciitis     Proteinuria     Proteinuria     chronic    Sleep apnea      CPAP    Uncomplicated asthma        PAST SURGICAL HISTORY:   Past Surgical History:   Procedure Laterality Date    CERVICAL FUSION N/A 4/27/2017    Procedure: ANTERIOR CERVICAL DECOMPRESSION FUSION C5-7 BILATERAL;  Surgeon: Basim Barone MD;  Location: [facility];  Service:     CHOLECYSTECTOMY      open    COLONOSCOPY N/A 12/20/2019    Procedure: COLONOSCOPY WITH BIOPSIES;  Surgeon: Lucio Farr MD;  Location: [facility];  Service: Gastroenterology    EYE SURGERY      HAND SURGERY Right     HYSTEROSCOPY DIAGNOSTIC      JOINT REPLACEMENT      bilateral TKA    KNEE SURGERY      RELEASE CARPAL TUNNEL Bilateral        FAMILY HISTORY:   Family History   Problem Relation Age of Onset    Rheumatoid Arthritis Mother     Heart Disease Sister     Chronic Obstructive Pulmonary Disease Father        SOCIAL HISTORY:   Social History     Tobacco Use    Smoking status: Never    Smokeless tobacco: Never   Substance Use Topics    Alcohol use: No        HEALTH & LIFESTYLE PRACTICES  Tobacco:  reports that she has never smoked. She has never used smokeless tobacco.  Alcohol:  reports no history of alcohol use.  Illicit drugs:  reports no history of drug use.    Allergies  Allergies   Allergen Reactions    Latex Rash     Severe rash    Pregabalin Shortness Of Breath     Other Reaction(s): swelling and shortness of breath    Valsartan Unknown     Hyperkalemia    Ciprofloxacin Visual Disturbance, Hallucination and Confusion     Likely contributed visual hallucination and confusion    Colchicine Diarrhea    Dicloxacillin      Other Reaction(s): confusion, says she has tolerated augmentin without difficulty.     Gabapentin      Other Reaction(s): Sedation    Latex Unknown     Added based on information entered during case entry, please review and add reactions, type, and severity as needed    Other Drug Allergy (See Comments) Muscle Pain (Myalgia)     Tried lovastatin and simvastatin    Other Environmental  Allergy Unknown     Coverlet bandaid , 3M product; rash    Statins-Hmg-Coa Reductase Inhibitors [Statins] Muscle Pain (Myalgia)     Tried lovastatin and simvastatin    Sulfa Antibiotics Swelling     Facial swelling      Adhesive Tape Rash       Problem List  Patient Active Problem List    Diagnosis Date Noted    Generalized weakness 04/09/2025     Priority: Medium    Diarrhea of presumed infectious origin 03/28/2025     Priority: Medium    Weakness 03/28/2025     Priority: Medium    Hypoxemia 03/22/2025     Priority: Medium    Acute pulmonary edema (H) 03/22/2025     Priority: Medium    Dyspnea on exertion 03/22/2025     Priority: Medium    Acute cystitis without hematuria 03/22/2025     Priority: Medium    Acute Cerebellar ischemic stroke (H) 12/22/2024     Priority: Medium    Acute metabolic encephalopathy 12/22/2024     Priority: Medium    Acute urinary retention 12/22/2024     Priority: Medium    Chronic respiratory failure with hypoxia (H) 12/22/2024     Priority: Medium    Obesity hypoventilation syndrome (H) 12/22/2024     Priority: Medium    Stenosis, spinal, lumbar 12/22/2024     Priority: Medium    Low back pain 09/10/2024     Priority: Medium    Sepsis (H) 04/14/2024     Priority: Medium    CHF (congestive heart failure) (H) 04/14/2024     Priority: Medium    Pulmonary hypertension (H) 04/14/2024     Priority: Medium    Diverticulitis 04/13/2024     Priority: Medium    UTI (urinary tract infection), uncomplicated 12/06/2023     Priority: Medium    Cellulitis of left lower extremity 12/05/2023     Priority: Medium    Fall at home, initial encounter 12/05/2023     Priority: Medium    Moderate persistent asthma without complication 01/18/2023     Priority: Medium    Altered mental status, unspecified altered mental status type 10/31/2022     Priority: Medium    Acute weakness 10/31/2022     Priority: Medium    Adult failure to thrive 10/31/2022     Priority: Medium    Chronic heart failure with preserved  ejection fraction (HFpEF) (H) 10/31/2022     Priority: Medium    Elevated troponin 10/31/2022     Priority: Medium    Chondrocalcinosis      Priority: Medium     Created by Conversion        NELLIE (obstructive sleep apnea)      Priority: Medium     4/24/2019 Polysomnography Split Bilevel (wt 214 lbs)  Diagnostic Portion:  Respiratory monitoring showed moderate obstructive sleep apnea (AHI=17.1)   during light NREM sleep, with events more frequently occurring during   supine sleep (AHI 40.6/hr vs 8.3/hr).  The patient spent a total of 21.9 minutes at an oxygen saturation below   88%.  Profound hypoxia was noted and after 10 minutes of sleep, 1 LPM of   supplemental oxygen was added via nasal cannula (which stabilized   hypoxia).  Mild tachypnea was noted throughout  Titration Portion:  A trial of Bi-level PAP in Spontaneous (S) mode was initiated at 8/4 and   increased up to 22/12 cmH2O (supplemental oxygen was turned off).    Respiration was stabilized during NREM sleep, albeit with persistent   hypoxia.  However, during REM sleep respiratory events and profound   hypoxia returned.    This was considered an unacceptable titration due to persistence of   respiratory events and hypoxia.   Mask leak was also problematic.  Recommending Auto-Bilevel 12 - 25 with PS 7 cmH2O. Recommending ResMed   device due to integrated O2 hookups.        Diastolic dysfunction 08/27/2017     Priority: Medium    Chronic kidney disease, stage 2 (mild)      Priority: Medium    Sleep apnea      Priority: Medium     CPAP        CKD (chronic kidney disease) stage 3, GFR 30-59 ml/min (H)      Priority: Medium     stage 3         Hypertension      Priority: Medium    Cubital tunnel syndrome, right      Priority: Medium    Other specified hypotension      Priority: Medium    Hyperlipidemia, unspecified hyperlipidemia type      Priority: Medium    Blood loss anemia      Priority: Medium    GRACY (acute kidney injury)      Priority: Medium    Renal  Insufficiency      Priority: Medium     Created by Conversion  Replacement Utility updated for latest IMO load        Cervical radiculopathy 04/27/2017     Priority: Medium    Benign essential hypertension 04/27/2017     Priority: Medium    Osteoporosis      Priority: Medium     Created by Conversion  Replacement Utility updated for latest IMO load        Osteoarthritis      Priority: Medium     Created by Conversion  Replacement Utility updated for latest IMO load        Allergic rhinitis      Priority: Medium     Created by Conversion  Replacement Utility updated for latest IMO load        Fibromyalgia      Priority: Medium     Created by Conversion        Proteinuria      Priority: Medium     Created by Conversion        Synovitis and tenosynovitis      Priority: Medium     Created by Conversion        Pseudogout      Priority: Medium     Created by Conversion        Pain During Urination (Dysuria)      Priority: Medium     Created by Conversion        Obesity      Priority: Medium     Created by Conversion        Anemia      Priority: Medium     Created by Conversion        Hematuria      Priority: Medium     Created by Conversion           Prior to Admission Medications   Medications Prior to Admission   Medication Sig Dispense Refill Last Dose/Taking    acetaminophen (TYLENOL) 500 MG tablet Take 500 mg by mouth every 6 hours as needed for mild pain   Past Month    albuterol (PROAIR HFA;PROVENTIL HFA;VENTOLIN HFA) 90 mcg/actuation inhaler Inhale 1 puff into the lungs every 4 hours as needed for shortness of breath / dyspnea   More than a month    alendronate (FOSAMAX) 70 MG tablet [ALENDRONATE (FOSAMAX) 70 MG TABLET] Take 70 mg by mouth every 7 days. Takes on Mondays 11 4/7/2025    aspirin 81 MG EC tablet Take 81 mg by mouth daily.   Past Month    azelastine (OPTIVAR) 0.05 % ophthalmic solution Place 1 drop into both eyes 2 times daily as needed.   4/8/2025 Bedtime    calcium citrate (CITRACAL) 950 (200 Ca) MG  tablet Take 1 tablet by mouth daily   4/8/2025 Bedtime    cetirizine (ZYRTEC) 10 MG tablet [CETIRIZINE (ZYRTEC) 10 MG TABLET] Take 10 mg by mouth daily.    4/8/2025 Morning    cholecalciferol (VITAMIN D3) 25 mcg (1000 units) capsule Take 1 capsule by mouth daily.   4/8/2025 Morning    CRESTOR 10 MG tablet Take 10 mg by mouth daily.   4/8/2025 Bedtime    docusate sodium (COLACE) 100 MG capsule Take 100 mg by mouth 2 times daily as needed for constipation.   4/8/2025 Evening    DULoxetine (CYMBALTA) 30 MG capsule Take 60 mg by mouth every morning.   4/9/2025 Morning    DULoxetine (CYMBALTA) 30 MG capsule Take 30 mg by mouth every evening.   4/8/2025 Bedtime    esomeprazole (NEXIUM) 20 MG DR capsule Take 20 mg by mouth daily as needed. Take 30-60 minutes before eating.   Past Week    fluticasone-salmeterol (AIRDUO RESPICLICK) 113-14 MCG/ACT inhaler Inhale 1 puff into the lungs as needed (shortness of breath).   More than a month    furosemide (LASIX) 20 MG tablet Take 40 mg by mouth every morning.   4/9/2025 Morning    furosemide (LASIX) 20 MG tablet Take 20 mg by mouth every evening.   4/8/2025 Bedtime    HYDROcodone-acetaminophen (NORCO) 5-325 MG tablet Take 1 tablet by mouth every 4 hours as needed for pain Max 6 tablets per day. 60 tablet 0 4/9/2025 Noon    l-lysine HCl 500 MG TABS tablet Take 500 mg by mouth daily.   4/9/2025 Morning    magnesium oxide 250 mg Tab Take 250 mg by mouth 2 times daily.   4/9/2025 Morning    metoprolol succinate ER (TOPROL XL) 50 MG 24 hr tablet Take 1 tablet (50 mg) by mouth daily. (Patient taking differently: Take 50 mg by mouth daily. May take an additional 25 mg PRN if SBP is 150 mmHg or higher.) 30 tablet 1 4/9/2025 Morning    modafinil (PROVIGIL) 100 MG tablet Take 200 mg by mouth every morning.   4/9/2025 Morning    montelukast (SINGULAIR) 10 mg tablet Take 10 mg by mouth every evening   4/8/2025 Bedtime    multivitamin, therapeutic (THERA-VIT) TABS tablet Take 1 tablet by mouth  "daily.   Past Week Morning    polyethylene glycol (MIRALAX) 17 GM/Dose powder Take 1 Capful by mouth daily as needed for constipation.   Past Month    solifenacin (VESICARE) 10 MG tablet Take 10 mg by mouth at bedtime.   4/8/2025 Bedtime    Turmeric (CURCUPLEX-95) 500 MG CAPS Take 1 capsule by mouth daily   Past Week    valACYclovir (VALTREX) 1000 mg tablet Take 1 tablet (1,000 mg) by mouth 2 times daily. 10 tablet 0 4/8/2025 Bedtime    OXYGEN-AIR DELIVERY SYSTEMS MISC [OXYGEN-AIR DELIVERY SYSTEMS MISC] Use 3 L As Directed. 3 lpm with activities and as needed at night          Review of Systems  Complete ROS reviewed, unless noted in HPI, all other systems reviewed (with patient) and all others found to be negative.      Objective:     Physical Exam:  /60   Pulse 62   Temp 97.8  F (36.6  C) (Oral)   Resp 18   Ht 1.397 m (4' 7\")   Wt 89.2 kg (196 lb 11.2 oz)   SpO2 95%   BMI 45.72 kg/m    Weight:   Vitals:    04/09/25 1720 04/10/25 1338 04/11/25 0623   Weight: 88.9 kg (196 lb) 88.6 kg (195 lb 6.4 oz) 89.2 kg (196 lb 11.2 oz)      Body mass index is 45.72 kg/m .    General Appearance:  Alert, cooperative, no distress, up in a chair, walks with a front wheel walker and standby assist of 1   Head:  Normocephalic, without obvious abnormality, atraumatic   Eyes:  PERRL, conjunctiva/corneas clear, EOM's intact   ENT/Throat: Lips, mucosa, and tongue normal; teeth and gums normal   Lymph/Neck: Supple, symmetrical, trachea midline   Lungs:   Clear but decreased to auscultation bilaterally, respirations unlabored, nasal cannula oxygen    Chest Wall:  No tenderness or deformity   Cardiovascular/Heart:  Regular rate and rhythm, S1, S2 normal   Abdomen:   Soft, non-tender, obese   Musculoskeletal: Extremities normal, atraumatic   Skin: Skin warm, dry   Neurologic: Alert and oriented X 3, Moves all 4 extremities     Psych: Affect is appropriate     Imaging: Reviewed I have personally reviewed pertinent notes, labs, " tests, and radiologic imaging in patient's chart.  Labs: Reviewed I have personally reviewed pertinent notes, labs, tests, and radiologic imaging in patient's chart.  Notes: Reviewed I have personally reviewed pertinent notes, labs, tests, and radiologic imaging in patient's chart.    Total time spent 65 minutes with greater than 50% in consultation, education and coordination of care.   Also discussed with RN and Hospital Medicine Service.   Treatment plan includes: multimodal pain approach, Hospital Medicine Service for medical management, pulmonology, neurosurgery.   Patient educated regarding: multimodal pain approach and medications as listed above.   Elements of Medical Decision Making as described above. High level of decision making required due to 1 or more chronic illness with severe exacerbation, progression, and side effects of treatment. Acute or chronic illness or injury or surgery. High risk therapy including opioids, high risk drug therapy including oral and/or parenteral controlled substances.    Patient is understanding of the plan. All questions and concerns addressed to patient's satisfaction.     Thank you for this consultation.    PRAVEEN CansecoP-C  Acute Care Inpatient Pain Management Program  [facility])  Hours of coverage Monday-Friday [phone]. After hours please contact Primary team   Page via Epic chat or Vocera Messaging

## 2025-04-11 NOTE — PROGRESS NOTES
PRIMARY DIAGNOSIS: GENERALIZED WEAKNESS    OUTPATIENT/OBSERVATION GOALS TO BE MET BEFORE DISCHARGE  1. Orthostatic performed: N/A    2. Tolerating PO medications: Yes    3. Return to near baseline physical activity: No    4. Cleared for discharge by consultants (if involved): No

## 2025-04-11 NOTE — PROGRESS NOTES
[facility]    Neurosurgery Progress Note    Date of Service (when I saw the patient): 04/11/2025     Assessment & Plan   Ms. Rey is a 74-year-old with a history of lumbar spinal stenosis, presented on 4/9/2025 with worsening low back pain and increased difficulty with mobility.  Last MRI is from December 2024.  Patient was seen by our service at that time, and was not interested in surgical intervention     Has history of ACDF in 2017 by Dr. Alvarez with noted plate migration of which on xray appears to be stable from December will obtain updated cervical CT for further evaluation of bony fusion as in December no solid fusion noted     Patient states she is doing okay has continue complaint of neck pain that extends into bilateral upper extremities remains unchanged over the past few years though maybe slightly improved when compared to yesterday, has continued complaint of back pain and bilateral lower extremity pain and weakness    Plan:  Cervical CT  Discussed that we would want to address her cervical spine prior to her lumbar spine as concern for migrate plate to cause further complications with possible erosion of esophagus and trachea as well as without solid fusion and continue falls worsened neck pain or radicular pain and weakness   Likely would need to have outpatient follow up with neurosurgeon to further discuss treatment options  Further recommendations once ct completed  Okay for activity as tolerated with PT/OT    Addendum:   Reviewed personally with stable appearance of hardware migration and only parital osseous fusion on left C5-C6 and none on right C5-C6 or C6-C7    FINDINGS:   VERTEBRA: Straightening of the normal cervical lordosis with grade 1 anterolisthesis of C3 on C4 and T2 on T3. Vertebral body heights are maintained. C5-C7 ACDF with interbody disc grafts. There is near complete anterior retraction of the plate and screw   fixation hardware outside of the  vertebral bodies, similar to 12/20/2024 CT. The inner surface of the metallic plate is 11 mm anterior to the anterior aspect of the vertebral bodies, unchanged when measured similarly. The interbody disc grafts remain   relatively in place, similar to prior imaging. No hardware fracture.     CANAL/FORAMINA: No high grade spinal canal or neural foraminal stenosis.     EXTRASPINAL: No extraspinal abnormality.                                                                      IMPRESSION: C5-C7 ACDF with anterior retraction of the plate and screw fixation hardware outside of the vertebral bodies, similar to prior imaging.  Report should include that there may be partial osseous fusion across the left side of the C5-C6 disc space. No osseous bridging along the right side of the C5-C6 disc space or the entire C6-C7 disc space. No fusion across the facet joints.     Plan:   Attempted to call  with no response but spoke with daughter about risks associated with current hardware placement that be worsening pain numbness tingling paralysis or imminent death based on loose hardware in cervical region and with increased risk of her continued falls of which she states understanding and will discuss further with her mother   Would recommend outpatient follow up in the near future with neurosurgery and would benefit from higher level of care facility such as [facility] vs [facility] vs [facility] of which we can provide referrals to   Would not have intubation as increased risk unless were to address cervical spine of which would likely require coordination with ENT surgeon as well and would address cervical spine prior to any procedure for lumbar spine   Will allow patient and family to discuss further       I have discussed the following assessment and plan Dr. Shaw who is in agreement with initial plan and will follow up with further consultation recommendations.    Debora Schaefer PA-C  [facility] Neurosurgery  [place]  "[address]  [place] [address]  Tel [phone]  Text page via [facility] Paging/Directory     Interval History   stable    Physical Exam   Temp: 97  F (36.1  C) Temp src: Oral BP: 126/60 Pulse: 58   Resp: 18 SpO2: 97 % O2 Device: Nasal cannula with humidification Oxygen Delivery: 2 LPM  Vitals:    04/09/25 1720 04/10/25 1338 04/11/25 0623   Weight: 88.9 kg (196 lb) 88.6 kg (195 lb 6.4 oz) 89.2 kg (196 lb 11.2 oz)     Vital Signs with Ranges  Temp:  [97  F (36.1  C)-98  F (36.7  C)] 97  F (36.1  C)  Pulse:  [55-69] 58  Resp:  [18-35] 18  BP: (111-152)/(56-66) 126/60  SpO2:  [95 %-100 %] 97 %  I/O last 3 completed shifts:  In: 900 [P.O.:900]  Out: 1050 [Urine:1050]     , Blood pressure 126/60, pulse 58, temperature 97  F (36.1  C), temperature source Oral, resp. rate 18, height 1.397 m (4' 7\"), weight 89.2 kg (196 lb 11.2 oz), SpO2 97%.  196 lbs 11.2 oz  HEENT:  Normocephalic.  PERRLA.  EOM s intact.    Neck:  Supple, non-tender, without lymphadenopathy.  Heart:  No peripheral edema  Lungs:  No SOB  Abdomen:  Soft, non-tender, non-distended.   Skin:  Warm and dry, good capillary refill.  Extremities:  Good radial and dorsalis pedis pulses bilaterally, no edema, cyanosis or clubbing.    NEUROLOGICAL EXAMINATION:   Mental status: alert and oriented x3 speech clear and appropriate   Cranial nerves: II-XII intact  Motor strength: generalized weakness of LE without focal deficit   Biceps: 5/5 right, 5/5 left   Wrist extensors:5/5 right, 5/5 left   Triceps: 5/5 right, 5/5 left   Deltoids: 5/5 right, 5/5 left   Finger flexion: 5/5 right, 5/5 left   Finger abduction:5/5 right, 5/5 left   Hand : 5/5 right, 5/5 left   Hip flexors: 5/5 right, 5/5 left   Quadriceps: 5/5 right, 5/5 left  Ankle dorsiflexion: 5/5 right, 5/5 left    Ankle plantar flexion:5/5 right, 5/5 left   Extensor hallicus longus: 5/5 right, 5/5 left   Sensation:  intact  Reflexes:  Negative Babinski.  Negative Clonus.  "   Coordination:  Smooth finger to nose testing.   Negative pronator drift.    Gait:  not tested    Xray reviewed with stable plate migration when compared to December  IMPRESSION: Lateral alignment is normal. There is straightening of the normal cervical lordosis with new grade 1 anterolisthesis of C4 on C5. Craniocervical junction is intact. Lateral masses of C1 and C2 demonstrate normal relationship. Dens is   partially obscured by overlying skull base on the open-mouth view. Base of the dens is intact.     Patient is status post interbody and anterior instrumented fusion extending from C5 to C7. Anchoring screws and plate and screw assembly are minimally seated within bone at the C6 and C7 levels with approximately 1 cm anterior migration of the plate from   the ventral margin of the vertebral bodies. Positioning in appearance of hardware appears very similar to the 12/20/2024 prior. There is diffuse sclerosis throughout the C5-C7 vertebral bodies as seen previously. Lower cervical prevertebral soft tissue   fullness, similar to prior.    Medications   Current Facility-Administered Medications   Medication Dose Route Frequency Provider Last Rate Last Admin     Current Facility-Administered Medications   Medication Dose Route Frequency Provider Last Rate Last Admin    aspirin EC tablet 81 mg  81 mg Oral Daily Mary Macedo PA-C        cetirizine (zyrTEC) tablet 10 mg  10 mg Oral Daily Mary Macedo PA-C        DULoxetine (CYMBALTA) DR capsule 30 mg  30 mg Oral QPM Mary Macedo PA-C   30 mg at 04/10/25 1909    DULoxetine (CYMBALTA) DR capsule 60 mg  60 mg Oral QAM Mary Macedo PA-C        enoxaparin ANTICOAGULANT (LOVENOX) injection 40 mg  40 mg Subcutaneous Q12H Mary Macedo PA-C   40 mg at 04/10/25 2102    [Held by provider] furosemide (LASIX) tablet 20 mg  20 mg Oral QPM Mary Macedo PA-C        [Held by provider] furosemide (LASIX) tablet 40 mg  40 mg Oral QAM Mary Macedo PA-C        metoprolol succinate  "ER (TOPROL XL) 24 hr tablet 50 mg  50 mg Oral Daily Mary Macedo PA-C        modafinil (PROVIGIL) tablet 200 mg  200 mg Oral QAM Mary Macedo PA-C        montelukast (SINGULAIR) tablet 10 mg  10 mg Oral QPM Mary Macedo PA-C   10 mg at 04/10/25 1909    rosuvastatin (CRESTOR) tablet 10 mg  10 mg Oral At Bedtime Mary Macedo PA-C   10 mg at 04/10/25 2102    solifenacin (VESICARE) tablet 10 mg  10 mg Oral At Bedtime Mary Macedo PA-C   10 mg at 04/10/25 2102    valACYclovir (VALTREX) tablet 1,000 mg  1,000 mg Oral BID Mary Macedo PA-C   1,000 mg at 04/10/25 1909       Data     CBC RESULTS:   Recent Labs   Lab Test 04/10/25  0612   WBC 8.0   RBC 3.76*   HGB 11.7   HCT 36.2   MCV 96   MCH 31.1   MCHC 32.3   RDW 12.8        Basic Metabolic Panel:  Lab Results   Component Value Date     04/10/2025      Lab Results   Component Value Date    POTASSIUM 4.2 04/10/2025    POTASSIUM 4.9 12/15/2021     Lab Results   Component Value Date    CHLORIDE 100 04/10/2025    CHLORIDE 100 12/15/2021     Lab Results   Component Value Date    LYNN 9.8 04/10/2025     Lab Results   Component Value Date    CO2 29 04/10/2025    CO2 33 12/15/2021     Lab Results   Component Value Date    BUN 43.3 04/10/2025    BUN 62 12/15/2021     Lab Results   Component Value Date    CR 1.67 04/10/2025     Lab Results   Component Value Date     04/10/2025    GLC 92 12/24/2024     12/15/2021     INR:  No results found for: \"INR\"     "

## 2025-04-11 NOTE — CONSULTS
Care Management Initial Consult    General Information  Assessment completed with: Patient,    Type of CM/SW Visit: Initial Assessment    Primary Care Provider verified and updated as needed: Yes   Readmission within the last 30 days: unable to assess         Advance Care Planning: Advance Care Planning Reviewed: present on chart          Communication Assessment  Patient's communication style: spoken language (English or Bilingual)             Cognitive  Cognitive/Neuro/Behavioral: WDL  Level of Consciousness: alert  Arousal Level: arouses to voice  Orientation: oriented x 4  Mood/Behavior: calm, cooperative     Speech: clear, spontaneous, logical    Living Environment:   People in home: spouse     Current living Arrangements: house      Able to return to prior arrangements: yes       Family/Social Support:  Care provided by: self, spouse/significant other  Provides care for: no one, unable/limited ability to care for self  Marital Status:   Support system:           Description of Support System: Supportive, Involved         Current Resources:   Patient receiving home care services: Yes  Skilled Home Care Services: Skilled Nursing, Physical Therapy, Occupational Therapy     Community Resources: Home Care, Transitional Care  Equipment currently used at home: walker, rolling, shower chair, grab bar, toilet, grab bar, tub/shower, hospital bed, wheelchair, manual, cane, straight, dressing device  Supplies currently used at home: Incontinence Supplies    Employment/Financial:  Employment Status: retired        Financial Concerns: none   Referral to Financial Worker: No       Does the patient's insurance plan have a 3 day qualifying hospital stay waiver?  No    Lifestyle & Psychosocial Needs:  Social Drivers of Health     Food Insecurity: Low Risk  (3/29/2025)    Food Insecurity     Within the past 12 months, did you worry that your food would run out before you got money to buy more?: No     Within the  past 12 months, did the food you bought just not last and you didn t have money to get more?: No   Depression: Not at risk (6/13/2024)    PHQ-2     PHQ-2 Score: 0   Housing Stability: Low Risk  (3/29/2025)    Housing Stability     Do you have housing? : Yes     Are you worried about losing your housing?: No   Tobacco Use: Low Risk  (3/21/2025)    Received from [facility] [employer] [facility]    Patient History     Smoking Tobacco Use: Never     Smokeless Tobacco Use: Never     Passive Exposure: Not on file   Financial Resource Strain: Low Risk  (3/29/2025)    Financial Resource Strain     Within the past 12 months, have you or your family members you live with been unable to get utilities (heat, electricity) when it was really needed?: No   Alcohol Use: Not on file   Transportation Needs: Low Risk  (3/29/2025)    Transportation Needs     Within the past 12 months, has lack of transportation kept you from medical appointments, getting your medicines, non-medical meetings or appointments, work, or from getting things that you need?: No   Physical Activity: Not on file   Interpersonal Safety: Low Risk  (3/29/2025)    Interpersonal Safety     Do you feel physically and emotionally safe where you currently live?: Yes     Within the past 12 months, have you been hit, slapped, kicked or otherwise physically hurt by someone?: No     Within the past 12 months, have you been humiliated or emotionally abused in other ways by your partner or ex-partner?: No   Stress: Not on file   Social Connections: Unknown (3/9/2023)    Received from [facility], [facility] [email or web address] [facility]    Social Connections     Frequency of Communication with Friends and Family: Not on file   Health Literacy: Not on file       Functional Status:  Prior to admission patient needed assistance:              Mental Health Status:  Mental Health Status: No Current Concerns       Chemical  Dependency Status:  Chemical Dependency Status: No Current Concerns             Values/Beliefs:  Spiritual, Cultural Beliefs, Orthodox Practices, Values that affect care: no               Discussed  Partnership in Safe Discharge Planning  document with patient/family: Yes:     Care Management Discharge Note    Discharge Date: 04/11/2025       Discharge Disposition: Home Care, Transitional Care    Discharge Services: Home Care    Discharge DME: Walker, Shower Chair    Discharge Transportation: family or friend will provide    Private pay costs discussed: Not applicable    Does the patient's insurance plan have a 3 day qualifying hospital stay waiver?  No    PAS Confirmation Code:  N/A    Patient/family educated on Medicare website which has current facility and service quality ratings: yes    Education Provided on the Discharge Plan: Yes  Persons Notified of Discharge Plans: Pt, Dtr and Home Care   Patient/Family in Agreement with the Plan: yes    Handoff Referral Completed: Yes, [facility] PCP: Internal handoff referral completed      Additional Information:  [facility] met and introduced self and CM services to Pt.  Pt lives with  in [facility].  Pt has a ramp on out side of house.  Pt has walker and shower chair and grab bars.  Pt uses [facility] [employer] at home. Pt has [facility] for PT, OT and RN.  Therapy is currently recommending TCU.  Pt did give choices of [facility] and [facility], [place].  Referral sent.  [facility] reviewed the MOON and Pt declined to sign.  Pt does not agree with OBS status. [facility] reached to Hospitalist who states it is up to UR.  Pt got  on the phone and wants to bring in home medications as Pt was on Obs in the past and got a $700 bill.  [facility] gave Pt the Pt Relation card.      4:20 PM  [facility] was called by hospitalist at 3:55 that Pt can go home with Home Care.  [facility] called [facility] and spoke to Ed who states they will try and open over the weekend.  [facility] called Daughter Tiny and  updated.  Dtr needs to call a friend for Pt to be transported home and will let Pt know when a ride will be coming.     4:28 PM  Home Care orders faxed     Next Steps: PAS needed. Secure TCU     ADONIS Marinelli

## 2025-04-11 NOTE — PLAN OF CARE
"Problem: Adult Inpatient Plan of Care  Goal: Plan of Care Review  Description: The Plan of Care Review/Shift note should be completed every shift.  The Outcome Evaluation is a brief statement about your assessment that the patient is improving, declining, or no change.  This information will be displayed automatically on your shiftnote.  Outcome: Progressing   Goal Outcome Evaluation:  VSS and afebrile. Sating well on 1L O2 via NC. Received PRN Norco once this shift for generalized pain. CT cervical spine done today.     Patient is discharging this evening. Writer spent a couple hours communicating with patient, care management, and patient's family trying to get a safe discharge plan in place. Patient's transport is concerned about her being able to get up the stairs when she gets home, as this has been an issue before. Patient, patient's spouse, and patient's daughter are all aware that not having assistance with transport/getting up the stairs at home is not a safe idea, but patient's spouse says that insurance will not cover transport and he is unwilling to pay out of pocket for transport. Patient's spouse was unpleasant on the phone, talking about his wife \"being kicked out of the hospital even though she could fall and die but that does not seem to matter.\" Writer tried to explain that is why care team was advising for TCU placement, and that since that was being refused, writer was trying to assist in getting the safest possible discharge. Patient's spouse began ranting more and clearly did not have a full picture grasp, nor sufficient medical knowledge and was not interested in any education.   Writer had specific, very intentional conversation about the risk patient is taking with this discharge plan. Patient stated that she knows she is taking this risk and that it could be unsafe.   All of this will be passed on to RN assuming care.   "

## 2025-04-11 NOTE — PROGRESS NOTES
PRIMARY DIAGNOSIS: GENERALIZED WEAKNESS    OUTPATIENT/OBSERVATION GOALS TO BE MET BEFORE DISCHARGE  1. Orthostatic performed: N/A    2. Tolerating PO medications: Yes    3. Return to near baseline physical activity: No    4. Cleared for discharge by consultants (if involved): No    Discharge Planner Nurse   Safe discharge environment identified: No  Barriers to discharge: Yes    Pt alert and oriented x4, report neuropathic pain, managed appropriately with prn medications, reposition and rest, pt remained on 3L oxygen per pt baseline, all schedule medications given, pt ambulating with A1 gait belt and walker, discharge pending.       Please review provider order for any additional goals.

## 2025-04-11 NOTE — PLAN OF CARE
Goal Outcome Evaluation:      Plan of Care Reviewed With: patient    Overall Patient Progress: improvingOverall Patient Progress: improving    Outcome Evaluation: Pt recs for TCU.  Pt open to [facility].

## 2025-04-11 NOTE — CONSULTS
"PULMONARY/CRITICAL CARE CONSULT NOTE    Date / Time of Admission:  4/9/2025  5:17 PM  Assessment:   74yoF with spinal stenosis, chronic respiratory failure requiring oxygen during the day and Bipap at night, asthma, CKD who is here after a fall    Clinically Significant Risk Factors Present on Admission                 # Drug Induced Platelet Defect: home medication list includes an antiplatelet medication   # Hypertension: Noted on problem list  # Chronic heart failure with preserved ejection fraction: heart failure noted on problem list and last echo with EF >50%          # Severe Obesity: Estimated body mass index is 45.72 kg/m  as calculated from the following:    Height as of this encounter: 1.397 m (4' 7\").    Weight as of this encounter: 89.2 kg (196 lb 11.2 oz).       # Financial/Environmental Concerns:    # Asthma: noted on problem list            Active Problems:    NELLIE (obstructive sleep apnea)    Benign essential hypertension    Hyperlipidemia, unspecified hyperlipidemia type    CKD (chronic kidney disease) stage 3, GFR 30-59 ml/min (H)    Chronic heart failure with preserved ejection fraction (HFpEF) (H)    Fall at home, initial encounter    Chronic respiratory failure with hypoxia (H)    Stenosis, spinal, lumbar    Generalized weakness        Plan:   Severe NELLIE while supine (40.6). Currently using AutoBipap 25/12, PS of 7. Would trial AVAPs, Tv 250 (Patient is only 4'7\"). Can increase if patient can tolerate. This will improve ventilation in the short-term. We could Attempt to qualify her for AVAPs machine on discharge but doing ABG at bedtime and then in the morning on her home AutoBipap settings.   The hypercapnea is fairly well compensated so this is not new.   OK to lower O2 goal 88-92%. Not sure that hyperoxia is the main  of her hypercapnea.     She is concerned that she is observation status and will refuse further workup unless she is admitted.         Subjective:    cc: " hypercapnea    HPI: 74 year old female with history of morbid obesity, NELLIE, spinal stenosis and chronic back pain admitted after a fall. She has her home Bipap here (auto) but has chronic hypercapnea. This is the result of excess weight, chronic pain and NELLIE. She has no complaints about her breathing currently.     Past Medical History:   Diagnosis Date    Allergic rhinitis     Arthritis of back     CHF (congestive heart failure) (H)     Chronic kidney disease     stage 3     De Quervain's disease (tenosynovitis)     Fibromyalgia, primary     GERD (gastroesophageal reflux disease)     Hematuria     chronic    High cholesterol     History of blood clots 09/01/1970    DVT, from birth control, h/o left calf    Hyperlipidemia     Hypertension     Low back pain     Osteoporosis     Pickwickian syndrome (H)     Plantar fasciitis     Proteinuria     Proteinuria     chronic    Sleep apnea     CPAP    Uncomplicated asthma        Social History     Tobacco Use    Smoking status: Never    Smokeless tobacco: Never   Substance Use Topics    Alcohol use: No       Family History   Problem Relation Age of Onset    Rheumatoid Arthritis Mother     Heart Disease Sister     Chronic Obstructive Pulmonary Disease Father        Current Facility-Administered Medications   Medication Dose Route Frequency Provider Last Rate Last Admin    acetaminophen (TYLENOL) tablet 650 mg  650 mg Oral Q4H PRN Mary Macedo PA-C        Or    acetaminophen (TYLENOL) Suppository 650 mg  650 mg Rectal Q4H PRN Mary Macedo PA-C        albuterol (PROVENTIL HFA/VENTOLIN HFA) inhaler  1 puff Inhalation Q4H PRN Mary Macedo PA-C        aspirin EC tablet 81 mg  81 mg Oral Daily Mary Macedo PA-C   81 mg at 04/11/25 0815    azelastine (OPTIVAR) ophthalmic solution 1 drop  1 drop Both Eyes BID PRN Mary Macedo PA-C        cetirizine (zyrTEC) tablet 10 mg  10 mg Oral Daily Mary Macedo PA-C   10 mg at 04/11/25 0816    DULoxetine (CYMBALTA) DR capsule 30 mg  30 mg  Oral QPM Mary Macedo PA-C   30 mg at 04/10/25 1909    DULoxetine (CYMBALTA) DR capsule 60 mg  60 mg Oral Mary Sanchez PA-C   60 mg at 04/11/25 0815    enoxaparin ANTICOAGULANT (LOVENOX) injection 40 mg  40 mg Subcutaneous Q12H Mary Macedo PA-C   40 mg at 04/10/25 2102    [Held by provider] furosemide (LASIX) tablet 20 mg  20 mg Oral QPM Mary Macedo PA-C        [Held by provider] furosemide (LASIX) tablet 40 mg  40 mg Oral QAM Mary Macedo PA-C        HYDROcodone-acetaminophen (NORCO) 5-325 MG per tablet 1 tablet  1 tablet Oral Q4H PRN Mary Macedo PA-C   1 tablet at 04/11/25 0825    metoprolol succinate ER (TOPROL XL) 24 hr tablet 50 mg  50 mg Oral Daily Mary Macedo PA-C   50 mg at 04/11/25 0815    modafinil (PROVIGIL) tablet 200 mg  200 mg Oral Mary Sanchez PA-C   200 mg at 04/11/25 0815    montelukast (SINGULAIR) tablet 10 mg  10 mg Oral QPM Mary Macedo PA-C   10 mg at 04/10/25 1909    naloxone (NARCAN) injection 0.2 mg  0.2 mg Intravenous Q2 Min PRN Mary Macedo PA-C        Or    naloxone (NARCAN) injection 0.4 mg  0.4 mg Intravenous Q2 Min PRN Mary Macedo PA-C        Or    naloxone (NARCAN) injection 0.2 mg  0.2 mg Intramuscular Q2 Min PRN Mary Macedo PA-C        Or    naloxone (NARCAN) injection 0.4 mg  0.4 mg Intramuscular Q2 Min PRN Mary Macedo PA-C        ondansetron (ZOFRAN ODT) ODT tab 4 mg  4 mg Oral Q6H PRN aMry Macedo PA-C        Or    ondansetron (ZOFRAN) injection 4 mg  4 mg Intravenous Q6H PRN Mary Macedo PA-C        pantoprazole (PROTONIX) EC tablet 40 mg  40 mg Oral Daily PRN Mary Macedo PA-C        prochlorperazine (COMPAZINE) injection 5 mg  5 mg Intravenous Q6H PRN Mary Macedo PA-C        Or    prochlorperazine (COMPAZINE) tablet 5 mg  5 mg Oral Q6H PRN Mary Macedo PA-C        rosuvastatin (CRESTOR) tablet 10 mg  10 mg Oral At Bedtime Mary Macedo PA-C   10 mg at 04/10/25 2102    senna-docusate (SENOKOT-S/PERICOLACE) 8.6-50 MG per tablet 1 tablet  1 tablet  "Oral BID PRN Mary Macedo PA-C        Or    senna-docusate (SENOKOT-S/PERICOLACE) 8.6-50 MG per tablet 2 tablet  2 tablet Oral BID PRN Mary Macedo PA-C        solifenacin (VESICARE) tablet 10 mg  10 mg Oral At Bedtime Mary Macedo PA-C   10 mg at 04/10/25 2102    valACYclovir (VALTREX) tablet 1,000 mg  1,000 mg Oral BID Mary Macedo PA-C   1,000 mg at 04/11/25 0815         Review of Systems: 12-point Review performed and negative aside from that noted in HPI.    Objective:    Vital signs:  /60   Pulse 58   Temp 97  F (36.1  C) (Oral)   Resp 18   Ht 1.397 m (4' 7\")   Wt 89.2 kg (196 lb 11.2 oz)   SpO2 94%   BMI 45.72 kg/m      GENERAL APPEARANCE: healthy, alert and no distress     EYES: EOMI, - PERRL     NECK: no adenopathy, no asymmetry, masses, or scars and thyroid normal to palpation     RESP: decreased breath sounds bilaterally     CV: regular rates and rhythm, normal S1 S2, no S3 or S4 and no murmur, click or rub -     ABDOMEN:  soft, nontender, no HSM or masses and bowel sounds normal     MS: extremities normal- no gross deformities noted, no evidence of inflammation in joints, FROM in all extremities.     SKIN: no suspicious lesions or rashes     NEURO: Normal strength and tone, sensory exam grossly normal, mentation intact and speech normal     PSYCH: mentation appears normal. and affect normal/bright     LYMPHATICS: No axillary, cervical, inguinal, or supraclavicular nodes        Data    CT Chest from 3/2025  EXAM: CT CHEST WITH CONTRAST - PULMONARY EMBOLISM PROTOCOL   LOCATION: [facility]  DATE/TIME: 03/22/2025 12:57 AM CDT     INDICATION: Shortness of breath for one week. Elevated d-dimer.  COMPARISON:  07/28/2017 - CT cervical spine.  04/5/2017 - CT chest.     TECHNIQUE: CT angiogram chest during arterial phase injection IV contrast. 2D and 3D MIP reconstructions were performed by the CT technologist. Dose reduction techniques were used.   CONTRAST: 70 mL Isovue " 370.     FINDINGS:     ANGIOGRAM CHEST: The central pulmonary arteries are mildly large in caliber. No visualized pulmonary embolus. Atherosclerotic calcification in the thoracic aorta.     LUNGS AND PLEURA: Unremarkable.     MEDIASTINUM/AXILLAE: Unremarkable.     CORONARY ARTERY CALCIFICATION: Present.     MUSCULOSKELETAL: Partial visualization of fusion hardware in the lower cervical spine. Irregular lucency within the osseous structures about the fusion hardware that appears new since the prior study dated 07/28/2017.     UPPER ABDOMEN: Prior cholecystectomy.                                                                      IMPRESSION:   1.  No visualized pulmonary embolus.  2.  No evidence of active pulmonary disease.  3.  Partial visualization of fusion hardware in the lower cervical spine. Irregular lucency is present within the osseous structures about the fusion hardware that appears new since 07/28/2017. This is nonspecific, but could relate to hardware loosening   and/or other ongoing abnormality. If the patient has symptoms in this region, a CT scan of the neck would be useful for further evaluation.      Laboratory:  Results for orders placed or performed during the hospital encounter of 04/09/25   Basic metabolic panel    Collection Time: 04/10/25  6:12 AM   Result Value Ref Range    Sodium 141 135 - 145 mmol/L    Potassium 4.2 3.4 - 5.3 mmol/L    Chloride 100 98 - 107 mmol/L    Carbon Dioxide (CO2) 29 22 - 29 mmol/L    Anion Gap 12 7 - 15 mmol/L    Urea Nitrogen 43.3 (H) 8.0 - 23.0 mg/dL    Creatinine 1.67 (H) 0.51 - 0.95 mg/dL    GFR Estimate 32 (L) >60 mL/min/1.73m2    Calcium 9.8 8.8 - 10.4 mg/dL    Glucose 123 (H) 70 - 99 mg/dL     Lab Results   Component Value Date    WBC 8.0 04/10/2025    HGB 11.7 04/10/2025    HCT 36.2 04/10/2025    MCV 96 04/10/2025     04/10/2025

## 2025-04-12 NOTE — PROGRESS NOTES
Physical Therapy Discharge Summary    Reason for therapy discharge:    Discharged to home with home therapy.    Progress towards therapy goal(s). See goals on Care Plan in [facility] electronic health record for goal details.  Goals not met.  Barriers to achieving goals:   discharge from facility.    Therapy recommendation(s):    Continued therapy is recommended.  Rationale/Recommendations:  TCU recommended, pt went home with home PT.

## 2025-04-12 NOTE — PROGRESS NOTES
Occupational Therapy Discharge Summary    Reason for therapy discharge:    Discharged to home with home therapy.    Progress towards therapy goal(s). See goals on Care Plan in [facility] electronic health record for goal details.  Goals not met.  Barriers to achieving goals:   discharge from facility.    Therapy recommendation(s):    Continued therapy is recommended.  Rationale/Recommendations:  continued OT to advance ADL endurance/independence.

## 2025-04-17 ENCOUNTER — LAB REQUISITION (OUTPATIENT)
Dept: LAB | Facility: CLINIC | Age: 75
End: 2025-04-17
Payer: MEDICARE

## 2025-04-17 PROCEDURE — 80048 BASIC METABOLIC PNL TOTAL CA: CPT | Mod: ORL | Performed by: PHYSICIAN ASSISTANT

## 2025-04-18 LAB
ANION GAP SERPL CALCULATED.3IONS-SCNC: 16 MMOL/L (ref 7–15)
BUN SERPL-MCNC: 58.1 MG/DL (ref 8–23)
CALCIUM SERPL-MCNC: 10 MG/DL (ref 8.8–10.4)
CHLORIDE SERPL-SCNC: 101 MMOL/L (ref 98–107)
CREAT SERPL-MCNC: 1.68 MG/DL (ref 0.51–0.95)
EGFRCR SERPLBLD CKD-EPI 2021: 32 ML/MIN/1.73M2
GLUCOSE SERPL-MCNC: 123 MG/DL (ref 70–99)
HCO3 SERPL-SCNC: 22 MMOL/L (ref 22–29)
POTASSIUM SERPL-SCNC: 4.8 MMOL/L (ref 3.4–5.3)
SODIUM SERPL-SCNC: 139 MMOL/L (ref 135–145)

## 2025-04-30 ENCOUNTER — LAB REQUISITION (OUTPATIENT)
Dept: LAB | Facility: CLINIC | Age: 75
End: 2025-04-30
Payer: MEDICARE

## 2025-04-30 ENCOUNTER — DOCUMENTATION ONLY (OUTPATIENT)
Dept: GERIATRICS | Facility: CLINIC | Age: 75
End: 2025-04-30
Payer: MEDICARE

## 2025-04-30 DIAGNOSIS — Z11.1 ENCOUNTER FOR SCREENING FOR RESPIRATORY TUBERCULOSIS: ICD-10-CM

## 2025-05-01 ENCOUNTER — LAB REQUISITION (OUTPATIENT)
Dept: LAB | Facility: CLINIC | Age: 75
End: 2025-05-01
Payer: MEDICARE

## 2025-05-01 ENCOUNTER — TRANSITIONAL CARE UNIT VISIT (OUTPATIENT)
Dept: GERIATRICS | Facility: CLINIC | Age: 75
End: 2025-05-01
Payer: MEDICARE

## 2025-05-01 VITALS
HEIGHT: 55 IN | BODY MASS INDEX: 44.99 KG/M2 | SYSTOLIC BLOOD PRESSURE: 148 MMHG | RESPIRATION RATE: 16 BRPM | TEMPERATURE: 98.2 F | OXYGEN SATURATION: 96 % | WEIGHT: 194.4 LBS | DIASTOLIC BLOOD PRESSURE: 88 MMHG | HEART RATE: 71 BPM

## 2025-05-01 DIAGNOSIS — M06.879: ICD-10-CM

## 2025-05-01 DIAGNOSIS — J96.21 ACUTE ON CHRONIC RESPIRATORY FAILURE WITH HYPOXIA AND HYPERCAPNIA (H): Primary | ICD-10-CM

## 2025-05-01 DIAGNOSIS — R53.81 PHYSICAL DECONDITIONING: ICD-10-CM

## 2025-05-01 DIAGNOSIS — N32.81 OVERACTIVE BLADDER: ICD-10-CM

## 2025-05-01 DIAGNOSIS — I10 BENIGN ESSENTIAL HYPERTENSION: ICD-10-CM

## 2025-05-01 DIAGNOSIS — Z86.73 HISTORY OF STROKE: ICD-10-CM

## 2025-05-01 DIAGNOSIS — N18.32 STAGE 3B CHRONIC KIDNEY DISEASE (H): ICD-10-CM

## 2025-05-01 DIAGNOSIS — M62.81 GENERALIZED MUSCLE WEAKNESS: ICD-10-CM

## 2025-05-01 DIAGNOSIS — I50.32 CHRONIC DIASTOLIC (CONGESTIVE) HEART FAILURE (H): ICD-10-CM

## 2025-05-01 DIAGNOSIS — R29.6 FALLS FREQUENTLY: ICD-10-CM

## 2025-05-01 DIAGNOSIS — I48.11 LONGSTANDING PERSISTENT ATRIAL FIBRILLATION (H): ICD-10-CM

## 2025-05-01 DIAGNOSIS — J44.9 CHRONIC OBSTRUCTIVE PULMONARY DISEASE, UNSPECIFIED COPD TYPE (H): ICD-10-CM

## 2025-05-01 DIAGNOSIS — J30.9 ALLERGIC RHINITIS, UNSPECIFIED SEASONALITY, UNSPECIFIED TRIGGER: ICD-10-CM

## 2025-05-01 DIAGNOSIS — J96.22 ACUTE ON CHRONIC RESPIRATORY FAILURE WITH HYPOXIA AND HYPERCAPNIA (H): Primary | ICD-10-CM

## 2025-05-01 DIAGNOSIS — I50.33 ACUTE ON CHRONIC DIASTOLIC HEART FAILURE (H): ICD-10-CM

## 2025-05-01 DIAGNOSIS — K59.01 SLOW TRANSIT CONSTIPATION: ICD-10-CM

## 2025-05-01 PROCEDURE — 86481 TB AG RESPONSE T-CELL SUSP: CPT | Mod: ORL | Performed by: FAMILY MEDICINE

## 2025-05-01 PROCEDURE — 36415 COLL VENOUS BLD VENIPUNCTURE: CPT | Mod: ORL | Performed by: FAMILY MEDICINE

## 2025-05-01 PROCEDURE — P9603 ONE-WAY ALLOW PRORATED MILES: HCPCS | Mod: ORL | Performed by: FAMILY MEDICINE

## 2025-05-01 PROCEDURE — 99310 SBSQ NF CARE HIGH MDM 45: CPT

## 2025-05-01 RX ORDER — NYSTATIN 100000 [USP'U]/G
POWDER TOPICAL 2 TIMES DAILY
COMMUNITY

## 2025-05-01 RX ORDER — VITAMIN E (DL,TOCOPHERYL ACET) 180 MG
500 CAPSULE ORAL DAILY
COMMUNITY

## 2025-05-01 RX ORDER — TRAMADOL HYDROCHLORIDE 50 MG/1
25 TABLET ORAL 2 TIMES DAILY PRN
COMMUNITY
End: 2025-05-03

## 2025-05-01 RX ORDER — NYSTATIN 100000 [USP'U]/G
POWDER TOPICAL 2 TIMES DAILY
COMMUNITY
Start: 2025-05-01 | End: 2025-05-01

## 2025-05-01 RX ORDER — AMOXICILLIN 250 MG
1 CAPSULE ORAL 2 TIMES DAILY
COMMUNITY

## 2025-05-01 RX ORDER — AZELASTINE HYDROCHLORIDE 0.5 MG/ML
1 SOLUTION/ DROPS OPHTHALMIC 2 TIMES DAILY
Qty: 6 ML | Refills: 0 | Status: SHIPPED | OUTPATIENT
Start: 2025-05-01

## 2025-05-01 NOTE — LETTER
5/1/2025      Desirae Rey  [address]   [place] [address]        [facility]    PRIMARY CARE PROVIDER AND CLINIC:  Daysi Bryan PA-C, [address] / [place] [facility] [address]***  Chief Complaint   Patient presents with    Hospital F/U      [place] Medical Record Number:  [id number]  Place of Service where encounter took place:  [employer] [facility] [[id number]]    Desirae Rey  is a 74 year old  (1950), {fgsinitialoption:343539}.   HPI:    ***    CODE STATUS/ADVANCE DIRECTIVES DISCUSSION:  Prior  {CODE STATUS:181260}  ALLERGIES:   Allergies   Allergen Reactions    Latex Rash     Severe rash    Pregabalin Shortness Of Breath     Other Reaction(s): swelling and shortness of breath    Valsartan Unknown     Hyperkalemia    Ciprofloxacin Visual Disturbance, Hallucination and Confusion     Likely contributed visual hallucination and confusion    Colchicine Diarrhea    Dicloxacillin      Other Reaction(s): confusion, says she has tolerated augmentin without difficulty.     Gabapentin      Other Reaction(s): Sedation    Latex Unknown     Added based on information entered during case entry, please review and add reactions, type, and severity as needed    Other Drug Allergy (See Comments) Muscle Pain (Myalgia)     Tried lovastatin and simvastatin    Other Environmental Allergy Unknown     Coverlet bandaid , Inquirly product; rash    Statins-Hmg-Coa Reductase Inhibitors [Statins] Muscle Pain (Myalgia)     Tried lovastatin and simvastatin    Sulfa Antibiotics Swelling     Facial swelling      Adhesive Tape Rash      PAST MEDICAL HISTORY:   Past Medical History:   Diagnosis Date    Allergic rhinitis     Arthritis of back     CHF (congestive heart failure) (H)     Chronic kidney disease     stage 3     De Quervain's disease (tenosynovitis)     Fibromyalgia, primary     GERD (gastroesophageal reflux disease)     Hematuria     chronic    High cholesterol     History of blood clots  09/01/1970    DVT, from birth control, h/o left calf    Hyperlipidemia     Hypertension     Low back pain     Osteoporosis     Pickwickian syndrome (H)     Plantar fasciitis     Proteinuria     Proteinuria     chronic    Sleep apnea     CPAP    Uncomplicated asthma       PAST SURGICAL HISTORY:   has a past surgical history that includes knee surgery; Hand surgery (Right); Release carpal tunnel (Bilateral); joint replacement; Cholecystectomy; Hysteroscopy Diagnostic; Cervical Fusion (N/A, 4/27/2017); Eye surgery; and Colonoscopy (N/A, 12/20/2019).  FAMILY HISTORY: family history includes Chronic Obstructive Pulmonary Disease in her father; Heart Disease in her sister; Rheumatoid Arthritis in her mother.  SOCIAL HISTORY:   reports that she has never smoked. She has never used smokeless tobacco. She reports that she does not drink alcohol and does not use drugs.  Patient's living condition: {LIVES WITH (NURSING HOME):381622}    Post Discharge Medication Reconciliation Status:   MED REC REQUIRED{TIP  Click the link below to document or use med rec list, use list to pull in response :[id number]}  Post Medication Reconciliation Status: {MED REC LIST:363785}       Current Outpatient Medications   Medication Sig Dispense Refill    acetaminophen (TYLENOL) 325 MG tablet Take 1 tablet (325 mg) by mouth every 4 hours.      albuterol (PROAIR HFA;PROVENTIL HFA;VENTOLIN HFA) 90 mcg/actuation inhaler Inhale 1 puff into the lungs every 4 hours as needed for shortness of breath / dyspnea      alendronate (FOSAMAX) 70 MG tablet [ALENDRONATE (FOSAMAX) 70 MG TABLET] Take 70 mg by mouth every 7 days. Takes on Mondays 11    aspirin 81 MG EC tablet Take 81 mg by mouth daily.      azelastine (OPTIVAR) 0.05 % ophthalmic solution Place 1 drop into both eyes 2 times daily as needed.      calcium citrate (CITRACAL) 950 (200 Ca) MG tablet Take 1 tablet by mouth daily      cetirizine (ZYRTEC) 10 MG tablet [CETIRIZINE (ZYRTEC) 10 MG TABLET] Take 10  mg by mouth daily.       cholecalciferol (VITAMIN D3) 25 mcg (1000 units) capsule Take 1 capsule by mouth daily.      CRESTOR 10 MG tablet Take 10 mg by mouth daily.      diclofenac (VOLTAREN) 1 % topical gel Apply 2 g topically 4 times daily. Apply to painful areas. Use supplied dosing card to measure dose. 50 g 0    docusate sodium (COLACE) 100 MG capsule Take 100 mg by mouth 2 times daily as needed for constipation.      DULoxetine (CYMBALTA) 30 MG capsule Take 60 mg by mouth every morning.      DULoxetine (CYMBALTA) 30 MG capsule Take 30 mg by mouth every evening.      esomeprazole (NEXIUM) 20 MG DR capsule Take 1 capsule (20 mg) by mouth every morning (before breakfast). Take 30-60 minutes before eating. 30 capsule 0    fluticasone-salmeterol (AIRDUO RESPICLICK) 113-14 MCG/ACT inhaler Inhale 1 puff into the lungs as needed (shortness of breath).      furosemide (LASIX) 20 MG tablet Take 40 mg by mouth every morning.      furosemide (LASIX) 20 MG tablet Take 20 mg by mouth every evening.      HYDROcodone-acetaminophen (NORCO) 5-325 MG tablet Take 1 tablet by mouth every 4 hours as needed for pain Max 6 tablets per day. 60 tablet 0    l-lysine HCl 500 MG TABS tablet Take 500 mg by mouth daily.      magnesium oxide 250 mg Tab Take 250 mg by mouth 2 times daily.      metoprolol succinate ER (TOPROL XL) 50 MG 24 hr tablet Take 1 tablet (50 mg) by mouth daily. 30 tablet 1    modafinil (PROVIGIL) 100 MG tablet Take 200 mg by mouth every morning.      montelukast (SINGULAIR) 10 mg tablet Take 10 mg by mouth every evening      multivitamin, therapeutic (THERA-VIT) TABS tablet Take 1 tablet by mouth daily.      OXYGEN-AIR DELIVERY SYSTEMS MISC [OXYGEN-AIR DELIVERY SYSTEMS [facility]] Use 3 L As Directed. 3 lpm with activities and as needed at night      polyethylene glycol (MIRALAX) 17 GM/Dose powder Take 1 Capful by mouth daily as needed for constipation.      solifenacin (VESICARE) 10 MG tablet Take 10 mg by mouth at  bedtime.      Turmeric (CURCUPLEX-95) 500 MG CAPS Take 1 capsule by mouth daily      valACYclovir (VALTREX) 1000 mg tablet Take 1 tablet (1,000 mg) by mouth 2 times daily. 10 tablet 0     No current facility-administered medications for this visit.       ROS:  {ROS FGS:544278}    Vitals:  There were no vitals taken for this visit.  Exam:  {retirement physical exam :638244}    Lab/Diagnostic data:  {fgslab:352344}    ASSESSMENT/PLAN:    {FGS DX2:612153}    Orders:  {fgsorders:092705}  ***    Electronically signed by:  Marquita Herbert ***                     Sincerely,        KALIE Alvarez CNP    Electronically signed   "7\")   Wt 88.2 kg (194 lb 6.4 oz)   SpO2 96%   BMI 45.18 kg/m    Exam:  GENERAL APPEARANCE:  Alert, in no distress  RESP:  respiratory effort and palpation of chest normal, lungs clear to auscultation , no respiratory distress, on oxygen 3l/nc  CV:  regular rate and rhythm, no murmur, rub, or gallop, +2 edema BLE  ABDOMEN:  normal bowel sounds, soft, nontender, no hepatosplenomegaly or other masses, no guarding or rebound, bowel sounds normal  :    Can be incontinent   M/S:   wheelchair bound at this time   SKIN:  Inspection of skin and subcutaneous tissue baseline  NEURO:   Examination of sensation by touch normal  PSYCH:  oriented X 3    Lab/Diagnostic data:  Most Recent 3 CBC's:  Recent Labs   Lab Test 04/10/25  0612 04/09/25  1819 03/31/25  0601 03/28/25  2214   WBC 8.0 7.7  --  8.2   HGB 11.7 12.0  --  11.7   MCV 96 95  --  97    384 231 238     Most Recent 3 BMP's:  Recent Labs   Lab Test 05/02/25  0505 04/17/25  1649 04/10/25  0612    139 141   POTASSIUM 4.8 4.8 4.2   CHLORIDE 101 101 100   CO2 29 22 29   BUN 60.2* 58.1* 43.3*   CR 1.37* 1.68* 1.67*   ANIONGAP 12 16* 12   LYNN 9.8 10.0 9.8   * 123* 123*       ASSESSMENT/PLAN:  (J96.21,  J96.22) Acute on chronic respiratory failure with hypoxia and hypercapnia (H)    (J44.9) Chronic obstructive pulmonary disease, unspecified COPD type (H)  (J30.9) Allergic rhinitis, unspecified seasonality, unspecified trigger  Comment: Acute on chronic Hx of NELLIE on chronic modafinil , and oxygen at baseline .  Plan:   - Continue Oxygen   - Continue Modafinil   - Continue Singular daily  - Continue Fluticasone -salmeterol -PRN  - Continue PRN albuterol  - Continue Zyrtec daily      (Z86.73) History of stroke  (M06.879) Other specified rheumatoid arthritis, unspecified ankle and foot (H)  (R29.6) Falls frequently  (R53.81) Physical deconditioning  (M62.81) Generalized muscle weakness  Comment: Acute on chronic deconditioned post acute illness/fall hx of " CVA working with therapy   Plan:   - Continue Rosuvastatin daily   - Continue ASA  - PT/OT ongoing  - Encourage ambulation with assistance     (I50.33) Acute on chronic diastolic heart failure (H)  (I10) Benign essential hypertension  (I48.11) Longstanding persistent atrial fibrillation (H)  Comment: Acute on chronic Edema at baseline weight at 194.4 SBP controlled ranging 125-140  Plan:   -Continue BP monitoring  -Daily weight   - Continue Furosemide 40mg AM and 20mg at noon   - Continue Metoprolol succinate 50mg daily      (N18.32) Stage 3b chronic kidney disease (H)  Comment: Acute on chronic stable baseline Cr 1. recheck BMP periodically     (N32.81) Overactive bladder  Comment: Stable on Vesicare no acute concern     (K59.01) Slow transit constipation  Comment: Chronic constipation no BM in the last 2 days   Plan:   - Start Senna.S BID with PRN       Total time spent with patient visit at the skilled nursing facility was 48 min including patient visit and review of past records.     Orders:  Azelastine eyedrops 2 drops twice daily  Start senna .s p.o. 1 tab twice daily with twice daily as needed available  BMP due 5/1/2025  Start PRN tramadol for sever pain 25mg BID    Electronically signed by:KALIE Alvarez CNP         The above has been created using voice recognition software. Please be aware that this may unintentionally produce inaccuracies and/or nonsensical sentences.       Sincerely,        KALIE Alvarez CNP    Electronically signed

## 2025-05-01 NOTE — PROGRESS NOTES
[facility] GERIATRICS    PRIMARY CARE PROVIDER AND CLINIC:  Daysi Bryan PA-C, [address] [facility] [address] / [facility] [id number]***  Chief Complaint   Patient presents with    Hospital F/U      [place] Medical Record Number:  [id number]  Place of Service where encounter took place:  [facility] ([facility]) [[id number]]    Desirae Rey  is a 74 year old  (1950), admitted to the above facility from  [facility] . Hospital stay 4/24/25 through 4/30/24. 74 Y old female with PMH of  chronic respiratory failure on 3L oxygen at baseline, severe NELLIE on BiPap, frequent falls, cerebellar stroke, cervical fusion with loose hardware, HTN/HL, chronic diastolic CHF, and morbid obesity. Who recently admitted to hospital Post fall via EMS.        Hospital course  Sacral nondisplaced fracture - Noted on x-ray imaging, reported by Radiology to more likely appear to be chronic. Will need to reassess with patient in the AM.   Loose cervical hardware - Previously noted on imaging with recommendation for NSGY follow-up. Daughter expresses interest in patient seeing Dr. Mckeon, will need to discuss further with patient in the AM and NSGY appropriate timing inpatient vs outpatient     Follow up   Follow up with - outpt clinic f/u w/ Dr Mckeon       HPI:    ***    CODE STATUS/ADVANCE DIRECTIVES DISCUSSION:  Prior  CPR/Full code   ALLERGIES:   Allergies   Allergen Reactions    Latex Rash     Severe rash    Pregabalin Shortness Of Breath     Other Reaction(s): swelling and shortness of breath    Valsartan Unknown     Hyperkalemia    Ciprofloxacin Visual Disturbance, Hallucination and Confusion     Likely contributed visual hallucination and confusion    Colchicine Diarrhea    Dicloxacillin      Other Reaction(s): confusion, says she has tolerated augmentin without difficulty.     Gabapentin      Other Reaction(s): Sedation    Latex Unknown     Added based on information entered during case entry, please review and add reactions,  type, and severity as needed    Other Drug Allergy (See Comments) Muscle Pain (Myalgia)     Tried lovastatin and simvastatin    Other Environmental Allergy Unknown     Coverlet bandaid , 3M product; rash    Statins-Hmg-Coa Reductase Inhibitors [Statins] Muscle Pain (Myalgia)     Tried lovastatin and simvastatin    Sulfa Antibiotics Swelling     Facial swelling      Adhesive Tape Rash      PAST MEDICAL HISTORY:   Past Medical History:   Diagnosis Date    Allergic rhinitis     Arthritis of back     CHF (congestive heart failure) (H)     Chronic kidney disease     stage 3     De Quervain's disease (tenosynovitis)     Fibromyalgia, primary     GERD (gastroesophageal reflux disease)     Hematuria     chronic    High cholesterol     History of blood clots 09/01/1970    DVT, from birth control, h/o left calf    Hyperlipidemia     Hypertension     Low back pain     Osteoporosis     Pickwickian syndrome (H)     Plantar fasciitis     Proteinuria     Proteinuria     chronic    Sleep apnea     CPAP    Uncomplicated asthma       PAST SURGICAL HISTORY:   has a past surgical history that includes knee surgery; Hand surgery (Right); Release carpal tunnel (Bilateral); joint replacement; Cholecystectomy; Hysteroscopy Diagnostic; Cervical Fusion (N/A, 4/27/2017); Eye surgery; and Colonoscopy (N/A, 12/20/2019).  FAMILY HISTORY: family history includes Chronic Obstructive Pulmonary Disease in her father; Heart Disease in her sister; Rheumatoid Arthritis in her mother.  SOCIAL HISTORY:   reports that she has never smoked. She has never used smokeless tobacco. She reports that she does not drink alcohol and does not use drugs.  Patient's living condition: lives with spouse    Post Discharge Medication Reconciliation Status:   MED REC REQUIRED  Post Medication Reconciliation Status: discharge medications reconciled and changed, per note/orders       Current Outpatient Medications   Medication Sig Dispense Refill    albuterol (PROAIR  HFA;PROVENTIL HFA;VENTOLIN HFA) 90 mcg/actuation inhaler Inhale 1 puff into the lungs every 4 hours as needed for shortness of breath / dyspnea      alendronate (FOSAMAX) 70 MG tablet [ALENDRONATE (FOSAMAX) 70 MG TABLET] Take 70 mg by mouth every 7 days. Takes on Mondays 11    aspirin 81 MG EC tablet Take 81 mg by mouth daily.      azelastine (OPTIVAR) 0.05 % ophthalmic solution Place 1 drop into both eyes 2 times daily. 6 mL 0    calcium citrate (CITRACAL) 950 (200 Ca) MG tablet Take 1 tablet by mouth daily      cetirizine (ZYRTEC) 10 MG tablet [CETIRIZINE (ZYRTEC) 10 MG TABLET] Take 10 mg by mouth daily.       cholecalciferol (VITAMIN D3) 25 mcg (1000 units) capsule Take 1 capsule by mouth daily.      diclofenac (VOLTAREN) 1 % topical gel Apply 2 g topically 4 times daily. Apply to painful areas. Use supplied dosing card to measure dose. 50 g 0    DULoxetine (CYMBALTA) 30 MG capsule Take 60 mg by mouth every morning.      fluticasone-salmeterol (AIRDUO RESPICLICK) 113-14 MCG/ACT inhaler Inhale 1 puff into the lungs as needed (shortness of breath).      Magnesium Oxide -Mg Supplement 500 MG CAPS Take 500 mg by mouth daily.      nystatin (MYCOSTATIN) 768906 UNIT/GM external powder Apply topically 2 times daily.      senna-docusate (SENOKOT-S/PERICOLACE) 8.6-50 MG tablet Take 1 tablet by mouth 2 times daily. Can also take 1 tab po BID PRN      traMADol (ULTRAM) 50 MG tablet Take 25 mg by mouth 2 times daily as needed for severe pain.      acetaminophen (TYLENOL) 325 MG tablet Take 1 tablet (325 mg) by mouth every 4 hours.      azelastine (OPTIVAR) 0.05 % ophthalmic solution Place 1 drop into both eyes 2 times daily.      CRESTOR 10 MG tablet Take 10 mg by mouth daily.      docusate sodium (COLACE) 100 MG capsule Take 100 mg by mouth 2 times daily as needed for constipation.      DULoxetine (CYMBALTA) 30 MG capsule Take 30 mg by mouth every evening.      esomeprazole (NEXIUM) 20 MG DR capsule Take 1 capsule (20 mg)  "by mouth every morning (before breakfast). Take 30-60 minutes before eating. 30 capsule 0    furosemide (LASIX) 20 MG tablet Take 40 mg by mouth every morning.      furosemide (LASIX) 20 MG tablet Take 20 mg by mouth every evening.      l-lysine HCl 500 MG TABS tablet Take 500 mg by mouth daily.      magnesium oxide 250 mg Tab Take 250 mg by mouth 2 times daily.      metoprolol succinate ER (TOPROL XL) 50 MG 24 hr tablet Take 1 tablet (50 mg) by mouth daily. 30 tablet 1    modafinil (PROVIGIL) 100 MG tablet Take 250 mg by mouth every morning.      montelukast (SINGULAIR) 10 mg tablet Take 10 mg by mouth every evening      multivitamin, therapeutic (THERA-VIT) TABS tablet Take 1 tablet by mouth daily.      OXYGEN-AIR DELIVERY SYSTEMS MISC [OXYGEN-AIR DELIVERY SYSTEMS MISC] Use 3 L As Directed. 3 lpm with activities and as needed at night      polyethylene glycol (MIRALAX) 17 GM/Dose powder Take 1 Capful by mouth daily as needed for constipation.      solifenacin (VESICARE) 10 MG tablet Take 10 mg by mouth at bedtime.      Turmeric (CURCUPLEX-95) 500 MG CAPS Take 1 capsule by mouth daily      valACYclovir (VALTREX) 1000 mg tablet Take 1 tablet (1,000 mg) by mouth 2 times daily. 10 tablet 0     No current facility-administered medications for this visit.       ROS:  4 point ROS including Respiratory, CV, GI and , other than that noted in the HPI,  is negative    Vitals:  BP (!) 148/88   Pulse 71   Temp 98.2  F (36.8  C)   Resp 16   Ht 1.397 m (4' 7\")   Wt 88.2 kg (194 lb 6.4 oz)   SpO2 96%   BMI 45.18 kg/m    Exam:  {Nursing home physical exam :312950}    Lab/Diagnostic data:  Most Recent 3 CBC's:  Recent Labs   Lab Test 04/10/25  0612 04/09/25  1819 03/31/25  0601 03/28/25  2214   WBC 8.0 7.7  --  8.2   HGB 11.7 12.0  --  11.7   MCV 96 95  --  97    384 231 238     Most Recent 3 BMP's:  Recent Labs   Lab Test 04/17/25  1649 04/10/25  0612 04/09/25  1819    141 139   POTASSIUM 4.8 4.2 4.5 "   CHLORIDE 101 100 98   CO2 22 29 28   BUN 58.1* 43.3* 41.9*   CR 1.68* 1.67* 1.59*   ANIONGAP 16* 12 13   LYNN 10.0 9.8 10.4   * 123* 95       ASSESSMENT/PLAN:    {FGS DX2:455556}    Orders:  Azelastine eyedrops 2 drops twice daily  Start senna .s p.o. 1 tab twice daily with twice daily as needed available  BMP due 5/1/2025  Start PRN tramadol for sever pain 25mg BID    Electronically signed by:KALIE Alvarez CNP                        chronic diastolic heart failure (H)  (I10) Benign essential hypertension  (I48.11) Longstanding persistent atrial fibrillation (H)  Comment: Acute on chronic Edema at baseline weight at 194.4 SBP controlled ranging 125-140  Plan:   -Continue BP monitoring  -Daily weight   - Continue Furosemide 40mg AM and 20mg at noon   - Continue Metoprolol succinate 50mg daily      (N18.32) Stage 3b chronic kidney disease (H)  Comment: Acute on chronic stable baseline Cr 1. recheck BMP periodically     (N32.81) Overactive bladder  Comment: Stable on Vesicare no acute concern     (K59.01) Slow transit constipation  Comment: Chronic constipation no BM in the last 2 days   Plan:   - Start Senna.S BID with PRN       Total time spent with patient visit at the skilled nursing facility was 48 min including patient visit and review of past records.     Orders:  Azelastine eyedrops 2 drops twice daily  Start senna .s p.o. 1 tab twice daily with twice daily as needed available  BMP due 5/1/2025  Start PRN tramadol for sever pain 25mg BID    Electronically signed by:KALIE Alvarez CNP         The above has been created using voice recognition software. Please be aware that this may unintentionally produce inaccuracies and/or nonsensical sentences.

## 2025-05-02 LAB
ANION GAP SERPL CALCULATED.3IONS-SCNC: 12 MMOL/L (ref 7–15)
BUN SERPL-MCNC: 60.2 MG/DL (ref 8–23)
CALCIUM SERPL-MCNC: 9.8 MG/DL (ref 8.8–10.4)
CHLORIDE SERPL-SCNC: 101 MMOL/L (ref 98–107)
CREAT SERPL-MCNC: 1.37 MG/DL (ref 0.51–0.95)
EGFRCR SERPLBLD CKD-EPI 2021: 41 ML/MIN/1.73M2
GAMMA INTERFERON BACKGROUND BLD IA-ACNC: 0.08 IU/ML
GLUCOSE SERPL-MCNC: 108 MG/DL (ref 70–99)
HCO3 SERPL-SCNC: 29 MMOL/L (ref 22–29)
M TB IFN-G BLD-IMP: NEGATIVE
M TB IFN-G CD4+ BCKGRND COR BLD-ACNC: 4.43 IU/ML
MITOGEN IGNF BCKGRD COR BLD-ACNC: 0.02 IU/ML
MITOGEN IGNF BCKGRD COR BLD-ACNC: 0.04 IU/ML
POTASSIUM SERPL-SCNC: 4.8 MMOL/L (ref 3.4–5.3)
QUANTIFERON MITOGEN: 4.51 IU/ML
QUANTIFERON NIL TUBE: 0.08 IU/ML
QUANTIFERON TB1 TUBE: 0.1 IU/ML
QUANTIFERON TB2 TUBE: 0.12
SODIUM SERPL-SCNC: 142 MMOL/L (ref 135–145)

## 2025-05-02 PROCEDURE — 80048 BASIC METABOLIC PNL TOTAL CA: CPT | Mod: ORL

## 2025-05-02 PROCEDURE — 36415 COLL VENOUS BLD VENIPUNCTURE: CPT | Mod: ORL

## 2025-05-02 PROCEDURE — P9603 ONE-WAY ALLOW PRORATED MILES: HCPCS | Mod: ORL

## 2025-05-03 ENCOUNTER — TELEPHONE (OUTPATIENT)
Dept: GERIATRICS | Facility: CLINIC | Age: 75
End: 2025-05-03
Payer: MEDICARE

## 2025-05-03 DIAGNOSIS — R52 PAIN: Primary | ICD-10-CM

## 2025-05-03 RX ORDER — TRAMADOL HYDROCHLORIDE 50 MG/1
25 TABLET ORAL 2 TIMES DAILY PRN
Qty: 30 TABLET | Refills: 0 | Status: SHIPPED | OUTPATIENT
Start: 2025-05-03

## 2025-05-08 ENCOUNTER — TRANSITIONAL CARE UNIT VISIT (OUTPATIENT)
Dept: GERIATRICS | Facility: CLINIC | Age: 75
End: 2025-05-08
Payer: MEDICARE

## 2025-05-08 VITALS
HEART RATE: 64 BPM | WEIGHT: 194.5 LBS | DIASTOLIC BLOOD PRESSURE: 56 MMHG | SYSTOLIC BLOOD PRESSURE: 112 MMHG | HEIGHT: 55 IN | TEMPERATURE: 97.3 F | BODY MASS INDEX: 45.01 KG/M2

## 2025-05-08 DIAGNOSIS — J96.11 CHRONIC RESPIRATORY FAILURE WITH HYPOXIA (H): ICD-10-CM

## 2025-05-08 DIAGNOSIS — I10 ESSENTIAL HYPERTENSION: ICD-10-CM

## 2025-05-08 DIAGNOSIS — R52 PAIN: ICD-10-CM

## 2025-05-08 DIAGNOSIS — R53.81 PHYSICAL DECONDITIONING: Primary | ICD-10-CM

## 2025-05-08 DIAGNOSIS — I50.9 CONGESTIVE HEART FAILURE, UNSPECIFIED HF CHRONICITY, UNSPECIFIED HEART FAILURE TYPE (H): ICD-10-CM

## 2025-05-08 DIAGNOSIS — G47.33 OSA (OBSTRUCTIVE SLEEP APNEA): ICD-10-CM

## 2025-05-08 PROCEDURE — 99305 1ST NF CARE MODERATE MDM 35: CPT | Performed by: FAMILY MEDICINE

## 2025-05-08 NOTE — LETTER
5/8/2025      Desirae AMADOR [facility]  [address]   [place] [address]        No notes on file      Sincerely,        Pastora Esquivel MD    Electronically signed   BiPap, 3L during day   # Acute on chronic hypoxic/hypercapnic respiratory failure  # Metabolic encephalopathy due to hypercapnia  Unclear inciting cause, but patient typically with tenuous balance and wonder whether patient took additional pain meds at home either prior to (and leading to) or after the fall.   - Required BIPAP on admission, CO2 decreased from 70-62  She is feeling much better today, on regular O2 during day and BIPAP at night as per baseline.     # CKD stage 3 - Cr improved, near baseline    # Chronic back pain - Monitor for now, avoid psychotropic meds.    # HTN/HL - Continue home med regimen.     # Chronic diastolic CHF/pulmonary hypertension - Euvolemic to slightly volume up, continue home meds and monitor. Edema is at baseline. Is at higher risk for surgical procedure based on these comorbid conditions.     # Asthma - Not in exacerbation, continue PTA meds.     # Morbid obesity - The patient has morbid obesity, affecting the patient s current medical condition. The body mass index is unknown because there is no height or weight on file. This has required use of extra resources, including treatment, assistance from extra staff, and the usage of bariatric equipment (wheelchair, bed, BP cuff, scales, etc.).    Overall stabilized and discharged to TCU on 4/30/2025 for PT, OT, nursing cares, medical management and monitoring.     Today:  She is on O2 at home, chronic respiratory failure, she feels breathing at baseline. No cough or fever. Has back pain, has been asking to have her home Norco re instated, not doing well on prn low dose Tramadol as ordered in TCU. Needs follow up with PMD for pain management post TCU stay. Will have follow up with neurosurgery for loose cervical neck hardware noted on imaging. Hx of stroke, falls and overall gait imbalance and weakness. Working with therapy in TCU. Appetite is good. No abdominal pain, diarrhea, constipation or urinary sx. No new vision hearing  concerns. Lives at home with .       REVIEW OF SYSTEMS:  All others negative other than those noted in HPI.      PAST MEDICAL HISTORY:  Past Medical History:   Diagnosis Date     Allergic rhinitis      Arthritis of back      CHF (congestive heart failure) (H)      Chronic kidney disease     stage 3      De Quervain's disease (tenosynovitis)      Fibromyalgia, primary      GERD (gastroesophageal reflux disease)      Hematuria     chronic     High cholesterol      History of blood clots 09/01/1970    DVT, from birth control, h/o left calf     Hyperlipidemia      Hypertension      Low back pain      Osteoporosis      Pickwickian syndrome (H)      Plantar fasciitis      Proteinuria      Proteinuria     chronic     Sleep apnea     CPAP     Uncomplicated asthma        PAST SURGICAL HISTORY:  Past Surgical History:   Procedure Laterality Date     CERVICAL FUSION N/A 4/27/2017    Procedure: ANTERIOR CERVICAL DECOMPRESSION FUSION C5-7 BILATERAL;  Surgeon: Basim Barone MD;  Location: [facility];  Service:      CHOLECYSTECTOMY      open     COLONOSCOPY N/A 12/20/2019    Procedure: COLONOSCOPY WITH BIOPSIES;  Surgeon: Lucio Farr MD;  Location: [facility];  Service: Gastroenterology     EYE SURGERY       HAND SURGERY Right      HYSTEROSCOPY DIAGNOSTIC       JOINT REPLACEMENT      bilateral TKA     KNEE SURGERY       RELEASE CARPAL TUNNEL Bilateral        FAMILY HISTORY:  Family History   Problem Relation Age of Onset     Rheumatoid Arthritis Mother      Heart Disease Sister      Chronic Obstructive Pulmonary Disease Father        SOCIAL HISTORY:  Social History     Socioeconomic History     Marital status:      Spouse name: Not on file     Number of children: Not on file     Years of education: Not on file     Highest education level: Not on file   Occupational History     Not on file   Tobacco Use     Smoking status: Never     Smokeless tobacco: Never   Vaping Use     Vaping  status: Never Used   Substance and Sexual Activity     Alcohol use: No     Drug use: No     Sexual activity: Not on file   Other Topics Concern     Not on file   Social History Narrative     Not on file     Social Drivers of Health     Financial Resource Strain: Low Risk  (3/29/2025)    Financial Resource Strain      Within the past 12 months, have you or your family members you live with been unable to get utilities (heat, electricity) when it was really needed?: No   Food Insecurity: No Food Insecurity (4/25/2025)    Received from [facility]    Hunger Vital Sign      Worried About Running Out of Food in the Last Year: Never true      Ran Out of Food in the Last Year: Never true   Transportation Needs: No Transportation Needs (4/25/2025)    Received from [facility]    PRAPARE - Transportation      Lack of Transportation (Medical): No      Lack of Transportation (Non-Medical): No   Physical Activity: Not on file   Stress: Not on file   Social Connections: Unknown (3/9/2023)    Received from [facility]    Social Connections      Frequency of Communication with Friends and Family: Not on file   Interpersonal Safety: Not At Risk (4/25/2025)    Received from [facility]    Humiliation, Afraid, Rape, and Kick questionnaire      Fear of Current or Ex-Partner: No      Emotionally Abused: No      Physically Abused: No      Sexually Abused: No   Housing Stability: Low Risk  (4/25/2025)    Received from [facility]    Housing Stability Vital Sign      Unable to Pay for Housing in the Last Year: No      Number of Times Moved in the Last Year: 0      Homeless in the Last Year: No       MEDICATIONS:  Current Outpatient Medications   Medication Sig Dispense Refill     albuterol (PROAIR HFA;PROVENTIL HFA;VENTOLIN HFA) 90 mcg/actuation inhaler Inhale 1 puff into the lungs every 4 hours as needed for shortness of breath / dyspnea       alendronate (FOSAMAX) 70 MG tablet [ALENDRONATE  (FOSAMAX) 70 MG TABLET] Take 70 mg by mouth every 7 days. Takes on Mondays 11     aspirin 81 MG EC tablet Take 81 mg by mouth daily.       azelastine (OPTIVAR) 0.05 % ophthalmic solution Place 1 drop into both eyes 2 times daily. 6 mL 0     azelastine (OPTIVAR) 0.05 % ophthalmic solution Place 1 drop into both eyes 2 times daily.       calcium citrate (CITRACAL) 950 (200 Ca) MG tablet Take 1 tablet by mouth daily       cetirizine (ZYRTEC) 10 MG tablet [CETIRIZINE (ZYRTEC) 10 MG TABLET] Take 10 mg by mouth daily.        cholecalciferol (VITAMIN D3) 25 mcg (1000 units) capsule Take 1 capsule by mouth daily.       CRESTOR 10 MG tablet Take 10 mg by mouth daily.       diclofenac (VOLTAREN) 1 % topical gel Apply 2 g topically 4 times daily. Apply to painful areas. Use supplied dosing card to measure dose. 50 g 0     DULoxetine (CYMBALTA) 30 MG capsule Take 60 mg by mouth every morning.       DULoxetine (CYMBALTA) 30 MG capsule Take 30 mg by mouth every evening.       esomeprazole (NEXIUM) 20 MG DR capsule Take 1 capsule (20 mg) by mouth every morning (before breakfast). Take 30-60 minutes before eating. 30 capsule 0     fluticasone-salmeterol (AIRDUO RESPICLICK) 113-14 MCG/ACT inhaler Inhale 1 puff into the lungs as needed (shortness of breath).       furosemide (LASIX) 20 MG tablet Take 40 mg by mouth every morning.       furosemide (LASIX) 20 MG tablet Take 20 mg by mouth every evening.       HYDROcodone-acetaminophen (NORCO) 5-325 MG tablet Take 1 tablet by mouth every 6 hours as needed for pain. 30 tablet 0     l-lysine HCl 500 MG TABS tablet Take 500 mg by mouth daily.       Magnesium Oxide -Mg Supplement 500 MG CAPS Take 500 mg by mouth daily.       metoprolol succinate ER (TOPROL XL) 50 MG 24 hr tablet Take 1 tablet (50 mg) by mouth daily. 30 tablet 1     modafinil (PROVIGIL) 100 MG tablet Take 2.5 tablets (250 mg) by mouth every morning. 45 tablet 0     montelukast (SINGULAIR) 10 mg tablet Take 10 mg by mouth  "every evening       nystatin (MYCOSTATIN) 348673 UNIT/GM external powder Apply topically 2 times daily.       OXYGEN-AIR DELIVERY SYSTEMS MISC [OXYGEN-AIR DELIVERY SYSTEMS [facility]] Use 3 L As Directed. 3 lpm with activities and as needed at night       senna-docusate (SENOKOT-S/PERICOLACE) 8.6-50 MG tablet Take 1 tablet by mouth 2 times daily. Can also take 1 tab po BID PRN       solifenacin (VESICARE) 10 MG tablet Take 10 mg by mouth at bedtime.       Turmeric (CURCUPLEX-95) 500 MG CAPS Take 1 capsule by mouth daily          ALLERGIES:  Allergies   Allergen Reactions     Latex Rash     Severe rash     Pregabalin Shortness Of Breath     Other Reaction(s): swelling and shortness of breath     Valsartan Unknown     Hyperkalemia     Ciprofloxacin Visual Disturbance, Hallucination and Confusion     Likely contributed visual hallucination and confusion     Colchicine Diarrhea     Dicloxacillin      Other Reaction(s): confusion, says she has tolerated augmentin without difficulty.      Gabapentin      Other Reaction(s): Sedation     Latex Unknown     Added based on information entered during case entry, please review and add reactions, type, and severity as needed     Other Drug Allergy (See Comments) Muscle Pain (Myalgia)     Tried lovastatin and simvastatin     Other Environmental Allergy Unknown     Coverlet bandaid , [employer] product; rash     Statins-Hmg-Coa Reductase Inhibitors [Statins] Muscle Pain (Myalgia)     Tried lovastatin and simvastatin     Sulfa Antibiotics Swelling     Facial swelling       Adhesive Tape Rash       PHYSICAL EXAM:  General: Patient is alert female, sitting in wheelchair, no distress, on oxygen per NC.   Vitals: /56   Pulse 64   Temp 97.3  F (36.3  C)   Ht 1.397 m (4' 7\")   Wt 88.2 kg (194 lb 8 oz)   BMI 45.21 kg/m    HEENT: Head is NCAT. Eyes show no injection or icterus. Nares negative. Oropharynx well hydrated.  Neck: Supple. No tenderness or adenopathy. No JVD.  Lungs: Clear " bilaterally. No wheezes.  Cardiovascular: Regular rate and rhythm, normal S1, S2.  Back: No spinal or CVA tenderness.  Abdomen: Soft, no tenderness on exam. Bowel sounds present. No guarding rebound or rigidity.  : Deferred.  Extremities: Mild LE edema is noted.  Musculoskeletal: Degen changes.   Skin: No rashes noted.   Psych: Mood appears good.      LABS/DIAGNOSTIC DATA:  Outside hospital.       ASSESSMENT/PLAN:  Weakness and deconditioning. Hx of falls. Work up in the hospital, multifactorial. Known back pain, stenosis, loose cervical neck hardware from prior surgery, chronic or subacute sacral fracture, hx of stroke.  HTN. On metoprolol, diuretics. Monitor Bps in TCU, adjust meds if needed.  CHF. On Lasix. Monitor weights, edema, clinical status.   Chronic respiratory failure. Home O2. Resp status is stable.   Pain management. On chronic opioids at home, did not come from the hospital to TCU with any narcotics. Low dose Tramadol prn trialed, ineffective. Okay reinstate home Norco 5/325 one pill q 6 hours prn. Follow up with PMD at post TCU visit for further pain management strategies.   NELLIE. Uses BiPAP.  Obesity.   Hx of stroke. Cerebellar. Contributor to falls. Here in TCU for therapies, strengthening.  CKD. Labs in Epic, reviewed.   Code status is full code.       Electronically signed by: Pastora Esquivel MD       Sincerely,        Pastora Esquivel MD    Electronically signed

## 2025-05-12 ENCOUNTER — TRANSITIONAL CARE UNIT VISIT (OUTPATIENT)
Dept: GERIATRICS | Facility: CLINIC | Age: 75
End: 2025-05-12
Payer: MEDICARE

## 2025-05-12 VITALS
HEART RATE: 77 BPM | OXYGEN SATURATION: 91 % | WEIGHT: 200 LBS | SYSTOLIC BLOOD PRESSURE: 132 MMHG | HEIGHT: 55 IN | DIASTOLIC BLOOD PRESSURE: 68 MMHG | TEMPERATURE: 97.2 F | BODY MASS INDEX: 46.29 KG/M2 | RESPIRATION RATE: 17 BRPM

## 2025-05-12 DIAGNOSIS — I50.20 SYSTOLIC CONGESTIVE HEART FAILURE, UNSPECIFIED HF CHRONICITY (H): ICD-10-CM

## 2025-05-12 DIAGNOSIS — I48.0 PAROXYSMAL ATRIAL FIBRILLATION (H): ICD-10-CM

## 2025-05-12 DIAGNOSIS — I42.9 CARDIOMYOPATHY, UNSPECIFIED TYPE (H): Primary | ICD-10-CM

## 2025-05-12 DIAGNOSIS — R26.9 ABNORMAL GAIT: ICD-10-CM

## 2025-05-12 PROBLEM — Z87.19 HISTORY OF ABDOMINAL HERNIA: Status: ACTIVE | Noted: 2025-05-12

## 2025-05-12 PROBLEM — M17.0 PRIMARY OSTEOARTHRITIS OF BOTH KNEES: Status: ACTIVE | Noted: 2025-05-12

## 2025-05-12 PROBLEM — R73.03 PREDIABETES: Status: ACTIVE | Noted: 2025-05-12

## 2025-05-12 PROBLEM — T81.49XA ABSCESS AFTER PROCEDURE: Status: ACTIVE | Noted: 2023-02-22

## 2025-05-12 PROBLEM — Z86.73 HISTORY OF STROKE WITHOUT RESIDUAL DEFICITS: Status: ACTIVE | Noted: 2025-04-28

## 2025-05-12 PROBLEM — M79.674 PAIN OF TOE OF RIGHT FOOT: Status: ACTIVE | Noted: 2023-02-19

## 2025-05-12 PROBLEM — S32.10XA CLOSED FRACTURE OF SACRUM (H): Status: ACTIVE | Noted: 2025-04-24

## 2025-05-12 PROBLEM — I48.91 ATRIAL FIBRILLATION (H): Status: ACTIVE | Noted: 2025-05-12

## 2025-05-12 PROBLEM — N39.0 E. COLI UTI: Status: ACTIVE | Noted: 2023-02-16

## 2025-05-12 PROBLEM — M79.606 LEG PAIN: Status: ACTIVE | Noted: 2025-05-12

## 2025-05-12 PROBLEM — M54.2 CERVICAL PAIN: Status: ACTIVE | Noted: 2025-05-12

## 2025-05-12 PROBLEM — Z99.89 DEPENDENCE ON OTHER ENABLING MACHINES AND DEVICES: Status: ACTIVE | Noted: 2025-04-28

## 2025-05-12 PROBLEM — M54.2 CERVICALGIA: Status: ACTIVE | Noted: 2025-04-28

## 2025-05-12 PROBLEM — T84.216A HARDWARE FAILURE OF ANTERIOR COLUMN OF SPINE: Status: ACTIVE | Noted: 2025-04-28

## 2025-05-12 PROBLEM — R19.7 DIARRHEA: Status: ACTIVE | Noted: 2023-02-24

## 2025-05-12 PROBLEM — Z99.81 DEPENDENCE ON SUPPLEMENTAL OXYGEN: Status: ACTIVE | Noted: 2025-04-28

## 2025-05-12 PROBLEM — R79.81 ABNORMAL BLOOD-GAS LEVEL: Status: ACTIVE | Noted: 2025-04-28

## 2025-05-12 PROBLEM — Z91.81 HISTORY OF FALLING: Status: ACTIVE | Noted: 2025-04-28

## 2025-05-12 PROBLEM — H93.13 TINNITUS OF BOTH EARS: Status: ACTIVE | Noted: 2025-05-12

## 2025-05-12 PROBLEM — Z98.1 ARTHRODESIS STATUS: Status: ACTIVE | Noted: 2025-04-28

## 2025-05-12 PROBLEM — K43.6 INCARCERATED VENTRAL HERNIA: Status: ACTIVE | Noted: 2023-02-08

## 2025-05-12 PROBLEM — Z88.9 MULTIPLE ALLERGIES: Status: ACTIVE | Noted: 2025-05-12

## 2025-05-12 PROBLEM — F11.20 OPIOID DEPENDENCE (H): Status: ACTIVE | Noted: 2023-02-16

## 2025-05-12 PROBLEM — I87.2 PERIPHERAL VENOUS INSUFFICIENCY: Status: ACTIVE | Noted: 2025-05-12

## 2025-05-12 PROBLEM — R76.8 ANA POSITIVE: Status: ACTIVE | Noted: 2025-05-12

## 2025-05-12 PROBLEM — B00.9 HERPES SIMPLEX: Status: ACTIVE | Noted: 2025-05-12

## 2025-05-12 PROBLEM — I63.81 LACUNAR STROKE (H): Status: ACTIVE | Noted: 2025-05-12

## 2025-05-12 PROBLEM — E87.1 HYPONATREMIA: Status: ACTIVE | Noted: 2023-02-19

## 2025-05-12 PROBLEM — I87.321 STASIS DERMATITIS OF RIGHT LOWER EXTREMITY DUE TO PERIPHERAL VENOUS HYPERTENSION: Status: ACTIVE | Noted: 2025-05-12

## 2025-05-12 PROBLEM — B00.1 HERPES LABIALIS: Status: ACTIVE | Noted: 2023-02-21

## 2025-05-12 PROBLEM — N39.46 MIXED INCONTINENCE: Status: ACTIVE | Noted: 2025-05-12

## 2025-05-12 PROBLEM — H26.9 CATARACT OF BOTH EYES, UNSPECIFIED CATARACT TYPE: Status: ACTIVE | Noted: 2025-05-12

## 2025-05-12 PROBLEM — G56.21 ULNAR NEUROPATHY OF RIGHT UPPER EXTREMITY: Status: ACTIVE | Noted: 2025-05-12

## 2025-05-12 PROBLEM — T84.216A: Status: ACTIVE | Noted: 2025-04-28

## 2025-05-12 PROBLEM — B96.20 E. COLI UTI: Status: ACTIVE | Noted: 2023-02-16

## 2025-05-12 PROBLEM — E55.9 VITAMIN D DEFICIENCY: Status: ACTIVE | Noted: 2025-05-12

## 2025-05-12 PROBLEM — Z87.09 PERSONAL HISTORY OF OTHER DISEASES OF THE RESPIRATORY SYSTEM: Status: ACTIVE | Noted: 2025-04-28

## 2025-05-12 PROBLEM — Z99.89 BIPAP (BIPHASIC POSITIVE AIRWAY PRESSURE) DEPENDENCE: Status: ACTIVE | Noted: 2025-05-12

## 2025-05-12 PROBLEM — M25.50 POLYARTHRALGIA: Status: ACTIVE | Noted: 2025-05-12

## 2025-05-12 PROCEDURE — 99309 SBSQ NF CARE MODERATE MDM 30: CPT | Performed by: NURSE PRACTITIONER

## 2025-05-12 NOTE — PROGRESS NOTES
[facility] GERIATRICS      Code Status:  FULL CODE  Visit Type: RECHECK     Facility:   [facility]) [[id number]]         History of Present Illness: Desirae Rey is a 74 year old female with past medical history for CHF with chronic respiratory failure on home O2, NELLIE on BiPAP, falls, cerebellar stroke, hx of cervical fusion, HTN and obesity.  She was recently hospitalized 4/25/2025- 4/30/2025 after a fall with a sacral fracture and COPD exacerbation.  She has had 8ED and 4 hospital visits in the past 6 months.  She was seen by NSG and they would like her cervical issues addressed first before addressing lower back issues.      Today, nursing reports weight gain from 194lbs admission no 202.4lbs today.  Patient reports she is typically 198lbs at home.  She denies feeling more SOB and edema of LE is at baseline.  She is asking why she has poor balance and is falling so much.  I explained that she has multiple comorbids that can make her unbalanced.  She reports that she has a past history of fracturing bilateral great toes in the past.  She has planus pes and admits to not wearing shoes with ambulation.       Current Outpatient Medications   Medication Sig Dispense Refill    albuterol (PROAIR HFA;PROVENTIL HFA;VENTOLIN HFA) 90 mcg/actuation inhaler Inhale 1 puff into the lungs every 4 hours as needed for shortness of breath / dyspnea      alendronate (FOSAMAX) 70 MG tablet [ALENDRONATE (FOSAMAX) 70 MG TABLET] Take 70 mg by mouth every 7 days. Takes on Mondays 11    aspirin 81 MG EC tablet Take 81 mg by mouth daily.      azelastine (OPTIVAR) 0.05 % ophthalmic solution Place 1 drop into both eyes 2 times daily. 6 mL 0    azelastine (OPTIVAR) 0.05 % ophthalmic solution Place 1 drop into both eyes 2 times daily.      calcium citrate (CITRACAL) 950 (200 Ca) MG tablet Take 1 tablet by mouth daily      cetirizine (ZYRTEC) 10 MG  tablet [CETIRIZINE (ZYRTEC) 10 MG TABLET] Take 10 mg by mouth daily.       cholecalciferol (VITAMIN D3) 25 mcg (1000 units) capsule Take 1 capsule by mouth daily.      CRESTOR 10 MG tablet Take 10 mg by mouth daily.      diclofenac (VOLTAREN) 1 % topical gel Apply 2 g topically 4 times daily. Apply to painful areas. Use supplied dosing card to measure dose. 50 g 0    DULoxetine (CYMBALTA) 30 MG capsule Take 60 mg by mouth every morning.      DULoxetine (CYMBALTA) 30 MG capsule Take 30 mg by mouth every evening.      esomeprazole (NEXIUM) 20 MG DR capsule Take 1 capsule (20 mg) by mouth every morning (before breakfast). Take 30-60 minutes before eating. 30 capsule 0    fluticasone-salmeterol (AIRDUO RESPICLICK) 113-14 MCG/ACT inhaler Inhale 1 puff into the lungs as needed (shortness of breath).      furosemide (LASIX) 20 MG tablet Take 40 mg by mouth every morning.      furosemide (LASIX) 20 MG tablet Take 20 mg by mouth every evening.      l-lysine HCl 500 MG TABS tablet Take 500 mg by mouth daily.      Magnesium Oxide -Mg Supplement 500 MG CAPS Take 500 mg by mouth daily.      metoprolol succinate ER (TOPROL XL) 50 MG 24 hr tablet Take 1 tablet (50 mg) by mouth daily. 30 tablet 1    modafinil (PROVIGIL) 100 MG tablet Take 250 mg by mouth every morning.      montelukast (SINGULAIR) 10 mg tablet Take 10 mg by mouth every evening      nystatin (MYCOSTATIN) 780120 UNIT/GM external powder Apply topically 2 times daily.      OXYGEN-AIR DELIVERY SYSTEMS MIS [OXYGEN-AIR DELIVERY SYSTEMS [facility]] Use 3 L As Directed. 3 lpm with activities and as needed at night      senna-docusate (SENOKOT-S/PERICOLACE) 8.6-50 MG tablet Take 1 tablet by mouth 2 times daily. Can also take 1 tab po BID PRN      solifenacin (VESICARE) 10 MG tablet Take 10 mg by mouth at bedtime.      traMADol (ULTRAM) 50 MG tablet Take 0.5 tablets (25 mg) by mouth 2 times daily as needed for severe pain. 30 tablet 0    Turmeric (CURCUPLEX-95) 500 MG CAPS Take 1  "capsule by mouth daily         Review of Systems   Patient denies fever, chills, headache, lightheadedness, dizziness, rhinorrhea, cough, congestion, shortness of breath, chest pain, palpitations, abdominal pain, n/v, diarrhea, constipation, change in appetite, change in sleep pattern, dysuria, frequency, burning or pain with urination.  Other than stated in HPI all other review of systems is negative.       Physical Exam  Vital signs:/68   Pulse 77   Temp 97.2  F (36.2  C)   Resp 17   Ht 1.397 m (4' 7\")   Wt 90.7 kg (200 lb)   SpO2 (!) 91%   BMI 46.48 kg/m     GENERAL APPEARANCE: obese elderly female,  in no acute distress.  HEENT: normocephalic, atraumatic  sclerae anicteric, conjunctivae clear and moist, EOM intact  LUNGS: Lung sounds CTA, no adventitious sounds, respiratory effort normal.  CARD: RRR, S1, S2, without murmurs, gallops, rubs  ABD: Soft, nondistended and nontender with normal bowel sounds.   MSK: Muscle strength and tone were equal bilaterally. Moves all extremities easily and intentionally.   EXTREMITIES: +1 pitting edema BLE  NEURO: Alert and oriented x 3. Face is symmetric.  SKIN: Inspection of the skin reveals no rashes, ulcerations or petechiae.  PSYCH: euthymic        Labs:    Last Comprehensive Metabolic Panel:  Lab Results   Component Value Date     05/02/2025    POTASSIUM 4.8 05/02/2025    CHLORIDE 101 05/02/2025    CO2 29 05/02/2025    ANIONGAP 12 05/02/2025     (H) 05/02/2025    BUN 60.2 (H) 05/02/2025    CR 1.37 (H) 05/02/2025    GFRESTIMATED 41 (L) 05/02/2025    LYNN 9.8 05/02/2025       Lab Results   Component Value Date    WBC 8.0 04/10/2025     Lab Results   Component Value Date    RBC 3.76 04/10/2025     Lab Results   Component Value Date    HGB 11.7 04/10/2025     Lab Results   Component Value Date    HCT 36.2 04/10/2025     Lab Results   Component Value Date    MCV 96 04/10/2025     Lab Results   Component Value Date    MCH 31.1 04/10/2025     Lab Results "   Component Value Date    MCHC 32.3 04/10/2025     Lab Results   Component Value Date    RDW 12.8 04/10/2025     Lab Results   Component Value Date     04/10/2025           Assessment/Plan:  Cardiomyopathy, unspecified type (H)  Systolic congestive heart failure, unspecified HF chronicity (H)  Weight up 4lbs from baseline  Increase lasix 40mg BID x 3 days then change back to 40mg in the AM and 20mg in the Afternoon.   Continue with metoprolol.   Check BMP and BNP this week.     Paroxysmal atrial fibrillation (H)  Rate controlled, continue with metoprolol.  Not on anticoagulant due to falls.     Abnormal gait  Advised patient to wear shoes with ambulation.  PT/OT        Electronically signed by:Avelina Barrios NP             Heterosexual

## 2025-05-12 NOTE — LETTER
5/12/2025      Desirae Rey  [address]   [place] [address]                                                                                           [facility] GERIATRICS      Code Status:  FULL CODE  Visit Type: RECHECK     Facility:   [facility] [[id number]]         History of Present Illness: Desirae Rey is a 74 year old female with past medical history for CHF with chronic respiratory failure on home O2, NELLIE on BiPAP, falls, cerebellar stroke, hx of cervical fusion, HTN and obesity.  She was recently hospitalized 4/25/2025- 4/30/2025 after a fall with a sacral fracture and COPD exacerbation.  She has had 8ED and 4 hospital visits in the past 6 months.  She was seen by NSG and they would like her cervical issues addressed first before addressing lower back issues.      Today, nursing reports weight gain from 194lbs admission no 202.4lbs today.  Patient reports she is typically 198lbs at home.  She denies feeling more SOB and edema of LE is at baseline.  She is asking why she has poor balance and is falling so much.  I explained that she has multiple comorbids that can make her unbalanced.  She reports that she has a past history of fracturing bilateral great toes in the past.  She has planus pes and admits to not wearing shoes with ambulation.       Current Outpatient Medications   Medication Sig Dispense Refill     albuterol (PROAIR HFA;PROVENTIL HFA;VENTOLIN HFA) 90 mcg/actuation inhaler Inhale 1 puff into the lungs every 4 hours as needed for shortness of breath / dyspnea       alendronate (FOSAMAX) 70 MG tablet [ALENDRONATE (FOSAMAX) 70 MG TABLET] Take 70 mg by mouth every 7 days. Takes on Mondays 11     aspirin 81 MG EC tablet Take 81 mg by mouth daily.       azelastine (OPTIVAR) 0.05 % ophthalmic solution Place 1 drop into both eyes 2 times daily. 6 mL 0     azelastine (OPTIVAR) 0.05 % ophthalmic solution Place 1 drop into both eyes 2 times daily.       calcium citrate  (CITRACAL) 950 (200 Ca) MG tablet Take 1 tablet by mouth daily       cetirizine (ZYRTEC) 10 MG tablet [CETIRIZINE (ZYRTEC) 10 MG TABLET] Take 10 mg by mouth daily.        cholecalciferol (VITAMIN D3) 25 mcg (1000 units) capsule Take 1 capsule by mouth daily.       CRESTOR 10 MG tablet Take 10 mg by mouth daily.       diclofenac (VOLTAREN) 1 % topical gel Apply 2 g topically 4 times daily. Apply to painful areas. Use supplied dosing card to measure dose. 50 g 0     DULoxetine (CYMBALTA) 30 MG capsule Take 60 mg by mouth every morning.       DULoxetine (CYMBALTA) 30 MG capsule Take 30 mg by mouth every evening.       esomeprazole (NEXIUM) 20 MG DR capsule Take 1 capsule (20 mg) by mouth every morning (before breakfast). Take 30-60 minutes before eating. 30 capsule 0     fluticasone-salmeterol (AIRDUO RESPICLICK) 113-14 MCG/ACT inhaler Inhale 1 puff into the lungs as needed (shortness of breath).       furosemide (LASIX) 20 MG tablet Take 40 mg by mouth every morning.       furosemide (LASIX) 20 MG tablet Take 20 mg by mouth every evening.       l-lysine HCl 500 MG TABS tablet Take 500 mg by mouth daily.       Magnesium Oxide -Mg Supplement 500 MG CAPS Take 500 mg by mouth daily.       metoprolol succinate ER (TOPROL XL) 50 MG 24 hr tablet Take 1 tablet (50 mg) by mouth daily. 30 tablet 1     modafinil (PROVIGIL) 100 MG tablet Take 250 mg by mouth every morning.       montelukast (SINGULAIR) 10 mg tablet Take 10 mg by mouth every evening       nystatin (MYCOSTATIN) 302958 UNIT/GM external powder Apply topically 2 times daily.       OXYGEN-AIR DELIVERY SYSTEMS [facility] [place] [place] [OXYGEN-AIR DELIVERY SYSTEMS [facility] [place] [place]] Use 3 L As Directed. 3 lpm with activities and as needed at night       senna-docusate (SENOKOT-S/PERICOLACE) 8.6-50 MG tablet Take 1 tablet by mouth 2 times daily. Can also take 1 tab po BID PRN       solifenacin (VESICARE) 10 MG tablet Take 10 mg by mouth at bedtime.       traMADol (ULTRAM) 50 MG tablet Take 0.5 tablets  "(25 mg) by mouth 2 times daily as needed for severe pain. 30 tablet 0     Turmeric (CURCUPLEX-95) 500 MG CAPS Take 1 capsule by mouth daily         Review of Systems   Patient denies fever, chills, headache, lightheadedness, dizziness, rhinorrhea, cough, congestion, shortness of breath, chest pain, palpitations, abdominal pain, n/v, diarrhea, constipation, change in appetite, change in sleep pattern, dysuria, frequency, burning or pain with urination.  Other than stated in HPI all other review of systems is negative.       Physical Exam  Vital signs:/68   Pulse 77   Temp 97.2  F (36.2  C)   Resp 17   Ht 1.397 m (4' 7\")   Wt 90.7 kg (200 lb)   SpO2 (!) 91%   BMI 46.48 kg/m     GENERAL APPEARANCE: obese elderly female,  in no acute distress.  HEENT: normocephalic, atraumatic  sclerae anicteric, conjunctivae clear and moist, EOM intact  LUNGS: Lung sounds CTA, no adventitious sounds, respiratory effort normal.  CARD: RRR, S1, S2, without murmurs, gallops, rubs  ABD: Soft, nondistended and nontender with normal bowel sounds.   MSK: Muscle strength and tone were equal bilaterally. Moves all extremities easily and intentionally.   EXTREMITIES: +1 pitting edema BLE  NEURO: Alert and oriented x 3. Face is symmetric.  SKIN: Inspection of the skin reveals no rashes, ulcerations or petechiae.  PSYCH: euthymic        Labs:    Last Comprehensive Metabolic Panel:  Lab Results   Component Value Date     05/02/2025    POTASSIUM 4.8 05/02/2025    CHLORIDE 101 05/02/2025    CO2 29 05/02/2025    ANIONGAP 12 05/02/2025     (H) 05/02/2025    BUN 60.2 (H) 05/02/2025    CR 1.37 (H) 05/02/2025    GFRESTIMATED 41 (L) 05/02/2025    LYNN 9.8 05/02/2025       Lab Results   Component Value Date    WBC 8.0 04/10/2025     Lab Results   Component Value Date    RBC 3.76 04/10/2025     Lab Results   Component Value Date    HGB 11.7 04/10/2025     Lab Results   Component Value Date    HCT 36.2 04/10/2025     Lab Results "   Component Value Date    MCV 96 04/10/2025     Lab Results   Component Value Date    MCH 31.1 04/10/2025     Lab Results   Component Value Date    MCHC 32.3 04/10/2025     Lab Results   Component Value Date    RDW 12.8 04/10/2025     Lab Results   Component Value Date     04/10/2025           Assessment/Plan:  Cardiomyopathy, unspecified type (H)  Systolic congestive heart failure, unspecified HF chronicity (H)  Weight up 4lbs from baseline  Increase lasix 40mg BID x 3 days then change back to 40mg in the AM and 20mg in the Afternoon.   Continue with metoprolol.   Check BMP and BNP this week.     Paroxysmal atrial fibrillation (H)  Rate controlled, continue with metoprolol.  Not on anticoagulant due to falls.     Abnormal gait  Advised patient to wear shoes with ambulation.  PT/OT        Electronically signed by:Avelina Barrios NP              Sincerely,        Avelina Barrios NP    Electronically signed

## 2025-05-15 ENCOUNTER — LAB REQUISITION (OUTPATIENT)
Dept: LAB | Facility: CLINIC | Age: 75
End: 2025-05-15
Payer: MEDICARE

## 2025-05-15 DIAGNOSIS — Z13.228 ENCOUNTER FOR SCREENING FOR OTHER METABOLIC DISORDERS: ICD-10-CM

## 2025-05-15 DIAGNOSIS — R79.89 OTHER SPECIFIED ABNORMAL FINDINGS OF BLOOD CHEMISTRY: ICD-10-CM

## 2025-05-16 ENCOUNTER — LAB REQUISITION (OUTPATIENT)
Dept: LAB | Facility: CLINIC | Age: 75
End: 2025-05-16
Payer: MEDICARE

## 2025-05-16 DIAGNOSIS — I50.22 CHRONIC SYSTOLIC (CONGESTIVE) HEART FAILURE (H): ICD-10-CM

## 2025-05-19 ENCOUNTER — RESULTS FOLLOW-UP (OUTPATIENT)
Dept: GERIATRICS | Facility: CLINIC | Age: 75
End: 2025-05-19

## 2025-05-19 ENCOUNTER — TRANSITIONAL CARE UNIT VISIT (OUTPATIENT)
Dept: GERIATRICS | Facility: CLINIC | Age: 75
End: 2025-05-19
Payer: MEDICARE

## 2025-05-19 ENCOUNTER — LAB REQUISITION (OUTPATIENT)
Dept: LAB | Facility: CLINIC | Age: 75
End: 2025-05-19
Payer: MEDICARE

## 2025-05-19 VITALS
SYSTOLIC BLOOD PRESSURE: 133 MMHG | RESPIRATION RATE: 16 BRPM | HEART RATE: 79 BPM | WEIGHT: 203 LBS | BODY MASS INDEX: 46.98 KG/M2 | DIASTOLIC BLOOD PRESSURE: 57 MMHG | OXYGEN SATURATION: 96 % | TEMPERATURE: 97.8 F | HEIGHT: 55 IN

## 2025-05-19 DIAGNOSIS — I48.0 PAROXYSMAL ATRIAL FIBRILLATION (H): ICD-10-CM

## 2025-05-19 DIAGNOSIS — K64.8 HEMORRHOIDS, INTERNAL, WITH BLEEDING: ICD-10-CM

## 2025-05-19 DIAGNOSIS — I50.22 CHRONIC SYSTOLIC (CONGESTIVE) HEART FAILURE (H): ICD-10-CM

## 2025-05-19 DIAGNOSIS — R25.2 MUSCLE CRAMPS AT NIGHT: ICD-10-CM

## 2025-05-19 DIAGNOSIS — I42.9 CARDIOMYOPATHY, UNSPECIFIED TYPE (H): ICD-10-CM

## 2025-05-19 DIAGNOSIS — I50.20 SYSTOLIC CONGESTIVE HEART FAILURE, UNSPECIFIED HF CHRONICITY (H): Primary | ICD-10-CM

## 2025-05-19 LAB
ANION GAP SERPL CALCULATED.3IONS-SCNC: 16 MMOL/L (ref 7–15)
BUN SERPL-MCNC: 42.3 MG/DL (ref 8–23)
CALCIUM SERPL-MCNC: 9.8 MG/DL (ref 8.8–10.4)
CHLORIDE SERPL-SCNC: 98 MMOL/L (ref 98–107)
CREAT SERPL-MCNC: 1.43 MG/DL (ref 0.51–0.95)
EGFRCR SERPLBLD CKD-EPI 2021: 39 ML/MIN/1.73M2
GLUCOSE SERPL-MCNC: 83 MG/DL (ref 70–99)
HCO3 SERPL-SCNC: 24 MMOL/L (ref 22–29)
NT-PROBNP SERPL-MCNC: 1016 PG/ML (ref 0–353)
POTASSIUM SERPL-SCNC: 6 MMOL/L (ref 3.4–5.3)
SODIUM SERPL-SCNC: 138 MMOL/L (ref 135–145)

## 2025-05-19 PROCEDURE — 99309 SBSQ NF CARE MODERATE MDM 30: CPT | Performed by: NURSE PRACTITIONER

## 2025-05-19 PROCEDURE — 80048 BASIC METABOLIC PNL TOTAL CA: CPT | Mod: ORL | Performed by: FAMILY MEDICINE

## 2025-05-19 PROCEDURE — P9604 ONE-WAY ALLOW PRORATED TRIP: HCPCS | Mod: ORL | Performed by: FAMILY MEDICINE

## 2025-05-19 PROCEDURE — 36415 COLL VENOUS BLD VENIPUNCTURE: CPT | Mod: ORL | Performed by: FAMILY MEDICINE

## 2025-05-19 PROCEDURE — 83880 ASSAY OF NATRIURETIC PEPTIDE: CPT | Mod: ORL | Performed by: FAMILY MEDICINE

## 2025-05-19 RX ORDER — BUMETANIDE 2 MG/1
2 TABLET ORAL
COMMUNITY

## 2025-05-19 NOTE — LETTER
5/19/2025      Desirae Rey  [address]   [place] [address]                                                                                           [facility] GERIATRICS      Code Status:  FULL CODE  Visit Type: RECHECK     [place]   [facility] [place] [place] [facility] [[id number]]         History of Present Illness: Desirae Rey is a 74 year old female with past medical history for CHF with chronic respiratory failure on home O2, NELLIE on BiPAP, falls, cerebellar stroke, hx of cervical fusion, HTN and obesity.  She was recently hospitalized 4/25/2025- 4/30/2025 after a fall with a sacral fracture and COPD exacerbation.  She has had 8ED and 4 hospital visits in the past 6 months.  She was seen by NSG and they would like her cervical issues addressed first before addressing lower back issues.      Today,weight is up 6lbs from last week.  She endorses increased SOB and increase edema of BLE.  She is asking when she is ready to go home.  Hasn't had a discharge Care Conference yet.     Complains of muscle cramps in her legs and would like her Magnesium increased    Complains of hemorrhoids.  Appears to be more internal.       Current Outpatient Medications   Medication Sig Dispense Refill     bumetanide (BUMEX) 2 MG tablet Take 2 mg by mouth 2 times daily.       albuterol (PROAIR HFA;PROVENTIL HFA;VENTOLIN HFA) 90 mcg/actuation inhaler Inhale 1 puff into the lungs every 4 hours as needed for shortness of breath / dyspnea       alendronate (FOSAMAX) 70 MG tablet [ALENDRONATE (FOSAMAX) 70 MG TABLET] Take 70 mg by mouth every 7 days. Takes on Mondays 11     aspirin 81 MG EC tablet Take 81 mg by mouth daily.       azelastine (OPTIVAR) 0.05 % ophthalmic solution Place 1 drop into both eyes 2 times daily. 6 mL 0     azelastine (OPTIVAR) 0.05 % ophthalmic solution Place 1 drop into both eyes 2 times daily.       calcium citrate (CITRACAL) 950 (200 Ca) MG tablet Take 1 tablet by mouth daily       cetirizine (ZYRTEC) 10  MG tablet [CETIRIZINE (ZYRTEC) 10 MG TABLET] Take 10 mg by mouth daily.        cholecalciferol (VITAMIN D3) 25 mcg (1000 units) capsule Take 1 capsule by mouth daily.       CRESTOR 10 MG tablet Take 10 mg by mouth daily.       diclofenac (VOLTAREN) 1 % topical gel Apply 2 g topically 4 times daily. Apply to painful areas. Use supplied dosing card to measure dose. 50 g 0     DULoxetine (CYMBALTA) 30 MG capsule Take 60 mg by mouth every morning.       DULoxetine (CYMBALTA) 30 MG capsule Take 30 mg by mouth every evening.       esomeprazole (NEXIUM) 20 MG DR capsule Take 1 capsule (20 mg) by mouth every morning (before breakfast). Take 30-60 minutes before eating. 30 capsule 0     fluticasone-salmeterol (AIRDUO RESPICLICK) 113-14 MCG/ACT inhaler Inhale 1 puff into the lungs as needed (shortness of breath).       HYDROcodone-acetaminophen (NORCO) 5-325 MG tablet Take 1 tablet by mouth every 6 hours as needed for pain. 30 tablet 0     l-lysine HCl 500 MG TABS tablet Take 500 mg by mouth daily.       Magnesium Oxide -Mg Supplement 500 MG CAPS Take 500 mg by mouth daily.       metoprolol succinate ER (TOPROL XL) 50 MG 24 hr tablet Take 1 tablet (50 mg) by mouth daily. 30 tablet 1     modafinil (PROVIGIL) 100 MG tablet Take 2.5 tablets (250 mg) by mouth every morning. 45 tablet 0     montelukast (SINGULAIR) 10 mg tablet Take 10 mg by mouth every evening       nystatin (MYCOSTATIN) 201994 UNIT/GM external powder Apply topically 2 times daily.       OXYGEN-AIR DELIVERY SYSTEMS MIS [OXYGEN-AIR DELIVERY SYSTEMS [facility] [place]] Use 3 L As Directed. 3 lpm with activities and as needed at night       senna-docusate (SENOKOT-S/PERICOLACE) 8.6-50 MG tablet Take 1 tablet by mouth 2 times daily. Can also take 1 tab po BID PRN       solifenacin (VESICARE) 10 MG tablet Take 10 mg by mouth at bedtime.       Turmeric (CURCUPLEX-95) 500 MG CAPS Take 1 capsule by mouth daily         Review of Systems   Patient denies fever, chills, headache,  "lightheadedness, dizziness, rhinorrhea, cough, congestion, chest pain, palpitations, abdominal pain, n/v, diarrhea, constipation, change in appetite, change in sleep pattern, dysuria, frequency, burning or pain with urination.  Other than stated in HPI all other review of systems is negative.       Physical Exam  Vital signs:/57   Pulse 79   Temp 97.8  F (36.6  C)   Resp 16   Ht 1.397 m (4' 7\")   Wt 92.1 kg (203 lb)   SpO2 96%   BMI 47.18 kg/m     GENERAL APPEARANCE: obese elderly female,  in no acute distress.  HEENT: normocephalic, atraumatic  sclerae anicteric, conjunctivae clear and moist, EOM intact  LUNGS: Lung sounds CTA, no adventitious sounds, intermittently shortness of breath  CARD: RRR, S1, S2, without murmurs, gallops, rubs  ABD: Soft, nondistended and nontender with normal bowel sounds.   MSK: Muscle strength and tone were equal bilaterally. Moves all extremities easily and intentionally.   EXTREMITIES: +2 pitting edema BLE  NEURO: Alert and oriented x 3. Face is symmetric.  SKIN: Inspection of the skin reveals no rashes, ulcerations or petechiae.  PSYCH: euthymic        Labs:    Last Comprehensive Metabolic Panel:  Lab Results   Component Value Date     05/02/2025    POTASSIUM 4.8 05/02/2025    CHLORIDE 101 05/02/2025    CO2 29 05/02/2025    ANIONGAP 12 05/02/2025     (H) 05/02/2025    BUN 60.2 (H) 05/02/2025    CR 1.37 (H) 05/02/2025    GFRESTIMATED 41 (L) 05/02/2025    LYNN 9.8 05/02/2025       Lab Results   Component Value Date    WBC 8.0 04/10/2025     Lab Results   Component Value Date    RBC 3.76 04/10/2025     Lab Results   Component Value Date    HGB 11.7 04/10/2025     Lab Results   Component Value Date    HCT 36.2 04/10/2025     Lab Results   Component Value Date    MCV 96 04/10/2025     Lab Results   Component Value Date    MCH 31.1 04/10/2025     Lab Results   Component Value Date    MCHC 32.3 04/10/2025     Lab Results   Component Value Date    RDW 12.8 04/10/2025 "     Lab Results   Component Value Date     04/10/2025           Assessment/Plan:  Cardiomyopathy, unspecified type (H)  Systolic congestive heart failure, unspecified HF chronicity (H)  Weight up 8lbs from baseline  Discontinue lasix  Start Bumex 2mg BID and metolazone 2.5mg on 5/20, 5/22  Continue with metoprolol.   Check BMP on 5/23.     Paroxysmal atrial fibrillation (H)  Rate controlled, continue with metoprolol.  Not on anticoagulant due to falls.     Hemorrhoids Internal:  Proctocort suppositories BID x 14 days.     Muscles cramps:  Awaiting BMP drawn today,   Increase Magnesium to 500mg BID.       Electronically signed by:Avelina Barrios NP              Sincerely,        Avelina Barrios NP    Electronically signed

## 2025-05-19 NOTE — PROGRESS NOTES
[facility] GERIATRICS      Code Status:  FULL CODE  Visit Type: RECHECK     Facility:   [facility]) [[id number]]         History of Present Illness: Desirae Rey is a 74 year old female with past medical history for CHF with chronic respiratory failure on home O2, NELLIE on BiPAP, falls, cerebellar stroke, hx of cervical fusion, HTN and obesity.  She was recently hospitalized 4/25/2025- 4/30/2025 after a fall with a sacral fracture and COPD exacerbation.  She has had 8ED and 4 hospital visits in the past 6 months.  She was seen by NSG and they would like her cervical issues addressed first before addressing lower back issues.      Today,weight is up 6lbs from last week.  She endorses increased SOB and increase edema of BLE.  She is asking when she is ready to go home.  Hasn't had a discharge Care Conference yet.     Complains of muscle cramps in her legs and would like her Magnesium increased    Complains of hemorrhoids.  Appears to be more internal.       Current Outpatient Medications   Medication Sig Dispense Refill    bumetanide (BUMEX) 2 MG tablet Take 2 mg by mouth 2 times daily.      albuterol (PROAIR HFA;PROVENTIL HFA;VENTOLIN HFA) 90 mcg/actuation inhaler Inhale 1 puff into the lungs every 4 hours as needed for shortness of breath / dyspnea      alendronate (FOSAMAX) 70 MG tablet [ALENDRONATE (FOSAMAX) 70 MG TABLET] Take 70 mg by mouth every 7 days. Takes on Mondays 11    aspirin 81 MG EC tablet Take 81 mg by mouth daily.      azelastine (OPTIVAR) 0.05 % ophthalmic solution Place 1 drop into both eyes 2 times daily. 6 mL 0    azelastine (OPTIVAR) 0.05 % ophthalmic solution Place 1 drop into both eyes 2 times daily.      calcium citrate (CITRACAL) 950 (200 Ca) MG tablet Take 1 tablet by mouth daily      cetirizine (ZYRTEC) 10 MG tablet [CETIRIZINE (ZYRTEC) 10 MG TABLET] Take 10 mg by mouth daily.        cholecalciferol (VITAMIN D3) 25 mcg (1000 units) capsule Take 1 capsule by mouth daily.      CRESTOR 10 MG tablet Take 10 mg by mouth daily.      diclofenac (VOLTAREN) 1 % topical gel Apply 2 g topically 4 times daily. Apply to painful areas. Use supplied dosing card to measure dose. 50 g 0    DULoxetine (CYMBALTA) 30 MG capsule Take 60 mg by mouth every morning.      DULoxetine (CYMBALTA) 30 MG capsule Take 30 mg by mouth every evening.      esomeprazole (NEXIUM) 20 MG DR capsule Take 1 capsule (20 mg) by mouth every morning (before breakfast). Take 30-60 minutes before eating. 30 capsule 0    fluticasone-salmeterol (AIRDUO RESPICLICK) 113-14 MCG/ACT inhaler Inhale 1 puff into the lungs as needed (shortness of breath).      HYDROcodone-acetaminophen (NORCO) 5-325 MG tablet Take 1 tablet by mouth every 6 hours as needed for pain. 30 tablet 0    l-lysine HCl 500 MG TABS tablet Take 500 mg by mouth daily.      Magnesium Oxide -Mg Supplement 500 MG CAPS Take 500 mg by mouth daily.      metoprolol succinate ER (TOPROL XL) 50 MG 24 hr tablet Take 1 tablet (50 mg) by mouth daily. 30 tablet 1    modafinil (PROVIGIL) 100 MG tablet Take 2.5 tablets (250 mg) by mouth every morning. 45 tablet 0    montelukast (SINGULAIR) 10 mg tablet Take 10 mg by mouth every evening      nystatin (MYCOSTATIN) 180158 UNIT/GM external powder Apply topically 2 times daily.      OXYGEN-AIR DELIVERY SYSTEMS MIS [OXYGEN-AIR DELIVERY SYSTEMS [facility]] Use 3 L As Directed. 3 lpm with activities and as needed at night      senna-docusate (SENOKOT-S/PERICOLACE) 8.6-50 MG tablet Take 1 tablet by mouth 2 times daily. Can also take 1 tab po BID PRN      solifenacin (VESICARE) 10 MG tablet Take 10 mg by mouth at bedtime.      Turmeric (CURCUPLEX-95) 500 MG CAPS Take 1 capsule by mouth daily         Review of Systems   Patient denies fever, chills, headache, lightheadedness, dizziness, rhinorrhea, cough, congestion, chest pain, palpitations, abdominal pain,  "n/v, diarrhea, constipation, change in appetite, change in sleep pattern, dysuria, frequency, burning or pain with urination.  Other than stated in HPI all other review of systems is negative.       Physical Exam  Vital signs:/57   Pulse 79   Temp 97.8  F (36.6  C)   Resp 16   Ht 1.397 m (4' 7\")   Wt 92.1 kg (203 lb)   SpO2 96%   BMI 47.18 kg/m     GENERAL APPEARANCE: obese elderly female,  in no acute distress.  HEENT: normocephalic, atraumatic  sclerae anicteric, conjunctivae clear and moist, EOM intact  LUNGS: Lung sounds CTA, no adventitious sounds, intermittently shortness of breath  CARD: RRR, S1, S2, without murmurs, gallops, rubs  ABD: Soft, nondistended and nontender with normal bowel sounds.   MSK: Muscle strength and tone were equal bilaterally. Moves all extremities easily and intentionally.   EXTREMITIES: +2 pitting edema BLE  NEURO: Alert and oriented x 3. Face is symmetric.  SKIN: Inspection of the skin reveals no rashes, ulcerations or petechiae.  PSYCH: euthymic        Labs:    Last Comprehensive Metabolic Panel:  Lab Results   Component Value Date     05/02/2025    POTASSIUM 4.8 05/02/2025    CHLORIDE 101 05/02/2025    CO2 29 05/02/2025    ANIONGAP 12 05/02/2025     (H) 05/02/2025    BUN 60.2 (H) 05/02/2025    CR 1.37 (H) 05/02/2025    GFRESTIMATED 41 (L) 05/02/2025    LYNN 9.8 05/02/2025       Lab Results   Component Value Date    WBC 8.0 04/10/2025     Lab Results   Component Value Date    RBC 3.76 04/10/2025     Lab Results   Component Value Date    HGB 11.7 04/10/2025     Lab Results   Component Value Date    HCT 36.2 04/10/2025     Lab Results   Component Value Date    MCV 96 04/10/2025     Lab Results   Component Value Date    MCH 31.1 04/10/2025     Lab Results   Component Value Date    MCHC 32.3 04/10/2025     Lab Results   Component Value Date    RDW 12.8 04/10/2025     Lab Results   Component Value Date     04/10/2025 "           Assessment/Plan:  Cardiomyopathy, unspecified type (H)  Systolic congestive heart failure, unspecified HF chronicity (H)  Weight up 8lbs from baseline  Discontinue lasix  Start Bumex 2mg BID and metolazone 2.5mg on 5/20, 5/22  Continue with metoprolol.   Check BMP on 5/23.     Paroxysmal atrial fibrillation (H)  Rate controlled, continue with metoprolol.  Not on anticoagulant due to falls.     Hemorrhoids Internal:  Proctocort suppositories BID x 14 days.     Muscles cramps:  Awaiting BMP drawn today,   Increase Magnesium to 500mg BID.       Electronically signed by:Avelina Barrios NP

## 2025-05-19 NOTE — PROGRESS NOTES
[facility] GERIATRICS  [facility] Medical Record Number:  [id number]  Place of Service where encounter took place[facility] ([facility]) [[id number]]  CODE STATUS:   CPR/Full code     Chief Complaint/Reason for Visit:  Chief Complaint   Patient presents with    Hospital F/U       HPI:    Desirae Rey is a 74 year old female with hx of chronic respiratory failure on 3L, severe NELLIE on BiPap, frequent falls, cerebellar stroke, cervical fusion with loose hardware, HTN/HL, chronic diastolic CHF, and morbid obesity presenting to the ED for recurrent falls.Her hospital discharge summary is partially excerpted below.     [facility] Medicine   Discharge Summary Report  Admit Date: 4/24/2025   Discharge date: 04/30/2025    Problem that led to hospitalization:   73 y/o chronic respiratory failure on 3L, severe NELLIE on BiPap, frequent falls, cerebellar stroke, cervical fusion with loose hardware, HTN/HL, chronic diastolic CHF, and morbid obesity presenting to the ED for recurrent falls    Hospital Course:  # History of cerebellar stroke - Contributing to above.This occurred in Dec 2024 and falls have escalated since. Very likely contributing to her falls.     # Frequent falls - Ongoing falls at home, multiple admissions for the same recently. Likely in the setting of recent cerebellar stroke/spinal stenosis. Discussed need for TCU vs going home.     # Sacral nondisplaced fracture   - Noted on x-ray imaging, reported by Radiology to more likely appear to be chronic. She acknowledges increased pain in that area for several weeks.     # Loose cervical hardware - Previously noted on imaging with recommendation for NSGY follow-up. Daughter expresses interest in patient seeing Dr. Mckeon but they had not established care with her.   Neurosurgery consulted in the hospital  - MRI completed  - outpt clinic f/u w/ Dr Mckeon    # Severe NELLIE on BiPap, 3L during day   # Acute on chronic hypoxic/hypercapnic respiratory failure  #  Metabolic encephalopathy due to hypercapnia  Unclear inciting cause, but patient typically with tenuous balance and wonder whether patient took additional pain meds at home either prior to (and leading to) or after the fall.   - Required BIPAP on admission, CO2 decreased from 70-62  She is feeling much better today, on regular O2 during day and BIPAP at night as per baseline.     # CKD stage 3 - Cr improved, near baseline    # Chronic back pain - Monitor for now, avoid psychotropic meds.    # HTN/HL - Continue home med regimen.     # Chronic diastolic CHF/pulmonary hypertension - Euvolemic to slightly volume up, continue home meds and monitor. Edema is at baseline. Is at higher risk for surgical procedure based on these comorbid conditions.     # Asthma - Not in exacerbation, continue PTA meds.     # Morbid obesity - The patient has morbid obesity, affecting the patient s current medical condition. The body mass index is unknown because there is no height or weight on file. This has required use of extra resources, including treatment, assistance from extra staff, and the usage of bariatric equipment (wheelchair, bed, BP cuff, scales, etc.).    Overall stabilized and discharged to TCU on 4/30/2025 for PT, OT, nursing cares, medical management and monitoring.     Today:  She is on O2 at home, chronic respiratory failure, she feels breathing at baseline. No cough or fever. Has back pain, has been asking to have her home Norco re instated, not doing well on prn low dose Tramadol as ordered in TCU. Needs follow up with PMD for pain management post TCU stay. Will have follow up with neurosurgery for loose cervical neck hardware noted on imaging. Hx of stroke, falls and overall gait imbalance and weakness. Working with therapy in TCU. Appetite is good. No abdominal pain, diarrhea, constipation or urinary sx. No new vision hearing concerns. Lives at home with .       REVIEW OF SYSTEMS:  All others negative other  than those noted in HPI.      PAST MEDICAL HISTORY:  Past Medical History:   Diagnosis Date    Allergic rhinitis     Arthritis of back     CHF (congestive heart failure) (H)     Chronic kidney disease     stage 3     De Quervain's disease (tenosynovitis)     Fibromyalgia, primary     GERD (gastroesophageal reflux disease)     Hematuria     chronic    High cholesterol     History of blood clots 09/01/1970    DVT, from birth control, h/o left calf    Hyperlipidemia     Hypertension     Low back pain     Osteoporosis     Pickwickian syndrome (H)     Plantar fasciitis     Proteinuria     Proteinuria     chronic    Sleep apnea     CPAP    Uncomplicated asthma        PAST SURGICAL HISTORY:  Past Surgical History:   Procedure Laterality Date    CERVICAL FUSION N/A 4/27/2017    Procedure: ANTERIOR CERVICAL DECOMPRESSION FUSION C5-7 BILATERAL;  Surgeon: Basim Barone MD;  Location: [facility] [place], [place];  Service:     CHOLECYSTECTOMY      open    COLONOSCOPY N/A 12/20/2019    Procedure: COLONOSCOPY WITH BIOPSIES;  Surgeon: Lucio Farr MD;  Location: [facility] [place], [place];  Service: Gastroenterology    EYE SURGERY      HAND SURGERY Right     HYSTEROSCOPY DIAGNOSTIC      JOINT REPLACEMENT      bilateral TKA    KNEE SURGERY      RELEASE CARPAL TUNNEL Bilateral        FAMILY HISTORY:  Family History   Problem Relation Age of Onset    Rheumatoid Arthritis Mother     Heart Disease Sister     Chronic Obstructive Pulmonary Disease Father        SOCIAL HISTORY:  Social History     Socioeconomic History    Marital status:      Spouse name: Not on file    Number of children: Not on file    Years of education: Not on file    Highest education level: Not on file   Occupational History    Not on file   Tobacco Use    Smoking status: Never    Smokeless tobacco: Never   Vaping Use    Vaping status: Never Used   Substance and Sexual Activity    Alcohol use: No    Drug use: No    Sexual activity: Not on file   Other Topics  Concern    Not on file   Social History Narrative    Not on file     Social Drivers of Health     Financial Resource Strain: Low Risk  (3/29/2025)    Financial Resource Strain     Within the past 12 months, have you or your family members you live with been unable to get utilities (heat, electricity) when it was really needed?: No   Food Insecurity: No Food Insecurity (4/25/2025)    Received from [employer]    Hunger Vital Sign     Worried About Running Out of Food in the Last Year: Never true     Ran Out of Food in the Last Year: Never true   Transportation Needs: No Transportation Needs (4/25/2025)    Received from [employer]    PRAPARE - Transportation     Lack of Transportation (Medical): No     Lack of Transportation (Non-Medical): No   Physical Activity: Not on file   Stress: Not on file   Social Connections: Unknown (3/9/2023)    Received from [facility]    Social Connections     Frequency of Communication with Friends and Family: Not on file   Interpersonal Safety: Not At Risk (4/25/2025)    Received from [employer]    Humiliation, Afraid, Rape, and Kick questionnaire     Fear of Current or Ex-Partner: No     Emotionally Abused: No     Physically Abused: No     Sexually Abused: No   Housing Stability: Low Risk  (4/25/2025)    Received from [employer]    Housing Stability Vital Sign     Unable to Pay for Housing in the Last Year: No     Number of Times Moved in the Last Year: 0     Homeless in the Last Year: No       MEDICATIONS:  Current Outpatient Medications   Medication Sig Dispense Refill    albuterol (PROAIR HFA;PROVENTIL HFA;VENTOLIN HFA) 90 mcg/actuation inhaler Inhale 1 puff into the lungs every 4 hours as needed for shortness of breath / dyspnea      alendronate (FOSAMAX) 70 MG tablet [ALENDRONATE (FOSAMAX) 70 MG TABLET] Take 70 mg by mouth every 7 days. Takes on Mondays 11    aspirin 81 MG EC tablet Take 81 mg by mouth daily.      azelastine  (OPTIVAR) 0.05 % ophthalmic solution Place 1 drop into both eyes 2 times daily. 6 mL 0    azelastine (OPTIVAR) 0.05 % ophthalmic solution Place 1 drop into both eyes 2 times daily.      calcium citrate (CITRACAL) 950 (200 Ca) MG tablet Take 1 tablet by mouth daily      cetirizine (ZYRTEC) 10 MG tablet [CETIRIZINE (ZYRTEC) 10 MG TABLET] Take 10 mg by mouth daily.       cholecalciferol (VITAMIN D3) 25 mcg (1000 units) capsule Take 1 capsule by mouth daily.      CRESTOR 10 MG tablet Take 10 mg by mouth daily.      diclofenac (VOLTAREN) 1 % topical gel Apply 2 g topically 4 times daily. Apply to painful areas. Use supplied dosing card to measure dose. 50 g 0    DULoxetine (CYMBALTA) 30 MG capsule Take 60 mg by mouth every morning.      DULoxetine (CYMBALTA) 30 MG capsule Take 30 mg by mouth every evening.      esomeprazole (NEXIUM) 20 MG DR capsule Take 1 capsule (20 mg) by mouth every morning (before breakfast). Take 30-60 minutes before eating. 30 capsule 0    fluticasone-salmeterol (AIRDUO RESPICLICK) 113-14 MCG/ACT inhaler Inhale 1 puff into the lungs as needed (shortness of breath).      furosemide (LASIX) 20 MG tablet Take 40 mg by mouth every morning.      furosemide (LASIX) 20 MG tablet Take 20 mg by mouth every evening.      HYDROcodone-acetaminophen (NORCO) 5-325 MG tablet Take 1 tablet by mouth every 6 hours as needed for pain. 30 tablet 0    l-lysine HCl 500 MG TABS tablet Take 500 mg by mouth daily.      Magnesium Oxide -Mg Supplement 500 MG CAPS Take 500 mg by mouth daily.      metoprolol succinate ER (TOPROL XL) 50 MG 24 hr tablet Take 1 tablet (50 mg) by mouth daily. 30 tablet 1    modafinil (PROVIGIL) 100 MG tablet Take 2.5 tablets (250 mg) by mouth every morning. 45 tablet 0    montelukast (SINGULAIR) 10 mg tablet Take 10 mg by mouth every evening      nystatin (MYCOSTATIN) 038252 UNIT/GM external powder Apply topically 2 times daily.      OXYGEN-AIR DELIVERY SYSTEMS MISC [OXYGEN-AIR DELIVERY SYSTEMS  "MISC] Use 3 L As Directed. 3 lpm with activities and as needed at night      senna-docusate (SENOKOT-S/PERICOLACE) 8.6-50 MG tablet Take 1 tablet by mouth 2 times daily. Can also take 1 tab po BID PRN      solifenacin (VESICARE) 10 MG tablet Take 10 mg by mouth at bedtime.      Turmeric (CURCUPLEX-95) 500 MG CAPS Take 1 capsule by mouth daily          ALLERGIES:  Allergies   Allergen Reactions    Latex Rash     Severe rash    Pregabalin Shortness Of Breath     Other Reaction(s): swelling and shortness of breath    Valsartan Unknown     Hyperkalemia    Ciprofloxacin Visual Disturbance, Hallucination and Confusion     Likely contributed visual hallucination and confusion    Colchicine Diarrhea    Dicloxacillin      Other Reaction(s): confusion, says she has tolerated augmentin without difficulty.     Gabapentin      Other Reaction(s): Sedation    Latex Unknown     Added based on information entered during case entry, please review and add reactions, type, and severity as needed    Other Drug Allergy (See Comments) Muscle Pain (Myalgia)     Tried lovastatin and simvastatin    Other Environmental Allergy Unknown     Coverlet bandaid , [place] product; rash    Statins-Hmg-Coa Reductase Inhibitors [Statins] Muscle Pain (Myalgia)     Tried lovastatin and simvastatin    Sulfa Antibiotics Swelling     Facial swelling      Adhesive Tape Rash       PHYSICAL EXAM:  General: Patient is alert female, sitting in wheelchair, no distress, on oxygen per NC.   Vitals: /56   Pulse 64   Temp 97.3  F (36.3  C)   Ht 1.397 m (4' 7\")   Wt 88.2 kg (194 lb 8 oz)   BMI 45.21 kg/m    HEENT: Head is NCAT. Eyes show no injection or icterus. Nares negative. Oropharynx well hydrated.  Neck: Supple. No tenderness or adenopathy. No JVD.  Lungs: Clear bilaterally. No wheezes.  Cardiovascular: Regular rate and rhythm, normal S1, S2.  Back: No spinal or CVA tenderness.  Abdomen: Soft, no tenderness on exam. Bowel sounds present. No guarding " rebound or rigidity.  : Deferred.  Extremities: Mild LE edema is noted.  Musculoskeletal: Degen changes.   Skin: No rashes noted.   Psych: Mood appears good.      LABS/DIAGNOSTIC DATA:  Outside hospital.       ASSESSMENT/PLAN:  Weakness and deconditioning. Hx of falls. Work up in the hospital, multifactorial. Known back pain, stenosis, loose cervical neck hardware from prior surgery, chronic or subacute sacral fracture, hx of stroke.  HTN. On metoprolol, diuretics. Monitor Bps in TCU, adjust meds if needed.  CHF. On Lasix. Monitor weights, edema, clinical status.   Chronic respiratory failure. Home O2. Resp status is stable.   Pain management. On chronic opioids at home, did not come from the hospital to TCU with any narcotics. Low dose Tramadol prn trialed, ineffective. Okay reinstate home Norco 5/325 one pill q 6 hours prn. Follow up with PMD at post TCU visit for further pain management strategies.   NELLIE. Uses BiPAP.  Obesity.   Hx of stroke. Cerebellar. Contributor to falls. Here in TCU for therapies, strengthening.  CKD. Labs in [facility], reviewed.   Code status is full code.       Electronically signed by: Pastora Esquivel MD

## 2025-05-20 ENCOUNTER — RESULTS FOLLOW-UP (OUTPATIENT)
Dept: GERIATRICS | Facility: CLINIC | Age: 75
End: 2025-05-20

## 2025-05-20 LAB
ANION GAP SERPL CALCULATED.3IONS-SCNC: 10 MMOL/L (ref 7–15)
BUN SERPL-MCNC: 44.6 MG/DL (ref 8–23)
CALCIUM SERPL-MCNC: 9.7 MG/DL (ref 8.8–10.4)
CHLORIDE SERPL-SCNC: 96 MMOL/L (ref 98–107)
CREAT SERPL-MCNC: 1.59 MG/DL (ref 0.51–0.95)
EGFRCR SERPLBLD CKD-EPI 2021: 34 ML/MIN/1.73M2
GLUCOSE SERPL-MCNC: 98 MG/DL (ref 70–99)
HCO3 SERPL-SCNC: 33 MMOL/L (ref 22–29)
POTASSIUM SERPL-SCNC: 4.8 MMOL/L (ref 3.4–5.3)
SODIUM SERPL-SCNC: 139 MMOL/L (ref 135–145)

## 2025-05-23 ENCOUNTER — DISCHARGE SUMMARY NURSING HOME (OUTPATIENT)
Dept: GERIATRICS | Facility: CLINIC | Age: 75
End: 2025-05-23
Payer: MEDICARE

## 2025-05-23 VITALS
WEIGHT: 204.8 LBS | SYSTOLIC BLOOD PRESSURE: 127 MMHG | DIASTOLIC BLOOD PRESSURE: 79 MMHG | TEMPERATURE: 97.9 F | HEART RATE: 62 BPM | BODY MASS INDEX: 47.4 KG/M2 | RESPIRATION RATE: 19 BRPM | HEIGHT: 55 IN | OXYGEN SATURATION: 91 %

## 2025-05-23 DIAGNOSIS — I42.9 CARDIOMYOPATHY, UNSPECIFIED TYPE (H): Primary | ICD-10-CM

## 2025-05-23 DIAGNOSIS — M62.81 GENERALIZED MUSCLE WEAKNESS: ICD-10-CM

## 2025-05-23 DIAGNOSIS — J30.9 ALLERGIC RHINITIS, UNSPECIFIED SEASONALITY, UNSPECIFIED TRIGGER: ICD-10-CM

## 2025-05-23 DIAGNOSIS — J44.9 CHRONIC OBSTRUCTIVE PULMONARY DISEASE, UNSPECIFIED COPD TYPE (H): ICD-10-CM

## 2025-05-23 DIAGNOSIS — I50.20 SYSTOLIC CONGESTIVE HEART FAILURE, UNSPECIFIED HF CHRONICITY (H): ICD-10-CM

## 2025-05-23 DIAGNOSIS — R25.2 MUSCLE CRAMPS AT NIGHT: ICD-10-CM

## 2025-05-23 DIAGNOSIS — F11.20 UNCOMPLICATED OPIOID DEPENDENCE (H): ICD-10-CM

## 2025-05-23 DIAGNOSIS — R29.6 FALLS FREQUENTLY: ICD-10-CM

## 2025-05-23 DIAGNOSIS — R53.81 PHYSICAL DECONDITIONING: ICD-10-CM

## 2025-05-23 DIAGNOSIS — J96.22 ACUTE ON CHRONIC RESPIRATORY FAILURE WITH HYPOXIA AND HYPERCAPNIA (H): ICD-10-CM

## 2025-05-23 DIAGNOSIS — I10 ESSENTIAL HYPERTENSION: ICD-10-CM

## 2025-05-23 DIAGNOSIS — J96.21 ACUTE ON CHRONIC RESPIRATORY FAILURE WITH HYPOXIA AND HYPERCAPNIA (H): ICD-10-CM

## 2025-05-23 DIAGNOSIS — Z86.73 HISTORY OF STROKE: ICD-10-CM

## 2025-05-23 PROCEDURE — 99316 NF DSCHRG MGMT 30 MIN+: CPT

## 2025-05-23 NOTE — PROGRESS NOTES
[facility] GERIATRICS DISCHARGE SUMMARY  PATIENT'S NAME: Desirae Rey  YOB: 1950  MEDICAL RECORD NUMBER:  [id number]  Place of Service where encounter took place:  [facility] ([facility]) [[id number]]    PRIMARY CARE PROVIDER AND CLINIC RESPONSIBLE AFTER TRANSFER:   Daysi Bryan PA-C, [address] / [facility] [address]    [facility] Provider     Transferring providers: KALIE Alvarez CNP,Pastora Esquivel MD  Recent Hospitalization/ED:  Hospital  [facility]  stay 4/24/25 to 4/30/25.  Date of SNF Admission: April 30, 2025  Date of SNF (anticipated) Discharge: May 27, 2025  Discharged to: previous independent home and with family  and son  Cognitive Scores: BIMS: 15/15 and SLUMS: 18/30  Physical Function: Pt is completing transfers with CGA and ambulating approx. 100 feet using the 4WW  DME: Wheelchair    CODE STATUS/ADVANCE DIRECTIVES DISCUSSION:  Full Code   ALLERGIES: Latex, Pregabalin, Valsartan, Ciprofloxacin, Colchicine, Dicloxacillin, Gabapentin, Latex, Other drug allergy (see comments), Other environmental allergy, Statins-hmg-coa reductase inhibitors [statins], Sulfa antibiotics, and Adhesive tape    NURSING FACILITY COURSE     Medication Changes/Rationale:   Magnesium increased to 500 mg twice daily due to muscle cramp  Pain medication changed to [place] per patient request  Furosemide changed to Bumex for fluid overload    History of Present Illness:   74 Y old female with PMH of  chronic respiratory failure on 3L oxygen at baseline, severe NELLIE on BiPap, frequent falls, cerebellar stroke, cervical fusion with loose hardware, HTN/HL, chronic diastolic CHF, and morbid obesity. Who recently admitted to hospital Post fall via EMS. Patient having had 8 ED admissions and 4 hospital admissions in the last 6 months due to fall and COPD exacerbation .Neurosurgery is recommending addressing her cervical issues first prior to approaching her lumbar issues which likely are  contributing to her recurrent falls. Patient transferred to TCU for rehabilitation and medical management.       Hospital course  Sacral nondisplaced fracture - Noted on x-ray imaging, reported by Radiology to more likely appear to be chronic. Will need to reassess with patient in the AM.   Loose cervical hardware - Previously noted on imaging with recommendation for NSGY follow-up. Daughter expresses interest in patient seeing Dr. Mckeon.     Skilled Nursing Facility Course:    Uneventful TCU stay progressed well with therapy will go home with [facility].  Today,  is seen in her room while sitting in a wheelchair.  She reports feeling better pain much controlled with [place] and ready to discharge back home with a supportive family and home health care.  Patient already has a follow-up scheduled with Dr. Mckeon on 6/11/2025 writer also recommended she makes a follow-up           Discharge Plan:  Patient is stable to discharge to home with home care services.  Follow up with PCP.     Summary of nursing facility stay:   (J96.21,  J96.22) Acute on chronic respiratory failure with hypoxia and hypercapnia (H)    (J44.9) Chronic obstructive pulmonary disease, unspecified COPD type (H)  (J30.9) Allergic rhinitis, unspecified seasonality, unspecified trigger  Comment: Acute on chronic Hx of NELLIE on chronic modafinil , and oxygen at baseline .  Plan:   - Continue Oxygen   - Continue Modafinil   - Continue Singular daily  - Continue Fluticasone -salmeterol -PRN  - Continue PRN albuterol  - Continue Zyrtec daily        (Z86.73) History of stroke  (M06.879) Other specified rheumatoid arthritis, unspecified ankle and foot (H)  (R29.6) Falls frequently  (R53.81) Physical deconditioning  (M62.81) Generalized muscle weakness  Comment: Acute on chronic deconditioned post acute illness/fall hx of CVA working with therapy-therapy to continue at home  Plan:   - Continue Rosuvastatin daily   - Continue ASA  - PT/OT ongoing  - Encourage  ambulation with assistance      (I50.33) Acute on chronic diastolic heart failure (H)  (I10) Benign essential hypertension  Comment: Acute on chronic Edema at baseline weight at 194.4 current weight 202.4 .SBP controlled ranging 130-140 adjustment deferred to primary care provider  Plan:   - Continue BP monitoring  - Daily weight   - Furosemide discontinued   - Continue Bumex 2 mg twice daily  - Continue Metoprolol succinate 50mg daily        (N18.32) Stage 3b chronic kidney disease (H)  Comment: Acute on chronic stable baseline Cr 1. recheck BMP periodically      (N32.81) Overactive bladder  Comment: Stable on Vesicare no acute concern      (K59.01) Slow transit constipation  Comment: Chronic constipation no BM in the last 2 days   Plan:   - Continue Senna.S BID with PRN therapy to continue at home      Discharge Medications:  MED REC REQUIRED{Post Medication Reconciliation Status: discharge medications reconciled and changed, per note/orders       Current Outpatient Medications   Medication Sig Dispense Refill    bumetanide (BUMEX) 1 MG tablet Take 2 tablets (2 mg) by mouth 2 times daily.      albuterol (PROAIR HFA;PROVENTIL HFA;VENTOLIN HFA) 90 mcg/actuation inhaler Inhale 1 puff into the lungs every 4 hours as needed for shortness of breath / dyspnea      alendronate (FOSAMAX) 70 MG tablet [ALENDRONATE (FOSAMAX) 70 MG TABLET] Take 70 mg by mouth every 7 days. Takes on Mondays 11    aspirin 81 MG EC tablet Take 81 mg by mouth daily.      azelastine (OPTIVAR) 0.05 % ophthalmic solution Place 1 drop into both eyes 2 times daily. 6 mL 0    azelastine (OPTIVAR) 0.05 % ophthalmic solution Place 1 drop into both eyes 2 times daily.      bumetanide (BUMEX) 2 MG tablet Take 2 mg by mouth 2 times daily.      calcium citrate (CITRACAL) 950 (200 Ca) MG tablet Take 1 tablet by mouth daily      cetirizine (ZYRTEC) 10 MG tablet [CETIRIZINE (ZYRTEC) 10 MG TABLET] Take 10 mg by mouth daily.       cholecalciferol (VITAMIN  D3) 25 mcg (1000 units) capsule Take 1 capsule by mouth daily.      CRESTOR 10 MG tablet Take 10 mg by mouth daily.      diclofenac (VOLTAREN) 1 % topical gel Apply 2 g topically 4 times daily. Apply to painful areas. Use supplied dosing card to measure dose. 50 g 0    DULoxetine (CYMBALTA) 30 MG capsule Take 60 mg by mouth every morning.      DULoxetine (CYMBALTA) 30 MG capsule Take 30 mg by mouth every evening.      esomeprazole (NEXIUM) 20 MG DR capsule Take 1 capsule (20 mg) by mouth every morning (before breakfast). Take 30-60 minutes before eating. 30 capsule 0    fluticasone-salmeterol (AIRDUO RESPICLICK) 113-14 MCG/ACT inhaler Inhale 1 puff into the lungs as needed (shortness of breath).      HYDROcodone-acetaminophen (NORCO) 5-325 MG tablet Take 1 tablet by mouth every 6 hours as needed for pain. 30 tablet 0    l-lysine HCl 500 MG TABS tablet Take 500 mg by mouth daily.      Magnesium Oxide -Mg Supplement 500 MG CAPS Take 500 mg by mouth 2 times daily.      metoprolol succinate ER (TOPROL XL) 50 MG 24 hr tablet Take 1 tablet (50 mg) by mouth daily. 30 tablet 1    modafinil (PROVIGIL) 100 MG tablet Take 2.5 tablets (250 mg) by mouth every morning. 45 tablet 0    montelukast (SINGULAIR) 10 mg tablet Take 10 mg by mouth every evening      nystatin (MYCOSTATIN) 181209 UNIT/GM external powder Apply topically 2 times daily.      OXYGEN-AIR DELIVERY SYSTEMS MIS [OXYGEN-AIR DELIVERY SYSTEMS [facility]] Use 3 L As Directed. 3 lpm with activities and as needed at night      senna-docusate (SENOKOT-S/PERICOLACE) 8.6-50 MG tablet Take 1 tablet by mouth 2 times daily. Can also take 1 tab po BID PRN      solifenacin (VESICARE) 10 MG tablet Take 10 mg by mouth at bedtime.      Turmeric (CURCUPLEX-95) 500 MG CAPS Take 1 capsule by mouth daily        Controlled medications:   Restaurant remaining supply of Norco with patient less than 30 tab at time of discharge     Past Medical History:   Past Medical History:   Diagnosis Date  "   Allergic rhinitis     Arthritis of back     CHF (congestive heart failure) (H)     Chronic kidney disease     stage 3     De Quervain's disease (tenosynovitis)     Fibromyalgia, primary     GERD (gastroesophageal reflux disease)     Hematuria     chronic    High cholesterol     History of blood clots 09/01/1970    DVT, from birth control, h/o left calf    Hyperlipidemia     Hypertension     Low back pain     Osteoporosis     Pickwickian syndrome (H)     Plantar fasciitis     Proteinuria     Proteinuria     chronic    Sleep apnea     CPAP    Uncomplicated asthma      Physical Exam:   Vitals: /79   Pulse 62   Temp 97.9  F (36.6  C)   Resp 19   Ht 1.397 m (4' 7\")   Wt 92.9 kg (204 lb 12.8 oz)   SpO2 (!) 91%   BMI 47.60 kg/m    BMI: Body mass index is 47.6 kg/m .  GENERAL APPEARANCE:  Alert, in no distress  RESP:  respiratory effort and palpation of chest normal, on oxygen at baseline  CV:  regular rate and rhythm, no murmur, rub, or gallop, +2 edema  ABDOMEN:  normal bowel sounds, soft, nontender, no hepatosplenomegaly or other masses, no guarding or rebound, bowel sounds normal  :    Incontinent  M/S:   Ambulating with assistance  SKIN:  Inspection of skin and subcutaneous tissue baseline  NEURO:   Examination of sensation by touch normal  PSYCH:  oriented X 3     SNF labs: Most Recent 3 CBC's:  Recent Labs   Lab Test 04/10/25  0612 04/09/25  1819 03/31/25  0601 03/28/25  2214   WBC 8.0 7.7  --  8.2   HGB 11.7 12.0  --  11.7   MCV 96 95  --  97    384 231 238     Most Recent 3 BMP's:  Recent Labs   Lab Test 05/20/25  0517 05/19/25  0905 05/02/25  0505    138 142   POTASSIUM 4.8 6.0* 4.8   CHLORIDE 96* 98 101   CO2 33* 24 29   BUN 44.6* 42.3* 60.2*   CR 1.59* 1.43* 1.37*   ANIONGAP 10 16* 12   LYNN 9.7 9.8 9.8   GLC 98 83 108*       DISCHARGE PLAN:  Follow up labs: No labs orders/due  Medical Follow Up:      Follow up with primary care provider in 1-2 weeks  Current [place] scheduled " appointments:  Appointments in Next Year      May 23, 2025 8:00 AM  Discharge Summary with KALIE Alvarez CNP  [facility] ([facility] ) [phone]     Jul 14, 2025 3:30 PM  (Arrive by 3:15 PM)  New Stroke with Alejandro Ames MD  [facility] [place] ([facility]) [phone]     Aug 21, 2025 2:20 PM  (Arrive by 2:05 PM)  New Cardiology with Vera Zamora MD  [facility] ([facility] ) [phone]           Discharge Services: Home Care:  Occupational Therapy, Physical Therapy, Registered Nurse, and Home Health Aide  Discharge Instructions Verbalized to Patient at Discharge:   None    TOTAL DISCHARGE TIME:   Greater than 30 minutes  Electronically signed by:  KALIE Alvarez CNP     Documentation of Face to Face and Certification for Home Health Services    I certify that patient: Desirae Rey is under my care and that I, or a nurse practitioner or physician's assistant working with me, had a face-to-face encounter that meets the physician face-to-face encounter requirements with this patient on: 5/23/2025.    This encounter with the patient was in whole, or in part, for the following medical condition, which is the primary reason for home health care: Physical deconditioning weakness and fall.    I certify that, based on my findings, the following services are medically necessary home health services: Nursing, Occupational Therapy, Physical Therapy, and HHA.    My clinical findings support the need for the above services because: Nurse is needed: For complex aftercare of surgical procedures because the patient needs instruction and cannot perform care on their own due to: Inability to manage new medications.., Occupational Therapy Services are needed to assess and treat cognitive ability and address ADL safety due to impairment in physical  deconditioning and the cognitive decline activity tolerance., Physical Therapy Services are needed to assess and treat the following functional impairments: And endurance., and HHA for personal care    Further, I certify that my clinical findings support that this patient is homebound (i.e. absences from home require considerable and taxing effort and are for medical reasons or Gnostic[facility] services or infrequently or of short duration when for other reasons) because: Requires assistance of another person or specialized equipment to access medical services because patient: Range of motion limitations prevents ability to exit home safely. and Requires supervision of another for safe transfer...    Based on the above findings. I certify that this patient is confined to the home and needs intermittent skilled nursing care, physical therapy and/or speech therapy.  The patient is under my care, and I have initiated the establishment of the plan of care.  This patient will be followed by a physician who will periodically review the plan of care.  Physician/Provider to provide follow up care: Daysi Bryan    Attending [facility] physician (the Medicare certified [facility] provider): KALIE Alvarez CNP  Physician Signature: See electronic signature associated with these discharge orders.  Date: 5/24/2025      The above has been created using voice recognition software. Please be aware that this may unintentionally produce inaccuracies and/or nonsensical sentences.

## 2025-05-23 NOTE — LETTER
5/23/2025      Desirae Rizvicaroline  [address]   [place] [address]        [facility] GERIATRICS DISCHARGE SUMMARY  PATIENT'S NAME: Desirae Rey  YOB: 1950  MEDICAL RECORD NUMBER:  [id number]  Place of Service where encounter took place:  [facility] ([facility]) [[id number]]    PRIMARY CARE PROVIDER AND CLINIC RESPONSIBLE AFTER TRANSFER:   Daysi Bryan PA-C, [address] / [facility] [id number]    [facility] Provider     Transferring providers: KALIE Alvarez CNP,Pastora Esquivel MD  Recent Hospitalization/ED:  Hospital  [facility]  stay 4/24/25 to 4/30/25.  Date of SNF Admission: April 30, 2025  Date of SNF (anticipated) Discharge: May 27, 2025  Discharged to: previous independent home and with family  and son  Cognitive Scores: BIMS: 15/15 and SLUMS: 18/30  Physical Function: Pt is completing transfers with CGA and ambulating approx. 100 feet using the 4WW  DME: Wheelchair    CODE STATUS/ADVANCE DIRECTIVES DISCUSSION:  Full Code   ALLERGIES: Latex, Pregabalin, Valsartan, Ciprofloxacin, Colchicine, Dicloxacillin, Gabapentin, Latex, Other drug allergy (see comments), Other environmental allergy, Statins-hmg-coa reductase inhibitors [statins], Sulfa antibiotics, and Adhesive tape    NURSING FACILITY COURSE     Medication Changes/Rationale:   Magnesium increased to 500 mg twice daily due to muscle cramp  Pain medication changed to [place] per patient request  Furosemide changed to Bumex for fluid overload    History of Present Illness:   74 Y old female with PMH of  chronic respiratory failure on 3L oxygen at baseline, severe NELLIE on BiPap, frequent falls, cerebellar stroke, cervical fusion with loose hardware, HTN/HL, chronic diastolic CHF, and morbid obesity. Who recently admitted to hospital Post fall via EMS. Patient having had 8 ED admissions and 4 hospital admissions in the last 6 months due to fall and COPD exacerbation .Neurosurgery is recommending addressing her  cervical issues first prior to approaching her lumbar issues which likely are contributing to her recurrent falls. Patient transferred to TCU for rehabilitation and medical management.       Hospital course  Sacral nondisplaced fracture - Noted on x-ray imaging, reported by Radiology to more likely appear to be chronic. Will need to reassess with patient in the AM.   Loose cervical hardware - Previously noted on imaging with recommendation for NSGY follow-up. Daughter expresses interest in patient seeing Dr. Mckeon.     Skilled Nursing Facility Course:    Uneventful TCU stay progressed well with therapy will go home with [facility].  Today,  is seen in her room while sitting in a wheelchair.  She reports feeling better pain much controlled with [place] and ready to discharge back home with a supportive family and home health care.  Patient already has a follow-up scheduled with Dr. Mckeon on 6/11/2025 writer also recommended she makes a follow-up           Discharge Plan:  Patient is stable to discharge to home with home care services.  Follow up with PCP.     Summary of nursing facility stay:   (J96.21,  J96.22) Acute on chronic respiratory failure with hypoxia and hypercapnia (H)    (J44.9) Chronic obstructive pulmonary disease, unspecified COPD type (H)  (J30.9) Allergic rhinitis, unspecified seasonality, unspecified trigger  Comment: Acute on chronic Hx of NELLIE on chronic modafinil , and oxygen at baseline .  Plan:   - Continue Oxygen   - Continue Modafinil   - Continue Singular daily  - Continue Fluticasone -salmeterol -PRN  - Continue PRN albuterol  - Continue Zyrtec daily        (Z86.73) History of stroke  (M06.879) Other specified rheumatoid arthritis, unspecified ankle and foot (H)  (R29.6) Falls frequently  (R53.81) Physical deconditioning  (M62.81) Generalized muscle weakness  Comment: Acute on chronic deconditioned post acute illness/fall hx of CVA working with therapy-therapy to continue at home  Plan:   -  Continue Rosuvastatin daily   - Continue ASA  - PT/OT ongoing  - Encourage ambulation with assistance      (I50.33) Acute on chronic diastolic heart failure (H)  (I10) Benign essential hypertension  Comment: Acute on chronic Edema at baseline weight at 194.4 current weight 202.4 .SBP controlled ranging 130-140 adjustment deferred to primary care provider  Plan:   - Continue BP monitoring  - Daily weight   - Furosemide discontinued   - Continue Bumex 2 mg twice daily  - Continue Metoprolol succinate 50mg daily        (N18.32) Stage 3b chronic kidney disease (H)  Comment: Acute on chronic stable baseline Cr 1. recheck BMP periodically      (N32.81) Overactive bladder  Comment: Stable on Vesicare no acute concern      (K59.01) Slow transit constipation  Comment: Chronic constipation no BM in the last 2 days   Plan:   - Continue Senna.S BID with PRN therapy to continue at home      Discharge Medications:  MED REC REQUIRED{Post Medication Reconciliation Status: discharge medications reconciled and changed, per note/orders       Current Outpatient Medications   Medication Sig Dispense Refill     bumetanide (BUMEX) 1 MG tablet Take 2 tablets (2 mg) by mouth 2 times daily.       albuterol (PROAIR HFA;PROVENTIL HFA;VENTOLIN HFA) 90 mcg/actuation inhaler Inhale 1 puff into the lungs every 4 hours as needed for shortness of breath / dyspnea       alendronate (FOSAMAX) 70 MG tablet [ALENDRONATE (FOSAMAX) 70 MG TABLET] Take 70 mg by mouth every 7 days. Takes on Mondays 11     aspirin 81 MG EC tablet Take 81 mg by mouth daily.       azelastine (OPTIVAR) 0.05 % ophthalmic solution Place 1 drop into both eyes 2 times daily. 6 mL 0     azelastine (OPTIVAR) 0.05 % ophthalmic solution Place 1 drop into both eyes 2 times daily.       bumetanide (BUMEX) 2 MG tablet Take 2 mg by mouth 2 times daily.       calcium citrate (CITRACAL) 950 (200 Ca) MG tablet Take 1 tablet by mouth daily       cetirizine (ZYRTEC) 10 MG tablet  [CETIRIZINE (ZYRTEC) 10 MG TABLET] Take 10 mg by mouth daily.        cholecalciferol (VITAMIN D3) 25 mcg (1000 units) capsule Take 1 capsule by mouth daily.       CRESTOR 10 MG tablet Take 10 mg by mouth daily.       diclofenac (VOLTAREN) 1 % topical gel Apply 2 g topically 4 times daily. Apply to painful areas. Use supplied dosing card to measure dose. 50 g 0     DULoxetine (CYMBALTA) 30 MG capsule Take 60 mg by mouth every morning.       DULoxetine (CYMBALTA) 30 MG capsule Take 30 mg by mouth every evening.       esomeprazole (NEXIUM) 20 MG DR capsule Take 1 capsule (20 mg) by mouth every morning (before breakfast). Take 30-60 minutes before eating. 30 capsule 0     fluticasone-salmeterol (AIRDUO RESPICLICK) 113-14 MCG/ACT inhaler Inhale 1 puff into the lungs as needed (shortness of breath).       HYDROcodone-acetaminophen (NORCO) 5-325 MG tablet Take 1 tablet by mouth every 6 hours as needed for pain. 30 tablet 0     l-lysine HCl 500 MG TABS tablet Take 500 mg by mouth daily.       Magnesium Oxide -Mg Supplement 500 MG CAPS Take 500 mg by mouth 2 times daily.       metoprolol succinate ER (TOPROL XL) 50 MG 24 hr tablet Take 1 tablet (50 mg) by mouth daily. 30 tablet 1     modafinil (PROVIGIL) 100 MG tablet Take 2.5 tablets (250 mg) by mouth every morning. 45 tablet 0     montelukast (SINGULAIR) 10 mg tablet Take 10 mg by mouth every evening       nystatin (MYCOSTATIN) 553271 UNIT/GM external powder Apply topically 2 times daily.       OXYGEN-AIR DELIVERY SYSTEMS MISC [OXYGEN-AIR DELIVERY SYSTEMS MIS] Use 3 L As Directed. 3 lpm with activities and as needed at night       senna-docusate (SENOKOT-S/PERICOLACE) 8.6-50 MG tablet Take 1 tablet by mouth 2 times daily. Can also take 1 tab po BID PRN       solifenacin (VESICARE) 10 MG tablet Take 10 mg by mouth at bedtime.       Turmeric (CURCUPLEX-95) 500 MG CAPS Take 1 capsule by mouth daily        Controlled medications:   Restaurant remaining supply of Norco with  "patient less than 30 tab at time of discharge     Past Medical History:   Past Medical History:   Diagnosis Date     Allergic rhinitis      Arthritis of back      CHF (congestive heart failure) (H)      Chronic kidney disease     stage 3      De Quervain's disease (tenosynovitis)      Fibromyalgia, primary      GERD (gastroesophageal reflux disease)      Hematuria     chronic     High cholesterol      History of blood clots 09/01/1970    DVT, from birth control, h/o left calf     Hyperlipidemia      Hypertension      Low back pain      Osteoporosis      Pickwickian syndrome (H)      Plantar fasciitis      Proteinuria      Proteinuria     chronic     Sleep apnea     CPAP     Uncomplicated asthma      Physical Exam:   Vitals: /79   Pulse 62   Temp 97.9  F (36.6  C)   Resp 19   Ht 1.397 m (4' 7\")   Wt 92.9 kg (204 lb 12.8 oz)   SpO2 (!) 91%   BMI 47.60 kg/m    BMI: Body mass index is 47.6 kg/m .  GENERAL APPEARANCE:  Alert, in no distress  RESP:  respiratory effort and palpation of chest normal, on oxygen at baseline  CV:  regular rate and rhythm, no murmur, rub, or gallop, +2 edema  ABDOMEN:  normal bowel sounds, soft, nontender, no hepatosplenomegaly or other masses, no guarding or rebound, bowel sounds normal  :    Incontinent  M/S:   Ambulating with assistance  SKIN:  Inspection of skin and subcutaneous tissue baseline  NEURO:   Examination of sensation by touch normal  PSYCH:  oriented X 3     SNF labs: Most Recent 3 CBC's:  Recent Labs   Lab Test 04/10/25  0612 04/09/25  1819 03/31/25  0601 03/28/25  2214   WBC 8.0 7.7  --  8.2   HGB 11.7 12.0  --  11.7   MCV 96 95  --  97    384 231 238     Most Recent 3 BMP's:  Recent Labs   Lab Test 05/20/25  0517 05/19/25  0905 05/02/25  0505    138 142   POTASSIUM 4.8 6.0* 4.8   CHLORIDE 96* 98 101   CO2 33* 24 29   BUN 44.6* 42.3* 60.2*   CR 1.59* 1.43* 1.37*   ANIONGAP 10 16* 12   LYNN 9.7 9.8 9.8   GLC 98 83 108*       DISCHARGE PLAN:  Follow up " labs: No labs orders/due  Medical Follow Up:      Follow up with primary care provider in 1-2 weeks  [facility] scheduled appointments:  Appointments in Next Year      May 23, 2025 8:00 AM  Discharge Summary with KALIE Alvarez CNP  [facility] ([facility] ) [phone]     Jul 14, 2025 3:30 PM  (Arrive by 3:15 PM)  New Stroke with Alejandro Ames MD  [facility] [place] ([facility]) [phone]     Aug 21, 2025 2:20 PM  (Arrive by 2:05 PM)  New Cardiology with Vera Zamora MD  [facility] ([facility] ) [phone]           Discharge Services: Home Care:  Occupational Therapy, Physical Therapy, Registered Nurse, and Home Health Aide  Discharge Instructions Verbalized to Patient at Discharge:   None    TOTAL DISCHARGE TIME:   Greater than 30 minutes  Electronically signed by:  KALIE Alvarez CNP     Documentation of Face to Face and Certification for Home Health Services    I certify that patient: Desirae Rey is under my care and that I, or a nurse practitioner or physician's assistant working with me, had a face-to-face encounter that meets the physician face-to-face encounter requirements with this patient on: 5/23/2025.    This encounter with the patient was in whole, or in part, for the following medical condition, which is the primary reason for home health care: Physical deconditioning weakness and fall.    I certify that, based on my findings, the following services are medically necessary home health services: Nursing, Occupational Therapy, Physical Therapy, and HHA.    My clinical findings support the need for the above services because: Nurse is needed: For complex aftercare of surgical procedures because the patient needs instruction and cannot perform care on their own due to: Inability to manage new medications..,  Occupational Therapy Services are needed to assess and treat cognitive ability and address ADL safety due to impairment in physical deconditioning and the cognitive decline activity tolerance., Physical Therapy Services are needed to assess and treat the following functional impairments: And endurance., and HHA for personal care    Further, I certify that my clinical findings support that this patient is homebound (i.e. absences from home require considerable and taxing effort and are for medical reasons or [employer] services or infrequently or of short duration when for other reasons) because: Requires assistance of another person or specialized equipment to access medical services because patient: Range of motion limitations prevents ability to exit home safely. and Requires supervision of another for safe transfer...    Based on the above findings. I certify that this patient is confined to the home and needs intermittent skilled nursing care, physical therapy and/or speech therapy.  The patient is under my care, and I have initiated the establishment of the plan of care.  This patient will be followed by a physician who will periodically review the plan of care.  Physician/Provider to provide follow up care: Daysi Bryan    Attending hospital physician (the Medicare certified [place] provider): KALIE Alvarez CNP  Physician Signature: See electronic signature associated with these discharge orders.  Date: 5/24/2025      The above has been created using voice recognition software. Please be aware that this may unintentionally produce inaccuracies and/or nonsensical sentences.           Sincerely,        KALIE Alvarez CNP    Electronically signed

## 2025-05-24 RX ORDER — BUMETANIDE 1 MG/1
2 TABLET ORAL 2 TIMES DAILY
COMMUNITY
Start: 2025-05-24

## 2025-06-20 ENCOUNTER — LAB REQUISITION (OUTPATIENT)
Dept: LAB | Facility: CLINIC | Age: 75
End: 2025-06-20
Payer: MEDICARE

## 2025-06-20 ENCOUNTER — MEDICAL CORRESPONDENCE (OUTPATIENT)
Dept: HEALTH INFORMATION MANAGEMENT | Facility: CLINIC | Age: 75
End: 2025-06-20
Payer: MEDICARE

## 2025-06-20 DIAGNOSIS — Z11.1 ENCOUNTER FOR SCREENING FOR RESPIRATORY TUBERCULOSIS: ICD-10-CM

## 2025-06-21 DIAGNOSIS — G47.9 SLEEP DISORDER: ICD-10-CM

## 2025-06-21 RX ORDER — MODAFINIL 100 MG/1
250 TABLET ORAL EVERY MORNING
Qty: 15 TABLET | Refills: 0 | Status: SHIPPED | OUTPATIENT
Start: 2025-06-21 | End: 2025-06-24

## 2025-06-23 ENCOUNTER — LAB REQUISITION (OUTPATIENT)
Dept: LAB | Facility: CLINIC | Age: 75
End: 2025-06-23
Payer: MEDICARE

## 2025-06-23 ENCOUNTER — TRANSITIONAL CARE UNIT VISIT (OUTPATIENT)
Dept: GERIATRICS | Facility: CLINIC | Age: 75
End: 2025-06-23
Payer: MEDICARE

## 2025-06-23 ENCOUNTER — TRANSCRIBE ORDERS (OUTPATIENT)
Dept: OTHER | Age: 75
End: 2025-06-23

## 2025-06-23 VITALS
TEMPERATURE: 97 F | OXYGEN SATURATION: 95 % | HEART RATE: 68 BPM | DIASTOLIC BLOOD PRESSURE: 68 MMHG | BODY MASS INDEX: 46.24 KG/M2 | RESPIRATION RATE: 19 BRPM | SYSTOLIC BLOOD PRESSURE: 139 MMHG | WEIGHT: 199.8 LBS | HEIGHT: 55 IN

## 2025-06-23 DIAGNOSIS — M43.26 FUSION OF SPINE, LUMBAR REGION: ICD-10-CM

## 2025-06-23 DIAGNOSIS — M54.12 CERVICAL RADICULOPATHY: ICD-10-CM

## 2025-06-23 DIAGNOSIS — I50.32 CHRONIC HEART FAILURE WITH PRESERVED EJECTION FRACTION (HFPEF) (H): ICD-10-CM

## 2025-06-23 DIAGNOSIS — G62.9 PERIPHERAL POLYNEUROPATHY: ICD-10-CM

## 2025-06-23 DIAGNOSIS — R62.7 ADULT FAILURE TO THRIVE: ICD-10-CM

## 2025-06-23 DIAGNOSIS — M48.062 SPINAL STENOSIS OF LUMBAR REGION WITH NEUROGENIC CLAUDICATION: ICD-10-CM

## 2025-06-23 DIAGNOSIS — Z98.1 S/P CERVICAL SPINAL FUSION: ICD-10-CM

## 2025-06-23 DIAGNOSIS — I42.9 CARDIOMYOPATHY, UNSPECIFIED TYPE (H): ICD-10-CM

## 2025-06-23 DIAGNOSIS — J96.22 ACUTE ON CHRONIC RESPIRATORY FAILURE WITH HYPOXIA AND HYPERCAPNIA (H): Primary | ICD-10-CM

## 2025-06-23 DIAGNOSIS — K59.01 SLOW TRANSIT CONSTIPATION: ICD-10-CM

## 2025-06-23 DIAGNOSIS — Z11.1 ENCOUNTER FOR SCREENING FOR RESPIRATORY TUBERCULOSIS: ICD-10-CM

## 2025-06-23 DIAGNOSIS — R53.81 PHYSICAL DECONDITIONING: ICD-10-CM

## 2025-06-23 DIAGNOSIS — J96.21 ACUTE ON CHRONIC RESPIRATORY FAILURE WITH HYPOXIA AND HYPERCAPNIA (H): Primary | ICD-10-CM

## 2025-06-23 DIAGNOSIS — I63.81 LACUNAR STROKE (H): ICD-10-CM

## 2025-06-23 DIAGNOSIS — Z91.81 HISTORY OF FALLING: Primary | ICD-10-CM

## 2025-06-23 LAB — QUANTIFERON TB1 TUBE: 0 IU/ML

## 2025-06-23 PROCEDURE — 36415 COLL VENOUS BLD VENIPUNCTURE: CPT | Mod: ORL | Performed by: FAMILY MEDICINE

## 2025-06-23 PROCEDURE — P9604 ONE-WAY ALLOW PRORATED TRIP: HCPCS | Mod: ORL | Performed by: FAMILY MEDICINE

## 2025-06-23 RX ORDER — POLYETHYLENE GLYCOL 3350 17 G/17G
17 POWDER, FOR SOLUTION ORAL 2 TIMES DAILY
COMMUNITY
Start: 2025-06-20

## 2025-06-23 RX ORDER — OXYCODONE HYDROCHLORIDE 5 MG/1
5 TABLET ORAL EVERY 4 HOURS PRN
Qty: 20 TABLET | Refills: 0 | Status: SHIPPED | OUTPATIENT
Start: 2025-06-23

## 2025-06-23 RX ORDER — OXYCODONE HYDROCHLORIDE 5 MG/1
5 TABLET ORAL EVERY 4 HOURS PRN
COMMUNITY
Start: 2025-06-20 | End: 2025-06-23

## 2025-06-23 RX ORDER — TIZANIDINE 2 MG/1
2 TABLET ORAL EVERY 6 HOURS PRN
COMMUNITY
Start: 2025-06-20 | End: 2025-07-20

## 2025-06-23 RX ORDER — ACETAMINOPHEN 500 MG
1000 TABLET ORAL 4 TIMES DAILY
COMMUNITY
Start: 2025-06-20 | End: 2025-06-30

## 2025-06-23 RX ORDER — BISACODYL 10 MG
10 SUPPOSITORY, RECTAL RECTAL DAILY PRN
COMMUNITY
Start: 2025-06-20 | End: 2025-06-30

## 2025-06-23 NOTE — LETTER
6/23/2025      Desirae Rey  [address]   [place] [address]        [facility] Admission  PCP & CLINIC: Daysi Bryan PA-C, [address] / [facility] [place] [address]  Chief Complaint   Patient presents with     Hospital F/U    Eleanor MRN: [id number]. Place of Service where encounter took place:  [facility] (U) [[id number]] Desirae Rey  is a 74 year old  (1950), admitted to the above facility from  [facility] . Hospital stay 6/7/25 through 6/20/25. Admitted to this facility for  rehab, medical management, and nursing care. HPI information obtained from: facility chart records, facility staff, patient report, [facility] chart review, and Care Everywhere [facility] chart review.     Brief Summary of Hospital Course: Desirae presented to [facility] on 6/7 s/p fall at home. 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  Her hospital course was complicated by UTI, urinary retention with robledo, metabolic encephalopathy, cardiomyopathy and HFpEF. Of note 8 ED admissions and 4 hospital admissions in the last 6 months due to fall and COPD exacerbation, recent TCU stay 4/30-5/27 and recent LLE cellulitis.     Updates since admission to transitional care unit: Desirae presented to TCU on 6/20 s/p the above hospitalization. Today, Desirae is in her wheelchair. She has been able to stand and uses wheelchair for mobility. She reports having small hard bowel movements and feels constipated. She reports pain in her neck that feels like burning. She has been using the oxycodone 5mg daily along with scheduled tylenol. She reports she uses Biofreeze at home that helps. Had difficulty sleeping last night but melatonin was not helpful. Denies shortness of breath at rest, she remains on her 3L O2 via NC which is her baseline. She denies CP, palpitations, but does have SOB w/ activity.     CODE STATUS/ADVANCE DIRECTIVES  DISCUSSION: CPR/Full code. Patient's living condition: lives with spouse. ALLERGIES: Latex, Pregabalin, Valsartan, Ciprofloxacin, Colchicine, Dicloxacillin, Gabapentin, Latex, Other drug allergy (see comments), Other environmental allergy, Statins-hmg-coa reductase inhibitors [statins], Sulfa antibiotics, and Adhesive tape PAST MEDICAL HISTORY:  has a past medical history of Allergic rhinitis, Arthritis of back, CHF (congestive heart failure) (H), Chronic kidney disease, De Quervain's disease (tenosynovitis), Fibromyalgia, primary, GERD (gastroesophageal reflux disease), Hematuria, High cholesterol, History of blood clots (09/01/1970), Hyperlipidemia, Hypertension, Low back pain, Osteoporosis, Pickwickian syndrome (H), Plantar fasciitis, Proteinuria, Proteinuria, Sleep apnea, and Uncomplicated asthma.. PAST SURGICAL HISTORY:   has a past surgical history that includes knee surgery; Hand surgery (Right); Release carpal tunnel (Bilateral); joint replacement; Cholecystectomy; Hysteroscopy Diagnostic; Cervical Fusion (N/A, 4/27/2017); Eye surgery; and Colonoscopy (N/A, 12/20/2019).. FAMILY HISTORY: family history includes Chronic Obstructive Pulmonary Disease in her father; Heart Disease in her sister; Rheumatoid Arthritis in her mother.. SOCIAL HISTORY:   reports that she has never smoked. She has never used smokeless tobacco. She reports that she does not drink alcohol and does not use drugs.  Post Discharge Medication Reconciliation Status: discharge medications reconciled and changed, per note/orders.  Current Outpatient Medications   Medication Sig Dispense Refill     acetaminophen (TYLENOL) 500 MG tablet Take 1,000 mg by mouth 4 times daily.       albuterol (PROAIR HFA;PROVENTIL HFA;VENTOLIN HFA) 90 mcg/actuation inhaler Inhale 1 puff into the lungs every 4 hours as needed for shortness of breath / dyspnea       azelastine (OPTIVAR) 0.05 % ophthalmic solution Place 1 drop into both eyes 2 times daily. 6 mL 0      bisacodyl (DULCOLAX) 10 MG suppository Place 10 mg rectally daily as needed.       bumetanide (BUMEX) 1 MG tablet Take 2 mg by mouth daily.       calcium citrate (CITRACAL) 950 (200 Ca) MG tablet Take 1 tablet by mouth daily       cetirizine (ZYRTEC) 10 MG tablet [CETIRIZINE (ZYRTEC) 10 MG TABLET] Take 10 mg by mouth daily.        cholecalciferol (VITAMIN D3) 25 mcg (1000 units) capsule Take 1 capsule by mouth daily.       CRESTOR 10 MG tablet Take 10 mg by mouth daily.       DULoxetine (CYMBALTA) 30 MG capsule Take 60 mg by mouth every morning.       DULoxetine (CYMBALTA) 30 MG capsule Take 30 mg by mouth every evening.       esomeprazole (NEXIUM) 20 MG DR capsule Take 1 capsule (20 mg) by mouth every morning (before breakfast). Take 30-60 minutes before eating. 30 capsule 0     fluticasone-salmeterol (AIRDUO RESPICLICK) 113-14 MCG/ACT inhaler Inhale 1 puff into the lungs as needed (shortness of breath).       l-lysine HCl 500 MG TABS tablet Take 500 mg by mouth daily.       Magnesium Oxide -Mg Supplement 500 MG CAPS Take 500 mg by mouth 2 times daily.       metoprolol succinate ER (TOPROL XL) 50 MG 24 hr tablet Take 1 tablet (50 mg) by mouth daily. 30 tablet 1     montelukast (SINGULAIR) 10 mg tablet Take 10 mg by mouth every evening       nystatin (MYCOSTATIN) 762890 UNIT/GM external powder Apply topically 2 times daily.       oxyCODONE (ROXICODONE) 5 MG tablet Take 1 tablet (5 mg) by mouth every 4 hours as needed for severe pain. 20 tablet 0     polyethylene glycol (MIRALAX) 17 GM/Dose powder Take 17 g by mouth 2 times daily.       senna-docusate (SENOKOT-S/PERICOLACE) 8.6-50 MG tablet Take 1 tablet by mouth 2 times daily. Can also take 1 tab po BID PRN       solifenacin (VESICARE) 10 MG tablet Take 10 mg by mouth at bedtime.       tiZANidine (ZANAFLEX) 2 MG tablet Take 2 mg by mouth every 6 hours as needed.       Turmeric (CURCUPLEX-95) 500 MG CAPS Take 1 capsule by mouth daily       alendronate (FOSAMAX) 70 MG  "tablet [ALENDRONATE (FOSAMAX) 70 MG TABLET] Take 70 mg by mouth every 7 days. Takes on Mondays 11     azelastine (OPTIVAR) 0.05 % ophthalmic solution Place 1 drop into both eyes 2 times daily.       diclofenac (VOLTAREN) 1 % topical gel Apply 2 g topically 4 times daily. Apply to painful areas. Use supplied dosing card to measure dose. 50 g 0     modafinil (PROVIGIL) 100 MG tablet Take 2.5 tablets (250 mg) by mouth every morning. 30 tablet 0     OXYGEN-AIR DELIVERY SYSTEMS MISC [OXYGEN-AIR DELIVERY SYSTEMS MISC] Use 3 L As Directed. 3 lpm with activities and as needed at night       ROS: 10 point ROS of systems including Constitutional, Eyes, Respiratory, Cardiovascular, Gastroenterology, Genitourinary, Integumentary, Musculoskeletal, Psychiatric were all negative except for pertinent positives noted in my HPI.  Vitals: /68   Pulse 68   Temp 97  F (36.1  C)   Resp 19   Ht 1.397 m (4' 7\")   Wt 90.6 kg (199 lb 12.8 oz)   SpO2 95%   BMI 46.44 kg/m    Exam:  GENERAL APPEARANCE: Alert, in no distress, cooperative.   ENT: Mouth/posterior oropharynx intact w/ moist mucous membranes, hearing acuity [place].  EYES: EOM, conjunctivae, lids, pupils and irises normal, PERRL2.   RESP: Respiratory effort regular, no respiratory distress, Lung sounds clear, diminished throughout with shallow breathes. On 3L O2 via NC.   CV: Auscultation of heart reveals S1, S2, rate and rhythm regular, no murmur, no rub or gallop, Edema dependent nonpitting. Peripheral pulses are 2+.  ABDOMEN: Normal bowel sounds, soft, non-tender abdomen, and no masses palpated.  SKIN: Inspection/Palpation of skin and subcutaneous tissue baseline w/ fragility. No other wounds/rashes noted except cervical incision covered with island dressing, no drainage noted, no erythema or swelling around the site.   NEURO: CN II-XII at patient's baseline, sensation baseline PPS- BLE neuropathy. Weakness in left arm at baseline.  PSYCH: Insight, judgement, and memory " are intact, affect and mood are appropriate.    Lab/Diagnostic data: Recent labs in [facility] reviewed by me today.     ASSESSMENT/PLAN:  History of falling  Cervical radiculopathy  Spinal stenosis of lumbar region with neurogenic claudication  S/P cervical spinal fusion  Chronic heart failure with preserved ejection fraction (HFpEF) (H)  Peripheral polyneuropathy  Physical deconditioning  Slow transit constipation  Acute on chronic. Complex/Tenuous.   Provider reviewed records from hospitalization, facility, and interpreted most recent imaging/lab work (BMP, CBC), and vital signs, each with unique characteristics requiring MDM.   Provider discussed care and management with nursing, SW, and rehab team:   reports this patient will likely discharge home w/ her spouse.   Rehab team noting last SLUMS was 18/30 during last admission. They will retest this and have started rehab w/ Desirae able to walk 40ft w/ 2ww.   Nursing noted that Desirae discharged from facility w/in the last 60 days.   PostOp spine:  Increase senna- docusate to 2 tablets BID from 1 tablet BID for constipation.  Increase hydration and activity as tolerated.   Will decrease oxycodone dose to 5mg from 5mg-10mg PRN [id number].  Neurosurgery recommended addressing her cervical issues first prior to approaching her lumbar issues which likely are contributing to her recurrent falls. She will follow up with them in the beginning of July has appointments 7/1 and 7/29.   CHF/hypoxia/COPD:  On 3L NC at baseline, NELLIE on bipap/CPAP at HS.   Weights stable, 199.8lbs today, 200lbs on 6/21. Her baseline is around 200lbs. Remains on 1mg Bumex once daily. No changes necessary, patient symptoms at baseline.    Repeat CMP and CBC, last hgb 9.3 and creatinine 1.11.   Recommended by hospital to followup with palliative care outpatient. Will followup with her next visit to see if she has been contacted or reached out to them.   Provider initiated advanced care  planning and code status discussion directly with patient. Desirae would like to remain a full code at this time, which is consistent with her previous records. A POLST was signed at the facility, and a Full Code order was entered into [employer] as part of this admission encounter.   Follow up w/in 1 week or as needed.    Orders:  Decrease Oxycodone to 5mg PO Q4h PRN. Dx: severe pain.   Increase Senna-S to 2 tabs PO BID. Dx: constipation.    CBC, CMP x1 on 6/26. Dx: anemia, CKD.    I (the provider) was present with professional student who participated in this service and in the documentation of this service. I have verified the HPI/ROS, history, and personally performed the physical exam and the healthcare decision making. I agree with all elements as documented above.      Electronically signed by:  KALIE Riley CNP [facility]                        Sincerely,        KALIE Sears CNP    Electronically signed

## 2025-06-23 NOTE — PROGRESS NOTES
[facility]  PCP & CLINIC: Daysi Bryan PA-C, [address] / [facility] [address]  Chief Complaint   Patient presents with    Hospital F/U    Eleanor MRN: [id number]. Place of Service where encounter took place:  [facility] (TCU) [[id number]] Desirae Rey  is a 74 year old  (1950), admitted to the above facility from  [facility] . Hospital stay 6/7/25 through 6/20/25. Admitted to this facility for  rehab, medical management, and nursing care. HPI information obtained from: facility chart records, facility staff, patient report, [facility] chart review, and Care Everywhere [facility] chart review.     Brief Summary of Hospital Course: Desirae presented to [facility] on 6/7 s/p fall at home. 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  Her hospital course was complicated by UTI, urinary retention with robledo, metabolic encephalopathy, cardiomyopathy and HFpEF. Of note 8 ED admissions and 4 hospital admissions in the last 6 months due to fall and COPD exacerbation, recent TCU stay 4/30-5/27 and recent LLE cellulitis.     Updates since admission to transitional care unit: Desirae presented to TCU on 6/20 s/p the above hospitalization. Today, Desirae is in her wheelchair. She has been able to stand and uses wheelchair for mobility. She reports having small hard bowel movements and feels constipated. She reports pain in her neck that feels like burning. She has been using the oxycodone 5mg daily along with scheduled tylenol. She reports she uses Biofreeze at home that helps. Had difficulty sleeping last night but melatonin was not helpful. Denies shortness of breath at rest, she remains on her 3L O2 via NC which is her baseline. She denies CP, palpitations, but does have SOB w/ activity.     CODE STATUS/ADVANCE DIRECTIVES DISCUSSION: CPR/Full code. Patient's living condition: lives with spouse. ALLERGIES: Latex,  Pregabalin, Valsartan, Ciprofloxacin, Colchicine, Dicloxacillin, Gabapentin, Latex, Other drug allergy (see comments), Other environmental allergy, Statins-hmg-coa reductase inhibitors [statins], Sulfa antibiotics, and Adhesive tape PAST MEDICAL HISTORY:  has a past medical history of Allergic rhinitis, Arthritis of back, CHF (congestive heart failure) (H), Chronic kidney disease, De Quervain's disease (tenosynovitis), Fibromyalgia, primary, GERD (gastroesophageal reflux disease), Hematuria, High cholesterol, History of blood clots (09/01/1970), Hyperlipidemia, Hypertension, Low back pain, Osteoporosis, Pickwickian syndrome (H), Plantar fasciitis, Proteinuria, Proteinuria, Sleep apnea, and Uncomplicated asthma.. PAST SURGICAL HISTORY:   has a past surgical history that includes knee surgery; Hand surgery (Right); Release carpal tunnel (Bilateral); joint replacement; Cholecystectomy; Hysteroscopy Diagnostic; Cervical Fusion (N/A, 4/27/2017); Eye surgery; and Colonoscopy (N/A, 12/20/2019).. FAMILY HISTORY: family history includes Chronic Obstructive Pulmonary Disease in her father; Heart Disease in her sister; Rheumatoid Arthritis in her mother.. SOCIAL HISTORY:   reports that she has never smoked. She has never used smokeless tobacco. She reports that she does not drink alcohol and does not use drugs.  Post Discharge Medication Reconciliation Status: discharge medications reconciled and changed, per note/orders.  Current Outpatient Medications   Medication Sig Dispense Refill    acetaminophen (TYLENOL) 500 MG tablet Take 1,000 mg by mouth 4 times daily.      albuterol (PROAIR HFA;PROVENTIL HFA;VENTOLIN HFA) 90 mcg/actuation inhaler Inhale 1 puff into the lungs every 4 hours as needed for shortness of breath / dyspnea      azelastine (OPTIVAR) 0.05 % ophthalmic solution Place 1 drop into both eyes 2 times daily. 6 mL 0    bisacodyl (DULCOLAX) 10 MG suppository Place 10 mg rectally daily as needed.      bumetanide  (BUMEX) 1 MG tablet Take 2 mg by mouth daily.      calcium citrate (CITRACAL) 950 (200 Ca) MG tablet Take 1 tablet by mouth daily      cetirizine (ZYRTEC) 10 MG tablet [CETIRIZINE (ZYRTEC) 10 MG TABLET] Take 10 mg by mouth daily.       cholecalciferol (VITAMIN D3) 25 mcg (1000 units) capsule Take 1 capsule by mouth daily.      CRESTOR 10 MG tablet Take 10 mg by mouth daily.      DULoxetine (CYMBALTA) 30 MG capsule Take 60 mg by mouth every morning.      DULoxetine (CYMBALTA) 30 MG capsule Take 30 mg by mouth every evening.      esomeprazole (NEXIUM) 20 MG DR capsule Take 1 capsule (20 mg) by mouth every morning (before breakfast). Take 30-60 minutes before eating. 30 capsule 0    fluticasone-salmeterol (AIRDUO RESPICLICK) 113-14 MCG/ACT inhaler Inhale 1 puff into the lungs as needed (shortness of breath).      l-lysine HCl 500 MG TABS tablet Take 500 mg by mouth daily.      Magnesium Oxide -Mg Supplement 500 MG CAPS Take 500 mg by mouth 2 times daily.      metoprolol succinate ER (TOPROL XL) 50 MG 24 hr tablet Take 1 tablet (50 mg) by mouth daily. 30 tablet 1    montelukast (SINGULAIR) 10 mg tablet Take 10 mg by mouth every evening      nystatin (MYCOSTATIN) 530044 UNIT/GM external powder Apply topically 2 times daily.      oxyCODONE (ROXICODONE) 5 MG tablet Take 1 tablet (5 mg) by mouth every 4 hours as needed for severe pain. 20 tablet 0    polyethylene glycol (MIRALAX) 17 GM/Dose powder Take 17 g by mouth 2 times daily.      senna-docusate (SENOKOT-S/PERICOLACE) 8.6-50 MG tablet Take 1 tablet by mouth 2 times daily. Can also take 1 tab po BID PRN      solifenacin (VESICARE) 10 MG tablet Take 10 mg by mouth at bedtime.      tiZANidine (ZANAFLEX) 2 MG tablet Take 2 mg by mouth every 6 hours as needed.      Turmeric (CURCUPLEX-95) 500 MG CAPS Take 1 capsule by mouth daily      alendronate (FOSAMAX) 70 MG tablet [ALENDRONATE (FOSAMAX) 70 MG TABLET] Take 70 mg by mouth every 7 days. Takes on Mondays 11    azelastine  "(OPTIVAR) 0.05 % ophthalmic solution Place 1 drop into both eyes 2 times daily.      diclofenac (VOLTAREN) 1 % topical gel Apply 2 g topically 4 times daily. Apply to painful areas. Use supplied dosing card to measure dose. 50 g 0    modafinil (PROVIGIL) 100 MG tablet Take 2.5 tablets (250 mg) by mouth every morning. 30 tablet 0    OXYGEN-AIR DELIVERY SYSTEMS MISC [OXYGEN-AIR DELIVERY SYSTEMS MISC] Use 3 L As Directed. 3 lpm with activities and as needed at night       ROS: 10 point ROS of systems including Constitutional, Eyes, Respiratory, Cardiovascular, Gastroenterology, Genitourinary, Integumentary, Musculoskeletal, Psychiatric were all negative except for pertinent positives noted in my HPI.  Vitals: /68   Pulse 68   Temp 97  F (36.1  C)   Resp 19   Ht 1.397 m (4' 7\")   Wt 90.6 kg (199 lb 12.8 oz)   SpO2 95%   BMI 46.44 kg/m    Exam:  GENERAL APPEARANCE: Alert, in no distress, cooperative.   ENT: Mouth/posterior oropharynx intact w/ moist mucous membranes, hearing acuity [place].  EYES: EOM, conjunctivae, lids, pupils and irises normal, PERRL2.   RESP: Respiratory effort regular, no respiratory distress, Lung sounds clear, diminished throughout with shallow breathes. On 3L O2 via NC.   CV: Auscultation of heart reveals S1, S2, rate and rhythm regular, no murmur, no rub or gallop, Edema dependent nonpitting. Peripheral pulses are 2+.  ABDOMEN: Normal bowel sounds, soft, non-tender abdomen, and no masses palpated.  SKIN: Inspection/Palpation of skin and subcutaneous tissue baseline w/ fragility. No other wounds/rashes noted except cervical incision covered with island dressing, no drainage noted, no erythema or swelling around the site.   NEURO: CN II-XII at patient's baseline, sensation baseline PPS- BLE neuropathy. Weakness in left arm at baseline.  PSYCH: Insight, judgement, and memory are intact, affect and mood are appropriate.    Lab/Diagnostic data: Recent labs in [facility] reviewed by me today. "     ASSESSMENT/PLAN:  History of falling  Cervical radiculopathy  Spinal stenosis of lumbar region with neurogenic claudication  S/P cervical spinal fusion  Chronic heart failure with preserved ejection fraction (HFpEF) (H)  Peripheral polyneuropathy  Physical deconditioning  Slow transit constipation  Acute on chronic. Complex/Tenuous.   Provider reviewed records from hospitalization, facility, and interpreted most recent imaging/lab work (BMP, CBC), and vital signs, each with unique characteristics requiring MDM.   Provider discussed care and management with nursing, SW, and rehab team:   reports this patient will likely discharge home w/ her spouse.   Rehab team noting last SLUMS was 18/30 during last admission. They will retest this and have started rehab w/ Desirae able to walk 40ft w/ 2ww.   Nursing noted that Desirae discharged from facility w/in the last 60 days.   PostOp spine:  Increase senna- docusate to 2 tablets BID from 1 tablet BID for constipation.  Increase hydration and activity as tolerated.   Will decrease oxycodone dose to 5mg from 5mg-10mg PRN [id number].  Neurosurgery recommended addressing her cervical issues first prior to approaching her lumbar issues which likely are contributing to her recurrent falls. She will follow up with them in the beginning of July has appointments 7/1 and 7/29.   CHF/hypoxia/COPD:  On 3L NC at baseline, NELLIE on bipap/CPAP at HS.   Weights stable, 199.8lbs today, 200lbs on 6/21. Her baseline is around 200lbs. Remains on 1mg Bumex once daily. No changes necessary, patient symptoms at baseline.    Repeat CMP and CBC, last hgb 9.3 and creatinine 1.11.   Recommended by hospital to followup with palliative care outpatient. Will followup with her next visit to see if she has been contacted or reached out to them.   Provider initiated advanced care planning and code status discussion directly with patient. Desirae would like to remain a full code at this time, which  is consistent with her previous records. A POLST was signed at the facility, and a Full Code order was entered into [employer] as part of this admission encounter.   Follow up w/in 1 week or as needed.    Orders:  Decrease Oxycodone to 5mg PO Q4h PRN. Dx: severe pain.   Increase Senna-S to 2 tabs PO BID. Dx: constipation.    CBC, CMP x1 on 6/26. Dx: anemia, CKD.    I (the provider) was present with professional student who participated in this service and in the documentation of this service. I have verified the HPI/ROS, history, and personally performed the physical exam and the healthcare decision making. I agree with all elements as documented above.      Electronically signed by:  Dr. Natali Chakraborty, APRN CNP DNP

## 2025-06-24 ENCOUNTER — TELEPHONE (OUTPATIENT)
Dept: GERIATRICS | Facility: CLINIC | Age: 75
End: 2025-06-24
Payer: MEDICARE

## 2025-06-24 DIAGNOSIS — G47.9 SLEEP DISORDER: ICD-10-CM

## 2025-06-24 PROCEDURE — P9604 ONE-WAY ALLOW PRORATED TRIP: HCPCS | Mod: ORL | Performed by: FAMILY MEDICINE

## 2025-06-24 PROCEDURE — 36415 COLL VENOUS BLD VENIPUNCTURE: CPT | Mod: ORL | Performed by: FAMILY MEDICINE

## 2025-06-24 PROCEDURE — 86481 TB AG RESPONSE T-CELL SUSP: CPT | Mod: ORL | Performed by: FAMILY MEDICINE

## 2025-06-24 RX ORDER — MODAFINIL 100 MG/1
250 TABLET ORAL EVERY MORNING
Qty: 30 TABLET | Refills: 0 | Status: SHIPPED | OUTPATIENT
Start: 2025-06-24

## 2025-06-24 NOTE — TELEPHONE ENCOUNTER
"[facility] T[facility] Call    Provider: KALIE Valentine CNP, DNP  Facility: [facility]  Facility Type:  TCU    Caller: Marcia  Call Back Number: [phone]    Allergies:    Allergies   Allergen Reactions    Latex Rash     Severe rash    Pregabalin Shortness Of Breath     Other Reaction(s): swelling and shortness of breath    Valsartan Unknown     Hyperkalemia    Ciprofloxacin Visual Disturbance, Hallucination and Confusion     Likely contributed visual hallucination and confusion    Colchicine Diarrhea    Dicloxacillin      Other Reaction(s): confusion, says she has tolerated augmentin without difficulty.     Gabapentin      Other Reaction(s): Sedation    Latex Unknown     Added based on information entered during case entry, please review and add reactions, type, and severity as needed    Other Drug Allergy (See Comments) Muscle Pain (Myalgia)     Tried lovastatin and simvastatin    Other Environmental Allergy Unknown     Coverlet bandaid , 3M product; rash    Statins-Hmg-Coa Reductase Inhibitors [Statins] Muscle Pain (Myalgia)     Tried lovastatin and simvastatin    Sulfa Antibiotics Swelling     Facial swelling      Adhesive Tape Rash          SBAR:     S-(situation): Today this is calling to report date on routine checks the oxygen saturations were 84 to 85%.  The patient had her CPAP on with her orders of 3 L continuous.  Her oxygen was increased to 4 L and was satting 90 to 91%.     B-(background): History of CHF, on Bumex 1 mg daily.     A-(assessment): Oxygen saturations today 90% on 4 L.  No cough or shortness of breath, no increased edema.  Weight is currently down 1 pound from admission.  Today 199.8    R-(recommendations): Nursing requesting to increase oxygen to 4 L.       Telephone encounter sent to:  KALIE Valentine CNP, DNP    Please send response/orders to \"Geriatrics Nurse Pool\"    Tara Ace RN      "

## 2025-06-24 NOTE — TELEPHONE ENCOUNTER
[facility]   2025     Name: Desirae Rey   : 1950     Orders:  Agree to increase w/ sats goal 88% or above. I personally did assess her lungs yesterday and though diminished, they were clear and there is little concern for postop pneumonia, CHF or COPD exacerbation. Should hypoxia continue, we can consider a CXR. Blood work is already scheduled for .     Electronically signed by:  KALIE Riley CNP DNP

## 2025-06-25 PROBLEM — F11.20 OPIOID DEPENDENCE (H): Status: RESOLVED | Noted: 2023-02-16 | Resolved: 2025-06-25

## 2025-06-25 LAB
GAMMA INTERFERON BACKGROUND BLD IA-ACNC: 0.15 IU/ML
M TB IFN-G BLD-IMP: NEGATIVE
M TB IFN-G CD4+ BCKGRND COR BLD-ACNC: 9.85 IU/ML
MITOGEN IGNF BCKGRD COR BLD-ACNC: 0.01 IU/ML
MITOGEN IGNF BCKGRD COR BLD-ACNC: 0.01 IU/ML
QUANTIFERON MITOGEN: 10 IU/ML
QUANTIFERON NIL TUBE: 0.15 IU/ML
QUANTIFERON TB1 TUBE: 0.16 IU/ML
QUANTIFERON TB2 TUBE: 0.16

## 2025-06-26 ENCOUNTER — RESULTS FOLLOW-UP (OUTPATIENT)
Dept: GERIATRICS | Facility: CLINIC | Age: 75
End: 2025-06-26

## 2025-06-26 LAB
ALBUMIN SERPL BCG-MCNC: 3.5 G/DL (ref 3.5–5.2)
ALP SERPL-CCNC: 94 U/L (ref 40–150)
ALT SERPL W P-5'-P-CCNC: 9 U/L (ref 0–50)
ANION GAP SERPL CALCULATED.3IONS-SCNC: 11 MMOL/L (ref 7–15)
AST SERPL W P-5'-P-CCNC: 22 U/L (ref 0–45)
BILIRUB SERPL-MCNC: 0.2 MG/DL
BUN SERPL-MCNC: 47.8 MG/DL (ref 8–23)
CALCIUM SERPL-MCNC: 9.5 MG/DL (ref 8.8–10.4)
CHLORIDE SERPL-SCNC: 98 MMOL/L (ref 98–107)
CREAT SERPL-MCNC: 1.25 MG/DL (ref 0.51–0.95)
EGFRCR SERPLBLD CKD-EPI 2021: 45 ML/MIN/1.73M2
ERYTHROCYTE [DISTWIDTH] IN BLOOD BY AUTOMATED COUNT: 13.3 % (ref 10–15)
GLUCOSE SERPL-MCNC: 97 MG/DL (ref 70–99)
HCO3 SERPL-SCNC: 27 MMOL/L (ref 22–29)
HCT VFR BLD AUTO: 29.7 % (ref 35–47)
HGB BLD-MCNC: 8.8 G/DL (ref 11.7–15.7)
MCH RBC QN AUTO: 30.2 PG (ref 26.5–33)
MCHC RBC AUTO-ENTMCNC: 29.6 G/DL (ref 31.5–36.5)
MCV RBC AUTO: 102 FL (ref 78–100)
PLATELET # BLD AUTO: 352 10E3/UL (ref 150–450)
POTASSIUM SERPL-SCNC: 5 MMOL/L (ref 3.4–5.3)
PROT SERPL-MCNC: 6.4 G/DL (ref 6.4–8.3)
RBC # BLD AUTO: 2.91 10E6/UL (ref 3.8–5.2)
SODIUM SERPL-SCNC: 136 MMOL/L (ref 135–145)
WBC # BLD AUTO: 7 10E3/UL (ref 4–11)

## 2025-06-26 PROCEDURE — 80053 COMPREHEN METABOLIC PANEL: CPT | Mod: ORL | Performed by: NURSE PRACTITIONER

## 2025-06-26 PROCEDURE — P9603 ONE-WAY ALLOW PRORATED MILES: HCPCS | Mod: ORL | Performed by: NURSE PRACTITIONER

## 2025-06-26 PROCEDURE — 36415 COLL VENOUS BLD VENIPUNCTURE: CPT | Mod: ORL | Performed by: NURSE PRACTITIONER

## 2025-06-26 PROCEDURE — 85027 COMPLETE CBC AUTOMATED: CPT | Mod: ORL | Performed by: NURSE PRACTITIONER

## 2025-06-30 ENCOUNTER — TRANSITIONAL CARE UNIT VISIT (OUTPATIENT)
Dept: GERIATRICS | Facility: CLINIC | Age: 75
End: 2025-06-30
Payer: MEDICARE

## 2025-06-30 VITALS
RESPIRATION RATE: 17 BRPM | TEMPERATURE: 97.2 F | BODY MASS INDEX: 46.4 KG/M2 | HEIGHT: 55 IN | OXYGEN SATURATION: 94 % | HEART RATE: 82 BPM | DIASTOLIC BLOOD PRESSURE: 65 MMHG | SYSTOLIC BLOOD PRESSURE: 141 MMHG | WEIGHT: 200.5 LBS

## 2025-06-30 DIAGNOSIS — N39.46 MIXED INCONTINENCE: ICD-10-CM

## 2025-06-30 DIAGNOSIS — J44.9 CHRONIC OBSTRUCTIVE PULMONARY DISEASE, UNSPECIFIED COPD TYPE (H): ICD-10-CM

## 2025-06-30 DIAGNOSIS — N18.32 STAGE 3B CHRONIC KIDNEY DISEASE (H): ICD-10-CM

## 2025-06-30 DIAGNOSIS — Z98.1 S/P CERVICAL SPINAL FUSION: ICD-10-CM

## 2025-06-30 DIAGNOSIS — J45.40 MODERATE PERSISTENT ASTHMA, UNSPECIFIED WHETHER COMPLICATED: ICD-10-CM

## 2025-06-30 DIAGNOSIS — I50.32 CHRONIC DIASTOLIC HEART FAILURE (H): ICD-10-CM

## 2025-06-30 DIAGNOSIS — M54.12 CERVICAL RADICULOPATHY: Primary | ICD-10-CM

## 2025-06-30 PROCEDURE — 99310 SBSQ NF CARE HIGH MDM 45: CPT | Performed by: NURSE PRACTITIONER

## 2025-06-30 RX ORDER — OXYCODONE HYDROCHLORIDE 5 MG/1
5 TABLET ORAL EVERY 6 HOURS PRN
Qty: 20 TABLET | Refills: 0 | Status: SHIPPED | OUTPATIENT
Start: 2025-06-30

## 2025-06-30 RX ORDER — METHOCARBAMOL 500 MG/1
500 TABLET, FILM COATED ORAL 3 TIMES DAILY
COMMUNITY

## 2025-06-30 NOTE — PROGRESS NOTES
[facility] GERIATRIC SERVICE  Episodic/Acute/Follow-Up  [place] MRN: [id number]. Place of Service where encounter took place:  [facility] ([facility]) [[id number]]   Chief Complaint   Patient presents with    RECHECK    HPI: Desirae Rey  is a 74 year old (1950), who is being seen today for an episodic care visit.    TCU Course:  6/20: TCU Admission. 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   6/23: Admit visit. Increased senna. Decreased oxycodone.   6/24: CALL. Hypoxic on CPAP 3L (patients baseline), increased to 4L.     Patient seen today on routine follow up as Desirae continues to rehab in TCU.     Today, Desirae denies shortness of breath. She has tolerated 3L since yesterday. Patient denies wheezing, chest pain, fever, chills, or cold symptoms. She does report congestion and has a history of issues with her sinuses. No sinus headaches currently. Cough reported, patient says it is usually non-productive and that she has had this recurrently. She complains about pain, tightness in her bilateral shoulders that radiates down both arms to both of her elbows. Oxycodone helps minimally. Bowels have been functioning. Dysuria returned over the past couple of days. Denies blood in stool or urine. Has been dizzy upon standing, hasn't lost consciousness but has gotten tunnel vision a couple of times.     Past Medical and Surgical History reviewed in Epic today.  MEDICATIONS:  Current Outpatient Medications   Medication Sig Dispense Refill    methocarbamol (ROBAXIN) 500 MG tablet Take 500 mg by mouth 3 times daily.      oxyCODONE (ROXICODONE) 5 MG tablet Take 1 tablet (5 mg) by mouth every 6 hours as needed for severe pain. 20 tablet 0    albuterol (PROAIR HFA;PROVENTIL HFA;VENTOLIN HFA) 90 mcg/actuation inhaler Inhale 1 puff into the lungs every 4 hours  as needed for shortness of breath / dyspnea      azelastine (OPTIVAR) 0.05 % ophthalmic solution Place 1 drop into both eyes 2 times daily. 6 mL 0    bumetanide (BUMEX) 1 MG tablet Take 2 mg by mouth daily.      calcium citrate (CITRACAL) 950 (200 Ca) MG tablet Take 1 tablet by mouth daily      cetirizine (ZYRTEC) 10 MG tablet [CETIRIZINE (ZYRTEC) 10 MG TABLET] Take 10 mg by mouth daily.       cholecalciferol (VITAMIN D3) 25 mcg (1000 units) capsule Take 1 capsule by mouth daily.      CRESTOR 10 MG tablet Take 10 mg by mouth daily.      DULoxetine (CYMBALTA) 30 MG capsule Take 60 mg by mouth every morning.      DULoxetine (CYMBALTA) 30 MG capsule Take 30 mg by mouth every evening.      esomeprazole (NEXIUM) 20 MG DR capsule Take 1 capsule (20 mg) by mouth every morning (before breakfast). Take 30-60 minutes before eating. 30 capsule 0    fluticasone-salmeterol (AIRDUO RESPICLICK) 113-14 MCG/ACT inhaler Inhale 1 puff into the lungs every 12 hours.      l-lysine HCl 500 MG TABS tablet Take 500 mg by mouth daily.      Magnesium Oxide -Mg Supplement 500 MG CAPS Take 500 mg by mouth 2 times daily.      metoprolol succinate ER (TOPROL XL) 50 MG 24 hr tablet Take 1 tablet (50 mg) by mouth daily. 30 tablet 1    modafinil (PROVIGIL) 100 MG tablet Take 2.5 tablets (250 mg) by mouth every morning. 30 tablet 0    montelukast (SINGULAIR) 10 mg tablet Take 10 mg by mouth every evening      nystatin (MYCOSTATIN) 317891 UNIT/GM external powder Apply topically 2 times daily.      OXYGEN-AIR DELIVERY SYSTEMS MIS [OXYGEN-AIR DELIVERY SYSTEMS [facility] Use 3 L As Directed. 3 lpm with activities and as needed at night      polyethylene glycol (MIRALAX) 17 GM/Dose powder Take 17 g by mouth 2 times daily.      senna-docusate (SENOKOT-S/PERICOLACE) 8.6-50 MG tablet Take 2 tablets by mouth 2 times daily. Can also take 1 tab po BID PRN      solifenacin (VESICARE) 10 MG tablet Take 10 mg by mouth at bedtime.      Turmeric (CURCUPLEX-95) 500 MG  "CAPS Take 1 capsule by mouth daily       Objective: BP (!) 141/65   Pulse 82   Temp 97.2  F (36.2  C)   Resp 17   Ht 1.397 m (4' 7\")   Wt 90.9 kg (200 lb 8 oz)   SpO2 94%   BMI 46.60 kg/m    Exam:  GENERAL APPEARANCE: Alert, in no distress, cooperative.   RESP: Respiratory effort shallow, no respiratory distress, Lung sounds clear diminished throughout. On 3L o2 NC.   CV: Auscultation of heart reveals S1, S2, rate and rhythm regular, no murmur, no rub or gallop, Edema +1 BLE, compression socks on bilaterally. Peripheral pulses are 2+.  PSYCH: Insight, judgement, and memory are intact, some forgetfulness, affect and mood are appropriate.    ASSESSMENT/PLAN:  Cervical radiculopathy  S/P cervical spinal fusion  Moderate persistent asthma, unspecified whether complicated  Chronic obstructive pulmonary disease, unspecified COPD type (H)  Chronic diastolic heart failure (H)  Stage 3b chronic kidney disease (H)  Mixed incontinence  Acute on chronic. Complex/Tenuous.   Provider reviewed records from facility, and interpreted most recent imaging/lab work (CBC, CMP, previous PFTs/Spirometry), and vital signs, each with their own characteristics requiring MDM.  Provider discussed care with nursing, , and rehab team:  Per rehab, SLUMS 23/30, CG to min assist ambulating 45 feet with a walker. Moderate to max with dressing and toileting. SLP eval'd and no concern for aspiration w/ cause for cough. MMSE 28/30 via student NP.   Care conference today. SW recommended assisted living.  in disagreement related to cost. They will be researching PCA services.   Nursing reported cough and hypoxia earlier this week with strong \"coughing spells.\"  Moderate persistent asthma, last record of PFT 1/2023 showed normal spirometry. On PRN Albuterol and PRN AirDuo.  Multiple hospitalizations recently with respiratory failure and some noted COPD exacerbations. Although no record of PFT's with COPD diagnosis. Patient " reports she was on scheduled ICS at one point but was changed to as needed at one point for unknown reasons. Consulted PharmD who agreed that SMART therapy recommendations warrant controller medication to prevent hospitalizations:  Schedule AirDuo Q12 hours.   Optimize Asthma/COPD controller treatments for exacerbation prevention.   GRACY/CKD:  Creatinine trending down 1.25 (6/26). Will recheck BMP Thursday.   Hgb 8.8 (6/26). No signs of bleeding. Was 9.9 (6/18). Will recheck CBC Thursday.   S/P fusion, cervical radiculopathy:  Neurosurgery follow-up on 7/2.   Noting tightness in traps/rhomboids. Schedule Robaxin with goal to reduce opioids need and relax muscles.   Discontinue Tizanidine.  Decrease Oxycodone as noted below.    CHF:   Weights stable baseline around 200lbs.   Continue current bumex treatment and monitoring.  History of dysuria, completed treatment for UTI in the hospital. Given no fever, hematuria, or AMS will continue to monitor. Underlying mixed incontinence known.   Discussed advanced care planning, and patient agreed to palliative care consultation.    Follow up w/in 1 week or as needed.    Orders:  Schedule AirDuo RespiClick Q12 hours. Dx Asthma.   Robaxin 500mg TID. Dx S/P cervical spinal fusion  Discontinue tizanidine.    Decrease Oxycodone to 5mg PO Q6hrs PRN. Dx s/p cervical spinal fusion.   BMP, CBC x1 on 7/3. Dx anemia, CKD  Palliative care consult, x1 routine. Dx: CHF, COPD, asthma, CKD.     I (the provider) was present with professional student who participated in this service and in the documentation of this service. I have verified the HPI/ROS, history, and personally performed the physical exam and the healthcare decision making. I agree with all elements as documented above.      Electronically signed by:  Dr. Natali Chakraborty, APRN CNP DNP

## 2025-06-30 NOTE — LETTER
6/30/2025      Dseirae Rey  [address]   [place] [address]        [facility] GERIATRIC SERVICE  Episodic/Acute/Follow-Up  [place] MRN: [id number]. Place of Service where encounter took place:  [facility] ([facility]) [[id number]]   Chief Complaint   Patient presents with     RECHECK    HPI: Desirae Rey  is a 74 year old (1950), who is being seen today for an episodic care visit.    TCU Course:  6/20: TCU Admission. 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   6/23: Admit visit. Increased senna. Decreased oxycodone.   6/24: CALL. Hypoxic on CPAP 3L (patients baseline), increased to 4L.     Patient seen today on routine follow up as Desirae continues to rehab in TCU.     Today, Desirae denies shortness of breath. She has tolerated 3L since yesterday. Patient denies wheezing, chest pain, fever, chills, or cold symptoms. She does report congestion and has a history of issues with her sinuses. No sinus headaches currently. Cough reported, patient says it is usually non-productive and that she has had this recurrently. She complains about pain, tightness in her bilateral shoulders that radiates down both arms to both of her elbows. Oxycodone helps minimally. Bowels have been functioning. Dysuria returned over the past couple of days. Denies blood in stool or urine. Has been dizzy upon standing, hasn't lost consciousness but has gotten tunnel vision a couple of times.     Past Medical and Surgical History reviewed in Epic today.  MEDICATIONS:  Current Outpatient Medications   Medication Sig Dispense Refill     methocarbamol (ROBAXIN) 500 MG tablet Take 500 mg by mouth 3 times daily.       oxyCODONE (ROXICODONE) 5 MG tablet Take 1 tablet (5 mg) by mouth every 6 hours as needed for severe pain. 20 tablet 0     albuterol (PROAIR HFA;PROVENTIL  HFA;VENTOLIN HFA) 90 mcg/actuation inhaler Inhale 1 puff into the lungs every 4 hours as needed for shortness of breath / dyspnea       azelastine (OPTIVAR) 0.05 % ophthalmic solution Place 1 drop into both eyes 2 times daily. 6 mL 0     bumetanide (BUMEX) 1 MG tablet Take 2 mg by mouth daily.       calcium citrate (CITRACAL) 950 (200 Ca) MG tablet Take 1 tablet by mouth daily       cetirizine (ZYRTEC) 10 MG tablet [CETIRIZINE (ZYRTEC) 10 MG TABLET] Take 10 mg by mouth daily.        cholecalciferol (VITAMIN D3) 25 mcg (1000 units) capsule Take 1 capsule by mouth daily.       CRESTOR 10 MG tablet Take 10 mg by mouth daily.       DULoxetine (CYMBALTA) 30 MG capsule Take 60 mg by mouth every morning.       DULoxetine (CYMBALTA) 30 MG capsule Take 30 mg by mouth every evening.       esomeprazole (NEXIUM) 20 MG DR capsule Take 1 capsule (20 mg) by mouth every morning (before breakfast). Take 30-60 minutes before eating. 30 capsule 0     fluticasone-salmeterol (AIRDUO RESPICLICK) 113-14 MCG/ACT inhaler Inhale 1 puff into the lungs every 12 hours.       l-lysine HCl 500 MG TABS tablet Take 500 mg by mouth daily.       Magnesium Oxide -Mg Supplement 500 MG CAPS Take 500 mg by mouth 2 times daily.       metoprolol succinate ER (TOPROL XL) 50 MG 24 hr tablet Take 1 tablet (50 mg) by mouth daily. 30 tablet 1     modafinil (PROVIGIL) 100 MG tablet Take 2.5 tablets (250 mg) by mouth every morning. 30 tablet 0     montelukast (SINGULAIR) 10 mg tablet Take 10 mg by mouth every evening       nystatin (MYCOSTATIN) 033213 UNIT/GM external powder Apply topically 2 times daily.       OXYGEN-AIR DELIVERY SYSTEMS MIS [OXYGEN-AIR DELIVERY SYSTEMS [facility]] Use 3 L As Directed. 3 lpm with activities and as needed at night       polyethylene glycol (MIRALAX) 17 GM/Dose powder Take 17 g by mouth 2 times daily.       senna-docusate (SENOKOT-S/PERICOLACE) 8.6-50 MG tablet Take 2 tablets by mouth 2 times daily. Can also take 1 tab po BID PRN        "solifenacin (VESICARE) 10 MG tablet Take 10 mg by mouth at bedtime.       Turmeric (CURCUPLEX-95) 500 MG CAPS Take 1 capsule by mouth daily       Objective: BP (!) 141/65   Pulse 82   Temp 97.2  F (36.2  C)   Resp 17   Ht 1.397 m (4' 7\")   Wt 90.9 kg (200 lb 8 oz)   SpO2 94%   BMI 46.60 kg/m    Exam:  GENERAL APPEARANCE: Alert, in no distress, cooperative.   RESP: Respiratory effort shallow, no respiratory distress, Lung sounds clear diminished throughout. On 3L o2 NC.   CV: Auscultation of heart reveals S1, S2, rate and rhythm regular, no murmur, no rub or gallop, Edema +1 BLE, compression socks on bilaterally. Peripheral pulses are 2+.  PSYCH: Insight, judgement, and memory are intact, some forgetfulness, affect and mood are appropriate.    ASSESSMENT/PLAN:  Cervical radiculopathy  S/P cervical spinal fusion  Moderate persistent asthma, unspecified whether complicated  Chronic obstructive pulmonary disease, unspecified COPD type (H)  Chronic diastolic heart failure (H)  Stage 3b chronic kidney disease (H)  Mixed incontinence  Acute on chronic. Complex/Tenuous.   Provider reviewed records from facility, and interpreted most recent imaging/lab work (CBC, CMP, previous PFTs/Spirometry), and vital signs, each with their own characteristics requiring MDM.  Provider discussed care with nursing, , and rehab team:  Per rehab, SLUMS 23/30, CG to min assist ambulating 45 feet with a walker. Moderate to max with dressing and toileting. SLP eval'd and no concern for aspiration w/ cause for cough. MMSE 28/30 via student NP.   Care conference today. SW recommended assisted living.  in disagreement related to cost. They will be researching PCA services.   Nursing reported cough and hypoxia earlier this week with strong \"coughing spells.\"  Moderate persistent asthma, last record of PFT 1/2023 showed normal spirometry. On PRN Albuterol and PRN AirDuo.  Multiple hospitalizations recently with " respiratory failure and some noted COPD exacerbations. Although no record of PFT's with COPD diagnosis. Patient reports she was on scheduled ICS at one point but was changed to as needed at one point for unknown reasons. Consulted PharmD who agreed that SMART therapy recommendations warrant controller medication to prevent hospitalizations:  Schedule AirDuo Q12 hours.   Optimize Asthma/COPD controller treatments for exacerbation prevention.   GRACY/CKD:  Creatinine trending down 1.25 (6/26). Will recheck BMP Thursday.   Hgb 8.8 (6/26). No signs of bleeding. Was 9.9 (6/18). Will recheck CBC Thursday.   S/P fusion, cervical radiculopathy:  Neurosurgery follow-up on 7/2.   Noting tightness in traps/rhomboids. Schedule Robaxin with goal to reduce opioids need and relax muscles.   Discontinue Tizanidine.  Decrease Oxycodone as noted below.    CHF:   Weights stable baseline around 200lbs.   Continue current bumex treatment and monitoring.  History of dysuria, completed treatment for UTI in the hospital. Given no fever, hematuria, or AMS will continue to monitor. Underlying mixed incontinence known.   Discussed advanced care planning, and patient agreed to palliative care consultation.    Follow up w/in 1 week or as needed.    Orders:  Schedule AirDuo RespiClick Q12 hours. Dx Asthma.   Robaxin 500mg TID. Dx S/P cervical spinal fusion  Discontinue tizanidine.    Decrease Oxycodone to 5mg PO Q6hrs PRN. Dx s/p cervical spinal fusion.   BMP, CBC x1 on 7/3. Dx anemia, CKD  Palliative care consult, x1 routine. Dx: CHF, COPD, asthma, CKD.     I (the provider) was present with professional student who participated in this service and in the documentation of this service. I have verified the HPI/ROS, history, and personally performed the physical exam and the healthcare decision making. I agree with all elements as documented above.      Electronically signed by:  Dr. Natali Chakraborty, APRN CNP DNP        Sincerely,        Natali KUMAR  KALIE Chakraborty CNP    Electronically signed

## 2025-07-01 ENCOUNTER — LAB REQUISITION (OUTPATIENT)
Dept: LAB | Facility: CLINIC | Age: 75
End: 2025-07-01
Payer: MEDICARE

## 2025-07-01 DIAGNOSIS — D64.9 ANEMIA, UNSPECIFIED: ICD-10-CM

## 2025-07-01 DIAGNOSIS — N18.32 CHRONIC KIDNEY DISEASE, STAGE 3B (H): ICD-10-CM

## 2025-07-03 ENCOUNTER — RESULTS FOLLOW-UP (OUTPATIENT)
Dept: GERIATRICS | Facility: CLINIC | Age: 75
End: 2025-07-03

## 2025-07-03 LAB
ANION GAP SERPL CALCULATED.3IONS-SCNC: 10 MMOL/L (ref 7–15)
BUN SERPL-MCNC: 46.9 MG/DL (ref 8–23)
CALCIUM SERPL-MCNC: 9.6 MG/DL (ref 8.8–10.4)
CHLORIDE SERPL-SCNC: 99 MMOL/L (ref 98–107)
CREAT SERPL-MCNC: 1.17 MG/DL (ref 0.51–0.95)
EGFRCR SERPLBLD CKD-EPI 2021: 49 ML/MIN/1.73M2
ERYTHROCYTE [DISTWIDTH] IN BLOOD BY AUTOMATED COUNT: 13.4 % (ref 10–15)
GLUCOSE SERPL-MCNC: 101 MG/DL (ref 70–99)
HCO3 SERPL-SCNC: 30 MMOL/L (ref 22–29)
HCT VFR BLD AUTO: 31.9 % (ref 35–47)
HGB BLD-MCNC: 9.5 G/DL (ref 11.7–15.7)
MCH RBC QN AUTO: 31.3 PG (ref 26.5–33)
MCHC RBC AUTO-ENTMCNC: 29.8 G/DL (ref 31.5–36.5)
MCV RBC AUTO: 105 FL (ref 78–100)
PLATELET # BLD AUTO: 418 10E3/UL (ref 150–450)
POTASSIUM SERPL-SCNC: 5.2 MMOL/L (ref 3.4–5.3)
RBC # BLD AUTO: 3.04 10E6/UL (ref 3.8–5.2)
SODIUM SERPL-SCNC: 139 MMOL/L (ref 135–145)
WBC # BLD AUTO: 8.3 10E3/UL (ref 4–11)

## 2025-07-03 PROCEDURE — P9603 ONE-WAY ALLOW PRORATED MILES: HCPCS | Mod: ORL | Performed by: NURSE PRACTITIONER

## 2025-07-03 PROCEDURE — 85027 COMPLETE CBC AUTOMATED: CPT | Mod: ORL | Performed by: NURSE PRACTITIONER

## 2025-07-03 PROCEDURE — 36415 COLL VENOUS BLD VENIPUNCTURE: CPT | Mod: ORL | Performed by: NURSE PRACTITIONER

## 2025-07-03 PROCEDURE — 80048 BASIC METABOLIC PNL TOTAL CA: CPT | Mod: ORL | Performed by: NURSE PRACTITIONER

## 2025-07-07 ENCOUNTER — TRANSITIONAL CARE UNIT VISIT (OUTPATIENT)
Dept: GERIATRICS | Facility: CLINIC | Age: 75
End: 2025-07-07
Payer: MEDICARE

## 2025-07-07 VITALS
DIASTOLIC BLOOD PRESSURE: 77 MMHG | WEIGHT: 202.4 LBS | BODY MASS INDEX: 46.84 KG/M2 | SYSTOLIC BLOOD PRESSURE: 156 MMHG | TEMPERATURE: 96.9 F | RESPIRATION RATE: 19 BRPM | HEART RATE: 67 BPM | HEIGHT: 55 IN | OXYGEN SATURATION: 95 %

## 2025-07-07 DIAGNOSIS — M54.12 CERVICAL RADICULOPATHY: Primary | ICD-10-CM

## 2025-07-07 DIAGNOSIS — J45.40 MODERATE PERSISTENT ASTHMA, UNSPECIFIED WHETHER COMPLICATED: ICD-10-CM

## 2025-07-07 DIAGNOSIS — Z98.1 S/P CERVICAL SPINAL FUSION: ICD-10-CM

## 2025-07-07 DIAGNOSIS — J44.9 CHRONIC OBSTRUCTIVE PULMONARY DISEASE, UNSPECIFIED COPD TYPE (H): ICD-10-CM

## 2025-07-07 DIAGNOSIS — N18.32 STAGE 3B CHRONIC KIDNEY DISEASE (H): ICD-10-CM

## 2025-07-07 DIAGNOSIS — G47.9 SLEEP DISORDER: ICD-10-CM

## 2025-07-07 DIAGNOSIS — I50.32 CHRONIC DIASTOLIC HEART FAILURE (H): ICD-10-CM

## 2025-07-07 PROCEDURE — 99310 SBSQ NF CARE HIGH MDM 45: CPT | Performed by: NURSE PRACTITIONER

## 2025-07-07 RX ORDER — OXYCODONE HYDROCHLORIDE 5 MG/1
5 TABLET ORAL 3 TIMES DAILY PRN
Qty: 20 TABLET | Refills: 0 | Status: SHIPPED | OUTPATIENT
Start: 2025-07-07

## 2025-07-07 RX ORDER — MODAFINIL 100 MG/1
250 TABLET ORAL EVERY MORNING
Qty: 30 TABLET | Refills: 0 | Status: SHIPPED | OUTPATIENT
Start: 2025-07-07

## 2025-07-07 NOTE — LETTER
7/7/2025      Desirae Rey  [address]   [place] [address]        [facility] GERIATRIC SERVICE  Episodic/Acute/Follow-Up  [place] MRN: [id number]. Place of Service where encounter took place:  [facility] ([facility]) [[id number]]   Chief Complaint   Patient presents with     RECHECK    HPI: Desirae Rey  is a 74 year old (1950), who is being seen today for an episodic care visit.    TCU Course:  6/20: TCU Admission. 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   6/23: Admit visit. Increased senna. Decreased oxycodone.   6/24: CALL. Hypoxic on CPAP 3L (patients baseline), increased to 4L.   6/30: Follow-up visit. Scheduled Robaxin, stopped Zanaflex, reduced Oxycodone. Trended labs, scheduled AirDuo. Palliative care consult placed.   7/3: AirDuo not covered, switched to Dulera.     Patient seen today on routine follow up as Desirae continues to rehab in TCU.     Today, Desirae is doing well. She reports mild relief with Robaxin. Has been icing her neck which has been helpful. She asked about neck exercises with therapy, stating that they need an order. Bowels and bladder moving fine. Eating well. Sleeping well. Denies chest pain. Has mild shortness of breath on exertion. Remains on 3L O2 with 3-4L at night with CPAP. Staples removed at neurosurgery appointment on Thursday.     Past Medical and Surgical History reviewed in Epic today.  MEDICATIONS:  Current Outpatient Medications   Medication Sig Dispense Refill     modafinil (PROVIGIL) 100 MG tablet Take 2.5 tablets (250 mg) by mouth every morning. 30 tablet 0     mometasone-formoterol (DULERA) 100-5 MCG/ACT inhaler Inhale 2 puffs into the lungs 2 times daily.       oxyCODONE (ROXICODONE) 5 MG tablet Take 1 tablet (5 mg) by mouth 3 times daily as needed for severe pain. 20 tablet 0      albuterol (PROAIR HFA;PROVENTIL HFA;VENTOLIN HFA) 90 mcg/actuation inhaler Inhale 1 puff into the lungs every 4 hours as needed for shortness of breath / dyspnea       azelastine (OPTIVAR) 0.05 % ophthalmic solution Place 1 drop into both eyes 2 times daily. 6 mL 0     bumetanide (BUMEX) 1 MG tablet Take 2 mg by mouth daily.       calcium citrate (CITRACAL) 950 (200 Ca) MG tablet Take 1 tablet by mouth daily       cetirizine (ZYRTEC) 10 MG tablet [CETIRIZINE (ZYRTEC) 10 MG TABLET] Take 10 mg by mouth daily.        cholecalciferol (VITAMIN D3) 25 mcg (1000 units) capsule Take 1 capsule by mouth daily.       CRESTOR 10 MG tablet Take 10 mg by mouth daily.       DULoxetine (CYMBALTA) 30 MG capsule Take 60 mg by mouth every morning.       DULoxetine (CYMBALTA) 30 MG capsule Take 30 mg by mouth every evening.       esomeprazole (NEXIUM) 20 MG DR capsule Take 1 capsule (20 mg) by mouth every morning (before breakfast). Take 30-60 minutes before eating. 30 capsule 0     l-lysine HCl 500 MG TABS tablet Take 500 mg by mouth daily.       Magnesium Oxide -Mg Supplement 500 MG CAPS Take 500 mg by mouth 2 times daily.       methocarbamol (ROBAXIN) 500 MG tablet Take 750 mg by mouth 3 times daily.       metoprolol succinate ER (TOPROL XL) 50 MG 24 hr tablet Take 1 tablet (50 mg) by mouth daily. 30 tablet 1     montelukast (SINGULAIR) 10 mg tablet Take 10 mg by mouth every evening       nystatin (MYCOSTATIN) 612469 UNIT/GM external powder Apply topically 2 times daily.       OXYGEN-AIR DELIVERY SYSTEMS MIS [OXYGEN-AIR DELIVERY SYSTEMS [facility]] Use 3 L As Directed. 3 lpm with activities and as needed at night       polyethylene glycol (MIRALAX) 17 GM/Dose powder Take 17 g by mouth 2 times daily.       senna-docusate (SENOKOT-S/PERICOLACE) 8.6-50 MG tablet Take 2 tablets by mouth 2 times daily. Can also take 1 tab po BID PRN       solifenacin (VESICARE) 10 MG tablet Take 10 mg by mouth at bedtime.       Objective: BP (!) 156/77    "Pulse 67   Temp 96.9  F (36.1  C)   Resp 19   Ht 1.397 m (4' 7\")   Wt 91.8 kg (202 lb 6.4 oz)   SpO2 95%   BMI 47.04 kg/m    Exam:  GENERAL APPEARANCE: Alert, in no distress, cooperative.   RESP: Respiratory effort shallow, no respiratory distress, Lung sounds clear diminished throughout. On 3L o2 NC.   SKIN: Incision healing, staples removed, closed.   CV: Auscultation of heart reveals S1, S2, rate and rhythm regular, no murmur, no rub or gallop, Edema +1 BLE, compression socks on bilaterally. Peripheral pulses are 2+.Left forearm with mild non-pitting dependent edema.   PSYCH: Insight, judgement, and memory are intact, some forgetfulness, affect and mood are appropriate.    ASSESSMENT/PLAN:  Cervical radiculopathy  S/P cervical spinal fusion  Moderate persistent asthma, unspecified whether complicated  Chronic obstructive pulmonary disease, unspecified COPD type (H)  Chronic diastolic heart failure (H)  Stage 3b chronic kidney disease (H)  Acute on chronic. Complex.   Provider reviewed records from facility, and interpreted most recent imaging/lab work (CBC, BMP), and vital signs, each with their own characteristics requiring MDM.  Provider discussed care with nursing and rehab team:  Rehab team reports Desirae is ambulating 100ft w/ 2ww and they are following general spinal restrictions as outlined on her admission orders.   Previously,  reported likely discharge disposition was home w/ spouse. Will likel need 1-2 more weeks.   Nursing reports Dulera arrived over the weekend, cough is better. They also noted a slight increase in LUE swelling.   Respiratory Failure:  Started Dulera. Helpful.   Pallitative care consult appt on 7/23.   Remains on 3-4L of oxygen via NC. This was chronic at home, BUT:  Will place order for oxygen weaning with spo2 goal of 88% and above.   GRACY/CKD/CHF:  Creatine trended down 7/3 (1.17).   S/P fusion, cervical radiculopathy:  Neurosurgery note on 7/3 reported: No " bending, twisting, pushing, pulling. No overhead work.   Robaxin working mildly. Will increase dose to 750mg TID.   Continue Oxycodone reduction w/ goal for discontinuation. We did  on realistic pain expectations.   Anemia controlled.  Previous CVA:  Left forearm with mild non-pitting edema, given no pain, or known injury, and that it is her weak arm, suspect dependent edema r/t immobility. Encouraged elevation.   Neurology follow-up on 7/14.   Polypharmacy, good appetite. Discontinue Tumeric.     Follow up w/in 1 week or as needed.    Orders:  Increase Robaxin to 750mg TID. Dx: cervical radiculopathy.   Decrease Oxycodone to 5mg TID PRN. Dx: cervical radiculopathy.   Wean oxygen as tolerated 1-4 liters/min for sat < or equal to 88%, Q shift. Dx: asthma.  Discontinue Tumeric.      I (the provider) was present with professional student who participated in this service and in the documentation of this service. I have verified the HPI/ROS, history, and personally performed the physical exam and the healthcare decision making. I agree with all elements as documented above.      Electronically signed by:  KALIE Riley CNP DNP          Sincerely,        KALIE Sears CNP    Electronically signed

## 2025-07-07 NOTE — PROGRESS NOTES
[facility] GERIATRIC SERVICE  Episodic/Acute/Follow-Up  [place] MRN: [id number]. Place of Service where encounter took place:  [facility] ([facility]) [[id number]]   Chief Complaint   Patient presents with    RECHECK    HPI: Desirae Rey  is a 74 year old (1950), who is being seen today for an episodic care visit.    TCU Course:  6/20: TCU Admission. 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   6/23: Admit visit. Increased senna. Decreased oxycodone.   6/24: CALL. Hypoxic on CPAP 3L (patients baseline), increased to 4L.   6/30: Follow-up visit. Scheduled Robaxin, stopped Zanaflex, reduced Oxycodone. Trended labs, scheduled AirDuo. Palliative care consult placed.   7/3: AirDuo not covered, switched to Dulera.     Patient seen today on routine follow up as Desirae continues to rehab in TCU.     Today, Desirae is doing well. She reports mild relief with Robaxin. Has been icing her neck which has been helpful. She asked about neck exercises with therapy, stating that they need an order. Bowels and bladder moving fine. Eating well. Sleeping well. Denies chest pain. Has mild shortness of breath on exertion. Remains on 3L O2 with 3-4L at night with CPAP. Staples removed at neurosurgery appointment on Thursday.     Past Medical and Surgical History reviewed in Epic today.  MEDICATIONS:  Current Outpatient Medications   Medication Sig Dispense Refill    modafinil (PROVIGIL) 100 MG tablet Take 2.5 tablets (250 mg) by mouth every morning. 30 tablet 0    mometasone-formoterol (DULERA) 100-5 MCG/ACT inhaler Inhale 2 puffs into the lungs 2 times daily.      oxyCODONE (ROXICODONE) 5 MG tablet Take 1 tablet (5 mg) by mouth 3 times daily as needed for severe pain. 20 tablet 0    albuterol (PROAIR HFA;PROVENTIL HFA;VENTOLIN HFA) 90 mcg/actuation inhaler Inhale 1 puff  "into the lungs every 4 hours as needed for shortness of breath / dyspnea      azelastine (OPTIVAR) 0.05 % ophthalmic solution Place 1 drop into both eyes 2 times daily. 6 mL 0    bumetanide (BUMEX) 1 MG tablet Take 2 mg by mouth daily.      calcium citrate (CITRACAL) 950 (200 Ca) MG tablet Take 1 tablet by mouth daily      cetirizine (ZYRTEC) 10 MG tablet [CETIRIZINE (ZYRTEC) 10 MG TABLET] Take 10 mg by mouth daily.       cholecalciferol (VITAMIN D3) 25 mcg (1000 units) capsule Take 1 capsule by mouth daily.      CRESTOR 10 MG tablet Take 10 mg by mouth daily.      DULoxetine (CYMBALTA) 30 MG capsule Take 60 mg by mouth every morning.      DULoxetine (CYMBALTA) 30 MG capsule Take 30 mg by mouth every evening.      esomeprazole (NEXIUM) 20 MG DR capsule Take 1 capsule (20 mg) by mouth every morning (before breakfast). Take 30-60 minutes before eating. 30 capsule 0    l-lysine HCl 500 MG TABS tablet Take 500 mg by mouth daily.      Magnesium Oxide -Mg Supplement 500 MG CAPS Take 500 mg by mouth 2 times daily.      methocarbamol (ROBAXIN) 500 MG tablet Take 750 mg by mouth 3 times daily.      metoprolol succinate ER (TOPROL XL) 50 MG 24 hr tablet Take 1 tablet (50 mg) by mouth daily. 30 tablet 1    montelukast (SINGULAIR) 10 mg tablet Take 10 mg by mouth every evening      nystatin (MYCOSTATIN) 945034 UNIT/GM external powder Apply topically 2 times daily.      OXYGEN-AIR DELIVERY SYSTEMS MIS [OXYGEN-AIR DELIVERY SYSTEMS [facility]] Use 3 L As Directed. 3 lpm with activities and as needed at night      polyethylene glycol (MIRALAX) 17 GM/Dose powder Take 17 g by mouth 2 times daily.      senna-docusate (SENOKOT-S/PERICOLACE) 8.6-50 MG tablet Take 2 tablets by mouth 2 times daily. Can also take 1 tab po BID PRN      solifenacin (VESICARE) 10 MG tablet Take 10 mg by mouth at bedtime.       Objective: BP (!) 156/77   Pulse 67   Temp 96.9  F (36.1  C)   Resp 19   Ht 1.397 m (4' 7\")   Wt 91.8 kg (202 lb 6.4 oz)   SpO2 95%  "  BMI 47.04 kg/m    Exam:  GENERAL APPEARANCE: Alert, in no distress, cooperative.   RESP: Respiratory effort shallow, no respiratory distress, Lung sounds clear diminished throughout. On 3L o2 NC.   SKIN: Incision healing, staples removed, closed.   CV: Auscultation of heart reveals S1, S2, rate and rhythm regular, no murmur, no rub or gallop, Edema +1 BLE, compression socks on bilaterally. Peripheral pulses are 2+.Left forearm with mild non-pitting dependent edema.   PSYCH: Insight, judgement, and memory are intact, some forgetfulness, affect and mood are appropriate.    ASSESSMENT/PLAN:  Cervical radiculopathy  S/P cervical spinal fusion  Moderate persistent asthma, unspecified whether complicated  Chronic obstructive pulmonary disease, unspecified COPD type (H)  Chronic diastolic heart failure (H)  Stage 3b chronic kidney disease (H)  Acute on chronic. Complex.   Provider reviewed records from facility, and interpreted most recent imaging/lab work (CBC, BMP), and vital signs, each with their own characteristics requiring MDM.  Provider discussed care with nursing and rehab team:  Rehab team reports Desirae is ambulating 100ft w/ 2ww and they are following general spinal restrictions as outlined on her admission orders.   Previously,  reported likely discharge disposition was home w/ spouse. Will likel need 1-2 more weeks.   Nursing reports Dulera arrived over the weekend, cough is better. They also noted a slight increase in LUE swelling.   Respiratory Failure:  Started Dulera. Helpful.   Pallitative care consult appt on 7/23.   Remains on 3-4L of oxygen via NC. This was chronic at home, BUT:  Will place order for oxygen weaning with spo2 goal of 88% and above.   GRACY/CKD/CHF:  Creatine trended down 7/3 (1.17).   S/P fusion, cervical radiculopathy:  Neurosurgery note on 7/3 reported: No bending, twisting, pushing, pulling. No overhead work.   Robaxin working mildly. Will increase dose to 750mg TID.    Continue Oxycodone reduction w/ goal for discontinuation. We did  on realistic pain expectations.   Anemia controlled.  Previous CVA:  Left forearm with mild non-pitting edema, given no pain, or known injury, and that it is her weak arm, suspect dependent edema r/t immobility. Encouraged elevation.   Neurology follow-up on 7/14.   Polypharmacy, good appetite. Discontinue Tumeric.     Follow up w/in 1 week or as needed.    Orders:  Increase Robaxin to 750mg TID. Dx: cervical radiculopathy.   Decrease Oxycodone to 5mg TID PRN. Dx: cervical radiculopathy.   Wean oxygen as tolerated 1-4 liters/min for sat < or equal to 88%, Q shift. Dx: asthma.  Discontinue Tumeric.      I (the provider) was present with professional student who participated in this service and in the documentation of this service. I have verified the HPI/ROS, history, and personally performed the physical exam and the healthcare decision making. I agree with all elements as documented above.      Electronically signed by:  Dr. Natali Chakraborty, APRN CNP DNP

## 2025-07-11 NOTE — PROGRESS NOTES
In person evaluation    HPI  7/14/2025, in person consultation    74-year-old being evaluated neurologically for:  CVA 12/2024  Neck surgery 6/16/2025.  New weakness left proximal arm C5 distribution      Patient had neck surgery on 6/16/2025  Since surgery she cannot raise her left arm.  She did say that she had some weakness on the left side from a previous stroke 12/2024 this is more of a clumsiness    Patient follows with neurosurgery at the [facility]    7/2/2025 note reviewed, seen by neurosurgery  Concern for some C5 radiculopathy    Patient with hospitalization December 2024  tiny right cerebellar stroke  Treated with dual antiplatelets for 3 weeks and switch to aspirin only    [facility] a [facility]  Status post cervical surgery C5-C7 previous hardware removed  Hospitalized June 2025 complicated by UTI/urinary retention/metabolic encephalopathy/cardiomyopathy      Patient had previous CVA  MRI head with and without contrast 12/20/2024  1.  Small acute/subacute infarct within the right cerebellum measuring 0.4 cm.         No associated hemorrhage.  2.  Right anterolateral cerebral convexity enhancing nodule measuring up to 1 cm likely reflects a meningioma (in the absence of known malignancy).  3.  Chronic senescent and presumed microvascular ischemic changes,     Patient had CT of her spine back 4/24/2025 see actual reports  HEAD CT: 4/24/2025  1.  Motion degraded exam.   2.  No CT evidence for acute intracranial process.   3.  Brain atrophy and presumed chronic microvascular ischemic changes as above.     CERVICAL SPINE CT: 4/24/2025  1.  No fracture or posttraumatic subluxation.   2.  Postsurgical changes of ACDF C5-C7. All of the screws and plate completely disengaged from the vertebral bodies. No bony fusion.   3.  No high-grade spinal canal stenosis.     THORACIC SPINE CT: 4/24/2025  1.  No fracture or posttraumatic subluxation.   2.  Moderate to severe  spinal canal, severe left and moderate right neural foraminal stenosis at T11-12.   3.  Mild to moderate spinal canal stenosis at T10-11.     LUMBAR SPINE CT: 4/24/2025  1.  No fracture or posttraumatic subluxation.   2.  Multilevel lumbar spondylosis with moderate to severe spinal canal stenosis at L3-4 and severe spinal canal stenosis at L4-5.       Patient with hospitalization December 2024  tiny right cerebellar stroke  Treated with dual antiplatelets for 3 weeks and switch to aspirin only    Patient had a fall, hospitalized [facility]  Status post cervical surgery C5-C7 previous hardware removed 2/16/2025  Hospitalized June 2025 complicated by UTI/urinary retention/metabolic encephalopathy/cardiomyopathy  Patient with increased weakness left arm postsurgical intervention  Significant left C5 distribution weakness in her left arm  Following with neurosurgery Dr. 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  Small acute/subacute infarct within the right cerebellum measuring 0.4 cm.         No associated hemorrhage.  2.  Right anterolateral cerebral convexity enhancing nodule measuring up to 1 cm likely reflects a meningioma (in the absence of known malignancy).  3.  Chronic senescent  and presumed microvascular ischemic changes,   4.  Right maxillary sinus air-fluid level.   HEAD MRA: 12/2024  1.  Patent major intracranial arteries without large vessel occlusion, high-grade stenosis or aneurysm.  NECK MRA: 12/2024  1.  Patent major cervical arteries without high-grade stenosis or dissection.  MRI brain 2/19/2025 without contrast compared to 12/20/2024  1.  No acute intracranial process.  2.  Grossly stable, presumed right anterolateral cerebral convexity meningioma measuring up to 1.1 cm.  3.  Right maxillary sinus air-fluid level. Correlation for acute sinusitis is recommended.  4.  Generalized brain atrophy and chronic ischemic changes see full report.  5.  Scattered T2/FLAIR hyperintensities cerebral white matter judah consistent with mild chronic microvascular changes  HEAD CT: 4/24/2025  1.  Motion degraded exam.   2.  No CT evidence for acute intracranial process.   3.  Brain atrophy and presumed chronic microvascular ischemic changes as above.     CERVICAL SPINE CT: 4/24/2025  1.  No fracture or posttraumatic subluxation.   2.  Postsurgical changes of ACDF C5-C7. All of the screws and plate completely disengaged from the vertebral bodies. No bony fusion.   3.  No high-grade spinal canal stenosis.     THORACIC SPINE CT: 4/24/2025  1.  No fracture or posttraumatic subluxation.   2.  Moderate to severe spinal canal, severe left and moderate right neural foraminal stenosis at T11-12.   3.  Mild to moderate spinal canal stenosis at T10-11.     LUMBAR SPINE CT: 4/24/2025  1.  No fracture or posttraumatic subluxation.   2.  Multilevel lumbar spondylosis with moderate to severe spinal canal stenosis at L3-4 and severe spinal canal stenosis at L4-5.     CT head 6/8/2025 compared to 4/24/2025  1.  No CT evidence for acute intracranial process  2.  Mild-moderate presumed chronic microvascular ischemic changes  3.  Mild generalized volume loss  4.  No hydrocephalus    Laboratory data                         7/2025  NA/K               139/5.2  BUN/Cr           46.9/1.17  GLU                101  AST                22  WBC/HGB      8.3/9.5  PLTs                418,000  B12                 275      Exam  Review of systems  Pertinent positives and negatives  Diffuse neck and back pain  Arm and leg pain  Numbness in her hands and feet  Significant weakness left shoulder greater than right  Trouble with walking due to balance and back pain with frequent falls  Chronically uses wheelchair to get around can transfer with walker  At home has a chairlift    Has some dizziness  Trouble with swallowing solids which has been worked up by GI in the past  Has some visual difficulty chronic  Currently on chronic oxygen with respiratory difficulty  Has had atrial fibrillation in the past  History of significant falls    Otherwise review of systems negative see intake sheet    General exam  Blood pressure 141/76 pulse 73  Alert and oriented  Sitting in the wheelchair on oxygen  Lungs mildly decreased breath sounds bilaterally  Heart rate seems regular    Neurologic exam  Alert oriented x 3  No aphasia  No neglect  Memory recall adequate at bedside testing    Cranial nerves II through XII  Visual fields intact  Pupils equal round reactive to light  No ophthalmoplegia  No nystagmus  Face symmetrical  Tongue twisters okay    Upper extremities pertinent findings  Right upper extremity ulnar innervated weakness predominate    Left upper extremity  Deltoid 0 out of 5  Biceps 0 out of 5  Other muscle groups 4+ out of 5  Predominantly C5 distribution weakness    Lower extremities tested in the wheelchair reported right over left  Iliopsoas 5 - /5-  Quadriceps 5/5  Anterior tibial 5/5    Reflexes  Absent in the upper and lower extremities      Assessment/plan    1.  Past history of stroke December 2024   , small cerebellar lesion on the right       Did have some atrial fibrillation was not a good long-term anticoagulant candidate due to's  significant falls       Treated with dual antiplatelet medication for 3 weeks and then switch to aspirin only per that hospitalization    2.  Complicating medical issues CKD/CHF/history of blood clots/hypertension/hyperlipidemia/obstructive sleep apnea    3.  Left C5 radiculopathy relatively new after June 2025 neck surgery for failed hardware      Previous exam in 2022 showed left deltoid and biceps weakness and triceps weakness at that time.      Diagnosis  Past CVA  Left C5 radiculopathy  Complex cervical/lumbar spine disease followed by neurosurgery    Set up EMG of the left upper extremity  Follow-up at time of EMG  If patient does not tolerate conductions at least get the needle examination to see which muscles activate specially proximally  Keep follow-up visit with neurosurgery July 29, 2025    Patient currently at care center  Relatively wheelchair-bound  Significant respiratory difficulty chronically    Multiple issues as above discussed and reviewed during visit    Total care time today 60 minutes  The longitudinal plan of care for the diagnosis(es)/condition(s) as documented were addressed during this visit. Due to the added complexity in care, I will continue to support Desirae in the subsequent management and with ongoing continuity of care.    As part of visit today  Reviewed laboratory data  Reviewed scans as above  Reviewed geriatric note 7/7/2025   no concerns

## 2025-07-14 ENCOUNTER — TRANSITIONAL CARE UNIT VISIT (OUTPATIENT)
Dept: GERIATRICS | Facility: CLINIC | Age: 75
End: 2025-07-14
Payer: MEDICARE

## 2025-07-14 ENCOUNTER — OFFICE VISIT (OUTPATIENT)
Dept: NEUROLOGY | Facility: CLINIC | Age: 75
End: 2025-07-14
Payer: MEDICARE

## 2025-07-14 VITALS
HEIGHT: 55 IN | HEART RATE: 73 BPM | SYSTOLIC BLOOD PRESSURE: 141 MMHG | DIASTOLIC BLOOD PRESSURE: 76 MMHG | WEIGHT: 204 LBS | BODY MASS INDEX: 47.21 KG/M2

## 2025-07-14 VITALS
TEMPERATURE: 97.4 F | SYSTOLIC BLOOD PRESSURE: 153 MMHG | RESPIRATION RATE: 20 BRPM | OXYGEN SATURATION: 91 % | DIASTOLIC BLOOD PRESSURE: 87 MMHG | HEART RATE: 61 BPM | WEIGHT: 204.7 LBS | BODY MASS INDEX: 47.37 KG/M2 | HEIGHT: 55 IN

## 2025-07-14 DIAGNOSIS — M54.12 CERVICAL RADICULOPATHY: Primary | ICD-10-CM

## 2025-07-14 DIAGNOSIS — J44.9 CHRONIC OBSTRUCTIVE PULMONARY DISEASE, UNSPECIFIED COPD TYPE (H): ICD-10-CM

## 2025-07-14 DIAGNOSIS — Z98.1 S/P CERVICAL SPINAL FUSION: ICD-10-CM

## 2025-07-14 DIAGNOSIS — N18.32 STAGE 3B CHRONIC KIDNEY DISEASE (H): ICD-10-CM

## 2025-07-14 DIAGNOSIS — I50.32 CHRONIC DIASTOLIC HEART FAILURE (H): ICD-10-CM

## 2025-07-14 DIAGNOSIS — J45.40 MODERATE PERSISTENT ASTHMA, UNSPECIFIED WHETHER COMPLICATED: ICD-10-CM

## 2025-07-14 PROCEDURE — 99215 OFFICE O/P EST HI 40 MIN: CPT | Performed by: PSYCHIATRY & NEUROLOGY

## 2025-07-14 PROCEDURE — 3078F DIAST BP <80 MM HG: CPT | Performed by: PSYCHIATRY & NEUROLOGY

## 2025-07-14 PROCEDURE — 3077F SYST BP >= 140 MM HG: CPT | Performed by: PSYCHIATRY & NEUROLOGY

## 2025-07-14 PROCEDURE — G2211 COMPLEX E/M VISIT ADD ON: HCPCS | Performed by: PSYCHIATRY & NEUROLOGY

## 2025-07-14 PROCEDURE — 99417 PROLNG OP E/M EACH 15 MIN: CPT | Performed by: PSYCHIATRY & NEUROLOGY

## 2025-07-14 PROCEDURE — 99310 SBSQ NF CARE HIGH MDM 45: CPT | Performed by: NURSE PRACTITIONER

## 2025-07-14 RX ORDER — OXYCODONE HYDROCHLORIDE 5 MG/1
5 TABLET ORAL 2 TIMES DAILY PRN
Qty: 20 TABLET | Refills: 0 | Status: SHIPPED | OUTPATIENT
Start: 2025-07-14

## 2025-07-14 NOTE — LETTER
7/14/2025      Desirae Rey  [address] [place] [address]      Dear Colleague,    Thank you for referring your patient, Desirae Rey, to the [facility] [place]. Please see a copy of my visit note below.    In person evaluation    HPI  7/14/2025, in person consultation    74-year-old being evaluated neurologically for:  CVA 12/2024  Neck surgery 6/16/2025.  New weakness left proximal arm C5 distribution      Patient had neck surgery on 6/16/2025  Since surgery she cannot raise her left arm.  She did say that she had some weakness on the left side from a previous stroke 12/2024 this is more of a clumsiness    Patient follows with neurosurgery at the [facility]    7/2/2025 note reviewed, seen by neurosurgery  Concern for some C5 radiculopathy    Patient with hospitalization December 2024  tiny right cerebellar stroke  Treated with dual antiplatelets for 3 weeks and switch to aspirin only    Patient had a fall hospitalized at [facility]  Status post cervical surgery C5-C7 previous hardware removed  Hospitalized June 2025 complicated by UTI/urinary retention/metabolic encephalopathy/cardiomyopathy      Patient had previous CVA  MRI head with and without contrast 12/20/2024  1.  Small acute/subacute infarct within the right cerebellum measuring 0.4 cm.         No associated hemorrhage.  2.  Right anterolateral cerebral convexity enhancing nodule measuring up to 1 cm likely reflects a meningioma (in the absence of known malignancy).  3.  Chronic senescent and presumed microvascular ischemic changes,     Patient had CT of her spine back 4/24/2025 see actual reports  HEAD CT: 4/24/2025  1.  Motion degraded exam.   2.  No CT evidence for acute intracranial process.   3.  Brain atrophy and presumed chronic microvascular ischemic changes as above.     CERVICAL SPINE CT: 4/24/2025  1.  No fracture or posttraumatic subluxation.   2.  Postsurgical changes of ACDF  C5-C7. All of the screws and plate completely disengaged from the vertebral bodies. No bony fusion.   3.  No high-grade spinal canal stenosis.     THORACIC SPINE CT: 4/24/2025  1.  No fracture or posttraumatic subluxation.   2.  Moderate to severe spinal canal, severe left and moderate right neural foraminal stenosis at T11-12.   3.  Mild to moderate spinal canal stenosis at T10-11.     LUMBAR SPINE CT: 4/24/2025  1.  No fracture or posttraumatic subluxation.   2.  Multilevel lumbar spondylosis with moderate to severe spinal canal stenosis at L3-4 and severe spinal canal stenosis at L4-5.       Patient with hospitalization December 2024  tiny right cerebellar stroke  Treated with dual antiplatelets for 3 weeks and switch to aspirin only    [facility] had a fall, [facility]  Status post cervical surgery C5-C7 previous hardware removed 2/16/2025  Hospitalized June 2025 complicated by UTI/urinary retention/metabolic encephalopathy/cardiomyopathy  Patient with increased weakness left arm postsurgical intervention  Significant left C5 distribution weakness in her left arm  Following with neurosurgery Dr. 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  Small acute/subacute infarct within  the right cerebellum measuring 0.4 cm.         No associated hemorrhage.  2.  Right anterolateral cerebral convexity enhancing nodule measuring up to 1 cm likely reflects a meningioma (in the absence of known malignancy).  3.  Chronic senescent and presumed microvascular ischemic changes,   4.  Right maxillary sinus air-fluid level.   HEAD MRA: 12/2024  1.  Patent major intracranial arteries without large vessel occlusion, high-grade stenosis or aneurysm.  NECK MRA: 12/2024  1.  Patent major cervical arteries without high-grade stenosis or dissection.  MRI brain 2/19/2025 without contrast compared to 12/20/2024  1.  No acute intracranial process.  2.  Grossly stable, presumed right anterolateral cerebral convexity meningioma measuring up to 1.1 cm.  3.  Right maxillary sinus air-fluid level. Correlation for acute sinusitis is recommended.  4.  Generalized brain atrophy and chronic ischemic changes see full report.  5.  Scattered T2/FLAIR hyperintensities cerebral white matter judah consistent with mild chronic microvascular changes  HEAD CT: 4/24/2025  1.  Motion degraded exam.   2.  No CT evidence for acute intracranial process.   3.  Brain atrophy and presumed chronic microvascular ischemic changes as above.     CERVICAL SPINE CT: 4/24/2025  1.  No fracture or posttraumatic subluxation.   2.  Postsurgical changes of ACDF C5-C7. All of the screws and plate completely disengaged from the vertebral bodies. No bony fusion.   3.  No high-grade spinal canal stenosis.     THORACIC SPINE CT: 4/24/2025  1.  No fracture or posttraumatic subluxation.   2.  Moderate to severe spinal canal, severe left and moderate right neural foraminal stenosis at T11-12.   3.  Mild to moderate spinal canal stenosis at T10-11.     LUMBAR SPINE CT: 4/24/2025  1.  No fracture or posttraumatic subluxation.   2.  Multilevel lumbar spondylosis with moderate to severe spinal canal stenosis at L3-4 and severe spinal canal stenosis at L4-5.     CT  head 6/8/2025 compared to 4/24/2025  1.  No CT evidence for acute intracranial process  2.  Mild-moderate presumed chronic microvascular ischemic changes  3.  Mild generalized volume loss  4.  No hydrocephalus    Laboratory data                        7/2025  NA/K               139/5.2  BUN/Cr           46.9/1.17  GLU                101  AST                22  WBC/HGB      8.3/9.5  PLTs                418,000  B12                 275      Exam  Review of systems  Pertinent positives and negatives  Diffuse neck and back pain  Arm and leg pain  Numbness in her hands and feet  Significant weakness left shoulder greater than right  Trouble with walking due to balance and back pain with frequent falls  Chronically uses wheelchair to get around can transfer with walker  At home has a chairlift    Has some dizziness  Trouble with swallowing solids which has been worked up by GI in the past  Has some visual difficulty chronic  Currently on chronic oxygen with respiratory difficulty  Has had atrial fibrillation in the past  History of significant falls    Otherwise review of systems negative see intake sheet    General exam  Blood pressure 141/76 pulse 73  Alert and oriented  Sitting in the wheelchair on oxygen  Lungs mildly decreased breath sounds bilaterally  Heart rate seems regular    Neurologic exam  Alert oriented x 3  No aphasia  No neglect  Memory recall adequate at bedside testing    Cranial nerves II through XII  Visual fields intact  Pupils equal round reactive to light  No ophthalmoplegia  No nystagmus  Face symmetrical  Tongue twisters okay    Upper extremities pertinent findings  Right upper extremity ulnar innervated weakness predominate    Left upper extremity  Deltoid 0 out of 5  Biceps 0 out of 5  Other muscle groups 4+ out of 5  Predominantly C5 distribution weakness    Lower extremities tested in the wheelchair reported right over left  Iliopsoas 5 - /5-  Quadriceps 5/5  Anterior tibial  5/5    Reflexes  Absent in the upper and lower extremities      Assessment/plan    1.  Past history of stroke December 2024   , small cerebellar lesion on the right       Did have some atrial fibrillation was not a good long-term anticoagulant candidate due to's significant falls       Treated with dual antiplatelet medication for 3 weeks and then switch to aspirin only per that hospitalization    2.  Complicating medical issues CKD/CHF/history of blood clots/hypertension/hyperlipidemia/obstructive sleep apnea    3.  Left C5 radiculopathy relatively new after June 2025 neck surgery for failed hardware      Previous exam in 2022 showed left deltoid and biceps weakness and triceps weakness at that time.      Diagnosis  Past CVA  Left C5 radiculopathy  Complex cervical/lumbar spine disease followed by neurosurgery    Set up EMG of the left upper extremity  Follow-up at time of EMG  If patient does not tolerate conductions at least get the needle examination to see which muscles activate specially proximally  Keep follow-up visit with neurosurgery July 29, 2025    Patient currently at care center  Relatively wheelchair-bound  Significant respiratory difficulty chronically    Multiple issues as above discussed and reviewed during visit    Total care time today 60 minutes  The longitudinal plan of care for the diagnosis(es)/condition(s) as documented were addressed during this visit. Due to the added complexity in care, I will continue to support Desirae in the subsequent management and with ongoing continuity of care.    As part of visit today  Reviewed laboratory data  Reviewed scans as above  Reviewed geriatric note 7/7/2025    Again, thank you for allowing me to participate in the care of your patient.        Sincerely,        jordin Ames MD    Electronically signed

## 2025-07-14 NOTE — PROGRESS NOTES
[facility] GERIATRIC SERVICE  Episodic/Acute/Follow-Up  Joaquin MRN: [id number]. Place of Service where encounter took place:  [facility] ([facility]) [[id number]]   Chief Complaint   Patient presents with    RECHECK    HPI: Desirae Rey  is a 74 year old (1950), who is being seen today for an episodic care visit.    TCU Course:  6/20: TCU Admission. 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   6/23: Admit visit. Increased senna. Decreased oxycodone.   6/24: CALL. Hypoxic on CPAP 3L (patients baseline), increased to 4L.   6/30: Follow-up visit. Scheduled Robaxin, stopped Zanaflex, reduced Oxycodone. Trended labs, scheduled AirDuo. Palliative care consult placed.   7/3: AirDuo not covered, switched to Dulera.   7/7: Follow-up visit. Increased Robaxin, Decreased Oxycodone, started O2 weaning, Dc'd Tumeric.     Patient seen today on routine follow up as Desirae continues to rehab in TCU.     Today, Desirae is doing well. She reports her pain continues to bother her, but is not new and is manageable. She said the Robaxin helped at first. She has also been icing her neck. Left arm remains swollen, she has been elevating it, denies pain to wrist or arm, denies trauma. Denies chest pain. Has shortness of breath with activity at baseline, she reports decreased coughing since starting Dulera. Bowels and bladder functioning well. She has a neurology appointment today.     Past Medical and Surgical History reviewed in Epic today.  MEDICATIONS:  Current Outpatient Medications   Medication Sig Dispense Refill    albuterol (PROAIR HFA;PROVENTIL HFA;VENTOLIN HFA) 90 mcg/actuation inhaler Inhale 1 puff into the lungs every 4 hours as needed for shortness of breath / dyspnea      azelastine (OPTIVAR) 0.05 % ophthalmic solution Place 1 drop into both eyes 2 times  daily. 6 mL 0    bumetanide (BUMEX) 1 MG tablet Take 2 mg by mouth daily.      calcium citrate (CITRACAL) 950 (200 Ca) MG tablet Take 1 tablet by mouth daily      cetirizine (ZYRTEC) 10 MG tablet [CETIRIZINE (ZYRTEC) 10 MG TABLET] Take 10 mg by mouth daily.       cholecalciferol (VITAMIN D3) 25 mcg (1000 units) capsule Take 1 capsule by mouth daily.      CRESTOR 10 MG tablet Take 10 mg by mouth daily.      DULoxetine (CYMBALTA) 30 MG capsule Take 60 mg by mouth every morning.      DULoxetine (CYMBALTA) 30 MG capsule Take 30 mg by mouth every evening.      esomeprazole (NEXIUM) 20 MG DR capsule Take 1 capsule (20 mg) by mouth every morning (before breakfast). Take 30-60 minutes before eating. (Patient taking differently: Take 20 mg by mouth every morning (before breakfast). Take 30-60 minutes before eating. prn) 30 capsule 0    l-lysine HCl 500 MG TABS tablet Take 500 mg by mouth daily.      Magnesium Oxide -Mg Supplement 500 MG CAPS Take 500 mg by mouth 2 times daily.      methocarbamol (ROBAXIN) 500 MG tablet Take 750 mg by mouth 3 times daily.      metoprolol succinate ER (TOPROL XL) 50 MG 24 hr tablet Take 1 tablet (50 mg) by mouth daily. 30 tablet 1    modafinil (PROVIGIL) 100 MG tablet 2 1/2 TABLET (250MG) BY MOUTH EVERY MORNING 30 tablet 0    mometasone-formoterol (DULERA) 100-5 MCG/ACT inhaler Inhale 2 puffs into the lungs 2 times daily.      montelukast (SINGULAIR) 10 mg tablet Take 10 mg by mouth every evening      nystatin (MYCOSTATIN) 438392 UNIT/GM external powder Apply topically 2 times daily.      oxyCODONE (ROXICODONE) 5 MG tablet Take 1 tablet (5 mg) by mouth 3 times daily as needed for severe pain. 20 tablet 0    OXYGEN-AIR DELIVERY SYSTEMS MISC [OXYGEN-AIR DELIVERY SYSTEMS MISC] Use 3 L As Directed. 3 lpm with activities and as needed at night      polyethylene glycol (MIRALAX) 17 GM/Dose powder Take 17 g by mouth 2 times daily.      senna-docusate (SENOKOT-S/PERICOLACE) 8.6-50 MG tablet Take 2  "tablets by mouth 2 times daily. Can also take 1 tab po BID PRN      solifenacin (VESICARE) 10 MG tablet Take 10 mg by mouth at bedtime.       Objective: BP (!) 153/87   Pulse 61   Temp 97.4  F (36.3  C)   Resp 20   Ht 1.397 m (4' 7\")   Wt 92.9 kg (204 lb 11.2 oz)   SpO2 (!) 91%   BMI 47.58 kg/m    Weights:    <--Admit  Exam:  GENERAL APPEARANCE: Alert, in no distress, cooperative.   RESP: Respiratory effort shallow, no respiratory distress, Lung sounds clear diminished throughout. On 3L o2 NC.   CV: Auscultation of heart reveals S1, S2, rate and rhythm regular, no murmur, no rub or gallop, Edema +1 BLE, moderate non-pitting to LUE, compression socks on bilaterally. Peripheral pulses are 2+ .Left forearm with mild non-pitting dependent edema.   PSYCH: Insight, judgement, and memory are intact, some forgetfulness, affect and mood are appropriate.    ASSESSMENT/PLAN:  Cervical radiculopathy  S/P cervical spinal fusion  Moderate persistent asthma, unspecified whether complicated  Chronic obstructive pulmonary disease, unspecified COPD type (H)  Chronic diastolic heart failure (H)  Stage 3b chronic kidney disease (H)  Acute on chronic. Tenuous.   Provider reviewed records from facility, and interpreted most recent imaging/lab work (CBC, BMP), and vital signs, each with their own characteristics requiring MDM.  Provider discussed care with nursing, , and rehab team:  Rehab team reports Desirae is ambulating 100ft w/ 2ww and previous SLUMs is 23/30. She requires mod/max assistance with some ADLs.   reports no change in discharge disposition.   Nursing reports slight LUE swelling.   Respiratory Failure:  Started Dulera. Helpful.   Pallitative care consult appt on 7/23.   Remains on 3-4L of oxygen via NC.  Provider coordinated care w/ nursing as weaning was not in place.   GRACY/CKD/CHF, CHF exacerbated:  Noting wt gain, SOB w/ activity, increased edema, especially in LUE.   Will trial " one-time Metolazone given underlying CKD and current Bumex dosing.   Closely monitor BMP.   S/P fusion, cervical radiculopathy:  Neurosurgery follow-up on 7/29.   Continue current Tylenol, Robaxin, and continue PRN Oxycodone reduction.   Previous CVA:  Left forearm with mild non-pitting edema, given no pain, or known injury, and that it is her weak arm, suspect dependent edema r/t immobility. Encouraged elevation.   Neurology follow-up today.   Follow up w/in 1 week or as needed.    Orders:  On 7/15, 30 minutes before Bumex dose, give Metolazone 2.5mg PO x1.   BMP x1 on 7/17. Dx: CHF  Decrease Oxycodone 5mg PO BID PRN. Dx: S/P cervical spinal fusion    I (the provider) was present with professional student who participated in this service and in the documentation of this service. I have verified the HPI/ROS, history, and personally performed the physical exam and the healthcare decision making. I agree with all elements as documented above.      Electronically signed by:  Dr. Natali Chakraborty, APRN CNP DNP

## 2025-07-14 NOTE — NURSING NOTE
Chief Complaint   Patient presents with    Stroke     Follow up for stroke in 12/2024. Pt states she has weakness on her left side. She did have surgery on her neck on 6/16/25. Since the neck surgery she cannot raise her left arm. She is currently at [facility].     Leslie Candelaria LPN on 7/14/2025 at 3:29 PM

## 2025-07-14 NOTE — LETTER
7/14/2025      Desirae Rey  [address]   [place] [address]        [facility] GERIATRIC SERVICE  Episodic/Acute/Follow-Up  [place] MRN: [id number]. Place of Service where encounter took place:  [facility] ([facility]) [[id number]]   Chief Complaint   Patient presents with     RECHECK    HPI: Desirae Rey  is a 74 year old (1950), who is being seen today for an episodic care visit.    TCU Course:  6/20: TCU Admission. 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   6/23: Admit visit. Increased senna. Decreased oxycodone.   6/24: CALL. Hypoxic on CPAP 3L (patients baseline), increased to 4L.   6/30: Follow-up visit. Scheduled Robaxin, stopped Zanaflex, reduced Oxycodone. Trended labs, scheduled AirDuo. Palliative care consult placed.   7/3: AirDuo not covered, switched to Dulera.   7/7: Follow-up visit. Increased Robaxin, Decreased Oxycodone, started O2 weaning, Dc'd Tumeric.     Patient seen today on routine follow up as Desirae continues to rehab in TCU.     Today, Desirae is doing well. She reports her pain continues to bother her, but is not new and is manageable. She said the Robaxin helped at first. She has also been icing her neck. Left arm remains swollen, she has been elevating it, denies pain to wrist or arm, denies trauma. Denies chest pain. Has shortness of breath with activity at baseline, she reports decreased coughing since starting Dulera. Bowels and bladder functioning well. She has a neurology appointment today.     Past Medical and Surgical History reviewed in Epic today.  MEDICATIONS:  Current Outpatient Medications   Medication Sig Dispense Refill     albuterol (PROAIR HFA;PROVENTIL HFA;VENTOLIN HFA) 90 mcg/actuation inhaler Inhale 1 puff into the lungs every 4 hours as needed for shortness of breath / dyspnea        azelastine (OPTIVAR) 0.05 % ophthalmic solution Place 1 drop into both eyes 2 times daily. 6 mL 0     bumetanide (BUMEX) 1 MG tablet Take 2 mg by mouth daily.       calcium citrate (CITRACAL) 950 (200 Ca) MG tablet Take 1 tablet by mouth daily       cetirizine (ZYRTEC) 10 MG tablet [CETIRIZINE (ZYRTEC) 10 MG TABLET] Take 10 mg by mouth daily.        cholecalciferol (VITAMIN D3) 25 mcg (1000 units) capsule Take 1 capsule by mouth daily.       CRESTOR 10 MG tablet Take 10 mg by mouth daily.       DULoxetine (CYMBALTA) 30 MG capsule Take 60 mg by mouth every morning.       DULoxetine (CYMBALTA) 30 MG capsule Take 30 mg by mouth every evening.       esomeprazole (NEXIUM) 20 MG DR capsule Take 1 capsule (20 mg) by mouth every morning (before breakfast). Take 30-60 minutes before eating. (Patient taking differently: Take 20 mg by mouth every morning (before breakfast). Take 30-60 minutes before eating. prn) 30 capsule 0     l-lysine HCl 500 MG TABS tablet Take 500 mg by mouth daily.       Magnesium Oxide -Mg Supplement 500 MG CAPS Take 500 mg by mouth 2 times daily.       methocarbamol (ROBAXIN) 500 MG tablet Take 750 mg by mouth 3 times daily.       metoprolol succinate ER (TOPROL XL) 50 MG 24 hr tablet Take 1 tablet (50 mg) by mouth daily. 30 tablet 1     modafinil (PROVIGIL) 100 MG tablet 2 1/2 TABLET (250MG) BY MOUTH EVERY MORNING 30 tablet 0     mometasone-formoterol (DULERA) 100-5 MCG/ACT inhaler Inhale 2 puffs into the lungs 2 times daily.       montelukast (SINGULAIR) 10 mg tablet Take 10 mg by mouth every evening       nystatin (MYCOSTATIN) 467877 UNIT/GM external powder Apply topically 2 times daily.       oxyCODONE (ROXICODONE) 5 MG tablet Take 1 tablet (5 mg) by mouth 3 times daily as needed for severe pain. 20 tablet 0     OXYGEN-AIR DELIVERY SYSTEMS MISC [OXYGEN-AIR DELIVERY SYSTEMS MISC] Use 3 L As Directed. 3 lpm with activities and as needed at night       polyethylene glycol (MIRALAX) 17 GM/Dose powder  "Take 17 g by mouth 2 times daily.       senna-docusate (SENOKOT-S/PERICOLACE) 8.6-50 MG tablet Take 2 tablets by mouth 2 times daily. Can also take 1 tab po BID PRN       solifenacin (VESICARE) 10 MG tablet Take 10 mg by mouth at bedtime.       Objective: BP (!) 153/87   Pulse 61   Temp 97.4  F (36.3  C)   Resp 20   Ht 1.397 m (4' 7\")   Wt 92.9 kg (204 lb 11.2 oz)   SpO2 (!) 91%   BMI 47.58 kg/m    Weights:    <--Admit  Exam:  GENERAL APPEARANCE: Alert, in no distress, cooperative.   RESP: Respiratory effort shallow, no respiratory distress, Lung sounds clear diminished throughout. On 3L o2 NC.   CV: Auscultation of heart reveals S1, S2, rate and rhythm regular, no murmur, no rub or gallop, Edema +1 BLE, moderate non-pitting to LUE, compression socks on bilaterally. Peripheral pulses are 2+ .Left forearm with mild non-pitting dependent edema.   PSYCH: Insight, judgement, and memory are intact, some forgetfulness, affect and mood are appropriate.    ASSESSMENT/PLAN:  Cervical radiculopathy  S/P cervical spinal fusion  Moderate persistent asthma, unspecified whether complicated  Chronic obstructive pulmonary disease, unspecified COPD type (H)  Chronic diastolic heart failure (H)  Stage 3b chronic kidney disease (H)  Acute on chronic. Tenuous.   Provider reviewed records from facility, and interpreted most recent imaging/lab work (CBC, BMP), and vital signs, each with their own characteristics requiring MDM.  Provider discussed care with nursing, , and rehab team:  Rehab team reports Desirae is ambulating 100ft w/ 2ww and previous SLUMs is 23/30. She requires mod/max assistance with some ADLs.   reports no change in discharge disposition.   Nursing reports slight LUE swelling.   Respiratory Failure:  Started Dulera. Helpful.   Pallitative care consult appt on 7/23.   Remains on 3-4L of oxygen via NC.  Provider coordinated care w/ nursing as weaning was not in place.   GRACY/CKD/CHF, CHF " exacerbated:  Noting wt gain, SOB w/ activity, increased edema, especially in LUE.   Will trial one-time Metolazone given underlying CKD and current Bumex dosing.   Closely monitor BMP.   S/P fusion, cervical radiculopathy:  Neurosurgery follow-up on 7/29.   Continue current Tylenol, Robaxin, and continue PRN Oxycodone reduction.   Previous CVA:  Left forearm with mild non-pitting edema, given no pain, or known injury, and that it is her weak arm, suspect dependent edema r/t immobility. Encouraged elevation.   Neurology follow-up today.   Follow up w/in 1 week or as needed.    Orders:  On 7/15, 30 minutes before Bumex dose, give Metolazone 2.5mg PO x1.   BMP x1 on 7/17. Dx: CHF  Decrease Oxycodone 5mg PO BID PRN. Dx: S/P cervical spinal fusion    I (the provider) was present with professional student who participated in this service and in the documentation of this service. I have verified the HPI/ROS, history, and personally performed the physical exam and the healthcare decision making. I agree with all elements as documented above.      Electronically signed by:  KALIE Riley CNP DNP          Sincerely,        KALIE Sears CNP    Electronically signed

## 2025-07-15 ENCOUNTER — TRANSITIONAL CARE UNIT VISIT (OUTPATIENT)
Dept: GERIATRICS | Facility: CLINIC | Age: 75
End: 2025-07-15
Payer: MEDICARE

## 2025-07-15 ENCOUNTER — TELEPHONE (OUTPATIENT)
Dept: NEUROLOGY | Facility: CLINIC | Age: 75
End: 2025-07-15

## 2025-07-15 ENCOUNTER — LAB REQUISITION (OUTPATIENT)
Dept: LAB | Facility: CLINIC | Age: 75
End: 2025-07-15
Payer: MEDICARE

## 2025-07-15 VITALS
HEART RATE: 65 BPM | HEIGHT: 55 IN | WEIGHT: 203 LBS | BODY MASS INDEX: 46.98 KG/M2 | SYSTOLIC BLOOD PRESSURE: 137 MMHG | TEMPERATURE: 97.5 F | DIASTOLIC BLOOD PRESSURE: 55 MMHG | RESPIRATION RATE: 18 BRPM | OXYGEN SATURATION: 91 %

## 2025-07-15 DIAGNOSIS — I10 ESSENTIAL HYPERTENSION: ICD-10-CM

## 2025-07-15 DIAGNOSIS — R29.898 LEFT ARM WEAKNESS: ICD-10-CM

## 2025-07-15 DIAGNOSIS — I50.32 CHRONIC DIASTOLIC HEART FAILURE (H): ICD-10-CM

## 2025-07-15 DIAGNOSIS — M48.062 SPINAL STENOSIS OF LUMBAR REGION WITH NEUROGENIC CLAUDICATION: ICD-10-CM

## 2025-07-15 DIAGNOSIS — I50.32 CHRONIC DIASTOLIC (CONGESTIVE) HEART FAILURE (H): ICD-10-CM

## 2025-07-15 DIAGNOSIS — G47.33 OSA (OBSTRUCTIVE SLEEP APNEA): ICD-10-CM

## 2025-07-15 DIAGNOSIS — R13.10 DYSPHAGIA, UNSPECIFIED TYPE: ICD-10-CM

## 2025-07-15 DIAGNOSIS — Z98.1 S/P CERVICAL SPINAL FUSION: Primary | ICD-10-CM

## 2025-07-15 DIAGNOSIS — J96.11 CHRONIC RESPIRATORY FAILURE WITH HYPOXIA (H): ICD-10-CM

## 2025-07-15 PROCEDURE — 99305 1ST NF CARE MODERATE MDM 35: CPT | Performed by: FAMILY MEDICINE

## 2025-07-15 NOTE — TELEPHONE ENCOUNTER
Left message for pt on her mobile number (as requested) and left message on pt's , Hugo's, voicemail too as discussed in appointment with pt yesterday.  Writer can assist with scheduling pt for the EMG and follow up with Dr. Ames upon return call. (We are working pt in for a sooner appt)    Leslie Candelaria LPN on 7/15/2025 at 3:45 PM

## 2025-07-15 NOTE — LETTER
7/15/2025      Desirae AMADOR Skri  [address]   [place] [address]        No notes on file      Sincerely,        Pastora Esquivel MD    Electronically signed

## 2025-07-17 LAB
ANION GAP SERPL CALCULATED.3IONS-SCNC: 10 MMOL/L (ref 7–15)
BUN SERPL-MCNC: 38.9 MG/DL (ref 8–23)
CALCIUM SERPL-MCNC: 9.5 MG/DL (ref 8.8–10.4)
CHLORIDE SERPL-SCNC: 101 MMOL/L (ref 98–107)
CREAT SERPL-MCNC: 1.05 MG/DL (ref 0.51–0.95)
EGFRCR SERPLBLD CKD-EPI 2021: 56 ML/MIN/1.73M2
GLUCOSE SERPL-MCNC: 92 MG/DL (ref 70–99)
HCO3 SERPL-SCNC: 27 MMOL/L (ref 22–29)
POTASSIUM SERPL-SCNC: 5.3 MMOL/L (ref 3.4–5.3)
SODIUM SERPL-SCNC: 138 MMOL/L (ref 135–145)

## 2025-07-17 PROCEDURE — 80048 BASIC METABOLIC PNL TOTAL CA: CPT | Mod: ORL | Performed by: NURSE PRACTITIONER

## 2025-07-17 PROCEDURE — 36415 COLL VENOUS BLD VENIPUNCTURE: CPT | Mod: ORL | Performed by: NURSE PRACTITIONER

## 2025-07-17 PROCEDURE — P9603 ONE-WAY ALLOW PRORATED MILES: HCPCS | Mod: ORL | Performed by: NURSE PRACTITIONER

## 2025-07-17 NOTE — TELEPHONE ENCOUNTER
Called and spoke to pt. They can only come late in the day for EMG and follow up. Able to get pt scheduled on 8/13/25 at 2pm for the EMG and see Dr. Jackson following the test.    Leslie Candelaria LPN on 7/17/2025 at 4:05 PM

## 2025-07-21 ENCOUNTER — TRANSITIONAL CARE UNIT VISIT (OUTPATIENT)
Dept: GERIATRICS | Facility: CLINIC | Age: 75
End: 2025-07-21
Payer: MEDICARE

## 2025-07-21 VITALS
TEMPERATURE: 97.9 F | BODY MASS INDEX: 47.88 KG/M2 | WEIGHT: 206 LBS | OXYGEN SATURATION: 90 % | HEART RATE: 68 BPM | DIASTOLIC BLOOD PRESSURE: 66 MMHG | RESPIRATION RATE: 20 BRPM | SYSTOLIC BLOOD PRESSURE: 150 MMHG

## 2025-07-21 DIAGNOSIS — J44.9 CHRONIC OBSTRUCTIVE PULMONARY DISEASE, UNSPECIFIED COPD TYPE (H): ICD-10-CM

## 2025-07-21 DIAGNOSIS — Z98.1 S/P CERVICAL SPINAL FUSION: ICD-10-CM

## 2025-07-21 DIAGNOSIS — I50.32 CHRONIC DIASTOLIC HEART FAILURE (H): ICD-10-CM

## 2025-07-21 DIAGNOSIS — R06.09 DYSPNEA ON EXERTION: Primary | ICD-10-CM

## 2025-07-21 DIAGNOSIS — M81.0 OSTEOPOROSIS, UNSPECIFIED OSTEOPOROSIS TYPE, UNSPECIFIED PATHOLOGICAL FRACTURE PRESENCE: ICD-10-CM

## 2025-07-21 DIAGNOSIS — N18.32 STAGE 3B CHRONIC KIDNEY DISEASE (H): ICD-10-CM

## 2025-07-21 PROCEDURE — 99310 SBSQ NF CARE HIGH MDM 45: CPT | Performed by: NURSE PRACTITIONER

## 2025-07-21 NOTE — PROGRESS NOTES
[facility] GERIATRIC SERVICE  Episodic/Acute/Follow-Up  [place] MRN: [id number]. Place of Service where encounter took place:  [facility] ([facility]) [[id number]]   Chief Complaint   Patient presents with    RECHECK    HPI: Desirae Rey  is a 74 year old (1950), who is being seen today for an episodic care visit.    TCU Course:  6/20: TCU Admission. 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   6/23: Admit visit. Increased senna. Decreased oxycodone.   6/24: CALL. Hypoxic on CPAP 3L (patients baseline), increased to 4L.   6/30: Follow-up visit. Scheduled Robaxin, stopped Zanaflex, reduced Oxycodone. Trended labs, scheduled AirDuo. Palliative care consult placed.   7/3: AirDuo not covered, switched to Dulera.   7/7: Follow-up visit. Increased Robaxin, Decreased Oxycodone, started O2 weaning, Dc'd Tumeric.   7/14: Follow-up visit. Decreased Oxycodone. One-time dose of Metolazone. BMP ordered.   7/15: Follow-up visit.     Patient seen today on routine follow up as Desirae continues to rehab in TCU.     Today, Desirae is up in her wheelchair and doing well. She reports her pain is slightly improved and mainly in her head and neck, less radiation down the arms. Bowels and bladder working well. Appetite normal. Sleeping okay. Shortness of breath with therapy this morning while working, but overall controlled. Denies chest pain or palpitations.  She still feels like her weight is elevated.    Past Medical and Surgical History reviewed in Epic today.  MEDICATIONS:  Current Outpatient Medications   Medication Sig Dispense Refill    albuterol (PROAIR HFA;PROVENTIL HFA;VENTOLIN HFA) 90 mcg/actuation inhaler Inhale 1 puff into the lungs every 4 hours as needed for shortness of breath / dyspnea      azelastine (OPTIVAR) 0.05 % ophthalmic solution Place 1 drop into  both eyes 2 times daily. 6 mL 0    bumetanide (BUMEX) 1 MG tablet Take 2 mg by mouth daily.      calcium citrate (CITRACAL) 950 (200 Ca) MG tablet Take 1 tablet by mouth daily      cetirizine (ZYRTEC) 10 MG tablet [CETIRIZINE (ZYRTEC) 10 MG TABLET] Take 10 mg by mouth daily.       cholecalciferol (VITAMIN D3) 25 mcg (1000 units) capsule Take 1 capsule by mouth daily.      CRESTOR 10 MG tablet Take 10 mg by mouth daily.      DULoxetine (CYMBALTA) 30 MG capsule Take 60 mg by mouth every morning.      DULoxetine (CYMBALTA) 30 MG capsule Take 30 mg by mouth every evening.      esomeprazole (NEXIUM) 20 MG DR capsule Take 1 capsule (20 mg) by mouth every morning (before breakfast). Take 30-60 minutes before eating. 30 capsule 0    l-lysine HCl 500 MG TABS tablet Take 500 mg by mouth daily.      Magnesium Oxide -Mg Supplement 500 MG CAPS Take 500 mg by mouth 2 times daily.      methocarbamol (ROBAXIN) 500 MG tablet Take 750 mg by mouth 3 times daily.      metoprolol succinate ER (TOPROL XL) 50 MG 24 hr tablet Take 1 tablet (50 mg) by mouth daily. 30 tablet 1    modafinil (PROVIGIL) 100 MG tablet 2 1/2 TABLET (250MG) BY MOUTH EVERY MORNING 30 tablet 0    mometasone-formoterol (DULERA) 100-5 MCG/ACT inhaler Inhale 2 puffs into the lungs 2 times daily.      montelukast (SINGULAIR) 10 mg tablet Take 10 mg by mouth every evening      nystatin (MYCOSTATIN) 233207 UNIT/GM external powder Apply topically 2 times daily.      oxyCODONE (ROXICODONE) 5 MG tablet Take 1 tablet (5 mg) by mouth 2 times daily as needed for severe pain. 20 tablet 0    OXYGEN-AIR DELIVERY SYSTEMS MISC [OXYGEN-AIR DELIVERY SYSTEMS MISC] Use 3 L As Directed. 3 lpm with activities and as needed at night      polyethylene glycol (MIRALAX) 17 GM/Dose powder Take 17 g by mouth 2 times daily.      senna-docusate (SENOKOT-S/PERICOLACE) 8.6-50 MG tablet Take 2 tablets by mouth 2 times daily. Can also take 1 tab po BID PRN      solifenacin (VESICARE) 10 MG tablet  Take 10 mg by mouth at bedtime.       Objective: BP (!) 150/66   Pulse 68   Temp 97.9  F (36.6  C)   Resp 20   Wt 93.4 kg (206 lb)   SpO2 (!) 90%   BMI 47.88 kg/m    Weights Trend:    Exam:  GENERAL APPEARANCE: Alert, in no distress, cooperative.   RESP: Respiratory effort weak, no respiratory distress, Lung sounds with crackles to bilateral bases and diminished to upper lobes bilaterally On 2L O2 NC.   CV: Auscultation of heart reveals S1, S2, rate and rhythm regular, no murmur, no rub or gallop, Edema + BLE, compression stockings on. Peripheral pulses are 2+.  PSYCH: Insight, judgement, and memory are intact, affect and mood are baseline.    ASSESSMENT/PLAN:  Dyspnea on exertion  Chronic diastolic heart failure (H)  Stage 3b chronic kidney disease (H)  Chronic obstructive pulmonary disease, unspecified COPD type (H)  S/P cervical spinal fusion  Osteoporosis, unspecified osteoporosis type, unspecified pathological fracture presence  Acute on chronic. Tenuous.   Provider reviewed records from facility, and interpreted most recent imaging/lab work (BMP, CBC), and vital signs, each with their own characteristics requiring MDM.  Provider discussed care with nursing, , and rehab team:   reports patient's  is concerned about finances of affording care if further care is required. Patient has chair lift in her house and  helps cook and clean.   Nursing wondering if alendronate should be restarted, and they do concur about some weight gain noted.  Rehab team reporting Desirae is able to ambulate 200 feet with a 2 wheeled walker at standby assist, but still needs moderate assistance with her ADLs.  CHF/CKD w/ COPD and dyspnea on exertion:  Baseline weight is 200lbs, no sustained weight loss after 2.5mg metolazone dose on 7/15.   Will increase Bumex to 2mg once daily.   Follow-up with cardiology on 8/21.   Continue with cardiac diet and 2 L fluid restriction.  Given CKD,  closely monitor BMP.  Palliative care initial appointmnet on 7/23  Follows up with neurology 8/13, repeat CT/ MRI this Friday.   EMG 8/13 with neurology to work up left C5 radiculopathy.   Postop spine:  Pain improving, mobility improving.  Previously on Alendronate once weekly. Was not restarted after hospitalization. Patient with osteoporosis placing her in high risk. Recommendations are for 10 years of therapy. Started 2017. Will restart.   Follow up w/in 1 week or as needed.    Orders:  Resume Alendronate 70mg PO Qweek. Dx: osteoporosis.   Increase Bumex to 2mg once daily. Dx: CHF.   BMP x1 on 7/28. Dx: CHF.     I (the provider) was present with professional student who participated in this service and in the documentation of this service. I have verified the HPI/ROS, history, and personally performed the physical exam and the healthcare decision making. I agree with all elements as documented above.      Electronically signed by:  Dr. Natali Chakraborty, APRN CNP DNP

## 2025-07-21 NOTE — LETTER
7/21/2025      Desirae Rey  [address]   [place] [address]        [facility] GERIATRIC SERVICE  Episodic/Acute/Follow-Up  [place] MRN: [id number]. Place of Service where encounter took place:  [facility] ([facility]) [[id number]]   Chief Complaint   Patient presents with     RECHECK    HPI: Desirae Rey  is a 74 year old (1950), who is being seen today for an episodic care visit.    TCU Course:  6/20: TCU Admission. 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   6/23: Admit visit. Increased senna. Decreased oxycodone.   6/24: CALL. Hypoxic on CPAP 3L (patients baseline), increased to 4L.   6/30: Follow-up visit. Scheduled Robaxin, stopped Zanaflex, reduced Oxycodone. Trended labs, scheduled AirDuo. Palliative care consult placed.   7/3: AirDuo not covered, switched to Dulera.   7/7: Follow-up visit. Increased Robaxin, Decreased Oxycodone, started O2 weaning, Dc'd Tumeric.   7/14: Follow-up visit. Decreased Oxycodone. One-time dose of Metolazone. BMP ordered.   7/15: Follow-up visit.     Patient seen today on routine follow up as Desirae continues to rehab in TCU.     Today, Desirae is up in her wheelchair and doing well. She reports her pain is slightly improved and mainly in her head and neck, less radiation down the arms. Bowels and bladder working well. Appetite normal. Sleeping okay. Shortness of breath with therapy this morning while working, but overall controlled. Denies chest pain or palpitations.  She still feels like her weight is elevated.    Past Medical and Surgical History reviewed in Epic today.  MEDICATIONS:  Current Outpatient Medications   Medication Sig Dispense Refill     albuterol (PROAIR HFA;PROVENTIL HFA;VENTOLIN HFA) 90 mcg/actuation inhaler Inhale 1 puff into the lungs every 4 hours as needed for shortness of  breath / dyspnea       azelastine (OPTIVAR) 0.05 % ophthalmic solution Place 1 drop into both eyes 2 times daily. 6 mL 0     bumetanide (BUMEX) 1 MG tablet Take 2 mg by mouth daily.       calcium citrate (CITRACAL) 950 (200 Ca) MG tablet Take 1 tablet by mouth daily       cetirizine (ZYRTEC) 10 MG tablet [CETIRIZINE (ZYRTEC) 10 MG TABLET] Take 10 mg by mouth daily.        cholecalciferol (VITAMIN D3) 25 mcg (1000 units) capsule Take 1 capsule by mouth daily.       CRESTOR 10 MG tablet Take 10 mg by mouth daily.       DULoxetine (CYMBALTA) 30 MG capsule Take 60 mg by mouth every morning.       DULoxetine (CYMBALTA) 30 MG capsule Take 30 mg by mouth every evening.       esomeprazole (NEXIUM) 20 MG DR capsule Take 1 capsule (20 mg) by mouth every morning (before breakfast). Take 30-60 minutes before eating. 30 capsule 0     l-lysine HCl 500 MG TABS tablet Take 500 mg by mouth daily.       Magnesium Oxide -Mg Supplement 500 MG CAPS Take 500 mg by mouth 2 times daily.       methocarbamol (ROBAXIN) 500 MG tablet Take 750 mg by mouth 3 times daily.       metoprolol succinate ER (TOPROL XL) 50 MG 24 hr tablet Take 1 tablet (50 mg) by mouth daily. 30 tablet 1     modafinil (PROVIGIL) 100 MG tablet 2 1/2 TABLET (250MG) BY MOUTH EVERY MORNING 30 tablet 0     mometasone-formoterol (DULERA) 100-5 MCG/ACT inhaler Inhale 2 puffs into the lungs 2 times daily.       montelukast (SINGULAIR) 10 mg tablet Take 10 mg by mouth every evening       nystatin (MYCOSTATIN) 470131 UNIT/GM external powder Apply topically 2 times daily.       oxyCODONE (ROXICODONE) 5 MG tablet Take 1 tablet (5 mg) by mouth 2 times daily as needed for severe pain. 20 tablet 0     OXYGEN-AIR DELIVERY SYSTEMS MISC [OXYGEN-AIR DELIVERY SYSTEMS MISC] Use 3 L As Directed. 3 lpm with activities and as needed at night       polyethylene glycol (MIRALAX) 17 GM/Dose powder Take 17 g by mouth 2 times daily.       senna-docusate (SENOKOT-S/PERICOLACE) 8.6-50 MG tablet Take  2 tablets by mouth 2 times daily. Can also take 1 tab po BID PRN       solifenacin (VESICARE) 10 MG tablet Take 10 mg by mouth at bedtime.       Objective: BP (!) 150/66   Pulse 68   Temp 97.9  F (36.6  C)   Resp 20   Wt 93.4 kg (206 lb)   SpO2 (!) 90%   BMI 47.88 kg/m    Weights Trend:    Exam:  GENERAL APPEARANCE: Alert, in no distress, cooperative.   RESP: Respiratory effort weak, no respiratory distress, Lung sounds with crackles to bilateral bases and diminished to upper lobes bilaterally On 2L O2 NC.   CV: Auscultation of heart reveals S1, S2, rate and rhythm regular, no murmur, no rub or gallop, Edema + BLE, compression stockings on. Peripheral pulses are 2+.  PSYCH: Insight, judgement, and memory are intact, affect and mood are baseline.    ASSESSMENT/PLAN:  Dyspnea on exertion  Chronic diastolic heart failure (H)  Stage 3b chronic kidney disease (H)  Chronic obstructive pulmonary disease, unspecified COPD type (H)  S/P cervical spinal fusion  Osteoporosis, unspecified osteoporosis type, unspecified pathological fracture presence  Acute on chronic. Tenuous.   Provider reviewed records from facility, and interpreted most recent imaging/lab work (BMP, CBC), and vital signs, each with their own characteristics requiring MDM.  Provider discussed care with nursing, , and rehab team:   reports patient's  is concerned about finances of affording care if further care is required. Patient has chair lift in her house and  helps cook and clean.   Nursing wondering if alendronate should be restarted, and they do concur about some weight gain noted.  Rehab team reporting Desirae is able to ambulate 200 feet with a 2 wheeled walker at standby assist, but still needs moderate assistance with her ADLs.  CHF/CKD w/ COPD and dyspnea on exertion:  Baseline weight is 200lbs, no sustained weight loss after 2.5mg metolazone dose on 7/15.   Will increase Bumex to 2mg once daily.    Follow-up with cardiology on 8/21.   Continue with cardiac diet and 2 L fluid restriction.  Given CKD, closely monitor BMP.  Palliative care initial appointmnet on 7/23  Follows up with neurology 8/13, repeat CT/ MRI this Friday.   EMG 8/13 with neurology to work up left C5 radiculopathy.   Postop spine:  Pain improving, mobility improving.  Previously on Alendronate once weekly. Was not restarted after hospitalization. Patient with osteoporosis placing her in high risk. Recommendations are for 10 years of therapy. Started 2017. Will restart.   Follow up w/in 1 week or as needed.    Orders:  Resume Alendronate 70mg PO Qweek. Dx: osteoporosis.   Increase Bumex to 2mg once daily. Dx: CHF.   BMP x1 on 7/28. Dx: CHF.     I (the provider) was present with professional student who participated in this service and in the documentation of this service. I have verified the HPI/ROS, history, and personally performed the physical exam and the healthcare decision making. I agree with all elements as documented above.      Electronically signed by:  KALIE Riley CNP DNP          Sincerely,        KALIE Sears CNP    Electronically signed

## 2025-07-22 ENCOUNTER — TRANSITIONAL CARE UNIT VISIT (OUTPATIENT)
Dept: GERIATRICS | Facility: CLINIC | Age: 75
End: 2025-07-22
Payer: MEDICARE

## 2025-07-22 VITALS
RESPIRATION RATE: 20 BRPM | OXYGEN SATURATION: 96 % | SYSTOLIC BLOOD PRESSURE: 150 MMHG | DIASTOLIC BLOOD PRESSURE: 66 MMHG | WEIGHT: 207 LBS | HEIGHT: 55 IN | TEMPERATURE: 97.9 F | BODY MASS INDEX: 47.9 KG/M2 | HEART RATE: 68 BPM

## 2025-07-22 DIAGNOSIS — R13.10 DYSPHAGIA, UNSPECIFIED TYPE: ICD-10-CM

## 2025-07-22 DIAGNOSIS — I50.32 CHRONIC DIASTOLIC HEART FAILURE (H): ICD-10-CM

## 2025-07-22 DIAGNOSIS — Z98.1 S/P CERVICAL SPINAL FUSION: Primary | ICD-10-CM

## 2025-07-22 DIAGNOSIS — J96.11 CHRONIC RESPIRATORY FAILURE WITH HYPOXIA (H): ICD-10-CM

## 2025-07-22 DIAGNOSIS — M48.062 SPINAL STENOSIS OF LUMBAR REGION WITH NEUROGENIC CLAUDICATION: ICD-10-CM

## 2025-07-22 DIAGNOSIS — I10 ESSENTIAL HYPERTENSION: ICD-10-CM

## 2025-07-22 PROBLEM — R53.81 PHYSICAL DECONDITIONING: Status: ACTIVE | Noted: 2025-06-08

## 2025-07-22 PROBLEM — H26.9 CATARACT: Status: ACTIVE | Noted: 2025-07-22

## 2025-07-22 PROBLEM — I13.0 HYPERTENSIVE HEART DISEASE WITH CONGESTIVE HEART FAILURE AND CHRONIC KIDNEY DISEASE (H): Status: ACTIVE | Noted: 2025-07-22

## 2025-07-22 PROBLEM — Z88.9 HISTORY OF MULTIPLE ALLERGIES: Status: ACTIVE | Noted: 2025-07-22

## 2025-07-22 PROBLEM — N95.9 PERIMENOPAUSAL DISORDER: Status: ACTIVE | Noted: 2025-07-22

## 2025-07-22 PROCEDURE — 99309 SBSQ NF CARE MODERATE MDM 30: CPT | Performed by: FAMILY MEDICINE

## 2025-07-22 NOTE — LETTER
7/22/2025      Desirae AMADOR Skri  [address] [facility]   [facility] [address]        No notes on file      Sincerely,        Pastora Esquivel MD    Electronically signed   AHHRF and AMS with multiple falls. Here, ddx initially wide including metabolic, infectious, ischemic, toxic, seizure less likely. W/up notable for e/o acute UTI with noted hx of E. Coli infections in past. W/up otherwise unremarkable for source, pt improved significantly following 1d CTX here and was treated for 3d total with resolution in symptoms, cultures returned with pan-sensitive E. Coli as anticipated. Rebollar eventually able to be removed without issue. Pt with mixed incontinence at baseline, likely precipitating her to this, may be worth discussing at follow up.    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 Mckeon  # Dysphagia, improved   XR C spine during prior admission completed showing grade 1 anterolisthesis at C4-C5 iso known disengagement of all screws and plates at C5-7 site with 9mm vertebral migration of plate. Pt with notable complaints of dysphagia here, SLP with significant concerns about continued safety of pt swallowing with known hardware disintegration. Given this, engaged NSG officially and medical team cleared pt for surgery, noting her to be very high risk but ultimately agreeing that benefit outweighed risk for pt long term, particularly with quality of life. Taken to the OR 6/16 with Dr. Mckeon for hardware removal and C4-T1 posterior fusion without complication. Pt managed by medicine service post-operatively and improved rapidly from a pain and post-op perspective and was able to be discharged to TCU 6/20 for ongoing recovery with planned OP NSG follow up.     # Physical deconditioning c/b multiple spinal pathologies, h/o cerebellar stroke, h/o bl hallux fractures, cardiopulmonary comorbidities  # Frequent falls   # Multi-level lumbar spinal stenosis, unchanged   # Closed sacral fracture, chronic   Pt with longerstanding hx of frequent falls and generalized weakness prompting multiple admissions lately. Discharged  last on 4/30 with same, although stay also c/b hypercarbia at that time. No e/o that here. Based on discussion with the pt and description of discussion with  from overnight service, pt is not doing well at home in terms of mobility and being able to help herself. Ultimately, think that the lumbar spinal stenosis is contributing to her multiple falls, weakness, and overall deconditioning and will limit some of her recovery when it comes to strength and mobility which she is aware of. When previously d/w NSG, the C-spine hardware failure much more acutely required surgical intervention c/t the L spine concerns.  --> Pt amenable to palliative discussion and outpatient referral given significant co-morbidities     # Cardiomyopathy and HFpEF with grade II diastolic dysfunction, NYHA class III stg C  # Hypertension   Last TTE 3/2025 with preserved EF. Pt thinks that she has missed some of her heart medications lately as she lays out her own medications and has been unable to always remember to do this or to safely get up to do this. She has an elevated BNP here at 545 and appears to be third spacing a lot of her fluid, though was initially intravascularly dry. Once UTI cleared up, started Bumex at 1mg daily rather than 2mg BID as prescribed in addition to her metoprolol succinate. Ultimately, she did not require more than 1mg daily to reach euvolemia. Recommend f/up outpt to eval volume status, lytes, kidney function. Should have cardiology f/up.    # Chronic hypoxic respiratory failure with obesity hypoventilation syndrome on NC + NELLIE on BiPAP  No change in respiratory status here. Currently stable on NC and BiPAP while sleeping even postoperatively. No e/o post-operative increased needs in O2 or infection. Encourage IS while at TCU.     # Chronic venous stasis, improved   Diagnosed with BLE cellulitis 6/3 and started on Keflex outpatient. While she does have erythema bilaterally and swelling, exam is more c/w  lymphedema and increased extravascular volume status with chronic venous stasis dermatitis. Pt reporting improvement in erythema over last few days and notes that her pain is actually chronic, no worse than normal. Rubor is dependent, inconsistent with SSTI. Additionally, no purulence or skin breakdown noted.   Improved with lymphedema cares, diuresis. Continue OP.     The patient has morbid obesity, affecting the patient s current medical condition. The body mass index is 46.48 kg/m . This has required use of extra resources, including treatment, assistance from extra staff, and the usage of bariatric equipment (wheelchair, bed, BP cuff, scales, etc.).     Overall stabilized and discharged to TCU on 6/20/2025 for PT, OT, nursing cares, medical management and monitoring.     Today:  Respiratory status at baseline, on oxygen, has respiratory meds ongoing. For s/p cervical spine surgery, had follow up with neurosurgery on 7/2/2025 with concern for new left arm weakness and referred for CT and MRI of neck, tests pending. Also saw neurology on 7/14/2025 for the left arm weakness and hx of cerebellar stroke, felt to be C5 radiculopathy and referred for EMG as well. Appetite is good. No abdominal pain, diarrhea, constipation or urinary sx. Other medical conditions stable. Pain is adequately managed.      PAST MEDICAL HISTORY:  Past Medical History:   Diagnosis Date     Allergic rhinitis      Arthritis of back      CHF (congestive heart failure) (H)      Chronic kidney disease     stage 3      De Quervain's disease (tenosynovitis)      Fibromyalgia, primary      GERD (gastroesophageal reflux disease)      Hematuria     chronic     High cholesterol      History of blood clots 09/01/1970    DVT, from birth control, h/o left calf     Hyperlipidemia      Hypertension      Low back pain      Osteoporosis      Pickwickian syndrome (H)      Plantar fasciitis      Proteinuria      Proteinuria     chronic     Sleep apnea     CPAP      Uncomplicated asthma        MEDICATIONS:  Current Outpatient Medications   Medication Sig Dispense Refill     albuterol (PROAIR HFA;PROVENTIL HFA;VENTOLIN HFA) 90 mcg/actuation inhaler Inhale 1 puff into the lungs every 4 hours as needed for shortness of breath / dyspnea       azelastine (OPTIVAR) 0.05 % ophthalmic solution Place 1 drop into both eyes 2 times daily. 6 mL 0     bumetanide (BUMEX) 1 MG tablet Take 2 mg by mouth daily.       calcium citrate (CITRACAL) 950 (200 Ca) MG tablet Take 1 tablet by mouth daily       cetirizine (ZYRTEC) 10 MG tablet [CETIRIZINE (ZYRTEC) 10 MG TABLET] Take 10 mg by mouth daily.        cholecalciferol (VITAMIN D3) 25 mcg (1000 units) capsule Take 1 capsule by mouth daily.       CRESTOR 10 MG tablet Take 10 mg by mouth daily.       DULoxetine (CYMBALTA) 30 MG capsule Take 60 mg by mouth every morning.       DULoxetine (CYMBALTA) 30 MG capsule Take 30 mg by mouth every evening.       esomeprazole (NEXIUM) 20 MG DR capsule Take 1 capsule (20 mg) by mouth every morning (before breakfast). Take 30-60 minutes before eating. 30 capsule 0     l-lysine HCl 500 MG TABS tablet Take 500 mg by mouth daily.       Magnesium Oxide -Mg Supplement 500 MG CAPS Take 500 mg by mouth 2 times daily.       methocarbamol (ROBAXIN) 500 MG tablet Take 750 mg by mouth 3 times daily.       metoprolol succinate ER (TOPROL XL) 50 MG 24 hr tablet Take 1 tablet (50 mg) by mouth daily. 30 tablet 1     modafinil (PROVIGIL) 100 MG tablet 2 1/2 TABLET (250MG) BY MOUTH EVERY MORNING 30 tablet 0     mometasone-formoterol (DULERA) 100-5 MCG/ACT inhaler Inhale 2 puffs into the lungs 2 times daily.       montelukast (SINGULAIR) 10 mg tablet Take 10 mg by mouth every evening       nystatin (MYCOSTATIN) 684841 UNIT/GM external powder Apply topically 2 times daily.       oxyCODONE (ROXICODONE) 5 MG tablet Take 0.5 tablets (2.5 mg) by mouth 2 times daily as needed for severe pain. 15 tablet 0     OXYGEN-AIR DELIVERY  "SYSTEMS MISC [OXYGEN-AIR DELIVERY SYSTEMS MISC] Use 3 L As Directed. 3 lpm with activities and as needed at night       polyethylene glycol (MIRALAX) 17 GM/Dose powder Take 17 g by mouth 2 times daily.       senna-docusate (SENOKOT-S/PERICOLACE) 8.6-50 MG tablet Take 2 tablets by mouth 2 times daily. Can also take 1 tab po BID PRN       solifenacin (VESICARE) 10 MG tablet Take 10 mg by mouth at bedtime.          PHYSICAL EXAM:  General: Patient is alert female, no distress.   Vitals:BP (!) 150/66   Pulse 68   Temp 97.9  F (36.6  C)   Resp 20   Ht 1.397 m (4' 7\")   Wt 93.9 kg (207 lb)   SpO2 96%   BMI 48.11 kg/m    HEENT: Head is NCAT. Eyes show no injection or icterus. Nares negative. Oropharynx well hydrated.  Neck: No JVD.  Lungs: Non labored respirations.   : Deferred.  Extremities: LE edema is noted.  Musculoskeletal: Degen changes.   Skin: Post C-spine incision covered.   Psych: Mood appears good.      LABS/DIAGNOSTIC DATA:  Component      Latest Ref Rng 6/26/2025  6:21 AM 7/3/2025  6:30 AM   Sodium      135 - 145 mmol/L 136  139    Anion Gap      7 - 15 mmol/L 11  10    Calcium      8.8 - 10.4 mg/dL 9.5  9.6    Creatinine      0.51 - 0.95 mg/dL 1.25 (H)  1.17 (H)    GFR Estimate      >60 mL/min/1.73m2 45 (L)  49 (L)    Potassium      3.4 - 5.3 mmol/L 5.0  5.2    Chloride      98 - 107 mmol/L 98  99    Carbon Dioxide (CO2)      22 - 29 mmol/L 27  30 (H)    Urea Nitrogen      8.0 - 23.0 mg/dL 47.8 (H)  46.9 (H)      Component      Latest Ref Rng 6/26/2025  6:21 AM 7/3/2025  6:30 AM   WBC      4.0 - 11.0 10e3/uL 7.0  8.3    RBC Count      3.80 - 5.20 10e6/uL 2.91 (L)  3.04 (L)    Hemoglobin      11.7 - 15.7 g/dL 8.8 (L)  9.5 (L)    Hematocrit      35.0 - 47.0 % 29.7 (L)  31.9 (L)    MCV      78 - 100 fL 102 (H)  105 (H)    MCH      26.5 - 33.0 pg 30.2  31.3    MCHC      31.5 - 36.5 g/dL 29.6 (L)  29.8 (L)    RDW      10.0 - 15.0 % 13.3  13.4    Platelet Count      150 - 450 10e3/uL 352  418  "       ASSESSMENT/PLAN:  S/p cervical spine surgery on 6/16/2025, removal of failed hardware and C4-T1 posterior fusion per Dr. Mckeon. Had follow up with neurosurgery on 7/2/2025 with concern for new left arm weakness and referred for CT and MRI of neck, tests pending. Also saw neurology on 7/14/2025 for the left arm weakness and hx of cerebellar stroke, felt to be C5 radiculopathy and referred for EMG as well.  Dysphagia. Evaluated by SLP in the hospital as well as ENT. Improved.   Lumbar spinal stenosis. Follow up outpatient. Chronic pain is adequately managed.   CHF. Cardiomyopathy. Monitor weights, edema, clinical status. Follow up with cardiology. Appears compensated, continue bumex.   HTN. On metoprolol, Bumex. Monitor Bps in TCU, adjust meds if needed.   Chronic respiratory failure on home oxygen. Respiratory status is stable.   NELLIE. Overlay OHS. Uses BiPAP.  Venous stasis with lymphedema. On Bumex.   Weakness and deconditioning. Continuing in therapies in TCU.   Hx of stroke. Cerebellar. Likely contributor to falls.   CKD. Labs as noted.   Anemia. ABLA sec to surgery, improved.  Obesity.       Electronically signed by: Pastora Esquivel MD       Sincerely,        Pastora Esquivel MD    Electronically signed

## 2025-07-23 ENCOUNTER — LAB REQUISITION (OUTPATIENT)
Dept: LAB | Facility: CLINIC | Age: 75
End: 2025-07-23
Payer: MEDICARE

## 2025-07-23 DIAGNOSIS — I50.32 CHRONIC DIASTOLIC (CONGESTIVE) HEART FAILURE (H): ICD-10-CM

## 2025-07-24 ENCOUNTER — TRANSITIONAL CARE UNIT VISIT (OUTPATIENT)
Dept: GERIATRICS | Facility: CLINIC | Age: 75
End: 2025-07-24
Payer: MEDICARE

## 2025-07-24 VITALS
OXYGEN SATURATION: 95 % | HEART RATE: 87 BPM | WEIGHT: 206 LBS | RESPIRATION RATE: 18 BRPM | BODY MASS INDEX: 47.67 KG/M2 | SYSTOLIC BLOOD PRESSURE: 160 MMHG | DIASTOLIC BLOOD PRESSURE: 83 MMHG | HEIGHT: 55 IN | TEMPERATURE: 97.3 F

## 2025-07-24 DIAGNOSIS — I10 ESSENTIAL HYPERTENSION: ICD-10-CM

## 2025-07-24 DIAGNOSIS — M48.062 SPINAL STENOSIS OF LUMBAR REGION WITH NEUROGENIC CLAUDICATION: ICD-10-CM

## 2025-07-24 DIAGNOSIS — Z98.1 S/P CERVICAL SPINAL FUSION: Primary | ICD-10-CM

## 2025-07-24 DIAGNOSIS — G47.33 OSA (OBSTRUCTIVE SLEEP APNEA): ICD-10-CM

## 2025-07-24 DIAGNOSIS — I50.32 CHRONIC DIASTOLIC HEART FAILURE (H): ICD-10-CM

## 2025-07-24 DIAGNOSIS — J96.11 CHRONIC RESPIRATORY FAILURE WITH HYPOXIA (H): ICD-10-CM

## 2025-07-24 PROCEDURE — 99309 SBSQ NF CARE MODERATE MDM 30: CPT | Performed by: FAMILY MEDICINE

## 2025-07-24 NOTE — LETTER
7/24/2025      Desirae AMADOR Skri  [address] [facility]   [facility] [address]        No notes on file      Sincerely,        Pastora Esquivel MD    Electronically signed   AHHRF and AMS with multiple falls. Here, ddx initially wide including metabolic, infectious, ischemic, toxic, seizure less likely. W/up notable for e/o acute UTI with noted hx of E. Coli infections in past. W/up otherwise unremarkable for source, pt improved significantly following 1d CTX here and was treated for 3d total with resolution in symptoms, cultures returned with pan-sensitive E. Coli as anticipated. Rebollar eventually able to be removed without issue. Pt with mixed incontinence at baseline, likely precipitating her to this, may be worth discussing at follow up.    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 Mckeon  # Dysphagia, improved   XR C spine during prior admission completed showing grade 1 anterolisthesis at C4-C5 iso known disengagement of all screws and plates at C5-7 site with 9mm vertebral migration of plate. Pt with notable complaints of dysphagia here, SLP with significant concerns about continued safety of pt swallowing with known hardware disintegration. Given this, engaged NSG officially and medical team cleared pt for surgery, noting her to be very high risk but ultimately agreeing that benefit outweighed risk for pt long term, particularly with quality of life. Taken to the OR 6/16 with Dr. Mckeon for hardware removal and C4-T1 posterior fusion without complication. Pt managed by medicine service post-operatively and improved rapidly from a pain and post-op perspective and was able to be discharged to TCU 6/20 for ongoing recovery with planned OP NSG follow up.     # Physical deconditioning c/b multiple spinal pathologies, h/o cerebellar stroke, h/o bl hallux fractures, cardiopulmonary comorbidities  # Frequent falls   # Multi-level lumbar spinal stenosis, unchanged   # Closed sacral fracture, chronic   Pt with longerstanding hx of frequent falls and generalized weakness prompting multiple admissions lately. Discharged  last on 4/30 with same, although stay also c/b hypercarbia at that time. No e/o that here. Based on discussion with the pt and description of discussion with  from overnight service, pt is not doing well at home in terms of mobility and being able to help herself. Ultimately, think that the lumbar spinal stenosis is contributing to her multiple falls, weakness, and overall deconditioning and will limit some of her recovery when it comes to strength and mobility which she is aware of. When previously d/w NSG, the C-spine hardware failure much more acutely required surgical intervention c/t the L spine concerns.  --> Pt amenable to palliative discussion and outpatient referral given significant co-morbidities     # Cardiomyopathy and HFpEF with grade II diastolic dysfunction, NYHA class III stg C  # Hypertension   Last TTE 3/2025 with preserved EF. Pt thinks that she has missed some of her heart medications lately as she lays out her own medications and has been unable to always remember to do this or to safely get up to do this. She has an elevated BNP here at 545 and appears to be third spacing a lot of her fluid, though was initially intravascularly dry. Once UTI cleared up, started Bumex at 1mg daily rather than 2mg BID as prescribed in addition to her metoprolol succinate. Ultimately, she did not require more than 1mg daily to reach euvolemia. Recommend f/up outpt to eval volume status, lytes, kidney function. Should have cardiology f/up.    # Chronic hypoxic respiratory failure with obesity hypoventilation syndrome on NC + NELLIE on BiPAP  No change in respiratory status here. Currently stable on NC and BiPAP while sleeping even postoperatively. No e/o post-operative increased needs in O2 or infection. Encourage IS while at TCU.     # Chronic venous stasis, improved   Diagnosed with BLE cellulitis 6/3 and started on Keflex outpatient. While she does have erythema bilaterally and swelling, exam is more c/w  lymphedema and increased extravascular volume status with chronic venous stasis dermatitis. Pt reporting improvement in erythema over last few days and notes that her pain is actually chronic, no worse than normal. Rubor is dependent, inconsistent with SSTI. Additionally, no purulence or skin breakdown noted.   Improved with lymphedema cares, diuresis. Continue OP.     The patient has morbid obesity, affecting the patient s current medical condition. The body mass index is 46.48 kg/m . This has required use of extra resources, including treatment, assistance from extra staff, and the usage of bariatric equipment (wheelchair, bed, BP cuff, scales, etc.).     Overall stabilized and discharged to TCU on 6/20/2025 for PT, OT, nursing cares, medical management and monitoring.     Today:  She continues to have left arm weakness, was able to do more with left arm prior to surgery. Has seen neurosurgery and neurology and is scheduled for CT and MRI of neck tomorrow 7/25/2025, also will have EMG of left arm to further evaluate C5 radiculopathy on left. Has some baseline right arm weakness. Neck pain is managed. Has chronic back pain. On oxygen, resp status stable. Appetite is good. Working with therapy as able, hopeful to return home soon where she lives with her . No new bowel or bladder concerns. No fever or cough or SOB at rest.       PAST MEDICAL HISTORY:  Past Medical History:   Diagnosis Date     Allergic rhinitis      Arthritis of back      CHF (congestive heart failure) (H)      Chronic kidney disease     stage 3      De Quervain's disease (tenosynovitis)      Fibromyalgia, primary      GERD (gastroesophageal reflux disease)      Hematuria     chronic     High cholesterol      History of blood clots 09/01/1970    DVT, from birth control, h/o left calf     Hyperlipidemia      Hypertension      Low back pain      Osteoporosis      Pickwickian syndrome (H)      Plantar fasciitis      Proteinuria      Proteinuria      chronic     Sleep apnea     CPAP     Uncomplicated asthma        MEDICATIONS:  Current Outpatient Medications   Medication Sig Dispense Refill     albuterol (PROAIR HFA;PROVENTIL HFA;VENTOLIN HFA) 90 mcg/actuation inhaler Inhale 1 puff into the lungs every 4 hours as needed for shortness of breath / dyspnea       azelastine (OPTIVAR) 0.05 % ophthalmic solution Place 1 drop into both eyes 2 times daily. 6 mL 0     bumetanide (BUMEX) 1 MG tablet Take 2 mg by mouth daily.       calcium citrate (CITRACAL) 950 (200 Ca) MG tablet Take 1 tablet by mouth daily       cetirizine (ZYRTEC) 10 MG tablet [CETIRIZINE (ZYRTEC) 10 MG TABLET] Take 10 mg by mouth daily.        cholecalciferol (VITAMIN D3) 25 mcg (1000 units) capsule Take 1 capsule by mouth daily.       CRESTOR 10 MG tablet Take 10 mg by mouth daily.       DULoxetine (CYMBALTA) 30 MG capsule Take 60 mg by mouth every morning.       DULoxetine (CYMBALTA) 30 MG capsule Take 30 mg by mouth every evening.       esomeprazole (NEXIUM) 20 MG DR capsule Take 1 capsule (20 mg) by mouth every morning (before breakfast). Take 30-60 minutes before eating. 30 capsule 0     l-lysine HCl 500 MG TABS tablet Take 500 mg by mouth daily.       Magnesium Oxide -Mg Supplement 500 MG CAPS Take 500 mg by mouth 2 times daily.       methocarbamol (ROBAXIN) 500 MG tablet Take 750 mg by mouth 3 times daily.       metoprolol succinate ER (TOPROL XL) 50 MG 24 hr tablet Take 1 tablet (50 mg) by mouth daily. 30 tablet 1     modafinil (PROVIGIL) 100 MG tablet 2 1/2 TABLET (250MG) BY MOUTH EVERY MORNING 30 tablet 0     mometasone-formoterol (DULERA) 100-5 MCG/ACT inhaler Inhale 2 puffs into the lungs 2 times daily.       montelukast (SINGULAIR) 10 mg tablet Take 10 mg by mouth every evening       nystatin (MYCOSTATIN) 307571 UNIT/GM external powder Apply topically 2 times daily.       oxyCODONE (ROXICODONE) 5 MG tablet Take 0.5 tablets (2.5 mg) by mouth 2 times daily as needed for severe pain. 15  "tablet 0     OXYGEN-AIR DELIVERY SYSTEMS MISC [OXYGEN-AIR DELIVERY SYSTEMS MISC] Use 3 L As Directed. 3 lpm with activities and as needed at night       polyethylene glycol (MIRALAX) 17 GM/Dose powder Take 17 g by mouth 2 times daily.       senna-docusate (SENOKOT-S/PERICOLACE) 8.6-50 MG tablet Take 2 tablets by mouth 2 times daily. Can also take 1 tab po BID PRN       solifenacin (VESICARE) 10 MG tablet Take 10 mg by mouth at bedtime.          PHYSICAL EXAM:  General: Patient is alert female, no distress. On oxygen per NC.   Vitals:BP (!) 160/83   Pulse 87   Temp 97.3  F (36.3  C)   Resp 18   Ht 1.397 m (4' 7\")   Wt 93.4 kg (206 lb)   SpO2 95%   BMI 47.88 kg/m    HEENT: Head is NCAT. Eyes show no injection or icterus. Nares negative. Oropharynx well hydrated.  Neck: No JVD.  Lungs: Non labored respirations.   : Deferred.  Extremities: LE edema.  Musculoskeletal: Degen changes.   Skin: Post C-spine incision covered.   Psych: Mood is good.      LABS/DIAGNOSTIC DATA:  Component      Latest Ref Rng 6/26/2025  6:21 AM 7/3/2025  6:30 AM   Sodium      135 - 145 mmol/L 136  139    Anion Gap      7 - 15 mmol/L 11  10    Calcium      8.8 - 10.4 mg/dL 9.5  9.6    Creatinine      0.51 - 0.95 mg/dL 1.25 (H)  1.17 (H)    GFR Estimate      >60 mL/min/1.73m2 45 (L)  49 (L)    Potassium      3.4 - 5.3 mmol/L 5.0  5.2    Chloride      98 - 107 mmol/L 98  99    Carbon Dioxide (CO2)      22 - 29 mmol/L 27  30 (H)    Urea Nitrogen      8.0 - 23.0 mg/dL 47.8 (H)  46.9 (H)      Component      Latest Ref Rng 6/26/2025  6:21 AM 7/3/2025  6:30 AM   WBC      4.0 - 11.0 10e3/uL 7.0  8.3    RBC Count      3.80 - 5.20 10e6/uL 2.91 (L)  3.04 (L)    Hemoglobin      11.7 - 15.7 g/dL 8.8 (L)  9.5 (L)    Hematocrit      35.0 - 47.0 % 29.7 (L)  31.9 (L)    MCV      78 - 100 fL 102 (H)  105 (H)    MCH      26.5 - 33.0 pg 30.2  31.3    MCHC      31.5 - 36.5 g/dL 29.6 (L)  29.8 (L)    RDW      10.0 - 15.0 % 13.3  13.4    Platelet Count      150 " - [address] [place] [address]/uL 352  418        ASSESSMENT/PLAN:  S/p cervical spine surgery on 6/16/2025, removal of failed hardware and C4-T1 posterior fusion per Dr. Mckeon. Has seen neurosurgery and neurology and is scheduled for CT and MRI of neck tomorrow 7/25/2025, also will have EMG of left arm to further evaluate C5 radiculopathy on left.  Lumbar spinal stenosis. Follow up outpatient. Chronic pain is adequately managed.   CHF. Cardiomyopathy. Stable, continue to monitor weights, edema, clinical status. Continue Bumex.  HTN. On metoprolol, Bumex. Monitor Bps in TCU, adjust meds if needed.   Chronic respiratory failure on home oxygen. Stable.   NELLIE. Overlay OHS. Uses BiPAP.  Venous stasis with lymphedema. On Bumex.   Weakness and deconditioning. Continuing in therapies in TCU.   Hx of stroke. Cerebellar.   CKD. Labs as above.   Anemia. ABLA sec to surgery, improved.      Electronically signed by: Pastora Esquivel MD       Sincerely,        Pastora Esquivel MD    Electronically signed

## 2025-07-28 ENCOUNTER — TRANSITIONAL CARE UNIT VISIT (OUTPATIENT)
Dept: GERIATRICS | Facility: CLINIC | Age: 75
End: 2025-07-28
Payer: MEDICARE

## 2025-07-28 VITALS
HEART RATE: 66 BPM | DIASTOLIC BLOOD PRESSURE: 91 MMHG | BODY MASS INDEX: 47.77 KG/M2 | OXYGEN SATURATION: 93 % | SYSTOLIC BLOOD PRESSURE: 141 MMHG | RESPIRATION RATE: 18 BRPM | TEMPERATURE: 97.5 F | HEIGHT: 55 IN | WEIGHT: 206.4 LBS

## 2025-07-28 DIAGNOSIS — Z98.1 S/P CERVICAL SPINAL FUSION: Primary | ICD-10-CM

## 2025-07-28 DIAGNOSIS — I50.9 CONGESTIVE HEART FAILURE, UNSPECIFIED HF CHRONICITY, UNSPECIFIED HEART FAILURE TYPE (H): ICD-10-CM

## 2025-07-28 DIAGNOSIS — N18.32 STAGE 3B CHRONIC KIDNEY DISEASE (H): ICD-10-CM

## 2025-07-28 DIAGNOSIS — J96.11 CHRONIC RESPIRATORY FAILURE WITH HYPOXIA (H): ICD-10-CM

## 2025-07-28 DIAGNOSIS — M54.12 CERVICAL RADICULOPATHY: ICD-10-CM

## 2025-07-28 LAB
ANION GAP SERPL CALCULATED.3IONS-SCNC: 11 MMOL/L (ref 7–15)
BUN SERPL-MCNC: 39.1 MG/DL (ref 8–23)
CALCIUM SERPL-MCNC: 9.5 MG/DL (ref 8.8–10.4)
CHLORIDE SERPL-SCNC: 97 MMOL/L (ref 98–107)
CREAT SERPL-MCNC: 1.05 MG/DL (ref 0.51–0.95)
EGFRCR SERPLBLD CKD-EPI 2021: 56 ML/MIN/1.73M2
GLUCOSE SERPL-MCNC: 98 MG/DL (ref 70–99)
HCO3 SERPL-SCNC: 30 MMOL/L (ref 22–29)
POTASSIUM SERPL-SCNC: 5.2 MMOL/L (ref 3.4–5.3)
SODIUM SERPL-SCNC: 138 MMOL/L (ref 135–145)

## 2025-07-28 PROCEDURE — P9604 ONE-WAY ALLOW PRORATED TRIP: HCPCS | Mod: ORL | Performed by: NURSE PRACTITIONER

## 2025-07-28 PROCEDURE — 99309 SBSQ NF CARE MODERATE MDM 30: CPT | Performed by: NURSE PRACTITIONER

## 2025-07-28 PROCEDURE — 36415 COLL VENOUS BLD VENIPUNCTURE: CPT | Mod: ORL | Performed by: NURSE PRACTITIONER

## 2025-07-28 PROCEDURE — 80048 BASIC METABOLIC PNL TOTAL CA: CPT | Mod: ORL | Performed by: NURSE PRACTITIONER

## 2025-07-28 RX ORDER — OXYCODONE HYDROCHLORIDE 5 MG/1
2.5 TABLET ORAL 2 TIMES DAILY PRN
Qty: 15 TABLET | Refills: 0 | Status: SHIPPED | OUTPATIENT
Start: 2025-07-28

## 2025-07-28 NOTE — PROGRESS NOTES
"[facility] GERIATRICS    Chief Complaint   Patient presents with    RECHECK     HPI:  Desirae Rey is a 74 year old  (1950), who is being seen today for an episodic care visit at: [facility] ([facility]) [[id number]]. Today's concern is: ***    Allergies, and PMH/PSH reviewed in [facility] today.  REVIEW OF SYSTEMS:  {[id number]}    Objective:   BP (!) 160/69   Pulse 66   Temp 97.5  F (36.4  C)   Resp 18   Ht 1.397 m (4' 7\")   Wt 93.6 kg (206 lb 6.4 oz)   SpO2 93%   BMI 47.97 kg/m    {Nursing home physical exam :062811}    {fgslab:042363}    Assessment/Plan:  {S DX2:933432}    MED REC REQUIRED{TIP  Click the link below to document or use med rec list, use list to pull in response :388128}  Post Medication Reconciliation Status: {MED REC LIST:110765}      Orders:  {fgsorders:275202}  ***    Electronically signed by: Ivan Malloy ***      "

## 2025-07-28 NOTE — PROGRESS NOTES
[facility]  Episodic/Acute/Follow-Up  [place] MRN: [id number]. Place of Service where encounter took place:  [facility] ([facility]) [[id number]]   Chief Complaint   Patient presents with    RECHECK    HPI: Desirae Rey  is a 74 year old (1950), who is being seen today for an episodic care visit.    TCU Course:  6/20: TCU Admission. 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   6/23: Admit visit. Increased senna. Decreased oxycodone.   6/24: CALL. Hypoxic on CPAP 3L (patients baseline), increased to 4L.   6/30: Follow-up visit. Scheduled Robaxin, stopped Zanaflex, reduced Oxycodone. Trended labs, scheduled AirDuo. Palliative care consult placed.   7/2: Neurosurgery visit. MRI ordered, staples removed.   7/3: CALL. AirDuo not covered, switched to Dulera.   7/7: Follow-up visit. Increased Robaxin, Decreased Oxycodone, started O2 weaning, Dc'd Tumeric.   7/14: Follow-up visit. Decreased Oxycodone. One-time dose of Metolazone. BMP ordered.   7/14: Neurology visit. EMG ordered.   7/15: Follow-up visit.   7/21: Follow-up visit. Alendronate restarted. Bumex increased to 2mg per day.   7/22: Follow-up visit.  7/24: Follow-up visit.     Patient seen today on routine follow up as Desirae continues to rehab in TCU.     Today, Desirae, was seen in her wheelchair in her room. She reports her pain in her neck has improved. She missed her palliative care appointment last week related to technical difficulties. She has a care conference today. Her appetite is good and she has been eating better here than at home. Sleeping well. Denies changes to her breathing. Constipation managed with miralax and senna. No issues with voiding. Denies chest pain.     Past Medical and Surgical History reviewed in Epic today.  MEDICATIONS:  Current Outpatient  Medications   Medication Sig Dispense Refill    oxyCODONE (ROXICODONE) 5 MG tablet Take 0.5 tablets (2.5 mg) by mouth 2 times daily as needed for severe pain. 15 tablet 0    albuterol (PROAIR HFA;PROVENTIL HFA;VENTOLIN HFA) 90 mcg/actuation inhaler Inhale 1 puff into the lungs every 4 hours as needed for shortness of breath / dyspnea      azelastine (OPTIVAR) 0.05 % ophthalmic solution Place 1 drop into both eyes 2 times daily. 6 mL 0    bumetanide (BUMEX) 1 MG tablet Take 2 mg by mouth daily.      calcium citrate (CITRACAL) 950 (200 Ca) MG tablet Take 1 tablet by mouth daily      cetirizine (ZYRTEC) 10 MG tablet [CETIRIZINE (ZYRTEC) 10 MG TABLET] Take 10 mg by mouth daily.       cholecalciferol (VITAMIN D3) 25 mcg (1000 units) capsule Take 1 capsule by mouth daily.      CRESTOR 10 MG tablet Take 10 mg by mouth daily.      DULoxetine (CYMBALTA) 30 MG capsule Take 60 mg by mouth every morning.      DULoxetine (CYMBALTA) 30 MG capsule Take 30 mg by mouth every evening.      esomeprazole (NEXIUM) 20 MG DR capsule Take 1 capsule (20 mg) by mouth every morning (before breakfast). Take 30-60 minutes before eating. 30 capsule 0    l-lysine HCl 500 MG TABS tablet Take 500 mg by mouth daily.      Magnesium Oxide -Mg Supplement 500 MG CAPS Take 500 mg by mouth 2 times daily.      methocarbamol (ROBAXIN) 500 MG tablet Take 750 mg by mouth 3 times daily.      metoprolol succinate ER (TOPROL XL) 50 MG 24 hr tablet Take 1 tablet (50 mg) by mouth daily. 30 tablet 1    modafinil (PROVIGIL) 100 MG tablet 2 1/2 TABLET (250MG) BY MOUTH EVERY MORNING 30 tablet 0    mometasone-formoterol (DULERA) 100-5 MCG/ACT inhaler Inhale 2 puffs into the lungs 2 times daily.      montelukast (SINGULAIR) 10 mg tablet Take 10 mg by mouth every evening      nystatin (MYCOSTATIN) 158922 UNIT/GM external powder Apply topically 2 times daily.      OXYGEN-AIR DELIVERY SYSTEMS MISC [OXYGEN-AIR DELIVERY SYSTEMS MISC] Use 3 L As Directed. 3 lpm with  "activities and as needed at night      polyethylene glycol (MIRALAX) 17 GM/Dose powder Take 17 g by mouth 2 times daily.      senna-docusate (SENOKOT-S/PERICOLACE) 8.6-50 MG tablet Take 2 tablets by mouth 2 times daily. Can also take 1 tab po BID PRN      solifenacin (VESICARE) 10 MG tablet Take 10 mg by mouth at bedtime.       Objective: BP (!) 141/91   Pulse 66   Temp 97.5  F (36.4  C)   Resp 18   Ht 1.397 m (4' 7\")   Wt 93.6 kg (206 lb 6.4 oz)   SpO2 93%   BMI 47.97 kg/m    Exam:  GENERAL APPEARANCE: Alert, in no distress, cooperative.   RESP: Respiratory effort baseline, no respiratory distress, Lung sounds clear w/ diminished bases. On 2LO2 NC.   CV: Auscultation of heart reveals S1, S2, rate and rhythm regular, no murmur, no rub or gallop, Edema +2 BLE, compression socks on. Peripheral pulses are 2+.  PSYCH: Insight, judgement, and memory are intact, affect and mood are baseline.    ASSESSMENT/PLAN:  S/P cervical spinal fusion  Cervical radiculopathy  Congestive heart failure, unspecified HF chronicity, unspecified heart failure type (H)  Chronic respiratory failure with hypoxia (H)  Stage 3b chronic kidney disease (H)  Acute on chronic. Ongoing.  Provider reviewed records from facility, and interpreted most recent imaging/lab work (BMP), and vital signs, each with their own characteristics requiring MDM.  Provider discussed care with nursing, , and rehab team:   reports patient will need to reschedule palliative care appointment, and they are having a care conference later today to discuss discharge planning as she nears her 100 Medicare allowed days.  Rehab team reports Desirae continues to ambulate 200 feet with a 2 wheeled walker and standby assistance.  She requires min to moderate assistance with ADLs otherwise, which her  is unable to provide at home.  Nursing reports likely last covered day of 8/10, and concerned about ability to return home.  They will be " addressing in care conference as well.  Spine/Ortho:  Decrease oxycodone to 2.5 mg BID PRN with the goal of discontinuing.   CHF/Respiratory Failure/CKD:  Weight has been at 206 lbs, baseline previously around 200 lbs.   No significant weight loss with bumex dose increase or one time metolazone.  This could be weight gain from increased appetite. Will continue to monitor. No changes to diuretics.   Follow up w/in 1 week or as needed.    Orders:  Decrease Oxycodone to 2.5mg PO BID PRN. Dx: severe pain.    I (the provider) was present with professional student who participated in this service and in the documentation of this service. I have verified the HPI/ROS, history, and personally performed the physical exam and the healthcare decision making. I agree with all elements as documented above.      Electronically signed by:  Dr. Natali Chakraborty, KALIE CNP DNP

## 2025-07-28 NOTE — LETTER
7/28/2025      Desirae Rey  [address]   [place] [address]        [facility] GERIATRIC SERVICE  Episodic/Acute/Follow-Up  [place] MRN: [id number]. Place of Service where encounter took place:  [facility] ([facility]) [[id number]]   Chief Complaint   Patient presents with     RECHECK    HPI: Desirae Rey  is a 74 year old (1950), who is being seen today for an episodic care visit.    TCU Course:  6/20: TCU Admission. 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   6/23: Admit visit. Increased senna. Decreased oxycodone.   6/24: CALL. Hypoxic on CPAP 3L (patients baseline), increased to 4L.   6/30: Follow-up visit. Scheduled Robaxin, stopped Zanaflex, reduced Oxycodone. Trended labs, scheduled AirDuo. Palliative care consult placed.   7/2: Neurosurgery visit. MRI ordered, staples removed.   7/3: CALL. AirDuo not covered, switched to Dulera.   7/7: Follow-up visit. Increased Robaxin, Decreased Oxycodone, started O2 weaning, Dc'd Tumeric.   7/14: Follow-up visit. Decreased Oxycodone. One-time dose of Metolazone. BMP ordered.   7/14: Neurology visit. EMG ordered.   7/15: Follow-up visit.   7/21: Follow-up visit. Alendronate restarted. Bumex increased to 2mg per day.   7/22: Follow-up visit.  7/24: Follow-up visit.     Patient seen today on routine follow up as Desirae continues to rehab in TCU.     Today, Desirae, was seen in her wheelchair in her room. She reports her pain in her neck has improved. She missed her palliative care appointment last week related to technical difficulties. She has a care conference today. Her appetite is good and she has been eating better here than at home. Sleeping well. Denies changes to her breathing. Constipation managed with miralax and senna. No issues with voiding. Denies chest pain.     Past Medical and  Surgical History reviewed in Epic today.  MEDICATIONS:  Current Outpatient Medications   Medication Sig Dispense Refill     oxyCODONE (ROXICODONE) 5 MG tablet Take 0.5 tablets (2.5 mg) by mouth 2 times daily as needed for severe pain. 15 tablet 0     albuterol (PROAIR HFA;PROVENTIL HFA;VENTOLIN HFA) 90 mcg/actuation inhaler Inhale 1 puff into the lungs every 4 hours as needed for shortness of breath / dyspnea       azelastine (OPTIVAR) 0.05 % ophthalmic solution Place 1 drop into both eyes 2 times daily. 6 mL 0     bumetanide (BUMEX) 1 MG tablet Take 2 mg by mouth daily.       calcium citrate (CITRACAL) 950 (200 Ca) MG tablet Take 1 tablet by mouth daily       cetirizine (ZYRTEC) 10 MG tablet [CETIRIZINE (ZYRTEC) 10 MG TABLET] Take 10 mg by mouth daily.        cholecalciferol (VITAMIN D3) 25 mcg (1000 units) capsule Take 1 capsule by mouth daily.       CRESTOR 10 MG tablet Take 10 mg by mouth daily.       DULoxetine (CYMBALTA) 30 MG capsule Take 60 mg by mouth every morning.       DULoxetine (CYMBALTA) 30 MG capsule Take 30 mg by mouth every evening.       esomeprazole (NEXIUM) 20 MG DR capsule Take 1 capsule (20 mg) by mouth every morning (before breakfast). Take 30-60 minutes before eating. 30 capsule 0     l-lysine HCl 500 MG TABS tablet Take 500 mg by mouth daily.       Magnesium Oxide -Mg Supplement 500 MG CAPS Take 500 mg by mouth 2 times daily.       methocarbamol (ROBAXIN) 500 MG tablet Take 750 mg by mouth 3 times daily.       metoprolol succinate ER (TOPROL XL) 50 MG 24 hr tablet Take 1 tablet (50 mg) by mouth daily. 30 tablet 1     modafinil (PROVIGIL) 100 MG tablet 2 1/2 TABLET (250MG) BY MOUTH EVERY MORNING 30 tablet 0     mometasone-formoterol (DULERA) 100-5 MCG/ACT inhaler Inhale 2 puffs into the lungs 2 times daily.       montelukast (SINGULAIR) 10 mg tablet Take 10 mg by mouth every evening       nystatin (MYCOSTATIN) 177426 UNIT/GM external powder Apply topically 2 times daily.       OXYGEN-AIR  "DELIVERY SYSTEMS MISC [OXYGEN-AIR DELIVERY SYSTEMS MISC] Use 3 L As Directed. 3 lpm with activities and as needed at night       polyethylene glycol (MIRALAX) 17 GM/Dose powder Take 17 g by mouth 2 times daily.       senna-docusate (SENOKOT-S/PERICOLACE) 8.6-50 MG tablet Take 2 tablets by mouth 2 times daily. Can also take 1 tab po BID PRN       solifenacin (VESICARE) 10 MG tablet Take 10 mg by mouth at bedtime.       Objective: BP (!) 141/91   Pulse 66   Temp 97.5  F (36.4  C)   Resp 18   Ht 1.397 m (4' 7\")   Wt 93.6 kg (206 lb 6.4 oz)   SpO2 93%   BMI 47.97 kg/m    Exam:  GENERAL APPEARANCE: Alert, in no distress, cooperative.   RESP: Respiratory effort baseline, no respiratory distress, Lung sounds clear w/ diminished bases. On 2LO2 NC.   CV: Auscultation of heart reveals S1, S2, rate and rhythm regular, no murmur, no rub or gallop, Edema +2 BLE, compression socks on. Peripheral pulses are 2+.  PSYCH: Insight, judgement, and memory are intact, affect and mood are baseline.    ASSESSMENT/PLAN:  S/P cervical spinal fusion  Cervical radiculopathy  Congestive heart failure, unspecified HF chronicity, unspecified heart failure type (H)  Chronic respiratory failure with hypoxia (H)  Stage 3b chronic kidney disease (H)  Acute on chronic. Ongoing.  Provider reviewed records from facility, and interpreted most recent imaging/lab work (BMP), and vital signs, each with their own characteristics requiring MDM.  Provider discussed care with nursing, , and rehab team:   reports patient will need to reschedule palliative care appointment, and they are having a care conference later today to discuss discharge planning as she nears her 100 Medicare allowed days.  Rehab team reports Desirae continues to ambulate 200 feet with a 2 wheeled walker and standby assistance.  She requires min to moderate assistance with ADLs otherwise, which her  is unable to provide at home.  Nursing reports " likely last covered day of 8/10, and concerned about ability to return home.  They will be addressing in care conference as well.  Spine/Ortho:  Decrease oxycodone to 2.5 mg BID PRN with the goal of discontinuing.   CHF/Respiratory Failure/CKD:  Weight has been at 206 lbs, baseline previously around 200 lbs.   No significant weight loss with bumex dose increase or one time metolazone.  This could be weight gain from increased appetite. Will continue to monitor. No changes to diuretics.   Follow up w/in 1 week or as needed.    Orders:  Decrease Oxycodone to 2.5mg PO BID PRN. Dx: severe pain.    I (the provider) was present with professional student who participated in this service and in the documentation of this service. I have verified the HPI/ROS, history, and personally performed the physical exam and the healthcare decision making. I agree with all elements as documented above.      Electronically signed by:  KALIE Riley CNP DNP        Sincerely,        KALIE Sears CNP    Electronically signed

## 2025-07-31 NOTE — PROGRESS NOTES
[facility] GERIATRICS  [facility] Record Number:  [id number]  Place of Service where encounter took place[facility] (TCU) [[id number]]  CODE STATUS:   CPR/Full code     Chief Complaint/Reason for Visit:  Chief Complaint   Patient presents with    Follow Up     TCU 7/22/2025.        TCU HPI:    Desirae Rey is a 74 year old female with hx of CHF, CKD, chronic respiratory failure on home oxygen, HTN, NELLIE on BiPAP, hx of stroke admitted to the hospital on 6/7/2025 with LE cellulitis, venous insufficiency and UTI. Evaluation with neurosurgery due to dysphagia and known prior cervical spine hardware failure, ultimately underwent high risk cervical spine surgery as noted below.    [facility] Medicine Discharge Summary  Admit date: 6/7/2025  Discharge date: 6/20/2025     Brief HPI Summary: 74 y.o. female w/ HFpEF, pulmonary HTN, morbid obesity, severe NELLIE/OHS on BIPAP, chronic respiratory failure on 3L, hx of cerebellar stroke, CKD, hx of C5-7 fusion with hardware failure, severe L2-5 spinal stenosis, nondisplaced chronic sacral fracture, recurrent falls, 5th admission in the last six months, recent TCU discharge, recent treatment for LLE cellulitis in setting of likely bilateral venous insufficiency who presents with encephalopathy, weakness, and found to have UTI, urinary retention as likely etiology of metabolic encephalopathy. Admitted on CTX with noted hx of pan-sensitive E. Coli UTIs pending culture here. NSG alerted to admission given pt missing OP follow up and continues to have dysphagia, concerns regarding spine with recurrent falls. Stable at time of admission.     Hospital Course, by problem:  # Acute metabolic encephalopathy 2/2 pan-sensitive E. Coli UTI c/b urinary retention requiring acute Rebollar placement, resolved   Pt admitted with AMS of unclear etiology with hx of recent hospitalization of AHHRF and AMS with multiple falls. Here, ddx initially wide including metabolic,  infectious, ischemic, toxic, seizure less likely. W/up notable for e/o acute UTI with noted hx of E. Coli infections in past. W/up otherwise unremarkable for source, pt improved significantly following 1d CTX here and was treated for 3d total with resolution in symptoms, cultures returned with pan-sensitive E. Coli as anticipated. Rebollar eventually able to be removed without issue. Pt with mixed incontinence at baseline, likely precipitating her to this, may be worth discussing at follow up.    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 Mckeon  # Dysphagia, improved   XR C spine during prior admission completed showing grade 1 anterolisthesis at C4-C5 iso known disengagement of all screws and plates at C5-7 site with 9mm vertebral migration of plate. Pt with notable complaints of dysphagia here, SLP with significant concerns about continued safety of pt swallowing with known hardware disintegration. Given this, engaged NSG officially and medical team cleared pt for surgery, noting her to be very high risk but ultimately agreeing that benefit outweighed risk for pt long term, particularly with quality of life. Taken to the OR 6/16 with Dr. Mckeon for hardware removal and C4-T1 posterior fusion without complication. Pt managed by medicine service post-operatively and improved rapidly from a pain and post-op perspective and was able to be discharged to TCU 6/20 for ongoing recovery with planned OP NSG follow up.     # Physical deconditioning c/b multiple spinal pathologies, h/o cerebellar stroke, h/o bl hallux fractures, cardiopulmonary comorbidities  # Frequent falls   # Multi-level lumbar spinal stenosis, unchanged   # Closed sacral fracture, chronic   Pt with longerstanding hx of frequent falls and generalized weakness prompting multiple admissions lately. Discharged last on 4/30 with same, although stay also c/b hypercarbia at that time. No e/o  that here. Based on discussion with the pt and description of discussion with  from overnight service, pt is not doing well at home in terms of mobility and being able to help herself. Ultimately, think that the lumbar spinal stenosis is contributing to her multiple falls, weakness, and overall deconditioning and will limit some of her recovery when it comes to strength and mobility which she is aware of. When previously d/w NSG, the C-spine hardware failure much more acutely required surgical intervention c/t the L spine concerns.  --> Pt amenable to palliative discussion and outpatient referral given significant co-morbidities     # Cardiomyopathy and HFpEF with grade II diastolic dysfunction, NYHA class III stg C  # Hypertension   Last TTE 3/2025 with preserved EF. Pt thinks that she has missed some of her heart medications lately as she lays out her own medications and has been unable to always remember to do this or to safely get up to do this. She has an elevated BNP here at 545 and appears to be third spacing a lot of her fluid, though was initially intravascularly dry. Once UTI cleared up, started Bumex at 1mg daily rather than 2mg BID as prescribed in addition to her metoprolol succinate. Ultimately, she did not require more than 1mg daily to reach euvolemia. Recommend f/up outpt to eval volume status, lytes, kidney function. Should have cardiology f/up.    # Chronic hypoxic respiratory failure with obesity hypoventilation syndrome on NC + NELLIE on BiPAP  No change in respiratory status here. Currently stable on NC and BiPAP while sleeping even postoperatively. No e/o post-operative increased needs in O2 or infection. Encourage IS while at TCU.     # Chronic venous stasis, improved   Diagnosed with BLE cellulitis 6/3 and started on Keflex outpatient. While she does have erythema bilaterally and swelling, exam is more c/w lymphedema and increased extravascular volume status with chronic venous stasis  dermatitis. Pt reporting improvement in erythema over last few days and notes that her pain is actually chronic, no worse than normal. Rubor is dependent, inconsistent with SSTI. Additionally, no purulence or skin breakdown noted.   Improved with lymphedema cares, diuresis. Continue OP.     The patient has morbid obesity, affecting the patient s current medical condition. The body mass index is 46.48 kg/m . This has required use of extra resources, including treatment, assistance from extra staff, and the usage of bariatric equipment (wheelchair, bed, BP cuff, scales, etc.).     Overall stabilized and discharged to TCU on 6/20/2025 for PT, OT, nursing cares, medical management and monitoring.     Today:  Respiratory status at baseline, on oxygen, has respiratory meds ongoing. For s/p cervical spine surgery, had follow up with neurosurgery on 7/2/2025 with concern for new left arm weakness and referred for CT and MRI of neck, tests pending. Also saw neurology on 7/14/2025 for the left arm weakness and hx of cerebellar stroke, felt to be C5 radiculopathy and referred for EMG as well. Appetite is good. No abdominal pain, diarrhea, constipation or urinary sx. Other medical conditions stable. Pain is adequately managed.      PAST MEDICAL HISTORY:  Past Medical History:   Diagnosis Date    Allergic rhinitis     Arthritis of back     CHF (congestive heart failure) (H)     Chronic kidney disease     stage 3     De Quervain's disease (tenosynovitis)     Fibromyalgia, primary     GERD (gastroesophageal reflux disease)     Hematuria     chronic    High cholesterol     History of blood clots 09/01/1970    DVT, from birth control, h/o left calf    Hyperlipidemia     Hypertension     Low back pain     Osteoporosis     Pickwickian syndrome (H)     Plantar fasciitis     Proteinuria     Proteinuria     chronic    Sleep apnea     CPAP    Uncomplicated asthma        MEDICATIONS:  Current Outpatient Medications   Medication Sig  Dispense Refill    albuterol (PROAIR HFA;PROVENTIL HFA;VENTOLIN HFA) 90 mcg/actuation inhaler Inhale 1 puff into the lungs every 4 hours as needed for shortness of breath / dyspnea      azelastine (OPTIVAR) 0.05 % ophthalmic solution Place 1 drop into both eyes 2 times daily. 6 mL 0    bumetanide (BUMEX) 1 MG tablet Take 2 mg by mouth daily.      calcium citrate (CITRACAL) 950 (200 Ca) MG tablet Take 1 tablet by mouth daily      cetirizine (ZYRTEC) 10 MG tablet [CETIRIZINE (ZYRTEC) 10 MG TABLET] Take 10 mg by mouth daily.       cholecalciferol (VITAMIN D3) 25 mcg (1000 units) capsule Take 1 capsule by mouth daily.      CRESTOR 10 MG tablet Take 10 mg by mouth daily.      DULoxetine (CYMBALTA) 30 MG capsule Take 60 mg by mouth every morning.      DULoxetine (CYMBALTA) 30 MG capsule Take 30 mg by mouth every evening.      esomeprazole (NEXIUM) 20 MG DR capsule Take 1 capsule (20 mg) by mouth every morning (before breakfast). Take 30-60 minutes before eating. 30 capsule 0    l-lysine HCl 500 MG TABS tablet Take 500 mg by mouth daily.      Magnesium Oxide -Mg Supplement 500 MG CAPS Take 500 mg by mouth 2 times daily.      methocarbamol (ROBAXIN) 500 MG tablet Take 750 mg by mouth 3 times daily.      metoprolol succinate ER (TOPROL XL) 50 MG 24 hr tablet Take 1 tablet (50 mg) by mouth daily. 30 tablet 1    modafinil (PROVIGIL) 100 MG tablet 2 1/2 TABLET (250MG) BY MOUTH EVERY MORNING 30 tablet 0    mometasone-formoterol (DULERA) 100-5 MCG/ACT inhaler Inhale 2 puffs into the lungs 2 times daily.      montelukast (SINGULAIR) 10 mg tablet Take 10 mg by mouth every evening      nystatin (MYCOSTATIN) 522337 UNIT/GM external powder Apply topically 2 times daily.      oxyCODONE (ROXICODONE) 5 MG tablet Take 0.5 tablets (2.5 mg) by mouth 2 times daily as needed for severe pain. 15 tablet 0    OXYGEN-AIR DELIVERY SYSTEMS MISC [OXYGEN-AIR DELIVERY SYSTEMS MISC] Use 3 L As Directed. 3 lpm with activities and as needed at night       "polyethylene glycol (MIRALAX) 17 GM/Dose powder Take 17 g by mouth 2 times daily.      senna-docusate (SENOKOT-S/PERICOLACE) 8.6-50 MG tablet Take 2 tablets by mouth 2 times daily. Can also take 1 tab po BID PRN      solifenacin (VESICARE) 10 MG tablet Take 10 mg by mouth at bedtime.          PHYSICAL EXAM:  General: Patient is alert female, no distress.   Vitals:BP (!) 150/66   Pulse 68   Temp 97.9  F (36.6  C)   Resp 20   Ht 1.397 m (4' 7\")   Wt 93.9 kg (207 lb)   SpO2 96%   BMI 48.11 kg/m    HEENT: Head is NCAT. Eyes show no injection or icterus. Nares negative. Oropharynx well hydrated.  Neck: No JVD.  Lungs: Non labored respirations.   : Deferred.  Extremities: LE edema is noted.  Musculoskeletal: Degen changes.   Skin: Post C-spine incision covered.   Psych: Mood appears good.      LABS/DIAGNOSTIC DATA:  Component      Latest Ref Rng 6/26/2025  6:21 AM 7/3/2025  6:30 AM   Sodium      135 - 145 mmol/L 136  139    Anion Gap      7 - 15 mmol/L 11  10    Calcium      8.8 - 10.4 mg/dL 9.5  9.6    Creatinine      0.51 - 0.95 mg/dL 1.25 (H)  1.17 (H)    GFR Estimate      >60 mL/min/1.73m2 45 (L)  49 (L)    Potassium      3.4 - 5.3 mmol/L 5.0  5.2    Chloride      98 - 107 mmol/L 98  99    Carbon Dioxide (CO2)      22 - 29 mmol/L 27  30 (H)    Urea Nitrogen      8.0 - 23.0 mg/dL 47.8 (H)  46.9 (H)      Component      Latest Ref Rng 6/26/2025  6:21 AM 7/3/2025  6:30 AM   WBC      4.0 - 11.0 10e3/uL 7.0  8.3    RBC Count      3.80 - 5.20 10e6/uL 2.91 (L)  3.04 (L)    Hemoglobin      11.7 - 15.7 g/dL 8.8 (L)  9.5 (L)    Hematocrit      35.0 - 47.0 % 29.7 (L)  31.9 (L)    MCV      78 - 100 fL 102 (H)  105 (H)    MCH      26.5 - 33.0 pg 30.2  31.3    MCHC      31.5 - 36.5 g/dL 29.6 (L)  29.8 (L)    RDW      10.0 - 15.0 % 13.3  13.4    Platelet Count      150 - 450 10e3/uL 352  418        ASSESSMENT/PLAN:  S/p cervical spine surgery on 6/16/2025, removal of failed hardware and C4-T1 posterior fusion per Dr. Mckeon. " Had follow up with neurosurgery on 7/2/2025 with concern for new left arm weakness and referred for CT and MRI of neck, tests pending. Also saw neurology on 7/14/2025 for the left arm weakness and hx of cerebellar stroke, felt to be C5 radiculopathy and referred for EMG as well.  Dysphagia. Evaluated by SLP in the hospital as well as ENT. Improved.   Lumbar spinal stenosis. Follow up outpatient. Chronic pain is adequately managed.   CHF. Cardiomyopathy. Monitor weights, edema, clinical status. Follow up with cardiology. Appears compensated, continue bumex.   HTN. On metoprolol, Bumex. Monitor Bps in TCU, adjust meds if needed.   Chronic respiratory failure on home oxygen. Respiratory status is stable.   NELLIE. Overlay OHS. Uses BiPAP.  Venous stasis with lymphedema. On Bumex.   Weakness and deconditioning. Continuing in therapies in TCU.   Hx of stroke. Cerebellar. Likely contributor to falls.   CKD. Labs as noted.   Anemia. ABLA sec to surgery, improved.  Obesity.       Electronically signed by: Pastora Esquivel MD

## 2025-07-31 NOTE — PROGRESS NOTES
[facility] GERIATRICS  [place] Medical Record Number:  [id number]  Place of Service where encounter took place[facility] ([facility]U) [[id number]]  CODE STATUS:   CPR/Full code     Chief Complaint/Reason for Visit:  Chief Complaint   Patient presents with    Follow Up     TCU 7/24/2025.        TCU HPI:    Desirae Rey is a 74 year old female with hx of CHF, CKD, chronic respiratory failure on home oxygen, HTN, NELLIE on BiPAP, hx of stroke admitted to the hospital on 6/7/2025 with LE cellulitis, venous insufficiency and UTI. Evaluation with neurosurgery due to dysphagia and known prior cervical spine hardware failure, ultimately underwent high risk cervical spine surgery as noted below.    [facility] Medicine Discharge Summary  Admit date: 6/7/2025  Discharge date: 6/20/2025     Brief HPI Summary: 74 y.o. female w/ HFpEF, pulmonary HTN, morbid obesity, severe NELLIE/OHS on BIPAP, chronic respiratory failure on 3L, hx of cerebellar stroke, CKD, hx of C5-7 fusion with hardware failure, severe L2-5 spinal stenosis, nondisplaced chronic sacral fracture, recurrent falls, 5th admission in the last six months, recent TCU discharge, recent treatment for LLE cellulitis in setting of likely bilateral venous insufficiency who presents with encephalopathy, weakness, and found to have UTI, urinary retention as likely etiology of metabolic encephalopathy. Admitted on CTX with noted hx of pan-sensitive E. Coli UTIs pending culture here. NSG alerted to admission given pt missing OP follow up and continues to have dysphagia, concerns regarding spine with recurrent falls. Stable at time of admission.     Hospital Course, by problem:  # Acute metabolic encephalopathy 2/2 pan-sensitive E. Coli UTI c/b urinary retention requiring acute Rebollar placement, resolved   Pt admitted with AMS of unclear etiology with hx of recent hospitalization of AHHRF and AMS with multiple falls. Here, ddx initially wide including metabolic,  infectious, ischemic, toxic, seizure less likely. W/up notable for e/o acute UTI with noted hx of E. Coli infections in past. W/up otherwise unremarkable for source, pt improved significantly following 1d CTX here and was treated for 3d total with resolution in symptoms, cultures returned with pan-sensitive E. Coli as anticipated. Rebollar eventually able to be removed without issue. Pt with mixed incontinence at baseline, likely precipitating her to this, may be worth discussing at follow up.    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 Mckeon  # Dysphagia, improved   XR C spine during prior admission completed showing grade 1 anterolisthesis at C4-C5 iso known disengagement of all screws and plates at C5-7 site with 9mm vertebral migration of plate. Pt with notable complaints of dysphagia here, SLP with significant concerns about continued safety of pt swallowing with known hardware disintegration. Given this, engaged NSG officially and medical team cleared pt for surgery, noting her to be very high risk but ultimately agreeing that benefit outweighed risk for pt long term, particularly with quality of life. Taken to the OR 6/16 with Dr. Mckeon for hardware removal and C4-T1 posterior fusion without complication. Pt managed by medicine service post-operatively and improved rapidly from a pain and post-op perspective and was able to be discharged to TCU 6/20 for ongoing recovery with planned OP NSG follow up.     # Physical deconditioning c/b multiple spinal pathologies, h/o cerebellar stroke, h/o bl hallux fractures, cardiopulmonary comorbidities  # Frequent falls   # Multi-level lumbar spinal stenosis, unchanged   # Closed sacral fracture, chronic   Pt with longerstanding hx of frequent falls and generalized weakness prompting multiple admissions lately. Discharged last on 4/30 with same, although stay also c/b hypercarbia at that time. No e/o  that here. Based on discussion with the pt and description of discussion with  from overnight service, pt is not doing well at home in terms of mobility and being able to help herself. Ultimately, think that the lumbar spinal stenosis is contributing to her multiple falls, weakness, and overall deconditioning and will limit some of her recovery when it comes to strength and mobility which she is aware of. When previously d/w NSG, the C-spine hardware failure much more acutely required surgical intervention c/t the L spine concerns.  --> Pt amenable to palliative discussion and outpatient referral given significant co-morbidities     # Cardiomyopathy and HFpEF with grade II diastolic dysfunction, NYHA class III stg C  # Hypertension   Last TTE 3/2025 with preserved EF. Pt thinks that she has missed some of her heart medications lately as she lays out her own medications and has been unable to always remember to do this or to safely get up to do this. She has an elevated BNP here at 545 and appears to be third spacing a lot of her fluid, though was initially intravascularly dry. Once UTI cleared up, started Bumex at 1mg daily rather than 2mg BID as prescribed in addition to her metoprolol succinate. Ultimately, she did not require more than 1mg daily to reach euvolemia. Recommend f/up outpt to eval volume status, lytes, kidney function. Should have cardiology f/up.    # Chronic hypoxic respiratory failure with obesity hypoventilation syndrome on NC + NELLIE on BiPAP  No change in respiratory status here. Currently stable on NC and BiPAP while sleeping even postoperatively. No e/o post-operative increased needs in O2 or infection. Encourage IS while at TCU.     # Chronic venous stasis, improved   Diagnosed with BLE cellulitis 6/3 and started on Keflex outpatient. While she does have erythema bilaterally and swelling, exam is more c/w lymphedema and increased extravascular volume status with chronic venous stasis  dermatitis. Pt reporting improvement in erythema over last few days and notes that her pain is actually chronic, no worse than normal. Rubor is dependent, inconsistent with SSTI. Additionally, no purulence or skin breakdown noted.   Improved with lymphedema cares, diuresis. Continue OP.     The patient has morbid obesity, affecting the patient s current medical condition. The body mass index is 46.48 kg/m . This has required use of extra resources, including treatment, assistance from extra staff, and the usage of bariatric equipment (wheelchair, bed, BP cuff, scales, etc.).     Overall stabilized and discharged to TCU on 6/20/2025 for PT, OT, nursing cares, medical management and monitoring.     Today:  She continues to have left arm weakness, was able to do more with left arm prior to surgery. Has seen neurosurgery and neurology and is scheduled for CT and MRI of neck tomorrow 7/25/2025, also will have EMG of left arm to further evaluate C5 radiculopathy on left. Has some baseline right arm weakness. Neck pain is managed. Has chronic back pain. On oxygen, resp status stable. Appetite is good. Working with therapy as able, hopeful to return home soon where she lives with her . No new bowel or bladder concerns. No fever or cough or SOB at rest.       PAST MEDICAL HISTORY:  Past Medical History:   Diagnosis Date    Allergic rhinitis     Arthritis of back     CHF (congestive heart failure) (H)     Chronic kidney disease     stage 3     De Quervain's disease (tenosynovitis)     Fibromyalgia, primary     GERD (gastroesophageal reflux disease)     Hematuria     chronic    High cholesterol     History of blood clots 09/01/1970    DVT, from birth control, h/o left calf    Hyperlipidemia     Hypertension     Low back pain     Osteoporosis     Pickwickian syndrome (H)     Plantar fasciitis     Proteinuria     Proteinuria     chronic    Sleep apnea     CPAP    Uncomplicated asthma        MEDICATIONS:  Current  Outpatient Medications   Medication Sig Dispense Refill    albuterol (PROAIR HFA;PROVENTIL HFA;VENTOLIN HFA) 90 mcg/actuation inhaler Inhale 1 puff into the lungs every 4 hours as needed for shortness of breath / dyspnea      azelastine (OPTIVAR) 0.05 % ophthalmic solution Place 1 drop into both eyes 2 times daily. 6 mL 0    bumetanide (BUMEX) 1 MG tablet Take 2 mg by mouth daily.      calcium citrate (CITRACAL) 950 (200 Ca) MG tablet Take 1 tablet by mouth daily      cetirizine (ZYRTEC) 10 MG tablet [CETIRIZINE (ZYRTEC) 10 MG TABLET] Take 10 mg by mouth daily.       cholecalciferol (VITAMIN D3) 25 mcg (1000 units) capsule Take 1 capsule by mouth daily.      CRESTOR 10 MG tablet Take 10 mg by mouth daily.      DULoxetine (CYMBALTA) 30 MG capsule Take 60 mg by mouth every morning.      DULoxetine (CYMBALTA) 30 MG capsule Take 30 mg by mouth every evening.      esomeprazole (NEXIUM) 20 MG DR capsule Take 1 capsule (20 mg) by mouth every morning (before breakfast). Take 30-60 minutes before eating. 30 capsule 0    l-lysine HCl 500 MG TABS tablet Take 500 mg by mouth daily.      Magnesium Oxide -Mg Supplement 500 MG CAPS Take 500 mg by mouth 2 times daily.      methocarbamol (ROBAXIN) 500 MG tablet Take 750 mg by mouth 3 times daily.      metoprolol succinate ER (TOPROL XL) 50 MG 24 hr tablet Take 1 tablet (50 mg) by mouth daily. 30 tablet 1    modafinil (PROVIGIL) 100 MG tablet 2 1/2 TABLET (250MG) BY MOUTH EVERY MORNING 30 tablet 0    mometasone-formoterol (DULERA) 100-5 MCG/ACT inhaler Inhale 2 puffs into the lungs 2 times daily.      montelukast (SINGULAIR) 10 mg tablet Take 10 mg by mouth every evening      nystatin (MYCOSTATIN) 000117 UNIT/GM external powder Apply topically 2 times daily.      oxyCODONE (ROXICODONE) 5 MG tablet Take 0.5 tablets (2.5 mg) by mouth 2 times daily as needed for severe pain. 15 tablet 0    OXYGEN-AIR DELIVERY SYSTEMS MISC [OXYGEN-AIR DELIVERY SYSTEMS MISC] Use 3 L As Directed. 3 lpm  "with activities and as needed at night      polyethylene glycol (MIRALAX) 17 GM/Dose powder Take 17 g by mouth 2 times daily.      senna-docusate (SENOKOT-S/PERICOLACE) 8.6-50 MG tablet Take 2 tablets by mouth 2 times daily. Can also take 1 tab po BID PRN      solifenacin (VESICARE) 10 MG tablet Take 10 mg by mouth at bedtime.          PHYSICAL EXAM:  General: Patient is alert female, no distress. On oxygen per NC.   Vitals:BP (!) 160/83   Pulse 87   Temp 97.3  F (36.3  C)   Resp 18   Ht 1.397 m (4' 7\")   Wt 93.4 kg (206 lb)   SpO2 95%   BMI 47.88 kg/m    HEENT: Head is NCAT. Eyes show no injection or icterus. Nares negative. Oropharynx well hydrated.  Neck: No JVD.  Lungs: Non labored respirations.   : Deferred.  Extremities: LE edema.  Musculoskeletal: Degen changes.   Skin: Post C-spine incision covered.   Psych: Mood is good.      LABS/DIAGNOSTIC DATA:  Component      Latest Ref Rng 6/26/2025  6:21 AM 7/3/2025  6:30 AM   Sodium      135 - 145 mmol/L 136  139    Anion Gap      7 - 15 mmol/L 11  10    Calcium      8.8 - 10.4 mg/dL 9.5  9.6    Creatinine      0.51 - 0.95 mg/dL 1.25 (H)  1.17 (H)    GFR Estimate      >60 mL/min/1.73m2 45 (L)  49 (L)    Potassium      3.4 - 5.3 mmol/L 5.0  5.2    Chloride      98 - 107 mmol/L 98  99    Carbon Dioxide (CO2)      22 - 29 mmol/L 27  30 (H)    Urea Nitrogen      8.0 - 23.0 mg/dL 47.8 (H)  46.9 (H)      Component      Latest Ref Rng 6/26/2025  6:21 AM 7/3/2025  6:30 AM   WBC      4.0 - 11.0 10e3/uL 7.0  8.3    RBC Count      3.80 - 5.20 10e6/uL 2.91 (L)  3.04 (L)    Hemoglobin      11.7 - 15.7 g/dL 8.8 (L)  9.5 (L)    Hematocrit      35.0 - 47.0 % 29.7 (L)  31.9 (L)    MCV      78 - 100 fL 102 (H)  105 (H)    MCH      26.5 - 33.0 pg 30.2  31.3    MCHC      31.5 - 36.5 g/dL 29.6 (L)  29.8 (L)    RDW      10.0 - 15.0 % 13.3  13.4    Platelet Count      150 - 450 10e3/uL 352  418        ASSESSMENT/PLAN:  S/p cervical spine surgery on 6/16/2025, removal of failed " hardware and C4-T1 posterior fusion per Dr. Mckeon. Has seen neurosurgery and neurology and is scheduled for CT and MRI of neck tomorrow 7/25/2025, also will have EMG of left arm to further evaluate C5 radiculopathy on left.  Lumbar spinal stenosis. Follow up outpatient. Chronic pain is adequately managed.   CHF. Cardiomyopathy. Stable, continue to monitor weights, edema, clinical status. Continue Bumex.  HTN. On metoprolol, Bumex. Monitor Bps in TCU, adjust meds if needed.   Chronic respiratory failure on home oxygen. Stable.   NELLIE. Overlay OHS. Uses BiPAP.  Venous stasis with lymphedema. On Bumex.   Weakness and deconditioning. Continuing in therapies in TCU.   Hx of stroke. Cerebellar.   CKD. Labs as above.   Anemia. ABLA sec to surgery, improved.      Electronically signed by: Pastora Esquivel MD

## 2025-07-31 NOTE — PROGRESS NOTES
[facility] GERIATRICS  [facility] Medical Record Number:  [id number]  Place of Service where encounter took place[facility] ([facility]) [[id number]]  CODE STATUS:   CPR/Full code     Chief Complaint/Reason for Visit:  Chief Complaint   Patient presents with    Hospital F/U       HPI:    Desirae Rey is a 74 year old female with hx of CHF, CKD, chronic respiratory failure on home oxygen, HTN, NELLIE on BiPAP, hx of stroke admitted to the hospital on 6/7/2025 with LE cellulitis, venous insufficiency and UTI. Evaluation with neurosurgery due to dysphagia and known prior cervical spine hardware failure, ultimately underwent high risk cervical spine surgery as noted below.    [facility] Medicine Discharge Summary  Admit date: 6/7/2025  Discharge date: 6/20/2025     Brief HPI Summary: 74 y.o. female w/ HFpEF, pulmonary HTN, morbid obesity, severe NELLIE/OHS on BIPAP, chronic respiratory failure on 3L, hx of cerebellar stroke, CKD, hx of C5-7 fusion with hardware failure, severe L2-5 spinal stenosis, nondisplaced chronic sacral fracture, recurrent falls, 5th admission in the last six months, recent TCU discharge, recent treatment for LLE cellulitis in setting of likely bilateral venous insufficiency who presents with encephalopathy, weakness, and found to have UTI, urinary retention as likely etiology of metabolic encephalopathy. Admitted on CTX with noted hx of pan-sensitive E. Coli UTIs pending culture here. NSG alerted to admission given pt missing OP follow up and continues to have dysphagia, concerns regarding spine with recurrent falls. Stable at time of admission.     Hospital Course, by problem:  # Acute metabolic encephalopathy 2/2 pan-sensitive E. Coli UTI c/b urinary retention requiring acute Rebollar placement, resolved   Pt admitted with AMS of unclear etiology with hx of recent hospitalization of AHHRF and AMS with multiple falls. Here, ddx initially wide including metabolic, infectious, ischemic,  toxic, seizure less likely. W/up notable for e/o acute UTI with noted hx of E. Coli infections in past. W/up otherwise unremarkable for source, pt improved significantly following 1d CTX here and was treated for 3d total with resolution in symptoms, cultures returned with pan-sensitive E. Coli as anticipated. Rebollar eventually able to be removed without issue. Pt with mixed incontinence at baseline, likely precipitating her to this, may be worth discussing at follow up.    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 Mckeon  # Dysphagia, improved   XR C spine during prior admission completed showing grade 1 anterolisthesis at C4-C5 iso known disengagement of all screws and plates at C5-7 site with 9mm vertebral migration of plate. Pt with notable complaints of dysphagia here, SLP with significant concerns about continued safety of pt swallowing with known hardware disintegration. Given this, engaged NSG officially and medical team cleared pt for surgery, noting her to be very high risk but ultimately agreeing that benefit outweighed risk for pt long term, particularly with quality of life. Taken to the OR 6/16 with Dr. Mckeon for hardware removal and C4-T1 posterior fusion without complication. Pt managed by medicine service post-operatively and improved rapidly from a pain and post-op perspective and was able to be discharged to TCU 6/20 for ongoing recovery with planned OP NSG follow up.     # Physical deconditioning c/b multiple spinal pathologies, h/o cerebellar stroke, h/o bl hallux fractures, cardiopulmonary comorbidities  # Frequent falls   # Multi-level lumbar spinal stenosis, unchanged   # Closed sacral fracture, chronic   Pt with longerstanding hx of frequent falls and generalized weakness prompting multiple admissions lately. Discharged last on 4/30 with same, although stay also c/b hypercarbia at that time. No e/o that here. Based on  discussion with the pt and description of discussion with  from overnight service, pt is not doing well at home in terms of mobility and being able to help herself. Ultimately, think that the lumbar spinal stenosis is contributing to her multiple falls, weakness, and overall deconditioning and will limit some of her recovery when it comes to strength and mobility which she is aware of. When previously d/w NSG, the C-spine hardware failure much more acutely required surgical intervention c/t the L spine concerns.  --> Pt amenable to palliative discussion and outpatient referral given significant co-morbidities     # Cardiomyopathy and HFpEF with grade II diastolic dysfunction, NYHA class III stg C  # Hypertension   Last TTE 3/2025 with preserved EF. Pt thinks that she has missed some of her heart medications lately as she lays out her own medications and has been unable to always remember to do this or to safely get up to do this. She has an elevated BNP here at 545 and appears to be third spacing a lot of her fluid, though was initially intravascularly dry. Once UTI cleared up, started Bumex at 1mg daily rather than 2mg BID as prescribed in addition to her metoprolol succinate. Ultimately, she did not require more than 1mg daily to reach euvolemia. Recommend f/up outpt to eval volume status, lytes, kidney function. Should have cardiology f/up.    # Chronic hypoxic respiratory failure with obesity hypoventilation syndrome on NC + NELLIE on BiPAP  No change in respiratory status here. Currently stable on NC and BiPAP while sleeping even postoperatively. No e/o post-operative increased needs in O2 or infection. Encourage IS while at TCU.     # Chronic venous stasis, improved   Diagnosed with BLE cellulitis 6/3 and started on Keflex outpatient. While she does have erythema bilaterally and swelling, exam is more c/w lymphedema and increased extravascular volume status with chronic venous stasis dermatitis. Pt  reporting improvement in erythema over last few days and notes that her pain is actually chronic, no worse than normal. Rubor is dependent, inconsistent with SSTI. Additionally, no purulence or skin breakdown noted.   Improved with lymphedema cares, diuresis. Continue OP.     The patient has morbid obesity, affecting the patient s current medical condition. The body mass index is 46.48 kg/m . This has required use of extra resources, including treatment, assistance from extra staff, and the usage of bariatric equipment (wheelchair, bed, BP cuff, scales, etc.).     Overall stabilized and discharged to TCU on 6/20/2025 for PT, OT, nursing cares, medical management and monitoring.       Today:  She has chronic resp failure on home oxygen, feels breathing is at baseline. Overall tired working with therapy. Chronic pain related to spinal stenosis and post op neck pain, managed. Needs follow up with neurosurgery for post op visit due to 6/16/2025 cervical spine surgery. Saw neurology on 7/14/2025 yesterday for new weakness left arm that was not present prior to surgery per her report. Felt to be C5 radiculopathy and referred for EMG. Hx of stroke, falls and overall gait imbalance and weakness. Appetite is good. No abdominal pain, diarrhea, constipation or urinary sx. No new vision hearing concerns. Lives at home with .       REVIEW OF SYSTEMS:  All others negative other than those noted in HPI.      PAST MEDICAL HISTORY:  Past Medical History:   Diagnosis Date    Allergic rhinitis     Arthritis of back     CHF (congestive heart failure) (H)     Chronic kidney disease     stage 3     De Quervain's disease (tenosynovitis)     Fibromyalgia, primary     GERD (gastroesophageal reflux disease)     Hematuria     chronic    High cholesterol     History of blood clots 09/01/1970    DVT, from birth control, h/o left calf    Hyperlipidemia     Hypertension     Low back pain     Osteoporosis     Pickwickian syndrome (H)      Plantar fasciitis     Proteinuria     Proteinuria     chronic    Sleep apnea     CPAP    Uncomplicated asthma        PAST SURGICAL HISTORY:  Past Surgical History:   Procedure Laterality Date    CERVICAL FUSION N/A 4/27/2017    Procedure: ANTERIOR CERVICAL DECOMPRESSION FUSION C5-7 BILATERAL;  Surgeon: Basim Barone MD;  Location: [facility];  Service:     CHOLECYSTECTOMY      open    COLONOSCOPY N/A 12/20/2019    Procedure: COLONOSCOPY WITH BIOPSIES;  Surgeon: Lucio Farr MD;  Location: [facility];  Service: Gastroenterology    EYE SURGERY      HAND SURGERY Right     HYSTEROSCOPY DIAGNOSTIC      JOINT REPLACEMENT      bilateral TKA    KNEE SURGERY      RELEASE CARPAL TUNNEL Bilateral        FAMILY HISTORY:  Family History   Problem Relation Age of Onset    Rheumatoid Arthritis Mother     Heart Disease Sister     Chronic Obstructive Pulmonary Disease Father        SOCIAL HISTORY:  Social History     Socioeconomic History    Marital status:      Spouse name: Not on file    Number of children: Not on file    Years of education: Not on file    Highest education level: Not on file   Occupational History    Not on file   Tobacco Use    Smoking status: Never    Smokeless tobacco: Never   Vaping Use    Vaping status: Never Used   Substance and Sexual Activity    Alcohol use: No    Drug use: No    Sexual activity: Not on file   Other Topics Concern    Not on file   Social History Narrative    Not on file     Social Drivers of Health     Financial Resource Strain: Low Risk  (3/29/2025)    Financial Resource Strain     Within the past 12 months, have you or your family members you live with been unable to get utilities (heat, electricity) when it was really needed?: No   Food Insecurity: No Food Insecurity (6/9/2025)    [facility]    Hunger Vital Sign     Worried About Running Out of Food in the Last Year: Never true     Ran Out of Food in the Last Year: Never true    Transportation Needs: No Transportation Needs (6/9/2025)    Received from [facility]    PRAPARE - Transportation     Lack of Transportation (Medical): No     Lack of Transportation (Non-Medical): No   Physical Activity: Not on file   Stress: Not on file   Social Connections: Unknown (3/9/2023)    [facility] from [facility]    Social Connections     Frequency of Communication with Friends and Family: Not on file   Interpersonal Safety: Not At Risk (6/9/2025)    Received from [facility]    Humiliation, Afraid, Rape, and Kick questionnaire     Fear of Current or Ex-Partner: No     Emotionally Abused: No     Physically Abused: No     Sexually Abused: No   Housing Stability: Low Risk  (6/9/2025)    Received from [facility]    Housing Stability Vital Sign     Unable to Pay for Housing in the Last Year: No     Number of Times Moved in the Last Year: 1     Homeless in the Last Year: No       MEDICATIONS:  Current Outpatient Medications   Medication Sig Dispense Refill    albuterol (PROAIR HFA;PROVENTIL HFA;VENTOLIN HFA) 90 mcg/actuation inhaler Inhale 1 puff into the lungs every 4 hours as needed for shortness of breath / dyspnea      azelastine (OPTIVAR) 0.05 % ophthalmic solution Place 1 drop into both eyes 2 times daily. 6 mL 0    bumetanide (BUMEX) 1 MG tablet Take 2 mg by mouth daily.      calcium citrate (CITRACAL) 950 (200 Ca) MG tablet Take 1 tablet by mouth daily      cetirizine (ZYRTEC) 10 MG tablet [CETIRIZINE (ZYRTEC) 10 MG TABLET] Take 10 mg by mouth daily.       cholecalciferol (VITAMIN D3) 25 mcg (1000 units) capsule Take 1 capsule by mouth daily.      CRESTOR 10 MG tablet Take 10 mg by mouth daily.      DULoxetine (CYMBALTA) 30 MG capsule Take 60 mg by mouth every morning.      DULoxetine (CYMBALTA) 30 MG capsule Take 30 mg by mouth every evening.      esomeprazole (NEXIUM) 20 MG DR capsule Take 1 capsule (20 mg) by mouth every morning (before breakfast). Take  30-60 minutes before eating. 30 capsule 0    l-lysine HCl 500 MG TABS tablet Take 500 mg by mouth daily.      Magnesium Oxide -Mg Supplement 500 MG CAPS Take 500 mg by mouth 2 times daily.      methocarbamol (ROBAXIN) 500 MG tablet Take 750 mg by mouth 3 times daily.      metoprolol succinate ER (TOPROL XL) 50 MG 24 hr tablet Take 1 tablet (50 mg) by mouth daily. 30 tablet 1    modafinil (PROVIGIL) 100 MG tablet 2 1/2 TABLET (250MG) BY MOUTH EVERY MORNING 30 tablet 0    mometasone-formoterol (DULERA) 100-5 MCG/ACT inhaler Inhale 2 puffs into the lungs 2 times daily.      montelukast (SINGULAIR) 10 mg tablet Take 10 mg by mouth every evening      nystatin (MYCOSTATIN) 841317 UNIT/GM external powder Apply topically 2 times daily.      oxyCODONE (ROXICODONE) 5 MG tablet Take 0.5 tablets (2.5 mg) by mouth 2 times daily as needed for severe pain. 15 tablet 0    OXYGEN-AIR DELIVERY SYSTEMS MISC [OXYGEN-AIR DELIVERY SYSTEMS MISC] Use 3 L As Directed. 3 lpm with activities and as needed at night      polyethylene glycol (MIRALAX) 17 GM/Dose powder Take 17 g by mouth 2 times daily.      senna-docusate (SENOKOT-S/PERICOLACE) 8.6-50 MG tablet Take 2 tablets by mouth 2 times daily. Can also take 1 tab po BID PRN      solifenacin (VESICARE) 10 MG tablet Take 10 mg by mouth at bedtime.          ALLERGIES:  Allergies   Allergen Reactions    Latex Rash     Severe rash    Pregabalin Shortness Of Breath     Other Reaction(s): swelling and shortness of breath    Valsartan Unknown     Hyperkalemia    Ciprofloxacin Visual Disturbance, Hallucination and Confusion     Likely contributed visual hallucination and confusion    Colchicine Diarrhea    Dicloxacillin      Other Reaction(s): confusion, says she has tolerated augmentin without difficulty.     Gabapentin      Other Reaction(s): Sedation    Latex Unknown     Added based on information entered during case entry, please review and add reactions, type, and severity as needed    Other  "Drug Allergy (See Comments) Muscle Pain (Myalgia)     Tried lovastatin and simvastatin    Other Environmental Allergy Unknown     Coverlet bandaid , 3M product; rash    Statins-Hmg-Coa Reductase Inhibitors [Statins] Muscle Pain (Myalgia)     Tried lovastatin and simvastatin    Sulfa Antibiotics Swelling     Facial swelling      Adhesive Tape Rash    Vancomycin Rash       PHYSICAL EXAM:  General: Patient is alert female, no distress.   Vitals:/55   Pulse 65   Temp 97.5  F (36.4  C)   Resp 18   Ht 1.397 m (4' 7\")   Wt 92.1 kg (203 lb)   SpO2 (!) 91%   BMI 47.18 kg/m    HEENT: Head is NCAT. Eyes show no injection or icterus. Nares negative. Oropharynx well hydrated.  Neck: No JVD.  Lungs: Clear bilaterally. No wheezes.  Cardiovascular: Regular rate and rhythm, normal S1, S2.  Back: No spinal or CVA tenderness.  Abdomen: Obese, soft, no tenderness on exam. Bowel sounds present. No guarding rebound or rigidity.  : Deferred.  Extremities: LE edema is noted.  Musculoskeletal: Degen changes.   Skin: No rashes noted.  Psych: Mood appears good.      LABS/DIAGNOSTIC DATA:  Component      Latest Ref Rng 6/26/2025  6:21 AM 7/3/2025  6:30 AM   Sodium      135 - 145 mmol/L 136  139    Anion Gap      7 - 15 mmol/L 11  10    Calcium      8.8 - 10.4 mg/dL 9.5  9.6    Creatinine      0.51 - 0.95 mg/dL 1.25 (H)  1.17 (H)    GFR Estimate      >60 mL/min/1.73m2 45 (L)  49 (L)    Potassium      3.4 - 5.3 mmol/L 5.0  5.2    Chloride      98 - 107 mmol/L 98  99    Carbon Dioxide (CO2)      22 - 29 mmol/L 27  30 (H)    Urea Nitrogen      8.0 - 23.0 mg/dL 47.8 (H)  46.9 (H)      Component      Latest Ref Rn 6/26/2025  6:21 AM 7/3/2025  6:30 AM   WBC      4.0 - 11.0 10e3/uL 7.0  8.3    RBC Count      3.80 - 5.20 10e6/uL 2.91 (L)  3.04 (L)    Hemoglobin      11.7 - 15.7 g/dL 8.8 (L)  9.5 (L)    Hematocrit      35.0 - 47.0 % 29.7 (L)  31.9 (L)    MCV      78 - 100 fL 102 (H)  105 (H)    MCH      26.5 - 33.0 pg 30.2  31.3  "   MCHC      31.5 - 36.5 g/dL 29.6 (L)  29.8 (L)    RDW      10.0 - 15.0 % 13.3  13.4    Platelet Count      150 - 450 10e3/uL 352  418        ASSESSMENT/PLAN:  S/p cervical spine surgery on 6/16/2025, removal of failed hardware and C4-T1 posterior fusion per Dr. Mckeon. Follow up for post op visit with neurosurgery.   Dysphagia. Improved. Evaluated by SLP in the hospital as well as ENT.   Lumbar spinal stenosis. Follow up outpatient.  CHF. Cardiomyopathy. Monitor weights, edema, clinical status. Follow up with cardiology.  HTN. On metoprolol, Bumex. Monitor Bps in TCU, adjust meds if needed.   Chronic respiratory failure on home oxygen. Respiratory status is stable.   Weakness left arm, relatively new. Saw neurology yesterday, referred for EMG and further work up suspected C5 radiculopathy.   NELLIE. Overlay OHS. Uses BiPAP.  Encephalopathy. Improved, related to UTI which was sec to retention, treated and resolved. Has urinary incontinence at baseline.   Recent LLE cellulitis. Infection treated and cleared.   Venous stasis with lymphedema. On Bumex.   Weakness and deconditioning. Hx of falls. Here in TCU for therapies, strengthening. Had recently been in TCU prior to hospitalization and not back home for long. SW involved in TCU for disposition planning.   Hx of stroke. Cerebellar. Likely contributor to falls.   CKD. Labs as noted, reviewed.   Obesity.   Code status is full code.      Electronically signed by: Pastora Esquivel MD

## 2025-08-03 DIAGNOSIS — G47.9 SLEEP DISORDER: ICD-10-CM

## 2025-08-03 RX ORDER — MODAFINIL 100 MG/1
250 TABLET ORAL DAILY
Qty: 6 TABLET | Refills: 0 | Status: SHIPPED | OUTPATIENT
Start: 2025-08-03 | End: 2025-08-04

## 2025-08-04 ENCOUNTER — DISCHARGE SUMMARY NURSING HOME (OUTPATIENT)
Dept: GERIATRICS | Facility: CLINIC | Age: 75
End: 2025-08-04
Payer: MEDICARE

## 2025-08-04 VITALS
OXYGEN SATURATION: 95 % | RESPIRATION RATE: 19 BRPM | HEART RATE: 60 BPM | DIASTOLIC BLOOD PRESSURE: 60 MMHG | BODY MASS INDEX: 47.21 KG/M2 | TEMPERATURE: 96.7 F | SYSTOLIC BLOOD PRESSURE: 131 MMHG | HEIGHT: 55 IN | WEIGHT: 204 LBS

## 2025-08-04 DIAGNOSIS — M48.062 SPINAL STENOSIS OF LUMBAR REGION WITH NEUROGENIC CLAUDICATION: ICD-10-CM

## 2025-08-04 DIAGNOSIS — J96.11 CHRONIC RESPIRATORY FAILURE WITH HYPOXIA (H): ICD-10-CM

## 2025-08-04 DIAGNOSIS — Z98.1 S/P CERVICAL SPINAL FUSION: Primary | ICD-10-CM

## 2025-08-04 DIAGNOSIS — G47.9 SLEEP DISORDER: ICD-10-CM

## 2025-08-04 DIAGNOSIS — M54.12 CERVICAL RADICULOPATHY: ICD-10-CM

## 2025-08-04 DIAGNOSIS — I50.9 CONGESTIVE HEART FAILURE, UNSPECIFIED HF CHRONICITY, UNSPECIFIED HEART FAILURE TYPE (H): ICD-10-CM

## 2025-08-04 DIAGNOSIS — N18.32 STAGE 3B CHRONIC KIDNEY DISEASE (H): ICD-10-CM

## 2025-08-04 DIAGNOSIS — J44.9 CHRONIC OBSTRUCTIVE PULMONARY DISEASE, UNSPECIFIED COPD TYPE (H): ICD-10-CM

## 2025-08-04 DIAGNOSIS — Z91.81 HISTORY OF FALLING: ICD-10-CM

## 2025-08-04 PROCEDURE — 99316 NF DSCHRG MGMT 30 MIN+: CPT | Performed by: NURSE PRACTITIONER

## 2025-08-04 RX ORDER — MODAFINIL 100 MG/1
250 TABLET ORAL DAILY
Qty: 30 TABLET | Refills: 0 | Status: SHIPPED | OUTPATIENT
Start: 2025-08-04

## 2025-08-13 ENCOUNTER — OFFICE VISIT (OUTPATIENT)
Dept: NEUROLOGY | Facility: CLINIC | Age: 75
End: 2025-08-13
Payer: MEDICARE

## 2025-08-13 ENCOUNTER — OFFICE VISIT (OUTPATIENT)
Dept: NEUROLOGY | Facility: CLINIC | Age: 75
End: 2025-08-13
Attending: PSYCHIATRY & NEUROLOGY
Payer: MEDICARE

## 2025-08-13 VITALS
HEART RATE: 95 BPM | HEIGHT: 55 IN | DIASTOLIC BLOOD PRESSURE: 69 MMHG | WEIGHT: 204 LBS | SYSTOLIC BLOOD PRESSURE: 121 MMHG | BODY MASS INDEX: 47.21 KG/M2

## 2025-08-13 DIAGNOSIS — M54.12 CERVICAL RADICULOPATHY: Primary | ICD-10-CM

## 2025-08-13 PROCEDURE — 99207 PR NO CHARGE NURSE ONLY: CPT | Performed by: PSYCHIATRY & NEUROLOGY

## 2025-08-13 PROCEDURE — 95910 NRV CNDJ TEST 7-8 STUDIES: CPT | Performed by: PSYCHIATRY & NEUROLOGY

## 2025-08-13 PROCEDURE — 3078F DIAST BP <80 MM HG: CPT | Performed by: PSYCHIATRY & NEUROLOGY

## 2025-08-13 PROCEDURE — 3074F SYST BP LT 130 MM HG: CPT | Performed by: PSYCHIATRY & NEUROLOGY

## 2025-08-13 PROCEDURE — 99214 OFFICE O/P EST MOD 30 MIN: CPT | Performed by: PSYCHIATRY & NEUROLOGY

## 2025-08-13 PROCEDURE — 95886 MUSC TEST DONE W/N TEST COMP: CPT | Performed by: PSYCHIATRY & NEUROLOGY

## 2025-08-13 RX ORDER — FUROSEMIDE 20 MG/1
TABLET ORAL
COMMUNITY
Start: 2025-06-27

## 2025-08-18 ENCOUNTER — LAB REQUISITION (OUTPATIENT)
Dept: LAB | Facility: CLINIC | Age: 75
End: 2025-08-18
Payer: MEDICARE

## 2025-08-19 LAB — BACTERIA UR CULT: NORMAL

## 2025-08-25 ENCOUNTER — MEDICAL CORRESPONDENCE (OUTPATIENT)
Dept: HEALTH INFORMATION MANAGEMENT | Facility: CLINIC | Age: 75
End: 2025-08-25
Payer: MEDICARE

## 2025-08-26 ENCOUNTER — TELEPHONE (OUTPATIENT)
Dept: VASCULAR SURGERY | Facility: CLINIC | Age: 75
End: 2025-08-26
Payer: MEDICARE

## 2025-08-26 ENCOUNTER — TRANSCRIBE ORDERS (OUTPATIENT)
Dept: OTHER | Age: 75
End: 2025-08-26

## 2025-08-26 DIAGNOSIS — I87.2 VENOUS INSUFFICIENCY (CHRONIC) (PERIPHERAL): Primary | ICD-10-CM
